# Patient Record
Sex: FEMALE | Race: WHITE | NOT HISPANIC OR LATINO | Employment: OTHER | URBAN - METROPOLITAN AREA
[De-identification: names, ages, dates, MRNs, and addresses within clinical notes are randomized per-mention and may not be internally consistent; named-entity substitution may affect disease eponyms.]

---

## 2018-05-17 ENCOUNTER — LAB REQUISITION (OUTPATIENT)
Dept: LAB | Facility: HOSPITAL | Age: 73
End: 2018-05-17

## 2018-05-17 DIAGNOSIS — Z12.11 ENCOUNTER FOR SCREENING FOR MALIGNANT NEOPLASM OF COLON: ICD-10-CM

## 2018-05-17 PROCEDURE — 88305 TISSUE EXAM BY PATHOLOGIST: CPT | Performed by: INTERNAL MEDICINE

## 2018-05-18 LAB
CYTO UR: NORMAL
LAB AP CASE REPORT: NORMAL
LAB AP CLINICAL INFORMATION: NORMAL
Lab: NORMAL
PATH REPORT.FINAL DX SPEC: NORMAL
PATH REPORT.GROSS SPEC: NORMAL

## 2018-12-13 ENCOUNTER — OFFICE VISIT (OUTPATIENT)
Dept: GASTROENTEROLOGY | Facility: CLINIC | Age: 73
End: 2018-12-13

## 2018-12-13 VITALS
HEIGHT: 64 IN | WEIGHT: 194.6 LBS | BODY MASS INDEX: 33.22 KG/M2 | HEART RATE: 110 BPM | SYSTOLIC BLOOD PRESSURE: 134 MMHG | DIASTOLIC BLOOD PRESSURE: 67 MMHG

## 2018-12-13 DIAGNOSIS — K92.1 HEMATOCHEZIA: Primary | ICD-10-CM

## 2018-12-13 PROCEDURE — 99213 OFFICE O/P EST LOW 20 MIN: CPT | Performed by: INTERNAL MEDICINE

## 2018-12-13 RX ORDER — MONTELUKAST SODIUM 4 MG/1
TABLET, CHEWABLE ORAL
COMMUNITY
Start: 2018-11-13 | End: 2019-03-11 | Stop reason: SDUPTHER

## 2018-12-13 RX ORDER — EZETIMIBE 10 MG/1
TABLET ORAL
COMMUNITY
Start: 2017-04-03 | End: 2018-12-13 | Stop reason: ALTCHOICE

## 2018-12-13 RX ORDER — RANITIDINE 150 MG/1
TABLET ORAL
COMMUNITY
End: 2018-12-13 | Stop reason: ALTCHOICE

## 2018-12-13 RX ORDER — DULOXETIN HYDROCHLORIDE 30 MG/1
CAPSULE, DELAYED RELEASE ORAL
COMMUNITY
Start: 2017-04-03 | End: 2018-12-13 | Stop reason: ALTCHOICE

## 2018-12-13 RX ORDER — ALBUTEROL SULFATE 90 UG/1
AEROSOL, METERED RESPIRATORY (INHALATION)
COMMUNITY
End: 2019-08-30 | Stop reason: SDUPTHER

## 2018-12-13 RX ORDER — LEVOTHYROXINE SODIUM 0.05 MG/1
50 TABLET ORAL DAILY
COMMUNITY
Start: 2018-11-23 | End: 2022-02-03

## 2018-12-13 RX ORDER — DULOXETIN HYDROCHLORIDE 60 MG/1
CAPSULE, DELAYED RELEASE ORAL
COMMUNITY
Start: 2018-09-10 | End: 2018-12-13 | Stop reason: ALTCHOICE

## 2018-12-13 RX ORDER — CLONAZEPAM 0.5 MG/1
TABLET, ORALLY DISINTEGRATING ORAL
COMMUNITY
End: 2020-09-25

## 2018-12-13 RX ORDER — PHENAZOPYRIDINE HYDROCHLORIDE 200 MG/1
TABLET, FILM COATED ORAL
COMMUNITY
Start: 2018-10-23 | End: 2018-12-13

## 2018-12-13 RX ORDER — TRAMADOL HYDROCHLORIDE 50 MG/1
TABLET ORAL
COMMUNITY
End: 2018-12-13 | Stop reason: ALTCHOICE

## 2018-12-13 RX ORDER — TRAZODONE HYDROCHLORIDE 50 MG/1
TABLET ORAL
COMMUNITY
Start: 2018-11-03 | End: 2018-12-13 | Stop reason: ALTCHOICE

## 2018-12-13 RX ORDER — FLUTICASONE PROPIONATE 50 MCG
SPRAY, SUSPENSION (ML) NASAL
COMMUNITY
End: 2019-11-01

## 2018-12-13 RX ORDER — HYDROCODONE BITARTRATE AND ACETAMINOPHEN 10; 325 MG/15ML; MG/15ML
SOLUTION ORAL
COMMUNITY
Start: 2017-05-26 | End: 2018-12-13 | Stop reason: ALTCHOICE

## 2018-12-13 RX ORDER — DILTIAZEM HYDROCHLORIDE 120 MG/1
CAPSULE, COATED, EXTENDED RELEASE ORAL
COMMUNITY
End: 2018-12-13 | Stop reason: HOSPADM

## 2018-12-13 RX ORDER — DIPHENOXYLATE HYDROCHLORIDE AND ATROPINE SULFATE 2.5; .025 MG/1; MG/1
TABLET ORAL
COMMUNITY
End: 2020-01-27 | Stop reason: SDUPTHER

## 2018-12-13 RX ORDER — TIZANIDINE 4 MG/1
TABLET ORAL
COMMUNITY
Start: 2018-12-10 | End: 2019-11-01

## 2018-12-13 RX ORDER — ROPINIROLE 4 MG/1
4 TABLET, FILM COATED ORAL 2 TIMES DAILY
COMMUNITY
Start: 2014-04-09 | End: 2021-02-22 | Stop reason: DRUGHIGH

## 2018-12-13 RX ORDER — CETIRIZINE HYDROCHLORIDE 10 MG/1
TABLET ORAL
COMMUNITY
Start: 2014-04-09 | End: 2019-11-01

## 2018-12-13 RX ORDER — AMITRIPTYLINE HYDROCHLORIDE 10 MG/1
TABLET, FILM COATED ORAL
COMMUNITY
End: 2018-12-13 | Stop reason: ALTCHOICE

## 2018-12-13 RX ORDER — MONTELUKAST SODIUM 10 MG/1
TABLET ORAL
COMMUNITY
Start: 2018-10-14 | End: 2019-11-01

## 2018-12-13 RX ORDER — LISINOPRIL 10 MG/1
TABLET ORAL
COMMUNITY
End: 2018-12-13 | Stop reason: HOSPADM

## 2018-12-13 RX ORDER — METOPROLOL SUCCINATE 50 MG/1
TABLET, EXTENDED RELEASE ORAL
COMMUNITY
End: 2018-12-13 | Stop reason: ALTCHOICE

## 2018-12-13 RX ORDER — HYDROCORTISONE ACETATE 25 MG/1
25 SUPPOSITORY RECTAL DAILY
Qty: 15 SUPPOSITORY | Refills: 0 | Status: SHIPPED | OUTPATIENT
Start: 2018-12-13 | End: 2019-11-01

## 2018-12-13 NOTE — PROGRESS NOTES
Gastroenterology Office Note  Lorrie Pagan  [id number]  1945    CARE TEAM  Patient Care Team:  Provider, No Known as PCP - General    No ref. provider found     Chief Complaint   Patient presents with   • Rectal Pain   • Rectal Bleeding        History of present illness:    Patient is a 73-year-old white female known to me.  I saw her earlier this year.  She had chronic diarrhea and had undergone colonoscopy.  The colonoscopy was nondiagnostic outside of a hyperplastic polyp and left-sided diverticulitis but no recommendation for chronic diarrhea.  Biopsies are negative for microscopic colitis.    .  She is here today with occasional problem rectal bleeding.  She stopped taking Colestid which was helping with her diarrhea and the diarrhea has not been problematic she is having very rare occasional problems with painless hematochezia in the absence of any other localizing GI symptoms specifically denying dysphagia, odynophagia, early satiety, unexplained weight loss, melanotic stools, pulse constipation or diarrhea.    Past Medical History:   Diagnosis Date   • Bronchitis 01/2018   • Cancer (CMS/HCC)     History of lung cancer and skin cancer    • Cardiac murmur    • Chronic cough    • Chronic obstructive pulmonary disease (CMS/HCC)    • GERD (gastroesophageal reflux disease)    • History of colonic polyps    • Irritable bowel syndrome     Constipation/diarrhea   • Osteoarthritis    • Sleep apnea         Past Surgical History:   Procedure Laterality Date   • BACK SURGERY     • CATARACT EXTRACTION     • CHOLECYSTECTOMY     • COLONOSCOPY     • HAND SURGERY     • HYSTERECTOMY     • INCONTINENCE SURGERY     • LUNG SURGERY     • NISSEN FUNDOPLICATION          Medications:    Current Outpatient Medications:   •  albuterol (VENTOLIN HFA) 108 (90 Base) MCG/ACT inhaler, Ventolin HFA 90 mcg/actuation aerosol inhaler, Disp: , Rfl:   •  cetirizine (ZYRTEC ALLERGY) 10 MG tablet, Zyrtec 10 mg tablet, Disp: , Rfl:   •   clonazePAM (KlonoPIN) 0.5 MG disintegrating tablet, clonazepam 0.5 mg disintegrating tablet  Place 1 tablet as needed by translingual route., Disp: , Rfl:   •  colestipol (COLESTID) 1 g tablet, , Disp: , Rfl:   •  diphenoxylate-atropine (LOMOTIL) 2.5-0.025 MG per tablet, Lomotil  PRN, Disp: , Rfl:   •  fluticasone (FLONASE ALLERGY RELIEF) 50 MCG/ACT nasal spray, Flonase Allergy Relief 50 mcg/actuation nasal spray,suspension  Take as needed by nasal route., Disp: , Rfl:   •  fluticasone-salmeterol (ADVAIR DISKUS) 250-50 MCG/DOSE DISKUS, Advair Diskus 250 mcg-50 mcg/dose powder for inhalation, Disp: , Rfl:   •  fluticasone-salmeterol (ADVAIR DISKUS) 500-50 MCG/DOSE DISKUS, Every 12 (Twelve) Hours., Disp: , Rfl:   •  Furosemide (LASIX PO), furosemide  40 mg PRN, Disp: , Rfl:   •  ipratropium (ATROVENT HFA) 17 MCG/ACT inhaler, ipratropium bromide  daily, Disp: , Rfl:   •  levothyroxine (SYNTHROID, LEVOTHROID) 50 MCG tablet, , Disp: , Rfl:   •  montelukast (SINGULAIR) 10 MG tablet, , Disp: , Rfl:   •  rOPINIRole (REQUIP) 3 MG tablet, Requip 3 mg tablet, Disp: , Rfl:   •  sodium chloride 0.9 % nebulizer solution 0.88 mL with albuterol (5 MG/ML) 0.5% nebulizer solution 0.6 mg, albuterol sulfate  PRN, Disp: , Rfl:   •  Tiotropium Bromide Monohydrate (SPIRIVA RESPIMAT) 1.25 MCG/ACT aerosol solution inhaler, Spiriva Respimat 1.25 mcg/actuation solution for inhalation, Disp: , Rfl:   •  tiZANidine (ZANAFLEX) 4 MG tablet, , Disp: , Rfl:   •  Zafirlukast (ACCOLATE PO), Accolate  daily, Disp: , Rfl:     Allergies:  Allergies   Allergen Reactions   • Penicillins Other (See Comments)   • Statins Other (See Comments)       Family History   Problem Relation Age of Onset   • Colon polyps Mother    • Colon polyps Father        Social History     Socioeconomic History   • Marital status: Unknown     Spouse name: Not on file   • Number of children: Not on file   • Years of education: Not on file   • Highest education level: Not on file  "  Tobacco Use   • Smoking status: Former Smoker     Types: Cigarettes     Last attempt to quit: 1992     Years since quittin.9   • Smokeless tobacco: Never Used   Substance and Sexual Activity   • Alcohol use: Yes     Alcohol/week: 1.2 oz     Types: 2 Glasses of wine per week   • Drug use: No   • Sexual activity: No     Socioeconomic History: 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  2 sons.  5 grandchildren.  Drinks 2 alcoholic beverages per week.  Quit smoking in .         Review of Systems:  Review of Systems   Constitutional: Negative for unexpected weight loss.   HENT: Negative for trouble swallowing.    Eyes: Negative.    Respiratory: Negative.    Gastrointestinal: Positive for abdominal distention, abdominal pain, anal bleeding, constipation, diarrhea, nausea, rectal pain and indigestion. Negative for blood in stool, vomiting and GERD.   Endocrine: Negative.    Genitourinary: Negative.    Musculoskeletal: Negative.    Skin: Negative.    Allergic/Immunologic: Negative.    Neurological: Negative.    Hematological: Negative.    Psychiatric/Behavioral: Negative.        PHYSICAL EXAM   /67 (BP Location: Right arm, Patient Position: Sitting, Cuff Size: Adult)   Pulse 110   Ht 162.6 cm (64\")   Wt 88.3 kg (194 lb 9.6 oz)   BMI 33.40 kg/m²   General: Alert and oriented x 3. In no apparent or acute distress.  and No stigmata of chronic liver disease Wearing Nasal cannua O2    HEENT: Anicteric slcera. Normal oropharynx  Neck: Supple. Without lymphadenopathy  CV: Regular rate and rhythm, S1, S2  Lungs: Clear to ausculation. Without rales, robchi and wheezing  Abdomen:  Soft,non-distended without palpable masses or hepatosplenomeagaly, areas of rebound tenderness or guarding.   Extremeties: without clubbing or cyanosis +1 edema  Neurologic:  Alert and oriented x 3 without focal motor or sensory deficits  Rectal exam: deferred     Results for orders placed or performed in visit " on 05/17/18   Tissue Pathology Exam   Result Value Ref Range    Case Report       Surgical Pathology Report                         Case: [id number]                                  Authorizing Provider:  Cortes Rodriguez            Collected:           05/17/2018 09:10 AM                                 MD Yana                                                              Pathologist:           Vladislav Canales MD          Received:            05/17/2018 10:17 AM          Specimen:    Large Intestine, Left / Descending Colon                                                   Clinical Information       The working history is colon cancer screening.       Final Diagnosis        DESCENDING COLON POLYP:  Hyperplastic polyp.    JFJ/dlb         Gross Description       Received in formalin labeled as descending colon polyp is a 0.3 x 0.3 x 0.3 cm tan polyp. Submitted in toto in one cassette.  HBM/klb       Microscopic Description       Sections show colonic crypts with surface luminal dilatation and stellate shapes.  There is no adenomatous change or significant atypia.          Embedded Images          Results Review:  I reviewed the patient's new clinical results.  Old records were reviewed      ASSESSMENT  1.-Recurrent hematochezia likely due to anal fissure/internal hemorrhoids.  Conservative management is recommended  2.-History of cholerrheic diarrhea.     PLAN  1.-Conservative management.  Anusol HCC suppositories when necessary for refractory hematochezia  2.-High fiber diet  3.-Further workup and evaluation only for refractory symptoms.  Patient will will recheck to us and let us know if this is the case.      I discussed the patients findings and my recommendations with patient    Cortes Millan MD  12/13/2018   4:00 PM

## 2018-12-17 PROBLEM — K92.1 HEMATOCHEZIA: Status: ACTIVE | Noted: 2018-12-17

## 2019-03-11 RX ORDER — MONTELUKAST SODIUM 4 MG/1
TABLET, CHEWABLE ORAL
Qty: 60 TABLET | Refills: 4 | Status: SHIPPED | OUTPATIENT
Start: 2019-03-11 | End: 2019-07-25 | Stop reason: SDUPTHER

## 2019-07-25 RX ORDER — MONTELUKAST SODIUM 4 MG/1
TABLET, CHEWABLE ORAL
Qty: 60 TABLET | Refills: 4 | Status: SHIPPED | OUTPATIENT
Start: 2019-07-25 | End: 2019-10-17 | Stop reason: SDUPTHER

## 2019-08-28 ENCOUNTER — OFFICE VISIT (OUTPATIENT)
Dept: PULMONOLOGY | Facility: CLINIC | Age: 74
End: 2019-08-28

## 2019-08-28 VITALS
TEMPERATURE: 98.4 F | HEART RATE: 97 BPM | BODY MASS INDEX: 35.08 KG/M2 | OXYGEN SATURATION: 90 % | DIASTOLIC BLOOD PRESSURE: 60 MMHG | WEIGHT: 198 LBS | SYSTOLIC BLOOD PRESSURE: 126 MMHG | HEIGHT: 63 IN

## 2019-08-28 DIAGNOSIS — J44.9 CHRONIC OBSTRUCTIVE PULMONARY DISEASE, UNSPECIFIED COPD TYPE (HCC): Primary | ICD-10-CM

## 2019-08-28 DIAGNOSIS — Z99.89 OSA ON CPAP: ICD-10-CM

## 2019-08-28 DIAGNOSIS — J96.11 CHRONIC RESPIRATORY FAILURE WITH HYPOXIA (HCC): ICD-10-CM

## 2019-08-28 DIAGNOSIS — J47.9 IDIOPATHIC BRONCHIECTASIS (HCC): ICD-10-CM

## 2019-08-28 DIAGNOSIS — K44.9 HIATAL HERNIA: ICD-10-CM

## 2019-08-28 DIAGNOSIS — Z85.118 H/O: LUNG CANCER: ICD-10-CM

## 2019-08-28 DIAGNOSIS — G47.33 OSA ON CPAP: ICD-10-CM

## 2019-08-28 DIAGNOSIS — E66.9 OBESITY, CLASS II, BMI 35-39.9: ICD-10-CM

## 2019-08-28 DIAGNOSIS — Z87.891 FORMER SMOKER: ICD-10-CM

## 2019-08-28 PROCEDURE — 94060 EVALUATION OF WHEEZING: CPT | Performed by: INTERNAL MEDICINE

## 2019-08-28 PROCEDURE — 94726 PLETHYSMOGRAPHY LUNG VOLUMES: CPT | Performed by: INTERNAL MEDICINE

## 2019-08-28 PROCEDURE — 94729 DIFFUSING CAPACITY: CPT | Performed by: INTERNAL MEDICINE

## 2019-08-28 PROCEDURE — 99204 OFFICE O/P NEW MOD 45 MIN: CPT | Performed by: INTERNAL MEDICINE

## 2019-08-28 RX ORDER — IPRATROPIUM BROMIDE 42 UG/1
SPRAY, METERED NASAL
COMMUNITY
End: 2020-09-25

## 2019-08-28 RX ORDER — ALBUTEROL SULFATE 90 UG/1
4 AEROSOL, METERED RESPIRATORY (INHALATION) ONCE
Status: COMPLETED | OUTPATIENT
Start: 2019-08-28 | End: 2019-08-28

## 2019-08-28 RX ORDER — LISINOPRIL 5 MG/1
TABLET ORAL
Refills: 3 | COMMUNITY
Start: 2019-07-09 | End: 2019-11-01

## 2019-08-28 RX ORDER — AMITRIPTYLINE HYDROCHLORIDE 25 MG/1
TABLET, FILM COATED ORAL
COMMUNITY
End: 2020-09-25

## 2019-08-28 RX ADMIN — ALBUTEROL SULFATE 4 PUFF: 90 AEROSOL, METERED RESPIRATORY (INHALATION) at 09:17

## 2019-08-29 PROBLEM — J44.9 COPD MIXED TYPE: Status: ACTIVE | Noted: 2019-08-29

## 2019-08-29 PROBLEM — E66.9 OBESITY, CLASS II, BMI 35-39.9: Status: ACTIVE | Noted: 2019-08-29

## 2019-08-29 PROBLEM — E66.812 OBESITY, CLASS II, BMI 35-39.9: Status: ACTIVE | Noted: 2019-08-29

## 2019-08-29 PROBLEM — J96.11 CHRONIC RESPIRATORY FAILURE WITH HYPOXIA (HCC): Status: ACTIVE | Noted: 2019-08-29

## 2019-08-29 PROBLEM — Z99.89 OSA ON CPAP: Status: ACTIVE | Noted: 2019-08-29

## 2019-08-29 PROBLEM — K44.9 HIATAL HERNIA: Status: ACTIVE | Noted: 2019-08-29

## 2019-08-29 PROBLEM — Z85.118 H/O: LUNG CANCER: Status: ACTIVE | Noted: 2019-08-29

## 2019-08-29 PROBLEM — J47.9 IDIOPATHIC BRONCHIECTASIS (HCC): Status: ACTIVE | Noted: 2019-08-29

## 2019-08-29 PROBLEM — Z87.891 FORMER SMOKER: Status: ACTIVE | Noted: 2019-08-29

## 2019-08-29 PROBLEM — G47.33 OSA ON CPAP: Status: ACTIVE | Noted: 2019-08-29

## 2019-08-29 NOTE — PROGRESS NOTES
PULMONARY  NOTE    Chief Complaint     COPD, former smoker, history of lung cancer, history of bronchiectasis    History of Present Illness     73-year-old white female has been referred for evaluation of chronic lung disease.    She previously lived in [place] where she has had most of her medical care.  She recently moved to [place] and had been seen at [facility].  However due to difficulty getting into the clinic at [facility] she would like to switch her care to our office.    She has a past history of tobacco abuse that consisted of 1 or more packs of cigarettes per day.  She stopped smoking in 1992.    She has a history of moderate chronic obstructive pulmonary disease and has been on a medical regimen of Spiriva and Advair.  Insurance is apparently paying for these medications.    She has a history of obstructive sleep apnea and has CPAP which she uses with supplemental oxygen at night.    She has chronic respiratory failure and uses supplemental oxygen 24 hours a day.    She has regular reflux symptoms.  She did have a Nissen fundoplication in the past however that was over 10 years ago.  She continues to use a PPI and still has reflux symptoms.    She has fairly regular sinus symptoms with sinus congestion, drainage, and postnasal drip.  She is been on multiple OTC medications including Atrovent nasal spray.    She has a history of apparently limited stage lung cancer previously underwent a left upper lobe lobectomy.  She is had no evidence of recurrence to date.    Patient Active Problem List   Diagnosis   • Hematochezia   • Moderate (Stage II) COPD   • Bronchiectasis (CMS/HCC)   • H/O: lung cancer (Prior resection 2016)   • Former smoker (None since 1992)   • Chronic respiratory failure   • RENETTA on CPAP   • Obesity, Class II, BMI 35-39.9   • Hiatal hernia (Prior Nissen 2008)     Allergies   Allergen Reactions   • Penicillins Other (See Comments)     rash   • Statins Other (See Comments)     myalgia        Current Outpatient Medications:   •  albuterol (VENTOLIN HFA) 108 (90 Base) MCG/ACT inhaler, Ventolin HFA 90 mcg/actuation aerosol inhaler, Disp: , Rfl:   •  amitriptyline (ELAVIL) 10 MG tablet, amitriptyline 10 mg tablet, Disp: , Rfl:   •  cetirizine (ZYRTEC ALLERGY) 10 MG tablet, Zyrtec 10 mg tablet, Disp: , Rfl:   •  clonazePAM (KlonoPIN) 0.5 MG disintegrating tablet, clonazepam 0.5 mg disintegrating tablet  Place 1 tablet as needed by translingual route., Disp: , Rfl:   •  colestipol (COLESTID) 1 g tablet, Take 2 tablets (2 grams) by mouth daily . Keep two hours apart form other medications., Disp: 60 tablet, Rfl: 4  •  fluticasone (FLONASE ALLERGY RELIEF) 50 MCG/ACT nasal spray, Flonase Allergy Relief 50 mcg/actuation nasal spray,suspension  Take as needed by nasal route., Disp: , Rfl:   •  fluticasone-salmeterol (ADVAIR DISKUS) 250-50 MCG/DOSE DISKUS, Advair Diskus 250 mcg-50 mcg/dose powder for inhalation, Disp: , Rfl:   •  Furosemide (LASIX PO), furosemide  40 mg PRN, Disp: , Rfl:   •  HYDROcod Polst-CPM Polst ER (TUSSIONEX PENNKINETIC) 10-8 MG/5ML ER suspension, TK 10 ML PO BID PRN, Disp: , Rfl: 0  •  hydrocortisone (ANUSOL-HC) 25 MG suppository, Insert 1 suppository into the rectum Daily., Disp: 15 suppository, Rfl: 0  •  ipratropium (ATROVENT) 0.06 % nasal spray, ipratropium bromide 42 mcg (0.06 %) nasal spray, Disp: , Rfl:   •  levothyroxine (SYNTHROID, LEVOTHROID) 50 MCG tablet, , Disp: , Rfl:   •  lisinopril (PRINIVIL,ZESTRIL) 5 MG tablet, TK 1 T PO D UTD, Disp: , Rfl: 3  •  metFORMIN (GLUCOPHAGE) 500 MG tablet, TK 1 T PO Q 12 H WF, Disp: , Rfl: 0  •  montelukast (SINGULAIR) 10 MG tablet, , Disp: , Rfl:   •  rOPINIRole (REQUIP) 3 MG tablet, Requip 3 mg tablet, Disp: , Rfl:   •  sodium chloride 0.9 % nebulizer solution 0.88 mL with albuterol (5 MG/ML) 0.5% nebulizer solution 0.6 mg, albuterol sulfate  PRN, Disp: , Rfl:   •  Tiotropium Bromide Monohydrate (SPIRIVA RESPIMAT) 1.25 MCG/ACT aerosol  "solution inhaler, Spiriva Respimat 1.25 mcg/actuation solution for inhalation, Disp: , Rfl:   •  tiZANidine (ZANAFLEX) 4 MG tablet, , Disp: , Rfl:   •  Zafirlukast (ACCOLATE PO), Accolate  daily, Disp: , Rfl:   •  diphenoxylate-atropine (LOMOTIL) 2.5-0.025 MG per tablet, Lomotil  PRN, Disp: , Rfl:   No current facility-administered medications for this visit.   MEDICATION LIST AND ALLERGIES REVIEWED.    Family History   Problem Relation Age of Onset   • Colon polyps Mother    • Emphysema Mother    • Colon polyps Father      Social History     Tobacco Use   • Smoking status: Former Smoker     Packs/day: 1.50     Years: 25.00     Pack years: 37.50     Types: Cigarettes     Last attempt to quit: 1992     Years since quittin.6   • Smokeless tobacco: Never Used   Substance Use Topics   • Alcohol use: Yes     Alcohol/week: 1.2 oz     Types: 2 Glasses of wine per week   • Drug use: No     Social History     Social History Narrative    Previously lived in [facility], recently moved here        Retired    Previously smoked approximately 1 pack cigarettes per day for 25 years and stop smoking in     Drinks 3-4 alcoholic beverages on a weekly basis     FAMILY AND SOCIAL HISTORY REVIEWED.    Review of Systems  ALSO REFER TO SCANNED ROS SHEET FROM SAME DATE.    /60   Pulse 97   Temp 98.4 °F (36.9 °C)   Ht 158.8 cm (62.5\")   Wt 89.8 kg (198 lb)   LMP  (LMP Unknown)   SpO2 90% Comment: resting, 5 liters pulse dose  Breastfeeding? No   BMI 35.64 kg/m²   Physical Exam   Constitutional: She is oriented to person, place, and time. She appears well-developed. No distress.   HENT:   Head: Normocephalic and atraumatic.   Neck: No thyromegaly present.   Cardiovascular: Normal rate, regular rhythm and normal heart sounds.   No murmur heard.  Pulmonary/Chest: Effort normal. No stridor.   Decreased breath sounds with few rhonchi bilaterally that clear with cough, no wheezes   Abdominal: Soft. Bowel sounds are " normal.   Musculoskeletal: Normal range of motion.   Trace ankle edema   Lymphadenopathy:     She has no cervical adenopathy.        Right: No supraclavicular and no epitrochlear adenopathy present.        Left: No supraclavicular and no epitrochlear adenopathy present.   Neurological: She is alert and oriented to person, place, and time.   Skin: Skin is warm and dry. She is not diaphoretic.   Psychiatric: She has a normal mood and affect. Her behavior is normal.   Nursing note and vitals reviewed.      Results     Chest x-ray reveals chronic appearing interstitial changes without consolidation or effusions    PFTs reveal moderate airway obstruction, mild improvement in FEV1 after bronchodilator.  No restriction in a normal corrected diffusion capacity    Problem List       ICD-10-CM ICD-9-CM   1. Moderate (Stage II) COPD J44.9 496   2. Bronchiectasis (CMS/[facility]) J47.9 494.0   3. Chronic respiratory failure J96.11 518.83     799.02   4. Former smoker (None since 1992) Z87.891 V15.82   5. H/O: lung cancer (Prior resection 2016) Z85.118 V10.11   6. Hiatal hernia (Prior Nissen 2008) K44.9 553.3   7. Obesity, Class II, BMI 35-39.9 E66.9 278.00   8. RENETTA on CPAP G47.33 327.23    Z99.89 V46.8       Discussion     We reviewed her test results.  She has moderate obstructive airways disease.  I suspect she has a combination of emphysema, chronic bronchitis, and bronchiectasis.  Her lung disease is most likely due to her history of tobacco abuse.  However, will get an alpha-1 antitrypsin screen.  A suspect one is been done in the past but I do not have any record of it.    Regarding her bronchiectasis I recommended regular use of nebulized albuterol and a flutter valve.  I would suggest this at least twice a day.  I gave her a prescription for an Aerobika flutter valve.    She will remain on Spiriva and Advair for her moderate COPD    Although she has not been on frequent antibiotics she has had frequent exacerbations of cough  and sputum production and we will trial suppressive azithromycin on a Monday, Wednesday, and Friday schedule.  I gave her written instructions, a prescription, and we discussed potential side effects.    She has evidence of fluid retention probably due to chronic cor pulmonale.  I recommended salt and water avoidance.    Her chronic cough is most likely due to her COPD and bronchiectasis.  However, lisinopril (and reflux) could be complicating factors.  I suggested a trial off of lisinopril to see if her cough improves.    Reflux is probably also contributing to her multiple symptoms.  We discussed reflux precautions and I gave her reflux information sheet.    I think she would benefit from pulmonary rehabilitation.  We will make that referral.    I have encouraged continued compliance with her CPAP    We will obtain a CT scan of the chest to further evaluate for underlying bronchiectasis, interstitial lung disease, and as for surveillance for her history of lung cancer    Vladislav Arboleda MD  Note electronically signed    CC: Evangelina Luna MD

## 2019-08-30 DIAGNOSIS — J44.9 CHRONIC OBSTRUCTIVE PULMONARY DISEASE, UNSPECIFIED COPD TYPE (HCC): Primary | ICD-10-CM

## 2019-08-30 RX ORDER — ALBUTEROL SULFATE 90 UG/1
2 AEROSOL, METERED RESPIRATORY (INHALATION) EVERY 6 HOURS PRN
Qty: 8 G | Refills: 2 | Status: SHIPPED | OUTPATIENT
Start: 2019-08-30 | End: 2020-04-27

## 2019-09-03 DIAGNOSIS — J44.9 CHRONIC OBSTRUCTIVE PULMONARY DISEASE, UNSPECIFIED COPD TYPE (HCC): ICD-10-CM

## 2019-09-03 DIAGNOSIS — Z85.118 H/O: LUNG CANCER: ICD-10-CM

## 2019-09-03 DIAGNOSIS — J47.9 IDIOPATHIC BRONCHIECTASIS (HCC): Primary | ICD-10-CM

## 2019-09-10 ENCOUNTER — HOSPITAL ENCOUNTER (OUTPATIENT)
Dept: CT IMAGING | Facility: HOSPITAL | Age: 74
Discharge: HOME | End: 2019-09-10
Admitting: INTERNAL MEDICINE

## 2019-09-10 DIAGNOSIS — J47.9 IDIOPATHIC BRONCHIECTASIS (HCC): ICD-10-CM

## 2019-09-10 DIAGNOSIS — Z85.118 H/O: LUNG CANCER: ICD-10-CM

## 2019-09-10 PROCEDURE — 71250 CT THORAX DX C-: CPT

## 2019-09-13 ENCOUNTER — TELEPHONE (OUTPATIENT)
Dept: PULMONOLOGY | Facility: CLINIC | Age: 74
End: 2019-09-13

## 2019-09-16 ENCOUNTER — TELEPHONE (OUTPATIENT)
Dept: PULMONOLOGY | Facility: CLINIC | Age: 74
End: 2019-09-16

## 2019-09-16 NOTE — TELEPHONE ENCOUNTER
Called CT results to patient.  She does follow with a GI as well, and is due her endoscopy for evaluation.

## 2019-09-17 ENCOUNTER — PREP FOR SURGERY (OUTPATIENT)
Dept: OTHER | Facility: HOSPITAL | Age: 74
End: 2019-09-17

## 2019-09-17 DIAGNOSIS — K22.89 ESOPHAGEAL DILATATION: ICD-10-CM

## 2019-09-17 DIAGNOSIS — K21.9 GASTRIC REFLUX: Primary | ICD-10-CM

## 2019-09-20 PROBLEM — K22.89 ESOPHAGEAL DILATATION: Status: ACTIVE | Noted: 2019-09-20

## 2019-09-20 PROBLEM — K21.9 GASTRIC REFLUX: Status: ACTIVE | Noted: 2019-09-20

## 2019-09-30 ENCOUNTER — TELEPHONE (OUTPATIENT)
Dept: PULMONOLOGY | Facility: CLINIC | Age: 74
End: 2019-09-30

## 2019-09-30 NOTE — TELEPHONE ENCOUNTER
Pt called today c/o Sever SOB/Swelling/ and Fatigue for 3-4 days. Pt denies Chest Pain/Cough/Nause. Pt admits she is on Rx Azithromycin MWF and is currently taking Bactrim for UTI along with O2 24 hours daily. Pt can be reached @ [phone]. Please advise.

## 2019-09-30 NOTE — TELEPHONE ENCOUNTER
She will need to be seen and be evaluated, to get a better idea of what is going on.  She can see me or Jessica for a sick visit.  No testing, we can decide when she gets here.

## 2019-09-30 NOTE — TELEPHONE ENCOUNTER
CALLED AND SPOKE TO PT TO SCHEDULE APPT WITH [facility] FOR TOMORROW AT 11:00. PT STATED UNDERSTANDING

## 2019-09-30 NOTE — TELEPHONE ENCOUNTER
Please schedule pt with Jessica or Saray this week for sick f/u. No testing needed. Pt can be reached @ [phone].

## 2019-10-01 ENCOUNTER — OFFICE VISIT (OUTPATIENT)
Dept: PULMONOLOGY | Facility: CLINIC | Age: 74
End: 2019-10-01

## 2019-10-01 VITALS
HEIGHT: 63 IN | OXYGEN SATURATION: 90 % | SYSTOLIC BLOOD PRESSURE: 160 MMHG | TEMPERATURE: 97.6 F | DIASTOLIC BLOOD PRESSURE: 70 MMHG | BODY MASS INDEX: 36.11 KG/M2 | HEART RATE: 95 BPM | WEIGHT: 203.8 LBS

## 2019-10-01 DIAGNOSIS — E66.9 OBESITY, CLASS II, BMI 35-39.9: ICD-10-CM

## 2019-10-01 DIAGNOSIS — G47.33 OSA ON CPAP: ICD-10-CM

## 2019-10-01 DIAGNOSIS — R06.02 SHORTNESS OF BREATH: Primary | ICD-10-CM

## 2019-10-01 DIAGNOSIS — J96.11 CHRONIC RESPIRATORY FAILURE WITH HYPOXIA (HCC): ICD-10-CM

## 2019-10-01 DIAGNOSIS — J47.9 IDIOPATHIC BRONCHIECTASIS (HCC): ICD-10-CM

## 2019-10-01 DIAGNOSIS — Z99.89 OSA ON CPAP: ICD-10-CM

## 2019-10-01 DIAGNOSIS — J44.9 CHRONIC OBSTRUCTIVE PULMONARY DISEASE, UNSPECIFIED COPD TYPE (HCC): ICD-10-CM

## 2019-10-01 PROCEDURE — 87116 MYCOBACTERIA CULTURE: CPT | Performed by: NURSE PRACTITIONER

## 2019-10-01 PROCEDURE — 87205 SMEAR GRAM STAIN: CPT | Performed by: NURSE PRACTITIONER

## 2019-10-01 PROCEDURE — 99214 OFFICE O/P EST MOD 30 MIN: CPT | Performed by: NURSE PRACTITIONER

## 2019-10-01 PROCEDURE — 87206 SMEAR FLUORESCENT/ACID STAI: CPT | Performed by: NURSE PRACTITIONER

## 2019-10-01 PROCEDURE — 87070 CULTURE OTHR SPECIMN AEROBIC: CPT | Performed by: NURSE PRACTITIONER

## 2019-10-01 RX ORDER — SULFAMETHOXAZOLE AND TRIMETHOPRIM 200; 40 MG/5ML; MG/5ML
SUSPENSION ORAL 2 TIMES DAILY
COMMUNITY
End: 2019-10-25

## 2019-10-01 RX ORDER — ALBUTEROL SULFATE 2.5 MG/3ML
2.5 SOLUTION RESPIRATORY (INHALATION) 4 TIMES DAILY PRN
Qty: 125 VIAL | Refills: 3 | Status: SHIPPED | OUTPATIENT
Start: 2019-10-01 | End: 2020-09-25

## 2019-10-01 RX ORDER — PREDNISONE 10 MG/1
TABLET ORAL
Qty: 31 TABLET | Refills: 0 | Status: SHIPPED | OUTPATIENT
Start: 2019-10-01 | End: 2019-10-25

## 2019-10-01 NOTE — PROGRESS NOTES
[facility] Pulmonary Follow Up Note    Chief Complaint:  Chief Complaint   Patient presents with   • Shortness of Breath       History of Present Illness:  Lorrie Pagan is a 74 y.o.female here today for a sick visit with Shortness of Breath. She was last seen in the clinic on 8/28/19 by Dr Zaire Arboleda for an initial referral to our office for evaluation of chronic lung disease.    She presents today with increased shortness of breath, wheezing, with productive cough for the last 3-4 days. Her sputum is thick, but remains clear to white. She denies fever, chills, hemoptysis, chest pain, palpitations. She does have lower extremity edema, however she admits that it is at baseline and is chronic for her. She is also being treated for an acute UTI with Bactrim, which was started 2 days ago by her PCP.     Dr Arboleda has also started her on a trial of suppressive azithromycin on a Monday, Wednesday, and Friday because of frequent exacerbations and she has been taking it as prescribed. She also admits to some sinus symptoms with sinus congestion and postnasal drip. She is been on multiple OTC medications including Atrovent nasal spray and Mucinex DM. She was previously on Singulair, but has not been taking it lately.      She previously lived in [place] where she has had most of her medical care. She recently moved to [place] and had been seen at [facility], however due to difficulty getting into the clinic, she wanted to switch her care to our office. She is actually planning to switch most of her care to [facility].    She has a past history of tobacco abuse that consisted of 1 or more packs of cigarettes per day. She stopped smoking in 1992. She has a history of moderate chronic obstructive pulmonary disease that was confirmed by PFT at her last office appointment. She is currently on a maintenance regimen of Spiriva Resinat 1.25 and Advair Diskus 250/50.  Dr Arboleda also recommended adding albuterol  nebulizer treatments with a flutter valve for her bronchiectasis.     She has a history of obstructive sleep apnea and has CPAP which she uses with supplemental oxygen at night.     She has chronic respiratory failure and uses supplemental oxygen 24 hours a day. She is on 3 L at rest and increases to 4 L with activity.    I have reviewed the patients past medical, past surgical and family history as noted in Epic.     Subjective     Social History     Socioeconomic History   • Marital status: Unknown     Spouse name: Not on file   • Number of children: Not on file   • Years of education: Not on file   • Highest education level: Not on file   Tobacco Use   • Smoking status: Former Smoker     Packs/day: 1.50     Years: 25.00     Pack years: 37.50     Types: Cigarettes     Last attempt to quit: 1992     Years since quittin.7   • Smokeless tobacco: Never Used   Substance and Sexual Activity   • Alcohol use: Yes     Alcohol/week: 1.2 oz     Types: 2 Glasses of wine per week   • Drug use: No   • Sexual activity: No   Social History Narrative    Previously lived in [facility], recently moved here        Retired    Previously smoked approximately 1 pack cigarettes per day for 25 years and stop smoking in     Drinks 3-4 alcoholic beverages on a weekly basis         Current Outpatient Medications:   •  sulfamethoxazole-trimethoprim (BACTRIM,SEPTRA) 200-40 MG/5ML suspension, Take  by mouth 2 (Two) Times a Day., Disp: , Rfl:   •  albuterol (PROVENTIL) (2.5 MG/3ML) 0.083% nebulizer solution, Take 2.5 mg by nebulization 4 (Four) Times a Day As Needed for Wheezing., Disp: 125 vial, Rfl: 3  •  albuterol sulfate HFA (VENTOLIN HFA) 108 (90 Base) MCG/ACT inhaler, Inhale 2 puffs Every 6 (Six) Hours As Needed for Wheezing., Disp: 8 g, Rfl: 2  •  amitriptyline (ELAVIL) 10 MG tablet, amitriptyline 10 mg tablet, Disp: , Rfl:   •  cetirizine (ZYRTEC ALLERGY) 10 MG tablet, Zyrtec 10 mg tablet, Disp: , Rfl:   •  clonazePAM  (KlonoPIN) 0.5 MG disintegrating tablet, clonazepam 0.5 mg disintegrating tablet  Place 1 tablet as needed by translingual route., Disp: , Rfl:   •  colestipol (COLESTID) 1 g tablet, Take 2 tablets (2 grams) by mouth daily . Keep two hours apart form other medications., Disp: 60 tablet, Rfl: 4  •  diphenoxylate-atropine (LOMOTIL) 2.5-0.025 MG per tablet, Lomotil  PRN, Disp: , Rfl:   •  fluticasone (FLONASE ALLERGY RELIEF) 50 MCG/ACT nasal spray, Flonase Allergy Relief 50 mcg/actuation nasal spray,suspension  Take as needed by nasal route., Disp: , Rfl:   •  fluticasone-salmeterol (ADVAIR DISKUS) 250-50 MCG/DOSE DISKUS, Advair Diskus 250 mcg-50 mcg/dose powder for inhalation, Disp: , Rfl:   •  Furosemide (LASIX PO), furosemide  40 mg PRN, Disp: , Rfl:   •  HYDROcod Polst-CPM Polst ER (TUSSIONEX PENNKINETIC) 10-8 MG/5ML ER suspension, TK 10 ML PO BID PRN, Disp: , Rfl: 0  •  hydrocortisone (ANUSOL-HC) 25 MG suppository, Insert 1 suppository into the rectum Daily., Disp: 15 suppository, Rfl: 0  •  ipratropium (ATROVENT) 0.06 % nasal spray, ipratropium bromide 42 mcg (0.06 %) nasal spray, Disp: , Rfl:   •  levothyroxine (SYNTHROID, LEVOTHROID) 50 MCG tablet, , Disp: , Rfl:   •  lisinopril (PRINIVIL,ZESTRIL) 5 MG tablet, TK 1 T PO D UTD, Disp: , Rfl: 3  •  metFORMIN (GLUCOPHAGE) 500 MG tablet, TK 1 T PO Q 12 H WF, Disp: , Rfl: 0  •  montelukast (SINGULAIR) 10 MG tablet, , Disp: , Rfl:   •  predniSONE (DELTASONE) 10 MG tablet, Take 4 tabs daily x 3 days, then take 3 tabs daily x 3 days, then take 2 tabs daily x 3 days, then take 1 tab daily x 3 days, Disp: 31 tablet, Rfl: 0  •  rOPINIRole (REQUIP) 3 MG tablet, Requip 3 mg tablet, Disp: , Rfl:   •  Tiotropium Bromide Monohydrate (SPIRIVA RESPIMAT) 1.25 MCG/ACT aerosol solution inhaler, Spiriva Respimat 1.25 mcg/actuation solution for inhalation, Disp: , Rfl:   •  tiZANidine (ZANAFLEX) 4 MG tablet, , Disp: , Rfl:   •  Zafirlukast (ACCOLATE PO), Accolate  daily, Disp: , Rfl:  "    Allergies   Allergen Reactions   • Penicillins Other (See Comments)     rash   • Statins Other (See Comments)     myalgia       Immunization History   Administered Date(s) Administered   • Flu Vaccine High Dose PF 65YR+ 10/28/2018   • Pneumococcal Conjugate 13-Valent (PCV13) 10/28/2018       Review of Systems:    Review of Systems   Constitutional: Positive for fatigue. Negative for chills and fever.   HENT: Positive for congestion, rhinorrhea and sneezing. Negative for sinus pressure, trouble swallowing and voice change.    Eyes: Negative for blurred vision and visual disturbance.   Respiratory: Positive for cough, shortness of breath and wheezing. Negative for apnea and chest tightness.    Cardiovascular: Positive for leg swelling. Negative for chest pain and palpitations.   Gastrointestinal: Positive for constipation and diarrhea. Negative for abdominal pain, nausea and vomiting.   Endocrine: Negative for cold intolerance and heat intolerance.   Genitourinary: Positive for dysuria (Current UTI per PCP). Negative for hematuria.   Musculoskeletal: Negative for arthralgias and joint swelling.   Skin: Negative for rash and bruise.   Allergic/Immunologic: Positive for environmental allergies. Negative for food allergies and immunocompromised state.   Neurological: Negative for dizziness, speech difficulty, weakness and headache.   Hematological: Negative for adenopathy. Bruises/bleeds easily.   Psychiatric/Behavioral: Negative for dysphoric mood. The patient is not nervous/anxious.        Objective     Vital Signs:  Vitals:    10/01/19 1012   BP: 160/70   BP Location: Right arm   Patient Position: Sitting   Pulse: 95   Temp: 97.6 °F (36.4 °C)   SpO2: 90%  Comment: 5 liters   Weight: 92.4 kg (203 lb 12.8 oz)   Height: 158.8 cm (62.5\")       Physical Exam:    Physical Exam   Constitutional: She is oriented to person, place, and time. She appears well-developed and well-nourished. She is obese.  HENT:   Head: " Normocephalic and atraumatic.   Mouth/Throat: Oropharynx is clear and moist.   Eyes: EOM are normal. Pupils are equal, round, and reactive to light.   Neck: Normal range of motion. Neck supple.   Cardiovascular: Normal rate and regular rhythm.   Pulmonary/Chest: Effort normal. No respiratory distress. She has decreased breath sounds. She has wheezes (expiratory).   Abdominal: Soft. Bowel sounds are normal.   Musculoskeletal: Normal range of motion. She exhibits edema (BLE).   Lymphadenopathy:        Head (right side): No submental, no submandibular and no tonsillar adenopathy present.        Head (left side): No submental, no submandibular and no tonsillar adenopathy present.     She has no cervical adenopathy.   Neurological: She is alert and oriented to person, place, and time.   Skin: Skin is warm and dry.   Psychiatric: She has a normal mood and affect. Judgment normal.   Nursing note and vitals reviewed.      Results Review:    I reviewed the patient's new clinical results.    Ct Chest Without Contrast    Result Date: 9/11/2019  1.  Severe centrilobular emphysema with advanced destructive characteristics predominantly involving the upper lobes with mild paraseptal emphysema component also noted. There is diffuse bronchial wall thickening suggesting likely ongoing bronchial inflammation. Multifocal atelectasis and scarring are identified without evidence for dominant pulmonary mass or lesion.  2.  Remote sternal fracture identified.  3.  Nonspecific mild esophageal wall thickening suggesting possible gastroesophageal reflux disease, clinical correlation required.  D:  09/11/2019 E:  09/11/2019  This report was finalized on 9/11/2019 3:51 PM by Dr. Gage Noble MD.      Assessment/Plan   Assessment / Plan:    Problem List Items Addressed This Visit        Respiratory    Bronchiectasis (CMS/HCC)    Relevant Medications    predniSONE (DELTASONE) 10 MG tablet    Chronic respiratory failure    RENETTA on CPAP        "Other    Obesity, Class II, BMI 35-39.9      Other Visit Diagnoses     Shortness of breath    -  Primary    Relevant Medications    predniSONE (DELTASONE) 10 MG tablet    Other Relevant Orders    Respiratory Culture - Sputum, Cough    AFB Culture - Sputum, Cough    Moderate (Stage II) COPD        Relevant Medications    predniSONE (DELTASONE) 10 MG tablet          Discussion:    Patient presents today for a sick visit c/o increased shortness of breath, wheezing, and productive cough. Her sputum is thick, but non-colored, with no complaints of fever, chills. She does have wheezing on exam so I will send in a short steroid taper. Potential side effects were reviewed. I will also order a respiratory and AFB culture today. She should have her flu vaccination on return.    She will complete her coarse of Bactrim for her UTI as prescribed by her PCP. She will also continue her suppressive azithromycin on a Monday, Wednesday, and Friday schedule.    She will remain on Advair 250/50 for her moderate COPD. She has been on Spiriva Respimat 1.25 for \"asthma\". I will give her enough samples today for Spiriva 2.5 til she returns to the office to see if she does better with it. If so, we can change her prescription at her return visit.  She will continue regular use of nebulized albuterol and a flutter valve, at least twice daily. She may use up to 4 times daily as needed. I gave her samples of her albuterol neb and renewed her prescription today.    She will continue her oxygent therapy with 3 L at rest, 4 L with activity. She agrees to continue CPAP nightly with supplemental oxygen.     She did have a  CT scan of the chest to further evaluate for underlying bronchiectasis, interstitial lung disease, and as for surveillance for her history of lung cancer. Dr Arboleda can review it with her at her next visit.    She will return on 10/25/19 for her already scheduled follow up appointment with Dr Zaire Arboleda.  She will call in " the meantime if she develops worsening symptoms, fever, chills, or colored sputum.    Plan of care was reviewed with the patient at the conclusion of today's visit. Education was provided regarding diagnosis, management, and any prescribed or recommended over the counter medications. Patient verbalizes understanding of and agreement with management plan.     I spent 25 minutes with the patient. I spent > 50% percent of this time counseling and discussing diagnosis, prognosis, diagnostic testing, evaluation, treatment options and management.    DNOALD Petty  Electronically signed     Please note that portions of this note were completed with a voice recognition program. Efforts were made to edit the dictations, but occasionally words are mistranscribed.

## 2019-10-03 LAB
BACTERIA SPEC RESP CULT: NORMAL
GRAM STN SPEC: NORMAL

## 2019-10-17 ENCOUNTER — APPOINTMENT (OUTPATIENT)
Dept: GENERAL RADIOLOGY | Facility: HOSPITAL | Age: 74
End: 2019-10-17

## 2019-10-17 ENCOUNTER — APPOINTMENT (OUTPATIENT)
Dept: CT IMAGING | Facility: HOSPITAL | Age: 74
End: 2019-10-17

## 2019-10-17 ENCOUNTER — HOSPITAL ENCOUNTER (EMERGENCY)
Facility: HOSPITAL | Age: 74
Discharge: HOME | End: 2019-10-17
Attending: EMERGENCY MEDICINE | Admitting: EMERGENCY MEDICINE

## 2019-10-17 ENCOUNTER — TELEPHONE (OUTPATIENT)
Dept: PULMONOLOGY | Facility: CLINIC | Age: 74
End: 2019-10-17

## 2019-10-17 VITALS
HEART RATE: 98 BPM | DIASTOLIC BLOOD PRESSURE: 64 MMHG | WEIGHT: 190 LBS | HEIGHT: 64 IN | SYSTOLIC BLOOD PRESSURE: 140 MMHG | TEMPERATURE: 98.3 F | RESPIRATION RATE: 18 BRPM | OXYGEN SATURATION: 98 % | BODY MASS INDEX: 32.44 KG/M2

## 2019-10-17 DIAGNOSIS — J44.1 COPD EXACERBATION (HCC): Primary | ICD-10-CM

## 2019-10-17 DIAGNOSIS — R09.02 HYPOXIA: ICD-10-CM

## 2019-10-17 LAB
ALBUMIN SERPL-MCNC: 4.2 G/DL (ref 3.5–5.2)
ALBUMIN/GLOB SERPL: 1.7 G/DL
ALP SERPL-CCNC: 84 U/L (ref 39–117)
ALT SERPL W P-5'-P-CCNC: 12 U/L (ref 1–33)
ANION GAP SERPL CALCULATED.3IONS-SCNC: 6 MMOL/L (ref 5–15)
AST SERPL-CCNC: 13 U/L (ref 1–32)
BASOPHILS # BLD AUTO: 0.03 10*3/MM3 (ref 0–0.2)
BASOPHILS NFR BLD AUTO: 0.2 % (ref 0–1.5)
BILIRUB SERPL-MCNC: 0.3 MG/DL (ref 0.2–1.2)
BUN BLD-MCNC: 11 MG/DL (ref 8–23)
BUN/CREAT SERPL: 19 (ref 7–25)
CALCIUM SPEC-SCNC: 8.9 MG/DL (ref 8.6–10.5)
CHLORIDE SERPL-SCNC: 100 MMOL/L (ref 98–107)
CO2 SERPL-SCNC: 36 MMOL/L (ref 22–29)
CREAT BLD-MCNC: 0.58 MG/DL (ref 0.57–1)
D DIMER PPP FEU-MCNC: 1.04 MCGFEU/ML (ref 0–0.56)
DEPRECATED RDW RBC AUTO: 49 FL (ref 37–54)
EOSINOPHIL # BLD AUTO: 0.18 10*3/MM3 (ref 0–0.4)
EOSINOPHIL NFR BLD AUTO: 1.3 % (ref 0.3–6.2)
ERYTHROCYTE [DISTWIDTH] IN BLOOD BY AUTOMATED COUNT: 14.2 % (ref 12.3–15.4)
GFR SERPL CREATININE-BSD FRML MDRD: 101 ML/MIN/1.73
GLOBULIN UR ELPH-MCNC: 2.5 GM/DL
GLUCOSE BLD-MCNC: 157 MG/DL (ref 65–99)
HCT VFR BLD AUTO: 40.7 % (ref 34–46.6)
HGB BLD-MCNC: 11.9 G/DL (ref 12–15.9)
HOLD SPECIMEN: NORMAL
HOLD SPECIMEN: NORMAL
IMM GRANULOCYTES # BLD AUTO: 0.12 10*3/MM3 (ref 0–0.05)
IMM GRANULOCYTES NFR BLD AUTO: 0.9 % (ref 0–0.5)
LYMPHOCYTES # BLD AUTO: 1.69 10*3/MM3 (ref 0.7–3.1)
LYMPHOCYTES NFR BLD AUTO: 12.3 % (ref 19.6–45.3)
MCH RBC QN AUTO: 27.4 PG (ref 26.6–33)
MCHC RBC AUTO-ENTMCNC: 29.2 G/DL (ref 31.5–35.7)
MCV RBC AUTO: 93.6 FL (ref 79–97)
MONOCYTES # BLD AUTO: 1.02 10*3/MM3 (ref 0.1–0.9)
MONOCYTES NFR BLD AUTO: 7.4 % (ref 5–12)
NEUTROPHILS # BLD AUTO: 10.69 10*3/MM3 (ref 1.7–7)
NEUTROPHILS NFR BLD AUTO: 77.9 % (ref 42.7–76)
NRBC BLD AUTO-RTO: 0 /100 WBC (ref 0–0.2)
NT-PROBNP SERPL-MCNC: 65.7 PG/ML (ref 5–1800)
PLATELET # BLD AUTO: 312 10*3/MM3 (ref 140–450)
PMV BLD AUTO: 9.5 FL (ref 6–12)
POTASSIUM BLD-SCNC: 3.5 MMOL/L (ref 3.5–5.2)
PROT SERPL-MCNC: 6.7 G/DL (ref 6–8.5)
RBC # BLD AUTO: 4.35 10*6/MM3 (ref 3.77–5.28)
SODIUM BLD-SCNC: 142 MMOL/L (ref 136–145)
TROPONIN T SERPL-MCNC: <0.01 NG/ML (ref 0–0.03)
TROPONIN T SERPL-MCNC: <0.01 NG/ML (ref 0–0.03)
WBC NRBC COR # BLD: 13.73 10*3/MM3 (ref 3.4–10.8)
WHOLE BLOOD HOLD SPECIMEN: NORMAL
WHOLE BLOOD HOLD SPECIMEN: NORMAL

## 2019-10-17 PROCEDURE — 94799 UNLISTED PULMONARY SVC/PX: CPT

## 2019-10-17 PROCEDURE — 85025 COMPLETE CBC W/AUTO DIFF WBC: CPT | Performed by: EMERGENCY MEDICINE

## 2019-10-17 PROCEDURE — 96374 THER/PROPH/DIAG INJ IV PUSH: CPT

## 2019-10-17 PROCEDURE — 71045 X-RAY EXAM CHEST 1 VIEW: CPT

## 2019-10-17 PROCEDURE — 83880 ASSAY OF NATRIURETIC PEPTIDE: CPT | Performed by: EMERGENCY MEDICINE

## 2019-10-17 PROCEDURE — 99284 EMERGENCY DEPT VISIT MOD MDM: CPT

## 2019-10-17 PROCEDURE — 0 IOPAMIDOL PER 1 ML: Performed by: EMERGENCY MEDICINE

## 2019-10-17 PROCEDURE — 93005 ELECTROCARDIOGRAM TRACING: CPT | Performed by: EMERGENCY MEDICINE

## 2019-10-17 PROCEDURE — 71275 CT ANGIOGRAPHY CHEST: CPT

## 2019-10-17 PROCEDURE — 84484 ASSAY OF TROPONIN QUANT: CPT | Performed by: EMERGENCY MEDICINE

## 2019-10-17 PROCEDURE — 80053 COMPREHEN METABOLIC PANEL: CPT | Performed by: EMERGENCY MEDICINE

## 2019-10-17 PROCEDURE — 85379 FIBRIN DEGRADATION QUANT: CPT | Performed by: EMERGENCY MEDICINE

## 2019-10-17 PROCEDURE — 94640 AIRWAY INHALATION TREATMENT: CPT

## 2019-10-17 PROCEDURE — 25010000002 METHYLPREDNISOLONE PER 125 MG: Performed by: EMERGENCY MEDICINE

## 2019-10-17 RX ORDER — MONTELUKAST SODIUM 4 MG/1
TABLET, CHEWABLE ORAL
Qty: 60 TABLET | Refills: 0 | Status: SHIPPED | OUTPATIENT
Start: 2019-10-17 | End: 2020-02-17

## 2019-10-17 RX ORDER — IPRATROPIUM BROMIDE AND ALBUTEROL SULFATE 2.5; .5 MG/3ML; MG/3ML
3 SOLUTION RESPIRATORY (INHALATION) ONCE
Status: COMPLETED | OUTPATIENT
Start: 2019-10-17 | End: 2019-10-17

## 2019-10-17 RX ORDER — METHYLPREDNISOLONE SODIUM SUCCINATE 125 MG/2ML
125 INJECTION, POWDER, LYOPHILIZED, FOR SOLUTION INTRAMUSCULAR; INTRAVENOUS ONCE
Status: COMPLETED | OUTPATIENT
Start: 2019-10-17 | End: 2019-10-17

## 2019-10-17 RX ORDER — SODIUM CHLORIDE 0.9 % (FLUSH) 0.9 %
10 SYRINGE (ML) INJECTION AS NEEDED
Status: DISCONTINUED | OUTPATIENT
Start: 2019-10-17 | End: 2019-10-17 | Stop reason: HOSPADM

## 2019-10-17 RX ORDER — ASPIRIN 81 MG/1
TABLET, CHEWABLE ORAL
Status: COMPLETED
Start: 2019-10-17 | End: 2019-10-17

## 2019-10-17 RX ORDER — ASPIRIN 81 MG/1
324 TABLET, CHEWABLE ORAL ONCE
Status: COMPLETED | OUTPATIENT
Start: 2019-10-17 | End: 2019-10-17

## 2019-10-17 RX ORDER — PREDNISONE 20 MG/1
20 TABLET ORAL
Qty: 12 TABLET | Refills: 0 | Status: SHIPPED | OUTPATIENT
Start: 2019-10-17 | End: 2019-11-01

## 2019-10-17 RX ADMIN — ASPIRIN 81 MG CHEWABLE TABLET 324 MG: 81 TABLET CHEWABLE at 17:56

## 2019-10-17 RX ADMIN — IOPAMIDOL 75 ML: 755 INJECTION, SOLUTION INTRAVENOUS at 19:04

## 2019-10-17 RX ADMIN — METHYLPREDNISOLONE SODIUM SUCCINATE 125 MG: 125 INJECTION, POWDER, FOR SOLUTION INTRAMUSCULAR; INTRAVENOUS at 18:22

## 2019-10-17 RX ADMIN — ASPIRIN 324 MG: 81 TABLET, CHEWABLE ORAL at 17:56

## 2019-10-17 RX ADMIN — IPRATROPIUM BROMIDE AND ALBUTEROL SULFATE 3 ML: 2.5; .5 SOLUTION RESPIRATORY (INHALATION) at 18:29

## 2019-10-17 NOTE — ED PROVIDER NOTES
Subjective   74-year-old female presents for evaluation of shortness of breath.  Of note, the patient has a history of COPD and is oxygen dependent.  She wears 4 to 5 L of oxygen at all times.  Over the past 3 days she has been experiencing gradually worsening shortness of breath compared to baseline.  No sick contacts.  No fevers.  She endorses a nagging, nonproductive cough.  She has been using her home breathing treatments with only mild relief.  No recent foreign travel.  No recent surgeries.  She endorses mild lower extremity swelling.  No chest pain.        History provided by:  Patient  Shortness of Breath   Severity:  Moderate  Onset quality:  Sudden  Duration:  3 days  Timing:  Constant  Progression:  Worsening  Worsened by:  Exertion  Ineffective treatments:  Oxygen  Associated symptoms: cough    Associated symptoms: no fever    Cough:     Cough characteristics:  Non-productive    Severity:  Mild    Onset quality:  Sudden    Timing:  Constant    Progression:  Unchanged    Chronicity:  Recurrent  Risk factors: no recent surgery        Review of Systems   Constitutional: Negative for chills and fever.   HENT: Positive for congestion.    Respiratory: Positive for cough and shortness of breath.    Cardiovascular: Positive for leg swelling.   All other systems reviewed and are negative.      Past Medical History:   Diagnosis Date   • Bronchiectasis (CMS/HCC)    • Bronchitis 01/2018   • Cancer (CMS/HCC)     History of lung cancer and skin cancer    • Cardiac murmur    • Chronic cough    • Chronic obstructive pulmonary disease (CMS/HCC)    • GERD (gastroesophageal reflux disease)    • History of colonic polyps    • Irritable bowel syndrome     Constipation/diarrhea   • Lung cancer (CMS/HCC)    • Osteoarthritis    • Sleep apnea        Allergies   Allergen Reactions   • Penicillins Other (See Comments)     rash   • Statins Other (See Comments)     myalgia       Past Surgical History:   Procedure Laterality Date   •  BACK SURGERY     • CATARACT EXTRACTION     • CHOLECYSTECTOMY     • COLONOSCOPY     • HAND SURGERY     • HYSTERECTOMY     • INCONTINENCE SURGERY     • LUNG SURGERY     • NISSEN FUNDOPLICATION         Family History   Problem Relation Age of Onset   • Colon polyps Mother    • Emphysema Mother    • Colon polyps Father        Social History     Socioeconomic History   • Marital status: Unknown     Spouse name: Not on file   • Number of children: Not on file   • Years of education: Not on file   • Highest education level: Not on file   Tobacco Use   • Smoking status: Former Smoker     Packs/day: 1.50     Years: 25.00     Pack years: 37.50     Types: Cigarettes     Last attempt to quit: 1992     Years since quittin.8   • Smokeless tobacco: Never Used   Substance and Sexual Activity   • Alcohol use: Yes     Alcohol/week: 1.2 oz     Types: 2 Glasses of wine per week   • Drug use: No   • Sexual activity: No   Social History Narrative    Previously lived in [facility], recently moved here        Retired    Previously smoked approximately 1 pack cigarettes per day for 25 years and stop smoking in     Drinks 3-4 alcoholic beverages on a weekly basis         Objective   Physical Exam   Constitutional: She is oriented to person, place, and time. She appears well-developed and well-nourished. No distress.   Well-appearing female in no acute distress   HENT:   Head: Normocephalic and atraumatic.   Mouth/Throat: Oropharynx is clear and moist.   Eyes: EOM are normal. Pupils are equal, round, and reactive to light.   Neck: Normal range of motion. Neck supple.   Cardiovascular: Normal rate, regular rhythm and normal heart sounds. Exam reveals no gallop and no friction rub.   No murmur heard.  Pulmonary/Chest: Effort normal. No respiratory distress. She has wheezes. She has no rales.   Breathing is nonlabored, mild wheezing noted bilaterally in posterior lung fields, speaking in full sentences, no accessory muscle use  or retractions noted   Abdominal: Soft. Bowel sounds are normal. She exhibits no distension and no mass. There is no tenderness. There is no rebound and no guarding.   Musculoskeletal: Normal range of motion.        Right lower leg: She exhibits edema.        Left lower leg: She exhibits edema.   Mild, symmetrical lower extremity edema noted to bilateral lower extremities   Neurological: She is alert and oriented to person, place, and time.   Skin: Skin is warm and dry. No rash noted. She is not diaphoretic. No erythema.   Psychiatric: She has a normal mood and affect. Judgment and thought content normal.   Nursing note and vitals reviewed.      Procedures         ED Course  ED Course as of Oct 17 2129   Thu Oct 17, 2019   9331 Dr. Connors paged Dr. Miller, cardiology.  [PK]   4659 Dr. Connors discussed the case with Dr. Miller, cardiology, who is unable to review the EKG currently due to being in the cath lab. Dr. Connors has paged Dr. Morejon.  [PK]   2053 74-year-old female with a history of COPD presents for evaluation of shortness of breath that has gradually worsened over the past few days.  On arrival to the ED, patient nontoxic-appearing but mildly hypoxic and tachycardic.  Exam remarkable for only mild wheezing.  The patient's initial EKG revealed sinus tachycardia with a heart rate of 107 and mild ST elevation in inferior leads with no reciprocal depression.  Given the patient's overall clinical presentation, I felt that ACS/STEMI was unlikely, and she did not have ST elevation in 2 contiguous leads on her EKG.  I discussed the patient's case with Dr. Miller who was on for STEMI call who recommended that I talk with Dr. Morejon since he was already in the Cath Lab with another patient.  I discussed the patient's case with Dr. Morejon and he personally reviewed the patient's EKG and agreed that it was not consistent with a STEMI.  Labs unrevealing.  Low risk Well's but d-dimer elevated.  Imaging negative for  "pulmonary embolism or pneumonia.  Upon reevaluation following nebs and steroids, patient markedly improved.  Work of breathing is normal.  Oxygen saturations on her baseline O2 are in the upper 90s.  We discussed inpatient versus outpatient management, and the patient preferred the latter.  I feel that this is completely reasonable.  Repeat troponin/EKG negative/unchanged.  Doubt ACS, PE, dissection, or emergent cardiothoracic process at this time.  Prescription for steroid burst.  The patient will follow up with her primary care physician within the next week.  Agreeable with plan and given appropriate strict return precautions.  [DD]   2120 Upon reevaluation, patient significantly improved.Repeat troponin/EKG negative/unchanged.  Doubt ACS, PE, dissection, or emergent cardiothoracic process at this time based on exam, history, clinical presentation, gestalt, and objective findings in the ED.  The patient will follow up with her primary care physician within the next week.  Agreeable with plan and given appropriate strict return precautions.  Prescription for steroid burst.    [DD]      ED Course User Index  [DD] Red Connors MD  [PK] Behzad Martin     No results found for this or any previous visit (from the past 24 hour(s)).  Note: In addition to lab results from this visit, the labs listed above may include labs taken at another facility or during a different encounter within the last 24 hours. Please correlate lab times with ED admission and discharge times for further clarification of the services performed during this visit.    XR Chest 1 View    (Results Pending)     Vitals:    10/17/19 1733   BP: 151/65   BP Location: Left arm   Patient Position: Sitting   Pulse: 111   Resp: 18   Temp: 98.3 °F (36.8 °C)   TempSrc: Oral   SpO2: (!) 88%   Weight: 86.2 kg (190 lb)   Height: 162.6 cm (64\")     Medications   sodium chloride 0.9 % flush 10 mL (not administered)     ECG/EMG Results (last 24 hours)     " ** No results found for the last 24 hours. **        ECG 12 Lead    (Results Pending)                 Recent Results (from the past 24 hour(s))   Comprehensive Metabolic Panel    Collection Time: 10/17/19  6:05 PM   Result Value Ref Range    Glucose 157 (H) 65 - 99 mg/dL    BUN 11 8 - 23 mg/dL    Creatinine 0.58 0.57 - 1.00 mg/dL    Sodium 142 136 - 145 mmol/L    Potassium 3.5 3.5 - 5.2 mmol/L    Chloride 100 98 - 107 mmol/L    CO2 36.0 (H) 22.0 - 29.0 mmol/L    Calcium 8.9 8.6 - 10.5 mg/dL    Total Protein 6.7 6.0 - 8.5 g/dL    Albumin 4.20 3.50 - 5.20 g/dL    ALT (SGPT) 12 1 - 33 U/L    AST (SGOT) 13 1 - 32 U/L    Alkaline Phosphatase 84 39 - 117 U/L    Total Bilirubin 0.3 0.2 - 1.2 mg/dL    eGFR Non African Amer 101 >60 mL/min/1.73    Globulin 2.5 gm/dL    A/G Ratio 1.7 g/dL    BUN/Creatinine Ratio 19.0 7.0 - 25.0    Anion Gap 6.0 5.0 - 15.0 mmol/L   BNP    Collection Time: 10/17/19  6:05 PM   Result Value Ref Range    proBNP 65.7 5.0-1,800.0 pg/mL   Troponin    Collection Time: 10/17/19  6:05 PM   Result Value Ref Range    Troponin T <0.010 0.000 - 0.030 ng/mL   Light Blue Top    Collection Time: 10/17/19  6:05 PM   Result Value Ref Range    Extra Tube hold for add-on    Green Top (Gel)    Collection Time: 10/17/19  6:05 PM   Result Value Ref Range    Extra Tube Hold for add-ons.    Lavender Top    Collection Time: 10/17/19  6:05 PM   Result Value Ref Range    Extra Tube hold for add-on    Gold Top - SST    Collection Time: 10/17/19  6:05 PM   Result Value Ref Range    Extra Tube Hold for add-ons.    CBC Auto Differential    Collection Time: 10/17/19  6:05 PM   Result Value Ref Range    WBC 13.73 (H) 3.40 - 10.80 10*3/mm3    RBC 4.35 3.77 - 5.28 10*6/mm3    Hemoglobin 11.9 (L) 12.0 - 15.9 g/dL    Hematocrit 40.7 34.0 - 46.6 %    MCV 93.6 79.0 - 97.0 fL    MCH 27.4 26.6 - 33.0 pg    MCHC 29.2 (L) 31.5 - 35.7 g/dL    RDW 14.2 12.3 - 15.4 %    RDW-SD 49.0 37.0 - 54.0 fl    MPV 9.5 6.0 - 12.0 fL    Platelets 312 140  - 450 10*3/mm3    Neutrophil % 77.9 (H) 42.7 - 76.0 %    Lymphocyte % 12.3 (L) 19.6 - 45.3 %    Monocyte % 7.4 5.0 - 12.0 %    Eosinophil % 1.3 0.3 - 6.2 %    Basophil % 0.2 0.0 - 1.5 %    Immature Grans % 0.9 (H) 0.0 - 0.5 %    Neutrophils, Absolute 10.69 (H) 1.70 - 7.00 10*3/mm3    Lymphocytes, Absolute 1.69 0.70 - 3.10 10*3/mm3    Monocytes, Absolute 1.02 (H) 0.10 - 0.90 10*3/mm3    Eosinophils, Absolute 0.18 0.00 - 0.40 10*3/mm3    Basophils, Absolute 0.03 0.00 - 0.20 10*3/mm3    Immature Grans, Absolute 0.12 (H) 0.00 - 0.05 10*3/mm3    nRBC 0.0 0.0 - 0.2 /100 WBC   D-dimer, Quantitative    Collection Time: 10/17/19  6:05 PM   Result Value Ref Range    D-Dimer, Quantitative 1.04 (H) 0.00 - 0.56 MCGFEU/mL   Troponin    Collection Time: 10/17/19  8:37 PM   Result Value Ref Range    Troponin T <0.010 0.000 - 0.030 ng/mL     Note: In addition to lab results from this visit, the labs listed above may include labs taken at another facility or during a different encounter within the last 24 hours. Please correlate lab times with ED admission and discharge times for further clarification of the services performed during this visit.    XR Chest 1 View   Preliminary Result   Changes identified left lung base. There is prominence of   the pulmonary vascularity with no acute parenchymal disease. Underlying   chronic and emphysematous changes seen throughout the lung fields   bilaterally.       DICTATED:   10/17/2019   EDITED/ls :   10/17/2019           CT Angiogram Chest   Final Result   1. Negative for pulmonary embolus.      2. No acute findings in the chest. Emphysema. Postoperative changes of prior left upper lobectomy.      Signer Name: Carmelo Morgan MD    Signed: 10/17/2019 7:19 PM    Workstation Name: [id number]     [facility]        Vitals:    10/17/19 1807 10/17/19 1829 10/17/19 1830 10/17/19 1910   BP: 147/68  143/67    BP Location:       Patient Position:       Pulse: 108  102 101   Resp:   18     Temp:       TempSrc:       SpO2: 100%  100% 98%   Weight:       Height:         Medications   sodium chloride 0.9 % flush 10 mL (not administered)   ipratropium-albuterol (DUO-NEB) nebulizer solution 3 mL (3 mL Nebulization Given 10/17/19 1829)   methylPREDNISolone sodium succinate (SOLU-Medrol) injection 125 mg (125 mg Intravenous Given 10/17/19 1822)   aspirin chewable tablet 324 mg (324 mg Oral Given 10/17/19 1756)   iopamidol (ISOVUE-370) 76 % injection 100 mL (75 mL Intravenous Given 10/17/19 1904)     ECG/EMG Results (last 24 hours)     Procedure Component Value Units Date/Time    ECG 12 Lead [[id number]] Collected:  10/17/19 1750     Updated:  10/17/19 1758    ECG 12 Lead [[id number]] Collected:  10/17/19 1756     Updated:  10/17/19 1758    ECG 12 Lead [[id number]] Collected:  10/17/19 2035     Updated:  10/17/19 2035        ECG 12 Lead         ECG 12 Lead         ECG 12 Lead                 MDM    Final diagnoses:   COPD exacerbation (CMS/[facility])   Hypoxia       Documentation assistance provided by anni Martin.  Information recorded by the scribe was done at my direction and has been verified and validated by me.     Behzad Martin  10/17/19 1748       Red Connors MD  10/17/19 2130

## 2019-10-21 ENCOUNTER — PREP FOR SURGERY (OUTPATIENT)
Dept: OTHER | Facility: HOSPITAL | Age: 74
End: 2019-10-21

## 2019-10-21 DIAGNOSIS — R13.10 DYSPHAGIA, UNSPECIFIED TYPE: Primary | ICD-10-CM

## 2019-10-24 PROBLEM — R13.10 DYSPHAGIA: Status: ACTIVE | Noted: 2019-10-24

## 2019-10-25 ENCOUNTER — OFFICE VISIT (OUTPATIENT)
Dept: PULMONOLOGY | Facility: CLINIC | Age: 74
End: 2019-10-25

## 2019-10-25 ENCOUNTER — TELEPHONE (OUTPATIENT)
Dept: GASTROENTEROLOGY | Facility: CLINIC | Age: 74
End: 2019-10-25

## 2019-10-25 VITALS
HEIGHT: 64 IN | OXYGEN SATURATION: 90 % | SYSTOLIC BLOOD PRESSURE: 132 MMHG | WEIGHT: 193 LBS | DIASTOLIC BLOOD PRESSURE: 72 MMHG | HEART RATE: 98 BPM | BODY MASS INDEX: 32.95 KG/M2 | TEMPERATURE: 98.4 F

## 2019-10-25 DIAGNOSIS — Z99.89 OSA ON CPAP: ICD-10-CM

## 2019-10-25 DIAGNOSIS — G47.33 OSA ON CPAP: ICD-10-CM

## 2019-10-25 DIAGNOSIS — Z87.891 FORMER SMOKER: ICD-10-CM

## 2019-10-25 DIAGNOSIS — K44.9 HIATAL HERNIA: ICD-10-CM

## 2019-10-25 DIAGNOSIS — K21.9 GASTRIC REFLUX: ICD-10-CM

## 2019-10-25 DIAGNOSIS — J47.9 IDIOPATHIC BRONCHIECTASIS (HCC): Primary | ICD-10-CM

## 2019-10-25 DIAGNOSIS — E66.9 OBESITY, CLASS II, BMI 35-39.9: ICD-10-CM

## 2019-10-25 DIAGNOSIS — J96.11 CHRONIC RESPIRATORY FAILURE WITH HYPOXIA (HCC): ICD-10-CM

## 2019-10-25 DIAGNOSIS — Z85.118 H/O: LUNG CANCER: ICD-10-CM

## 2019-10-25 PROCEDURE — 99214 OFFICE O/P EST MOD 30 MIN: CPT | Performed by: INTERNAL MEDICINE

## 2019-10-25 RX ORDER — AZITHROMYCIN 250 MG/1
250 TABLET, FILM COATED ORAL 3 TIMES WEEKLY
Refills: 11 | COMMUNITY
Start: 2019-10-20 | End: 2020-09-25

## 2019-10-28 DIAGNOSIS — E66.9 OBESITY, CLASS II, BMI 35-39.9: Primary | ICD-10-CM

## 2019-10-28 NOTE — PROGRESS NOTES
PULMONARY  NOTE    Chief Complaint     COPD stage II, former smoker, history of lung cancer with a prior resection, history of bronchiectasis, RENETTA, class II obesity, chronic hypoxic respiratory failure    History of Present Illness     74-year-old white female returns today for follow-up.  I last saw her on 8/28/2019.    She previously lived in [place] where she has received most of her medical care then moved to [place] and has been seen at [facility] and most recently has moved her care to our office from a pulmonary perspective.    She has a past history of tobacco abuse that resolved in 1992.    She has a history of lung cancer and underwent a left upper lobe lobectomy in 2016 with no evidence of recurrence to date.    She has class II morbid obesity.    She has obstructive sleep apnea and uses CPAP on a nightly basis.    She has chronic hypoxic respiratory failure and uses supplemental oxygen during the day and with her CPAP at night.    She has regular reflux symptoms and uses a PPI.  Despite discussions on our last visit, she is not regularly following reflux precautions.  She had a Nissen fundoplication remotely    We talked about rehab on her last visit but apparently she has not been contacted about scheduling appointment.    She remains on Spiriva, Advair, and albuterol on an as-needed basis    Patient Active Problem List   Diagnosis   • Hematochezia   • Moderate (Stage II) COPD   • Bronchiectasis (CMS/HCC)   • H/O: lung cancer (Prior resection 2016)   • Former smoker (None since 1992)   • Chronic respiratory failure   • RENETTA on CPAP   • Obesity, Class II, BMI 35-39.9   • Hiatal hernia (Prior Nissen 2008)   • Gastric reflux   • Esophageal dilatation   • Dysphagia     Allergies   Allergen Reactions   • Penicillins Other (See Comments)     rash   • Statins Other (See Comments)     myalgia       Current Outpatient Medications:   •  albuterol (PROVENTIL) (2.5 MG/3ML) 0.083% nebulizer solution, Take  2.5 mg by nebulization 4 (Four) Times a Day As Needed for Wheezing., Disp: 125 vial, Rfl: 3  •  albuterol sulfate HFA (VENTOLIN HFA) 108 (90 Base) MCG/ACT inhaler, Inhale 2 puffs Every 6 (Six) Hours As Needed for Wheezing., Disp: 8 g, Rfl: 2  •  amitriptyline (ELAVIL) 10 MG tablet, amitriptyline 10 mg tablet, Disp: , Rfl:   •  cetirizine (ZYRTEC ALLERGY) 10 MG tablet, Zyrtec 10 mg tablet, Disp: , Rfl:   •  clonazePAM (KlonoPIN) 0.5 MG disintegrating tablet, clonazepam 0.5 mg disintegrating tablet  Place 1 tablet as needed by translingual route., Disp: , Rfl:   •  colestipol (COLESTID) 1 g tablet, TAKE 2 TABLETS BY MOUTH DAILY. KEEP 2 HOURS APART FROM OTHER MEDICATIONS, Disp: 60 tablet, Rfl: 0  •  diphenoxylate-atropine (LOMOTIL) 2.5-0.025 MG per tablet, Lomotil  PRN, Disp: , Rfl:   •  fluticasone (FLONASE ALLERGY RELIEF) 50 MCG/ACT nasal spray, Flonase Allergy Relief 50 mcg/actuation nasal spray,suspension  Take as needed by nasal route., Disp: , Rfl:   •  fluticasone-salmeterol (ADVAIR DISKUS) 250-50 MCG/DOSE DISKUS, Advair Diskus 250 mcg-50 mcg/dose powder for inhalation, Disp: , Rfl:   •  Furosemide (LASIX PO), furosemide  40 mg PRN, Disp: , Rfl:   •  HYDROcod Polst-CPM Polst ER (TUSSIONEX PENNKINETIC) 10-8 MG/5ML ER suspension, TK 10 ML PO BID PRN, Disp: , Rfl: 0  •  hydrocortisone (ANUSOL-HC) 25 MG suppository, Insert 1 suppository into the rectum Daily., Disp: 15 suppository, Rfl: 0  •  ipratropium (ATROVENT) 0.06 % nasal spray, ipratropium bromide 42 mcg (0.06 %) nasal spray, Disp: , Rfl:   •  levothyroxine (SYNTHROID, LEVOTHROID) 50 MCG tablet, , Disp: , Rfl:   •  metFORMIN (GLUCOPHAGE) 500 MG tablet, TK 1 T PO Q 12 H WF, Disp: , Rfl: 0  •  montelukast (SINGULAIR) 10 MG tablet, , Disp: , Rfl:   •  predniSONE (DELTASONE) 20 MG tablet, Take 1 tablet by mouth 3 (Three) Times a Day With Meals., Disp: 12 tablet, Rfl: 0  •  rOPINIRole (REQUIP) 3 MG tablet, Requip 3 mg tablet, Disp: , Rfl:   •  Tiotropium Bromide  "Monohydrate (SPIRIVA RESPIMAT) 1.25 MCG/ACT aerosol solution inhaler, Spiriva Respimat 1.25 mcg/actuation solution for inhalation, Disp: , Rfl:   •  tiZANidine (ZANAFLEX) 4 MG tablet, , Disp: , Rfl:   •  Zafirlukast (ACCOLATE PO), Accolate  daily, Disp: , Rfl:   •  azithromycin (ZITHROMAX) 250 MG tablet, , Disp: , Rfl: 11  •  lisinopril (PRINIVIL,ZESTRIL) 5 MG tablet, TK 1 T PO D UTD, Disp: , Rfl: 3  MEDICATION LIST AND ALLERGIES REVIEWED.    Family History   Problem Relation Age of Onset   • Colon polyps Mother    • Emphysema Mother    • Colon polyps Father      Social History     Tobacco Use   • Smoking status: Former Smoker     Packs/day: 1.50     Years: 25.00     Pack years: 37.50     Types: Cigarettes     Last attempt to quit: 1992     Years since quittin.8   • Smokeless tobacco: Never Used   Substance Use Topics   • Alcohol use: Yes     Alcohol/week: 1.2 oz     Types: 2 Glasses of wine per week   • Drug use: No     Social History     Social History Narrative    Previously lived in [facility], recently moved here        Retired    Previously smoked approximately 1 pack cigarettes per day for 25 years and stop smoking in     Drinks 3-4 alcoholic beverages on a weekly basis     FAMILY AND SOCIAL HISTORY REVIEWED.    Review of Systems  ALSO REFER TO SCANNED ROS SHEET FROM SAME DATE.    /72   Pulse 98   Temp 98.4 °F (36.9 °C)   Ht 162.6 cm (64\")   Wt 87.5 kg (193 lb)   LMP  (LMP Unknown)   SpO2 90% Comment: resting, 4 liters pulse dose  Breastfeeding? No   BMI 33.13 kg/m²   Physical Exam   Constitutional: She is oriented to person, place, and time. She appears well-developed. No distress.   HENT:   Head: Normocephalic and atraumatic.   Neck: No thyromegaly present.   Cardiovascular: Normal rate, regular rhythm and normal heart sounds.   No murmur heard.  Pulmonary/Chest: Effort normal. No stridor.   Decreased breath sounds bilaterally without wheezes   Abdominal: Soft. Bowel sounds " are normal.   Musculoskeletal: Normal range of motion.   Trace pitting lower extremity edema   Lymphadenopathy:     She has no cervical adenopathy.        Right: No supraclavicular and no epitrochlear adenopathy present.        Left: No supraclavicular and no epitrochlear adenopathy present.   Neurological: She is alert and oriented to person, place, and time.   Skin: Skin is warm and dry. She is not diaphoretic.   Psychiatric: She has a normal mood and affect. Her behavior is normal.   Nursing note and vitals reviewed.      Results     CT Christi Castillo of the chest on 10/17/2019 revealed no suspicious lesions or pulmonary emboli.    Alpha-1 antitrypsin screen was millimeters    Problem List       ICD-10-CM ICD-9-CM   1. Bronchiectasis (CMS/HCC) J47.9 494.0   2. Chronic respiratory failure J96.11 518.83     799.02   3. H/O: lung cancer (Prior resection 2016) Z85.118 V10.11   4. Former smoker (None since 1992) Z87.891 V15.82   5. Obesity, Class II, BMI 35-39.9 E66.9 278.00   6. RENETTA on CPAP G47.33 327.23    Z99.89 V46.8   7. Hiatal hernia (Prior Nissen 2008) K44.9 553.3   8. Gastric reflux K21.9 530.81       Discussion     We again reviewed her diagnoses and multiple medical issues.  I again reinforced weight loss and a regular exercise program.  We will again try to facilitate a referral to rehabilitation.    At times, she may need more than 5 L/min via nasal cannula, particularly with exertion.  We will try to arrange for her to get a high efficiency concentrator for home.    I reinforced reflux precautions, again.  She is not regularly following reflux precautions.    5 encourage continued use of CPAP with supplemental oxygen at night.    I reassured her that there is no evidence of recurrent malignancy on her current CT scan.  I would recommend an annual CT scan of the chest for the time being    Vladislav Arboleda MD  Note electronically signed    CC: Evangelina Luna MD

## 2019-11-01 ENCOUNTER — APPOINTMENT (OUTPATIENT)
Dept: PREADMISSION TESTING | Facility: HOSPITAL | Age: 74
End: 2019-11-01

## 2019-11-01 VITALS — WEIGHT: 199.8 LBS | HEIGHT: 64 IN | BODY MASS INDEX: 34.11 KG/M2

## 2019-11-01 LAB
DEPRECATED RDW RBC AUTO: 47.9 FL (ref 37–54)
ERYTHROCYTE [DISTWIDTH] IN BLOOD BY AUTOMATED COUNT: 14.2 % (ref 12.3–15.4)
HCT VFR BLD AUTO: 39.7 % (ref 34–46.6)
HGB BLD-MCNC: 11.9 G/DL (ref 12–15.9)
MCH RBC QN AUTO: 27.5 PG (ref 26.6–33)
MCHC RBC AUTO-ENTMCNC: 30 G/DL (ref 31.5–35.7)
MCV RBC AUTO: 91.7 FL (ref 79–97)
PLATELET # BLD AUTO: 321 10*3/MM3 (ref 140–450)
PMV BLD AUTO: 9.4 FL (ref 6–12)
RBC # BLD AUTO: 4.33 10*6/MM3 (ref 3.77–5.28)
WBC NRBC COR # BLD: 8.86 10*3/MM3 (ref 3.4–10.8)

## 2019-11-01 PROCEDURE — 85027 COMPLETE CBC AUTOMATED: CPT | Performed by: ANESTHESIOLOGY

## 2019-11-01 PROCEDURE — 36415 COLL VENOUS BLD VENIPUNCTURE: CPT

## 2019-11-01 RX ORDER — OMEPRAZOLE 20 MG/1
20 CAPSULE, DELAYED RELEASE ORAL DAILY
COMMUNITY
End: 2020-01-22

## 2019-11-01 RX ORDER — SULFAMETHOXAZOLE AND TRIMETHOPRIM 400; 80 MG/1; MG/1
1 TABLET ORAL 2 TIMES DAILY
COMMUNITY
End: 2020-09-25

## 2019-11-01 NOTE — PAT
Abnormal EKG. Cardiac clearance requested per Dr. Tang.     Appointment made Monday Nov. 4, 2019 at 9:45 with Dr. Chaidez.     Left message with Keely about clearance appointment and patient currently taking abx for UTI.

## 2019-11-05 NOTE — PAT
Called to get clearance patient had appt on 11/4 with Dr. Chaidez, per Radha in dr. Ward office he has not signed off on the clearance.  Radha to ask Dr. Chaidez to either fax clearance or call Dr. Millan so he know if patient is cleared for surgery.  Patient did not require any additional testing.

## 2019-11-06 ENCOUNTER — HOSPITAL ENCOUNTER (OUTPATIENT)
Facility: HOSPITAL | Age: 74
Setting detail: HOSPITAL OUTPATIENT SURGERY
Discharge: HOME | End: 2019-11-06
Attending: INTERNAL MEDICINE | Admitting: INTERNAL MEDICINE

## 2019-11-06 ENCOUNTER — ANESTHESIA EVENT (OUTPATIENT)
Dept: GASTROENTEROLOGY | Facility: HOSPITAL | Age: 74
End: 2019-11-06

## 2019-11-06 ENCOUNTER — ANESTHESIA (OUTPATIENT)
Dept: GASTROENTEROLOGY | Facility: HOSPITAL | Age: 74
End: 2019-11-06

## 2019-11-06 VITALS
TEMPERATURE: 97.4 F | SYSTOLIC BLOOD PRESSURE: 155 MMHG | WEIGHT: 199.8 LBS | RESPIRATION RATE: 20 BRPM | HEART RATE: 95 BPM | OXYGEN SATURATION: 98 % | DIASTOLIC BLOOD PRESSURE: 69 MMHG | BODY MASS INDEX: 34.11 KG/M2 | HEIGHT: 64 IN

## 2019-11-06 DIAGNOSIS — R13.10 DYSPHAGIA, UNSPECIFIED TYPE: ICD-10-CM

## 2019-11-06 LAB
GLUCOSE BLDC GLUCOMTR-MCNC: 140 MG/DL (ref 70–130)
POTASSIUM BLD-SCNC: 4.2 MMOL/L (ref 3.5–5.2)

## 2019-11-06 PROCEDURE — 82962 GLUCOSE BLOOD TEST: CPT

## 2019-11-06 PROCEDURE — 88305 TISSUE EXAM BY PATHOLOGIST: CPT | Performed by: INTERNAL MEDICINE

## 2019-11-06 PROCEDURE — 84132 ASSAY OF SERUM POTASSIUM: CPT | Performed by: ANESTHESIOLOGY

## 2019-11-06 PROCEDURE — 25010000002 PROPOFOL 10 MG/ML EMULSION: Performed by: NURSE ANESTHETIST, CERTIFIED REGISTERED

## 2019-11-06 PROCEDURE — C1726 CATH, BAL DIL, NON-VASCULAR: HCPCS | Performed by: INTERNAL MEDICINE

## 2019-11-06 RX ORDER — FAMOTIDINE 10 MG/ML
20 INJECTION, SOLUTION INTRAVENOUS ONCE
Status: COMPLETED | OUTPATIENT
Start: 2019-11-06 | End: 2019-11-06

## 2019-11-06 RX ORDER — SODIUM CHLORIDE, SODIUM LACTATE, POTASSIUM CHLORIDE, CALCIUM CHLORIDE 600; 310; 30; 20 MG/100ML; MG/100ML; MG/100ML; MG/100ML
9 INJECTION, SOLUTION INTRAVENOUS CONTINUOUS
Status: DISCONTINUED | OUTPATIENT
Start: 2019-11-06 | End: 2019-11-06 | Stop reason: HOSPADM

## 2019-11-06 RX ORDER — SODIUM CHLORIDE 0.9 % (FLUSH) 0.9 %
3 SYRINGE (ML) INJECTION EVERY 12 HOURS SCHEDULED
Status: DISCONTINUED | OUTPATIENT
Start: 2019-11-06 | End: 2019-11-06 | Stop reason: HOSPADM

## 2019-11-06 RX ORDER — FENTANYL CITRATE 50 UG/ML
50 INJECTION, SOLUTION INTRAMUSCULAR; INTRAVENOUS
Status: DISCONTINUED | OUTPATIENT
Start: 2019-11-06 | End: 2019-11-06 | Stop reason: HOSPADM

## 2019-11-06 RX ORDER — PROPOFOL 10 MG/ML
VIAL (ML) INTRAVENOUS AS NEEDED
Status: DISCONTINUED | OUTPATIENT
Start: 2019-11-06 | End: 2019-11-06 | Stop reason: SURG

## 2019-11-06 RX ORDER — LIDOCAINE HYDROCHLORIDE 10 MG/ML
INJECTION, SOLUTION EPIDURAL; INFILTRATION; INTRACAUDAL; PERINEURAL AS NEEDED
Status: DISCONTINUED | OUTPATIENT
Start: 2019-11-06 | End: 2019-11-06 | Stop reason: SURG

## 2019-11-06 RX ORDER — LIDOCAINE HYDROCHLORIDE 10 MG/ML
0.5 INJECTION, SOLUTION EPIDURAL; INFILTRATION; INTRACAUDAL; PERINEURAL ONCE AS NEEDED
Status: DISCONTINUED | OUTPATIENT
Start: 2019-11-06 | End: 2019-11-06 | Stop reason: HOSPADM

## 2019-11-06 RX ORDER — FAMOTIDINE 20 MG/1
20 TABLET, FILM COATED ORAL ONCE
Status: DISCONTINUED | OUTPATIENT
Start: 2019-11-06 | End: 2019-11-06 | Stop reason: HOSPADM

## 2019-11-06 RX ORDER — SODIUM CHLORIDE 0.9 % (FLUSH) 0.9 %
3-10 SYRINGE (ML) INJECTION AS NEEDED
Status: DISCONTINUED | OUTPATIENT
Start: 2019-11-06 | End: 2019-11-06 | Stop reason: HOSPADM

## 2019-11-06 RX ADMIN — SODIUM CHLORIDE, POTASSIUM CHLORIDE, SODIUM LACTATE AND CALCIUM CHLORIDE 9 ML/HR: 600; 310; 30; 20 INJECTION, SOLUTION INTRAVENOUS at 11:21

## 2019-11-06 RX ADMIN — FAMOTIDINE 20 MG: 10 INJECTION, SOLUTION INTRAVENOUS at 11:22

## 2019-11-06 RX ADMIN — LIDOCAINE HYDROCHLORIDE 50 MG: 10 INJECTION, SOLUTION EPIDURAL; INFILTRATION; INTRACAUDAL; PERINEURAL at 12:04

## 2019-11-06 RX ADMIN — PROPOFOL 20 MG: 10 INJECTION, EMULSION INTRAVENOUS at 12:10

## 2019-11-06 RX ADMIN — PROPOFOL 20 MG: 10 INJECTION, EMULSION INTRAVENOUS at 12:07

## 2019-11-06 RX ADMIN — PROPOFOL 50 MG: 10 INJECTION, EMULSION INTRAVENOUS at 12:04

## 2019-11-06 NOTE — ANESTHESIA POSTPROCEDURE EVALUATION
Patient: Lorrie Pagan    Procedure Summary     Date:  11/06/19 Room / Location:   [facility]    Anesthesia Start:  1158 Anesthesia Stop:      Procedure:  ESOPHAGOGASTRODUODENOSCOPY (N/A ) Diagnosis:       Dysphagia, unspecified type      (Dysphagia, unspecified type [R13.10])    Surgeon:  Cortes Millan MD Provider:  Red Villalobos MD    Anesthesia Type:  general ASA Status:  3          Anesthesia Type: general  Last vitals  /74  155/73 (11/06/19 1119)   Temp 97  97.5 °F (36.4 °C) (11/06/19 1119)   Pulse 92  92 (11/06/19 1119)   Resp 12  9 (11/06/19 1119)     SpO2 97  100 % (11/06/19 1119)     Post Anesthesia Care and Evaluation    Patient location during evaluation: PACU  Patient participation: complete - patient participated  Level of consciousness: awake and alert  Pain score: 0  Pain management: adequate  Airway patency: patent  Anesthetic complications: No anesthetic complications  PONV Status: none  Cardiovascular status: hemodynamically stable and acceptable  Respiratory status: nonlabored ventilation, acceptable and nasal cannula  Hydration status: acceptable

## 2019-11-06 NOTE — H&P
GASTROENTEROLOGY OFFICE NOTE  Lorrie Pagan  [id number]  1945    CARE TEAM  Patient Care Team:  Evangelina Luna MD as PCP - General (Family Medicine)  Elias Chaidez MD as Consulting Physician (Cardiology)    Cortes Sotomayor*     No chief complaint on file.       HISTORY OF PRESENT ILLNESS:  Patient presents for EGD to evaluate abnormal CAT scan revealing thickening of the esophagus in the setting of intermittent dysphasia to solids which is been present for many years.  She has chronic reflux and is status post Nissen fundoplication which initially was very effective in controlling her reflux but has been more problematic of late.  She denies odynophagia, early satiety, unexplained weight loss, melanotic stools, constipation or diarrhea.  She status post colonoscopy in 2018 by me which was unremarkable.    PAST MEDICAL HISTORY  Past Medical History:   Diagnosis Date   • Bronchiectasis (CMS/HCC)    • Bronchitis 01/2018   • Cancer (CMS/HCC)     History of lung cancer and skin cancer    • Cardiac murmur    • Chronic cough    • Chronic obstructive pulmonary disease (CMS/HCC)    • Depression    • Diabetes mellitus (CMS/HCC)    • Disease of thyroid gland    • GERD (gastroesophageal reflux disease)    • Glaucoma    • History of colonic polyps    • History of transfusion 1988   • Irritable bowel syndrome     Constipation/diarrhea   • Legally blind    • Lung cancer (CMS/HCC)    • Macular degeneration    • Neuropathy    • Osteoarthritis    • Oxygen dependent     5L   • Pneumonia    • Sleep apnea    • UTI (urinary tract infection)    • Wears glasses         PAST SURGICAL HISTORY  Past Surgical History:   Procedure Laterality Date   • BACK SURGERY     • CATARACT EXTRACTION     • CHOLECYSTECTOMY     • COLONOSCOPY     • ENDOSCOPY     • HAND SURGERY     • HYSTERECTOMY     • INCONTINENCE SURGERY     • LUNG SURGERY     • NISSEN FUNDOPLICATION          MEDICATIONS:    Current Facility-Administered  Medications:   •  famotidine (PEPCID) tablet 20 mg, 20 mg, Oral, Once, Red Villalobos MD  •  lactated ringers infusion, 9 mL/hr, Intravenous, Continuous, Red Villalobos MD, Last Rate: 9 mL/hr at 19 1121, 9 mL/hr at 19 1121  •  lidocaine PF 1% (XYLOCAINE) injection 0.5 mL, 0.5 mL, Injection, Once PRN, Red Villalobos MD  •  sodium chloride 0.9 % flush 3 mL, 3 mL, Intravenous, Q12H, Red Villalobos MD  •  sodium chloride 0.9 % flush 3-10 mL, 3-10 mL, Intravenous, PRN, Red Villalobos MD    ALLERGIES  Allergies   Allergen Reactions   • Penicillins Other (See Comments)     rash   • Statins Other (See Comments)     myalgia       FAMILY HISTORY:  Family History   Problem Relation Age of Onset   • Colon polyps Mother    • Emphysema Mother    • Colon polyps Father        SOCIAL HISTORY  Social History     Socioeconomic History   • Marital status: Single     Spouse name: Not on file   • Number of children: Not on file   • Years of education: Not on file   • Highest education level: Not on file   Tobacco Use   • Smoking status: Former Smoker     Packs/day: 1.50     Years: 25.00     Pack years: 37.50     Types: Cigarettes     Last attempt to quit: 1992     Years since quittin.8   • Smokeless tobacco: Never Used   Substance and Sexual Activity   • Alcohol use: Yes     Alcohol/week: 1.2 oz     Types: 2 Glasses of wine per week   • Drug use: No   • Sexual activity: No   Social History Narrative    Previously lived in [facility], recently moved here        Retired    Previously smoked approximately 1 pack cigarettes per day for 25 years and stop smoking in     Drinks 3-4 alcoholic beverages on a weekly basis     Socioeconomic History:  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       REVIEW OF SYSTEMS  Review of Systems  12 point review of systems pertinent as reviewed above    PHYSICAL EXAM   /73 (BP Location: Right arm,  "Patient Position: Lying)   Pulse 92   Temp 97.5 °F (36.4 °C) (Tympanic)   Resp 9   Ht 162.6 cm (64\")   Wt 90.6 kg (199 lb 12.8 oz)   LMP  (LMP Unknown)   SpO2 100%   BMI 34.30 kg/m²   General: Alert and oriented x 3. In no apparent or acute distress.  and No stigmata of chronic liver disease  HEENT: Anicteric slcera. Normal oropharynx  Neck: Supple. Without lymphadenopathy  CV: Regular rate and rhythm, S1, S2  Lungs: Clear to ausculation. Without rales, robchi and wheezing  Abdomen:  Soft,non-distended without palpable masses or hepatosplenomeagaly, areas of rebound tenderness or guarding.   Extremeties: Chronic stasis changes with marked erythema bilateral below the knees with 2+ pitting edema is noted.  Neurologic:  Alert and oriented x 3 without focal motor or sensory deficits  Rectal exam: deferred   \     Results Review:  I reviewed the patient's new clinical results.      ASSESSMENT  1.-  Abnormal CAT scan.  Abnormal esophagus.  2.-  Intermittent dysphagia to solids  3.-  Chronic GERD    PLAN  1.-  EGD for evaluation and likely esophageal dilation.  Risk of perforation bleeding cardiorespiratory compromise and missed lesions are reviewed in detail and ample opportunity for questions was provided to her son.  Further recommendations are deferred pending findings of today's upper endoscopy      I discussed the patients findings and my recommendations with patient    Cortes Millan MD  11/6/2019   11:53 AM    Much of this note is an electronic transcription of spoken language to printed text. Electronic transcription of spoken language may permit erroneous, nonsensical word phrases to be inadvertently transcribed.  Although I have reviewed the note for these errors, some may still be present.                  "

## 2019-11-07 LAB
CYTO UR: NORMAL
LAB AP CASE REPORT: NORMAL
LAB AP CLINICAL INFORMATION: NORMAL
PATH REPORT.FINAL DX SPEC: NORMAL
PATH REPORT.GROSS SPEC: NORMAL

## 2019-11-11 ENCOUNTER — CLINICAL SUPPORT (OUTPATIENT)
Dept: PULMONOLOGY | Facility: CLINIC | Age: 74
End: 2019-11-11

## 2019-11-11 DIAGNOSIS — J44.9 CHRONIC OBSTRUCTIVE PULMONARY DISEASE, UNSPECIFIED COPD TYPE (HCC): Primary | ICD-10-CM

## 2019-11-11 PROCEDURE — G0424 PULMONARY REHAB W EXER: HCPCS | Performed by: INTERNAL MEDICINE

## 2019-11-12 VITALS — SYSTOLIC BLOOD PRESSURE: 148 MMHG | OXYGEN SATURATION: 98 % | HEART RATE: 100 BPM | DIASTOLIC BLOOD PRESSURE: 82 MMHG

## 2019-11-12 NOTE — PROGRESS NOTES
[facility]  Pulmonary Rehabilitation  Daily Treatment Log    Name: Lorrie Pagan : 1945 Date: 2019     Session: 1/ approved: 18          Time In/Out: 10:30/12:00     COPD Charges:  x1    Physician : Zaire Arboleda MD      Dx: J44.9      GOLD: 1 []  2 [x]  3 []  4 []     Primary Insurance:            Secondary Insurance:[place] [employer]    /82   Pulse 100   LMP  (LMP Unknown)   SpO2 98% Comment: 5 lpd     Auscultation:  []Clear   [x]Clear and Decreased   []Insp. Wheeze   []Exp. Wheeze     []Rhonchi   []Rales    Target Heart Rate Zone: 120-130  Max HR: 140    Exercise Sp02% Blood Pressure Heart Rate ARNOL Scale/Fatigue   Treadmill: Speed:       Min:  % Grade:          Distance: x      Bands:#:        Reps:        Min:  Color Band: x      6MWT:Min: 15       Distance: 600 ft     MET: 1.9 88 160/80  128/80 123 5/5   Step-ups:#:      Reps:     Min: x      Cybex/Vision/Schwinn Bike:  Level:      Speed:    RPM:       Min:  Distance:                 Seat: x      Assessments:     Min: 20  Sit/stand # 10  Arm Curls #16  Step Test # 18     86  95  88    116  103  115   3/3  1  5/5   NuStep: Level:      METS:     Min:       SPM:         Total Steps:  Seat:      Arms: x      Arm Ergometer: Level:     Min:  RPM:            Fwd:      Back:  Total Turns: x      PLB / DB Spinner: Min:  P-Flex: Level:         Min:  IS: Volume:             Min:  Warm-up Stretches: Min: x         Education and Training: []ARNOL Scale  []PLB  []DB  []IMT  []IS  []Vital Signs   []Target HR Zone  []Exercise Vital Signs [] Safe Vital Signs  []Pulmonary Anatomy  []Lung Function  []Lung Disease Process  []Oxygen Use  []Oxygen Safety  []Pulmonary Meds  []Med Instruction  []Cough Technique  []Pulmonary Hygeine  []Infection Prevention  []Signs of Infection  []When to Call MD  []Nutrition  []Weight Management  []Advance Directives  []Posture  []Body Mechanics []Energy Conservation  []Pacing  ADL's  []Stress/Anxiety Management  []Stretching  []Home Exercise  []Smoking Cessation   []CO Progress  []Maintenance Exercise Program    []Pulmonary Knowledge Test/Education  []Review PFT  []Review Specific Disease  []Review Home Follow-through Compliance  []Safe Exercise []Exercise Progression Strategies    Progress Report:  Initial evaluation for CO today. Assessments and questionnaires completed today. Please see clinical eval for details.    Treating Clinicians: [x]Kasia Ramirez, RRT  [] Radha Means RRT    MD in Office: []INDY Tom  []STACIA Pagan  []HAILEE Villalta  []JR Arboleda    []ADRIANA Carlos []HOLLY Noel  []STACIA Quesada  []STACIA Alvarado  [x]DIANNE Arboleda  []MARY Estes   []ADRIANA Webber  []INDY Lala

## 2019-11-13 ENCOUNTER — APPOINTMENT (OUTPATIENT)
Dept: NUTRITION | Facility: HOSPITAL | Age: 74
End: 2019-11-13

## 2019-11-15 ENCOUNTER — TELEPHONE (OUTPATIENT)
Dept: PULMONOLOGY | Facility: CLINIC | Age: 74
End: 2019-11-15

## 2019-11-15 DIAGNOSIS — J47.1 BRONCHIECTASIS WITH ACUTE EXACERBATION (HCC): Primary | ICD-10-CM

## 2019-11-15 DIAGNOSIS — J96.11 CHRONIC RESPIRATORY FAILURE WITH HYPOXIA (HCC): ICD-10-CM

## 2019-11-15 DIAGNOSIS — J47.9 IDIOPATHIC BRONCHIECTASIS (HCC): ICD-10-CM

## 2019-11-15 RX ORDER — DOXYCYCLINE HYCLATE 100 MG
100 TABLET ORAL 2 TIMES DAILY
Qty: 20 TABLET | Refills: 0 | Status: SHIPPED | OUTPATIENT
Start: 2019-11-15 | End: 2020-09-25

## 2019-11-15 RX ORDER — PREDNISONE 10 MG/1
TABLET ORAL
Qty: 31 TABLET | Refills: 0 | Status: SHIPPED | OUTPATIENT
Start: 2019-11-15 | End: 2020-09-25

## 2019-11-15 NOTE — TELEPHONE ENCOUNTER
CALLED AND SPOKE TO PT TO SCHEDULE SICK VISIT WITH WILLIAMS 11/21 @10:30 FOR 6MWT. PT STATED UNDERSTANDING.

## 2019-11-15 NOTE — TELEPHONE ENCOUNTER
Pt states that O2 drops only when moving around from room to room. Abx/steroid will be called into [facility]. Pt notified and informed if symptoms continue to get worse she will need to go to ED for further evaluation. Pt verbalized understanding. Please schedule pt with DONALD w/ 6MWT. Pt can be reached @ [phone].

## 2019-11-15 NOTE — TELEPHONE ENCOUNTER
Pt called requesting a call back @ [phone] regarding her oxygen dropping in the 80's . Pt states that she has been experiencing Chest Tightness/Congestion for over 1 week.  Pt denies any fever/chest pain. Please advise.

## 2019-11-21 ENCOUNTER — OFFICE VISIT (OUTPATIENT)
Dept: PULMONOLOGY | Facility: CLINIC | Age: 74
End: 2019-11-21

## 2019-11-21 ENCOUNTER — CLINICAL SUPPORT (OUTPATIENT)
Dept: PULMONOLOGY | Facility: CLINIC | Age: 74
End: 2019-11-21

## 2019-11-21 VITALS
TEMPERATURE: 98.1 F | BODY MASS INDEX: 34.04 KG/M2 | HEART RATE: 102 BPM | OXYGEN SATURATION: 91 % | WEIGHT: 199.4 LBS | DIASTOLIC BLOOD PRESSURE: 80 MMHG | HEIGHT: 64 IN | SYSTOLIC BLOOD PRESSURE: 162 MMHG

## 2019-11-21 VITALS — DIASTOLIC BLOOD PRESSURE: 80 MMHG | HEART RATE: 98 BPM | SYSTOLIC BLOOD PRESSURE: 120 MMHG | OXYGEN SATURATION: 98 %

## 2019-11-21 DIAGNOSIS — R06.02 SHORTNESS OF BREATH: Primary | ICD-10-CM

## 2019-11-21 DIAGNOSIS — K21.9 GASTRIC REFLUX: ICD-10-CM

## 2019-11-21 DIAGNOSIS — J47.1 BRONCHIECTASIS WITH ACUTE EXACERBATION (HCC): ICD-10-CM

## 2019-11-21 DIAGNOSIS — J44.9 CHRONIC OBSTRUCTIVE PULMONARY DISEASE, UNSPECIFIED COPD TYPE (HCC): Primary | ICD-10-CM

## 2019-11-21 DIAGNOSIS — Z85.118 H/O: LUNG CANCER: ICD-10-CM

## 2019-11-21 DIAGNOSIS — G47.33 OBSTRUCTIVE SLEEP APNEA: ICD-10-CM

## 2019-11-21 DIAGNOSIS — E66.9 OBESITY, CLASS II, BMI 35-39.9: ICD-10-CM

## 2019-11-21 DIAGNOSIS — J96.11 CHRONIC RESPIRATORY FAILURE WITH HYPOXIA (HCC): ICD-10-CM

## 2019-11-21 DIAGNOSIS — Z87.891 FORMER SMOKER: ICD-10-CM

## 2019-11-21 PROCEDURE — G0424 PULMONARY REHAB W EXER: HCPCS | Performed by: INTERNAL MEDICINE

## 2019-11-21 PROCEDURE — 99214 OFFICE O/P EST MOD 30 MIN: CPT | Performed by: NURSE PRACTITIONER

## 2019-11-21 RX ORDER — PREDNISONE 10 MG/1
20 TABLET ORAL DAILY
Qty: 21 TABLET | Refills: 0 | Status: SHIPPED | OUTPATIENT
Start: 2019-11-21 | End: 2019-11-28

## 2019-11-21 NOTE — PROGRESS NOTES
[facility]  Pulmonary Rehabilitation  Daily Treatment Log    Name: Lorrie Pagan : 1945 Date: 2019     Session:2/ approved: 18          Time In/Out: 9:00/10:30am      COPD Charges:  x1    Physician : Zaire Arboleda MD      Dx: J44.9      GOLD: 1 []  2 [x]  3 []  4 []     Primary Insurance:        Secondary Insurance:[place] [facility]    /80   Pulse 98   LMP  (LMP Unknown)   SpO2 98% Comment: 3 lpm     Auscultation:  []Clear   []Clear and Decreased   []Insp. Wheeze   []Exp. Wheeze     [x]Rhonchi, decreased    []Rales    Target Heart Rate Zone: 120-130  Max HR: 140    Exercise Sp02% Blood Pressure Heart Rate ARNOL Scale/Fatigue   Treadmill: Speed:       Min:  % Grade:          Distance:  x      Bands:#:        Reps:        Min:  Color Band:  x      6MWT:Min:        Distance:     MET:  x       Step-ups:#:      Reps:     Min:  x      Cybex/Vision/Schwinn Bike:  Level:      Speed:    RPM:       Min:  Distance:                 Seat:  x      Hand Weights: Pounds:     Min:  Curls#         Sets:   Presses#    Sets:  Fly#            Sets:  Shoulder Shrugs#     Sets:  Wings#       Sets:  Wrist Curls#   Sets:                      x      NuStep: Level:1      METS: 1.9      Min:11       SPM:65           Total Steps:736  Seat:8      Arms:8 98 130/80 94 1/0.5   Arm Ergometer: Level:     Min:  RPM:            Fwd:      Back:  Total Turns:  x      PLB / DB Spinner: Min:10  P-Flex: Level:  1/1       Min:10  IS: Volume: 1500             Min:10  Warm-up Stretches: Min:x 98  90 0/0      Education and Trainin min  [x]ARNOL Scale  [x]PLB  [x]DB  [x]IMT  [x]IS  []Vital Signs   []Target HR Zone  []Exercise Vital Signs [] Safe Vital Signs  [x]Pulmonary Anatomy  [x]Lung Function  []Lung Disease Process  [x]Oxygen Use  []Oxygen Safety  []Pulmonary Meds  []Med Instruction  []Cough Technique  []Pulmonary Hygeine  []Infection Prevention  []Signs of Infection  []When to Call MD   []Nutrition  []Weight Management  []Advance Directives  [x]Posture  []Body Mechanics []Energy Conservation  []Pacing ADL's  []Stress/Anxiety Management  []Stretching  [x]Home Exercise  []Smoking Cessation   []NH Progress  [x]Maintenance Exercise Program    []Pulmonary Knowledge Test/Education  []Review PFT  []Review Specific Disease  []Review Home Follow-through Compliance  []Safe Exercise []Exercise Progression Strategies    Progress Report:  Orientation to NH. Pt given PFlex and IS. Instructed pt to use devices 2 times daily for 10 min each time. Initial training on PLB/DB and using better posture for better breathing.  Pt able to exercise on NuStep today. Max cues on using PLB with exercise.      Treating Clinicians: [x]Kasia Ramirez, RRT  [] Radha Means RRT    MD in Office: []INDY Tom  []STACIA Pagan  [x]HAILEE Villalta  [x]JR Arboleda    []ADRIANA Carlos [x]HOLLY Noel  []STACIA Quesada  []STACIA Alvarado  []DIANNE Arboleda  []MARY Estes   []ADRIANA Webber  [x]INDY Lala

## 2019-11-21 NOTE — PROGRESS NOTES
[facility] Pulmonary Follow Up Note    Chief Complaint:  Chief Complaint   Patient presents with   • Shortness of Breath       History of Present Illness:  Lorrie Pagan is a 74 y.o.female here today for a sick visit with Shortness of Breath. She was last seen in the office by Dr Zaire Arboleda on 10/25/19 for a routine follow up visit.    She previously lived in [place] where she has received most of her medical care then moved to [place] and has been seen at [facility] and most recently has moved her care to our office from a pulmonary perspective.     She has a past history of tobacco abuse and quit in 1992. She has a history of lung cancer and underwent a left upper lobe lobectomy in 2016 without evidence of reoccurrence. Most recent imaging was a CTA done in ED on 10/17/19 which was negative for pulmonary embolus. No acute findings, with chronic changes with emphysema and postoperative changes.     She has a history of chronic hypoxic respiratory failure and bronchiectasis. She is on supplemental home oxygen at 5 L NC 24/7. She is on Spiriva Respimat and Advair daily. She also has albuterol inhaler and nebs as needed.  She was just evaluated by Shameka with our pulmonary rehab today, prior to her visit.     She carries a diagnosis of obstructive sleep apnea with recommendation for CPAP. She was told that it was mild so she tells me today that she did not ever get a CPAP. She had a sleep study done at [facility] under Zacarias Pantoja within the previous year.    She has regular reflux symptoms and uses a PPI. She had a Nissen fundoplication remotely. She just had a repeat EGD with esophageal dilation by Dr Kenny on 11/6/19.    Today she presents with increased shortness of breath that began almost 2 weeks ago. She has had a productive cough with thick tenacious secretions, chest tightness, and congestions. She called the office last week and I started her on doxycyline with a prednisone taper. She  is here to follow up her acute illness. She has improved slightly, however she continues to be short of breath. She was able to participate in a very short rehab session today, however her oxygen saturation was in the 80's by the time she was roomed, but recovered with rest and increasing her oxygen to 6 L. She denies current fever, chills, hemoptysis, chest pain, palpations, or lower extremity edema.      I have reviewed the patients past medical, past surgical and family history as noted in Epic.     Subjective     Social History     Socioeconomic History   • Marital status: Single     Spouse name: Not on file   • Number of children: Not on file   • Years of education: Not on file   • Highest education level: Not on file   Tobacco Use   • Smoking status: Former Smoker     Packs/day: 1.50     Years: 25.00     Pack years: 37.50     Types: Cigarettes     Last attempt to quit: 1992     Years since quittin.9   • Smokeless tobacco: Never Used   Substance and Sexual Activity   • Alcohol use: Yes     Alcohol/week: 1.2 oz     Types: 2 Glasses of wine per week   • Drug use: No   • Sexual activity: No   Social History Narrative    Previously lived in [facility], recently moved here        Retired    Previously smoked approximately 1 pack cigarettes per day for 25 years and stop smoking in     Drinks 3-4 alcoholic beverages on a weekly basis         Current Outpatient Medications:   •  albuterol (PROVENTIL) (2.5 MG/3ML) 0.083% nebulizer solution, Take 2.5 mg by nebulization 4 (Four) Times a Day As Needed for Wheezing., Disp: 125 vial, Rfl: 3  •  albuterol sulfate HFA (VENTOLIN HFA) 108 (90 Base) MCG/ACT inhaler, Inhale 2 puffs Every 6 (Six) Hours As Needed for Wheezing., Disp: 8 g, Rfl: 2  •  amitriptyline (ELAVIL) 25 MG tablet, daily, Disp: , Rfl:   •  azithromycin (ZITHROMAX) 250 MG tablet, Take 250 mg by mouth 3 (Three) Times a Week., Disp: , Rfl: 11  •  clonazePAM (KlonoPIN) 0.5 MG disintegrating tablet,  clonazepam 0.5 mg disintegrating tablet  Place 1 tablet as needed by translingual route., Disp: , Rfl:   •  colestipol (COLESTID) 1 g tablet, TAKE 2 TABLETS BY MOUTH DAILY. KEEP 2 HOURS APART FROM OTHER MEDICATIONS, Disp: 60 tablet, Rfl: 0  •  diphenoxylate-atropine (LOMOTIL) 2.5-0.025 MG per tablet, Lomotil  PRN, Disp: , Rfl:   •  doxycycline (VIBRAMYICN) 100 MG tablet, Take 1 tablet by mouth 2 (Two) Times a Day., Disp: 20 tablet, Rfl: 0  •  fluticasone-salmeterol (ADVAIR DISKUS) 250-50 MCG/DOSE DISKUS, Advair Diskus 250 mcg-50 mcg/dose powder for inhalation, Disp: , Rfl:   •  Furosemide (LASIX PO), furosemide  40 mg, Disp: , Rfl:   •  HYDROcod Polst-CPM Polst ER (TUSSIONEX PENNKINETIC) 10-8 MG/5ML ER suspension, TK 10 ML PO BID PRN, Disp: , Rfl: 0  •  ipratropium (ATROVENT) 0.06 % nasal spray, ipratropium bromide 42 mcg (0.06 %) nasal spray, Disp: , Rfl:   •  levothyroxine (SYNTHROID, LEVOTHROID) 50 MCG tablet, Take 50 mcg by mouth Daily., Disp: , Rfl:   •  metFORMIN (GLUCOPHAGE) 500 MG tablet, TK 1 T PO Q 12 H WF, Disp: , Rfl: 0  •  omeprazole (priLOSEC) 20 MG capsule, Take 20 mg by mouth Daily., Disp: , Rfl:   •  predniSONE (DELTASONE) 10 MG tablet, Take 4 tabs daily x 3 days, then take 3 tabs daily x 3 days, then take 2 tabs daily x 3 days, then take 1 tab daily x 3 days, Disp: 31 tablet, Rfl: 0  •  rOPINIRole HCl (REQUIP PO), 8 mg nightly, Disp: , Rfl:   •  sulfamethoxazole-trimethoprim (BACTRIM,SEPTRA) 400-80 MG tablet, Take 1 tablet by mouth 2 (Two) Times a Day., Disp: , Rfl:   •  Tiotropium Bromide Monohydrate (SPIRIVA RESPIMAT) 1.25 MCG/ACT aerosol solution inhaler, Spiriva Respimat 1.25 mcg/actuation solution for inhalation, Disp: , Rfl:   •  predniSONE (DELTASONE) 10 MG tablet, Take 2 tablets by mouth Daily for 7 days. Then take 1 tablet by mouth Daily x 7 days., Disp: 21 tablet, Rfl: 0    Allergies   Allergen Reactions   • Penicillins Other (See Comments)     rash   • Statins Other (See Comments)      "myalgia       Immunization History   Administered Date(s) Administered   • Fluzone High Dose =>65 Years ([employer] ONLY) 10/28/2018, 10/21/2019   • Pneumococcal Conjugate 13-Valent (PCV13) 10/28/2018       Review of Systems:    Review of Systems   Constitutional: Positive for fatigue. Negative for chills and fever.   HENT: Positive for congestion and postnasal drip. Negative for rhinorrhea, sinus pressure and voice change.    Eyes: Negative for blurred vision and visual disturbance.   Respiratory: Positive for cough, chest tightness, shortness of breath and wheezing. Negative for apnea.    Cardiovascular: Negative for chest pain, palpitations and leg swelling.   Gastrointestinal: Positive for GERD. Negative for abdominal pain, nausea and vomiting.   Endocrine: Negative for cold intolerance and heat intolerance.   Genitourinary: Negative for dysuria and hematuria.   Musculoskeletal: Negative for arthralgias and joint swelling.   Skin: Negative for rash and bruise.   Allergic/Immunologic: Negative for environmental allergies, food allergies and immunocompromised state.   Neurological: Negative for dizziness, speech difficulty, weakness and headache.   Hematological: Negative for adenopathy. Does not bruise/bleed easily.   Psychiatric/Behavioral: Negative for dysphoric mood. The patient is not nervous/anxious.        Objective     Vital Signs:  Vitals:    11/21/19 1021   BP: 162/80   BP Location: Right arm   Patient Position: Sitting   Pulse: 102   Temp: 98.1 °F (36.7 °C)   SpO2: 91%  Comment: 6 liters   Weight: 90.4 kg (199 lb 6.4 oz)   Height: 162.6 cm (64\")       Physical Exam:    Physical Exam   Constitutional: She is oriented to person, place, and time. She appears well-developed and well-nourished.   HENT:   Head: Normocephalic and atraumatic.   Mouth/Throat: Oropharynx is clear and moist.   Eyes: EOM are normal. Pupils are equal, round, and reactive to light.   Neck: Normal range of motion. Neck supple. "   Cardiovascular: Normal rate and regular rhythm.   Pulmonary/Chest: She is in respiratory distress. She has decreased breath sounds. She has no wheezes.   Abdominal: Soft. Bowel sounds are normal.   Musculoskeletal: Normal range of motion. She exhibits edema (mild BLE).   Lymphadenopathy:     She has no cervical adenopathy.   Neurological: She is alert and oriented to person, place, and time.   Skin: Skin is warm and dry.   Psychiatric: She has a normal mood and affect. Judgment normal.   Nursing note and vitals reviewed.      Results Review:    I reviewed the patient's new clinical results.    No radiology results for the last 30 days.    CTA Chest 10/17/2019  COMPARISON: 9/10/2019  FINDINGS:   There is no CT evidence for pulmonary embolus. The heart is normal in size. There is no significant thoracic lymphadenopathy. There are no pleural effusions. There is diffuse centrilobular emphysema. Postoperative changes of prior left upper lobectomy.  Atelectasis at the lung bases.     IMPRESSION:  1. Negative for pulmonary embolus.     2. No acute findings in the chest. Emphysema. Postoperative changes of prior left upper lobectomy.       Assessment/Plan   Assessment / Plan:    Problem List Items Addressed This Visit        Respiratory    Chronic respiratory failure with oxygen depend ance    Obstructive Sleep Apnea       Digestive    Gastric reflux       Other    H/O: lung cancer (Prior resection 2016)    Former smoker (None since 1992)    Obesity, Class II, BMI 35-39.9      Other Visit Diagnoses     Shortness of breath    -  Primary    Relevant Medications    prednisone (DELTASONE) 10 MG tablet    Bronchiectasis with acute exacerbation (CMS/[facility])        Relevant Medications    prednisone (DELTASONE) 10 MG tablet          Discussion:    Patient presents today for a sick visit. Her symptoms began almost 2 weeks. She is C/O increased shortness of breath with a productive cough with thick tenacious secretions, wheezing,  chest tightness, and congestion. She called the office last week and I started her on doxycyline with a prednisone taper. She has improved slightly, however she continues to be short of breath. She was able to participate in a very short rehab session earlier today. After walking from the waiting room, her oxygen saturation was 86% on 5 L. She required 6 L with rest to recover to over 90% O2 saturation.  She will complete her course of doxycyline. She was to start her 20 mg daily prednisone today with her taper. I will extend her taper to include 20 mg daily for 7 days, then decrease to 10 mg daily for 7 days.     With her bronchiectasis, she will continue to use her nebulizer treatment twice a day with her flutter valve and as needed. We reviewed the proper use and need for secretion clearance. She will also resume her suppressive therapy with azithromycin M/W/F upon completion of doxycyline. She will continue Advair and Spiriva Respimat.     She will continue her home oxygen therapy for her chronic hypoxic respiratory failure. She uses 4-5 L at rest currently She will require more that 5 L at times, especially with exertion, as seen in the office today.  We need to get her a high efficiency concentrator through her [employer] company, [employer] with [employer].     She also carries the diagnosis of RENETTA. She had a sleep study done at [facility] per Dr Guerra with Pulmonary Department. It was done within this year so we will try to get the report and initiate her CPAP. We had a long discussion regarding the use. She does not sleep well. She has unrestorative sleep with daytime hypersomnolence. According to the patient, CPAP was recommended, but she admits that she did not think she needed it and never obtained it. We will try to get this set up for her through her DME noted above.    She has GERD with reflux and will continue on her daily PPI. She just had an EGD with esophageal dilation per Dr Cullen on 11/6/19.      She was just evaluated by our pulmonary rehab program today. She qualifies and will be starting next week. She would certainly benefit from the program and physical activity. She is obese with deconditioning. benefits of physical activity was discussed.     She was recently treated with Bactrim for an acute UTI and I urged her to follow up with her PCP to ensure resolution of UTI with possible repeat culture.     She will follow up in the clinic in approximately 3-4 months with Dr Zaire Arboleda. She will need a post 30 day CPAP follow up as well.     Plan of care was reviewed with the patient at the conclusion of today's visit. Education was provided regarding diagnosis, management, and any prescribed or recommended over the counter medications. Patient verbalizes understanding of and agreement with management plan.     I spent 25 minutes with the patient. I spent > 50% percent of this time counseling and discussing diagnosis, prognosis, diagnostic testing, evaluation, current status, treatment options, management and clinical trials.    DONALD Petty  Electronically signed     Please note that portions of this note were completed with a voice recognition program. Efforts were made to edit the dictations, but occasionally words are mistranscribed.

## 2019-11-25 ENCOUNTER — CLINICAL SUPPORT (OUTPATIENT)
Dept: PULMONOLOGY | Facility: CLINIC | Age: 74
End: 2019-11-25

## 2019-11-25 VITALS
HEART RATE: 102 BPM | OXYGEN SATURATION: 99 % | DIASTOLIC BLOOD PRESSURE: 70 MMHG | RESPIRATION RATE: 14 BRPM | SYSTOLIC BLOOD PRESSURE: 140 MMHG

## 2019-11-25 DIAGNOSIS — J44.9 COPD MIXED TYPE (HCC): ICD-10-CM

## 2019-11-25 PROCEDURE — G0424 PULMONARY REHAB W EXER: HCPCS | Performed by: INTERNAL MEDICINE

## 2019-11-25 NOTE — PROGRESS NOTES
[facility]  Pulmonary Rehabilitation  Daily Treatment Log    Name: Lorrie Pagan : 1945 Date: 2019     Session: 3/ approved: 18          Time In/Out: 09:00/10:30am     COPD Charges:  x1    Physician : Zaire Arboleda MD      Dx: J44.9      GOLD: 1 []  2 [x]  3 []  4 []     Primary Insurance:   Secondary Insurance:[employer]  /70   Pulse 102   Resp 14   LMP  (LMP Unknown)   SpO2 99% Comment: 3 LPM     Auscultation:  [x]Clear   []Clear and Decreased   []Insp. Wheeze   []Exp. Wheeze     []Rhonchi   []Rales    Target Heart Rate Zone: 120-130 Max HR: 140    Exercise Sp02% Blood Pressure Heart Rate ARNOL Scale/Fatigue   Treadmill: Speed:       Min:  % Grade:          Distance: x      Bands:#:        Reps:        Min:  Color Band: x      6MWT:Min:        Distance:     MET: x      Step-ups:#:      Reps:     Min: x      Cybex/Vision/Schwinn Bike:  Level:      Speed:    RPM:       Min:  Distance:                 Seat: x      Hand Weights: Pounds:     Min:  Curls#         Sets:   Presses#    Sets:  Fly#            Sets:  Shoulder Shrugs#     Sets:  Wings#       Sets:  Wrist Curls#   Sets:                     x      NuStep: Level:1      METS:2.0     Min:20  5 x 3 plus rest SPM: 62 Total Steps:970  Seat:8    Arms:8 99  98  100 130/80 101  103  94 2/2   Arm Ergometer: Level:10w/8w Min:10  RPM:  60          Fwd:5      Back:5  Total Turns:414 99  99 140/80 91  93 3/2   PLB / DB Spinner: Min:10  P-Flex: Level: 1/1        Min:10  IS: Volume: 1000            Min:10  Warm-up Stretches: Min:10 93  105 3/3      Education and Trainin min  [x]ARNOL Scale  [x]PLB  [x]DB  [x]IMT  [x]IS  [x]Vital Signs   []Target HR Zone  []Exercise Vital Signs [] Safe Vital Signs  []Pulmonary Anatomy  []Lung Function  []Lung Disease Process  []Oxygen Use  []Oxygen Safety  []Pulmonary Meds  []Med Instruction  []Cough Technique  []Pulmonary Hygeine  []Infection Prevention  []Signs of  Infection  []When to Call MD  []Nutrition  []Weight Management  []Advance Directives  [x]Posture  []Body Mechanics []Energy Conservation  []Pacing ADL's  []Stress/Anxiety Management  [x]Stretching  [x]Home Exercise  []Smoking Cessation   []RI Progress  []Maintenance Exercise Program    []Pulmonary Knowledge Test/Education  []Review PFT  []Review Specific Disease  []Review Home Follow-through Compliance  []Safe Exercise []Exercise Progression Strategies    Progress Report:  Good motivation and participation.  Max cues to use pursed lip breathing during exercise.  Pt states good follow-through doing breathing exercises at home.  Reminded pt to use good posture during stretching and exercise.     Treating Clinicians: []Kasia Ramirez, RRT  [x] Radha Means, RRT    MD in Office: [x]INDY Tom  []STACIA Pagan  []HAILEE Villalta  []JR Arboleda    []ADRIANA Carlos []HOLLY Noel  []STACIA Quesada  []STACIA Alvarado  [x]DIANNE Arboleda  [x]MARY Estes   []ADRIANA Webber  []INDY Lala

## 2019-11-29 LAB
MYCOBACTERIUM SPEC CULT: ABNORMAL
NIGHT BLUE STAIN TISS: ABNORMAL

## 2019-12-02 ENCOUNTER — HOSPITAL ENCOUNTER (OUTPATIENT)
Dept: NUTRITION | Facility: HOSPITAL | Age: 74
Setting detail: RECURRING SERIES
Discharge: HOME | End: 2019-12-02

## 2019-12-02 VITALS — HEIGHT: 64 IN | BODY MASS INDEX: 34.23 KG/M2

## 2019-12-02 PROCEDURE — 97802 MEDICAL NUTRITION INDIV IN: CPT

## 2019-12-03 ENCOUNTER — DOCUMENTATION (OUTPATIENT)
Dept: PULMONOLOGY | Facility: CLINIC | Age: 74
End: 2019-12-03

## 2019-12-03 ENCOUNTER — CLINICAL SUPPORT (OUTPATIENT)
Dept: PULMONOLOGY | Facility: CLINIC | Age: 74
End: 2019-12-03

## 2019-12-03 VITALS
BODY MASS INDEX: 34.33 KG/M2 | DIASTOLIC BLOOD PRESSURE: 72 MMHG | HEART RATE: 102 BPM | OXYGEN SATURATION: 96 % | RESPIRATION RATE: 12 BRPM | SYSTOLIC BLOOD PRESSURE: 138 MMHG | WEIGHT: 200 LBS

## 2019-12-03 DIAGNOSIS — J44.9 CHRONIC OBSTRUCTIVE PULMONARY DISEASE, UNSPECIFIED COPD TYPE (HCC): Primary | ICD-10-CM

## 2019-12-03 PROBLEM — Z22.39 MYCOBACTERIUM AVIUM COMPLEX COLONIZATION: Status: ACTIVE | Noted: 2019-12-03

## 2019-12-03 PROCEDURE — G0424 PULMONARY REHAB W EXER: HCPCS | Performed by: INTERNAL MEDICINE

## 2019-12-03 NOTE — PROGRESS NOTES
Patient in pulmonary rehab today. Spoke with her. She has received her high efficiency concentrator at home. She is feeling much improved since her OV.     Scant growth 1+ MAC ID by state lab from sputum culture.   CTA Chest after sputum collection revealed no evidence for PE. No significant lymphadenopathy, infiltrates, or other acute findings.  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

## 2019-12-03 NOTE — PROGRESS NOTES
[facility]  Pulmonary Rehabilitation  Daily Treatment Log    Name: Lorrie Pagan : 1945 Date: 12/3/2019     Session: 4/ approved: 18          Time In/Out: 9:00/10:30am     COPD Charges:  x1    Physician : Zaire Arboleda MD      Dx: J44.9      GOLD: 1 []  2 [x]  3 []  4 []     Primary Insurance:               Secondary Insurance:[employer] [facility]    /72   Pulse 102   Resp 12   Wt 90.7 kg (200 lb)   LMP  (LMP Unknown)   SpO2 96% Comment: 3 lpm  BMI 34.33 kg/m²      Auscultation:  []Clear   []Clear and Decreased   []Insp. Wheeze   [x]Exp. Wheeze, cleared with rescue MDI x 2 puffs   []Rhonchi   []Rales    Target Heart Rate Zone: 120-130  Max HR: 140    Exercise Sp02% Blood Pressure Heart Rate ARNOL Scale/Fatigue   Treadmill: Speed:       Min:  % Grade:          Distance:  x      Bands:#:        Reps:        Min:  Color Band:  x      6MWT:Min:        Distance:     MET:  x      Step-ups:#:      Reps:     Min:  x      Cybex/Vision/Schwinn Bike:  Level:      Speed:    RPM:       Min:  Distance:                 Seat:  x      Hand Weights: Pounds:     Min:  Curls#         Sets:   Presses#    Sets:  Fly#            Sets:  Shoulder Shrugs#     Sets:  Wings#       Sets:  Wrist Curls#   Sets:                      x      NuStep: Level: 2     METS:     Min: 20  Rested at 10 min      SPM:62         Total Steps:1238  Seat: 8    Arms:8 98  96 140/70  140/80 107  108 2/2  2/2   Arm Ergometer: Level: 10/8w    Min:13  RPM:50-53            Fwd:8      Back:5  Total Turns:690 96 142/80 107 2/2   PLB / DB Spinner: Min:5  P-Flex: Level:2/2         Min:5  IS: Volume: 900            Min:10  Warm-up Stretches: Min:10   95    105   2/2      Education and Trainin min   xBORG Scale  [x]PLB  [x]DB  [x]IMT  [x]IS  []Vital Signs   []Target HR Zone  []Exercise Vital Signs [] Safe Vital Signs  []Pulmonary Anatomy  []Lung Function  []Lung Disease Process  [x]Oxygen Use  []Oxygen Safety   [x]Pulmonary Meds  [x]Med Instruction  []Cough Technique  []Pulmonary Hygeine  []Infection Prevention  []Signs of Infection  []When to Call MD  []Nutrition  []Weight Management  []Advance Directives  []Posture  []Body Mechanics [x]Energy Conservation  []Pacing ADL's  []Stress/Anxiety Management  [x]Stretching  [x]Home Exercise  []Smoking Cessation   [x]PA Progress  []Maintenance Exercise Program    [x]Pulmonary Knowledge Test/Education  []Review PFT  []Review Specific Disease  [x]Review Home Follow-through Compliance  [x]Safe Exercise []Exercise Progression Strategies    Progress Report:  Good participation and motivation. Pt states she is using IS/Pflex at home on regular basis. She states she is doing stretches daily and demo safe effective stretching and use of IS/Pflex. Mod cues to use PLB with exercise. Completed pre- pulmonary knowledge test. Pt missed 1 question. Education on nutrition, weight-loss and strategies for weight-loss/goal weight. Pt seeing dietitian for guidance. Mod cues for pacing with exercise to be able to continue exercise when fatigue, instead of stopping exercise. She decreased pace to recover, then increases pace to normal pace. Pt able to make progress with exercise, increasing resistance/pace and time.    Treating Clinicians: [x]Kasia Ramirez, RRT  [] Radha Means, RRT    MD in Office: []INDY Tom  []STACIA Pagan  []HAILEE Villalta  [x]JR Arboleda    []ADRIANA Carlos []HOLLY Noel  [x]STACIA Quesada  [x]STACIA Alvarado  []DIANNE Arboleda  []MARY Estes   []ADRIANA Webber  []INDY Lala

## 2019-12-05 ENCOUNTER — CLINICAL SUPPORT (OUTPATIENT)
Dept: PULMONOLOGY | Facility: CLINIC | Age: 74
End: 2019-12-05

## 2019-12-05 VITALS
HEART RATE: 83 BPM | RESPIRATION RATE: 14 BRPM | DIASTOLIC BLOOD PRESSURE: 60 MMHG | OXYGEN SATURATION: 99 % | SYSTOLIC BLOOD PRESSURE: 120 MMHG

## 2019-12-05 DIAGNOSIS — J44.9 CHRONIC OBSTRUCTIVE PULMONARY DISEASE, UNSPECIFIED COPD TYPE (HCC): Primary | ICD-10-CM

## 2019-12-05 PROCEDURE — G0424 PULMONARY REHAB W EXER: HCPCS | Performed by: INTERNAL MEDICINE

## 2019-12-05 NOTE — PROGRESS NOTES
[facility]  Pulmonary Rehabilitation  Daily Treatment Log    Name: Lorrie Pagan : 1945 Date: 2019     Session: 5/ approved: 18          Time In/Out: 9:00/10:30am     COPD Charges:  x1    Physician : Zaire Arboleda MD      Dx: J44.9      GOLD: 1 []  2 [x]  3 []  4 []     Primary Insurance:               Secondary Insurance:[employer]    /60   Pulse 83   Resp 14   LMP  (LMP Unknown)   SpO2 99% Comment: 3 lpm     Auscultation:  []Clear   [x]Clear and Decreased   []Insp. Wheeze   []Exp. Wheeze     []Rhonchi   []Rales    Target Heart Rate Zone: 120-130  Max HR: 140    Exercise Sp02% Blood Pressure Heart Rate ARNOL Scale/Fatigue   Treadmill: Speed:       Min:  % Grade:          Distance:  x      Bands:#:        Reps:        Min:  Color Band:  x      6MWT:Min:        Distance:     MET:  x      Step-ups:#:      Reps:     Min:  x      Cybex/Vision/Schwinn Bike:  Level:      Speed:    RPM:       Min:  Distance:                 Seat:  x      Hand Weights: Pounds:     Min:  Curls#         Sets:   Presses#    Sets:  Fly#            Sets:  Shoulder Shrugs#     Sets:  Wings#       Sets:  Wrist Curls#   Sets:                      x      NuStep: Level:2/3      METS: 2.6     Min:18       SPM: 71    Total Steps:1260  Seat: 8     Arms:8 98 130/80 86 1/2   Arm Ergometer: Level:     Min:  RPM:            Fwd:      Back:  Total Turns:  x      PLB / DB Spinner: Min:8  P-Flex: Level:2/2         Min:10  IS: Volume: 1500             Min: 15  Warm-up Stretches: Min: 15 99  85 1/0      Education and Trainin min  [x]ARNOL Scale  [x]PLB  [x]DB  [x]IMT  [x]IS  []Vital Signs   []Target HR Zone  []Exercise Vital Signs [] Safe Vital Signs  []Pulmonary Anatomy  [x]Lung Function  [x]Lung Disease Process  []Oxygen Use  []Oxygen Safety  []Pulmonary Meds  []Med Instruction  []Cough Technique  []Pulmonary Hygeine  []Infection Prevention  []Signs of Infection  []When to Call MD   []Nutrition  []Weight Management  []Advance Directives  [x]Posture  [x]Body Mechanics [x]Energy Conservation  []Pacing ADL's  []Stress/Anxiety Management  [x]Stretching  []Home Exercise  []Smoking Cessation   []AL Progress  []Maintenance Exercise Program    []Pulmonary Knowledge Test/Education  []Review PFT  []Review Specific Disease  [x]Review Home Follow-through Compliance  []Safe Exercise []Exercise Progression Strategies    Progress Report: Good participation and motivation. Pt demonstrates better use of IS/Pflex and states she is using devices at home daily. Pt verbalizes better understanding of lung function and lung disease process. Demonstrates effective stretching exercises, mod cues to use PLB with exercise. Pt feeling very good today. She was able to exercise at a higher intensity and longer time without resting. Making good progress. She will be switching rehab time from morning session starting next week to 1-2:30 pm, T/Th due to transportation.      Treating Clinicians: [x]Kasia Ramirez, RRT  [] Radha Means RRT MD in Office: []INDY Tom  []STACIA Pagan  []HAILEE Villalta  [x]JR Arboleda    []ADRIANA Carlos []HOLLY Noel  []STACIA Quesada  [x]STACIA Alvarado  []DIANNE Arboleda  []MARY Estes   []ADRIANA Webber  []INDY Lala

## 2019-12-10 ENCOUNTER — CLINICAL SUPPORT (OUTPATIENT)
Dept: PULMONOLOGY | Facility: CLINIC | Age: 74
End: 2019-12-10

## 2019-12-10 DIAGNOSIS — J44.9 CHRONIC OBSTRUCTIVE PULMONARY DISEASE, UNSPECIFIED COPD TYPE (HCC): Primary | ICD-10-CM

## 2019-12-10 PROCEDURE — G0424 PULMONARY REHAB W EXER: HCPCS | Performed by: INTERNAL MEDICINE

## 2019-12-12 VITALS
DIASTOLIC BLOOD PRESSURE: 70 MMHG | HEART RATE: 99 BPM | OXYGEN SATURATION: 98 % | RESPIRATION RATE: 14 BRPM | SYSTOLIC BLOOD PRESSURE: 138 MMHG

## 2019-12-12 NOTE — PROGRESS NOTES
[facility]  Pulmonary Rehabilitation  Daily Treatment Log    Name: Lorrie Pagan : 1945 Date: 12/10/2019     Session: 6/ approved: 18          Time In/Out: 1:00/2:30pm     COPD Charges:  x1    Physician : Zaire Arboleda MD      Dx: J44.9      GOLD: 1 []  2 [x]  3 []  4 []     Primary Insurance:                Secondary Insurance:[employer]    /70   Pulse 99   Resp 14   LMP  (LMP Unknown)   SpO2 98% Comment: R/A     Auscultation:  [x]Clear   []Clear and Decreased   []Insp. Wheeze   []Exp. Wheeze     []Rhonchi   []Rales    Target Heart Rate Zone: 120-130  Max HR: 140    Exercise Sp02% Blood Pressure Heart Rate ARNOL Scale/Fatigue   Treadmill: Speed:       Min:  % Grade:          Distance:  x      Bands:#:        Reps:        Min:  Color Band:  x      6MWT:Min:        Distance:     MET:  x      Step-ups:#:      Reps:     Min:  x      Cybex/Vision/Schwinn Bike:  Level:      Speed:    RPM:       Min:  Distance:                 Seat:  x      Hand Weights: Pounds:     Min:  Curls#         Sets:   Presses#    Sets:  Fly#            Sets:  Shoulder Shrugs#     Sets:  Wings#       Sets:  Wrist Curls#   Sets:                      x      NuStep: Level: 3   METS:2.6   Min: 25  10min/rest/10min    SPM:71           Total Steps:1420  Seat: 8     Arms: 8   96  96   140/80   99  107     0/0  0/2   Arm Ergometer: Level:     Min:  RPM:            Fwd:      Back:  Total Turns:  x      PLB / DB Spinner: Min:10  P-Flex: Level: 3/3        Min:10  IS: Volume: 1500            Min:10  Warm-up Stretches: Min:15   99    95   0/2  Legs fatigue      Education and Trainin min  [x]ARNOL Scale  [x]PLB  [x]DB  [x]IMT  [x]IS  [x]Vital Signs   []Target HR Zone  []Exercise Vital Signs [] Safe Vital Signs  []Pulmonary Anatomy  []Lung Function  []Lung Disease Process  []Oxygen Use  []Oxygen Safety  []Pulmonary Meds  []Med Instruction  []Cough Technique  []Pulmonary Hygeine   []Infection Prevention  []Signs of Infection  []When to Call MD  []Nutrition  []Weight Management  []Advance Directives  [x]Posture  [x]Body Mechanics [x]Energy Conservation  [x]Pacing ADL's  []Stress/Anxiety Management  [x]Stretching  [x]Home Exercise  []Smoking Cessation   []DE Progress  []Maintenance Exercise Program    []Pulmonary Knowledge Test/Education  []Review PFT  []Review Specific Disease  []Review Home Follow-through Compliance  [x]Safe Exercise []Exercise Progression Strategies    Progress Report: Good participation and motivation. Pt states she is doing stretches daily and using IS/Pflex at home twice daily. Demonstrates appropriate use of IS/Plfex. Discussed pacing with exercise and ADL's to be able to do activities for longer period of time. Pt verbalized understanding of safe exercise progression and safe vital signs. Mod cues to use PLB with exercise/stretching.       Treating Clinicians: [x]Kasia Ramirez, RRT  [] Radha Means RRT    MD in Office: [x]INDY Tom  []STACIA Pagan  []HAILEE Villalta  []JR Arboleda    []ADRIANA Carlos []HOLLY Noel  []STACIA Quesada  []STACIA Alvarado  []DIANNE Arboleda  [x]MARY Estes   []ADRIANA Webber  []INDY Lala

## 2019-12-17 ENCOUNTER — CLINICAL SUPPORT (OUTPATIENT)
Dept: PULMONOLOGY | Facility: CLINIC | Age: 74
End: 2019-12-17

## 2019-12-17 VITALS
SYSTOLIC BLOOD PRESSURE: 138 MMHG | HEART RATE: 93 BPM | OXYGEN SATURATION: 99 % | DIASTOLIC BLOOD PRESSURE: 82 MMHG | RESPIRATION RATE: 14 BRPM

## 2019-12-17 DIAGNOSIS — J44.9 COPD MIXED TYPE (HCC): ICD-10-CM

## 2019-12-17 PROCEDURE — G0424 PULMONARY REHAB W EXER: HCPCS | Performed by: INTERNAL MEDICINE

## 2019-12-17 NOTE — PROGRESS NOTES
[facility]  Pulmonary Rehabilitation  Daily Treatment Log    Name: Lorrie Pagan : 1945 Date: 2019     Session: 7/ approved: 18          Time In/Out: 01:00/02:30PM     COPD Charges:  x1    Physician : Zaire Arboleda MD      Dx: J44.9      GOLD: 1 []  2 [x]  3 []  4 []     Primary Insurance:  Secondary Insurance:[employer]  /82   Pulse 93   Resp 14   LMP  (LMP Unknown)   SpO2 99% Comment: 3 LPM     Auscultation:  [x]Clear   []Clear and Decreased   []Insp. Wheeze   []Exp. Wheeze     []Rhonchi   []Rales    Target Heart Rate Zone: 120-130 Max HR:140    Exercise Sp02% Blood Pressure Heart Rate ARNOL Scale/Fatigue   Treadmill: Speed:       Min:  % Grade:          Distance: x      Bands:#:        Reps:        Min:  Color Band: x      6MWT:Min:        Distance:     MET: x      Step-ups:#:      Reps:     Min: x      Cybex/Vision/Schwinn Bike:  Level:      Speed:    RPM:       Min:  Distance:                 Seat: x      Hand Weights: Pounds:     Min:  Curls#         Sets:   Presses#    Sets:  Fly#            Sets:  Shoulder Shrugs#     Sets:  Wings#       Sets:  Wrist Curls#   Sets:                     x      NuStep: Level:2   METS:2.3   Min: 10      SPM: 65        Total Steps:714  Seat: 8     Arms:8 99 130/70 87 1/2   Arm Ergometer: Level:10w/8w Min:14  RPM: 50           Fwd:7      Back:7  Total Turns:680 96 140/80 98 2/2   PLB / DB Spinner: Min:5  P-Flex: Level:4/4         Min:5  IS: Volume: 1000            Min:5  Warm-up Stretches: Min:10 95  92 0/0.5      Education and Trainin min [x]ARNOL Scale  [x]PLB  [x]DB  [x]IMT  [x]IS  [x]Vital Signs   []Target HR Zone  []Exercise Vital Signs [] Safe Vital Signs  []Pulmonary Anatomy  []Lung Function  []Lung Disease Process  []Oxygen Use  []Oxygen Safety  []Pulmonary Meds  []Med Instruction  []Cough Technique  []Pulmonary Hygeine  []Infection Prevention  []Signs of Infection  []When to Call MD   []Nutrition  []Weight Management  []Advance Directives  [x]Posture  []Body Mechanics []Energy Conservation  []Pacing ADL's  []Stress/Anxiety Management  [x]Stretching  [x]Home Exercise  []Smoking Cessation   [x]SC Progress  []Maintenance Exercise Program    []Pulmonary Knowledge Test/Education  []Review PFT  []Review Specific Disease  [x]Review Home Follow-through Compliance  []Safe Exercise []Exercise Progression Strategies    Progress Report: Good motivation and participation.  Min cues for pursed lip breathing.  Pt arrived with a new battery operated scooter for mobility.  Pt is learning to use it properly.  She developed sciatic pain while using Nu-Step.  She completed 10 minutes but then needed to walk around and rest.  Patient was no longer complaining of pain when she left; pain resolved.    Treating Clinicians: [x]Kasia Ramirez, RRT  [x] Radha Means, RRT    MD in Office: []INDY Tom  []STACIA Pagan  []HAILEE Villalta  [x]JR Arboleda    []ADRIANA Carlos []HOLLY Noel  [x]STACIA Quesada  []STACIA Alvarado  []DIANNE Arboleda  []MARY Estes   []ADRIANA Webber  []INDY Lala

## 2020-01-02 ENCOUNTER — CLINICAL SUPPORT (OUTPATIENT)
Dept: PULMONOLOGY | Facility: CLINIC | Age: 75
End: 2020-01-02

## 2020-01-02 VITALS
OXYGEN SATURATION: 100 % | WEIGHT: 193 LBS | DIASTOLIC BLOOD PRESSURE: 72 MMHG | RESPIRATION RATE: 12 BRPM | BODY MASS INDEX: 33.13 KG/M2 | SYSTOLIC BLOOD PRESSURE: 130 MMHG | HEART RATE: 86 BPM

## 2020-01-02 DIAGNOSIS — J44.9 COPD MIXED TYPE (HCC): Primary | ICD-10-CM

## 2020-01-02 PROCEDURE — G0424 PULMONARY REHAB W EXER: HCPCS | Performed by: INTERNAL MEDICINE

## 2020-01-02 NOTE — PROGRESS NOTES
[facility]  Pulmonary Rehabilitation  Daily Treatment Log    Name: Lorrie Pagan : 1945 Date: 2020     Session:  approved: 18          Time In/Out: 1:00/*2:30pm     COPD Charges:  x1    Physician : Zaire Arboleda MD      Dx: J44.9      GOLD: 1 []  2 [x]  3 []  4 []     Primary Insurance:                   Secondary Insurance: [employer] [facility]    /72   Pulse 86   Resp 12   Wt 87.5 kg (193 lb)   LMP  (LMP Unknown)   SpO2 100% Comment: 3 lpm  BMI 33.13 kg/m²      Auscultation:  []Clear   [x]Clear and Decreased   []Insp. Wheeze   []Exp. Wheeze     []Rhonchi   []Rales    Target Heart Rate Zone: 120-130  Max HR: 140    Exercise Sp02% Blood Pressure Heart Rate ARNOL Scale/Fatigue   Treadmill: Speed:       Min:  % Grade:          Distance:  x      Bands:#:        Reps:        Min:  Color Band:  x      6MWT:Min:        Distance:     MET:  x      Step-ups:#:      Reps:     Min:  x      Cybex/Vision/Schwinn Bike:  Level:      Speed:    RPM:       Min:  Distance:                 Seat:  x      Hand Weights: Pounds: 2     Min: 20  Curls#10         Sets: 2  Presses#10    Sets:2  Fly# 10           Sets:2  Shoulder Shrugs# 5    Sets:2  Wings# 10      Sets:2  Wrist Curls# 5  Sets:2                       97   142/80   85   0/1   NuStep: Level:4/3      METS:2.2     Min:25       SPM:58    Rested at 15 min         Total Steps:1288  Seat:8      Arms:8   95  97   140/80   92  92   2/2  0/1   Arm Ergometer: Level:     Min:  RPM:            Fwd:      Back:  Total Turns:  x      PLB / DB Spinner: Min:5  P-Flex: Level: 4/4         Min:10  IS: Volume: 1500             Min:10  Warm-up Stretches: Min:15   97    83   0/0.5  Rt hip pain, slight  Stretches done sitting      Education and Trainin min  [x]ARNOL Scale  [x]PLB  [x]DB  [x]IMT  [x]IS  []Vital Signs   []Target HR Zone  []Exercise Vital Signs [] Safe Vital Signs  []Pulmonary Anatomy  []Lung Function  []Lung Disease  Process  []Oxygen Use  []Oxygen Safety  []Pulmonary Meds  []Med Instruction  []Cough Technique  []Pulmonary Hygeine  []Infection Prevention  []Signs of Infection  []When to Call MD  []Nutrition  []Weight Management  []Advance Directives  [x]Posture  [x]Body Mechanics []Energy Conservation  []Pacing ADL's  []Stress/Anxiety Management  [x]Stretching  [x]Home Exercise  []Smoking Cessation   []NE Progress  []Maintenance Exercise Program    []Pulmonary Knowledge Test/Education  []Review PFT  []Review Specific Disease  [x]Review Home Follow-through Compliance  []Safe Exercise []Exercise Progression Strategies    Progress Report: Good participation and motivation. Pt states she is walking/using IMT/IS at home daily. Demonstrates effective use. Pt has lost 6 pounds since beginning NE. She states she is eating less, having smaller portions of food. Mod cues to use PLB/DB with exercise, but doing much better.     Treating Clinicians: [x]Kasia Ramirez, RRT  [] Radha Means RRT    MD in Office: []INDY Tom  []STACIA Pagan  []HAILEE Villalta  [x]JR Arboleda    []ADRIANA Carlos []HOLLY Noel  []STACIA Quesada  []STACIA Alvarado  []DIANNE Arboleda  []MARY Estes   []ADRIANA Webber  []INDY Lala

## 2020-01-06 ENCOUNTER — HOSPITAL ENCOUNTER (OUTPATIENT)
Dept: NUTRITION | Facility: HOSPITAL | Age: 75
Setting detail: RECURRING SERIES
Discharge: HOME | End: 2020-01-06

## 2020-01-06 VITALS — HEIGHT: 64 IN | WEIGHT: 196.5 LBS | BODY MASS INDEX: 33.55 KG/M2

## 2020-01-07 ENCOUNTER — CLINICAL SUPPORT (OUTPATIENT)
Dept: PULMONOLOGY | Facility: CLINIC | Age: 75
End: 2020-01-07

## 2020-01-07 VITALS
SYSTOLIC BLOOD PRESSURE: 140 MMHG | OXYGEN SATURATION: 99 % | DIASTOLIC BLOOD PRESSURE: 88 MMHG | HEART RATE: 80 BPM | RESPIRATION RATE: 14 BRPM

## 2020-01-07 DIAGNOSIS — J44.9 COPD MIXED TYPE (HCC): Primary | ICD-10-CM

## 2020-01-07 PROCEDURE — MNTEXCRVST: Performed by: INTERNAL MEDICINE

## 2020-01-09 ENCOUNTER — CLINICAL SUPPORT (OUTPATIENT)
Dept: PULMONOLOGY | Facility: CLINIC | Age: 75
End: 2020-01-09

## 2020-01-09 VITALS
RESPIRATION RATE: 14 BRPM | OXYGEN SATURATION: 98 % | BODY MASS INDEX: 33.64 KG/M2 | HEART RATE: 92 BPM | SYSTOLIC BLOOD PRESSURE: 140 MMHG | WEIGHT: 196 LBS | DIASTOLIC BLOOD PRESSURE: 80 MMHG

## 2020-01-09 DIAGNOSIS — J44.9 COPD MIXED TYPE (HCC): Primary | ICD-10-CM

## 2020-01-09 PROCEDURE — G0424 PULMONARY REHAB W EXER: HCPCS | Performed by: INTERNAL MEDICINE

## 2020-01-09 NOTE — PROGRESS NOTES
[facility]  Pulmonary Rehabilitation  Daily Treatment Log    Name: Lorrie Pagan : 1945 Date: 2020     Session: 10/ approved: 18          Time In/Out: 1:00/2:30pm     COPD Charges:  x1    Physician : Zaire Arboleda MD      Dx: J44.9      GOLD: 1 []  2 [x]  3 []  4 []     Primary Insurance:                Secondary Insurance:[place] [employer]    /80   Pulse 92   Resp 14   Wt 88.9 kg (196 lb)   LMP  (LMP Unknown)   SpO2 98% Comment: 2 lpm  BMI 33.64 kg/m²      Auscultation:  []Clear   [x]Clear and Decreased   []Insp. Wheeze   []Exp. Wheeze     []Rhonchi   []Rales    Target Heart Rate Zone: 120-130  Max HR: 140    Exercise Sp02% Blood Pressure Heart Rate ARNOL Scale/Fatigue   Treadmill: Speed:       Min:  % Grade:          Distance:  x      Bands:#:        Reps:        Min:  Color Band:  x      6MWT:Min:        Distance:     MET:  x      Step-ups:#:      Reps:     Min:  x      Cybex/Vision/Schwinn Bike:  Level:      Speed:    RPM:       Min:  Distance:                 Seat:  x      Hand Weights: Pounds:     Min:  Curls#         Sets:   Presses#    Sets:  Fly#            Sets:  Shoulder Shrugs#     Sets:  Wings#       Sets:  Wrist Curls#   Sets:                      x      NuStep: Level: 3      METS: 3.0    Min:35       SPM:82         Total Steps:2920  Seat:8      Arms:8   99   148/88   92   2/3   Arm Ergometer: Level: 12w    Min:7  RPM:64            Fwd:7      Back:x  Total Turns:407   98   152/88   100   0/2   PLB / DB Spinner: Min:5  P-Flex: Level: 5/4        Min:5  IS: Volume: 1500            Min:5  Warm-up Stretches: Min:15   97    90   0/1      Education and Training:15 min [x]ARNOL Scale  [x]PLB  [x]DB  [x]IMT  [x]IS  []Vital Signs   []Target HR Zone  []Exercise Vital Signs [] Safe Vital Signs  [x]Pulmonary Anatomy  []Lung Function  []Lung Disease Process  []Oxygen Use  []Oxygen Safety  []Pulmonary Meds  []Med Instruction  []Cough Technique   []Pulmonary Hygeine  []Infection Prevention  []Signs of Infection  []When to Call MD  []Nutrition  []Weight Management  []Advance Directives  [x]Posture  [x]Body Mechanics [x]Energy Conservation  []Pacing ADL's  []Stress/Anxiety Management  [x]Stretching  [x]Home Exercise  []Smoking Cessation   []SD Progress  []Maintenance Exercise Program    []Pulmonary Knowledge Test/Education  []Review PFT  []Review Specific Disease  [x]Review Home Follow-through Compliance  []Safe Exercise [x]Exercise Progression Strategies    Progress Report: Good participation and motivation. Pt states understanding different exercise strategies for cardiopulmonary benefit. She was able to increase intensity of pace with nustep exercise today. Min cues to use PLB with exercise.      Treating Clinicians: [x]Kasia Ramirez, RRT  [] Radha Means, RRT    MD in Office: [x]INDY Tom  []STACIA Pagan  []HAILEE Villalta  []JR Arboleda    []ADRIANA Carlos []HOLLY Noel  []STACIA Quesada  []STACIA Alvarado  [x]DIANNE Arboleda  []MARY Estes   []ADRIANA Webber  []INDY Lala

## 2020-01-14 ENCOUNTER — CLINICAL SUPPORT (OUTPATIENT)
Dept: PULMONOLOGY | Facility: CLINIC | Age: 75
End: 2020-01-14

## 2020-01-14 DIAGNOSIS — J44.9 COPD MIXED TYPE (HCC): Primary | ICD-10-CM

## 2020-01-14 PROCEDURE — G0424 PULMONARY REHAB W EXER: HCPCS | Performed by: INTERNAL MEDICINE

## 2020-01-21 VITALS
RESPIRATION RATE: 12 BRPM | DIASTOLIC BLOOD PRESSURE: 70 MMHG | HEART RATE: 99 BPM | OXYGEN SATURATION: 98 % | SYSTOLIC BLOOD PRESSURE: 112 MMHG

## 2020-01-21 NOTE — PROGRESS NOTES
[facility]  Pulmonary Rehabilitation  Daily Treatment Log    Name: Lorrie Pagan : 1945 Date: 2020     Session: 11/ approved: 18          Time In/Out: 1:00/2:30pm     COPD Charges:  x1    Physician : Zaire Arboleda MD      Dx: J44.9      GOLD: 1 []  2 [x]  3 []  4 []     Primary Insurance:                   Secondary Insurance:[employer]    /70   Pulse 99   Resp 12   LMP  (LMP Unknown)   SpO2 98% Comment: 2 lpm     Auscultation:  []Clear   [x]Clear and Decreased   []Insp. Wheeze   []Exp. Wheeze     []Rhonchi   []Rales    Target Heart Rate Zone: 120-130  Max HR: 140    Exercise Sp02% Blood Pressure Heart Rate ARNOL Scale/Fatigue   Treadmill: Speed:       Min:  % Grade:          Distance:  x      Bands:#:        Reps:        Min:  Color Band:  x       6MWT:Min:        Distance:     MET:  x      Step-ups:#:      Reps:     Min:  x      Cybex/Vision/Schwinn Bike:  Level:      Speed:    RPM:       Min:  Distance:                 Seat:  x      Hand Weights: Pounds:     Min:  Curls#         Sets:   Presses#    Sets:  Fly#            Sets:  Shoulder Shrugs#     Sets:  Wings#       Sets:  Wrist Curls#   Sets:                      x      NuStep: Level:4      METS:2.5       Min:40       SPM:64           Total Steps:2582  Seat:8      Arms:8   99   140/70   111 2/3  2/1  Arms tired   Arm Ergometer: Level:     Min:  RPM:            Fwd:      Back:  Total Turns:  x      PLB / DB Spinner: Min:10  P-Flex: Level: 4/5        Min:10  IS: Volume:  2000           Min:10  Warm-up Stretches: Min:10   98    103   2/2      Education and Training: 10 min [x]ARNOL Scale  [x]PLB  [x]DB  [x]IMT  [x]IS  []Vital Signs   []Target HR Zone  []Exercise Vital Signs [] Safe Vital Signs  []Pulmonary Anatomy  []Lung Function  []Lung Disease Process  []Oxygen Use  []Oxygen Safety  []Pulmonary Meds  []Med Instruction  []Cough Technique  []Pulmonary Hygeine  []Infection Prevention  []Signs of  Infection  []When to Call MD  []Nutrition  []Weight Management  []Advance Directives  [x]Posture  [x]Body Mechanics []Energy Conservation  []Pacing ADL's  []Stress/Anxiety Management  [x]Stretching  []Home Exercise  []Smoking Cessation   []CA Progress  []Maintenance Exercise Program    []Pulmonary Knowledge Test/Education  []Review PFT  []Review Specific Disease  [x]Review Home Follow-through Compliance  []Safe Exercise []Exercise Progression Strategies    Progress Report: Good participation and motivation. Pt states she is using IS/Pflex at home and is trying to walk more for exercise. Pt understands the importance of exercise at home and long term for better breathing.     Treating Clinicians: [x]Kasia Ramirez, RRT  [] Radha Means, RRT    MD in Office: []INDY Tom  []STACIA Pagan  []HAILEE Villalta  [x]JR Arboleda    []ADRIANA Carlos []HOLLY Noel  []STACIA Quesada  []STACIA Alvarado  []DIANNE Arboleda  []MARY Estes   []ADRIANA Webber  [x]INDY Lala

## 2020-01-22 ENCOUNTER — OFFICE VISIT (OUTPATIENT)
Dept: GASTROENTEROLOGY | Facility: CLINIC | Age: 75
End: 2020-01-22

## 2020-01-22 VITALS
SYSTOLIC BLOOD PRESSURE: 162 MMHG | WEIGHT: 199.6 LBS | BODY MASS INDEX: 34.08 KG/M2 | HEART RATE: 100 BPM | DIASTOLIC BLOOD PRESSURE: 75 MMHG | HEIGHT: 64 IN

## 2020-01-22 DIAGNOSIS — K21.00 GASTROESOPHAGEAL REFLUX DISEASE WITH ESOPHAGITIS: ICD-10-CM

## 2020-01-22 DIAGNOSIS — R13.19 ESOPHAGEAL DYSPHAGIA: ICD-10-CM

## 2020-01-22 DIAGNOSIS — R09.89 GLOBUS SENSATION: Primary | ICD-10-CM

## 2020-01-22 DIAGNOSIS — K58.0 IRRITABLE BOWEL SYNDROME WITH DIARRHEA: ICD-10-CM

## 2020-01-22 DIAGNOSIS — R60.9 PERIPHERAL EDEMA: ICD-10-CM

## 2020-01-22 PROCEDURE — 99214 OFFICE O/P EST MOD 30 MIN: CPT | Performed by: INTERNAL MEDICINE

## 2020-01-22 RX ORDER — RANITIDINE 150 MG/1
TABLET ORAL
COMMUNITY
End: 2020-01-22

## 2020-01-22 RX ORDER — EZETIMIBE 10 MG/1
10 TABLET ORAL DAILY
COMMUNITY
Start: 2017-04-03 | End: 2020-09-25

## 2020-01-22 RX ORDER — CLINDAMYCIN PHOSPHATE 10 MG/G
GEL TOPICAL AS NEEDED
COMMUNITY
End: 2020-09-25

## 2020-01-22 RX ORDER — ESOMEPRAZOLE MAGNESIUM 40 MG/1
40 CAPSULE, DELAYED RELEASE ORAL 2 TIMES DAILY
Qty: 180 CAPSULE | Refills: 3 | Status: SHIPPED | OUTPATIENT
Start: 2020-01-22 | End: 2021-03-09

## 2020-01-22 RX ORDER — LIDOCAINE HCL 4 %
LIQUID ROLL-ON (ML) TOPICAL
Refills: 3 | COMMUNITY
Start: 2019-12-09 | End: 2020-09-25

## 2020-01-22 RX ORDER — OMEPRAZOLE AND SODIUM BICARBONATE 40; 1100 MG/1; MG/1
CAPSULE ORAL
COMMUNITY
Start: 2008-03-02 | End: 2020-01-22

## 2020-01-22 NOTE — PROGRESS NOTES
GASTROENTEROLOGY OFFICE NOTE  Lorrie Pagan  [id number]  1945    CARE TEAM  Patient Care Team:  Evangelina Luna MD as PCP - General (Family Medicine)  Elias Chaidez MD as Consulting Physician (Cardiology)    No ref. provider found     Chief Complaint   Patient presents with   • Follow-up     Post EGD    • Difficulty Swallowing   • Abnormal Imaging        HISTORY OF PRESENT ILLNESS:  Patient presents for follow-up post EGD for dysphagia to solids.  She underwent EGD on November 6, 2019 And was dilated 20 mm and biopsies were negative for eosinophilia or Lopez's esophagus..  There is no gross endoscopic features of eosinophilia nor did we clearly identify an esophageal stricture.  She had also been noted to have esophageal thickening on CAT scan which prompted the upper endoscopy.  She has a history of Nissen fundoplication which was initially effective but has since failed to resolve her reflux.    She has had no dysphagia since her esophageal  dilation.  She does however describe a globus sensation with a lump-like sensation of the sternal notch and associated with dysphagia to solids, odynophagia early satiety coughing, wheezing, voice hoarseness, nausea, vomiting melena or bright red blood per rectum.    She has quite a bit of peripheral edema and is a little frustrated with this.  It is chronically erythematous both her lower extremities and she is wondering what else can be done for this.    PAST MEDICAL HISTORY  Past Medical History:   Diagnosis Date   • Bronchiectasis (CMS/HCC)    • Bronchitis 01/2018   • Cancer (CMS/HCC)     History of lung cancer and skin cancer    • Cardiac murmur    • Chronic cough    • Chronic obstructive pulmonary disease (CMS/HCC)    • Depression    • Diabetes mellitus (CMS/HCC)    • Disease of thyroid gland    • GERD (gastroesophageal reflux disease)    • Glaucoma    • History of colonic polyps    • History of transfusion 1988   • Irritable bowel syndrome      Constipation/diarrhea   • Legally blind    • Lung cancer (CMS/HCC)    • Macular degeneration    • Neuropathy    • Osteoarthritis    • Oxygen dependent     5L   • Pneumonia    • Sleep apnea    • UTI (urinary tract infection)    • Wears glasses         PAST SURGICAL HISTORY  Past Surgical History:   Procedure Laterality Date   • BACK SURGERY     • CATARACT EXTRACTION     • CHOLECYSTECTOMY     • COLONOSCOPY     • ENDOSCOPY     • ENDOSCOPY N/A 11/6/2019    Procedure: ESOPHAGOGASTRODUODENOSCOPY;  Surgeon: Cortes Millan MD;  Location: [facility] ENDOSCOPY;  Service: Gastroenterology   • HAND SURGERY     • HYSTERECTOMY     • INCONTINENCE SURGERY     • LUNG SURGERY     • NISSEN FUNDOPLICATION          MEDICATIONS:    Current Outpatient Medications:   •  albuterol (PROVENTIL) (2.5 MG/3ML) 0.083% nebulizer solution, Take 2.5 mg by nebulization 4 (Four) Times a Day As Needed for Wheezing., Disp: 125 vial, Rfl: 3  •  albuterol sulfate HFA (VENTOLIN HFA) 108 (90 Base) MCG/ACT inhaler, Inhale 2 puffs Every 6 (Six) Hours As Needed for Wheezing., Disp: 8 g, Rfl: 2  •  amitriptyline (ELAVIL) 25 MG tablet, daily, Disp: , Rfl:   •  azithromycin (ZITHROMAX) 250 MG tablet, Take 250 mg by mouth 3 (Three) Times a Week., Disp: , Rfl: 11  •  clonazePAM (KlonoPIN) 0.5 MG disintegrating tablet, clonazepam 0.5 mg disintegrating tablet  Place 1 tablet as needed by translingual route., Disp: , Rfl:   •  colestipol (COLESTID) 1 g tablet, TAKE 2 TABLETS BY MOUTH DAILY. KEEP 2 HOURS APART FROM OTHER MEDICATIONS, Disp: 60 tablet, Rfl: 0  •  diphenoxylate-atropine (LOMOTIL) 2.5-0.025 MG per tablet, Lomotil  PRN, Disp: , Rfl:   •  doxycycline (VIBRAMYICN) 100 MG tablet, Take 1 tablet by mouth 2 (Two) Times a Day., Disp: 20 tablet, Rfl: 0  •  ezetimibe (ZETIA) 10 MG tablet, Zetia 10 mg tablet, Disp: , Rfl:   •  fluticasone-salmeterol (ADVAIR DISKUS) 250-50 MCG/DOSE DISKUS, Advair Diskus 250 mcg-50 mcg/dose powder for inhalation, Disp: ,  Rfl:   •  Furosemide (LASIX PO), furosemide  40 mg, Disp: , Rfl:   •  levothyroxine (SYNTHROID, LEVOTHROID) 50 MCG tablet, Take 50 mcg by mouth Daily., Disp: , Rfl:   •  metFORMIN (GLUCOPHAGE) 500 MG tablet, TK 1 T PO Q 12 H WF, Disp: , Rfl: 0  •  omeprazole (priLOSEC) 20 MG capsule, Take 20 mg by mouth Daily., Disp: , Rfl:   •  omeprazole-sodium bicarbonate (ZEGERID)  MG per capsule, Zegerid 40 mg-1.1 gram capsule  Two times a day, Disp: , Rfl:   •  rOPINIRole HCl (REQUIP PO), 8 mg nightly, Disp: , Rfl:   •  Tiotropium Bromide Monohydrate (SPIRIVA RESPIMAT) 1.25 MCG/ACT aerosol solution inhaler, Spiriva Respimat 1.25 mcg/actuation solution for inhalation, Disp: , Rfl:   •  ASPERCREME W/LIDOCAINE 4 % cream, USE TOPICALLY BID UTD, Disp: , Rfl: 3  •  clindamycin (CLINDAGEL) 1 % gel, clindamycin 1 % topical gel, Disp: , Rfl:   •  diclofenac (VOLTAREN) 1 % gel gel, diclofenac 1 % topical gel, Disp: , Rfl:   •  HYDROcod Polst-CPM Polst ER (TUSSIONEX PENNKINETIC) 10-8 MG/5ML ER suspension, TK 10 ML PO BID PRN, Disp: , Rfl: 0  •  influenza vac split high-dose (FLUZONE HIGH DOSE) 0.5 ML suspension prefilled syringe injection, Fluzone High-Dose 5161-4149 (PF) 180 mcg/0.5 mL intramuscular syringe, Disp: , Rfl:   •  ipratropium (ATROVENT) 0.06 % nasal spray, ipratropium bromide 42 mcg (0.06 %) nasal spray, Disp: , Rfl:   •  predniSONE (DELTASONE) 10 MG tablet, Take 4 tabs daily x 3 days, then take 3 tabs daily x 3 days, then take 2 tabs daily x 3 days, then take 1 tab daily x 3 days, Disp: 31 tablet, Rfl: 0  •  raNITIdine (ZANTAC) 150 MG tablet, ranitidine 150 mg tablet, Disp: , Rfl:   •  sulfamethoxazole-trimethoprim (BACTRIM,SEPTRA) 400-80 MG tablet, Take 1 tablet by mouth 2 (Two) Times a Day., Disp: , Rfl:     ALLERGIES  Allergies   Allergen Reactions   • Penicillins Other (See Comments)     rash   • Statins Other (See Comments)     myalgia       FAMILY HISTORY:  Family History   Problem Relation Age of Onset   •  Colon polyps Mother    • Emphysema Mother    • Colon polyps Father        SOCIAL HISTORY  Social History     Socioeconomic History   • Marital status: Single     Spouse name: Not on file   • Number of children: Not on file   • Years of education: Not on file   • Highest education level: Not on file   Tobacco Use   • Smoking status: Former Smoker     Packs/day: 1.50     Years: 25.00     Pack years: 37.50     Types: Cigarettes     Last attempt to quit: 1992     Years since quittin.0   • Smokeless tobacco: Never Used   Substance and Sexual Activity   • Alcohol use: Yes     Alcohol/week: 2.0 standard drinks     Types: 2 Glasses of wine per week   • Drug use: No   • Sexual activity: Never   Social History Narrative    Previously lived in [place] [facility], recently moved here        Retired    Previously smoked approximately 1 pack cigarettes per day for 25 years and stop smoking in     Drinks 3-4 alcoholic beverages on a weekly basis     Socioeconomic History:  She is single.  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 Negative for appetite change, chills, fatigue, fever, unexpected weight gain and unexpected weight loss.   HENT: Positive for dental problem, drooling, facial swelling, postnasal drip, rhinorrhea, sneezing and voice change. Negative for congestion, ear discharge, ear pain, hearing loss, mouth sores, nosebleeds, sinus pressure, sore throat, swollen glands, tinnitus and trouble swallowing.    Respiratory: Positive for chest tightness, shortness of breath and wheezing. Negative for apnea, cough, choking and stridor.    Cardiovascular: Positive for chest pain, palpitations and leg swelling.   Gastrointestinal: Positive for abdominal distention, constipation, diarrhea and GERD. Negative for abdominal pain, anal bleeding, blood in stool,  "nausea, rectal pain, vomiting and indigestion.   Endocrine: Positive for heat intolerance. Negative for cold intolerance, polydipsia, polyphagia and polyuria.   Musculoskeletal: Positive for back pain, gait problem, neck pain and neck stiffness. Negative for arthralgias, joint swelling, myalgias and bursitis.   Allergic/Immunologic: Positive for environmental allergies. Negative for food allergies and immunocompromised state.   Neurological: Positive for numbness. Negative for dizziness, tremors, seizures, syncope, facial asymmetry, speech difficulty, weakness, light-headedness, headache, memory problem and confusion.   Hematological: Negative for adenopathy. Bruises/bleeds easily.   Psychiatric/Behavioral: Positive for sleep disturbance. Negative for agitation, behavioral problems, decreased concentration, dysphoric mood, hallucinations, self-injury, suicidal ideas, negative for hyperactivity, depressed mood and stress. The patient is not nervous/anxious.      I reviewed the above-noted review of systems and the active GI issues are those noted in my HPI    PHYSICAL EXAM   /75 (BP Location: Right arm, Patient Position: Sitting, Cuff Size: Adult)   Pulse 100   Ht 162.6 cm (64\")   Wt 90.5 kg (199 lb 9.6 oz)   LMP  (LMP Unknown)   BMI 34.26 kg/m²   General: Alert and oriented x 3. In no apparent or acute distress.  and No stigmata of chronic liver disease  HEENT: Anicteric slcera. Normal oropharynx  Neck: Supple. Without lymphadenopathy  CV: Regular rate and rhythm, S1, S2  Lungs: Clear to ausculation. Without rales, robchi and wheezing  Abdomen:  Soft,non-distended without palpable masses or hepatosplenomeagaly, areas of rebound tenderness or guarding.   Extremeties: Bilateral 1-2+ pitting edema with chronic stasis changes and induration and skin thickening noted.  No clubbing or cyanosis.  Neurologic:  Alert and oriented x 3 without focal motor or sensory deficits  Rectal exam: deferred        Results " Review:  I reviewed the patient's new clinical results.      ASSESSMENT  1.-  Intermittent dysphagia to solids resolved with esophageal dilation  2.-  Patient up-to-date with her colon cancer screening.  Screening colonoscopy in 2018  3.-  Peripheral edema.  Patient will reapproach her primary care provider in this regard  4.-  Globus syndrome likely due to incomplete control of reflux.  High-dose proton pump inhibitor therapy for 8 weeks is recommended  5.-  Chronic diarrhea well controlled with Colestid.  Multifactorial    PLAN  1.-  Nexium 40 mg p.o. twice daily  2.-  Continue Colestid as needed for diarrhea  3.-  Return appointment in 8 weeks  4.-  Patient will discuss her peripheral edema with her primary care physician      I discussed the patients findings and my recommendations with patient    Cortes Michael Millan MD  1/22/2020   2:45 PM    Much of this note is an electronic transcription of spoken language to printed text. Electronic transcription of spoken language may permit erroneous, nonsensical word phrases to be inadvertently transcribed.  Although I have reviewed the note for these errors, some may still be present.

## 2020-01-23 ENCOUNTER — CLINICAL SUPPORT (OUTPATIENT)
Dept: PULMONOLOGY | Facility: CLINIC | Age: 75
End: 2020-01-23

## 2020-01-23 DIAGNOSIS — J44.9 COPD MIXED TYPE (HCC): Primary | ICD-10-CM

## 2020-01-23 PROCEDURE — G0424 PULMONARY REHAB W EXER: HCPCS | Performed by: INTERNAL MEDICINE

## 2020-01-24 VITALS
RESPIRATION RATE: 12 BRPM | SYSTOLIC BLOOD PRESSURE: 130 MMHG | HEART RATE: 91 BPM | DIASTOLIC BLOOD PRESSURE: 70 MMHG | OXYGEN SATURATION: 98 %

## 2020-01-24 NOTE — PROGRESS NOTES
[facility]  Pulmonary Rehabilitation  Daily Treatment Log    Name: Lorrie Pagan : 1945 Date: 2020     Session: 12/ approved: 18          Time In/Out: 1:00/2:30pm    COPD Charges:  x1    Physician : Zaire Arboleda MD      Dx: J44.9      GOLD: 1 []  2 [x]  3 []  4 []     Primary Insurance:                 Secondary Insurance:[employer]    /70   Pulse 91   Resp 12   LMP  (LMP Unknown)   SpO2 98% Comment: 2 lpm     Auscultation:  [x]Clear   []Clear and Decreased   []Insp. Wheeze   []Exp. Wheeze     []Rhonchi   []Rales    Target Heart Rate Zone: 120-130  Max HR: 140    Exercise Sp02% Blood Pressure Heart Rate ARNOL Scale/Fatigue   Treadmill: Speed:       Min:  % Grade:          Distance:  x      Bands:#:        Reps:        Min:  Color Band:  x      6MWT:Min:        Distance:     MET:  x      Step-ups:#:      Reps:     Min:  x      Cybex/Vision/Schwinn Bike:  Level:      Speed:    RPM:       Min:  Distance:                 Seat:  x      Hand Weights: Pounds:     Min:  Curls#         Sets:   Presses#    Sets:  Fly#            Sets:  Shoulder Shrugs#     Sets:  Wings#       Sets:  Wrist Curls#   Sets:                      x      NuStep: Level:      METS:     Min:       SPM:         Total Steps:  Seat:      Arms:  x      Arm Ergometer: Level:10w     Min:18  RPM:58            Fwd:9      Back:9  Total Turns:1034 97 138/80 92 2/2   PLB / DB Spinner: Min:10  P-Flex: Level:5/4         Min:10  IS: Volume: 1500            Min:10  Warm-up Stretches: Min:15 99  90 2/2      Education and Trainin min  [x]ARNOL Scale  [x]PLB  [x]DB  [x]IMT  [x]IS  []Vital Signs   []Target HR Zone  []Exercise Vital Signs [] Safe Vital Signs  [x]Pulmonary Anatomy  []Lung Function  []Lung Disease Process  []Oxygen Use  []Oxygen Safety  []Pulmonary Meds  []Med Instruction  []Cough Technique  []Pulmonary Hygeine  []Infection Prevention  []Signs of Infection  []When to Call MD   []Nutrition  []Weight Management  []Advance Directives  [x]Posture  [x]Body Mechanics []Energy Conservation  []Pacing ADL's  [x]Stress/Anxiety Management  [x]Stretching  [x]Home Exercise  []Smoking Cessation   []IN Progress  [x]Maintenance Exercise Program    []Pulmonary Knowledge Test/Education  []Review PFT  []Review Specific Disease  [x]Review Home Follow-through Compliance  []Safe Exercise []Exercise Progression Strategies    Progress Report: Good participation and motivation. Progressing with IN. Min cues to use PLB with exercise. Mod cues to use better posture with exercise and with breathing exercises. Discussed maintenance exercise program. She states understanding of the importance of continued exercise long term for continued lung/health benefit.        Treating Clinicians: [x]Kasia Ramirez, RRT  [] Radha Means RRT    MD in Office: []INDY Tom  []STACIA Pagan  []HAILEE Villalta  [x]JR Arboleda    []ADRIANA Carlos []HOLLY Noel  []STACIA Quesada  []STACIA Alvarado  [x]DIANNE Arboleda  []MARY Estes   []ADRIANA Webber  []INDY Lala

## 2020-01-27 DIAGNOSIS — K58.0 IRRITABLE BOWEL SYNDROME WITH DIARRHEA: Primary | ICD-10-CM

## 2020-01-27 PROBLEM — K21.00 GASTROESOPHAGEAL REFLUX DISEASE WITH ESOPHAGITIS: Status: ACTIVE | Noted: 2020-01-27

## 2020-01-27 PROBLEM — R60.9 PERIPHERAL EDEMA: Status: ACTIVE | Noted: 2020-01-27

## 2020-01-27 PROBLEM — R09.A2 GLOBUS SENSATION: Status: ACTIVE | Noted: 2020-01-27

## 2020-01-27 PROBLEM — R09.89 GLOBUS SENSATION: Status: ACTIVE | Noted: 2020-01-27

## 2020-01-27 PROBLEM — R60.0 PERIPHERAL EDEMA: Status: ACTIVE | Noted: 2020-01-27

## 2020-01-27 PROBLEM — R13.19 ESOPHAGEAL DYSPHAGIA: Status: ACTIVE | Noted: 2019-10-24

## 2020-01-27 NOTE — TELEPHONE ENCOUNTER
----- Message from Cortes Millan MD sent at 1/27/2020  1:53 PM EST -----  Okay to refill confirm that she is using it sparingly just call and 30 with no refills  ----- Message -----  From: Maria Eugenia Ace MA  Sent: 1/27/2020  10:47 AM EST  To: Cortes Millan MD    Patient is requesting refill on lomotil?    We last sent this in on 05/30/2018 and she only uses it as needed.

## 2020-01-28 RX ORDER — DIPHENOXYLATE HYDROCHLORIDE AND ATROPINE SULFATE 2.5; .025 MG/1; MG/1
TABLET ORAL
Qty: 30 TABLET | Refills: 0 | Status: SHIPPED | OUTPATIENT
Start: 2020-01-28 | End: 2020-07-15

## 2020-02-10 ENCOUNTER — APPOINTMENT (OUTPATIENT)
Dept: NUTRITION | Facility: HOSPITAL | Age: 75
End: 2020-02-10

## 2020-02-17 RX ORDER — MONTELUKAST SODIUM 4 MG/1
TABLET, CHEWABLE ORAL
Qty: 60 TABLET | Refills: 2 | Status: SHIPPED | OUTPATIENT
Start: 2020-02-17 | End: 2020-09-25

## 2020-02-26 ENCOUNTER — OFFICE VISIT (OUTPATIENT)
Dept: PULMONOLOGY | Facility: CLINIC | Age: 75
End: 2020-02-26

## 2020-02-26 VITALS
BODY MASS INDEX: 31.45 KG/M2 | HEIGHT: 64 IN | DIASTOLIC BLOOD PRESSURE: 78 MMHG | TEMPERATURE: 97.4 F | SYSTOLIC BLOOD PRESSURE: 128 MMHG | HEART RATE: 92 BPM | OXYGEN SATURATION: 98 % | WEIGHT: 184.2 LBS

## 2020-02-26 DIAGNOSIS — Z87.891 FORMER SMOKER: ICD-10-CM

## 2020-02-26 DIAGNOSIS — J47.9 IDIOPATHIC BRONCHIECTASIS (HCC): ICD-10-CM

## 2020-02-26 DIAGNOSIS — J96.11 CHRONIC RESPIRATORY FAILURE WITH HYPOXIA (HCC): ICD-10-CM

## 2020-02-26 DIAGNOSIS — Z99.89 OSA ON CPAP: ICD-10-CM

## 2020-02-26 DIAGNOSIS — E66.9 OBESITY, CLASS I, BMI 30.0-34.9 (SEE ACTUAL BMI): ICD-10-CM

## 2020-02-26 DIAGNOSIS — G47.33 OSA ON CPAP: ICD-10-CM

## 2020-02-26 DIAGNOSIS — K44.9 HIATAL HERNIA: ICD-10-CM

## 2020-02-26 DIAGNOSIS — R13.19 ESOPHAGEAL DYSPHAGIA: ICD-10-CM

## 2020-02-26 DIAGNOSIS — Z85.118 H/O: LUNG CANCER: ICD-10-CM

## 2020-02-26 DIAGNOSIS — J44.9 COPD MIXED TYPE (HCC): Primary | ICD-10-CM

## 2020-02-26 DIAGNOSIS — Z22.39 MYCOBACTERIUM AVIUM COMPLEX COLONIZATION: ICD-10-CM

## 2020-02-26 PROBLEM — E66.811 OBESITY, CLASS I, BMI 30.0-34.9 (SEE ACTUAL BMI): Status: ACTIVE | Noted: 2020-02-26

## 2020-02-26 PROCEDURE — 99214 OFFICE O/P EST MOD 30 MIN: CPT | Performed by: INTERNAL MEDICINE

## 2020-02-26 RX ORDER — LOSARTAN POTASSIUM 25 MG/1
TABLET ORAL DAILY
COMMUNITY
Start: 2020-02-07 | End: 2020-09-25

## 2020-02-26 RX ORDER — DULOXETIN HYDROCHLORIDE 60 MG/1
60 CAPSULE, DELAYED RELEASE ORAL DAILY
COMMUNITY
Start: 2020-01-22 | End: 2020-12-20

## 2020-02-26 NOTE — PROGRESS NOTES
PULMONARY  NOTE    Chief Complaint     Stage II COPD, former smoker, history of lung cancer with prior resection, history of bronchiectasis, RENETTA, class I obesity, chronic respiratory failure    History of Present Illness     74-year-old white female returns today for follow-up.  I last saw her on 10/25/2019.    She has a history of tobacco abuse that resolved in 1992.    She has stage II, moderate, chronic obstructive pulmonary disease.  She remains on Spiriva, Advair, and albuterol.  Insurance is paying for most of the cost of these medications.    She has had no exacerbation of bronchiectasis or COPD since I last saw her.  Since I last saw her she went off of lisinopril and she indicates that her cough is significantly better.    She has been on azithromycin on a Monday, Wednesday, and Friday schedule for prevention of acute bronchitis.  She is wondering about going off of that.    She has obstructive sleep apnea as well as chronic hypoxic respiratory failure.  She uses supplemental oxygen during the day and CPAP at night.    She has reflux symptoms despite PPI use and a prior Nissen fundoplication.  She intermittently follows reflux precautions.    Her most recent chest imaging was a CT angiogram done 10/17/2019 which revealed no thoracic abnormalities and no evidence of recurrent disease.    Patient Active Problem List   Diagnosis   • Hematochezia   • Moderate (Stage II) COPD   • Bronchiectasis (CMS/HCC)   • H/O: lung cancer (Prior resection 2016)   • Former smoker (None since 1992)   • Chronic respiratory failure   • RENETTA on CPAP   • Hiatal hernia (Prior Nissen 2008)   • Gastric reflux   • Esophageal dilatation   • Esophageal dysphagia   • Mycobacterium avium complex colonization   • Globus sensation   • Gastroesophageal reflux disease with esophagitis   • Irritable bowel syndrome with diarrhea   • Peripheral edema   • Obesity, Class I, BMI 30.0-34.9 (see actual BMI)     Allergies   Allergen Reactions   •  Penicillins Other (See Comments)     rash   • Statins Other (See Comments)     myalgia       Current Outpatient Medications:   •  albuterol (PROVENTIL) (2.5 MG/3ML) 0.083% nebulizer solution, Take 2.5 mg by nebulization 4 (Four) Times a Day As Needed for Wheezing., Disp: 125 vial, Rfl: 3  •  albuterol sulfate HFA (VENTOLIN HFA) 108 (90 Base) MCG/ACT inhaler, Inhale 2 puffs Every 6 (Six) Hours As Needed for Wheezing., Disp: 8 g, Rfl: 2  •  amitriptyline (ELAVIL) 25 MG tablet, daily, Disp: , Rfl:   •  ASPERCREME W/LIDOCAINE 4 % cream, USE TOPICALLY BID UTD, Disp: , Rfl: 3  •  azithromycin (ZITHROMAX) 250 MG tablet, Take 250 mg by mouth 3 (Three) Times a Week., Disp: , Rfl: 11  •  colestipol (COLESTID) 1 g tablet, TAKE 2 TABLETS BY MOUTH DAILY. KEEP 2 HOURS APART FROM OTHER MEDICATIONS, Disp: 60 tablet, Rfl: 2  •  diclofenac (VOLTAREN) 1 % gel gel, diclofenac 1 % topical gel, Disp: , Rfl:   •  diphenoxylate-atropine (LOMOTIL) 2.5-0.025 MG per tablet, Take 1 tablet (2.5 mg) by mouth Q 6 hours 2 times per day as needed, Disp: 30 tablet, Rfl: 0  •  DULoxetine (CYMBALTA) 30 MG capsule, TK 1 C PO D, Disp: , Rfl:   •  esomeprazole (nexIUM) 40 MG capsule, Take 1 capsule by mouth 2 (Two) Times a Day., Disp: 180 capsule, Rfl: 3  •  ezetimibe (ZETIA) 10 MG tablet, Zetia 10 mg tablet, Disp: , Rfl:   •  fluticasone-salmeterol (ADVAIR DISKUS) 250-50 MCG/DOSE DISKUS, Advair Diskus 250 mcg-50 mcg/dose powder for inhalation, Disp: , Rfl:   •  Furosemide (LASIX PO), furosemide  40 mg, Disp: , Rfl:   •  influenza vac split high-dose (FLUZONE HIGH DOSE) 0.5 ML suspension prefilled syringe injection, Fluzone High-Dose 9437-3646 (PF) 180 mcg/0.5 mL intramuscular syringe, Disp: , Rfl:   •  ipratropium (ATROVENT) 0.06 % nasal spray, ipratropium bromide 42 mcg (0.06 %) nasal spray, Disp: , Rfl:   •  levothyroxine (SYNTHROID, LEVOTHROID) 50 MCG tablet, Take 50 mcg by mouth Daily., Disp: , Rfl:   •  losartan (COZAAR) 25 MG tablet, Take  by  mouth Daily., Disp: , Rfl:   •  metFORMIN (GLUCOPHAGE) 500 MG tablet, TK 1 T PO Q 12 H WF, Disp: , Rfl: 0  •  rOPINIRole HCl (REQUIP PO), 8 mg nightly, Disp: , Rfl:   •  Tiotropium Bromide Monohydrate (SPIRIVA RESPIMAT) 1.25 MCG/ACT aerosol solution inhaler, Spiriva Respimat 1.25 mcg/actuation solution for inhalation, Disp: , Rfl:   •  clindamycin (CLINDAGEL) 1 % gel, clindamycin 1 % topical gel, Disp: , Rfl:   •  clonazePAM (KlonoPIN) 0.5 MG disintegrating tablet, clonazepam 0.5 mg disintegrating tablet  Place 1 tablet as needed by translingual route., Disp: , Rfl:   •  doxycycline (VIBRAMYICN) 100 MG tablet, Take 1 tablet by mouth 2 (Two) Times a Day., Disp: 20 tablet, Rfl: 0  •  HYDROcod Polst-CPM Polst ER (TUSSIONEX PENNKINETIC) 10-8 MG/5ML ER suspension, TK 10 ML PO BID PRN, Disp: , Rfl: 0  •  predniSONE (DELTASONE) 10 MG tablet, Take 4 tabs daily x 3 days, then take 3 tabs daily x 3 days, then take 2 tabs daily x 3 days, then take 1 tab daily x 3 days, Disp: 31 tablet, Rfl: 0  •  sulfamethoxazole-trimethoprim (BACTRIM,SEPTRA) 400-80 MG tablet, Take 1 tablet by mouth 2 (Two) Times a Day., Disp: , Rfl:   MEDICATION LIST AND ALLERGIES REVIEWED.    Family History   Problem Relation Age of Onset   • Colon polyps Mother    • Emphysema Mother    • Colon polyps Father      Social History     Tobacco Use   • Smoking status: Former Smoker     Packs/day: 1.50     Years: 25.00     Pack years: 37.50     Types: Cigarettes     Last attempt to quit: 1992     Years since quittin.1   • Smokeless tobacco: Never Used   Substance Use Topics   • Alcohol use: Yes     Alcohol/week: 2.0 standard drinks     Types: 2 Glasses of wine per week   • Drug use: No     Social History     Social History Narrative    Previously lived in [facility], recently moved here        Retired    Previously smoked approximately 1 pack cigarettes per day for 25 years and stop smoking in     Drinks 3-4 alcoholic beverages on a weekly basis  "    FAMILY AND SOCIAL HISTORY REVIEWED.    Review of Systems  ALSO REFER TO SCANNED ROS SHEET FROM SAME DATE.    /78   Pulse 92   Temp 97.4 °F (36.3 °C)   Ht 162.6 cm (64\")   Wt 83.6 kg (184 lb 3.2 oz)   LMP  (LMP Unknown)   SpO2 98% Comment: 5 liters  BMI 31.62 kg/m²   Physical Exam   Constitutional: She is oriented to person, place, and time. She appears well-developed. No distress.   HENT:   Head: Normocephalic and atraumatic.   Neck: No thyromegaly present.   Cardiovascular: Normal rate, regular rhythm and normal heart sounds.   No murmur heard.  Pulmonary/Chest: Effort normal. No stridor.   Decreased breath sounds without wheezes   Abdominal: Soft. Bowel sounds are normal.   Musculoskeletal: Normal range of motion. She exhibits no edema.   Lymphadenopathy:     She has no cervical adenopathy.        Right: No supraclavicular and no epitrochlear adenopathy present.        Left: No supraclavicular and no epitrochlear adenopathy present.   Neurological: She is alert and oriented to person, place, and time.   Skin: Skin is warm and dry. She is not diaphoretic.   Psychiatric: She has a normal mood and affect. Her behavior is normal.   Nursing note and vitals reviewed.      Results     CT angiogram of the chest from 10/17/2019 again reviewed on PACS.  No suspicious parenchymal lesions and no pulmonary embolism.  No evidence of recurrent malignancy    Problem List       ICD-10-CM ICD-9-CM   1. Moderate (Stage II) COPD J44.9 496   2. H/O: lung cancer (Prior resection 2016) Z85.118 V10.11   3. Former smoker (None since 1992) Z87.891 V15.82   4. Chronic respiratory failure J96.11 518.83     799.02   5. Bronchiectasis (CMS/HCC) J47.9 494.0   6. Esophageal dysphagia R13.10 787.20   7. Mycobacterium avium complex colonization Z22.39 V02.59   8. RENETTA on CPAP G47.33 327.23    Z99.89 V46.8   9. Hiatal hernia (Prior Nissen 2008) K44.9 553.3   10. Obesity, Class I, BMI 30.0-34.9 (see actual BMI) E66.9 278.00 "       Discussion     She is stable from a pulmonary standpoint.  Her cough is significantly better since she has been off of lisinopril.    She has had no recent exacerbation of bronchiectasis or acute bronchitis.  Taylor try her off of the scheduled azithromycin.    I have recommended continued compliance with reflux precautions.    She is going to remain on Spiriva, Advair, and albuterol.    Organic continue with periodic CT scans for surveillance for lung cancer.    I will plan to see her back in 6 months or earlier if there are any problems in the meantime    Vladislav Arboleda MD  Note electronically signed    CC: Evangelina Luna MD

## 2020-02-27 DIAGNOSIS — Z85.118 H/O: LUNG CANCER: Primary | ICD-10-CM

## 2020-03-30 ENCOUNTER — TELEPHONE (OUTPATIENT)
Dept: GASTROENTEROLOGY | Facility: CLINIC | Age: 75
End: 2020-03-30

## 2020-03-30 NOTE — TELEPHONE ENCOUNTER
Called to offer the video visit as an alternative to waiting for an office appointment. The patient denied and said she would wait for her appointment in-person.

## 2020-04-07 ENCOUNTER — TELEPHONE (OUTPATIENT)
Dept: PULMONOLOGY | Facility: CLINIC | Age: 75
End: 2020-04-07

## 2020-04-07 ENCOUNTER — TELEPHONE (OUTPATIENT)
Dept: GASTROENTEROLOGY | Facility: CLINIC | Age: 75
End: 2020-04-07

## 2020-04-07 NOTE — TELEPHONE ENCOUNTER
Patient called stating she has been getting regular CT scans d/t hx of Cancer and is scheduled for another one tomorrow. She has been self-isolating d/t concerns of coronavirus and is wondering if she needs to postpone CT scan for another month or so. Please advise?

## 2020-04-07 NOTE — TELEPHONE ENCOUNTER
Since she is 4 years post diagnosis and no signs of reoccurrence 6 months ago it would be ok to postpone until may or June if she feels safer doing that.

## 2020-04-08 ENCOUNTER — APPOINTMENT (OUTPATIENT)
Dept: CT IMAGING | Facility: HOSPITAL | Age: 75
End: 2020-04-08

## 2020-04-25 DIAGNOSIS — J44.9 CHRONIC OBSTRUCTIVE PULMONARY DISEASE, UNSPECIFIED COPD TYPE (HCC): ICD-10-CM

## 2020-04-27 ENCOUNTER — OFFICE VISIT (OUTPATIENT)
Dept: GASTROENTEROLOGY | Facility: CLINIC | Age: 75
End: 2020-04-27

## 2020-04-27 DIAGNOSIS — J44.9 CHRONIC OBSTRUCTIVE PULMONARY DISEASE, UNSPECIFIED COPD TYPE (HCC): ICD-10-CM

## 2020-04-27 DIAGNOSIS — R09.89 GLOBUS SENSATION: ICD-10-CM

## 2020-04-27 DIAGNOSIS — R13.19 ESOPHAGEAL DYSPHAGIA: ICD-10-CM

## 2020-04-27 DIAGNOSIS — K59.1 FUNCTIONAL DIARRHEA: ICD-10-CM

## 2020-04-27 DIAGNOSIS — K21.9 GASTROESOPHAGEAL REFLUX DISEASE WITHOUT ESOPHAGITIS: Primary | ICD-10-CM

## 2020-04-27 PROBLEM — IMO0002: Status: ACTIVE | Noted: 2017-10-13

## 2020-04-27 PROBLEM — R60.9 EDEMA: Status: ACTIVE | Noted: 2020-04-27

## 2020-04-27 PROBLEM — R00.0 TACHYCARDIA: Status: ACTIVE | Noted: 2020-04-27

## 2020-04-27 PROBLEM — I87.2 PERIPHERAL VENOUS INSUFFICIENCY: Status: ACTIVE | Noted: 2020-04-27

## 2020-04-27 PROBLEM — R06.00 DYSPNEA: Status: ACTIVE | Noted: 2020-04-27

## 2020-04-27 PROBLEM — I35.1 AORTIC VALVE REGURGITATION: Status: ACTIVE | Noted: 2020-04-27

## 2020-04-27 PROBLEM — I10 BENIGN HYPERTENSION: Status: ACTIVE | Noted: 2020-04-27

## 2020-04-27 PROBLEM — R03.0 ELEVATED BLOOD-PRESSURE READING WITHOUT DIAGNOSIS OF HYPERTENSION: Status: ACTIVE | Noted: 2020-04-27

## 2020-04-27 PROBLEM — R26.89 IMPAIRMENT OF BALANCE: Status: ACTIVE | Noted: 2018-11-09

## 2020-04-27 PROCEDURE — 99214 OFFICE O/P EST MOD 30 MIN: CPT | Performed by: INTERNAL MEDICINE

## 2020-04-27 RX ORDER — LATANOPROST 50 UG/ML
1 SOLUTION/ DROPS OPHTHALMIC NIGHTLY
COMMUNITY
Start: 2020-04-23

## 2020-04-27 RX ORDER — IBUPROFEN 600 MG/1
TABLET ORAL DAILY
COMMUNITY
Start: 2020-04-03 | End: 2020-09-25

## 2020-04-27 RX ORDER — GLIMEPIRIDE 2 MG/1
1 TABLET ORAL EVERY MORNING
COMMUNITY
Start: 2020-04-09

## 2020-04-27 RX ORDER — ALBUTEROL SULFATE 90 UG/1
AEROSOL, METERED RESPIRATORY (INHALATION)
Qty: 25.5 G | Refills: 2 | Status: SHIPPED | OUTPATIENT
Start: 2020-04-27 | End: 2020-09-25 | Stop reason: SDUPTHER

## 2020-04-27 RX ORDER — ALBUTEROL SULFATE 90 UG/1
AEROSOL, METERED RESPIRATORY (INHALATION)
Qty: 8.5 G | Refills: 1 | Status: SHIPPED | OUTPATIENT
Start: 2020-04-27 | End: 2020-04-27

## 2020-04-27 RX ORDER — ALOSETRON HYDROCHLORIDE 1 MG/1
1 TABLET, FILM COATED ORAL 2 TIMES DAILY
Qty: 60 TABLET | Refills: 11 | Status: SHIPPED | OUTPATIENT
Start: 2020-04-27 | End: 2020-09-25

## 2020-04-27 NOTE — PROGRESS NOTES
GASTROENTEROLOGY TELEMEDICINE OFFICE NOTE  Lorrie Pagan  [id number]  1945    CARE TEAM  Patient Care Team:  Evangelina Luna MD as PCP - General (Family Medicine)  Elias Chaidez MD as Consulting Physician (Cardiology)     You have chosen to receive care through a telehealth visit.  Do you consent to use a video/audio connection for your medical care today? Yes      No ref. provider found     Chief Complaint   Patient presents with   • Follow-up     Globus sensation   • Difficulty Swallowing   • Heartburn   • Diarrhea        HISTORY OF PRESENT ILLNESS:  Patient presents for follow-up of multiple issues    In terms of her acid reflux despite taking Nexium 40 mg p.o. twice daily for the past 3 weeks she still has occasional breakthrough symptoms on the order of once or twice a week during the day not associated with any specific foods that cause her fairly significant retrosternal burning sensation and discomfort.  She takes Tums and this gives her some transient relief.    In terms of her dysphagia to solids which, when I last saw her in January, was entirely resolved following her November dilation has recurred to the point where she is having some problems occasionally with pills but is nowhere near as bad as it was prior to the esophageal dilation.  She did not feel that repeat dilation is necessary just yet at this time    Her chronic diarrhea which is been a problem since she was in college continues to be problematic and she has difficulties scheduling Colestid to keep it away from other medications she takes Lotronex in addition to the Colestid is still has 4-6 urgent bowel movements per day with occasional episodes of urge incontinence.    Her peripheral edema was somewhat problematic last time I saw her she was due to see a vascular surgeon but that appointment was rescheduled due to the coronavirus pandemic and she has yet to reschedule it.    She denies odynophagia, early satiety,  unexplained weight loss melena or bright red blood per rectum.    PAST MEDICAL HISTORY  Past Medical History:   Diagnosis Date   • Bronchiectasis (CMS/HCC)    • Bronchitis 01/2018   • Cancer (CMS/HCC)     History of lung cancer and skin cancer    • Cardiac murmur    • Chronic cough    • Chronic obstructive pulmonary disease (CMS/HCC)    • Colon polyp    • Depression    • Diabetes mellitus (CMS/HCC)    • Disease of thyroid gland    • GERD (gastroesophageal reflux disease)    • Glaucoma    • History of colonic polyps    • History of transfusion 1988   • Irritable bowel syndrome     Constipation/diarrhea   • Legally blind    • Lung cancer (CMS/HCC)    • Macular degeneration    • Neuropathy    • Osteoarthritis    • Oxygen dependent     5L   • Pneumonia    • Sleep apnea    • UTI (urinary tract infection)    • Wears glasses         PAST SURGICAL HISTORY  Past Surgical History:   Procedure Laterality Date   • BACK SURGERY     • CATARACT EXTRACTION     • CHOLECYSTECTOMY     • COLONOSCOPY     • ENDOSCOPY     • ENDOSCOPY N/A 11/6/2019    Procedure: ESOPHAGOGASTRODUODENOSCOPY;  Surgeon: Cortes Millan MD;  Location: [facility] ENDOSCOPY;  Service: Gastroenterology   • HAND SURGERY     • HYSTERECTOMY     • INCONTINENCE SURGERY     • LUNG SURGERY     • NISSEN FUNDOPLICATION          MEDICATIONS:    Current Outpatient Medications:   •  albuterol (PROVENTIL) (2.5 MG/3ML) 0.083% nebulizer solution, Take 2.5 mg by nebulization 4 (Four) Times a Day As Needed for Wheezing., Disp: 125 vial, Rfl: 3  •  ALBUTEROL SULFATE  (90 Base) MCG/ACT inhaler, INHALE 2 PUFFS BY MOUTH EVERY 6 HOURS AS NEEDED FOR WHEEZING, Disp: 25.5 g, Rfl: 2  •  alosetron (Lotronex) 1 MG tablet, Take 1 tablet by mouth 2 (Two) Times a Day. Hold for constipation, blood in stool, abdominal pain., Disp: 60 tablet, Rfl: 11  •  amitriptyline (ELAVIL) 25 MG tablet, daily, Disp: , Rfl:   •  ASPERCREME W/LIDOCAINE 4 % cream, USE TOPICALLY BID UTD, Disp: ,  Rfl: 3  •  clindamycin (CLINDAGEL) 1 % gel, Apply  topically to the appropriate area as directed As Needed., Disp: , Rfl:   •  colestipol (COLESTID) 1 g tablet, TAKE 2 TABLETS BY MOUTH DAILY. KEEP 2 HOURS APART FROM OTHER MEDICATIONS, Disp: 60 tablet, Rfl: 2  •  diclofenac (VOLTAREN) 1 % gel gel, diclofenac 1 % topical gel, Disp: , Rfl:   •  diphenoxylate-atropine (LOMOTIL) 2.5-0.025 MG per tablet, Take 1 tablet (2.5 mg) by mouth Q 6 hours 2 times per day as needed, Disp: 30 tablet, Rfl: 0  •  DULoxetine (CYMBALTA) 30 MG capsule, TK 1 C PO D, Disp: , Rfl:   •  esomeprazole (nexIUM) 40 MG capsule, Take 1 capsule by mouth 2 (Two) Times a Day., Disp: 180 capsule, Rfl: 3  •  Furosemide (LASIX PO), furosemide  40 mg, Disp: , Rfl:   •  ibuprofen (ADVIL,MOTRIN) 600 MG tablet, Take  by mouth Daily., Disp: , Rfl:   •  influenza vac split high-dose (FLUZONE HIGH DOSE) 0.5 ML suspension prefilled syringe injection, Fluzone High-Dose 1139-9549 (PF) 180 mcg/0.5 mL intramuscular syringe, Disp: , Rfl:   •  ipratropium (ATROVENT) 0.06 % nasal spray, ipratropium bromide 42 mcg (0.06 %) nasal spray, Disp: , Rfl:   •  latanoprost (XALATAN) 0.005 % ophthalmic solution, USE 1 DROP IN BOTH EYES NIGHTLY, Disp: , Rfl:   •  levothyroxine (SYNTHROID, LEVOTHROID) 50 MCG tablet, Take 50 mcg by mouth Daily., Disp: , Rfl:   •  losartan (COZAAR) 25 MG tablet, Take  by mouth Daily., Disp: , Rfl:   •  metFORMIN (GLUCOPHAGE) 500 MG tablet, TK 1 T PO Q 12 H WF, Disp: , Rfl: 0  •  rOPINIRole HCl (REQUIP PO), Take 8 mg by mouth 2 (Two) Times a Day., Disp: , Rfl:   •  timolol (TIMOPTIC) 0.25 % ophthalmic solution, INT 1 GTT IN OD IN THE MORNING, Disp: , Rfl:   •  Tiotropium Bromide Monohydrate (SPIRIVA RESPIMAT) 1.25 MCG/ACT aerosol solution inhaler, Spiriva Respimat 1.25 mcg/actuation solution for inhalation, Disp: , Rfl:   •  azithromycin (ZITHROMAX) 250 MG tablet, Take 250 mg by mouth 3 (Three) Times a Week., Disp: , Rfl: 11  •  clonazePAM (KlonoPIN)  0.5 MG disintegrating tablet, clonazepam 0.5 mg disintegrating tablet  Place 1 tablet as needed by translingual route., Disp: , Rfl:   •  doxycycline (VIBRAMYICN) 100 MG tablet, Take 1 tablet by mouth 2 (Two) Times a Day., Disp: 20 tablet, Rfl: 0  •  ezetimibe (ZETIA) 10 MG tablet, Zetia 10 mg tablet, Disp: , Rfl:   •  fluticasone-salmeterol (ADVAIR DISKUS) 250-50 MCG/DOSE DISKUS, Advair Diskus 250 mcg-50 mcg/dose powder for inhalation, Disp: , Rfl:   •  HYDROcod Polst-CPM Polst ER (TUSSIONEX PENNKINETIC) 10-8 MG/5ML ER suspension, TK 10 ML PO BID PRN, Disp: , Rfl: 0  •  predniSONE (DELTASONE) 10 MG tablet, Take 4 tabs daily x 3 days, then take 3 tabs daily x 3 days, then take 2 tabs daily x 3 days, then take 1 tab daily x 3 days, Disp: 31 tablet, Rfl: 0  •  sulfamethoxazole-trimethoprim (BACTRIM,SEPTRA) 400-80 MG tablet, Take 1 tablet by mouth 2 (Two) Times a Day., Disp: , Rfl:     ALLERGIES  Allergies   Allergen Reactions   • Penicillins Other (See Comments)     rash   • Statins Other (See Comments)     myalgia   • Tramadol Nausea And Vomiting and GI Intolerance       FAMILY HISTORY:  Family History   Problem Relation Age of Onset   • Colon polyps Mother    • Emphysema Mother    • Colon polyps Father    • Colon cancer Neg Hx        SOCIAL HISTORY  Social History     Socioeconomic History   • Marital status: Single     Spouse name: Not on file   • Number of children: Not on file   • Years of education: Not on file   • Highest education level: Not on file   Tobacco Use   • Smoking status: Former Smoker     Packs/day: 1.50     Years: 25.00     Pack years: 37.50     Types: Cigarettes     Last attempt to quit: 1992     Years since quittin.3   • Smokeless tobacco: Never Used   Substance and Sexual Activity   • Alcohol use: Yes     Alcohol/week: 2.0 standard drinks     Types: 2 Glasses of wine per week   • Drug use: No   • Sexual activity: Never   Social History Narrative    Previously lived in [facility], [facility]  moved here        Retired    Previously smoked approximately 1 pack cigarettes per day for 25 years and stop smoking in 1992    Drinks 3-4 alcoholic beverages on a weekly basis     Socioeconomic History:  She is retired.       REVIEW OF SYSTEMS  Review of Systems   Constitutional: Positive for diaphoresis and fatigue. Negative for activity change, appetite change, chills, fever, unexpected weight gain and unexpected weight loss.   HENT: Positive for trouble swallowing. Negative for voice change.    Gastrointestinal: Positive for abdominal distention, abdominal pain, anal bleeding, constipation, diarrhea, nausea, GERD and indigestion. Negative for blood in stool, rectal pain and vomiting.     I reviewed the above-noted review of systems    PHYSICAL EXAM   General: She is alert and in no apparent or acute distress.  In good spirits  HEENT: Wearing nasal cannula oxygen  Neck: Neck rigidity not observed.  Normal neck motion apparent  Lungs: Speaking in full sentences without any respiratory distress noted.  Neurologic: Normal cognition.  Normal affect.  Normal speech       Results Review:  None      ASSESSMENT  1.-  GERD.  Incompletely controlled with Nexium 40 mg p.o. twice daily with occasional breakthrough symptoms.  Pepcid Complete for breakthrough symptoms as recommended  2.-  Globus syndrome.  Likewise incompletely resolved with Nexium 40 mg p.o. twice daily  3.-  Dysphagia to solids.  Recent recurrence despite relatively recent dilation in November.  Observe for now repeat dilation would not appear to be necessary  4.-  Multifactorial diarrhea.  Unresponsive to Colestid and Imodium (as she stated that if she took Colestid 2 g p.o. twice daily she feels she do would do well but given her multiple medications is difficult for her to take it consistently)  5.-  Peripheral edema due to see vascular surgeon for this    PLAN  1.-  Discontinue Colestid and Imodium  2.-  Start Lotronex 1 mg p.o. twice daily  titrated to response and tolerance.  Clear instructions given to discontinue at the earliest onset of abdominal pain, constipation, hematochezia and to not restart unless clearly instructed by us to do so.  Risk of ischemic colitis reviewed  3.-  Pepcid Complete as needed for breakthrough GERD  4.-  Follow-up appointment in 4 weeks      I discussed the patient's findings and my recommendations with patient    Cortesmanny Millan MD  4/27/2020   09:54    Visit completed via telemedicine video/audio with [employer].  15-minute duration.    Much of this note is an electronic transcription of spoken language to printed text. Electronic transcription of spoken language may permit erroneous, nonsensical word phrases to be inadvertently transcribed.  Although I have reviewed the note for these errors, some may still be present.

## 2020-04-28 ENCOUNTER — TELEPHONE (OUTPATIENT)
Dept: GASTROENTEROLOGY | Facility: CLINIC | Age: 75
End: 2020-04-28

## 2020-04-28 NOTE — TELEPHONE ENCOUNTER
----- Message from Cortes Millan MD sent at 4/27/2020  9:51 AM EDT -----  appt in 4 weeks for lotronex follow up

## 2020-04-29 ENCOUNTER — TELEPHONE (OUTPATIENT)
Dept: GASTROENTEROLOGY | Facility: CLINIC | Age: 75
End: 2020-04-29

## 2020-04-30 ENCOUNTER — TELEPHONE (OUTPATIENT)
Dept: GASTROENTEROLOGY | Facility: CLINIC | Age: 75
End: 2020-04-30

## 2020-04-30 NOTE — TELEPHONE ENCOUNTER
Called patient back because she was confused on what medication  recommended. Notified patient per  she should take Pepcid Complete as needed for breakthrough GERD. Patient confirmed she understood and had no additional questions

## 2020-05-19 ENCOUNTER — OFFICE VISIT (OUTPATIENT)
Dept: GASTROENTEROLOGY | Facility: CLINIC | Age: 75
End: 2020-05-19

## 2020-05-19 DIAGNOSIS — K59.1 FUNCTIONAL DIARRHEA: ICD-10-CM

## 2020-05-19 DIAGNOSIS — R09.89 GLOBUS SENSATION: ICD-10-CM

## 2020-05-19 DIAGNOSIS — K21.9 GASTROESOPHAGEAL REFLUX DISEASE WITHOUT ESOPHAGITIS: Primary | ICD-10-CM

## 2020-05-19 DIAGNOSIS — R13.19 ESOPHAGEAL DYSPHAGIA: ICD-10-CM

## 2020-05-19 DIAGNOSIS — K58.0 IRRITABLE BOWEL SYNDROME WITH DIARRHEA: ICD-10-CM

## 2020-05-19 PROCEDURE — 99441 PR PHYS/QHP TELEPHONE EVALUATION 5-10 MIN: CPT | Performed by: INTERNAL MEDICINE

## 2020-05-19 RX ORDER — METFORMIN HYDROCHLORIDE 750 MG/1
750 TABLET, EXTENDED RELEASE ORAL 2 TIMES DAILY
COMMUNITY
Start: 2020-04-28 | End: 2020-09-25

## 2020-05-19 NOTE — PROGRESS NOTES
GASTROENTEROLOGY TELEPHONE BASED OFFICE NOTE  Lorrie Pagan  [id number]  1945    CARE TEAM  Patient Care Team:  Evangelina Luna MD as PCP - General (Family Medicine)  Elias Chaidez MD as Consulting Physician (Cardiology)     You have chosen to receive care through a telephone visit. Do you consent to use a telephone visit for your medical care today? Yes      No ref. provider found     Chief Complaint   Patient presents with   • Follow-up   • Heartburn   • Diarrhea        HISTORY OF PRESENT ILLNESS:  Patient presents for follow-up of chronic diarrhea.    She has been doing well on Colestid 2 g p.o. twice daily but this was somewhat complicated given her multiple medications and finding a window where she could take the Colestid and keep it separate from her other meds by 2 hours.  A trial of Lotronex 1 mg p.o. twice daily did not control her diarrhea as well as Colestid did having now 4-6 urgent bowel movements per day.  Again she was doing well without the urgent bowel movements and occasional urge incontinence that has returned since discontinuing Colestid.  She remains on metformin.    She is otherwise without any specific localizing GI complaints.  She is status post esophageal dilation to 20 mm in November 2019 and states that her dysphasia remains improved following this dilation.  Occasionally she has some problems with dysphagia to pills but nothing more than this.    She denies odynophagia, early satiety, unexplained weight loss, melena or bright red blood per rectum.  Again, her diarrhea has been a problem since she was in college.    PAST MEDICAL HISTORY  Past Medical History:   Diagnosis Date   • Bronchiectasis (CMS/HCC)    • Bronchitis 01/2018   • Cancer (CMS/HCC)     History of lung cancer and skin cancer    • Cardiac murmur    • Chronic cough    • Chronic obstructive pulmonary disease (CMS/HCC)    • Colon polyp    • Depression    • Diabetes mellitus (CMS/HCC)    • Disease of  thyroid gland    • GERD (gastroesophageal reflux disease)    • Glaucoma    • History of colonic polyps    • History of transfusion 1988   • Irritable bowel syndrome     Constipation/diarrhea   • Legally blind    • Lung cancer (CMS/HCC)    • Macular degeneration    • Neuropathy    • Osteoarthritis    • Oxygen dependent     5L   • Pneumonia    • Sleep apnea    • UTI (urinary tract infection)    • Wears glasses         PAST SURGICAL HISTORY  Past Surgical History:   Procedure Laterality Date   • BACK SURGERY     • CATARACT EXTRACTION     • CHOLECYSTECTOMY     • COLONOSCOPY     • ENDOSCOPY     • ENDOSCOPY N/A 11/6/2019    Procedure: ESOPHAGOGASTRODUODENOSCOPY;  Surgeon: Cortes Millan MD;  Location: [facility] ENDOSCOPY;  Service: Gastroenterology   • HAND SURGERY     • HYSTERECTOMY     • INCONTINENCE SURGERY     • LUNG SURGERY     • NISSEN FUNDOPLICATION          MEDICATIONS:    Current Outpatient Medications:   •  albuterol (PROVENTIL) (2.5 MG/3ML) 0.083% nebulizer solution, Take 2.5 mg by nebulization 4 (Four) Times a Day As Needed for Wheezing., Disp: 125 vial, Rfl: 3  •  ALBUTEROL SULFATE  (90 Base) MCG/ACT inhaler, INHALE 2 PUFFS BY MOUTH EVERY 6 HOURS AS NEEDED FOR WHEEZING, Disp: 25.5 g, Rfl: 2  •  alosetron (Lotronex) 1 MG tablet, Take 1 tablet by mouth 2 (Two) Times a Day. Hold for constipation, blood in stool, abdominal pain., Disp: 60 tablet, Rfl: 11  •  amitriptyline (ELAVIL) 25 MG tablet, daily, Disp: , Rfl:   •  ASPERCREME W/LIDOCAINE 4 % cream, USE TOPICALLY BID UTD, Disp: , Rfl: 3  •  azithromycin (ZITHROMAX) 250 MG tablet, Take 250 mg by mouth 3 (Three) Times a Week., Disp: , Rfl: 11  •  clindamycin (CLINDAGEL) 1 % gel, Apply  topically to the appropriate area as directed As Needed., Disp: , Rfl:   •  clonazePAM (KlonoPIN) 0.5 MG disintegrating tablet, clonazepam 0.5 mg disintegrating tablet  Place 1 tablet as needed by translingual route., Disp: , Rfl:   •  colestipol (COLESTID) 1 g  tablet, TAKE 2 TABLETS BY MOUTH DAILY. KEEP 2 HOURS APART FROM OTHER MEDICATIONS, Disp: 60 tablet, Rfl: 2  •  diclofenac (VOLTAREN) 1 % gel gel, diclofenac 1 % topical gel, Disp: , Rfl:   •  diphenoxylate-atropine (LOMOTIL) 2.5-0.025 MG per tablet, Take 1 tablet (2.5 mg) by mouth Q 6 hours 2 times per day as needed, Disp: 30 tablet, Rfl: 0  •  doxycycline (VIBRAMYICN) 100 MG tablet, Take 1 tablet by mouth 2 (Two) Times a Day., Disp: 20 tablet, Rfl: 0  •  DULoxetine (CYMBALTA) 30 MG capsule, TK 1 C PO D, Disp: , Rfl:   •  esomeprazole (nexIUM) 40 MG capsule, Take 1 capsule by mouth 2 (Two) Times a Day., Disp: 180 capsule, Rfl: 3  •  ezetimibe (ZETIA) 10 MG tablet, Zetia 10 mg tablet, Disp: , Rfl:   •  fluticasone-salmeterol (ADVAIR DISKUS) 250-50 MCG/DOSE DISKUS, Advair Diskus 250 mcg-50 mcg/dose powder for inhalation, Disp: , Rfl:   •  Furosemide (LASIX PO), furosemide  40 mg, Disp: , Rfl:   •  HYDROcod Polst-CPM Polst ER (TUSSIONEX PENNKINETIC) 10-8 MG/5ML ER suspension, TK 10 ML PO BID PRN, Disp: , Rfl: 0  •  ibuprofen (ADVIL,MOTRIN) 600 MG tablet, Take  by mouth Daily., Disp: , Rfl:   •  influenza vac split high-dose (FLUZONE HIGH DOSE) 0.5 ML suspension prefilled syringe injection, Fluzone High-Dose 1010-1818 (PF) 180 mcg/0.5 mL intramuscular syringe, Disp: , Rfl:   •  ipratropium (ATROVENT) 0.06 % nasal spray, ipratropium bromide 42 mcg (0.06 %) nasal spray, Disp: , Rfl:   •  latanoprost (XALATAN) 0.005 % ophthalmic solution, USE 1 DROP IN BOTH EYES NIGHTLY, Disp: , Rfl:   •  levothyroxine (SYNTHROID, LEVOTHROID) 50 MCG tablet, Take 50 mcg by mouth Daily., Disp: , Rfl:   •  losartan (COZAAR) 25 MG tablet, Take  by mouth Daily., Disp: , Rfl:   •  metFORMIN (GLUCOPHAGE) 500 MG tablet, TK 1 T PO Q 12 H WF, Disp: , Rfl: 0  •  metFORMIN ER (GLUCOPHAGE-XR) 750 MG 24 hr tablet, Take 750 mg by mouth 2 (Two) Times a Day., Disp: , Rfl:   •  predniSONE (DELTASONE) 10 MG tablet, Take 4 tabs daily x 3 days, then take 3 tabs  daily x 3 days, then take 2 tabs daily x 3 days, then take 1 tab daily x 3 days, Disp: 31 tablet, Rfl: 0  •  rOPINIRole HCl (REQUIP PO), Take 8 mg by mouth 2 (Two) Times a Day., Disp: , Rfl:   •  sulfamethoxazole-trimethoprim (BACTRIM,SEPTRA) 400-80 MG tablet, Take 1 tablet by mouth 2 (Two) Times a Day., Disp: , Rfl:   •  timolol (TIMOPTIC) 0.25 % ophthalmic solution, INT 1 GTT IN OD IN THE MORNING, Disp: , Rfl:   •  Tiotropium Bromide Monohydrate (SPIRIVA RESPIMAT) 1.25 MCG/ACT aerosol solution inhaler, Spiriva Respimat 1.25 mcg/actuation solution for inhalation, Disp: , Rfl:     ALLERGIES  Allergies   Allergen Reactions   • Penicillins Other (See Comments)     rash   • Statins Other (See Comments)     myalgia   • Tramadol Nausea And Vomiting and GI Intolerance       FAMILY HISTORY:  Family History   Problem Relation Age of Onset   • Colon polyps Mother    • Emphysema Mother    • Colon polyps Father    • Colon cancer Neg Hx        SOCIAL HISTORY  Social History     Socioeconomic History   • Marital status: Single     Spouse name: Not on file   • Number of children: Not on file   • Years of education: Not on file   • Highest education level: Not on file   Tobacco Use   • Smoking status: Former Smoker     Packs/day: 1.50     Years: 25.00     Pack years: 37.50     Types: Cigarettes     Last attempt to quit: 1992     Years since quittin.4   • Smokeless tobacco: Never Used   Substance and Sexual Activity   • Alcohol use: Yes     Alcohol/week: 2.0 standard drinks     Types: 2 Glasses of wine per week   • Drug use: No   • Sexual activity: Never   Social History Narrative    Previously lived in [facility], recently moved here        Retired    Previously smoked approximately 1 pack cigarettes per day for 25 years and stop smoking in     Drinks 3-4 alcoholic beverages on a weekly basis     Socioeconomic History:  Retired.  Single./.  Quit smoking .  Has about 2 glasses of wine per week.        REVIEW OF SYSTEMS  Review of Systems   Constitutional: Positive for diaphoresis. Negative for activity change, appetite change, chills, fatigue, fever, unexpected weight gain and unexpected weight loss.   HENT: Positive for trouble swallowing and voice change.    Gastrointestinal: Positive for abdominal distention, anal bleeding and GERD. Negative for abdominal pain, blood in stool, constipation, diarrhea, nausea, rectal pain, vomiting and indigestion.     I reviewed the above-noted review of systems and the pertinent/active issues today are those noted in today's HPI.    PHYSICAL EXAM   LMP  (LMP Unknown)   General: Pleasant female no apparent or acute distress  HEENT: Normal speech  Lungs: Grossly normal respirations without labored breathing or wheezing noted.  Speaking in full sentences  Neurologic: Normal cognition.  Normal affect.  Alert oriented x3.  Normal speech  Rectal exam: deferred        Results Review:  I reviewed the patient's new clinical results.      ASSESSMENT  1.-  Multifactorial diarrhea.  Patient states that she is doing well on Colestid and will resume Colestid 2 g p.o. twice daily.  I have also asked her to approach her primary care physician and see if metformin can be either substituted for another medication, diminished in dosage or temporarily discontinued.  She will get back to me once this has been addressed  2.-  Gastroesophageal reflux disease.  Doing well on Nexium 40 mg p.o. twice daily with as needed use of Pepcid Complete  3.-  Dysphagia to solids.  Status post dilation November 2019 with residual dysphagia only occasionally to pills    PLAN  1.-  Colestid 2 g p.o. twice daily  2.-  Discontinue Lotronex  3.-  Follow-up appointment in 2 weeks  4.-  Patient to discuss management of metformin with her primary care physician.  Best case scenario would be for her to be able to discontinue it and using another alternative agent for management of her diabetes.      This visit has  been rescheduled as a phone visit to comply with patient safety concerns in accordance with CDC recommendations. Total time of discussion was 10 minutes.        I discussed the patient's findings and my recommendations with patient    Cortes Millan MD  5/19/2020   15:34    Much of this note is an electronic transcription of spoken language to printed text. Electronic transcription of spoken language may permit erroneous, nonsensical word phrases to be inadvertently transcribed.  Although I have reviewed the note for these errors, some may still be present.

## 2020-05-27 ENCOUNTER — HOSPITAL ENCOUNTER (OUTPATIENT)
Dept: CT IMAGING | Facility: HOSPITAL | Age: 75
Discharge: HOME | End: 2020-05-27
Admitting: INTERNAL MEDICINE

## 2020-05-27 DIAGNOSIS — Z85.118 H/O: LUNG CANCER: ICD-10-CM

## 2020-05-27 PROCEDURE — 71250 CT THORAX DX C-: CPT

## 2020-05-28 ENCOUNTER — TELEPHONE (OUTPATIENT)
Dept: PULMONOLOGY | Facility: CLINIC | Age: 75
End: 2020-05-28

## 2020-05-28 NOTE — TELEPHONE ENCOUNTER
Patient called states has had more shortness of breath in the last week  coughing and spitting up brown no fever. Please advise

## 2020-06-01 ENCOUNTER — OFFICE VISIT (OUTPATIENT)
Dept: PULMONOLOGY | Facility: CLINIC | Age: 75
End: 2020-06-01

## 2020-06-01 VITALS
HEART RATE: 96 BPM | HEIGHT: 64 IN | BODY MASS INDEX: 33.63 KG/M2 | WEIGHT: 197 LBS | TEMPERATURE: 98 F | DIASTOLIC BLOOD PRESSURE: 56 MMHG | OXYGEN SATURATION: 92 % | SYSTOLIC BLOOD PRESSURE: 124 MMHG

## 2020-06-01 DIAGNOSIS — K44.9 HIATAL HERNIA: ICD-10-CM

## 2020-06-01 DIAGNOSIS — I35.1 NONRHEUMATIC AORTIC VALVE INSUFFICIENCY: ICD-10-CM

## 2020-06-01 DIAGNOSIS — Z85.118 H/O: LUNG CANCER: ICD-10-CM

## 2020-06-01 DIAGNOSIS — E66.9 OBESITY, CLASS I, BMI 30.0-34.9 (SEE ACTUAL BMI): ICD-10-CM

## 2020-06-01 DIAGNOSIS — G47.33 OSA ON CPAP: ICD-10-CM

## 2020-06-01 DIAGNOSIS — Z87.891 FORMER SMOKER: ICD-10-CM

## 2020-06-01 DIAGNOSIS — Z99.89 OSA ON CPAP: ICD-10-CM

## 2020-06-01 DIAGNOSIS — J47.9 IDIOPATHIC BRONCHIECTASIS (HCC): ICD-10-CM

## 2020-06-01 DIAGNOSIS — I50.32 CHRONIC DIASTOLIC HEART FAILURE (HCC): ICD-10-CM

## 2020-06-01 DIAGNOSIS — J44.9 COPD MIXED TYPE (HCC): ICD-10-CM

## 2020-06-01 DIAGNOSIS — J96.11 CHRONIC RESPIRATORY FAILURE WITH HYPOXIA (HCC): Primary | ICD-10-CM

## 2020-06-01 PROCEDURE — 99214 OFFICE O/P EST MOD 30 MIN: CPT | Performed by: INTERNAL MEDICINE

## 2020-06-01 NOTE — PROGRESS NOTES
PULMONARY  NOTE    Chief Complaint     Stage II COPD, former smoker, history of lung cancer with a prior resection, history of bronchiectasis, RENETTA, class I obesity, chronic respiratory failure, valvular heart disease, diastolic dysfunction    History of Present Illness     74-year-old white female returns today for follow-up.  I last saw her on 2/26/2020    When I last saw her she was doing much better.  She felt that she was doing the best that she had been doing for a long period of time.  Her weight at that time was about 184 pounds.  She indicates that her best weight has been in the low 180s.    Since I saw her she has noted more dyspnea on exertion.  She denies chest pain or palpitations.  She has also noted more lower extremity edema.    Her most recent weight is up around 196 pounds.    She is on Lasix 40 mg a day.  There is been no change in her diuretic dosing.  She is indiscrete with salt and does not restrict fluid intake.    She has chronic respiratory failure and uses supplemental oxygen 24 hours a day.    She also has obstructive sleep apnea and uses CPAP with her supplemental oxygen at night.    She has a history of lung cancer resected in 2016.  Most recent CT scan of the chest on 5/27/2020 reveals no evidence of active disease.    Patient Active Problem List   Diagnosis   • Hematochezia   • Moderate (Stage II) COPD   • Bronchiectasis (CMS/HCC)   • H/O: lung cancer (Prior resection 2016)   • Former smoker (None since 1992)   • Chronic respiratory failure   • RENETTA on CPAP   • Hiatal hernia (Prior Nissen 2008)   • Esophageal dilatation   • Esophageal dysphagia   • Mycobacterium avium complex colonization   • Globus sensation   • Irritable bowel syndrome with diarrhea   • Peripheral edema   • Obesity, Class I, BMI 30.0-34.9 (see actual BMI)   • Aortic valve regurgitation   • Benign hypertension   • Dyspnea   • Edema   • Elevated blood-pressure reading without diagnosis of hypertension   • Hip pain   •  Impairment of balance   • Low back pain   • Nerve root disorder   • Neuritis or radiculitis due to displacement of intervertebral disc   • Peripheral venous insufficiency   • Tachycardia   • Functional diarrhea   • Chronic diastolic heart failure (CMS/HCC)     Allergies   Allergen Reactions   • Penicillins Other (See Comments)     rash   • Statins Other (See Comments)     myalgia   • Tramadol Nausea And Vomiting and GI Intolerance       Current Outpatient Medications:   •  albuterol (PROVENTIL) (2.5 MG/3ML) 0.083% nebulizer solution, Take 2.5 mg by nebulization 4 (Four) Times a Day As Needed for Wheezing., Disp: 125 vial, Rfl: 3  •  ALBUTEROL SULFATE  (90 Base) MCG/ACT inhaler, INHALE 2 PUFFS BY MOUTH EVERY 6 HOURS AS NEEDED FOR WHEEZING, Disp: 25.5 g, Rfl: 2  •  alosetron (Lotronex) 1 MG tablet, Take 1 tablet by mouth 2 (Two) Times a Day. Hold for constipation, blood in stool, abdominal pain., Disp: 60 tablet, Rfl: 11  •  amitriptyline (ELAVIL) 25 MG tablet, daily, Disp: , Rfl:   •  ASPERCREME W/LIDOCAINE 4 % cream, USE TOPICALLY BID UTD, Disp: , Rfl: 3  •  azithromycin (ZITHROMAX) 250 MG tablet, Take 250 mg by mouth 3 (Three) Times a Week., Disp: , Rfl: 11  •  clindamycin (CLINDAGEL) 1 % gel, Apply  topically to the appropriate area as directed As Needed., Disp: , Rfl:   •  clonazePAM (KlonoPIN) 0.5 MG disintegrating tablet, clonazepam 0.5 mg disintegrating tablet  Place 1 tablet as needed by translingual route., Disp: , Rfl:   •  colestipol (COLESTID) 1 g tablet, TAKE 2 TABLETS BY MOUTH DAILY. KEEP 2 HOURS APART FROM OTHER MEDICATIONS, Disp: 60 tablet, Rfl: 2  •  diclofenac (VOLTAREN) 1 % gel gel, diclofenac 1 % topical gel, Disp: , Rfl:   •  diphenoxylate-atropine (LOMOTIL) 2.5-0.025 MG per tablet, Take 1 tablet (2.5 mg) by mouth Q 6 hours 2 times per day as needed, Disp: 30 tablet, Rfl: 0  •  doxycycline (VIBRAMYICN) 100 MG tablet, Take 1 tablet by mouth 2 (Two) Times a Day., Disp: 20 tablet, Rfl: 0  •   DULoxetine (CYMBALTA) 30 MG capsule, TK 1 C PO D, Disp: , Rfl:   •  esomeprazole (nexIUM) 40 MG capsule, Take 1 capsule by mouth 2 (Two) Times a Day., Disp: 180 capsule, Rfl: 3  •  ezetimibe (ZETIA) 10 MG tablet, Zetia 10 mg tablet, Disp: , Rfl:   •  fluticasone-salmeterol (ADVAIR DISKUS) 250-50 MCG/DOSE DISKUS, Advair Diskus 250 mcg-50 mcg/dose powder for inhalation, Disp: , Rfl:   •  Furosemide (LASIX PO), furosemide  40 mg, Disp: , Rfl:   •  HYDROcod Polst-CPM Polst ER (TUSSIONEX PENNKINETIC) 10-8 MG/5ML ER suspension, TK 10 ML PO BID PRN, Disp: , Rfl: 0  •  ibuprofen (ADVIL,MOTRIN) 600 MG tablet, Take  by mouth Daily., Disp: , Rfl:   •  influenza vac split high-dose (FLUZONE HIGH DOSE) 0.5 ML suspension prefilled syringe injection, Fluzone High-Dose [id number] (PF) 180 mcg/0.5 mL intramuscular syringe, Disp: , Rfl:   •  ipratropium (ATROVENT) 0.06 % nasal spray, ipratropium bromide 42 mcg (0.06 %) nasal spray, Disp: , Rfl:   •  latanoprost (XALATAN) 0.005 % ophthalmic solution, USE 1 DROP IN BOTH EYES NIGHTLY, Disp: , Rfl:   •  levothyroxine (SYNTHROID, LEVOTHROID) 50 MCG tablet, Take 50 mcg by mouth Daily., Disp: , Rfl:   •  losartan (COZAAR) 25 MG tablet, Take  by mouth Daily., Disp: , Rfl:   •  metFORMIN (GLUCOPHAGE) 500 MG tablet, TK 1 T PO Q 12 H WF, Disp: , Rfl: 0  •  metFORMIN ER (GLUCOPHAGE-XR) 750 MG 24 hr tablet, Take 750 mg by mouth 2 (Two) Times a Day., Disp: , Rfl:   •  predniSONE (DELTASONE) 10 MG tablet, Take 4 tabs daily x 3 days, then take 3 tabs daily x 3 days, then take 2 tabs daily x 3 days, then take 1 tab daily x 3 days, Disp: 31 tablet, Rfl: 0  •  rOPINIRole HCl (REQUIP PO), Take 8 mg by mouth 2 (Two) Times a Day., Disp: , Rfl:   •  sulfamethoxazole-trimethoprim (BACTRIM,SEPTRA) 400-80 MG tablet, Take 1 tablet by mouth 2 (Two) Times a Day., Disp: , Rfl:   •  timolol (TIMOPTIC) 0.25 % ophthalmic solution, INT 1 GTT IN OD IN THE MORNING, Disp: , Rfl:   •  Tiotropium Bromide Monohydrate  "(SPIRIVA RESPIMAT) 1.25 MCG/ACT aerosol solution inhaler, Spiriva Respimat 1.25 mcg/actuation solution for inhalation, Disp: , Rfl:   MEDICATION LIST AND ALLERGIES REVIEWED.    Family History   Problem Relation Age of Onset   • Colon polyps Mother    • Emphysema Mother    • Colon polyps Father    • Colon cancer Neg Hx      Social History     Tobacco Use   • Smoking status: Former Smoker     Packs/day: 1.50     Years: 25.00     Pack years: 37.50     Types: Cigarettes     Last attempt to quit: 1992     Years since quittin.4   • Smokeless tobacco: Never Used   Substance Use Topics   • Alcohol use: Yes     Alcohol/week: 2.0 standard drinks     Types: 2 Glasses of wine per week   • Drug use: No     Social History     Social History Narrative    Previously lived in [facility], recently moved here        Retired    Previously smoked approximately 1 pack cigarettes per day for 25 years and stop smoking in     Drinks 3-4 alcoholic beverages on a weekly basis     FAMILY AND SOCIAL HISTORY REVIEWED.    Review of Systems   Constitutional: Negative.  Negative for fatigue and unexpected weight change.   HENT: Positive for rhinorrhea. Negative for postnasal drip.    Respiratory: Positive for shortness of breath and wheezing. Negative for cough.    Cardiovascular: Positive for leg swelling. Negative for chest pain and palpitations.   Gastrointestinal: Positive for abdominal distention. Negative for nausea.   Musculoskeletal: Negative.  Negative for arthralgias and joint swelling.   Skin: Negative.  Negative for rash and wound.   Allergic/Immunologic: Negative.  Negative for immunocompromised state.   Hematological: Negative.  Negative for adenopathy.   Psychiatric/Behavioral: Positive for sleep disturbance.     ALSO REFER TO SCANNED ROS SHEET FROM SAME DATE.    /56   Pulse 96   Temp 98 °F (36.7 °C)   Ht 162.6 cm (64\")   Wt 89.4 kg (197 lb)   LMP  (LMP Unknown)   SpO2 92% Comment: 5 LITERS  BMI 33.81 " "kg/m²   Physical Exam   Constitutional: She is oriented to person, place, and time. She appears well-developed. No distress.   HENT:   Head: Normocephalic and atraumatic.   Neck: No thyromegaly present.   Cardiovascular: Normal rate and regular rhythm.   No murmur heard.  Grade 2/6 SM   Pulmonary/Chest: Effort normal. No stridor.   Decreased BS bilaterally without wheezes   Abdominal: Soft. Bowel sounds are normal.   Musculoskeletal: Normal range of motion. She exhibits edema.   Bilateral LE edema, pitting   Lymphadenopathy:     She has no cervical adenopathy.        Right: No supraclavicular and no epitrochlear adenopathy present.        Left: No supraclavicular and no epitrochlear adenopathy present.   Neurological: She is alert and oriented to person, place, and time.   Skin: Skin is warm and dry. She is not diaphoretic.   Psychiatric: She has a normal mood and affect. Her behavior is normal.   Nursing note and vitals reviewed.      Results     CT Chest chest done 5/27/2020 reveals no evidence of active disease    Problem List       ICD-10-CM ICD-9-CM   1. Chronic respiratory failure J96.11 518.83     799.02   2. Bronchiectasis (CMS/HCC) J47.9 494.0   3. Nonrheumatic aortic valve insufficiency I35.1 424.1   4. Former smoker (None since 1992) Z87.891 V15.82   5. RENETTA on CPAP G47.33 327.23    Z99.89 V46.8   6. Obesity, Class I, BMI 30.0-34.9 (see actual BMI) E66.9 278.00   7. Moderate (Stage II) COPD J44.9 496   8. Hiatal hernia (Prior Nissen 2008) K44.9 553.3   9. H/O: lung cancer (Prior resection 2016) Z85.118 V10.11   10. Chronic diastolic heart failure (CMS/HCC) I50.32 428.32       Discussion     We reviewed her CT scan.  No suspicious lesions.  At the very least, we will plan on getting a follow-up CT scan of the chest next year.    She is volume overloaded.  Her weight is up almost 15 pounds from her \"Dry weight\"  She does not follow any salt moderation.  We discussed the need for salt water moderation.  Also, " she needs to increase her diuretics for a period of time    She may be best served through the heart failure clinic.    If her dyspnea and lower extremity edema cannot be controlled with reasonable diuretic dose, she probably needs another echocardiogram.    In the meantime, I recommended supplemental oxygen 24 hours a day.  She will remain on the same bronchodilator regimen    Also, CPAP at night    Vladislav Arboleda MD  Note electronically signed    CC: Evangelina Luna MD

## 2020-06-02 DIAGNOSIS — I50.32 CHRONIC DIASTOLIC HEART FAILURE (HCC): Primary | ICD-10-CM

## 2020-06-08 ENCOUNTER — OFFICE VISIT (OUTPATIENT)
Dept: CARDIOLOGY | Facility: HOSPITAL | Age: 75
End: 2020-06-08

## 2020-06-08 VITALS
WEIGHT: 195 LBS | SYSTOLIC BLOOD PRESSURE: 152 MMHG | HEIGHT: 64 IN | RESPIRATION RATE: 18 BRPM | BODY MASS INDEX: 33.29 KG/M2 | TEMPERATURE: 98 F | OXYGEN SATURATION: 100 % | HEART RATE: 93 BPM | DIASTOLIC BLOOD PRESSURE: 67 MMHG

## 2020-06-08 DIAGNOSIS — J44.9 COPD MIXED TYPE (HCC): ICD-10-CM

## 2020-06-08 DIAGNOSIS — I10 BENIGN HYPERTENSION: ICD-10-CM

## 2020-06-08 DIAGNOSIS — I50.33 ACUTE ON CHRONIC DIASTOLIC CHF (CONGESTIVE HEART FAILURE) (HCC): Primary | ICD-10-CM

## 2020-06-08 LAB
ANION GAP SERPL CALCULATED.3IONS-SCNC: 9 MMOL/L (ref 5–15)
BUN BLD-MCNC: 10 MG/DL (ref 8–23)
BUN/CREAT SERPL: 16.1 (ref 7–25)
CALCIUM SPEC-SCNC: 9.3 MG/DL (ref 8.6–10.5)
CHLORIDE SERPL-SCNC: 96 MMOL/L (ref 98–107)
CO2 SERPL-SCNC: 37 MMOL/L (ref 22–29)
CREAT BLD-MCNC: 0.62 MG/DL (ref 0.57–1)
GFR SERPL CREATININE-BSD FRML MDRD: 94 ML/MIN/1.73
GLUCOSE BLD-MCNC: 95 MG/DL (ref 65–99)
POTASSIUM BLD-SCNC: 3.6 MMOL/L (ref 3.5–5.2)
SODIUM BLD-SCNC: 142 MMOL/L (ref 136–145)

## 2020-06-08 PROCEDURE — 99214 OFFICE O/P EST MOD 30 MIN: CPT | Performed by: NURSE PRACTITIONER

## 2020-06-08 PROCEDURE — 80048 BASIC METABOLIC PNL TOTAL CA: CPT | Performed by: NURSE PRACTITIONER

## 2020-06-08 RX ORDER — GLIMEPIRIDE 2 MG/1
2 TABLET ORAL 2 TIMES DAILY
COMMUNITY
End: 2020-12-20

## 2020-06-08 NOTE — PROGRESS NOTES
[facility]  [facility]      06/08/2020         Lorrie Pagan  [address] [facility] [address] [facility] [address]  [unfilled]    1945    Evangelina Luna MD    Lorrie Pagan is a 74 y.o. female.      Subjective:     Chief Complaint:  Leg Swelling and Establish Care         HPI 74-year-old female presents the office at the request of Dr. Arboleda pulmonary for ongoing evaluation of her acute on chronic diastolic heart failure.  She reports she is currently 15 pounds up from her dry weight.  Dry weight is usually around 180.  She notes worsening dyspnea as well as pedal edema over the last few months.  She reports she is followed by pulmonary and oxygen requirements have increased from 2 L to 5 L over the last 6 months.  She is followed by Dr. Chaidez.  Last appointment Dr. Chaidez was roughly 6 months ago and upcoming appointment is roughly 6 months from now.  She does deny chest pain, palpitations, dizziness, presyncope syncope or orthopnea.  She recently increased her Lasix at the request of Dr. Arboleda to 40 mg twice daily and notes mild improvement in her pedal edema.      Patient Active Problem List   Diagnosis   • Hematochezia   • Moderate (Stage II) COPD   • Bronchiectasis (CMS/HCC)   • H/O: lung cancer (Prior resection 2016)   • Former smoker (None since 1992)   • Chronic respiratory failure   • RENETTA on CPAP   • Hiatal hernia (Prior Nissen 2008)   • Esophageal dilatation   • Esophageal dysphagia   • Mycobacterium avium complex colonization   • Globus sensation   • Irritable bowel syndrome with diarrhea   • Peripheral edema   • Obesity, Class I, BMI 30.0-34.9 (see actual BMI)   • Aortic valve regurgitation   • Benign hypertension   • Dyspnea   • Edema   • Elevated blood-pressure reading without diagnosis of hypertension   • Hip pain   • Impairment of balance   • Low back pain   • Nerve root disorder   • Neuritis or radiculitis due to displacement of intervertebral  disc   • Peripheral venous insufficiency   • Tachycardia   • Functional diarrhea   • Chronic diastolic heart failure (CMS/HCC)       Past Medical History:   Diagnosis Date   • Bronchiectasis (CMS/HCC)    • Bronchitis 2018   • Cancer (CMS/HCC)     History of lung cancer and skin cancer    • Cardiac murmur    • Chronic cough    • Chronic obstructive pulmonary disease (CMS/HCC)    • Colon polyp    • Depression    • Diabetes mellitus (CMS/HCC)    • Disease of thyroid gland    • GERD (gastroesophageal reflux disease)    • Glaucoma    • History of colonic polyps    • History of transfusion    • Irritable bowel syndrome     Constipation/diarrhea   • Legally blind    • Lung cancer (CMS/HCC)    • Macular degeneration    • Neuropathy    • Osteoarthritis    • Oxygen dependent     5L   • Pneumonia    • Sleep apnea    • UTI (urinary tract infection)    • Wears glasses        Past Surgical History:   Procedure Laterality Date   • BACK SURGERY     • CATARACT EXTRACTION     • CHOLECYSTECTOMY     • COLONOSCOPY     • ENDOSCOPY     • ENDOSCOPY N/A 2019    Procedure: ESOPHAGOGASTRODUODENOSCOPY;  Surgeon: Cortes Millan MD;  Location: [facility] ENDOSCOPY;  Service: Gastroenterology   • HAND SURGERY     • HYSTERECTOMY     • INCONTINENCE SURGERY     • LUNG SURGERY     • NISSEN FUNDOPLICATION         Family History   Problem Relation Age of Onset   • Colon polyps Mother    • Emphysema Mother    • Colon polyps Father    • Colon cancer Neg Hx        Social History     Socioeconomic History   • Marital status: Single     Spouse name: Not on file   • Number of children: Not on file   • Years of education: Not on file   • Highest education level: Not on file   Tobacco Use   • Smoking status: Former Smoker     Packs/day: 1.50     Years: 25.00     Pack years: 37.50     Types: Cigarettes     Last attempt to quit: 1992     Years since quittin.4   • Smokeless tobacco: Never Used   Substance and Sexual Activity   •  Alcohol use: Yes     Alcohol/week: 2.0 standard drinks     Types: 2 Glasses of wine per week   • Drug use: No   • Sexual activity: Never   Social History Narrative    Previously lived in [facility], recently moved here        Retired    Previously smoked approximately 1 pack cigarettes per day for 25 years and stop smoking in 1992    Drinks 3-4 alcoholic beverages on a weekly basis    caffeine use: 1 serving of coffee or tea weekly       Allergies   Allergen Reactions   • Penicillins Other (See Comments)     rash   • Statins Other (See Comments)     myalgia   • Tramadol Nausea And Vomiting and GI Intolerance         Current Outpatient Medications:   •  albuterol (PROVENTIL) (2.5 MG/3ML) 0.083% nebulizer solution, Take 2.5 mg by nebulization 4 (Four) Times a Day As Needed for Wheezing., Disp: 125 vial, Rfl: 3  •  ALBUTEROL SULFATE  (90 Base) MCG/ACT inhaler, INHALE 2 PUFFS BY MOUTH EVERY 6 HOURS AS NEEDED FOR WHEEZING, Disp: 25.5 g, Rfl: 2  •  alosetron (Lotronex) 1 MG tablet, Take 1 tablet by mouth 2 (Two) Times a Day. Hold for constipation, blood in stool, abdominal pain., Disp: 60 tablet, Rfl: 11  •  amitriptyline (ELAVIL) 25 MG tablet, daily, Disp: , Rfl:   •  ASPERCREME W/LIDOCAINE 4 % cream, USE TOPICALLY BID UTD, Disp: , Rfl: 3  •  azithromycin (ZITHROMAX) 250 MG tablet, Take 250 mg by mouth 3 (Three) Times a Week., Disp: , Rfl: 11  •  clindamycin (CLINDAGEL) 1 % gel, Apply  topically to the appropriate area as directed As Needed., Disp: , Rfl:   •  colestipol (COLESTID) 1 g tablet, TAKE 2 TABLETS BY MOUTH DAILY. KEEP 2 HOURS APART FROM OTHER MEDICATIONS, Disp: 60 tablet, Rfl: 2  •  diclofenac (VOLTAREN) 1 % gel gel, diclofenac 1 % topical gel, Disp: , Rfl:   •  diphenoxylate-atropine (LOMOTIL) 2.5-0.025 MG per tablet, Take 1 tablet (2.5 mg) by mouth Q 6 hours 2 times per day as needed, Disp: 30 tablet, Rfl: 0  •  DULoxetine (CYMBALTA) 30 MG capsule, TK 1 C PO D, Disp: , Rfl:   •  esomeprazole  (nexIUM) 40 MG capsule, Take 1 capsule by mouth 2 (Two) Times a Day., Disp: 180 capsule, Rfl: 3  •  fluticasone-salmeterol (ADVAIR DISKUS) 250-50 MCG/DOSE DISKUS, Advair Diskus 250 mcg-50 mcg/dose powder for inhalation, Disp: , Rfl:   •  Furosemide (LASIX PO), furosemide  40 mg, Disp: , Rfl:   •  glimepiride (AMARYL) 2 MG tablet, Take 2 mg by mouth 2 (Two) Times a Day., Disp: , Rfl:   •  HYDROcod Polst-CPM Polst ER (TUSSIONEX PENNKINETIC) 10-8 MG/5ML ER suspension, TK 10 ML PO BID PRN, Disp: , Rfl: 0  •  ibuprofen (ADVIL,MOTRIN) 600 MG tablet, Take  by mouth Daily., Disp: , Rfl:   •  influenza vac split high-dose (FLUZONE HIGH DOSE) 0.5 ML suspension prefilled syringe injection, Fluzone High-Dose 2650-9613 (PF) 180 mcg/0.5 mL intramuscular syringe, Disp: , Rfl:   •  ipratropium (ATROVENT) 0.06 % nasal spray, ipratropium bromide 42 mcg (0.06 %) nasal spray, Disp: , Rfl:   •  latanoprost (XALATAN) 0.005 % ophthalmic solution, USE 1 DROP IN BOTH EYES NIGHTLY, Disp: , Rfl:   •  levothyroxine (SYNTHROID, LEVOTHROID) 50 MCG tablet, Take 50 mcg by mouth Daily., Disp: , Rfl:   •  losartan (COZAAR) 25 MG tablet, Take  by mouth Daily., Disp: , Rfl:   •  rOPINIRole HCl (REQUIP PO), Take 8 mg by mouth 2 (Two) Times a Day., Disp: , Rfl:   •  timolol (TIMOPTIC) 0.25 % ophthalmic solution, INT 1 GTT IN OD IN THE MORNING, Disp: , Rfl:   •  Tiotropium Bromide Monohydrate (SPIRIVA RESPIMAT) 1.25 MCG/ACT aerosol solution inhaler, Spiriva Respimat 1.25 mcg/actuation solution for inhalation, Disp: , Rfl:   •  clonazePAM (KlonoPIN) 0.5 MG disintegrating tablet, clonazepam 0.5 mg disintegrating tablet  Place 1 tablet as needed by translingual route., Disp: , Rfl:   •  doxycycline (VIBRAMYICN) 100 MG tablet, Take 1 tablet by mouth 2 (Two) Times a Day., Disp: 20 tablet, Rfl: 0  •  ezetimibe (ZETIA) 10 MG tablet, Zetia 10 mg tablet, Disp: , Rfl:   •  metFORMIN (GLUCOPHAGE) 500 MG tablet, TK 1 T PO Q 12 H WF, Disp: , Rfl: 0  •  metFORMIN ER  (GLUCOPHAGE-XR) 750 MG 24 hr tablet, Take 750 mg by mouth 2 (Two) Times a Day., Disp: , Rfl:   •  predniSONE (DELTASONE) 10 MG tablet, Take 4 tabs daily x 3 days, then take 3 tabs daily x 3 days, then take 2 tabs daily x 3 days, then take 1 tab daily x 3 days, Disp: 31 tablet, Rfl: 0  •  sulfamethoxazole-trimethoprim (BACTRIM,SEPTRA) 400-80 MG tablet, Take 1 tablet by mouth 2 (Two) Times a Day., Disp: , Rfl:     The following portions of the patient's history were reviewed today and updated as appropriate: allergies, current medications, past family history, past medical history, past social history, past surgical history and problem list     Review of Systems   Constitution: Negative for chills, decreased appetite, diaphoresis, fever, malaise/fatigue, night sweats, weight gain and weight loss.   HENT: Negative for congestion, hearing loss, hoarse voice and nosebleeds.    Eyes: Negative for blurred vision, visual disturbance and visual halos.   Cardiovascular: Positive for dyspnea on exertion and leg swelling. Negative for chest pain, claudication, cyanosis, irregular heartbeat, near-syncope, orthopnea, palpitations, paroxysmal nocturnal dyspnea and syncope.   Respiratory: Negative for cough, hemoptysis, shortness of breath, sleep disturbances due to breathing, snoring, sputum production and wheezing.    Hematologic/Lymphatic: Negative for bleeding problem. Does not bruise/bleed easily.   Skin: Negative for dry skin, itching and rash.   Musculoskeletal: Negative for arthritis, falls, joint pain, joint swelling and myalgias.   Gastrointestinal: Negative for bloating, abdominal pain, constipation, diarrhea, flatus, heartburn, hematemesis, hematochezia, melena, nausea and vomiting.   Genitourinary: Negative for dysuria, frequency, hematuria, nocturia and urgency.   Neurological: Negative for excessive daytime sleepiness, dizziness, headaches, light-headedness, loss of balance and weakness.   Psychiatric/Behavioral:  "Negative for depression. The patient does not have insomnia and is not nervous/anxious.        Objective:     Vitals:    06/08/20 1245 06/08/20 1247 06/08/20 1248   BP: 152/70 154/69 152/67   BP Location: Right arm Right arm Left arm   Patient Position: Sitting Sitting Sitting   Cuff Size: Adult Adult Adult   Pulse: 99 98 93   Resp:   18   Temp:   98 °F (36.7 °C)   TempSrc:   Temporal   SpO2: 99% 100% 100%   Weight:   88.5 kg (195 lb)   Height:   162.6 cm (64\")       Body mass index is 33.47 kg/m².    Physical Exam   Constitutional: She is oriented to person, place, and time. She appears well-developed and well-nourished. She is active and cooperative. No distress.   HENT:   Head: Normocephalic and atraumatic.   Mouth/Throat: Oropharynx is clear and moist.   Eyes: Pupils are equal, round, and reactive to light. Conjunctivae and EOM are normal.   Neck: Normal range of motion. Neck supple. No JVD present. No tracheal deviation present. No thyromegaly present.   Cardiovascular: Normal rate, regular rhythm, normal heart sounds and intact distal pulses.   Pulmonary/Chest: Effort normal. She has rales.   Abdominal: Soft. Bowel sounds are normal. She exhibits no distension. There is no tenderness.   Musculoskeletal: Normal range of motion. She exhibits edema (2+ pitting edema noted BLEs).   Neurological: She is alert and oriented to person, place, and time.   Skin: Skin is warm, dry and intact. There is erythema (BLES).   Psychiatric: She has a normal mood and affect. Her behavior is normal.   Nursing note and vitals reviewed.      Lab and Diagnostic Review:  Results for orders placed or performed during the hospital encounter of 11/06/19   Potassium   Result Value Ref Range    Potassium 4.2 3.5 - 5.2 mmol/L   POC Glucose Once   Result Value Ref Range    Glucose 140 (H) 70 - 130 mg/dL   Tissue Pathology Exam   Result Value Ref Range    Case Report       Surgical Pathology Report                         Case: [id number]     "                              Authorizing Provider:  Millan, Cortes     Collected:           11/06/2019 12:10 PM                                 MD Michael                                                                    Ordering Location:     [facility]   Received:            11/06/2019 01:06 PM                                 ENDO [facility]                                                                  Pathologist:           Jf Kiser MD                                                        Specimen:    Esophagus, Distal, distal esophagus bx                                                     Clinical Information       The working history is dysphagia, unspecified type.      Final Diagnosis        DISTAL ESOPHAGUS BIOPSY:  Squamous mucosa with basal layer hyperplasia, vascular ectasia and increased intraepithelial leukocytes without eosinophils.  Gastric cardia-type mucosa showing mild chronic gastritis without activity with reactive features.  Histologic changes are compatible with reflux esophagitis.  Negative for intestinal metaplasia and dysplasia.    PCC/dlb      Gross Description       Received in formalin labeled as distal esophagus biopsy.  The specimen consists of a tan/pink soft irregular tissue measuring 0.3 x 0.2 x 0.2 cm which is submitted in toto in cassette 1A.  [place]/dlb      Microscopic Description       The slides are reviewed and demonstrate histopathologic features supporting the above rendered diagnosis.           BMP pending from today     Assessment and Plan:   1. Acute on chronic diastolic CHF (congestive heart failure) (CMS/HCC)  IV diuresis today in office. Patient received 80 mg lasix (NDC 2705-9217-76)  today through a right AC  over slow IV push. During IV diuresis, vitals were monitored and stable. Please see IV diuresis record for those vitals. Patient voided 750 ml in the office prior to discharge from the office. Butterfly  was d/c'd and area was free  of erythema, ecchymosis, or drainage.  Patient was  instructed to record urinary output for the next 24 hours. Patient will receive a follow up call from the HF center in 24 hours to evaluate urinary output and reassess signs and symptoms. Heart failure education today including signs and symptoms, the role of the heart failure center, daily weights, low sodium diet (less than 1500 mg per day), and daily physical activity. Reviewed HF Zones with patient and family.  Patient to continue current medications as previously ordered.     - Basic Metabolic Panel    2. Moderate (Stage II) COPD  On oxygen 24 hours a day   Followed by pulmonary   3. Benign hypertension  Continue losartan, lasix        It has been a pleasure to participate in the care of this patient.  Patient was instructed to call the [facility] Center with any questions, concerns, or worsening symptoms.    *Please note that portions of this note were completed with a voice recognition program. Efforts were made to edit the dictations, but occasionally words are mistranscribed.      Answers for HPI/ROS submitted by the patient on 6/7/2020   What is the primary reason for your visit?: Other  Please describe your symptoms.: swelling of feet and lower legs. swelling began about 7-8 years ago.had vein surgery in legs  about 5 years ago. It did no help. serious swelling last 2  years.  The swelling is becoming Very uncomfortable. My lower legs are red  and skin is dry and peeling . is this related to long term edema?  Have you had these symptoms before?: Yes  How long have you been having these symptoms?: Greater than 2 weeks  Please list any medications you are currently taking for this condition.: lasix  Please describe any probable cause for these symptoms. : do not know.   compression  sox, reduction in salt

## 2020-06-09 ENCOUNTER — TELEPHONE (OUTPATIENT)
Dept: CARDIOLOGY | Facility: HOSPITAL | Age: 75
End: 2020-06-09

## 2020-06-09 RX ORDER — FUROSEMIDE 40 MG/1
40 TABLET ORAL 2 TIMES DAILY
Qty: 60 TABLET | Refills: 11 | Status: ON HOLD | OUTPATIENT
Start: 2020-06-09 | End: 2020-09-27 | Stop reason: SDUPTHER

## 2020-06-09 RX ORDER — POTASSIUM CHLORIDE 20 MEQ/1
20 TABLET, EXTENDED RELEASE ORAL DAILY
Qty: 30 TABLET | Refills: 11 | Status: SHIPPED | OUTPATIENT
Start: 2020-06-09 | End: 2020-06-25

## 2020-06-09 NOTE — TELEPHONE ENCOUNTER
Reviewed labs with patient.  Patient notes very little improvement in symptoms of abdominal fullness, dyspnea and pedal edema after IV diuresis.  Patient to continue Lasix 40 mg twice daily.  Will begin potassium chloride 20 mEq daily.  Did speak with Dr. Chaidez's office.  Patient has an upcoming appointment in November 2020.  Asked office staff to move appointment up for further evaluation.  Office staff noted that his next available was mid December. Asked office staff again to move up her appt with Dr Chaidez. Dr Chaidez's office to call patient with new appt.   Patient verbalized understanding.

## 2020-06-25 ENCOUNTER — TELEPHONE (OUTPATIENT)
Dept: CARDIOLOGY | Facility: HOSPITAL | Age: 75
End: 2020-06-25

## 2020-06-25 RX ORDER — POTASSIUM CHLORIDE 750 MG/1
20 TABLET, FILM COATED, EXTENDED RELEASE ORAL DAILY
Qty: 60 TABLET | Refills: 11 | Status: SHIPPED | OUTPATIENT
Start: 2020-06-25 | End: 2020-11-20

## 2020-06-25 NOTE — TELEPHONE ENCOUNTER
Patient left message on voicemail statting that she has trouble swallowing her potassium and wants to know what other options there are.

## 2020-07-15 ENCOUNTER — TELEMEDICINE (OUTPATIENT)
Dept: GASTROENTEROLOGY | Facility: CLINIC | Age: 75
End: 2020-07-15

## 2020-07-15 DIAGNOSIS — K92.1 HEMATOCHEZIA: ICD-10-CM

## 2020-07-15 DIAGNOSIS — K58.0 IRRITABLE BOWEL SYNDROME WITH DIARRHEA: Primary | ICD-10-CM

## 2020-07-15 DIAGNOSIS — R13.19 ESOPHAGEAL DYSPHAGIA: ICD-10-CM

## 2020-07-15 DIAGNOSIS — R13.10 DYSPHAGIA, UNSPECIFIED TYPE: ICD-10-CM

## 2020-07-15 DIAGNOSIS — K21.9 GASTROESOPHAGEAL REFLUX DISEASE WITHOUT ESOPHAGITIS: ICD-10-CM

## 2020-07-15 PROCEDURE — 99213 OFFICE O/P EST LOW 20 MIN: CPT | Performed by: INTERNAL MEDICINE

## 2020-07-15 RX ORDER — DIPHENOXYLATE HYDROCHLORIDE AND ATROPINE SULFATE 2.5; .025 MG/1; MG/1
1 TABLET ORAL 4 TIMES DAILY PRN
Qty: 30 TABLET | Refills: 1 | Status: SHIPPED | OUTPATIENT
Start: 2020-07-15 | End: 2021-11-03

## 2020-07-17 NOTE — PROGRESS NOTES
"GASTROENTEROLOGY TELEMEDICINE OFFICE NOTE  Lorrie Pagan  [id number]  1945    CARE TEAM  Patient Care Team:  Evagnelina Luna MD as PCP - General (Family Medicine)  Elias Chaidez MD as Consulting Physician (Cardiology)    No ref. provider found     Chief Complaint   Patient presents with   • Chronic diarrhea        HISTORY OF PRESENT ILLNESS:  Patient presents for follow-up recently seen on 5/21/2020 with complaints of chronic diarrhea which was doing well on Colestid 2 g p.o. twice daily.  Unfortunately, the requirement that the Colestid be kept separate from other meds by 2 hours was somewhat complicated and difficult to follow through on.  A trial of Lotronex did not control her diarrhea as well as Colestid and she was having 4-6 bowel movements per day.    Since I last saw her she has resumed Colestid 2 g 1 p.o. twice daily, discontinued Lotronex and is also been off metformin and with this she is back to having just a few nonurgent formed bowel movements per day and she feels that for the most part she is doing \"okay \"and does not feel further work-up or evaluation is necessary.    Specifically she denies dysphagia, odynophagia, early satiety, unexplained weight loss melena or bright red blood per rectum.    PAST MEDICAL HISTORY  Past Medical History:   Diagnosis Date   • Bronchiectasis (CMS/HCC)    • Bronchitis 01/2018   • Cancer (CMS/HCC)     History of lung cancer and skin cancer    • Cardiac murmur    • Chronic cough    • Chronic obstructive pulmonary disease (CMS/HCC)    • Colon polyp    • Depression    • Diabetes mellitus (CMS/HCC)    • Disease of thyroid gland    • GERD (gastroesophageal reflux disease)    • Glaucoma    • History of colonic polyps    • History of transfusion 1988   • Irritable bowel syndrome     Constipation/diarrhea   • Legally blind    • Lung cancer (CMS/HCC)    • Macular degeneration    • Neuropathy    • Osteoarthritis    • Oxygen dependent     5L   • Pneumonia "    • Sleep apnea    • UTI (urinary tract infection)    • Wears glasses         PAST SURGICAL HISTORY  Past Surgical History:   Procedure Laterality Date   • BACK SURGERY     • CATARACT EXTRACTION     • CHOLECYSTECTOMY     • COLONOSCOPY     • ENDOSCOPY     • ENDOSCOPY N/A 11/6/2019    Procedure: ESOPHAGOGASTRODUODENOSCOPY;  Surgeon: Cortes Millan MD;  Location: [facility] ENDOSCOPY;  Service: Gastroenterology   • HAND SURGERY     • HYSTERECTOMY     • INCONTINENCE SURGERY     • LUNG SURGERY     • NISSEN FUNDOPLICATION          MEDICATIONS:    Current Outpatient Medications:   •  albuterol (PROVENTIL) (2.5 MG/3ML) 0.083% nebulizer solution, Take 2.5 mg by nebulization 4 (Four) Times a Day As Needed for Wheezing., Disp: 125 vial, Rfl: 3  •  ALBUTEROL SULFATE  (90 Base) MCG/ACT inhaler, INHALE 2 PUFFS BY MOUTH EVERY 6 HOURS AS NEEDED FOR WHEEZING, Disp: 25.5 g, Rfl: 2  •  alosetron (Lotronex) 1 MG tablet, Take 1 tablet by mouth 2 (Two) Times a Day. Hold for constipation, blood in stool, abdominal pain., Disp: 60 tablet, Rfl: 11  •  amitriptyline (ELAVIL) 25 MG tablet, daily, Disp: , Rfl:   •  ASPERCREME W/LIDOCAINE 4 % cream, USE TOPICALLY BID UTD, Disp: , Rfl: 3  •  azithromycin (ZITHROMAX) 250 MG tablet, Take 250 mg by mouth 3 (Three) Times a Week., Disp: , Rfl: 11  •  clindamycin (CLINDAGEL) 1 % gel, Apply  topically to the appropriate area as directed As Needed., Disp: , Rfl:   •  clonazePAM (KlonoPIN) 0.5 MG disintegrating tablet, clonazepam 0.5 mg disintegrating tablet  Place 1 tablet as needed by translingual route., Disp: , Rfl:   •  colestipol (COLESTID) 1 g tablet, TAKE 2 TABLETS BY MOUTH DAILY. KEEP 2 HOURS APART FROM OTHER MEDICATIONS, Disp: 60 tablet, Rfl: 2  •  diclofenac (VOLTAREN) 1 % gel gel, diclofenac 1 % topical gel, Disp: , Rfl:   •  diphenoxylate-atropine (Lomotil) 2.5-0.025 MG per tablet, Take 1 tablet by mouth 4 (Four) Times a Day As Needed for Diarrhea., Disp: 30 tablet, Rfl:  1  •  doxycycline (VIBRAMYICN) 100 MG tablet, Take 1 tablet by mouth 2 (Two) Times a Day., Disp: 20 tablet, Rfl: 0  •  DULoxetine (CYMBALTA) 30 MG capsule, TK 1 C PO D, Disp: , Rfl:   •  esomeprazole (nexIUM) 40 MG capsule, Take 1 capsule by mouth 2 (Two) Times a Day., Disp: 180 capsule, Rfl: 3  •  ezetimibe (ZETIA) 10 MG tablet, Zetia 10 mg tablet, Disp: , Rfl:   •  fluticasone-salmeterol (ADVAIR DISKUS) 250-50 MCG/DOSE DISKUS, Advair Diskus 250 mcg-50 mcg/dose powder for inhalation, Disp: , Rfl:   •  furosemide (LASIX) 40 MG tablet, Take 1 tablet by mouth 2 (Two) Times a Day., Disp: 60 tablet, Rfl: 11  •  glimepiride (AMARYL) 2 MG tablet, Take 2 mg by mouth 2 (Two) Times a Day., Disp: , Rfl:   •  HYDROcod Polst-CPM Polst ER (TUSSIONEX PENNKINETIC) 10-8 MG/5ML ER suspension, TK 10 ML PO BID PRN, Disp: , Rfl: 0  •  ibuprofen (ADVIL,MOTRIN) 600 MG tablet, Take  by mouth Daily., Disp: , Rfl:   •  influenza vac split high-dose (FLUZONE HIGH DOSE) 0.5 ML suspension prefilled syringe injection, Fluzone High-Dose 0955-2210 (PF) 180 mcg/0.5 mL intramuscular syringe, Disp: , Rfl:   •  ipratropium (ATROVENT) 0.06 % nasal spray, ipratropium bromide 42 mcg (0.06 %) nasal spray, Disp: , Rfl:   •  latanoprost (XALATAN) 0.005 % ophthalmic solution, USE 1 DROP IN BOTH EYES NIGHTLY, Disp: , Rfl:   •  levothyroxine (SYNTHROID, LEVOTHROID) 50 MCG tablet, Take 50 mcg by mouth Daily., Disp: , Rfl:   •  losartan (COZAAR) 25 MG tablet, Take  by mouth Daily., Disp: , Rfl:   •  metFORMIN (GLUCOPHAGE) 500 MG tablet, TK 1 T PO Q 12 H WF, Disp: , Rfl: 0  •  metFORMIN ER (GLUCOPHAGE-XR) 750 MG 24 hr tablet, Take 750 mg by mouth 2 (Two) Times a Day., Disp: , Rfl:   •  potassium chloride (K-DUR) 10 MEQ CR tablet, Take 2 tablets by mouth Daily., Disp: 60 tablet, Rfl: 11  •  predniSONE (DELTASONE) 10 MG tablet, Take 4 tabs daily x 3 days, then take 3 tabs daily x 3 days, then take 2 tabs daily x 3 days, then take 1 tab daily x 3 days, Disp: 31  tablet, Rfl: 0  •  rOPINIRole HCl (REQUIP PO), Take 8 mg by mouth 2 (Two) Times a Day., Disp: , Rfl:   •  sulfamethoxazole-trimethoprim (BACTRIM,SEPTRA) 400-80 MG tablet, Take 1 tablet by mouth 2 (Two) Times a Day., Disp: , Rfl:   •  timolol (TIMOPTIC) 0.25 % ophthalmic solution, INT 1 GTT IN OD IN THE MORNING, Disp: , Rfl:   •  Tiotropium Bromide Monohydrate (SPIRIVA RESPIMAT) 1.25 MCG/ACT aerosol solution inhaler, Spiriva Respimat 1.25 mcg/actuation solution for inhalation, Disp: , Rfl:     ALLERGIES  Allergies   Allergen Reactions   • Penicillins Other (See Comments)     rash   • Statins Other (See Comments)     myalgia   • Tramadol Nausea And Vomiting and GI Intolerance       FAMILY HISTORY:  Family History   Problem Relation Age of Onset   • Colon polyps Mother    • Emphysema Mother    • Colon polyps Father    • Colon cancer Neg Hx        SOCIAL HISTORY  Social History     Socioeconomic History   • Marital status: Single     Spouse name: Not on file   • Number of children: Not on file   • Years of education: Not on file   • Highest education level: Not on file   Tobacco Use   • Smoking status: Former Smoker     Packs/day: 1.50     Years: 25.00     Pack years: 37.50     Types: Cigarettes     Last attempt to quit: 1992     Years since quittin.5   • Smokeless tobacco: Never Used   Substance and Sexual Activity   • Alcohol use: Yes     Alcohol/week: 2.0 standard drinks     Types: 2 Glasses of wine per week   • Drug use: No   • Sexual activity: Never   Social History Narrative    Previously lived in [facility], recently moved here        Retired    Previously smoked approximately 1 pack cigarettes per day for 25 years and stop smoking in     Drinks 3-4 alcoholic beverages on a weekly basis    caffeine use: 1 serving of coffee or tea weekly     Socioeconomic History:  Retired.  Single.  .  Ex-smoker since .  Drinks 3-4 alcoholic beverages per week mostly wine       REVIEW OF  SYSTEMS  Review of Systems  Unchanged from before as follows:  Constitutional: Positive for diaphoresis. Negative for activity change, appetite change, chills, fatigue, fever, unexpected weight gain and unexpected weight loss.   HENT: Positive for trouble swallowing and voice change.    Gastrointestinal: Positive for abdominal distention hematochezia,  and GERD. Negative for abdominal pain, blood in stool, constipation, diarrhea, nausea, rectal pain, vomiting and indigestion.     PHYSICAL EXAM   General: Well-developed pleasant white female wearing nasal cannula oxygen  HEENT: Nasal cannula oxygen.  Neck: Normal neck motion without rigidity observed  Lungs: Without obvious labored breathing or audible wheezing.  Neurologic: Alert and oriented x3 in good spirits.  Normal cognition and affect.      ASSESSMENT  1.-  Diarrhea predominant irritable bowel syndrome.  Multifactorial.  Improved with discontinuation of metformin and well-controlled with Colestid 1 g p.o. twice daily.  2.-  GERD.  Doing well on Nexium 40 mg p.o. twice daily with as needed Pepcid use for breakthrough symptoms rarely necessary  3.-  Dysphagia to solids status post esophageal dilation 20 mm November 2019 with minimal residual dysphagia which is nonprogressive and occurs only to pills    PLAN  1.-  Continue Colestid 1 g p.o. twice daily.  To make things simple for she can try to take 2 g in a single dose and see if this works well for her  2.-  Esophageal dilation as needed for progressive dysphagia to solids  3.-  Continue Nexium  4.-  GI follow-up as needed    Cortes Millan MD  7/17/2020   14:31    .VIDEOCONSENT  The use of a video visit(via [employer]. 10 minutes duration) has been reviewed with the patient and verbal informed consent have been obtained.

## 2020-09-25 ENCOUNTER — APPOINTMENT (OUTPATIENT)
Dept: GENERAL RADIOLOGY | Facility: HOSPITAL | Age: 75
End: 2020-09-25

## 2020-09-25 ENCOUNTER — APPOINTMENT (OUTPATIENT)
Dept: CT IMAGING | Facility: HOSPITAL | Age: 75
End: 2020-09-25

## 2020-09-25 ENCOUNTER — HOSPITAL ENCOUNTER (OUTPATIENT)
Facility: HOSPITAL | Age: 75
Setting detail: OBSERVATION
Discharge: HOME | End: 2020-09-27
Attending: EMERGENCY MEDICINE | Admitting: INTERNAL MEDICINE

## 2020-09-25 ENCOUNTER — APPOINTMENT (OUTPATIENT)
Dept: MRI IMAGING | Facility: HOSPITAL | Age: 75
End: 2020-09-25

## 2020-09-25 DIAGNOSIS — J44.9 CHRONIC OBSTRUCTIVE PULMONARY DISEASE, UNSPECIFIED COPD TYPE (HCC): ICD-10-CM

## 2020-09-25 DIAGNOSIS — G45.9 TIA (TRANSIENT ISCHEMIC ATTACK): Primary | ICD-10-CM

## 2020-09-25 PROBLEM — R55 SYNCOPE AND COLLAPSE: Status: ACTIVE | Noted: 2020-09-25

## 2020-09-25 LAB
ALT SERPL W P-5'-P-CCNC: 10 U/L (ref 1–33)
APTT PPP: 39.4 SECONDS (ref 24–37)
AST SERPL-CCNC: 18 U/L (ref 1–32)
BASE EXCESS BLDA CALC-SCNC: 19 MMOL/L (ref -5–5)
BASOPHILS # BLD AUTO: 0.05 10*3/MM3 (ref 0–0.2)
BASOPHILS NFR BLD AUTO: 0.5 % (ref 0–1.5)
CA-I BLDA-SCNC: 1.2 MMOL/L (ref 1.2–1.32)
CO2 BLDA-SCNC: 46 MMOL/L (ref 24–29)
CREAT BLDA-MCNC: 0.9 MG/DL (ref 0.6–1.3)
DEPRECATED RDW RBC AUTO: 49.3 FL (ref 37–54)
EOSINOPHIL # BLD AUTO: 0.18 10*3/MM3 (ref 0–0.4)
EOSINOPHIL NFR BLD AUTO: 1.6 % (ref 0.3–6.2)
ERYTHROCYTE [DISTWIDTH] IN BLOOD BY AUTOMATED COUNT: 14.9 % (ref 12.3–15.4)
GLUCOSE BLDC GLUCOMTR-MCNC: 115 MG/DL (ref 70–130)
GLUCOSE BLDC GLUCOMTR-MCNC: 83 MG/DL (ref 70–130)
HBA1C MFR BLD: 6.9 % (ref 4.8–5.6)
HCO3 BLDA-SCNC: 43.8 MMOL/L (ref 22–26)
HCT VFR BLD AUTO: 36.9 % (ref 34–46.6)
HCT VFR BLDA CALC: 36 % (ref 38–51)
HGB BLD-MCNC: 11.6 G/DL (ref 12–15.9)
HGB BLDA-MCNC: 12.2 G/DL (ref 12–17)
HOLD SPECIMEN: NORMAL
HOLD SPECIMEN: NORMAL
IMM GRANULOCYTES # BLD AUTO: 0.12 10*3/MM3 (ref 0–0.05)
IMM GRANULOCYTES NFR BLD AUTO: 1.1 % (ref 0–0.5)
INR PPP: 1.02 (ref 0.85–1.16)
LYMPHOCYTES # BLD AUTO: 1.97 10*3/MM3 (ref 0.7–3.1)
LYMPHOCYTES NFR BLD AUTO: 17.8 % (ref 19.6–45.3)
MCH RBC QN AUTO: 28.3 PG (ref 26.6–33)
MCHC RBC AUTO-ENTMCNC: 31.4 G/DL (ref 31.5–35.7)
MCV RBC AUTO: 90 FL (ref 79–97)
MONOCYTES # BLD AUTO: 0.84 10*3/MM3 (ref 0.1–0.9)
MONOCYTES NFR BLD AUTO: 7.6 % (ref 5–12)
NEUTROPHILS NFR BLD AUTO: 7.91 10*3/MM3 (ref 1.7–7)
NEUTROPHILS NFR BLD AUTO: 71.4 % (ref 42.7–76)
NRBC BLD AUTO-RTO: 0 /100 WBC (ref 0–0.2)
PCO2 BLDA: 66.3 MM HG (ref 35–45)
PH BLDA: 7.43 PH UNITS (ref 7.35–7.6)
PLATELET # BLD AUTO: 391 10*3/MM3 (ref 140–450)
PMV BLD AUTO: 9.2 FL (ref 6–12)
PO2 BLDA: 27 MMHG (ref 80–105)
POTASSIUM BLDA-SCNC: 2.5 MMOL/L (ref 3.5–4.9)
PROTHROMBIN TIME: 13.1 SECONDS (ref 11.5–14)
RBC # BLD AUTO: 4.1 10*6/MM3 (ref 3.77–5.28)
SAO2 % BLDA: 49 % (ref 95–98)
SODIUM BLD-SCNC: 139 MMOL/L (ref 138–146)
TROPONIN T SERPL-MCNC: 0.01 NG/ML (ref 0–0.03)
WBC # BLD AUTO: 11.07 10*3/MM3 (ref 3.4–10.8)
WHOLE BLOOD HOLD SPECIMEN: NORMAL
WHOLE BLOOD HOLD SPECIMEN: NORMAL

## 2020-09-25 PROCEDURE — 82947 ASSAY GLUCOSE BLOOD QUANT: CPT

## 2020-09-25 PROCEDURE — G0378 HOSPITAL OBSERVATION PER HR: HCPCS

## 2020-09-25 PROCEDURE — 0 IOPAMIDOL PER 1 ML: Performed by: EMERGENCY MEDICINE

## 2020-09-25 PROCEDURE — 82803 BLOOD GASES ANY COMBINATION: CPT

## 2020-09-25 PROCEDURE — 96372 THER/PROPH/DIAG INJ SC/IM: CPT

## 2020-09-25 PROCEDURE — 82330 ASSAY OF CALCIUM: CPT

## 2020-09-25 PROCEDURE — 70551 MRI BRAIN STEM W/O DYE: CPT

## 2020-09-25 PROCEDURE — 84484 ASSAY OF TROPONIN QUANT: CPT | Performed by: EMERGENCY MEDICINE

## 2020-09-25 PROCEDURE — 84295 ASSAY OF SERUM SODIUM: CPT

## 2020-09-25 PROCEDURE — 0042T HC CT CEREBRAL PERFUSION W/WO CONTRAST: CPT

## 2020-09-25 PROCEDURE — 99220 PR INITIAL OBSERVATION CARE/DAY 70 MINUTES: CPT | Performed by: INTERNAL MEDICINE

## 2020-09-25 PROCEDURE — 85610 PROTHROMBIN TIME: CPT | Performed by: EMERGENCY MEDICINE

## 2020-09-25 PROCEDURE — 70498 CT ANGIOGRAPHY NECK: CPT

## 2020-09-25 PROCEDURE — 94799 UNLISTED PULMONARY SVC/PX: CPT

## 2020-09-25 PROCEDURE — 70450 CT HEAD/BRAIN W/O DYE: CPT

## 2020-09-25 PROCEDURE — 93005 ELECTROCARDIOGRAM TRACING: CPT | Performed by: EMERGENCY MEDICINE

## 2020-09-25 PROCEDURE — 70496 CT ANGIOGRAPHY HEAD: CPT

## 2020-09-25 PROCEDURE — 94640 AIRWAY INHALATION TREATMENT: CPT

## 2020-09-25 PROCEDURE — 99214 OFFICE O/P EST MOD 30 MIN: CPT | Performed by: NURSE PRACTITIONER

## 2020-09-25 PROCEDURE — 82565 ASSAY OF CREATININE: CPT

## 2020-09-25 PROCEDURE — 84132 ASSAY OF SERUM POTASSIUM: CPT

## 2020-09-25 PROCEDURE — 99284 EMERGENCY DEPT VISIT MOD MDM: CPT

## 2020-09-25 PROCEDURE — 71045 X-RAY EXAM CHEST 1 VIEW: CPT

## 2020-09-25 PROCEDURE — 84450 TRANSFERASE (AST) (SGOT): CPT | Performed by: EMERGENCY MEDICINE

## 2020-09-25 PROCEDURE — 85014 HEMATOCRIT: CPT

## 2020-09-25 PROCEDURE — 85025 COMPLETE CBC W/AUTO DIFF WBC: CPT | Performed by: EMERGENCY MEDICINE

## 2020-09-25 PROCEDURE — 84460 ALANINE AMINO (ALT) (SGPT): CPT | Performed by: EMERGENCY MEDICINE

## 2020-09-25 PROCEDURE — 85730 THROMBOPLASTIN TIME PARTIAL: CPT | Performed by: EMERGENCY MEDICINE

## 2020-09-25 PROCEDURE — 83036 HEMOGLOBIN GLYCOSYLATED A1C: CPT | Performed by: INTERNAL MEDICINE

## 2020-09-25 PROCEDURE — 25010000002 ENOXAPARIN PER 10 MG: Performed by: INTERNAL MEDICINE

## 2020-09-25 RX ORDER — CHLORHEXIDINE GLUCONATE 0.12 MG/ML
15 RINSE ORAL 2 TIMES DAILY PRN
COMMUNITY

## 2020-09-25 RX ORDER — MAGNESIUM SULFATE HEPTAHYDRATE 40 MG/ML
2 INJECTION, SOLUTION INTRAVENOUS AS NEEDED
Status: DISCONTINUED | OUTPATIENT
Start: 2020-09-25 | End: 2020-09-27 | Stop reason: HOSPADM

## 2020-09-25 RX ORDER — DONEPEZIL HYDROCHLORIDE 5 MG/1
5 TABLET, FILM COATED ORAL NIGHTLY
COMMUNITY
End: 2021-05-10 | Stop reason: DRUGHIGH

## 2020-09-25 RX ORDER — ROPINIROLE 2 MG/1
8 TABLET, FILM COATED ORAL 2 TIMES DAILY
Status: DISCONTINUED | OUTPATIENT
Start: 2020-09-25 | End: 2020-09-27 | Stop reason: HOSPADM

## 2020-09-25 RX ORDER — METOPROLOL SUCCINATE 25 MG/1
25 TABLET, EXTENDED RELEASE ORAL DAILY
Status: DISCONTINUED | OUTPATIENT
Start: 2020-09-26 | End: 2020-09-27 | Stop reason: HOSPADM

## 2020-09-25 RX ORDER — METOLAZONE 2.5 MG/1
2.5 TABLET ORAL DAILY
COMMUNITY
End: 2020-09-25

## 2020-09-25 RX ORDER — NICOTINE POLACRILEX 4 MG
15 LOZENGE BUCCAL
Status: DISCONTINUED | OUTPATIENT
Start: 2020-09-25 | End: 2020-09-27 | Stop reason: HOSPADM

## 2020-09-25 RX ORDER — DONEPEZIL HYDROCHLORIDE 5 MG/1
5 TABLET, FILM COATED ORAL NIGHTLY
Status: DISCONTINUED | OUTPATIENT
Start: 2020-09-25 | End: 2020-09-27 | Stop reason: HOSPADM

## 2020-09-25 RX ORDER — METOLAZONE 2.5 MG/1
2.5 TABLET ORAL DAILY
Status: DISCONTINUED | OUTPATIENT
Start: 2020-09-26 | End: 2020-09-26

## 2020-09-25 RX ORDER — MAGNESIUM SULFATE HEPTAHYDRATE 40 MG/ML
4 INJECTION, SOLUTION INTRAVENOUS AS NEEDED
Status: DISCONTINUED | OUTPATIENT
Start: 2020-09-25 | End: 2020-09-27 | Stop reason: HOSPADM

## 2020-09-25 RX ORDER — ACETAMINOPHEN 325 MG/1
650 TABLET ORAL EVERY 4 HOURS PRN
Status: DISCONTINUED | OUTPATIENT
Start: 2020-09-25 | End: 2020-09-27 | Stop reason: HOSPADM

## 2020-09-25 RX ORDER — POTASSIUM CHLORIDE 1.5 G/1.77G
40 POWDER, FOR SOLUTION ORAL DAILY
Status: DISCONTINUED | OUTPATIENT
Start: 2020-09-26 | End: 2020-09-27 | Stop reason: HOSPADM

## 2020-09-25 RX ORDER — DIPHENOXYLATE HYDROCHLORIDE AND ATROPINE SULFATE 2.5; .025 MG/1; MG/1
1 TABLET ORAL 4 TIMES DAILY PRN
Status: DISCONTINUED | OUTPATIENT
Start: 2020-09-25 | End: 2020-09-27 | Stop reason: HOSPADM

## 2020-09-25 RX ORDER — POTASSIUM CHLORIDE 750 MG/1
40 CAPSULE, EXTENDED RELEASE ORAL AS NEEDED
Status: DISCONTINUED | OUTPATIENT
Start: 2020-09-25 | End: 2020-09-27 | Stop reason: HOSPADM

## 2020-09-25 RX ORDER — ALBUTEROL SULFATE 90 UG/1
2 AEROSOL, METERED RESPIRATORY (INHALATION) EVERY 6 HOURS PRN
Qty: 25.5 G | Refills: 2 | Status: SHIPPED | OUTPATIENT
Start: 2020-09-25 | End: 2021-07-20 | Stop reason: SDUPTHER

## 2020-09-25 RX ORDER — DEXTROSE MONOHYDRATE 25 G/50ML
25 INJECTION, SOLUTION INTRAVENOUS
Status: DISCONTINUED | OUTPATIENT
Start: 2020-09-25 | End: 2020-09-27 | Stop reason: HOSPADM

## 2020-09-25 RX ORDER — GLIMEPIRIDE 2 MG/1
1 TABLET ORAL DAILY
Status: DISCONTINUED | OUTPATIENT
Start: 2020-09-26 | End: 2020-09-27 | Stop reason: HOSPADM

## 2020-09-25 RX ORDER — ACETAMINOPHEN 160 MG/5ML
650 SOLUTION ORAL EVERY 4 HOURS PRN
Status: DISCONTINUED | OUTPATIENT
Start: 2020-09-25 | End: 2020-09-27 | Stop reason: HOSPADM

## 2020-09-25 RX ORDER — ASPIRIN 325 MG
325 TABLET ORAL ONCE
Status: COMPLETED | OUTPATIENT
Start: 2020-09-25 | End: 2020-09-25

## 2020-09-25 RX ORDER — DULOXETIN HYDROCHLORIDE 60 MG/1
60 CAPSULE, DELAYED RELEASE ORAL DAILY
Status: DISCONTINUED | OUTPATIENT
Start: 2020-09-26 | End: 2020-09-27 | Stop reason: HOSPADM

## 2020-09-25 RX ORDER — SODIUM CHLORIDE 0.9 % (FLUSH) 0.9 %
10 SYRINGE (ML) INJECTION AS NEEDED
Status: DISCONTINUED | OUTPATIENT
Start: 2020-09-25 | End: 2020-09-27 | Stop reason: HOSPADM

## 2020-09-25 RX ORDER — ASPIRIN 81 MG/1
81 TABLET ORAL DAILY
Status: DISCONTINUED | OUTPATIENT
Start: 2020-09-26 | End: 2020-09-27 | Stop reason: HOSPADM

## 2020-09-25 RX ORDER — METOPROLOL SUCCINATE 25 MG/1
25 TABLET, EXTENDED RELEASE ORAL DAILY
COMMUNITY
End: 2022-04-05

## 2020-09-25 RX ORDER — POTASSIUM CHLORIDE 7.45 MG/ML
10 INJECTION INTRAVENOUS
Status: DISCONTINUED | OUTPATIENT
Start: 2020-09-25 | End: 2020-09-27 | Stop reason: HOSPADM

## 2020-09-25 RX ORDER — METOLAZONE 2.5 MG/1
2.5 TABLET ORAL DAILY
Status: ON HOLD | COMMUNITY
End: 2020-09-27 | Stop reason: SDUPTHER

## 2020-09-25 RX ORDER — ACETAMINOPHEN 650 MG/1
650 SUPPOSITORY RECTAL EVERY 4 HOURS PRN
Status: DISCONTINUED | OUTPATIENT
Start: 2020-09-25 | End: 2020-09-27 | Stop reason: HOSPADM

## 2020-09-25 RX ORDER — FUROSEMIDE 40 MG/1
40 TABLET ORAL
Status: DISCONTINUED | OUTPATIENT
Start: 2020-09-26 | End: 2020-09-26

## 2020-09-25 RX ORDER — LEVOTHYROXINE SODIUM 0.05 MG/1
50 TABLET ORAL
Status: DISCONTINUED | OUTPATIENT
Start: 2020-09-26 | End: 2020-09-27 | Stop reason: HOSPADM

## 2020-09-25 RX ORDER — PANTOPRAZOLE SODIUM 40 MG/1
40 TABLET, DELAYED RELEASE ORAL EVERY MORNING
Status: DISCONTINUED | OUTPATIENT
Start: 2020-09-26 | End: 2020-09-27 | Stop reason: HOSPADM

## 2020-09-25 RX ORDER — ERGOCALCIFEROL 1.25 MG/1
50000 CAPSULE ORAL WEEKLY
COMMUNITY
End: 2021-07-20 | Stop reason: SDUPTHER

## 2020-09-25 RX ORDER — LATANOPROST 50 UG/ML
1 SOLUTION/ DROPS OPHTHALMIC NIGHTLY
Status: DISCONTINUED | OUTPATIENT
Start: 2020-09-25 | End: 2020-09-27 | Stop reason: HOSPADM

## 2020-09-25 RX ORDER — CHOLESTYRAMINE LIGHT 4 G/5.7G
1 POWDER, FOR SUSPENSION ORAL DAILY
Status: DISCONTINUED | OUTPATIENT
Start: 2020-09-26 | End: 2020-09-27 | Stop reason: HOSPADM

## 2020-09-25 RX ORDER — POTASSIUM CHLORIDE 1.5 G/1.77G
40 POWDER, FOR SOLUTION ORAL AS NEEDED
Status: DISCONTINUED | OUTPATIENT
Start: 2020-09-25 | End: 2020-09-27 | Stop reason: HOSPADM

## 2020-09-25 RX ORDER — SODIUM CHLORIDE 0.9 % (FLUSH) 0.9 %
10 SYRINGE (ML) INJECTION EVERY 12 HOURS SCHEDULED
Status: DISCONTINUED | OUTPATIENT
Start: 2020-09-25 | End: 2020-09-27 | Stop reason: HOSPADM

## 2020-09-25 RX ORDER — IPRATROPIUM BROMIDE AND ALBUTEROL SULFATE 2.5; .5 MG/3ML; MG/3ML
3 SOLUTION RESPIRATORY (INHALATION)
Status: DISCONTINUED | OUTPATIENT
Start: 2020-09-25 | End: 2020-09-27 | Stop reason: HOSPADM

## 2020-09-25 RX ORDER — AMOXICILLIN 250 MG
2 CAPSULE ORAL 2 TIMES DAILY PRN
Status: DISCONTINUED | OUTPATIENT
Start: 2020-09-25 | End: 2020-09-27 | Stop reason: HOSPADM

## 2020-09-25 RX ADMIN — LATANOPROST 1 DROP: 50 SOLUTION OPHTHALMIC at 22:30

## 2020-09-25 RX ADMIN — ENOXAPARIN SODIUM 40 MG: 40 INJECTION SUBCUTANEOUS at 20:49

## 2020-09-25 RX ADMIN — ASPIRIN 325 MG ORAL TABLET 325 MG: 325 PILL ORAL at 17:14

## 2020-09-25 RX ADMIN — SODIUM CHLORIDE, PRESERVATIVE FREE 10 ML: 5 INJECTION INTRAVENOUS at 20:52

## 2020-09-25 RX ADMIN — POTASSIUM CHLORIDE 40 MEQ: 10 CAPSULE, COATED, EXTENDED RELEASE ORAL at 23:51

## 2020-09-25 RX ADMIN — ROPINIROLE HYDROCHLORIDE 8 MG: 2 TABLET, FILM COATED ORAL at 20:49

## 2020-09-25 RX ADMIN — DONEPEZIL HYDROCHLORIDE 5 MG: 5 TABLET ORAL at 20:49

## 2020-09-25 RX ADMIN — IOPAMIDOL 115 ML: 755 INJECTION, SOLUTION INTRAVENOUS at 15:25

## 2020-09-25 RX ADMIN — IPRATROPIUM BROMIDE AND ALBUTEROL SULFATE 3 ML: 2.5; .5 SOLUTION RESPIRATORY (INHALATION) at 20:35

## 2020-09-25 NOTE — TELEPHONE ENCOUNTER
Fax refill request for Rx Albuterol HFA approved and faxed per chart to [facility].

## 2020-09-26 ENCOUNTER — APPOINTMENT (OUTPATIENT)
Dept: CARDIOLOGY | Facility: HOSPITAL | Age: 75
End: 2020-09-26

## 2020-09-26 LAB
ALBUMIN SERPL-MCNC: 3.4 G/DL (ref 3.5–5.2)
ALBUMIN/GLOB SERPL: 1.1 G/DL
ALP SERPL-CCNC: 81 U/L (ref 39–117)
ALT SERPL W P-5'-P-CCNC: 10 U/L (ref 1–33)
ANION GAP SERPL CALCULATED.3IONS-SCNC: 8 MMOL/L (ref 5–15)
AST SERPL-CCNC: 17 U/L (ref 1–32)
BASOPHILS # BLD AUTO: 0.05 10*3/MM3 (ref 0–0.2)
BASOPHILS NFR BLD AUTO: 0.6 % (ref 0–1.5)
BH CV ECHO MEAS - AI P1/2T: 351 MSEC
BH CV ECHO MEAS - AO MAX PG: 10 MMHG
BH CV ECHO MEAS - AO MEAN PG: 6 MMHG
BH CV ECHO MEAS - AO ROOT DIAM: 2.7 CM
BH CV ECHO MEAS - AO V2 MAX: 157 CM/SEC
BH CV ECHO MEAS - AVA(I,D): 2.5 CM2
BH CV ECHO MEAS - BSA(HAYCOCK): 1.9 M^2
BH CV ECHO MEAS - BSA: 1.9 M^2
BH CV ECHO MEAS - BZI_BMI: 30.9 KILOGRAMS/M^2
BH CV ECHO MEAS - BZI_METRIC_HEIGHT: 162.6 CM
BH CV ECHO MEAS - BZI_METRIC_WEIGHT: 81.6 KG
BH CV ECHO MEAS - IVSD: 1 CM
BH CV ECHO MEAS - LA DIMENSION: 4.1 CM
BH CV ECHO MEAS - LAT PEAK E' VEL: 7.9 CM/SEC
BH CV ECHO MEAS - LV MAX PG: 7 MMHG
BH CV ECHO MEAS - LV MEAN PG: 4 MMHG
BH CV ECHO MEAS - LV V1 MAX: 133 CM/SEC
BH CV ECHO MEAS - LVIDD: 4.2 CM
BH CV ECHO MEAS - LVIDS: 2.9 CM
BH CV ECHO MEAS - LVOT DIAM: 2 CM
BH CV ECHO MEAS - LVPWD: 0.9 CM
BH CV ECHO MEAS - MED PEAK E' VEL: 6.5 CM/SEC
BH CV ECHO MEAS - MV P1/2T: 72 MSEC
BH CV ECHO MEAS - RAP SYSTOLE: 8 MMHG
BH CV ECHO MEAS - RVSP: 33 MMHG
BH CV ECHO MEAS - TAPSE (>1.6): 2.5 CM
BH CV ECHO MEAS - TR MAX PG: 25 MMHG
BH CV ECHO MEAS - TR MAX VEL: 249 CM/SEC
BH CV VAS BP RIGHT ARM: NORMAL MMHG
BH CV XLRA - RV BASE: 3.1 CM
BH CV XLRA - RV LENGTH: 7.3 CM
BH CV XLRA - RV MID: 2.3 CM
BH CV XLRA - TDI S': 12.5 CM/SEC
BILIRUB SERPL-MCNC: 0.3 MG/DL (ref 0–1.2)
BUN SERPL-MCNC: 14 MG/DL (ref 8–23)
BUN/CREAT SERPL: 21.9 (ref 7–25)
CALCIUM SPEC-SCNC: 9.3 MG/DL (ref 8.6–10.5)
CHLORIDE SERPL-SCNC: 93 MMOL/L (ref 98–107)
CHOLEST SERPL-MCNC: 166 MG/DL (ref 0–200)
CO2 SERPL-SCNC: 38 MMOL/L (ref 22–29)
CREAT SERPL-MCNC: 0.64 MG/DL (ref 0.57–1)
DEPRECATED RDW RBC AUTO: 50.2 FL (ref 37–54)
EOSINOPHIL # BLD AUTO: 0.18 10*3/MM3 (ref 0–0.4)
EOSINOPHIL NFR BLD AUTO: 2.1 % (ref 0.3–6.2)
ERYTHROCYTE [DISTWIDTH] IN BLOOD BY AUTOMATED COUNT: 15 % (ref 12.3–15.4)
GFR SERPL CREATININE-BSD FRML MDRD: 90 ML/MIN/1.73
GLOBULIN UR ELPH-MCNC: 3 GM/DL
GLUCOSE BLDC GLUCOMTR-MCNC: 149 MG/DL (ref 70–130)
GLUCOSE BLDC GLUCOMTR-MCNC: 169 MG/DL (ref 70–130)
GLUCOSE BLDC GLUCOMTR-MCNC: 182 MG/DL (ref 70–130)
GLUCOSE SERPL-MCNC: 108 MG/DL (ref 65–99)
HCT VFR BLD AUTO: 36.7 % (ref 34–46.6)
HDLC SERPL-MCNC: 32 MG/DL (ref 40–60)
HGB BLD-MCNC: 11.2 G/DL (ref 12–15.9)
IMM GRANULOCYTES # BLD AUTO: 0.09 10*3/MM3 (ref 0–0.05)
IMM GRANULOCYTES NFR BLD AUTO: 1 % (ref 0–0.5)
LDLC SERPL CALC-MCNC: 93 MG/DL (ref 0–100)
LDLC/HDLC SERPL: 2.9 {RATIO}
LEFT ATRIUM VOLUME INDEX: 20.3 ML/M2
LV EF 2D ECHO EST: 60 %
LYMPHOCYTES # BLD AUTO: 1.68 10*3/MM3 (ref 0.7–3.1)
LYMPHOCYTES NFR BLD AUTO: 19.3 % (ref 19.6–45.3)
MAGNESIUM SERPL-MCNC: 2.1 MG/DL (ref 1.6–2.4)
MAXIMAL PREDICTED HEART RATE: 143 BPM
MCH RBC QN AUTO: 28.1 PG (ref 26.6–33)
MCHC RBC AUTO-ENTMCNC: 30.5 G/DL (ref 31.5–35.7)
MCV RBC AUTO: 92 FL (ref 79–97)
MONOCYTES # BLD AUTO: 0.64 10*3/MM3 (ref 0.1–0.9)
MONOCYTES NFR BLD AUTO: 7.4 % (ref 5–12)
NEUTROPHILS NFR BLD AUTO: 6.05 10*3/MM3 (ref 1.7–7)
NEUTROPHILS NFR BLD AUTO: 69.6 % (ref 42.7–76)
NRBC BLD AUTO-RTO: 0 /100 WBC (ref 0–0.2)
PLATELET # BLD AUTO: 363 10*3/MM3 (ref 140–450)
PMV BLD AUTO: 9.1 FL (ref 6–12)
POTASSIUM SERPL-SCNC: 2.7 MMOL/L (ref 3.5–5.2)
PROT SERPL-MCNC: 6.4 G/DL (ref 6–8.5)
RBC # BLD AUTO: 3.99 10*6/MM3 (ref 3.77–5.28)
SARS-COV-2 RNA NOSE QL NAA+PROBE: NOT DETECTED
SODIUM SERPL-SCNC: 139 MMOL/L (ref 136–145)
STRESS TARGET HR: 122 BPM
TRIGL SERPL-MCNC: 206 MG/DL (ref 0–150)
VLDLC SERPL-MCNC: 41.2 MG/DL
WBC # BLD AUTO: 8.69 10*3/MM3 (ref 3.4–10.8)

## 2020-09-26 PROCEDURE — U0004 COV-19 TEST NON-CDC HGH THRU: HCPCS | Performed by: INTERNAL MEDICINE

## 2020-09-26 PROCEDURE — G0378 HOSPITAL OBSERVATION PER HR: HCPCS

## 2020-09-26 PROCEDURE — 80061 LIPID PANEL: CPT | Performed by: INTERNAL MEDICINE

## 2020-09-26 PROCEDURE — 99214 OFFICE O/P EST MOD 30 MIN: CPT | Performed by: PSYCHIATRY & NEUROLOGY

## 2020-09-26 PROCEDURE — 94799 UNLISTED PULMONARY SVC/PX: CPT

## 2020-09-26 PROCEDURE — 99226 PR SBSQ OBSERVATION CARE/DAY 35 MINUTES: CPT | Performed by: INTERNAL MEDICINE

## 2020-09-26 PROCEDURE — 85025 COMPLETE CBC W/AUTO DIFF WBC: CPT | Performed by: INTERNAL MEDICINE

## 2020-09-26 PROCEDURE — 82962 GLUCOSE BLOOD TEST: CPT

## 2020-09-26 PROCEDURE — 80053 COMPREHEN METABOLIC PANEL: CPT | Performed by: INTERNAL MEDICINE

## 2020-09-26 PROCEDURE — 96372 THER/PROPH/DIAG INJ SC/IM: CPT

## 2020-09-26 PROCEDURE — 83735 ASSAY OF MAGNESIUM: CPT | Performed by: INTERNAL MEDICINE

## 2020-09-26 PROCEDURE — 93306 TTE W/DOPPLER COMPLETE: CPT

## 2020-09-26 PROCEDURE — 25010000002 ENOXAPARIN PER 10 MG: Performed by: INTERNAL MEDICINE

## 2020-09-26 RX ADMIN — LATANOPROST 1 DROP: 50 SOLUTION OPHTHALMIC at 20:44

## 2020-09-26 RX ADMIN — DONEPEZIL HYDROCHLORIDE 5 MG: 5 TABLET ORAL at 20:27

## 2020-09-26 RX ADMIN — DULOXETINE HYDROCHLORIDE 60 MG: 60 CAPSULE, DELAYED RELEASE ORAL at 09:53

## 2020-09-26 RX ADMIN — POTASSIUM CHLORIDE 40 MEQ: 10 CAPSULE, COATED, EXTENDED RELEASE ORAL at 09:53

## 2020-09-26 RX ADMIN — ENOXAPARIN SODIUM 40 MG: 40 INJECTION SUBCUTANEOUS at 20:27

## 2020-09-26 RX ADMIN — ROPINIROLE HYDROCHLORIDE 8 MG: 2 TABLET, FILM COATED ORAL at 20:26

## 2020-09-26 RX ADMIN — IPRATROPIUM BROMIDE AND ALBUTEROL SULFATE 3 ML: 2.5; .5 SOLUTION RESPIRATORY (INHALATION) at 15:56

## 2020-09-26 RX ADMIN — SODIUM CHLORIDE, PRESERVATIVE FREE 10 ML: 5 INJECTION INTRAVENOUS at 20:27

## 2020-09-26 RX ADMIN — ASPIRIN 81 MG: 81 TABLET, COATED ORAL at 09:52

## 2020-09-26 RX ADMIN — ROPINIROLE HYDROCHLORIDE 8 MG: 2 TABLET, FILM COATED ORAL at 09:52

## 2020-09-26 RX ADMIN — PANTOPRAZOLE SODIUM 40 MG: 40 TABLET, DELAYED RELEASE ORAL at 09:53

## 2020-09-26 RX ADMIN — METOPROLOL SUCCINATE 25 MG: 25 TABLET, EXTENDED RELEASE ORAL at 09:52

## 2020-09-26 RX ADMIN — POTASSIUM CHLORIDE 40 MEQ: 10 CAPSULE, COATED, EXTENDED RELEASE ORAL at 20:26

## 2020-09-26 RX ADMIN — IPRATROPIUM BROMIDE AND ALBUTEROL SULFATE 3 ML: 2.5; .5 SOLUTION RESPIRATORY (INHALATION) at 12:39

## 2020-09-26 RX ADMIN — IPRATROPIUM BROMIDE AND ALBUTEROL SULFATE 3 ML: 2.5; .5 SOLUTION RESPIRATORY (INHALATION) at 07:53

## 2020-09-26 RX ADMIN — POTASSIUM CHLORIDE 40 MEQ: 10 CAPSULE, COATED, EXTENDED RELEASE ORAL at 17:13

## 2020-09-26 RX ADMIN — LEVOTHYROXINE SODIUM 50 MCG: 50 TABLET ORAL at 04:15

## 2020-09-26 RX ADMIN — TIMOLOL MALEATE 1 DROP: 2.5 SOLUTION/ DROPS OPHTHALMIC at 09:51

## 2020-09-26 RX ADMIN — METOLAZONE 2.5 MG: 2.5 TABLET ORAL at 09:54

## 2020-09-26 RX ADMIN — IPRATROPIUM BROMIDE AND ALBUTEROL SULFATE 3 ML: 2.5; .5 SOLUTION RESPIRATORY (INHALATION) at 20:36

## 2020-09-27 VITALS
SYSTOLIC BLOOD PRESSURE: 129 MMHG | WEIGHT: 180 LBS | DIASTOLIC BLOOD PRESSURE: 55 MMHG | HEIGHT: 64 IN | OXYGEN SATURATION: 96 % | HEART RATE: 76 BPM | TEMPERATURE: 98.1 F | RESPIRATION RATE: 18 BRPM | BODY MASS INDEX: 30.73 KG/M2

## 2020-09-27 PROBLEM — E83.42 HYPOMAGNESEMIA: Status: ACTIVE | Noted: 2020-09-27

## 2020-09-27 PROBLEM — E87.6 HYPOKALEMIA: Status: ACTIVE | Noted: 2020-09-27

## 2020-09-27 LAB
ANION GAP SERPL CALCULATED.3IONS-SCNC: 8 MMOL/L (ref 5–15)
BUN SERPL-MCNC: 12 MG/DL (ref 8–23)
BUN/CREAT SERPL: 18.8 (ref 7–25)
CALCIUM SPEC-SCNC: 9.8 MG/DL (ref 8.6–10.5)
CHLORIDE SERPL-SCNC: 94 MMOL/L (ref 98–107)
CO2 SERPL-SCNC: 35 MMOL/L (ref 22–29)
CREAT SERPL-MCNC: 0.64 MG/DL (ref 0.57–1)
GFR SERPL CREATININE-BSD FRML MDRD: 90 ML/MIN/1.73
GLUCOSE BLDC GLUCOMTR-MCNC: 134 MG/DL (ref 70–130)
GLUCOSE BLDC GLUCOMTR-MCNC: 174 MG/DL (ref 70–130)
GLUCOSE SERPL-MCNC: 162 MG/DL (ref 65–99)
MAGNESIUM SERPL-MCNC: 2 MG/DL (ref 1.6–2.4)
POTASSIUM SERPL-SCNC: 3.6 MMOL/L (ref 3.5–5.2)
SODIUM SERPL-SCNC: 137 MMOL/L (ref 136–145)

## 2020-09-27 PROCEDURE — 97165 OT EVAL LOW COMPLEX 30 MIN: CPT

## 2020-09-27 PROCEDURE — 80048 BASIC METABOLIC PNL TOTAL CA: CPT | Performed by: INTERNAL MEDICINE

## 2020-09-27 PROCEDURE — 99213 OFFICE O/P EST LOW 20 MIN: CPT | Performed by: PSYCHIATRY & NEUROLOGY

## 2020-09-27 PROCEDURE — 63710000001 INSULIN LISPRO (HUMAN) PER 5 UNITS: Performed by: INTERNAL MEDICINE

## 2020-09-27 PROCEDURE — 94799 UNLISTED PULMONARY SVC/PX: CPT

## 2020-09-27 PROCEDURE — 99217 PR OBSERVATION CARE DISCHARGE MANAGEMENT: CPT | Performed by: INTERNAL MEDICINE

## 2020-09-27 PROCEDURE — 82962 GLUCOSE BLOOD TEST: CPT

## 2020-09-27 PROCEDURE — 83735 ASSAY OF MAGNESIUM: CPT | Performed by: INTERNAL MEDICINE

## 2020-09-27 PROCEDURE — G0378 HOSPITAL OBSERVATION PER HR: HCPCS

## 2020-09-27 PROCEDURE — 97161 PT EVAL LOW COMPLEX 20 MIN: CPT

## 2020-09-27 RX ORDER — METOLAZONE 2.5 MG/1
2.5 TABLET ORAL DAILY PRN
Start: 2020-09-27 | End: 2022-04-05

## 2020-09-27 RX ORDER — FUROSEMIDE 40 MG/1
40 TABLET ORAL DAILY
Qty: 60 TABLET | Refills: 11 | Status: SHIPPED | OUTPATIENT
Start: 2020-09-27 | End: 2020-11-20

## 2020-09-27 RX ADMIN — INSULIN LISPRO 2 UNITS: 100 INJECTION, SOLUTION INTRAVENOUS; SUBCUTANEOUS at 12:28

## 2020-09-27 RX ADMIN — IPRATROPIUM BROMIDE AND ALBUTEROL SULFATE 3 ML: 2.5; .5 SOLUTION RESPIRATORY (INHALATION) at 12:17

## 2020-09-27 RX ADMIN — PANTOPRAZOLE SODIUM 40 MG: 40 TABLET, DELAYED RELEASE ORAL at 05:21

## 2020-09-27 RX ADMIN — DULOXETINE HYDROCHLORIDE 60 MG: 60 CAPSULE, DELAYED RELEASE ORAL at 10:14

## 2020-09-27 RX ADMIN — IPRATROPIUM BROMIDE AND ALBUTEROL SULFATE 3 ML: 2.5; .5 SOLUTION RESPIRATORY (INHALATION) at 08:11

## 2020-09-27 RX ADMIN — METOPROLOL SUCCINATE 25 MG: 25 TABLET, EXTENDED RELEASE ORAL at 10:14

## 2020-09-27 RX ADMIN — POTASSIUM CHLORIDE 40 MEQ: 1.5 FOR SOLUTION ORAL at 10:14

## 2020-09-27 RX ADMIN — TIMOLOL MALEATE 1 DROP: 2.5 SOLUTION/ DROPS OPHTHALMIC at 10:15

## 2020-09-27 RX ADMIN — ASPIRIN 81 MG: 81 TABLET, COATED ORAL at 10:14

## 2020-09-27 RX ADMIN — LEVOTHYROXINE SODIUM 50 MCG: 50 TABLET ORAL at 05:21

## 2020-09-28 ENCOUNTER — READMISSION MANAGEMENT (OUTPATIENT)
Dept: CALL CENTER | Facility: HOSPITAL | Age: 75
End: 2020-09-28

## 2020-09-28 NOTE — OUTREACH NOTE
Prep Survey      Responses   [facility] facility patient discharged from?  [place]   Is LACE score < 7 ?  No   Eligibility  Readm Mgmt   Discharge diagnosis  **Syncope and collapse    COVID-19 Test Status  Negative   Does the patient have one of the following disease processes/diagnoses(primary or secondary)?  Other   Does the patient have Home health ordered?  No   Is there a DME ordered?  No   Prep survey completed?  Yes          Radha Adame RN

## 2020-09-29 ENCOUNTER — OFFICE VISIT (OUTPATIENT)
Dept: PULMONOLOGY | Facility: CLINIC | Age: 75
End: 2020-09-29

## 2020-09-29 VITALS
WEIGHT: 183.8 LBS | BODY MASS INDEX: 31.38 KG/M2 | DIASTOLIC BLOOD PRESSURE: 64 MMHG | OXYGEN SATURATION: 98 % | HEIGHT: 64 IN | SYSTOLIC BLOOD PRESSURE: 130 MMHG | HEART RATE: 87 BPM | TEMPERATURE: 97.1 F

## 2020-09-29 DIAGNOSIS — G47.33 OSA ON CPAP: ICD-10-CM

## 2020-09-29 DIAGNOSIS — Z85.118 H/O: LUNG CANCER: ICD-10-CM

## 2020-09-29 DIAGNOSIS — Z87.891 FORMER SMOKER: ICD-10-CM

## 2020-09-29 DIAGNOSIS — Z22.39 MYCOBACTERIUM AVIUM COMPLEX COLONIZATION: ICD-10-CM

## 2020-09-29 DIAGNOSIS — J44.9 COPD MIXED TYPE (HCC): ICD-10-CM

## 2020-09-29 DIAGNOSIS — Z99.89 OSA ON CPAP: ICD-10-CM

## 2020-09-29 DIAGNOSIS — J47.9 IDIOPATHIC BRONCHIECTASIS (HCC): Primary | ICD-10-CM

## 2020-09-29 PROBLEM — G47.34 NOCTURNAL HYPOXEMIA: Status: ACTIVE | Noted: 2020-09-29

## 2020-09-29 PROCEDURE — 99214 OFFICE O/P EST MOD 30 MIN: CPT | Performed by: INTERNAL MEDICINE

## 2020-09-29 NOTE — PROGRESS NOTES
"  PULMONARY  NOTE    Chief Complaint     History of lung cancer, stage II COPD, former smoker, history of bronchiectasis, RENETTA, nocturnal hypoxemia, valvular heart disease, diastolic dysfunction    History of Present Illness     75-year-old white female returns today for follow-up.  I last saw her on 6/1/2020    On that visit she was quite markedly volume overloaded.  Her weight was up to around 196 pounds with her \"dry weight\" around 180 pounds.  She indicates that she is doing better with changes in her diuretics.    She has a history of obstructive sleep apnea and previously was on CPAP which she could not tolerate  She is using supplemental oxygen at night    She has a history of lung cancer resected in 2016  Most recent CT scan of the chest on 5/27/2020 revealed no evidence of recurrent or active disease    She has a history of stage II, moderate, COPD  She primarily uses albuterol on an as-needed basis    Patient Active Problem List   Diagnosis   • Hematochezia   • Bronchiectasis (CMS/HCC)   • H/O: lung cancer (Prior resection 2016)   • Former smoker (None since 1992)   • Chronic respiratory failure   • RENETTA (Previous CPAP)   • Hiatal hernia (Prior Nissen 2008)   • Esophageal dilatation   • Esophageal dysphagia   • Mycobacterium avium complex colonization   • Globus sensation   • Irritable bowel syndrome with diarrhea   • Peripheral edema   • Obesity   • Aortic valve regurgitation   • Benign hypertension   • Dyspnea   • Edema   • Elevated blood-pressure reading without diagnosis of hypertension   • Hip pain   • Impairment of balance   • Low back pain   • Nerve root disorder   • Neuritis or radiculitis due to displacement of intervertebral disc   • Peripheral venous insufficiency   • Tachycardia   • Functional diarrhea   • Chronic diastolic heart failure (CMS/HCC)   • Syncope and collapse   • Hypokalemia   • Hypomagnesemia   • Nocturnal hypoxemia   • Stage II, moderate, COPD     Allergies   Allergen Reactions   • " Penicillins Other (See Comments)     rash   • Statins Other (See Comments)     myalgia   • Tramadol Nausea And Vomiting and GI Intolerance       Current Outpatient Medications:   •  albuterol sulfate  (90 Base) MCG/ACT inhaler, Inhale 2 puffs Every 6 (Six) Hours As Needed for Wheezing., Disp: 25.5 g, Rfl: 2  •  chlorhexidine (PERIDEX) 0.12 % solution, Apply 15 mL to the mouth or throat 2 (Two) Times a Day. For 14 days, w/1 refill, Disp: , Rfl:   •  diphenoxylate-atropine (Lomotil) 2.5-0.025 MG per tablet, Take 1 tablet by mouth 4 (Four) Times a Day As Needed for Diarrhea., Disp: 30 tablet, Rfl: 1  •  donepezil (ARICEPT) 5 MG tablet, Take 5 mg by mouth Every Night., Disp: , Rfl:   •  DULoxetine (CYMBALTA) 60 MG capsule, Take 60 mg by mouth Daily., Disp: , Rfl:   •  esomeprazole (nexIUM) 40 MG capsule, Take 1 capsule by mouth 2 (Two) Times a Day., Disp: 180 capsule, Rfl: 3  •  furosemide (LASIX) 40 MG tablet, Take 1 tablet by mouth Daily., Disp: 60 tablet, Rfl: 11  •  glimepiride (AMARYL) 2 MG tablet, Take 2 mg by mouth 2 (Two) Times a Day., Disp: , Rfl:   •  latanoprost (XALATAN) 0.005 % ophthalmic solution, USE 1 DROP IN BOTH EYES NIGHTLY, Disp: , Rfl:   •  levothyroxine (SYNTHROID, LEVOTHROID) 50 MCG tablet, Take 50 mcg by mouth Daily., Disp: , Rfl:   •  metOLazone (ZAROXOLYN) 2.5 MG tablet, Take 1 tablet by mouth Daily As Needed (for swelling or weight gain of 4 lbs). As directed, Disp: , Rfl:   •  metoprolol succinate XL (TOPROL-XL) 25 MG 24 hr tablet, Take 25 mg by mouth Daily., Disp: , Rfl:   •  potassium chloride (K-DUR) 10 MEQ CR tablet, Take 2 tablets by mouth Daily. (Patient taking differently: Take 20 mEq by mouth 2 (Two) Times a Day. Daughter states takes in the MORNING), Disp: 60 tablet, Rfl: 11  •  rOPINIRole (Requip) 4 MG tablet, Take 4 mg by mouth 2 (Two) Times a Day. + 2 extra at bedtime if needed, Disp: , Rfl:   •  timolol (TIMOPTIC) 0.25 % ophthalmic solution, Administer 1 drop to the right  "eye Every Morning., Disp: , Rfl:   •  Tiotropium Bromide Monohydrate (SPIRIVA RESPIMAT) 1.25 MCG/ACT aerosol solution inhaler, Inhale 2 puffs Daily., Disp: , Rfl:   •  vitamin D (ERGOCALCIFEROL) 1.25 MG (97782 UT) capsule capsule, Take 50,000 Units by mouth 1 (One) Time Per Week., Disp: , Rfl:   MEDICATION LIST AND ALLERGIES REVIEWED.    Family History   Problem Relation Age of Onset   • Colon polyps Mother    • Emphysema Mother    • Colon polyps Father    • Colon cancer Neg Hx      Social History     Tobacco Use   • Smoking status: Former Smoker     Packs/day: 1.50     Years: 25.00     Pack years: 37.50     Types: Cigarettes     Quit date: 1992     Years since quittin.7   • Smokeless tobacco: Never Used   Substance Use Topics   • Alcohol use: Yes     Alcohol/week: 2.0 standard drinks     Types: 2 Glasses of wine per week   • Drug use: No     Social History     Social History Narrative    Previously lived in [facility], recently moved here        Retired    Previously smoked approximately 1 pack cigarettes per day for 25 years and stop smoking in     Drinks 3-4 alcoholic beverages on a weekly basis    caffeine use: 1 serving of coffee or tea weekly     FAMILY AND SOCIAL HISTORY REVIEWED.    Review of Systems  ALSO REFER TO SCANNED ROS SHEET FROM SAME DATE.    /64 (BP Location: Left arm, Patient Position: Sitting, Cuff Size: Adult)   Pulse 87   Temp 97.1 °F (36.2 °C) (Temporal)   Ht 162.6 cm (64\")   Wt 83.4 kg (183 lb 12.8 oz)   LMP  (LMP Unknown)   SpO2 98% Comment: 5 liters at rest  BMI 31.55 kg/m²   Physical Exam  Vitals signs and nursing note reviewed.   Constitutional:       General: She is not in acute distress.     Appearance: She is well-developed. She is not diaphoretic.   HENT:      Head: Normocephalic and atraumatic.   Neck:      Thyroid: No thyromegaly.   Cardiovascular:      Rate and Rhythm: Normal rate and regular rhythm.      Heart sounds: Normal heart sounds. No murmur. "   Pulmonary:      Effort: Pulmonary effort is normal.      Breath sounds: Normal breath sounds. No stridor.   Abdominal:      General: Bowel sounds are normal.      Palpations: Abdomen is soft.   Musculoskeletal: Normal range of motion.   Lymphadenopathy:      Cervical: No cervical adenopathy.      Upper Body:      Right upper body: No supraclavicular or epitrochlear adenopathy.      Left upper body: No supraclavicular or epitrochlear adenopathy.   Skin:     General: Skin is warm and dry.   Neurological:      Mental Status: She is alert and oriented to person, place, and time.   Psychiatric:         Behavior: Behavior normal.         Results     CT scan of the chest from 5/27/2020 again reviewed on PACS.  No evidence of active disease    Problem List       ICD-10-CM ICD-9-CM   1. Bronchiectasis (CMS/HCC)  J47.9 494.0   2. Mycobacterium avium complex colonization  Z22.39 V02.59   3. RENETTA (Previous CPAP)  G47.33 327.23    Z99.89 V46.8   4. Former smoker (None since 1992)  Z87.891 V15.82   5. H/O: lung cancer (Prior resection 2016)  Z85.118 V10.11   6. Stage II, moderate, COPD  J44.9 496       Discussion     She appears stable from a pulmonary standpoint.  Overall improved with better control of fluid retention.  Her dry weight is now around 180 pounds.    She is no longer using CPAP for her obstructive sleep apnea.  Only supplemental oxygen at night.  She does not think she can tolerate the CPAP.    Our plan is to follow serial chest imaging for 5 years.  We will get 1 or 2 more CAT scans over the next year for recurrent cancer surveillance.    I have reinforced salt water moderation    I will plan to see her back in 6 months.  We will arrange for get a CT scan of the chest in December 2020    Vladislav Arboleda MD  Note electronically signed    CC: Evangelina Luna MD

## 2020-09-30 ENCOUNTER — READMISSION MANAGEMENT (OUTPATIENT)
Dept: CALL CENTER | Facility: HOSPITAL | Age: 75
End: 2020-09-30

## 2020-09-30 DIAGNOSIS — Z87.891 FORMER SMOKER: Primary | ICD-10-CM

## 2020-09-30 NOTE — OUTREACH NOTE
Medical Week 1 Survey      Responses   [facility] patient discharged from?  [place]   COVID-19 Test Status  Negative   Does the patient have one of the following disease processes/diagnoses(primary or secondary)?  Other   Is there a successful TCM telephone encounter documented?  No   Week 1 attempt successful?  Yes   Call start time  0920   Call end time  0923   Discharge diagnosis  **Syncope and collapse    Meds reviewed with patient/caregiver?  Yes   Is the patient having any side effects they believe may be caused by any medication additions or changes?  No   Does the patient have all medications ordered at discharge?  Yes   Is the patient taking all medications as directed (includes completed medication regime)?  Yes   Does the patient have a primary care provider?   Yes   Does the patient have an appointment with their PCP within 7 days of discharge?  No   Comments regarding PCP  Dr Luna- encouraged patient to make followup with PCO   What is preventing the patient from scheduling follow up appointments within 7 days of discharge?  Haven't had time   Nursing Interventions  Educated patient on importance of making appointment, Advised patient to make appointment   Psychosocial issues?  No   Comments  Patient reports she is doing well.    Did the patient receive a copy of their discharge instructions?  Yes   Nursing interventions  Reviewed instructions with patient   What is the patient's perception of their health status since discharge?  Improving   Is the patient/caregiver able to teach back signs and symptoms related to disease process for when to call PCP?  Yes   Is the patient/caregiver able to teach back signs and symptoms related to disease process for when to call 911?  Yes   Is the patient/caregiver able to teach back the hierarchy of who to call/visit for symptoms/problems? PCP, Specialist, Home health nurse, Urgent Care, ED, 911  Yes   Week 1 call completed?  Yes          Nakul Mckeon,  RN

## 2020-10-07 ENCOUNTER — READMISSION MANAGEMENT (OUTPATIENT)
Dept: CALL CENTER | Facility: HOSPITAL | Age: 75
End: 2020-10-07

## 2020-10-07 NOTE — OUTREACH NOTE
Medical Week 2 Survey      Responses   [facility] patient discharged from?  [place]   Does the patient have one of the following disease processes/diagnoses(primary or secondary)?  Other   Week 2 attempt successful?  Yes   Call start time  0918   Call end time  0925   Medication alerts for this patient  Increase lasix and potassium.   Meds reviewed with patient/caregiver?  Yes   Is the patient taking all medications as directed (includes completed medication regime)?  Yes   Comments regarding appointments  Pt saw pcp yesterday.   Has the patient kept scheduled appointments due by today?  Yes   What is the patient's perception of their health status since discharge?  Improving   Additional teach back comments  Pt reports she has had increased leg swelling but they have increased her lasix.   Week 2 Call Completed?  Yes          Svetlana Oseguera RN

## 2020-10-13 ENCOUNTER — READMISSION MANAGEMENT (OUTPATIENT)
Dept: CALL CENTER | Facility: HOSPITAL | Age: 75
End: 2020-10-13

## 2020-10-13 NOTE — OUTREACH NOTE
Medical Week 3 Survey      Responses   [facility] patient discharged from?  [place]   Does the patient have one of the following disease processes/diagnoses(primary or secondary)?  Other   Week 3 attempt successful?  No   Unsuccessful attempts  Attempt 1          Mary Jo Mcgee RN

## 2020-10-15 ENCOUNTER — APPOINTMENT (OUTPATIENT)
Dept: CT IMAGING | Facility: HOSPITAL | Age: 75
End: 2020-10-15

## 2020-10-19 ENCOUNTER — READMISSION MANAGEMENT (OUTPATIENT)
Dept: CALL CENTER | Facility: HOSPITAL | Age: 75
End: 2020-10-19

## 2020-10-19 NOTE — OUTREACH NOTE
Medical Week 3 Survey      Responses   [facility] patient discharged from?  [place]   Does the patient have one of the following disease processes/diagnoses(primary or secondary)?  Other   Week 3 attempt successful?  No   Unsuccessful attempts  Attempt 2          Lisa Campuzano RN

## 2020-11-20 ENCOUNTER — LAB (OUTPATIENT)
Dept: LAB | Facility: HOSPITAL | Age: 75
End: 2020-11-20

## 2020-11-20 ENCOUNTER — OFFICE VISIT (OUTPATIENT)
Dept: INTERNAL MEDICINE | Facility: CLINIC | Age: 75
End: 2020-11-20

## 2020-11-20 VITALS
BODY MASS INDEX: 31.45 KG/M2 | RESPIRATION RATE: 18 BRPM | WEIGHT: 184.2 LBS | OXYGEN SATURATION: 98 % | HEIGHT: 64 IN | HEART RATE: 105 BPM | TEMPERATURE: 97.3 F | SYSTOLIC BLOOD PRESSURE: 150 MMHG | DIASTOLIC BLOOD PRESSURE: 62 MMHG

## 2020-11-20 DIAGNOSIS — J44.9 COPD MIXED TYPE (HCC): ICD-10-CM

## 2020-11-20 DIAGNOSIS — Z85.118 H/O: LUNG CANCER: ICD-10-CM

## 2020-11-20 DIAGNOSIS — K58.0 IRRITABLE BOWEL SYNDROME WITH DIARRHEA: ICD-10-CM

## 2020-11-20 DIAGNOSIS — G62.89 OTHER POLYNEUROPATHY: ICD-10-CM

## 2020-11-20 DIAGNOSIS — J30.9 ALLERGIC RHINITIS, UNSPECIFIED SEASONALITY, UNSPECIFIED TRIGGER: ICD-10-CM

## 2020-11-20 DIAGNOSIS — J96.11 CHRONIC RESPIRATORY FAILURE WITH HYPOXIA (HCC): ICD-10-CM

## 2020-11-20 DIAGNOSIS — E87.6 HYPOKALEMIA: ICD-10-CM

## 2020-11-20 DIAGNOSIS — I35.1 NONRHEUMATIC AORTIC VALVE INSUFFICIENCY: ICD-10-CM

## 2020-11-20 DIAGNOSIS — I10 BENIGN HYPERTENSION: ICD-10-CM

## 2020-11-20 DIAGNOSIS — E03.9 ACQUIRED HYPOTHYROIDISM: ICD-10-CM

## 2020-11-20 DIAGNOSIS — R09.82 POST-NASAL DRAINAGE: ICD-10-CM

## 2020-11-20 DIAGNOSIS — E78.2 MIXED HYPERLIPIDEMIA: ICD-10-CM

## 2020-11-20 DIAGNOSIS — I50.32 CHRONIC DIASTOLIC HEART FAILURE (HCC): Primary | ICD-10-CM

## 2020-11-20 DIAGNOSIS — E11.9 CONTROLLED TYPE 2 DIABETES MELLITUS WITHOUT COMPLICATION, WITHOUT LONG-TERM CURRENT USE OF INSULIN (HCC): ICD-10-CM

## 2020-11-20 DIAGNOSIS — I50.32 CHRONIC DIASTOLIC HEART FAILURE (HCC): ICD-10-CM

## 2020-11-20 DIAGNOSIS — G47.33 OSA ON CPAP: ICD-10-CM

## 2020-11-20 DIAGNOSIS — G25.81 RESTLESS LEG SYNDROME: ICD-10-CM

## 2020-11-20 DIAGNOSIS — E55.9 VITAMIN D DEFICIENCY: ICD-10-CM

## 2020-11-20 DIAGNOSIS — R41.3 MEMORY LOSS: ICD-10-CM

## 2020-11-20 DIAGNOSIS — Z99.89 OSA ON CPAP: ICD-10-CM

## 2020-11-20 PROBLEM — Z87.891 FORMER SMOKER: Status: RESOLVED | Noted: 2019-08-29 | Resolved: 2020-11-20

## 2020-11-20 PROBLEM — R06.00 DYSPNEA: Status: RESOLVED | Noted: 2020-04-27 | Resolved: 2020-11-20

## 2020-11-20 PROBLEM — R13.19 ESOPHAGEAL DYSPHAGIA: Status: RESOLVED | Noted: 2019-10-24 | Resolved: 2020-11-20

## 2020-11-20 PROBLEM — R03.0 ELEVATED BLOOD-PRESSURE READING WITHOUT DIAGNOSIS OF HYPERTENSION: Status: RESOLVED | Noted: 2020-04-27 | Resolved: 2020-11-20

## 2020-11-20 PROBLEM — K59.1 FUNCTIONAL DIARRHEA: Status: RESOLVED | Noted: 2020-04-27 | Resolved: 2020-11-20

## 2020-11-20 PROBLEM — IMO0002: Status: RESOLVED | Noted: 2017-10-13 | Resolved: 2020-11-20

## 2020-11-20 PROBLEM — K22.89 ESOPHAGEAL DILATATION: Status: RESOLVED | Noted: 2019-09-20 | Resolved: 2020-11-20

## 2020-11-20 PROBLEM — R60.9 EDEMA: Status: RESOLVED | Noted: 2020-04-27 | Resolved: 2020-11-20

## 2020-11-20 PROBLEM — K92.1 HEMATOCHEZIA: Status: RESOLVED | Noted: 2018-12-17 | Resolved: 2020-11-20

## 2020-11-20 PROBLEM — R00.0 TACHYCARDIA: Status: RESOLVED | Noted: 2020-04-27 | Resolved: 2020-11-20

## 2020-11-20 PROBLEM — R09.89 GLOBUS SENSATION: Status: RESOLVED | Noted: 2020-01-27 | Resolved: 2020-11-20

## 2020-11-20 PROBLEM — R09.A2 GLOBUS SENSATION: Status: RESOLVED | Noted: 2020-01-27 | Resolved: 2020-11-20

## 2020-11-20 PROBLEM — R55 SYNCOPE AND COLLAPSE: Status: RESOLVED | Noted: 2020-09-25 | Resolved: 2020-11-20

## 2020-11-20 LAB
ALBUMIN SERPL-MCNC: 4.1 G/DL (ref 3.5–5.2)
ALBUMIN/GLOB SERPL: 1.2 G/DL
ALP SERPL-CCNC: 94 U/L (ref 39–117)
ALT SERPL W P-5'-P-CCNC: 19 U/L (ref 1–33)
ANION GAP SERPL CALCULATED.3IONS-SCNC: 10 MMOL/L (ref 5–15)
AST SERPL-CCNC: 20 U/L (ref 1–32)
BILIRUB SERPL-MCNC: 0.3 MG/DL (ref 0–1.2)
BUN SERPL-MCNC: 12 MG/DL (ref 8–23)
BUN/CREAT SERPL: 18.5 (ref 7–25)
CALCIUM SPEC-SCNC: 9.8 MG/DL (ref 8.6–10.5)
CHLORIDE SERPL-SCNC: 92 MMOL/L (ref 98–107)
CO2 SERPL-SCNC: 39 MMOL/L (ref 22–29)
CREAT SERPL-MCNC: 0.65 MG/DL (ref 0.57–1)
DEPRECATED RDW RBC AUTO: 41.7 FL (ref 37–54)
ERYTHROCYTE [DISTWIDTH] IN BLOOD BY AUTOMATED COUNT: 13.4 % (ref 12.3–15.4)
GFR SERPL CREATININE-BSD FRML MDRD: 88 ML/MIN/1.73
GLOBULIN UR ELPH-MCNC: 3.4 GM/DL
GLUCOSE SERPL-MCNC: 144 MG/DL (ref 65–99)
HCT VFR BLD AUTO: 37.1 % (ref 34–46.6)
HGB BLD-MCNC: 11.9 G/DL (ref 12–15.9)
MCH RBC QN AUTO: 27.5 PG (ref 26.6–33)
MCHC RBC AUTO-ENTMCNC: 32.1 G/DL (ref 31.5–35.7)
MCV RBC AUTO: 85.9 FL (ref 79–97)
PLATELET # BLD AUTO: 385 10*3/MM3 (ref 140–450)
PMV BLD AUTO: 10 FL (ref 6–12)
POTASSIUM SERPL-SCNC: 3.3 MMOL/L (ref 3.5–5.2)
PROT SERPL-MCNC: 7.5 G/DL (ref 6–8.5)
RBC # BLD AUTO: 4.32 10*6/MM3 (ref 3.77–5.28)
SODIUM SERPL-SCNC: 141 MMOL/L (ref 136–145)
WBC # BLD AUTO: 10.52 10*3/MM3 (ref 3.4–10.8)

## 2020-11-20 PROCEDURE — 99204 OFFICE O/P NEW MOD 45 MIN: CPT | Performed by: INTERNAL MEDICINE

## 2020-11-20 PROCEDURE — 85027 COMPLETE CBC AUTOMATED: CPT

## 2020-11-20 PROCEDURE — 80053 COMPREHEN METABOLIC PANEL: CPT

## 2020-11-20 RX ORDER — IMIPRAMINE HYDROCHLORIDE 25 MG/1
TABLET ORAL AS NEEDED
COMMUNITY
Start: 2020-10-16

## 2020-11-20 RX ORDER — FUROSEMIDE 40 MG/1
40 TABLET ORAL DAILY
COMMUNITY
End: 2022-06-08 | Stop reason: SDUPTHER

## 2020-11-20 RX ORDER — FLUTICASONE PROPIONATE 50 MCG
2 SPRAY, SUSPENSION (ML) NASAL DAILY
Qty: 18.2 ML | Refills: 11 | Status: SHIPPED | OUTPATIENT
Start: 2020-11-20 | End: 2022-06-01

## 2020-11-20 RX ORDER — EZETIMIBE 10 MG/1
10 TABLET ORAL DAILY
Status: ON HOLD | COMMUNITY
Start: 2020-11-19 | End: 2022-04-11

## 2020-11-20 RX ORDER — CETIRIZINE HYDROCHLORIDE 5 MG/1
5 TABLET ORAL DAILY
Qty: 90 TABLET | Refills: 3 | Status: SHIPPED | OUTPATIENT
Start: 2020-11-20 | End: 2021-04-23

## 2020-11-20 RX ORDER — BLOOD SUGAR DIAGNOSTIC
STRIP MISCELLANEOUS
Status: ON HOLD | COMMUNITY
Start: 2020-11-08 | End: 2022-04-13

## 2020-11-20 RX ORDER — POTASSIUM CHLORIDE 750 MG/1
3 CAPSULE, EXTENDED RELEASE ORAL 2 TIMES DAILY
COMMUNITY
End: 2022-08-31 | Stop reason: SDUPTHER

## 2020-11-20 NOTE — PROGRESS NOTES
Internal Medicine New Patient  Lorrie Pagan is a 75 y.o. female who presents today to establish care and with concerns as outlined below.    Chief Complaint  Chief Complaint   Patient presents with   • Establish Care     New PT        HPI  Ms. Pagan comes in today to establish care. She used to be a patient of Evangelina Luna at . She sees several specialists with [facility] and wants to have a PCP with [facility]. She sees Dr. Chaidez, Dr. Benson, Dr. Arboleda, Dr. Saldana, Dr. Millan, Dr. Monroy, and [facility].    She has a history of DEANN lung cancer diagnosed in 11/2016 and treated with resection as well as COPD on 5L oxygen. She notes that she was able to use 3L when she was resting in the hospital at the end of September. She is currently on fluticasone and spiriva as well as albuterol PRN. She is currently having coughing when she leans her head back which results in gagging. She has been sleeping sitting up to avoid this.    She has peripheral neuropathy. She has diabetes but reports that her neuropathy was diagnosed before diabetes was diagnosed. She follows with Dr. Monroy for pain management and will have spinal cord stimulator implanted in December. She reports that she had a good response to the week trial of this. She also takes cymbalta 60mg daily which was originally started for depression around the time of lung cancer diagnosis but has now been continued for her neuropathy.    Dr. Saldana has her on aricept for memory loss. Her father had dementia.    She has restless leg syndrome on requip.    She has chronic diastolic heart failure. She follows with Dr. Chaidez but notes that her PCP has been changing her dosage of furosemide, metolazone, potassium frequently over the last few weeks. She is now using furosemide 40mg daily, metolazone 2.5mg daily, and potassium 30meq BID. She is also on metoprolol succinate 25mg daily. She has home health monitoring her weight weekly.    She  has T2DM on amaryl 2mg BID. She reports poor control due to diet. Her last A1c was 2 months ago and 6.9.    She has HLD on zetia. She has myalgia to statin.    She is on weekly vitamin D x 6 weeks.    She has IBS-D on lomotil PRN.        Review of Systems  Review of Systems   Constitutional: Positive for fatigue. Negative for activity change, appetite change, chills, fever, unexpected weight gain and unexpected weight loss.   HENT: Positive for dental problem, drooling, mouth sores, postnasal drip and rhinorrhea.    Eyes: Positive for visual disturbance (vision loss due to macular degeneration and gluacoma).   Respiratory: Positive for cough (when leaning her head back) and shortness of breath (stable).    Cardiovascular: Positive for leg swelling. Negative for chest pain and palpitations.   Gastrointestinal: Positive for diarrhea. Negative for abdominal pain, anal bleeding, blood in stool, constipation, nausea and vomiting.   Genitourinary: Negative.    Musculoskeletal: Positive for arthralgias, gait problem and myalgias.   Skin: Negative.    Neurological: Positive for weakness and numbness (neuropathy in feet).   Hematological: Negative.    Psychiatric/Behavioral: Positive for dysphoric mood.        Past Medical History  Past Medical History:   Diagnosis Date   • Bronchiectasis (CMS/HCC)    • Bronchitis 01/2018   • Cancer (CMS/HCC)     History of lung cancer and skin cancer    • Cardiac murmur    • Chronic cough    • Chronic obstructive pulmonary disease (CMS/HCC)    • Colon polyp    • Depression    • Diabetes mellitus (CMS/HCC)    • Disease of thyroid gland    • GERD (gastroesophageal reflux disease)    • Glaucoma    • History of colonic polyps    • History of transfusion 1988   • Irritable bowel syndrome     Constipation/diarrhea   • Legally blind    • Lung cancer (CMS/HCC)    • Macular degeneration    • Neuropathy    • Osteoarthritis    • Oxygen dependent     5L   • Pneumonia    • Sleep apnea    • UTI (urinary  tract infection)    • Wears glasses         Surgical History  Past Surgical History:   Procedure Laterality Date   • BACK SURGERY     • CATARACT EXTRACTION     • CHOLECYSTECTOMY     • COLONOSCOPY     • ENDOSCOPY     • ENDOSCOPY N/A 2019    Procedure: ESOPHAGOGASTRODUODENOSCOPY;  Surgeon: Cortes Millan MD;  [facility]  Service: Gastroenterology   • HAND SURGERY     • HYSTERECTOMY     • INCONTINENCE SURGERY     • LUNG SURGERY     • NISSEN FUNDOPLICATION          Family History  Family History   Problem Relation Age of Onset   • Colon polyps Mother    • Emphysema Mother    • Colon polyps Father    • Colon cancer Neg Hx         Social History  Social History     Socioeconomic History   • Marital status: Single     Spouse name: Not on file   • Number of children: Not on file   • Years of education: Not on file   • Highest education level: Not on file   Tobacco Use   • Smoking status: Former Smoker     Packs/day: 1.50     Years: 25.00     Pack years: 37.50     Types: Cigarettes     Quit date: 1992     Years since quittin.9   • Smokeless tobacco: Never Used   Substance and Sexual Activity   • Alcohol use: Yes     Alcohol/week: 2.0 standard drinks     Types: 2 Glasses of wine per week   • Drug use: No   • Sexual activity: Never   Social History Narrative    Previously lived in [facility], recently moved here        Retired    Previously smoked approximately 1 pack cigarettes per day for 25 years and stop smoking in     Drinks 3-4 alcoholic beverages on a weekly basis    caffeine use: 1 serving of coffee or tea weekly        Current Medications  Current Outpatient Medications on File Prior to Visit   Medication Sig Dispense Refill   • Accu-Chek Guide test strip TEST 2 TIMES PER DAY     • albuterol sulfate  (90 Base) MCG/ACT inhaler Inhale 2 puffs Every 6 (Six) Hours As Needed for Wheezing. 25.5 g 2   • chlorhexidine (PERIDEX) 0.12 % solution Apply 15 mL to the  mouth or throat 2 (Two) Times a Day. For 14 days, w/1 refill     • diphenoxylate-atropine (Lomotil) 2.5-0.025 MG per tablet Take 1 tablet by mouth 4 (Four) Times a Day As Needed for Diarrhea. 30 tablet 1   • donepezil (ARICEPT) 5 MG tablet Take 5 mg by mouth Every Night.     • DULoxetine (CYMBALTA) 60 MG capsule Take 60 mg by mouth Daily.     • esomeprazole (nexIUM) 40 MG capsule Take 1 capsule by mouth 2 (Two) Times a Day. 180 capsule 3   • ezetimibe (ZETIA) 10 MG tablet Take 10 mg by mouth Daily.     • Fluticasone Propionate, Inhal, 113 MCG/ACT aerosol powder  Take 113 mcg by mouth As Needed.     • furosemide (LASIX) 40 MG tablet Take 1 tablet by mouth Daily. 60 tablet 11   • glimepiride (AMARYL) 2 MG tablet Take 2 mg by mouth 2 (Two) Times a Day.     • latanoprost (XALATAN) 0.005 % ophthalmic solution USE 1 DROP IN BOTH EYES NIGHTLY     • levothyroxine (SYNTHROID, LEVOTHROID) 50 MCG tablet Take 50 mcg by mouth Daily.     • metOLazone (ZAROXOLYN) 2.5 MG tablet Take 1 tablet by mouth Daily As Needed (for swelling or weight gain of 4 lbs). As directed     • metoprolol succinate XL (TOPROL-XL) 25 MG 24 hr tablet Take 25 mg by mouth Daily.     • potassium chloride (K-DUR) 10 MEQ CR tablet Take 2 tablets by mouth Daily. (Patient taking differently: Take 60 mEq by mouth 2 (Two) Times a Day. Daughter states takes in the MORNING) 60 tablet 11   • rOPINIRole (Requip) 4 MG tablet Take 4 mg by mouth 2 (Two) Times a Day. + 2 extra at bedtime if needed     • Spacer/Aero-Holding Chambers (AeroChamber Plus Morris-Vu) misc As Needed.     • timolol (TIMOPTIC) 0.25 % ophthalmic solution Administer 1 drop to the right eye Every Morning.     • Tiotropium Bromide Monohydrate (SPIRIVA RESPIMAT) 1.25 MCG/ACT aerosol solution inhaler Inhale 2 puffs Daily.     • vitamin D (ERGOCALCIFEROL) 1.25 MG (81609 UT) capsule capsule Take 50,000 Units by mouth 1 (One) Time Per Week.       No current facility-administered medications on file prior to  "visit.        Allergies  Allergies   Allergen Reactions   • Penicillins Other (See Comments)     rash   • Statins Other (See Comments)     myalgia   • Tramadol Nausea And Vomiting and GI Intolerance        Objective  Visit Vitals  /62   Pulse 105   Temp 97.3 °F (36.3 °C)   Resp 18   Ht 162.6 cm (64.02\")   Wt 83.6 kg (184 lb 3.2 oz)   LMP  (LMP Unknown)   SpO2 98%   BMI 31.60 kg/m²        Physical Exam  Physical Exam  Vitals signs and nursing note reviewed.   Constitutional:       General: She is not in acute distress.     Appearance: She is well-developed. She is obese. She is ill-appearing (chronically ill appearing elderly female). She is not diaphoretic.   HENT:      Head: Normocephalic and atraumatic.      Right Ear: External ear normal.      Left Ear: External ear normal.      Nose: Nose normal.      Mouth/Throat:      Comments: Wearing mask  Eyes:      General: No scleral icterus.     Conjunctiva/sclera: Conjunctivae normal.      Pupils: Pupils are equal, round, and reactive to light.   Neck:      Musculoskeletal: Neck supple.   Cardiovascular:      Rate and Rhythm: Normal rate and regular rhythm.      Heart sounds: Normal heart sounds. No murmur.   Pulmonary:      Effort: Pulmonary effort is normal.      Breath sounds: Normal breath sounds. Decreased air movement present.      Comments: On supplemental oxygen via nasal cannula.  Abdominal:      General: There is no distension.      Palpations: Abdomen is soft.      Tenderness: There is no abdominal tenderness.   Musculoskeletal:         General: No deformity.      Right lower leg: Edema (1+) present.      Left lower leg: Edema (1+) present.   Lymphadenopathy:      Cervical: No cervical adenopathy.   Skin:     General: Skin is warm and dry.      Findings: Erythema (bilateral LE) present. No rash.   Neurological:      General: No focal deficit present.      Mental Status: She is alert and oriented to person, place, and time.      Motor: Weakness " (generalized) present.      Gait: Gait abnormal (walks with rolling walker).   Psychiatric:         Behavior: Behavior normal.          Results  Results for orders placed or performed during the hospital encounter of 09/25/20   COVID-19,[facility], NP SWAB IN [facility] VIRAL TRANSPORT MEDIA 24-30 HR TAT - Swab, Nasopharynx    Specimen: Nasopharynx; Swab   Result Value Ref Range    SARS-CoV-2 KATELYNN Not Detected Not Detected   Troponin    Specimen: Blood   Result Value Ref Range    Troponin T 0.010 0.000 - 0.030 ng/mL   aPTT    Specimen: Blood   Result Value Ref Range    PTT 39.4 (H) 24.0 - 37.0 seconds   AST    Specimen: Blood   Result Value Ref Range    AST (SGOT) 18 1 - 32 U/L   ALT    Specimen: Blood   Result Value Ref Range    ALT (SGPT) 10 1 - 33 U/L   CBC Auto Differential    Specimen: Blood   Result Value Ref Range    WBC 11.07 (H) 3.40 - 10.80 10*3/mm3    RBC 4.10 3.77 - 5.28 10*6/mm3    Hemoglobin 11.6 (L) 12.0 - 15.9 g/dL    Hematocrit 36.9 34.0 - 46.6 %    MCV 90.0 79.0 - 97.0 fL    MCH 28.3 26.6 - 33.0 pg    MCHC 31.4 (L) 31.5 - 35.7 g/dL    RDW 14.9 12.3 - 15.4 %    RDW-SD 49.3 37.0 - 54.0 fl    MPV 9.2 6.0 - 12.0 fL    Platelets 391 140 - 450 10*3/mm3    Neutrophil % 71.4 42.7 - 76.0 %    Lymphocyte % 17.8 (L) 19.6 - 45.3 %    Monocyte % 7.6 5.0 - 12.0 %    Eosinophil % 1.6 0.3 - 6.2 %    Basophil % 0.5 0.0 - 1.5 %    Immature Grans % 1.1 (H) 0.0 - 0.5 %    Neutrophils, Absolute 7.91 (H) 1.70 - 7.00 10*3/mm3    Lymphocytes, Absolute 1.97 0.70 - 3.10 10*3/mm3    Monocytes, Absolute 0.84 0.10 - 0.90 10*3/mm3    Eosinophils, Absolute 0.18 0.00 - 0.40 10*3/mm3    Basophils, Absolute 0.05 0.00 - 0.20 10*3/mm3    Immature Grans, Absolute 0.12 (H) 0.00 - 0.05 10*3/mm3    nRBC 0.0 0.0 - 0.2 /100 WBC   Protime-INR    Specimen: Blood   Result Value Ref Range    Protime 13.1 11.5 - 14.0 Seconds    INR 1.02 0.85 - 1.16   Hemoglobin A1c    Specimen: Blood   Result Value Ref Range    Hemoglobin A1C 6.90 (H) 4.80 - 5.60 %    Comprehensive Metabolic Panel    Specimen: Blood   Result Value Ref Range    Glucose 108 (H) 65 - 99 mg/dL    BUN 14 8 - 23 mg/dL    Creatinine 0.64 0.57 - 1.00 mg/dL    Sodium 139 136 - 145 mmol/L    Potassium 2.7 (L) 3.5 - 5.2 mmol/L    Chloride 93 (L) 98 - 107 mmol/L    CO2 38.0 (H) 22.0 - 29.0 mmol/L    Calcium 9.3 8.6 - 10.5 mg/dL    Total Protein 6.4 6.0 - 8.5 g/dL    Albumin 3.40 (L) 3.50 - 5.20 g/dL    ALT (SGPT) 10 1 - 33 U/L    AST (SGOT) 17 1 - 32 U/L    Alkaline Phosphatase 81 39 - 117 U/L    Total Bilirubin 0.3 0.0 - 1.2 mg/dL    eGFR Non African Amer 90 >60 mL/min/1.73    Globulin 3.0 gm/dL    A/G Ratio 1.1 g/dL    BUN/Creatinine Ratio 21.9 7.0 - 25.0    Anion Gap 8.0 5.0 - 15.0 mmol/L   CBC Auto Differential    Specimen: Blood   Result Value Ref Range    WBC 8.69 3.40 - 10.80 10*3/mm3    RBC 3.99 3.77 - 5.28 10*6/mm3    Hemoglobin 11.2 (L) 12.0 - 15.9 g/dL    Hematocrit 36.7 34.0 - 46.6 %    MCV 92.0 79.0 - 97.0 fL    MCH 28.1 26.6 - 33.0 pg    MCHC 30.5 (L) 31.5 - 35.7 g/dL    RDW 15.0 12.3 - 15.4 %    RDW-SD 50.2 37.0 - 54.0 fl    MPV 9.1 6.0 - 12.0 fL    Platelets 363 140 - 450 10*3/mm3    Neutrophil % 69.6 42.7 - 76.0 %    Lymphocyte % 19.3 (L) 19.6 - 45.3 %    Monocyte % 7.4 5.0 - 12.0 %    Eosinophil % 2.1 0.3 - 6.2 %    Basophil % 0.6 0.0 - 1.5 %    Immature Grans % 1.0 (H) 0.0 - 0.5 %    Neutrophils, Absolute 6.05 1.70 - 7.00 10*3/mm3    Lymphocytes, Absolute 1.68 0.70 - 3.10 10*3/mm3    Monocytes, Absolute 0.64 0.10 - 0.90 10*3/mm3    Eosinophils, Absolute 0.18 0.00 - 0.40 10*3/mm3    Basophils, Absolute 0.05 0.00 - 0.20 10*3/mm3    Immature Grans, Absolute 0.09 (H) 0.00 - 0.05 10*3/mm3    nRBC 0.0 0.0 - 0.2 /100 WBC   Lipid Panel    Specimen: Blood   Result Value Ref Range    Total Cholesterol 166 0 - 200 mg/dL    Triglycerides 206 (H) 0 - 150 mg/dL    HDL Cholesterol 32 (L) 40 - 60 mg/dL    LDL Cholesterol  93 0 - 100 mg/dL    VLDL Cholesterol 41.2 mg/dL    LDL/HDL Ratio 2.90    Magnesium     Specimen: Blood   Result Value Ref Range    Magnesium 2.1 1.6 - 2.4 mg/dL   Basic Metabolic Panel    Specimen: Blood   Result Value Ref Range    Glucose 162 (H) 65 - 99 mg/dL    BUN 12 8 - 23 mg/dL    Creatinine 0.64 0.57 - 1.00 mg/dL    Sodium 137 136 - 145 mmol/L    Potassium 3.6 3.5 - 5.2 mmol/L    Chloride 94 (L) 98 - 107 mmol/L    CO2 35.0 (H) 22.0 - 29.0 mmol/L    Calcium 9.8 8.6 - 10.5 mg/dL    eGFR Non African Amer 90 >60 mL/min/1.73    BUN/Creatinine Ratio 18.8 7.0 - 25.0    Anion Gap 8.0 5.0 - 15.0 mmol/L   Magnesium    Specimen: Blood   Result Value Ref Range    Magnesium 2.0 1.6 - 2.4 mg/dL   POC Surgery Labs    Specimen: Blood   Result Value Ref Range    Ionized Calcium 1.20 1.20 - 1.32 mmol/L    POC Potassium 2.5 (L) 3.5 - 4.9 mmol/L    Sodium 139 138 - 146 mmol/L    Total CO2 46 (H) 24 - 29 mmol/L    Hemoglobin 12.2 12.0 - 17.0 g/dL    Hematocrit 36 (L) 38 - 51 %    pCO2, Arterial 66.3 (H) 35 - 45 mm Hg    pO2, Arterial 27 (L) 80 - 105 mmHg    Base Excess 19.0000 (H) -5 - 5 mmol/L    O2 Saturation, Arterial 49 (L) 95 - 98 %    pH, Arterial 7.43 7.35 - 7.6 pH units    HCO3, Arterial 43.8 (H) 22 - 26 mmol/L    Glucose 115 70 - 130 mg/dL   POC Creatinine    Specimen: Blood   Result Value Ref Range    Creatinine 0.90 0.60 - 1.30 mg/dL   POC Glucose Once    Specimen: Blood   Result Value Ref Range    Glucose 83 70 - 130 mg/dL   POC Glucose Once    Specimen: Blood   Result Value Ref Range    Glucose 149 (H) 70 - 130 mg/dL   POC Glucose Once    Specimen: Blood   Result Value Ref Range    Glucose 169 (H) 70 - 130 mg/dL   POC Glucose Once    Specimen: Blood   Result Value Ref Range    Glucose 182 (H) 70 - 130 mg/dL   POC Glucose Once    Specimen: Blood   Result Value Ref Range    Glucose 134 (H) 70 - 130 mg/dL   POC Glucose Once    Specimen: Blood   Result Value Ref Range    Glucose 174 (H) 70 - 130 mg/dL   Adult Transthoracic Echo Complete W/ Cont if Necessary Per Protocol   Result Value Ref Range    BSA  1.9 m^2     CV ECHO EVANGELISTA - BZI_BMI 30.9 kilograms/m^2     CV ECHO EVANGELISTA - BSA(HAYCOCK) 1.9 m^2     CV ECHO EVANGELISTA - BZI_METRIC_WEIGHT 81.6 kg     CV ECHO EVANGELISTA - BZI_METRIC_HEIGHT 162.6 cm    Target HR (85%) 122 bpm    Max. Pred. HR (100%) 143 bpm     CV VAS BP RIGHT /71 mmHg    RV S' 12.50 cm/sec    RV Base 3.10 cm    RV Length 7.30 cm    RV Mid 2.30 cm    LA Volume Index 20.3 mL/m2    Ao root diam 2.7 cm    Ao pk waldemar 157.0 cm/sec    GEORGE(I,D) 2.5 cm2    Lat Peak E' Waldemar 7.9 cm/sec    LV V1 max PG 7.0 mmHg    LV V1 mean PG 4.0 mmHg    LV V1 max 133.0 cm/sec    LVPWd 0.9 cm    Med Peak E' Waldemar 6.50 cm/sec    MV P1/2t 72 msec    RAP systole 8 mmHg    RVSP(TR) 33 mmHg    TR max PG 25 mmHg    AI P1/2t 351 msec    Ao max PG 10 mmHg    Ao mean PG 6 mmHg    IVSd 1.0 cm    LA dimension 4.1 cm    LVIDd 4.2 cm    LVIDs 2.9 cm    LVOT diam 2.0 cm    TR max waldemar 249 cm/sec    TAPSE (>1.6) 2.50 cm    Echo EF Estimated 60 %   Light Blue Top   Result Value Ref Range    Extra Tube hold for add-on    Green Top (Gel)   Result Value Ref Range    Extra Tube Hold for add-ons.    Lavender Top   Result Value Ref Range    Extra Tube hold for add-on    Gold Top - SST   Result Value Ref Range    Extra Tube Hold for add-ons.         Assessment and Plan  Diagnoses and all orders for this visit:    Chronic diastolic heart failure (CMS/HCC)  - Follows with Dr. Chaidez. Echo 9/2020 with EF 56-60%, borderline dilated LA cavity, mild TR and AR, normal RVSP  - on metoprolol succinate 25mg daily, furosemide 40mg daily and metolazone 2.5mg daily. Diuretic regimen with several frequent changes due to increasing edema and hypokalemia per patient.  - Weight fairly stable, dry weight felt to be 180lbs. Stable respiratory status.   - Has home health for weekly weight monitoring  - Last CMP 1 month ago normal, will repeat today.    Hypokalemia  - Due to diuretic use. On potassium 30meq BID. CMP today.    H/O: lung cancer (Prior resection 2016)  -  Found incidentally in 2016 and resected. Will have CT scan for imaging surveillance arranged by pulmonary.    Chronic respiratory failure due to Stage II, moderate, COPD  - Follows with Dr. Arboleda. On fluticasone and albuterol PRN  - Uses 5L continuous O2    RENETTA (Previous CPAP)  - Intolerant of CPAP, sleeps with supplemental oxygen    Acquired hypothyroidism  - On levothyroxine 50mcg daily  - Uncertain of last TSH, will check [facility] portal    Irritable bowel syndrome with diarrhea  - Follows with Dr. Millan. Had been on colestid per last consult note however notably now using lomotil for control of diarrhea.    Nonrheumatic aortic valve insufficiency  - Mild AR on recent echo 9/2020. Follows with Dr. Chaidez.    Benign hypertension  - BP above goal today on lasix, metolazone, and metoprolol however appears to have fluctuated within an overall acceptable range. Continue current therapy.    Other polyneuropathy  - Follows with Dr. Saldana. Reports chronic neuropathy preceding diagnosis of diabetes. Likely multifactorial with component of radiculopathy from chronic DDD and diabetic neuropathy.  - On cymbalta 60mg daily and follows with pain management, Dr. Monroy, who plans to implant spinal cord stimulator in the next month per patient.    Memory loss  - On donepezil prescribed by Dr. Saldana.  - Possible component of polypharmacy    Restless leg syndrome  - On requip    Controlled type 2 diabetes mellitus without complication, without long-term current use of insulin (CMS/[facility])  - A1c 9/2020 6.9, on glimepiride 2mg BID. Had been on metformin but stopped due to diarrhea.  - Uncertain of last urine microalbumin result, will check [facility] portal    Vitamin D deficiency  - On ergocalciferol 10174 U weekly x ~6 weeks. Will need to repeat vitamin D level at future appointment.    Mixed hyperlipidemia  - Statin intolerant due to myalgias. On zetia.  - Need to obtain most recent lipid panel results from [facility] portal.    Allergic  rhinitis, unspecified seasonality, unspecified trigger and Post-nasal drainage  - Complains of rhinorrhea and what clinically seems to be post nasal drainage with cough when head is tilted back.   - Advised to start zyrtec and flonase    Health Maintenance   Topic Date Due   • URINE MICROALBUMIN  1945   • TDAP/TD VACCINES (1 - Tdap) 09/19/1962   • ZOSTER VACCINE (1 of 2) 09/19/1993   • HEPATITIS C SCREENING  12/13/2018   • ANNUAL WELLNESS VISIT  12/13/2018   • DIABETIC EYE EXAM  12/13/2018   • Pneumococcal Vaccine 65+ (2 of 2 - PPSV23) 10/28/2019   • INFLUENZA VACCINE  08/01/2020   • HEMOGLOBIN A1C  03/25/2021     Health Maintenance  - Pap smear: no longer indicated  - Mammogram: Discuss next visit, would not recommend ongoing screening.  - Colonoscopy: 2018  - HCV: Need records of past testing  - Immunizations: Flu UTD, oneumovax complete  - Depression screening: screen next visit    Return in about 3 months (around 2/20/2021) for Follow up 30 minutes, Labs today.

## 2020-11-20 NOTE — PROGRESS NOTES
Internal Medicine Follow Up    Chief Complaint  Lorrie Pagan is a 75 y.o. female who presents today for follow up of chronic medical conditions outlined below.    No chief complaint on file.       HPI  HPI     Review of Systems  Review of Systems     Current Medications  Current Outpatient Medications on File Prior to Visit   Medication Sig Dispense Refill   • albuterol sulfate  (90 Base) MCG/ACT inhaler Inhale 2 puffs Every 6 (Six) Hours As Needed for Wheezing. 25.5 g 2   • chlorhexidine (PERIDEX) 0.12 % solution Apply 15 mL to the mouth or throat 2 (Two) Times a Day. For 14 days, w/1 refill     • diphenoxylate-atropine (Lomotil) 2.5-0.025 MG per tablet Take 1 tablet by mouth 4 (Four) Times a Day As Needed for Diarrhea. 30 tablet 1   • donepezil (ARICEPT) 5 MG tablet Take 5 mg by mouth Every Night.     • DULoxetine (CYMBALTA) 60 MG capsule Take 60 mg by mouth Daily.     • esomeprazole (nexIUM) 40 MG capsule Take 1 capsule by mouth 2 (Two) Times a Day. 180 capsule 3   • furosemide (LASIX) 40 MG tablet Take 1 tablet by mouth Daily. 60 tablet 11   • glimepiride (AMARYL) 2 MG tablet Take 2 mg by mouth 2 (Two) Times a Day.     • latanoprost (XALATAN) 0.005 % ophthalmic solution USE 1 DROP IN BOTH EYES NIGHTLY     • levothyroxine (SYNTHROID, LEVOTHROID) 50 MCG tablet Take 50 mcg by mouth Daily.     • metOLazone (ZAROXOLYN) 2.5 MG tablet Take 1 tablet by mouth Daily As Needed (for swelling or weight gain of 4 lbs). As directed     • metoprolol succinate XL (TOPROL-XL) 25 MG 24 hr tablet Take 25 mg by mouth Daily.     • potassium chloride (K-DUR) 10 MEQ CR tablet Take 2 tablets by mouth Daily. (Patient taking differently: Take 20 mEq by mouth 2 (Two) Times a Day. Daughter states takes in the MORNING) 60 tablet 11   • rOPINIRole (Requip) 4 MG tablet Take 4 mg by mouth 2 (Two) Times a Day. + 2 extra at bedtime if needed     • timolol (TIMOPTIC) 0.25 % ophthalmic solution Administer 1 drop to the right eye  Every Morning.     • Tiotropium Bromide Monohydrate (SPIRIVA RESPIMAT) 1.25 MCG/ACT aerosol solution inhaler Inhale 2 puffs Daily.     • vitamin D (ERGOCALCIFEROL) 1.25 MG (83224 UT) capsule capsule Take 50,000 Units by mouth 1 (One) Time Per Week.       No current facility-administered medications on file prior to visit.        Allergies  Allergies   Allergen Reactions   • Penicillins Other (See Comments)     rash   • Statins Other (See Comments)     myalgia   • Tramadol Nausea And Vomiting and GI Intolerance       Objective  Visit Vitals  LMP  (LMP Unknown)        Physical Exam  Physical Exam    Results  Results for orders placed or performed during the hospital encounter of 09/25/20   COVID-19,[facility], NP SWAB IN [facility] VIRAL TRANSPORT MEDIA 24-30 HR TAT - Swab, Nasopharynx    Specimen: Nasopharynx; Swab   Result Value Ref Range    SARS-CoV-2 KATELYNN Not Detected Not Detected   Troponin    Specimen: Blood   Result Value Ref Range    Troponin T 0.010 0.000 - 0.030 ng/mL   aPTT    Specimen: Blood   Result Value Ref Range    PTT 39.4 (H) 24.0 - 37.0 seconds   AST    Specimen: Blood   Result Value Ref Range    AST (SGOT) 18 1 - 32 U/L   ALT    Specimen: Blood   Result Value Ref Range    ALT (SGPT) 10 1 - 33 U/L   CBC Auto Differential    Specimen: Blood   Result Value Ref Range    WBC 11.07 (H) 3.40 - 10.80 10*3/mm3    RBC 4.10 3.77 - 5.28 10*6/mm3    Hemoglobin 11.6 (L) 12.0 - 15.9 g/dL    Hematocrit 36.9 34.0 - 46.6 %    MCV 90.0 79.0 - 97.0 fL    MCH 28.3 26.6 - 33.0 pg    MCHC 31.4 (L) 31.5 - 35.7 g/dL    RDW 14.9 12.3 - 15.4 %    RDW-SD 49.3 37.0 - 54.0 fl    MPV 9.2 6.0 - 12.0 fL    Platelets 391 140 - 450 10*3/mm3    Neutrophil % 71.4 42.7 - 76.0 %    Lymphocyte % 17.8 (L) 19.6 - 45.3 %    Monocyte % 7.6 5.0 - 12.0 %    Eosinophil % 1.6 0.3 - 6.2 %    Basophil % 0.5 0.0 - 1.5 %    Immature Grans % 1.1 (H) 0.0 - 0.5 %    Neutrophils, Absolute 7.91 (H) 1.70 - 7.00 10*3/mm3    Lymphocytes, Absolute 1.97 0.70 - 3.10  10*3/mm3    Monocytes, Absolute 0.84 0.10 - 0.90 10*3/mm3    Eosinophils, Absolute 0.18 0.00 - 0.40 10*3/mm3    Basophils, Absolute 0.05 0.00 - 0.20 10*3/mm3    Immature Grans, Absolute 0.12 (H) 0.00 - 0.05 10*3/mm3    nRBC 0.0 0.0 - 0.2 /100 WBC   Protime-INR    Specimen: Blood   Result Value Ref Range    Protime 13.1 11.5 - 14.0 Seconds    INR 1.02 0.85 - 1.16   Hemoglobin A1c    Specimen: Blood   Result Value Ref Range    Hemoglobin A1C 6.90 (H) 4.80 - 5.60 %   Comprehensive Metabolic Panel    Specimen: Blood   Result Value Ref Range    Glucose 108 (H) 65 - 99 mg/dL    BUN 14 8 - 23 mg/dL    Creatinine 0.64 0.57 - 1.00 mg/dL    Sodium 139 136 - 145 mmol/L    Potassium 2.7 (L) 3.5 - 5.2 mmol/L    Chloride 93 (L) 98 - 107 mmol/L    CO2 38.0 (H) 22.0 - 29.0 mmol/L    Calcium 9.3 8.6 - 10.5 mg/dL    Total Protein 6.4 6.0 - 8.5 g/dL    Albumin 3.40 (L) 3.50 - 5.20 g/dL    ALT (SGPT) 10 1 - 33 U/L    AST (SGOT) 17 1 - 32 U/L    Alkaline Phosphatase 81 39 - 117 U/L    Total Bilirubin 0.3 0.0 - 1.2 mg/dL    eGFR Non African Amer 90 >60 mL/min/1.73    Globulin 3.0 gm/dL    A/G Ratio 1.1 g/dL    BUN/Creatinine Ratio 21.9 7.0 - 25.0    Anion Gap 8.0 5.0 - 15.0 mmol/L   CBC Auto Differential    Specimen: Blood   Result Value Ref Range    WBC 8.69 3.40 - 10.80 10*3/mm3    RBC 3.99 3.77 - 5.28 10*6/mm3    Hemoglobin 11.2 (L) 12.0 - 15.9 g/dL    Hematocrit 36.7 34.0 - 46.6 %    MCV 92.0 79.0 - 97.0 fL    MCH 28.1 26.6 - 33.0 pg    MCHC 30.5 (L) 31.5 - 35.7 g/dL    RDW 15.0 12.3 - 15.4 %    RDW-SD 50.2 37.0 - 54.0 fl    MPV 9.1 6.0 - 12.0 fL    Platelets 363 140 - 450 10*3/mm3    Neutrophil % 69.6 42.7 - 76.0 %    Lymphocyte % 19.3 (L) 19.6 - 45.3 %    Monocyte % 7.4 5.0 - 12.0 %    Eosinophil % 2.1 0.3 - 6.2 %    Basophil % 0.6 0.0 - 1.5 %    Immature Grans % 1.0 (H) 0.0 - 0.5 %    Neutrophils, Absolute 6.05 1.70 - 7.00 10*3/mm3    Lymphocytes, Absolute 1.68 0.70 - 3.10 10*3/mm3    Monocytes, Absolute 0.64 0.10 - 0.90 10*3/mm3     Eosinophils, Absolute 0.18 0.00 - 0.40 10*3/mm3    Basophils, Absolute 0.05 0.00 - 0.20 10*3/mm3    Immature Grans, Absolute 0.09 (H) 0.00 - 0.05 10*3/mm3    nRBC 0.0 0.0 - 0.2 /100 WBC   Lipid Panel    Specimen: Blood   Result Value Ref Range    Total Cholesterol 166 0 - 200 mg/dL    Triglycerides 206 (H) 0 - 150 mg/dL    HDL Cholesterol 32 (L) 40 - 60 mg/dL    LDL Cholesterol  93 0 - 100 mg/dL    VLDL Cholesterol 41.2 mg/dL    LDL/HDL Ratio 2.90    Magnesium    Specimen: Blood   Result Value Ref Range    Magnesium 2.1 1.6 - 2.4 mg/dL   Basic Metabolic Panel    Specimen: Blood   Result Value Ref Range    Glucose 162 (H) 65 - 99 mg/dL    BUN 12 8 - 23 mg/dL    Creatinine 0.64 0.57 - 1.00 mg/dL    Sodium 137 136 - 145 mmol/L    Potassium 3.6 3.5 - 5.2 mmol/L    Chloride 94 (L) 98 - 107 mmol/L    CO2 35.0 (H) 22.0 - 29.0 mmol/L    Calcium 9.8 8.6 - 10.5 mg/dL    eGFR Non African Amer 90 >60 mL/min/1.73    BUN/Creatinine Ratio 18.8 7.0 - 25.0    Anion Gap 8.0 5.0 - 15.0 mmol/L   Magnesium    Specimen: Blood   Result Value Ref Range    Magnesium 2.0 1.6 - 2.4 mg/dL   POC Surgery Labs    Specimen: Blood   Result Value Ref Range    Ionized Calcium 1.20 1.20 - 1.32 mmol/L    POC Potassium 2.5 (L) 3.5 - 4.9 mmol/L    Sodium 139 138 - 146 mmol/L    Total CO2 46 (H) 24 - 29 mmol/L    Hemoglobin 12.2 12.0 - 17.0 g/dL    Hematocrit 36 (L) 38 - 51 %    pCO2, Arterial 66.3 (H) 35 - 45 mm Hg    pO2, Arterial 27 (L) 80 - 105 mmHg    Base Excess 19.0000 (H) -5 - 5 mmol/L    O2 Saturation, Arterial 49 (L) 95 - 98 %    pH, Arterial 7.43 7.35 - 7.6 pH units    HCO3, Arterial 43.8 (H) 22 - 26 mmol/L    Glucose 115 70 - 130 mg/dL   POC Creatinine    Specimen: Blood   Result Value Ref Range    Creatinine 0.90 0.60 - 1.30 mg/dL   POC Glucose Once    Specimen: Blood   Result Value Ref Range    Glucose 83 70 - 130 mg/dL   POC Glucose Once    Specimen: Blood   Result Value Ref Range    Glucose 149 (H) 70 - 130 mg/dL   POC Glucose Once     Specimen: Blood   Result Value Ref Range    Glucose 169 (H) 70 - 130 mg/dL   POC Glucose Once    Specimen: Blood   Result Value Ref Range    Glucose 182 (H) 70 - 130 mg/dL   POC Glucose Once    Specimen: Blood   Result Value Ref Range    Glucose 134 (H) 70 - 130 mg/dL   POC Glucose Once    Specimen: Blood   Result Value Ref Range    Glucose 174 (H) 70 - 130 mg/dL   Adult Transthoracic Echo Complete W/ Cont if Necessary Per Protocol   Result Value Ref Range    BSA 1.9 m^2     CV ECHO EVANGELISTA - BZI_BMI 30.9 kilograms/m^2     CV ECHO EVANGELISTA - BSA(HAYCOCK) 1.9 m^2     CV ECHO EVANGELISTA - BZI_METRIC_WEIGHT 81.6 kg     CV ECHO EVANGELISTA - BZI_METRIC_HEIGHT 162.6 cm    Target HR (85%) 122 bpm    Max. Pred. HR (100%) 143 bpm     CV VAS BP RIGHT /71 mmHg    RV S' 12.50 cm/sec    RV Base 3.10 cm    RV Length 7.30 cm    RV Mid 2.30 cm    LA Volume Index 20.3 mL/m2    Ao root diam 2.7 cm    Ao pk waldemar 157.0 cm/sec    GEORGE(I,D) 2.5 cm2    Lat Peak E' Waldemar 7.9 cm/sec    LV V1 max PG 7.0 mmHg    LV V1 mean PG 4.0 mmHg    LV V1 max 133.0 cm/sec    LVPWd 0.9 cm    Med Peak E' Waldemar 6.50 cm/sec    MV P1/2t 72 msec    RAP systole 8 mmHg    RVSP(TR) 33 mmHg    TR max PG 25 mmHg    AI P1/2t 351 msec    Ao max PG 10 mmHg    Ao mean PG 6 mmHg    IVSd 1.0 cm    LA dimension 4.1 cm    LVIDd 4.2 cm    LVIDs 2.9 cm    LVOT diam 2.0 cm    TR max waldemar 249 cm/sec    TAPSE (>1.6) 2.50 cm    Echo EF Estimated 60 %   Light Blue Top   Result Value Ref Range    Extra Tube hold for add-on    Green Top (Gel)   Result Value Ref Range    Extra Tube Hold for add-ons.    Lavender Top   Result Value Ref Range    Extra Tube hold for add-on    Gold Top - SST   Result Value Ref Range    Extra Tube Hold for add-ons.         Assessment and Plan  There are no diagnoses linked to this encounter.     No follow-ups on file.

## 2020-11-21 DIAGNOSIS — E87.6 HYPOKALEMIA: Primary | ICD-10-CM

## 2020-11-21 PROBLEM — D64.9 NORMOCYTIC ANEMIA: Status: ACTIVE | Noted: 2020-11-21

## 2020-11-24 ENCOUNTER — TELEPHONE (OUTPATIENT)
Dept: INTERNAL MEDICINE | Facility: CLINIC | Age: 75
End: 2020-11-24

## 2020-11-24 ENCOUNTER — APPOINTMENT (OUTPATIENT)
Dept: CT IMAGING | Facility: HOSPITAL | Age: 75
End: 2020-11-24

## 2020-11-24 NOTE — TELEPHONE ENCOUNTER
Fatmata from [facility] states that she is retaining fluid.  On 11/17, her weight was 178.  On 11/21, her weight was 181 and yesterday it was 186.  She can be reached at [phone]

## 2020-11-24 NOTE — TELEPHONE ENCOUNTER
Called and spoke to Pt as well as Fatmata from HH. PT voiced understanding, as well as Fatmata from HH

## 2020-11-24 NOTE — TELEPHONE ENCOUNTER
Please call the patient and have her take a second dose of lasix today and tomorrow and call Dr. Ward office for an appointment.

## 2020-11-30 ENCOUNTER — HOSPITAL ENCOUNTER (OUTPATIENT)
Dept: CT IMAGING | Facility: HOSPITAL | Age: 75
Discharge: HOME | End: 2020-11-30
Admitting: INTERNAL MEDICINE

## 2020-11-30 DIAGNOSIS — Z87.891 FORMER SMOKER: ICD-10-CM

## 2020-11-30 PROCEDURE — 71250 CT THORAX DX C-: CPT

## 2020-12-04 ENCOUNTER — TELEPHONE (OUTPATIENT)
Dept: INTERNAL MEDICINE | Facility: CLINIC | Age: 75
End: 2020-12-04

## 2020-12-04 NOTE — TELEPHONE ENCOUNTER
PATIENT STATES SHE IS GETTING CRAMPS IN HER LEGS.  SHE THINKS IT MAY BE DUE TO LOW POTASSIUM.     PATIENT PHONE [phone].

## 2020-12-07 ENCOUNTER — LAB (OUTPATIENT)
Dept: LAB | Facility: HOSPITAL | Age: 75
End: 2020-12-07

## 2020-12-07 DIAGNOSIS — E87.6 HYPOKALEMIA: ICD-10-CM

## 2020-12-07 PROCEDURE — 80048 BASIC METABOLIC PNL TOTAL CA: CPT

## 2020-12-08 LAB
ANION GAP SERPL CALCULATED.3IONS-SCNC: 6.4 MMOL/L (ref 5–15)
BUN SERPL-MCNC: 15 MG/DL (ref 8–23)
BUN/CREAT SERPL: 23.4 (ref 7–25)
CALCIUM SPEC-SCNC: 9.5 MG/DL (ref 8.6–10.5)
CHLORIDE SERPL-SCNC: 97 MMOL/L (ref 98–107)
CO2 SERPL-SCNC: 33.6 MMOL/L (ref 22–29)
CREAT SERPL-MCNC: 0.64 MG/DL (ref 0.57–1)
GFR SERPL CREATININE-BSD FRML MDRD: 90 ML/MIN/1.73
GLUCOSE SERPL-MCNC: 98 MG/DL (ref 65–99)
POTASSIUM SERPL-SCNC: 4.5 MMOL/L (ref 3.5–5.2)
SODIUM SERPL-SCNC: 137 MMOL/L (ref 136–145)

## 2020-12-18 DIAGNOSIS — G62.89 OTHER POLYNEUROPATHY: Primary | ICD-10-CM

## 2020-12-18 DIAGNOSIS — E11.9 CONTROLLED TYPE 2 DIABETES MELLITUS WITHOUT COMPLICATION, WITHOUT LONG-TERM CURRENT USE OF INSULIN (HCC): ICD-10-CM

## 2020-12-20 RX ORDER — DULOXETIN HYDROCHLORIDE 60 MG/1
CAPSULE, DELAYED RELEASE ORAL
Qty: 90 CAPSULE | Refills: 3 | Status: SHIPPED | OUTPATIENT
Start: 2020-12-20 | End: 2022-05-31

## 2020-12-20 RX ORDER — GLIMEPIRIDE 2 MG/1
TABLET ORAL
Qty: 180 TABLET | Refills: 3 | Status: SHIPPED | OUTPATIENT
Start: 2020-12-20 | End: 2022-02-08

## 2020-12-31 ENCOUNTER — TELEPHONE (OUTPATIENT)
Dept: INTERNAL MEDICINE | Facility: CLINIC | Age: 75
End: 2020-12-31

## 2020-12-31 NOTE — TELEPHONE ENCOUNTER
MIGUEL @ [facility] CALLING CONTACT NUMBER: [phone]  ADVISING PATIENT  HAS HAD SIGNIFICANT WEIGHT GAIN.   12/22 WEIGHT 178.8 LBS  12/31 WEIGHT 187.0 LBS

## 2020-12-31 NOTE — TELEPHONE ENCOUNTER
If evidence of swelling, she can take an extra dose of furosemide 40 mg for 2-3 days. If no improvement or feeling short of breathschu, needs to contact Cardiologist.

## 2021-01-06 ENCOUNTER — OFFICE VISIT (OUTPATIENT)
Dept: INTERNAL MEDICINE | Facility: CLINIC | Age: 76
End: 2021-01-06

## 2021-01-06 VITALS
BODY MASS INDEX: 30.01 KG/M2 | WEIGHT: 175.8 LBS | TEMPERATURE: 97.7 F | RESPIRATION RATE: 16 BRPM | SYSTOLIC BLOOD PRESSURE: 140 MMHG | HEIGHT: 64 IN | DIASTOLIC BLOOD PRESSURE: 60 MMHG | HEART RATE: 88 BPM | OXYGEN SATURATION: 93 %

## 2021-01-06 DIAGNOSIS — I50.32 CHRONIC DIASTOLIC HEART FAILURE (HCC): ICD-10-CM

## 2021-01-06 DIAGNOSIS — N64.4 BREAST PAIN, RIGHT: Primary | ICD-10-CM

## 2021-01-06 PROBLEM — E66.9 OBESITY: Status: RESOLVED | Noted: 2020-02-26 | Resolved: 2021-01-06

## 2021-01-06 PROCEDURE — 99213 OFFICE O/P EST LOW 20 MIN: CPT | Performed by: INTERNAL MEDICINE

## 2021-01-06 NOTE — PROGRESS NOTES
Internal Medicine Acute Visit    Chief Complaint   Patient presents with   • Mass     breast        HPI  Ms. Pagan comes in today for R breast pain. Pain has been present for several weeks and seems to be improving but still present. No palpable mass or nipple discharge. She does have a history of breast biopsies with what sounds like fibrocystic breasts, advised to cut back on caffeine. She also notes prior trauma where she struck her breast and broke her sternum on a table. She last had a mammogram 5y ago.    Notably she is doing much better in regards to her edema, SOA. She is taking lasix and metolazone as instructed and has cut back on salt intake. Weight today is 175lbs. She requests diagnosis of obesity removed from her chart because it makes her feel bad.       Review of Systems  Review of Systems   Constitutional: Negative.    Respiratory: Positive for shortness of breath (chronic, on oxygen).    Cardiovascular: Positive for leg swelling (improving).   Genitourinary: Positive for breast pain (right). Negative for breast discharge and breast lump.        Medications  Current Outpatient Medications on File Prior to Visit   Medication Sig Dispense Refill   • Accu-Chek Guide test strip TEST 2 TIMES PER DAY     • albuterol sulfate  (90 Base) MCG/ACT inhaler Inhale 2 puffs Every 6 (Six) Hours As Needed for Wheezing. 25.5 g 2   • cetirizine (zyrTEC) 5 MG tablet Take 1 tablet by mouth Daily. 90 tablet 3   • chlorhexidine (PERIDEX) 0.12 % solution Apply 15 mL to the mouth or throat 2 (Two) Times a Day. For 14 days, w/1 refill     • diphenoxylate-atropine (Lomotil) 2.5-0.025 MG per tablet Take 1 tablet by mouth 4 (Four) Times a Day As Needed for Diarrhea. 30 tablet 1   • donepezil (ARICEPT) 5 MG tablet Take 5 mg by mouth Every Night.     • DULoxetine (CYMBALTA) 60 MG capsule TAKE 1 CAPSULE BY MOUTH DAILY 90 capsule 3   • esomeprazole (nexIUM) 40 MG capsule Take 1 capsule by mouth 2 (Two) Times a Day. 180  capsule 3   • ezetimibe (ZETIA) 10 MG tablet Take 10 mg by mouth Daily.     • fluticasone (Flonase) 50 MCG/ACT nasal spray 2 sprays into the nostril(s) as directed by provider Daily. 18.2 mL 11   • Fluticasone Propionate, Inhal, 113 MCG/ACT aerosol powder  Take 113 mcg by mouth As Needed.     • furosemide (LASIX) 40 MG tablet Take 40 mg by mouth Daily.     • glimepiride (AMARYL) 2 MG tablet TAKE 1 TABLET BY MOUTH TWICE DAILY 180 tablet 3   • latanoprost (XALATAN) 0.005 % ophthalmic solution USE 1 DROP IN BOTH EYES NIGHTLY     • levothyroxine (SYNTHROID, LEVOTHROID) 50 MCG tablet Take 50 mcg by mouth Daily.     • metOLazone (ZAROXOLYN) 2.5 MG tablet Take 1 tablet by mouth Daily As Needed (for swelling or weight gain of 4 lbs). As directed     • metoprolol succinate XL (TOPROL-XL) 25 MG 24 hr tablet Take 25 mg by mouth Daily.     • potassium chloride (MICRO-K) 10 MEQ CR capsule Take 30 mEq by mouth 2 (Two) Times a Day.     • rOPINIRole (Requip) 4 MG tablet Take 4 mg by mouth 2 (Two) Times a Day. + 2 extra at bedtime if needed     • Spacer/Aero-Holding Chambers (AeroChamber Plus Morris-Vu) misc As Needed.     • timolol (TIMOPTIC) 0.25 % ophthalmic solution Administer 1 drop to the right eye Every Morning.     • Tiotropium Bromide Monohydrate (SPIRIVA RESPIMAT) 1.25 MCG/ACT aerosol solution inhaler Inhale 2 puffs Daily.     • vitamin D (ERGOCALCIFEROL) 1.25 MG (53812 UT) capsule capsule Take 50,000 Units by mouth 1 (One) Time Per Week.       No current facility-administered medications on file prior to visit.         Allergies  Allergies   Allergen Reactions   • Penicillins Other (See Comments)     rash   • Statins Other (See Comments)     myalgia   • Tramadol Nausea And Vomiting and GI Intolerance       PMH  Past Medical History:   Diagnosis Date   • Bronchiectasis (CMS/[facility])    • Bronchitis 01/2018   • Cancer (CMS/[facility])     History of lung cancer and skin cancer    • Cardiac murmur    • Chronic cough    • Chronic  "obstructive pulmonary disease (CMS/HCC)    • Colon polyp    • Depression    • Diabetes mellitus (CMS/[facility])    • Disease of thyroid gland    • Esophageal dilatation 9/20/2019    Added automatically from request for surgery [id number]   • Esophageal dysphagia 10/24/2019    Added automatically from request for surgery [id number]   • Former smoker (None since 1992) 8/29/2019   • GERD (gastroesophageal reflux disease)    • Glaucoma    • Globus sensation 1/27/2020   • History of colonic polyps    • History of transfusion 1988   • Irritable bowel syndrome     Constipation/diarrhea   • Legally blind    • Lung cancer (CMS/[facility])    • Macular degeneration    • Neuropathy    • Obesity 2/26/2020   • Osteoarthritis    • Oxygen dependent     5L   • Pneumonia    • Sleep apnea    • UTI (urinary tract infection)    • Wears glasses        Objective  Visit Vitals  /60   Pulse 88   Temp 97.7 °F (36.5 °C)   Resp 16   Ht 162.6 cm (64\")   Wt 79.7 kg (175 lb 12.8 oz)   LMP  (LMP Unknown)   SpO2 93%   BMI 30.18 kg/m²        Physical Exam  Physical Exam  Vitals signs and nursing note reviewed. Exam conducted with a chaperone present.   Constitutional:       General: She is not in acute distress.     Appearance: She is well-developed.   HENT:      Head: Normocephalic and atraumatic.   Eyes:      Conjunctiva/sclera: Conjunctivae normal.   Pulmonary:      Effort: Pulmonary effort is normal. No respiratory distress.      Comments: Breathing comfortably on supplemental oxygen, able to lay flat on exam table for breast exam  Chest:      Breasts:         Right: Tenderness (see image below) present. No swelling, bleeding, inverted nipple, mass, nipple discharge or skin change.         Left: Normal. No swelling, bleeding, inverted nipple, mass, nipple discharge, skin change or tenderness.       Lymphadenopathy:      Upper Body:      Right upper body: No supraclavicular, axillary or pectoral adenopathy.      Left upper body: No supraclavicular, axillary " or pectoral adenopathy.   Skin:     General: Skin is warm and dry.   Neurological:      Mental Status: She is alert and oriented to person, place, and time.         Results  Results for orders placed or performed in visit on 12/07/20   Basic Metabolic Panel    Specimen: Blood   Result Value Ref Range    Glucose 98 65 - 99 mg/dL    BUN 15 8 - 23 mg/dL    Creatinine 0.64 0.57 - 1.00 mg/dL    Sodium 137 136 - 145 mmol/L    Potassium 4.5 3.5 - 5.2 mmol/L    Chloride 97 (L) 98 - 107 mmol/L    CO2 33.6 (H) 22.0 - 29.0 mmol/L    Calcium 9.5 8.6 - 10.5 mg/dL    eGFR Non African Amer 90 >60 mL/min/1.73    BUN/Creatinine Ratio 23.4 7.0 - 25.0    Anion Gap 6.4 5.0 - 15.0 mmol/L        Assessment and Plan  Diagnoses and all orders for this visit:    Breast pain, right  - Improving R breast pain, no mass on exam. Will schedule diagnostic mammogram.    Chronic diastolic heart failure (CMS/HCC)  - Follows with Dr. Chaidez. Echo 9/2020 with EF 56-60%, borderline dilated LA cavity, mild TR and AR, normal RVSP  - on metoprolol succinate 25mg daily, furosemide 40mg daily and metolazone 2.5mg daily with instruction to take extra dose of lasix if weight or edema increasing. Has had hypokalemia due to diuretics but most recently normal.  - Has home health for weekly weight monitoring  - Weight currently improving, 175lbs. Clinically looks well with less edema, stable oxygen requirement.    Health Maintenance  - Pap smear: no longer indicated  - Mammogram: ordered.  - Colonoscopy: 2018  - HCV: Need records of past testing  - Immunizations: Flu UTD, pneumovax complete  - Depression screening: screen next visit    Return in about 5 weeks (around 2/10/2021) for as scheduled.

## 2021-02-12 ENCOUNTER — TELEPHONE (OUTPATIENT)
Dept: INTERNAL MEDICINE | Facility: CLINIC | Age: 76
End: 2021-02-12

## 2021-02-12 NOTE — TELEPHONE ENCOUNTER
PATIENT STATED SHE IS GETTING HER 1ST COVID VACCINE SHOT 02/13 @ 10:15 WITH [facility] AND PATIENT WOULD LIKE TO KNOW IF IT WILL AFFORD HER IMMUNITY UNTIL THE 2ND COVID SHOT?             PLEASE ADVISE:  [phone]

## 2021-02-12 NOTE — TELEPHONE ENCOUNTER
Called PT and informed her that she should still wait 14 days before going out. PT stated understanding.

## 2021-02-17 ENCOUNTER — APPOINTMENT (OUTPATIENT)
Dept: MAMMOGRAPHY | Facility: HOSPITAL | Age: 76
End: 2021-02-17

## 2021-02-22 ENCOUNTER — OFFICE VISIT (OUTPATIENT)
Dept: INTERNAL MEDICINE | Facility: CLINIC | Age: 76
End: 2021-02-22

## 2021-02-22 ENCOUNTER — LAB (OUTPATIENT)
Dept: LAB | Facility: HOSPITAL | Age: 76
End: 2021-02-22

## 2021-02-22 VITALS
RESPIRATION RATE: 16 BRPM | SYSTOLIC BLOOD PRESSURE: 140 MMHG | DIASTOLIC BLOOD PRESSURE: 62 MMHG | WEIGHT: 177.8 LBS | OXYGEN SATURATION: 98 % | BODY MASS INDEX: 30.35 KG/M2 | TEMPERATURE: 96.6 F | HEART RATE: 94 BPM | HEIGHT: 64 IN

## 2021-02-22 DIAGNOSIS — E11.9 CONTROLLED TYPE 2 DIABETES MELLITUS WITHOUT COMPLICATION, WITHOUT LONG-TERM CURRENT USE OF INSULIN (HCC): ICD-10-CM

## 2021-02-22 DIAGNOSIS — E78.2 MIXED HYPERLIPIDEMIA: ICD-10-CM

## 2021-02-22 DIAGNOSIS — E03.9 ACQUIRED HYPOTHYROIDISM: ICD-10-CM

## 2021-02-22 DIAGNOSIS — R41.3 MEMORY DISTURBANCE: Primary | ICD-10-CM

## 2021-02-22 DIAGNOSIS — I50.32 CHRONIC DIASTOLIC HEART FAILURE (HCC): ICD-10-CM

## 2021-02-22 DIAGNOSIS — R41.3 MEMORY DISTURBANCE: ICD-10-CM

## 2021-02-22 DIAGNOSIS — J96.11 CHRONIC RESPIRATORY FAILURE WITH HYPOXIA (HCC): ICD-10-CM

## 2021-02-22 DIAGNOSIS — K21.00 GASTROESOPHAGEAL REFLUX DISEASE WITH ESOPHAGITIS WITHOUT HEMORRHAGE: ICD-10-CM

## 2021-02-22 DIAGNOSIS — J44.9 COPD MIXED TYPE (HCC): ICD-10-CM

## 2021-02-22 DIAGNOSIS — I10 BENIGN HYPERTENSION: ICD-10-CM

## 2021-02-22 DIAGNOSIS — G25.81 RESTLESS LEG SYNDROME: ICD-10-CM

## 2021-02-22 LAB
ALBUMIN UR-MCNC: <1.2 MG/DL
CREAT UR-MCNC: 146.7 MG/DL
HBA1C MFR BLD: 6.7 %
MICROALBUMIN/CREAT UR: NORMAL MG/G{CREAT}

## 2021-02-22 PROCEDURE — 80053 COMPREHEN METABOLIC PANEL: CPT

## 2021-02-22 PROCEDURE — 82607 VITAMIN B-12: CPT

## 2021-02-22 PROCEDURE — 80061 LIPID PANEL: CPT

## 2021-02-22 PROCEDURE — 82043 UR ALBUMIN QUANTITATIVE: CPT

## 2021-02-22 PROCEDURE — 99214 OFFICE O/P EST MOD 30 MIN: CPT | Performed by: INTERNAL MEDICINE

## 2021-02-22 PROCEDURE — 83036 HEMOGLOBIN GLYCOSYLATED A1C: CPT | Performed by: INTERNAL MEDICINE

## 2021-02-22 PROCEDURE — 85027 COMPLETE CBC AUTOMATED: CPT

## 2021-02-22 PROCEDURE — 82570 ASSAY OF URINE CREATININE: CPT

## 2021-02-22 PROCEDURE — 84443 ASSAY THYROID STIM HORMONE: CPT

## 2021-02-22 RX ORDER — ROPINIROLE 8 MG/1
8 TABLET, FILM COATED, EXTENDED RELEASE ORAL 2 TIMES DAILY
COMMUNITY

## 2021-02-22 RX ORDER — LOSARTAN POTASSIUM 25 MG/1
25 TABLET ORAL DAILY
COMMUNITY
Start: 2021-01-11 | End: 2021-04-29 | Stop reason: HOSPADM

## 2021-02-22 NOTE — PROGRESS NOTES
Internal Medicine Follow Up    Chief Complaint  Lorrie Pagan is a 75 y.o. female who presents today for follow up of chronic medical conditions outlined below.    Chief Complaint   Patient presents with   • Follow-up     heart failure, diabetes   • Insomnia   • Heartburn        HPI  Ms. Pagan comes in today for follow up. Dr. Saldana changed her ropinorole to extended release due to uncontrolled RLS. She is now taking 8mg BID. She feels that since this change she has had confusion with difficult completing sentences. She has insomnia. She does not fall asleep until 2-3am and sleeps only 3 hours. She sleeps in a recliner, cannot sleep in her bed but is unsure why. She denies orthopnea. Has a sleep number bed and has tried several settings. Due to poor sleep at night she does nap during the day. She has uncontrolled GERD despite nexium BID. Had EGD 11/2019 with reflux esophagitis. She has been drinking vinegar and lemon juice. She has not been limiting food triggers. She remains on glimepiride for DM, denies issues such as hypoglycemia.       Review of Systems  Review of Systems   Constitutional: Negative for chills, fever, unexpected weight gain and unexpected weight loss.   Respiratory: Positive for shortness of breath (stable).    Cardiovascular: Positive for leg swelling (minimal, stable). Negative for chest pain and palpitations.   Gastrointestinal: Positive for GERD. Negative for anal bleeding, blood in stool, nausea and vomiting.   Genitourinary: Negative for decreased urine volume and difficulty urinating.   Musculoskeletal: Positive for gait problem.   Psychiatric/Behavioral: Positive for sleep disturbance.        Current Medications  Current Outpatient Medications on File Prior to Visit   Medication Sig Dispense Refill   • Accu-Chek Guide test strip TEST 2 TIMES PER DAY     • albuterol sulfate  (90 Base) MCG/ACT inhaler Inhale 2 puffs Every 6 (Six) Hours As Needed for Wheezing. 25.5 g 2   •  cetirizine (zyrTEC) 5 MG tablet Take 1 tablet by mouth Daily. 90 tablet 3   • chlorhexidine (PERIDEX) 0.12 % solution Apply 15 mL to the mouth or throat 2 (Two) Times a Day. For 14 days, w/1 refill     • diphenoxylate-atropine (Lomotil) 2.5-0.025 MG per tablet Take 1 tablet by mouth 4 (Four) Times a Day As Needed for Diarrhea. 30 tablet 1   • donepezil (ARICEPT) 5 MG tablet Take 5 mg by mouth Every Night.     • DULoxetine (CYMBALTA) 60 MG capsule TAKE 1 CAPSULE BY MOUTH DAILY 90 capsule 3   • esomeprazole (nexIUM) 40 MG capsule Take 1 capsule by mouth 2 (Two) Times a Day. 180 capsule 3   • ezetimibe (ZETIA) 10 MG tablet Take 10 mg by mouth Daily.     • furosemide (LASIX) 40 MG tablet Take 40 mg by mouth Daily.     • glimepiride (AMARYL) 2 MG tablet TAKE 1 TABLET BY MOUTH TWICE DAILY 180 tablet 3   • latanoprost (XALATAN) 0.005 % ophthalmic solution USE 1 DROP IN BOTH EYES NIGHTLY     • levothyroxine (SYNTHROID, LEVOTHROID) 50 MCG tablet Take 50 mcg by mouth Daily.     • losartan (COZAAR) 25 MG tablet Take 25 mg by mouth Daily.     • metOLazone (ZAROXOLYN) 2.5 MG tablet Take 1 tablet by mouth Daily As Needed (for swelling or weight gain of 4 lbs). As directed     • metoprolol succinate XL (TOPROL-XL) 25 MG 24 hr tablet Take 25 mg by mouth Daily.     • potassium chloride (MICRO-K) 10 MEQ CR capsule Take 30 mEq by mouth 2 (Two) Times a Day.     • rOPINIRole XL (REQUIP XL) 8 MG 24 hr tablet Take 8 mg by mouth 2 (two) times a day.     • Spacer/Aero-Holding Chambers (AeroChamber Plus Morris-Vu) misc As Needed.     • timolol (TIMOPTIC) 0.25 % ophthalmic solution Administer 1 drop to the right eye Every Morning.     • Tiotropium Bromide Monohydrate (SPIRIVA RESPIMAT) 1.25 MCG/ACT aerosol solution inhaler Inhale 2 puffs Daily.     • vitamin D (ERGOCALCIFEROL) 1.25 MG (96276 UT) capsule capsule Take 50,000 Units by mouth 1 (One) Time Per Week.     • [DISCONTINUED] rOPINIRole (Requip) 4 MG tablet Take 4 mg by mouth 2 (Two) Times  "a Day. + 2 extra at bedtime if needed     • fluticasone (Flonase) 50 MCG/ACT nasal spray 2 sprays into the nostril(s) as directed by provider Daily. 18.2 mL 11   • Fluticasone Propionate, Inhal, 113 MCG/ACT aerosol powder  Take 113 mcg by mouth As Needed.       No current facility-administered medications on file prior to visit.        Allergies  Allergies   Allergen Reactions   • Penicillins Other (See Comments)     rash   • Statins Other (See Comments)     myalgia   • Tramadol Nausea And Vomiting and GI Intolerance       Objective  Visit Vitals  /62   Pulse 94   Temp 96.6 °F (35.9 °C)   Resp 16   Ht 162.6 cm (64.02\")   Wt 80.6 kg (177 lb 12.8 oz)   LMP  (LMP Unknown)   SpO2 98%   BMI 30.50 kg/m²        Physical Exam  Physical Exam  Vitals signs and nursing note reviewed.   Constitutional:       General: She is not in acute distress.     Appearance: She is well-developed.      Comments: Seated in motorized scooter with nasal cannula in place. Chronically ill appearing but at baseline.   HENT:      Head: Normocephalic and atraumatic.   Eyes:      Conjunctiva/sclera: Conjunctivae normal.   Cardiovascular:      Rate and Rhythm: Normal rate and regular rhythm.      Heart sounds: Murmur present.   Pulmonary:      Effort: Pulmonary effort is normal. No respiratory distress.      Breath sounds: Normal breath sounds. Decreased air movement (throughout) present.   Musculoskeletal:      Right lower leg: Edema (trace with associated stasis dermatitis) present.      Left lower leg: Edema (trace with associated stasis dermatitis) present.   Skin:     General: Skin is warm and dry.      Coloration: Skin is not jaundiced.   Neurological:      Mental Status: She is alert and oriented to person, place, and time. Mental status is at baseline.   Psychiatric:         Mood and Affect: Mood normal.         Behavior: Behavior normal.         Results  Results for orders placed or performed in visit on 12/07/20   Basic Metabolic " Panel    Specimen: Blood   Result Value Ref Range    Glucose 98 65 - 99 mg/dL    BUN 15 8 - 23 mg/dL    Creatinine 0.64 0.57 - 1.00 mg/dL    Sodium 137 136 - 145 mmol/L    Potassium 4.5 3.5 - 5.2 mmol/L    Chloride 97 (L) 98 - 107 mmol/L    CO2 33.6 (H) 22.0 - 29.0 mmol/L    Calcium 9.5 8.6 - 10.5 mg/dL    eGFR Non African Amer 90 >60 mL/min/1.73    BUN/Creatinine Ratio 23.4 7.0 - 25.0    Anion Gap 6.4 5.0 - 15.0 mmol/L        Assessment and Plan  Diagnoses and all orders for this visit:    Memory disturbance  - Chronically on donepezil 10mg daily, managed by Dr. Saldana  - Described as confusion with word finding difficulty. Most likely polypharmacy and discussed that this is a possible side effect of ropinorole particularly given she takes high doses.  - Will check TSH and B12  - Recommend she discuss this with Dr. Saldana and may need alternative for her RLS    Mixed hyperlipidemia  - Statin intolerant due to myalgias. On zetia.  - Lipid panel ordered    Controlled type 2 diabetes mellitus without complication, without long-term current use of insulin (CMS/[facility])  - A1c today stable at 6.4 on glimepiride 2mg BID. No hypoglycemia.  - Urine microalbumin today.    Restless leg syndrome  - Managed by Dr. Saldana, recently switched to ropinirole XR 8mg BID  - Concern for confusion as noted above    Chronic respiratory failure due to Stage II, moderate, COPD  - Follows with Dr. Arboleda. On fluticasone and albuterol PRN however notes noncompliance with inhalers.  - Uses 5L continuous O2.   - Respiratory status stable today, exam without wheezes or rhonchi.    Chronic diastolic heart failure (CMS/HCC)  - Follows with Dr. Chaidez. Echo 9/2020 with EF 56-60%, borderline dilated LA cavity, mild TR and AR, normal RVSP  - on metoprolol succinate 25mg daily, losartan 25mg daily, furosemide 40mg daily and metolazone 2.5mg daily. Takes extra dose of furosemide for weight gain or edema about once every 2 weeks.  - Weight fairly stable,  dry weight felt to be 175-180lbs. Stable respiratory status.   - labs today    Benign hypertension  - BP acceptable today on lasix 40mg daily, metolazone 2.5mg daily, metoprolol succinate 25mg daily, losartan 25mg daily. Unsure when or who added this medication as she had not previously reported it however per fill history it is not new. Continue current therapy.    Gastroesophageal reflux disease with esophagitis without hemorrhage  - Has increased PPI to BID due to uncontrolled reflux. Noted to be consuming vinegar and lemon juice. Discussed dietary modifications however if reflux continues may need EGD.    Acquired hypothyroidism  - On levothyroxine 50mcg daily  - TSH ordered     Health Maintenance  - Pap smear: no longer indicated  - Mammogram: scheduled 3/23.  - Colonoscopy: 2018  - HCV: Need records of past testing  - Immunizations: Flu UTD, pneumovax complete  - Depression screening: negative 2/2021       Return in about 3 months (around 5/22/2021) for Follow up 30 minutes, Labs today.

## 2021-02-23 PROBLEM — E03.9 ACQUIRED HYPOTHYROIDISM: Status: ACTIVE | Noted: 2021-02-23

## 2021-02-23 LAB
ALBUMIN SERPL-MCNC: 4.2 G/DL (ref 3.5–5.2)
ALBUMIN/GLOB SERPL: 1.3 G/DL
ALP SERPL-CCNC: 98 U/L (ref 39–117)
ALT SERPL W P-5'-P-CCNC: 9 U/L (ref 1–33)
ANION GAP SERPL CALCULATED.3IONS-SCNC: 12.7 MMOL/L (ref 5–15)
AST SERPL-CCNC: 17 U/L (ref 1–32)
BILIRUB SERPL-MCNC: 0.3 MG/DL (ref 0–1.2)
BUN SERPL-MCNC: 18 MG/DL (ref 8–23)
BUN/CREAT SERPL: 32.7 (ref 7–25)
CALCIUM SPEC-SCNC: 10.2 MG/DL (ref 8.6–10.5)
CHLORIDE SERPL-SCNC: 86 MMOL/L (ref 98–107)
CHOLEST SERPL-MCNC: 207 MG/DL (ref 0–200)
CO2 SERPL-SCNC: 39.3 MMOL/L (ref 22–29)
CREAT SERPL-MCNC: 0.55 MG/DL (ref 0.57–1)
DEPRECATED RDW RBC AUTO: 45.5 FL (ref 37–54)
ERYTHROCYTE [DISTWIDTH] IN BLOOD BY AUTOMATED COUNT: 14.3 % (ref 12.3–15.4)
GFR SERPL CREATININE-BSD FRML MDRD: 107 ML/MIN/1.73
GLOBULIN UR ELPH-MCNC: 3.2 GM/DL
GLUCOSE SERPL-MCNC: 121 MG/DL (ref 65–99)
HCT VFR BLD AUTO: 39.6 % (ref 34–46.6)
HDLC SERPL-MCNC: 49 MG/DL (ref 40–60)
HGB BLD-MCNC: 12.5 G/DL (ref 12–15.9)
LDLC SERPL CALC-MCNC: 133 MG/DL (ref 0–100)
LDLC/HDLC SERPL: 2.66 {RATIO}
MCH RBC QN AUTO: 27.4 PG (ref 26.6–33)
MCHC RBC AUTO-ENTMCNC: 31.6 G/DL (ref 31.5–35.7)
MCV RBC AUTO: 86.7 FL (ref 79–97)
PLATELET # BLD AUTO: 400 10*3/MM3 (ref 140–450)
PMV BLD AUTO: 9.7 FL (ref 6–12)
POTASSIUM SERPL-SCNC: 3.6 MMOL/L (ref 3.5–5.2)
PROT SERPL-MCNC: 7.4 G/DL (ref 6–8.5)
RBC # BLD AUTO: 4.57 10*6/MM3 (ref 3.77–5.28)
SODIUM SERPL-SCNC: 138 MMOL/L (ref 136–145)
TRIGL SERPL-MCNC: 139 MG/DL (ref 0–150)
TSH SERPL DL<=0.05 MIU/L-ACNC: 1.47 UIU/ML (ref 0.27–4.2)
VIT B12 BLD-MCNC: 292 PG/ML (ref 211–946)
VLDLC SERPL-MCNC: 25 MG/DL (ref 5–40)
WBC # BLD AUTO: 10.94 10*3/MM3 (ref 3.4–10.8)

## 2021-02-26 ENCOUNTER — TELEPHONE (OUTPATIENT)
Dept: INTERNAL MEDICINE | Facility: CLINIC | Age: 76
End: 2021-02-26

## 2021-02-26 NOTE — TELEPHONE ENCOUNTER
Please return patient's call and let her know that she is correct that may be related. We will still repeat the lab to guarantee it is resolved at her next visit.

## 2021-02-26 NOTE — TELEPHONE ENCOUNTER
PATIENT CALLED STATING THAT HER TEST RESULTS SHOWED THAT HER WHITE CELL COUNT WAS ELEVATED.    PATIENT STATED THAT SHE RECEIVED THE COVID VACCINE 9 DAYS PRIOR TO GETTING HER LABS DONE, AND THAT MAY BE THE REASON IT WAS ELEVATED.    ANY QUESTIONS AND/OR CONCERNS PLEASE CALL PATIENT. [phone]

## 2021-03-08 ENCOUNTER — TELEPHONE (OUTPATIENT)
Dept: INTERNAL MEDICINE | Facility: CLINIC | Age: 76
End: 2021-03-08

## 2021-03-08 NOTE — TELEPHONE ENCOUNTER
Yes, she is correct that the vaccine has caused issues with false positive mammograms. She should call the breast center and request to push the appointment to 6 weeks after her second vaccine.

## 2021-03-08 NOTE — TELEPHONE ENCOUNTER
PATIENT STATES THAT SHE HAS A REFERRAL UPCOMING FOR A MAMMOGRAM ON 03/23/21 AT 1PM. SHE WAS INFORMED THAT TYPICALLY THE IMAGING WHILE GETTING THE COVID VACCINES MAY BE DISTORTED. SHE STATES IN SOME CASES THAT THE LYMPH NODES MAY SHOW WHITE. PATIENT IS WONDERING IF SHE SHOULD PUSH BACK HER APPOINTMENT DUE TO BEING IN BETWEEN THE VACCINE 1 AND 2. PLEASE CONTACT AND ADVISE.     CONTACT: [phone]

## 2021-03-09 ENCOUNTER — OFFICE VISIT (OUTPATIENT)
Dept: GASTROENTEROLOGY | Facility: CLINIC | Age: 76
End: 2021-03-09

## 2021-03-09 VITALS
HEIGHT: 64 IN | BODY MASS INDEX: 30.39 KG/M2 | TEMPERATURE: 98.9 F | HEART RATE: 91 BPM | WEIGHT: 178 LBS | SYSTOLIC BLOOD PRESSURE: 137 MMHG | DIASTOLIC BLOOD PRESSURE: 68 MMHG

## 2021-03-09 DIAGNOSIS — K21.9 GASTROESOPHAGEAL REFLUX DISEASE WITHOUT ESOPHAGITIS: Primary | ICD-10-CM

## 2021-03-09 PROCEDURE — 99213 OFFICE O/P EST LOW 20 MIN: CPT | Performed by: NURSE PRACTITIONER

## 2021-03-09 RX ORDER — PANTOPRAZOLE SODIUM 40 MG/1
40 TABLET, DELAYED RELEASE ORAL DAILY
Qty: 90 TABLET | Refills: 3 | Status: SHIPPED | OUTPATIENT
Start: 2021-03-09 | End: 2021-04-05 | Stop reason: SDUPTHER

## 2021-03-09 NOTE — PROGRESS NOTES
GASTROENTEROLOGY OFFICE NOTE  Lorrie Pagan  [id number]  1945    CARE TEAM  Patient Care Team:  Dacia Viera MD as PCP - General (Internal Medicine)  Elias Chaidez MD as Consulting Physician (Cardiology)    Referring Provider: Dacia Viera MD    Chief Complaint   Patient presents with   • Heartburn        HISTORY OF PRESENT ILLNESS:  Ms. Pagan is a 75-year-old female who presents today with complaints of acid reflux.  She reports that she has always had to sleep on a wedge at nighttime but now this is not helping nearly as well and she is sleeping almost completely sitting up.  She denies dysphagia and odynophagia but complains of what feels like spasms when she drinks liquids frequently.    PAST MEDICAL HISTORY  Past Medical History:   Diagnosis Date   • Bronchiectasis (CMS/[facility])    • Bronchitis 01/2018   • Cancer (CMS/[facility])     History of lung cancer and skin cancer    • Cardiac murmur    • Chronic cough    • Chronic obstructive pulmonary disease (CMS/[facility])    • Colon polyp    • Depression    • Diabetes mellitus (CMS/[facility])    • Disease of thyroid gland    • Esophageal dilatation 9/20/2019    Added automatically from request for surgery [id number]   • Esophageal dysphagia 10/24/2019    Added automatically from request for surgery [id number]   • Former smoker (None since 1992) 8/29/2019   • GERD (gastroesophageal reflux disease)    • Glaucoma    • Globus sensation 1/27/2020   • History of colonic polyps    • History of transfusion 1988   • Irritable bowel syndrome     Constipation/diarrhea   • Legally blind    • Lung cancer (CMS/[facility])    • Macular degeneration    • Neuropathy    • Obesity 2/26/2020   • Osteoarthritis    • Oxygen dependent     5L   • Pneumonia    • Sleep apnea    • UTI (urinary tract infection)    • Wears glasses         PAST SURGICAL HISTORY  Past Surgical History:   Procedure Laterality Date   • BACK SURGERY     • CATARACT EXTRACTION Bilateral    • CHOLECYSTECTOMY     •  COLONOSCOPY     • ENDOSCOPY N/A 11/6/2019    Procedure: ESOPHAGOGASTRODUODENOSCOPY;  Surgeon: Cortes Millan MD;  Location: [facility] ENDOSCOPY;  Service: Gastroenterology   • HAND SURGERY      laceration repair   • INCONTINENCE SURGERY     • LUNG REMOVAL, PARTIAL Left 2016   • NISSEN FUNDOPLICATION     • TOTAL ABDOMINAL HYSTERECTOMY  1988    benign cyst        MEDICATIONS:    Current Outpatient Medications:   •  Accu-Chek Guide test strip, TEST 2 TIMES PER DAY, Disp: , Rfl:   •  albuterol sulfate  (90 Base) MCG/ACT inhaler, Inhale 2 puffs Every 6 (Six) Hours As Needed for Wheezing., Disp: 25.5 g, Rfl: 2  •  cetirizine (zyrTEC) 5 MG tablet, Take 1 tablet by mouth Daily., Disp: 90 tablet, Rfl: 3  •  chlorhexidine (PERIDEX) 0.12 % solution, Apply 15 mL to the mouth or throat 2 (Two) Times a Day. For 14 days, w/1 refill, Disp: , Rfl:   •  diphenoxylate-atropine (Lomotil) 2.5-0.025 MG per tablet, Take 1 tablet by mouth 4 (Four) Times a Day As Needed for Diarrhea., Disp: 30 tablet, Rfl: 1  •  donepezil (ARICEPT) 5 MG tablet, Take 5 mg by mouth Every Night., Disp: , Rfl:   •  DULoxetine (CYMBALTA) 60 MG capsule, TAKE 1 CAPSULE BY MOUTH DAILY, Disp: 90 capsule, Rfl: 3  •  ezetimibe (ZETIA) 10 MG tablet, Take 10 mg by mouth Daily., Disp: , Rfl:   •  fluticasone (Flonase) 50 MCG/ACT nasal spray, 2 sprays into the nostril(s) as directed by provider Daily., Disp: 18.2 mL, Rfl: 11  •  Fluticasone Propionate, Inhal, 113 MCG/ACT aerosol powder , Take 113 mcg by mouth As Needed., Disp: , Rfl:   •  furosemide (LASIX) 40 MG tablet, Take 40 mg by mouth Daily., Disp: , Rfl:   •  glimepiride (AMARYL) 2 MG tablet, TAKE 1 TABLET BY MOUTH TWICE DAILY, Disp: 180 tablet, Rfl: 3  •  latanoprost (XALATAN) 0.005 % ophthalmic solution, USE 1 DROP IN BOTH EYES NIGHTLY, Disp: , Rfl:   •  levothyroxine (SYNTHROID, LEVOTHROID) 50 MCG tablet, Take 50 mcg by mouth Daily., Disp: , Rfl:   •  losartan (COZAAR) 25 MG tablet, Take 25 mg  by mouth Daily., Disp: , Rfl:   •  metOLazone (ZAROXOLYN) 2.5 MG tablet, Take 1 tablet by mouth Daily As Needed (for swelling or weight gain of 4 lbs). As directed, Disp: , Rfl:   •  metoprolol succinate XL (TOPROL-XL) 25 MG 24 hr tablet, Take 25 mg by mouth Daily., Disp: , Rfl:   •  pantoprazole (PROTONIX) 40 MG EC tablet, Take 1 tablet by mouth Daily., Disp: 90 tablet, Rfl: 3  •  potassium chloride (MICRO-K) 10 MEQ CR capsule, Take 30 mEq by mouth 2 (Two) Times a Day., Disp: , Rfl:   •  rOPINIRole XL (REQUIP XL) 8 MG 24 hr tablet, Take 8 mg by mouth 2 (two) times a day., Disp: , Rfl:   •  Spacer/Aero-Holding Chambers (AeroChamber Plus Morris-Vu) misc, As Needed., Disp: , Rfl:   •  timolol (TIMOPTIC) 0.25 % ophthalmic solution, Administer 1 drop to the right eye Every Morning., Disp: , Rfl:   •  Tiotropium Bromide Monohydrate (SPIRIVA RESPIMAT) 1.25 MCG/ACT aerosol solution inhaler, Inhale 2 puffs Daily., Disp: , Rfl:   •  vitamin D (ERGOCALCIFEROL) 1.25 MG (91548 UT) capsule capsule, Take 50,000 Units by mouth 1 (One) Time Per Week., Disp: , Rfl:     ALLERGIES  Allergies   Allergen Reactions   • Penicillins Other (See Comments)     rash   • Statins Other (See Comments)     myalgia   • Tramadol Nausea And Vomiting and GI Intolerance       FAMILY HISTORY:  Family History   Problem Relation Age of Onset   • Colon polyps Mother    • Emphysema Mother    • Hypertension Mother    • Colon polyps Father    • Dementia Father    • Heart disease Father    • Hyperlipidemia Father    • Macular degeneration Father    • Glaucoma Father    • Colon cancer Neg Hx        SOCIAL HISTORY  Social History     Socioeconomic History   • Marital status: Single     Spouse name: Not on file   • Number of children: Not on file   • Years of education: Not on file   • Highest education level: Not on file   Tobacco Use   • Smoking status: Former Smoker     Packs/day: 1.50     Years: 25.00     Pack years: 37.50     Types: Cigarettes     Quit date:  "1992     Years since quittin.2   • Smokeless tobacco: Never Used   Vaping Use   • Vaping Use: Never used   Substance and Sexual Activity   • Alcohol use: Yes     Comment: a glass of wine or bourbon a couple times a month socially   • Drug use: No   • Sexual activity: Never       REVIEW OF SYSTEMS  Review of Systems   Constitutional: Negative for activity change, appetite change, chills, diaphoresis, fatigue, fever, unexpected weight gain and unexpected weight loss.   HENT: Negative for trouble swallowing and voice change.    Gastrointestinal: Positive for GERD. Negative for abdominal distention, abdominal pain, anal bleeding, blood in stool, constipation, diarrhea, nausea, rectal pain, vomiting and indigestion.         PHYSICAL EXAM   /68 (BP Location: Right arm, Patient Position: Sitting, Cuff Size: Adult)   Pulse 91   Temp 98.9 °F (37.2 °C) (Temporal)   Ht 162.6 cm (64.02\")   Wt 80.7 kg (178 lb)   LMP  (LMP Unknown)   BMI 30.54 kg/m²   Physical Exam  Vitals and nursing note reviewed.   Constitutional:       Appearance: Normal appearance. She is well-developed.   HENT:      Head: Normocephalic and atraumatic.      Nose: Nose normal.      Mouth/Throat:      Mouth: Mucous membranes are moist.      Pharynx: Oropharynx is clear.   Eyes:      Pupils: Pupils are equal, round, and reactive to light.   Cardiovascular:      Rate and Rhythm: Normal rate and regular rhythm.   Pulmonary:      Effort: Pulmonary effort is normal.      Breath sounds: Normal breath sounds. No wheezing or rales.   Abdominal:      General: Bowel sounds are normal.      Palpations: Abdomen is soft. There is no mass.      Tenderness: There is abdominal tenderness in the epigastric area. There is no guarding or rebound.      Hernia: No hernia is present.   Musculoskeletal:         General: Normal range of motion.      Cervical back: Normal range of motion and neck supple.   Skin:     General: Skin is warm and dry.   Neurological: "      Mental Status: She is alert and oriented to person, place, and time.      Cranial Nerves: No cranial nerve deficit.   Psychiatric:         Behavior: Behavior normal.         Judgment: Judgment normal.       Results Review:  [facility] records reviewed and discussed with patient.     ASSESSMENT / PLAN  1.  GERD  -Begin pantoprazole 40 mg daily    Return in about 6 months (around 9/9/2021).    I discussed the patients findings and my recommendations with patient    DONALD Serrano

## 2021-03-11 ENCOUNTER — OFFICE VISIT (OUTPATIENT)
Dept: PULMONOLOGY | Facility: CLINIC | Age: 76
End: 2021-03-11

## 2021-03-11 VITALS
OXYGEN SATURATION: 97 % | HEIGHT: 64 IN | BODY MASS INDEX: 30.9 KG/M2 | DIASTOLIC BLOOD PRESSURE: 76 MMHG | HEART RATE: 87 BPM | TEMPERATURE: 96.4 F | SYSTOLIC BLOOD PRESSURE: 122 MMHG | WEIGHT: 181 LBS

## 2021-03-11 DIAGNOSIS — J96.11 CHRONIC RESPIRATORY FAILURE WITH HYPOXIA (HCC): ICD-10-CM

## 2021-03-11 DIAGNOSIS — J44.9 COPD MIXED TYPE (HCC): ICD-10-CM

## 2021-03-11 DIAGNOSIS — K44.9 HIATAL HERNIA: ICD-10-CM

## 2021-03-11 DIAGNOSIS — Z99.89 OSA ON CPAP: ICD-10-CM

## 2021-03-11 DIAGNOSIS — I50.32 CHRONIC DIASTOLIC HEART FAILURE (HCC): ICD-10-CM

## 2021-03-11 DIAGNOSIS — J47.9 IDIOPATHIC BRONCHIECTASIS (HCC): Primary | ICD-10-CM

## 2021-03-11 DIAGNOSIS — G47.33 OSA ON CPAP: ICD-10-CM

## 2021-03-11 DIAGNOSIS — G47.34 NOCTURNAL HYPOXEMIA: ICD-10-CM

## 2021-03-11 DIAGNOSIS — Z85.118 H/O: LUNG CANCER: ICD-10-CM

## 2021-03-11 DIAGNOSIS — Z22.39 MYCOBACTERIUM AVIUM COMPLEX COLONIZATION: ICD-10-CM

## 2021-03-11 PROCEDURE — 99214 OFFICE O/P EST MOD 30 MIN: CPT | Performed by: INTERNAL MEDICINE

## 2021-03-11 NOTE — PROGRESS NOTES
PULMONARY  NOTE    Chief Complaint     Previously resected lung cancer, stage II COPD, former smoker, history of bronchiectasis, RENETTA intolerant of CPAP, nocturnal hypoxemia, valvular heart disease, chronic diastolic heart failure    History of Present Illness     75-year-old female returns today for follow-up  I last saw her on 9/29/2020    She continues to have issues with fluid retention.  Target weight is 177  She indicates that she weighed 184 pounds at home before coming in and 181 pounds by our office scale  She has not taken her diuretics today because she has been traveling    She has obstructive sleep apnea previously on CPAP for which she was intolerant  She now uses supplemental oxygen at night.    She has a history of lung cancer resected in 2016.  Most recent CT scan of the chest done in November revealed no evidence of recurrent disease    She has moderate, stage II, COPD.  She primarily uses albuterol for her COPD symptoms    Patient Active Problem List   Diagnosis   • Bronchiectasis (CMS/HCC)   • H/O: lung cancer (Prior resection 2016)   • Chronic respiratory failure   • RENETTA (Previous CPAP)   • Hiatal hernia (Prior Nissen 2008)   • Mycobacterium avium complex colonization   • Irritable bowel syndrome with diarrhea   • Peripheral edema   • Aortic valve regurgitation   • Benign hypertension   • Impairment of balance   • Peripheral neuropathy   • Peripheral venous insufficiency   • Chronic diastolic heart failure (CMS/HCC)   • Hypokalemia   • Hypomagnesemia   • Nocturnal hypoxemia   • Stage II, moderate, COPD   • Memory loss   • Restless leg syndrome   • Controlled type 2 diabetes mellitus without complication, without long-term current use of insulin (CMS/HCC)   • Vitamin D deficiency   • Mixed hyperlipidemia   • Allergic rhinitis   • Normocytic anemia   • Gastroesophageal reflux disease with esophagitis without hemorrhage   • Acquired hypothyroidism     Allergies   Allergen Reactions   • Penicillins  Other (See Comments)     rash   • Statins Other (See Comments)     myalgia   • Tramadol Nausea And Vomiting and GI Intolerance       Current Outpatient Medications:   •  Accu-Chek Guide test strip, TEST 2 TIMES PER DAY, Disp: , Rfl:   •  albuterol sulfate  (90 Base) MCG/ACT inhaler, Inhale 2 puffs Every 6 (Six) Hours As Needed for Wheezing., Disp: 25.5 g, Rfl: 2  •  cetirizine (zyrTEC) 5 MG tablet, Take 1 tablet by mouth Daily., Disp: 90 tablet, Rfl: 3  •  chlorhexidine (PERIDEX) 0.12 % solution, Apply 15 mL to the mouth or throat 2 (Two) Times a Day. For 14 days, w/1 refill, Disp: , Rfl:   •  diphenoxylate-atropine (Lomotil) 2.5-0.025 MG per tablet, Take 1 tablet by mouth 4 (Four) Times a Day As Needed for Diarrhea., Disp: 30 tablet, Rfl: 1  •  donepezil (ARICEPT) 5 MG tablet, Take 5 mg by mouth Every Night., Disp: , Rfl:   •  DULoxetine (CYMBALTA) 60 MG capsule, TAKE 1 CAPSULE BY MOUTH DAILY, Disp: 90 capsule, Rfl: 3  •  ezetimibe (ZETIA) 10 MG tablet, Take 10 mg by mouth Daily., Disp: , Rfl:   •  fluticasone (Flonase) 50 MCG/ACT nasal spray, 2 sprays into the nostril(s) as directed by provider Daily., Disp: 18.2 mL, Rfl: 11  •  Fluticasone Propionate, Inhal, 113 MCG/ACT aerosol powder , Take 113 mcg by mouth As Needed., Disp: , Rfl:   •  furosemide (LASIX) 40 MG tablet, Take 40 mg by mouth Daily., Disp: , Rfl:   •  glimepiride (AMARYL) 2 MG tablet, TAKE 1 TABLET BY MOUTH TWICE DAILY, Disp: 180 tablet, Rfl: 3  •  latanoprost (XALATAN) 0.005 % ophthalmic solution, USE 1 DROP IN BOTH EYES NIGHTLY, Disp: , Rfl:   •  levothyroxine (SYNTHROID, LEVOTHROID) 50 MCG tablet, Take 50 mcg by mouth Daily., Disp: , Rfl:   •  losartan (COZAAR) 25 MG tablet, Take 25 mg by mouth Daily., Disp: , Rfl:   •  metOLazone (ZAROXOLYN) 2.5 MG tablet, Take 1 tablet by mouth Daily As Needed (for swelling or weight gain of 4 lbs). As directed, Disp: , Rfl:   •  metoprolol succinate XL (TOPROL-XL) 25 MG 24 hr tablet, Take 25 mg by mouth  Daily., Disp: , Rfl:   •  pantoprazole (PROTONIX) 40 MG EC tablet, Take 1 tablet by mouth Daily., Disp: 90 tablet, Rfl: 3  •  potassium chloride (MICRO-K) 10 MEQ CR capsule, Take 30 mEq by mouth 2 (Two) Times a Day., Disp: , Rfl:   •  rOPINIRole XL (REQUIP XL) 8 MG 24 hr tablet, Take 8 mg by mouth 2 (two) times a day., Disp: , Rfl:   •  Spacer/Aero-Holding Chambers (AeroChamber Plus Morris-Vu) misc, As Needed., Disp: , Rfl:   •  timolol (TIMOPTIC) 0.25 % ophthalmic solution, Administer 1 drop to the right eye Every Morning., Disp: , Rfl:   •  Tiotropium Bromide Monohydrate (SPIRIVA RESPIMAT) 1.25 MCG/ACT aerosol solution inhaler, Inhale 2 puffs Daily., Disp: , Rfl:   •  vitamin D (ERGOCALCIFEROL) 1.25 MG (78825 UT) capsule capsule, Take 50,000 Units by mouth 1 (One) Time Per Week., Disp: , Rfl:   MEDICATION LIST AND ALLERGIES REVIEWED.    Family History   Problem Relation Age of Onset   • Colon polyps Mother    • Emphysema Mother    • Hypertension Mother    • Colon polyps Father    • Dementia Father    • Heart disease Father    • Hyperlipidemia Father    • Macular degeneration Father    • Glaucoma Father    • Colon cancer Neg Hx      Social History     Tobacco Use   • Smoking status: Former Smoker     Packs/day: 1.50     Years: 25.00     Pack years: 37.50     Types: Cigarettes     Quit date: 1992     Years since quittin.2   • Smokeless tobacco: Never Used   Vaping Use   • Vaping Use: Never used   Substance Use Topics   • Alcohol use: Yes     Comment: a glass of wine or bourbon a couple times a month socially   • Drug use: No     Social History     Social History Narrative    Previously lived in [facility], recently moved here        Retired    Previously smoked approximately 1 pack cigarettes per day for 25 years and stop smoking in     Drinks 3-4 alcoholic beverages on a weekly basis    caffeine use: 1 serving of coffee or tea weekly     FAMILY AND SOCIAL HISTORY REVIEWED.    Review of Systems  ALSO  "REFER TO SCANNED ROS SHEET FROM SAME DATE.    /76   Pulse 87   Temp 96.4 °F (35.8 °C)   Ht 162.6 cm (64.02\")   Wt 82.1 kg (181 lb)   LMP  (LMP Unknown)   SpO2 97% Comment: 5 liters  BMI 31.05 kg/m²   Physical Exam  Vitals and nursing note reviewed.   Constitutional:       General: She is not in acute distress.     Appearance: She is well-developed. She is not diaphoretic.   HENT:      Head: Normocephalic and atraumatic.   Neck:      Thyroid: No thyromegaly.   Cardiovascular:      Rate and Rhythm: Normal rate and regular rhythm.      Heart sounds: Normal heart sounds. No murmur.   Pulmonary:      Effort: Pulmonary effort is normal.      Breath sounds: No stridor.      Comments: Decreased breath sounds without wheezes or rhonchi  Abdominal:      General: Bowel sounds are normal.      Palpations: Abdomen is soft.   Musculoskeletal:         General: Normal range of motion.      Comments: Pitting ankle edema   Lymphadenopathy:      Cervical: No cervical adenopathy.      Upper Body:      Right upper body: No supraclavicular or epitrochlear adenopathy.      Left upper body: No supraclavicular or epitrochlear adenopathy.   Skin:     General: Skin is warm and dry.   Neurological:      Mental Status: She is alert and oriented to person, place, and time.   Psychiatric:         Behavior: Behavior normal.         Results     CT scan of the chest from 11/30/2020 revealed no suspicious intrathoracic lesions    Immunization History   Administered Date(s) Administered   • COVID-19 (MODERNA) 02/13/2021   • COVID-19 (PFIZER) 02/13/2021   • Flu Vaccine Quad PF >36MO 10/31/2019, 10/25/2020   • Fluzone High Dose =>65 Years ([facility] ONLY) 10/28/2018, 10/21/2019, 10/16/2020   • Influenza TIV (IM) 11/01/2017   • Pneumococcal Conjugate 13-Valent (PCV13) 08/03/2018, 10/28/2018   • Pneumococcal Polysaccharide (PPSV23) 10/16/2020   • Pneumococcal, Unspecified 01/01/2016     Problem List       ICD-10-CM ICD-9-CM   1. Bronchiectasis " (CMS/[facility])  J47.9 494.0   2. Chronic diastolic heart failure (CMS/[facility])  I50.32 428.32   3. Chronic respiratory failure  J96.11 518.83     799.02   4. H/O: lung cancer (Prior resection 2016)  Z85.118 V10.11   5. Hiatal hernia (Prior Nissen 2008)  K44.9 553.3   6. Mycobacterium avium complex colonization  Z22.39 V02.59   7. Nocturnal hypoxemia  G47.34 327.24   8. RENETTA (Previous CPAP)  G47.33 327.23    Z99.89 V46.8   9. Stage II, moderate, COPD  J44.9 496       Discussion     Fluid retention continues to be an ongoing issue  Her target weight is 177 pounds.  She is 4 to 7 pounds above her target weight today  I have encouraged her to double her diuretics as previously recommended for the next couple days    Her reflux symptoms appear worse  I have reinforced reflux precautions.    I recommended a follow-up CT scan of the chest which we will obtain in May 2021  After that point I think we will be able to just follow with annual chest imaging    She is can remain on albuterol on an as-needed basis for her COPD.  We will renew her albuterol    Have encouraged continued use of supplemental oxygen at night and during the day    Moderate level of Medical Decision Making complexity based on 2 or more chronic stable illnesses and prescription drug management.    Vladislav Arboleda MD  Note electronically signed    CC: Dacia Viera MD

## 2021-03-12 ENCOUNTER — TELEPHONE (OUTPATIENT)
Dept: GASTROENTEROLOGY | Facility: CLINIC | Age: 76
End: 2021-03-12

## 2021-03-12 DIAGNOSIS — Z85.118 H/O: LUNG CANCER: Primary | ICD-10-CM

## 2021-03-12 DIAGNOSIS — Z87.891 FORMER SMOKER: ICD-10-CM

## 2021-03-23 ENCOUNTER — APPOINTMENT (OUTPATIENT)
Dept: MAMMOGRAPHY | Facility: HOSPITAL | Age: 76
End: 2021-03-23

## 2021-04-05 ENCOUNTER — OFFICE VISIT (OUTPATIENT)
Dept: INTERNAL MEDICINE | Facility: CLINIC | Age: 76
End: 2021-04-05

## 2021-04-05 VITALS
TEMPERATURE: 98 F | DIASTOLIC BLOOD PRESSURE: 76 MMHG | HEART RATE: 78 BPM | OXYGEN SATURATION: 94 % | SYSTOLIC BLOOD PRESSURE: 122 MMHG | HEIGHT: 64 IN | WEIGHT: 180 LBS | BODY MASS INDEX: 30.73 KG/M2

## 2021-04-05 DIAGNOSIS — R13.19 ESOPHAGEAL DYSPHAGIA: Primary | ICD-10-CM

## 2021-04-05 DIAGNOSIS — K21.00 GASTROESOPHAGEAL REFLUX DISEASE WITH ESOPHAGITIS WITHOUT HEMORRHAGE: ICD-10-CM

## 2021-04-05 PROCEDURE — 99214 OFFICE O/P EST MOD 30 MIN: CPT | Performed by: INTERNAL MEDICINE

## 2021-04-05 RX ORDER — PANTOPRAZOLE SODIUM 40 MG/1
40 TABLET, DELAYED RELEASE ORAL 2 TIMES DAILY
Qty: 180 TABLET | Refills: 3 | Status: SHIPPED | OUTPATIENT
Start: 2021-04-05 | End: 2022-06-01 | Stop reason: SDUPTHER

## 2021-04-05 NOTE — PROGRESS NOTES
Internal Medicine Acute Visit    Chief Complaint   Patient presents with   • Difficulty Swallowing     Ongoing for a year but has worsened        HPI  Ms. Pagan comes in today due to worsening dysphagia. She has had this issue for over a year. She had barium swallow 5-6y ago in [place] that was reportedly normal. She had EGD 2019 which did not show abnormality and she had empiric esophageal dilation. She notes that her dysphagia worsened since then. She vomits about daily while eating. She notes last night that after taking a few sips of cold iced tea she vomited. She has sensation of something stuck in her throat. She continues protonix daily and has cut out vinegar, moon, and sleeps sitting up in her recliner. She has uncontrolled GERD despite this.       Review of Systems  Review of Systems   Constitutional: Negative for unexpected weight loss.   Gastrointestinal: Positive for vomiting, GERD and indigestion. Negative for abdominal pain.        Medications  Current Outpatient Medications on File Prior to Visit   Medication Sig Dispense Refill   • Accu-Chek Guide test strip TEST 2 TIMES PER DAY     • albuterol sulfate  (90 Base) MCG/ACT inhaler Inhale 2 puffs Every 6 (Six) Hours As Needed for Wheezing. 25.5 g 2   • chlorhexidine (PERIDEX) 0.12 % solution Apply 15 mL to the mouth or throat 2 (Two) Times a Day. For 14 days, w/1 refill     • diphenoxylate-atropine (Lomotil) 2.5-0.025 MG per tablet Take 1 tablet by mouth 4 (Four) Times a Day As Needed for Diarrhea. 30 tablet 1   • donepezil (ARICEPT) 5 MG tablet Take 5 mg by mouth Every Night.     • DULoxetine (CYMBALTA) 60 MG capsule TAKE 1 CAPSULE BY MOUTH DAILY 90 capsule 3   • ezetimibe (ZETIA) 10 MG tablet Take 10 mg by mouth Daily.     • fluticasone (Flonase) 50 MCG/ACT nasal spray 2 sprays into the nostril(s) as directed by provider Daily. 18.2 mL 11   • furosemide (LASIX) 40 MG tablet Take 40 mg by mouth Daily.     • glimepiride (AMARYL) 2 MG tablet  TAKE 1 TABLET BY MOUTH TWICE DAILY 180 tablet 3   • latanoprost (XALATAN) 0.005 % ophthalmic solution USE 1 DROP IN BOTH EYES NIGHTLY     • levothyroxine (SYNTHROID, LEVOTHROID) 50 MCG tablet Take 50 mcg by mouth Daily.     • losartan (COZAAR) 25 MG tablet Take 25 mg by mouth Daily.     • metOLazone (ZAROXOLYN) 2.5 MG tablet Take 1 tablet by mouth Daily As Needed (for swelling or weight gain of 4 lbs). As directed     • metoprolol succinate XL (TOPROL-XL) 25 MG 24 hr tablet Take 25 mg by mouth Daily.     • pantoprazole (PROTONIX) 40 MG EC tablet Take 1 tablet by mouth Daily. 90 tablet 3   • potassium chloride (MICRO-K) 10 MEQ CR capsule Take 30 mEq by mouth 2 (Two) Times a Day.     • rOPINIRole XL (REQUIP XL) 8 MG 24 hr tablet Take 8 mg by mouth 2 (two) times a day.     • Spacer/Aero-Holding Chambers (AeroChamber Plus Morris-Vu) misc As Needed.     • timolol (TIMOPTIC) 0.25 % ophthalmic solution Administer 1 drop to the right eye Every Morning.     • Tiotropium Bromide Monohydrate (SPIRIVA RESPIMAT) 1.25 MCG/ACT aerosol solution inhaler Inhale 2 puffs Daily.     • vitamin D (ERGOCALCIFEROL) 1.25 MG (98102 UT) capsule capsule Take 50,000 Units by mouth 1 (One) Time Per Week.     • cetirizine (zyrTEC) 5 MG tablet Take 1 tablet by mouth Daily. 90 tablet 3   • [DISCONTINUED] Fluticasone Propionate, Inhal, 113 MCG/ACT aerosol powder  Take 113 mcg by mouth As Needed.       No current facility-administered medications on file prior to visit.        Allergies  Allergies   Allergen Reactions   • Penicillins Other (See Comments)     rash   • Statins Other (See Comments)     myalgia   • Tramadol Nausea And Vomiting and GI Intolerance       PMH  Past Medical History:   Diagnosis Date   • Bronchiectasis (CMS/HCC)    • Bronchitis 01/2018   • Cancer (CMS/HCC)     History of lung cancer and skin cancer    • Cardiac murmur    • Chronic cough    • Chronic obstructive pulmonary disease (CMS/HCC)    • Colon polyp    • Depression    •  "Diabetes mellitus (CMS/HCC)    • Disease of thyroid gland    • Esophageal dilatation 9/20/2019    Added automatically from request for surgery [id number]   • Esophageal dysphagia 10/24/2019    Added automatically from request for surgery [id number]   • Former smoker (None since 1992) 8/29/2019   • GERD (gastroesophageal reflux disease)    • Glaucoma    • Globus sensation 1/27/2020   • History of colonic polyps    • History of transfusion 1988   • Irritable bowel syndrome     Constipation/diarrhea   • Legally blind    • Lung cancer (CMS/[facility])    • Macular degeneration    • Neuropathy    • Obesity 2/26/2020   • Osteoarthritis    • Oxygen dependent     5L   • Pneumonia    • Sleep apnea    • UTI (urinary tract infection)    • Wears glasses        Objective  Visit Vitals  /76   Pulse 78   Temp 98 °F (36.7 °C)   Ht 162.6 cm (64\")   Wt 81.6 kg (180 lb)   LMP  (LMP Unknown)   SpO2 94%   BMI 30.90 kg/m²        Physical Exam  Physical Exam  Vitals and nursing note reviewed.   Constitutional:       General: She is not in acute distress.     Appearance: She is well-developed. She is obese. She is not ill-appearing.   HENT:      Head: Normocephalic and atraumatic.   Eyes:      Conjunctiva/sclera: Conjunctivae normal.   Pulmonary:      Effort: Pulmonary effort is normal. No respiratory distress.      Comments: Nasal cannula in place, on supplemental oxygen  Skin:     General: Skin is warm and dry.      Coloration: Skin is not jaundiced.   Neurological:      Mental Status: She is alert and oriented to person, place, and time. Mental status is at baseline.      Gait: Gait normal.   Psychiatric:         Mood and Affect: Mood normal.         Behavior: Behavior normal.         Results  Results for orders placed or performed in visit on 02/22/21   Vitamin B12    Specimen: Blood   Result Value Ref Range    Vitamin B-12 292 211 - 946 pg/mL   Comprehensive Metabolic Panel    Specimen: Blood   Result Value Ref Range    Glucose 121 (H) 65 - " 99 mg/dL    BUN 18 8 - 23 mg/dL    Creatinine 0.55 (L) 0.57 - 1.00 mg/dL    Sodium 138 136 - 145 mmol/L    Potassium 3.6 3.5 - 5.2 mmol/L    Chloride 86 (L) 98 - 107 mmol/L    CO2 39.3 (H) 22.0 - 29.0 mmol/L    Calcium 10.2 8.6 - 10.5 mg/dL    Total Protein 7.4 6.0 - 8.5 g/dL    Albumin 4.20 3.50 - 5.20 g/dL    ALT (SGPT) 9 1 - 33 U/L    AST (SGOT) 17 1 - 32 U/L    Alkaline Phosphatase 98 39 - 117 U/L    Total Bilirubin 0.3 0.0 - 1.2 mg/dL    eGFR Non African Amer 107 >60 mL/min/1.73    Globulin 3.2 gm/dL    A/G Ratio 1.3 g/dL    BUN/Creatinine Ratio 32.7 (H) 7.0 - 25.0    Anion Gap 12.7 5.0 - 15.0 mmol/L   TSH Rfx On Abnormal To Free T4    Specimen: Blood   Result Value Ref Range    TSH 1.470 0.270 - 4.200 uIU/mL   CBC (No Diff)    Specimen: Blood   Result Value Ref Range    WBC 10.94 (H) 3.40 - 10.80 10*3/mm3    RBC 4.57 3.77 - 5.28 10*6/mm3    Hemoglobin 12.5 12.0 - 15.9 g/dL    Hematocrit 39.6 34.0 - 46.6 %    MCV 86.7 79.0 - 97.0 fL    MCH 27.4 26.6 - 33.0 pg    MCHC 31.6 31.5 - 35.7 g/dL    RDW 14.3 12.3 - 15.4 %    RDW-SD 45.5 37.0 - 54.0 fl    MPV 9.7 6.0 - 12.0 fL    Platelets 400 140 - 450 10*3/mm3   Microalbumin / Creatinine Urine Ratio - Urine, Clean Catch    Specimen: Urine, Clean Catch   Result Value Ref Range    Microalbumin/Creatinine Ratio      Creatinine, Urine 146.7 mg/dL    Microalbumin, Urine <1.2 mg/dL   Lipid Panel    Specimen: Blood   Result Value Ref Range    Total Cholesterol 207 (H) 0 - 200 mg/dL    Triglycerides 139 0 - 150 mg/dL    HDL Cholesterol 49 40 - 60 mg/dL    LDL Cholesterol  133 (H) 0 - 100 mg/dL    VLDL Cholesterol 25 5 - 40 mg/dL    LDL/HDL Ratio 2.66         Assessment and Plan  Diagnoses and all orders for this visit:    Esophageal dysphagia  - Chronic dysphagia, regurgitation of food  - Differential includes THO, stricture, uncontrolled GERD  - Had EGD 2019 without clear etiology and reports normal barium esophagram when in [place] 5+ years ago.   - Will repeat barium  esophagram to evaluation for THO. Increasing PPI to BID and she will return to see GI.    Gastroesophageal reflux disease with esophagitis without hemorrhage  - Uncontrolled on pantoprazole 40mg daily, increase to BID. Continue dietary and lifestyle modifications.  - Follow up with GI    Health Maintenance  - Pap smear: no longer indicated  - Mammogram: scheduled 5/3.  - Colonoscopy: 2018  - HCV: Need records of past testing  - Immunizations: Flu UTD, pneumovax complete, COVID complete, shingrix #1  - Depression screening: negative 2/2021    Return if symptoms worsen or fail to improve.

## 2021-04-09 ENCOUNTER — TELEPHONE (OUTPATIENT)
Dept: INTERNAL MEDICINE | Facility: CLINIC | Age: 76
End: 2021-04-09

## 2021-04-09 DIAGNOSIS — M62.830 MUSCLE SPASM OF BACK: Primary | ICD-10-CM

## 2021-04-09 RX ORDER — TIZANIDINE 2 MG/1
2 TABLET ORAL NIGHTLY PRN
Qty: 30 TABLET | Refills: 0 | Status: SHIPPED | OUTPATIENT
Start: 2021-04-09 | End: 2021-09-15 | Stop reason: SDUPTHER

## 2021-04-09 NOTE — TELEPHONE ENCOUNTER
I have sent a low dose of tizanidine that she can take at night for her back pain. She needs to watch for drowsiness, dizziness, and be cautious of falls as this can increase the risk of falling. I suggest she follow up with Dr. Monroy for her pain as well.

## 2021-04-13 ENCOUNTER — LAB (OUTPATIENT)
Dept: PREADMISSION TESTING | Facility: HOSPITAL | Age: 76
End: 2021-04-13

## 2021-04-13 PROCEDURE — U0005 INFEC AGEN DETEC AMPLI PROBE: HCPCS

## 2021-04-13 PROCEDURE — C9803 HOPD COVID-19 SPEC COLLECT: HCPCS

## 2021-04-13 PROCEDURE — U0004 COV-19 TEST NON-CDC HGH THRU: HCPCS

## 2021-04-14 LAB — SARS-COV-2 RNA NOSE QL NAA+PROBE: NOT DETECTED

## 2021-04-16 ENCOUNTER — HOSPITAL ENCOUNTER (OUTPATIENT)
Dept: GENERAL RADIOLOGY | Facility: HOSPITAL | Age: 76
Discharge: HOME | End: 2021-04-16
Admitting: INTERNAL MEDICINE

## 2021-04-16 DIAGNOSIS — R13.19 ESOPHAGEAL DYSPHAGIA: ICD-10-CM

## 2021-04-16 PROCEDURE — 63710000001 BARIUM SULFATE 96 % RECONSTITUTED SUSPENSION: Performed by: INTERNAL MEDICINE

## 2021-04-16 PROCEDURE — 74220 X-RAY XM ESOPHAGUS 1CNTRST: CPT

## 2021-04-16 PROCEDURE — A9270 NON-COVERED ITEM OR SERVICE: HCPCS | Performed by: INTERNAL MEDICINE

## 2021-04-16 RX ADMIN — BARIUM SULFATE 183 ML: 960 POWDER, FOR SUSPENSION ORAL at 09:05

## 2021-04-17 PROBLEM — K22.4 ESOPHAGEAL DYSMOTILITY: Status: ACTIVE | Noted: 2021-04-17

## 2021-04-22 ENCOUNTER — OFFICE VISIT (OUTPATIENT)
Dept: GASTROENTEROLOGY | Facility: CLINIC | Age: 76
End: 2021-04-22

## 2021-04-22 ENCOUNTER — PREP FOR SURGERY (OUTPATIENT)
Dept: OTHER | Facility: HOSPITAL | Age: 76
End: 2021-04-22

## 2021-04-22 VITALS
TEMPERATURE: 97.1 F | BODY MASS INDEX: 30.66 KG/M2 | SYSTOLIC BLOOD PRESSURE: 160 MMHG | WEIGHT: 178.6 LBS | HEART RATE: 87 BPM | DIASTOLIC BLOOD PRESSURE: 61 MMHG

## 2021-04-22 DIAGNOSIS — R13.10 DYSPHAGIA, UNSPECIFIED TYPE: Primary | ICD-10-CM

## 2021-04-22 DIAGNOSIS — R13.19 ESOPHAGEAL DYSPHAGIA: ICD-10-CM

## 2021-04-22 DIAGNOSIS — R09.89 GLOBUS SENSATION: ICD-10-CM

## 2021-04-22 DIAGNOSIS — K21.9 GASTROESOPHAGEAL REFLUX DISEASE WITHOUT ESOPHAGITIS: Primary | ICD-10-CM

## 2021-04-22 PROCEDURE — 99214 OFFICE O/P EST MOD 30 MIN: CPT | Performed by: INTERNAL MEDICINE

## 2021-04-22 RX ORDER — KETOCONAZOLE 20 MG/G
1 CREAM TOPICAL DAILY PRN
COMMUNITY
Start: 2021-04-20 | End: 2022-10-26

## 2021-04-22 RX ORDER — KETOCONAZOLE 20 MG/ML
1 SHAMPOO TOPICAL AS NEEDED
COMMUNITY
Start: 2021-04-20 | End: 2022-10-19

## 2021-04-22 RX ORDER — METOCLOPRAMIDE 10 MG/1
TABLET ORAL
Qty: 90 TABLET | Refills: 2 | Status: SHIPPED | OUTPATIENT
Start: 2021-04-22 | End: 2021-04-29 | Stop reason: HOSPADM

## 2021-04-22 NOTE — PROGRESS NOTES
GASTROENTEROLOGY OFFICE NOTE  Lorrie Pagan      [id number]  1945    CARE TEAM  Patient Care Team:  Dacia Viera MD as PCP - General (Internal Medicine)  Elias Chaidez MD as Consulting Physician (Cardiology)    No ref. provider found     Chief Complaint   Patient presents with   • Follow-up   • Heartburn        HISTORY OF PRESENT ILLNESS:  Patient known to me with history of intermittent dysphagia to solids which is responded well to esophageal dilation in the past.  Last one was in November 2019.  She is again having problems with dysphagia to solids.  In addition to this she continues have a persistent lump-like sensation of the sternal notch.    She also has problems with constipation and postprandial abdominal distention, bloating and can regurgitate food sometimes ingested the night prior.  She has rare problems with minimal amounts of rectal bleeding.  Last colonoscopy was in May 2018 at which time she was having problems chronic diarrhea hyperplastic polyp was removed and left-sided diverticulosis was identified.    She denies odynophagia.  She has some early satiety and postprandial fullness and bloating.  She denies unexplained weight loss, melena or particular problems with constipation.    She has problems with chronic diarrhea which has required therapy with colestipol in the past but is currently not problematic.    PAST MEDICAL HISTORY  Past Medical History:   Diagnosis Date   • Bronchiectasis (CMS/HCC)    • Bronchitis 01/2018   • Cancer (CMS/HCC)     History of lung cancer and skin cancer    • Cardiac murmur    • Chronic cough    • Chronic obstructive pulmonary disease (CMS/HCC)    • Colon polyp    • Depression    • Diabetes mellitus (CMS/HCC)    • Disease of thyroid gland    • Esophageal dilatation 9/20/2019    Added automatically from request for surgery [id number]   • Esophageal dysphagia 10/24/2019    Added automatically from request for surgery [id number]   • Former smoker  (None since 1992) 8/29/2019   • GERD (gastroesophageal reflux disease)    • Glaucoma    • Globus sensation 1/27/2020   • History of colonic polyps    • History of transfusion 1988   • Irritable bowel syndrome     Constipation/diarrhea   • Legally blind    • Lung cancer (CMS/HCC)    • Macular degeneration    • Neuropathy    • Obesity 2/26/2020   • Osteoarthritis    • Oxygen dependent     5L   • Pneumonia    • Sleep apnea    • UTI (urinary tract infection)    • Wears glasses         PAST SURGICAL HISTORY  Past Surgical History:   Procedure Laterality Date   • BACK SURGERY     • CATARACT EXTRACTION Bilateral    • CHOLECYSTECTOMY     • COLONOSCOPY     • ENDOSCOPY N/A 11/6/2019    Procedure: ESOPHAGOGASTRODUODENOSCOPY;  Surgeon: Cortes Millan MD;  Location: [facility] ENDOSCOPY;  Service: Gastroenterology   • HAND SURGERY      laceration repair   • INCONTINENCE SURGERY     • LUNG REMOVAL, PARTIAL Left 2016   • NISSEN FUNDOPLICATION     • TOTAL ABDOMINAL HYSTERECTOMY  1988    benign cyst        MEDICATIONS:    Current Outpatient Medications:   •  Accu-Chek Guide test strip, TEST 2 TIMES PER DAY, Disp: , Rfl:   •  albuterol sulfate  (90 Base) MCG/ACT inhaler, Inhale 2 puffs Every 6 (Six) Hours As Needed for Wheezing., Disp: 25.5 g, Rfl: 2  •  cetirizine (zyrTEC) 5 MG tablet, Take 1 tablet by mouth Daily., Disp: 90 tablet, Rfl: 3  •  chlorhexidine (PERIDEX) 0.12 % solution, Apply 15 mL to the mouth or throat 2 (Two) Times a Day. For 14 days, w/1 refill, Disp: , Rfl:   •  diphenoxylate-atropine (Lomotil) 2.5-0.025 MG per tablet, Take 1 tablet by mouth 4 (Four) Times a Day As Needed for Diarrhea., Disp: 30 tablet, Rfl: 1  •  donepezil (ARICEPT) 5 MG tablet, Take 5 mg by mouth Every Night., Disp: , Rfl:   •  DULoxetine (CYMBALTA) 60 MG capsule, TAKE 1 CAPSULE BY MOUTH DAILY, Disp: 90 capsule, Rfl: 3  •  ezetimibe (ZETIA) 10 MG tablet, Take 10 mg by mouth Daily., Disp: , Rfl:   •  fluticasone (Flonase) 50  MCG/ACT nasal spray, 2 sprays into the nostril(s) as directed by provider Daily., Disp: 18.2 mL, Rfl: 11  •  furosemide (LASIX) 40 MG tablet, Take 40 mg by mouth Daily., Disp: , Rfl:   •  glimepiride (AMARYL) 2 MG tablet, TAKE 1 TABLET BY MOUTH TWICE DAILY, Disp: 180 tablet, Rfl: 3  •  ketoconazole (NIZORAL) 2 % cream, , Disp: , Rfl:   •  ketoconazole (NIZORAL) 2 % shampoo, , Disp: , Rfl:   •  latanoprost (XALATAN) 0.005 % ophthalmic solution, USE 1 DROP IN BOTH EYES NIGHTLY, Disp: , Rfl:   •  levothyroxine (SYNTHROID, LEVOTHROID) 50 MCG tablet, Take 50 mcg by mouth Daily., Disp: , Rfl:   •  losartan (COZAAR) 25 MG tablet, Take 25 mg by mouth Daily., Disp: , Rfl:   •  metOLazone (ZAROXOLYN) 2.5 MG tablet, Take 1 tablet by mouth Daily As Needed (for swelling or weight gain of 4 lbs). As directed, Disp: , Rfl:   •  metoprolol succinate XL (TOPROL-XL) 25 MG 24 hr tablet, Take 25 mg by mouth Daily., Disp: , Rfl:   •  pantoprazole (PROTONIX) 40 MG EC tablet, Take 1 tablet by mouth 2 (two) times a day., Disp: 180 tablet, Rfl: 3  •  potassium chloride (MICRO-K) 10 MEQ CR capsule, Take 30 mEq by mouth 2 (Two) Times a Day., Disp: , Rfl:   •  rOPINIRole XL (REQUIP XL) 8 MG 24 hr tablet, Take 8 mg by mouth 2 (two) times a day., Disp: , Rfl:   •  Spacer/Aero-Holding Chambers (AeroChamber Plus Morris-Vu) misc, As Needed., Disp: , Rfl:   •  timolol (TIMOPTIC) 0.25 % ophthalmic solution, Administer 1 drop to the right eye Every Morning., Disp: , Rfl:   •  Tiotropium Bromide Monohydrate (SPIRIVA RESPIMAT) 1.25 MCG/ACT aerosol solution inhaler, Inhale 2 puffs Daily., Disp: , Rfl:   •  tiZANidine (ZANAFLEX) 2 MG tablet, Take 1 tablet by mouth At Night As Needed for Muscle Spasms., Disp: 30 tablet, Rfl: 0  •  vitamin D (ERGOCALCIFEROL) 1.25 MG (20458 UT) capsule capsule, Take 50,000 Units by mouth 1 (One) Time Per Week., Disp: , Rfl:     ALLERGIES  Allergies   Allergen Reactions   • Penicillins Other (See Comments)     rash   • Statins  Other (See Comments)     myalgia   • Tramadol Nausea And Vomiting and GI Intolerance       FAMILY HISTORY:  Family History   Problem Relation Age of Onset   • Colon polyps Mother    • Emphysema Mother    • Hypertension Mother    • Colon polyps Father    • Dementia Father    • Heart disease Father    • Hyperlipidemia Father    • Macular degeneration Father    • Glaucoma Father    • Colon cancer Neg Hx        SOCIAL HISTORY  Social History     Socioeconomic History   • Marital status: Single     Spouse name: Not on file   • Number of children: Not on file   • Years of education: Not on file   • Highest education level: Not on file   Tobacco Use   • Smoking status: Former Smoker     Packs/day: 1.50     Years: 25.00     Pack years: 37.50     Types: Cigarettes     Quit date: 1992     Years since quittin.3   • Smokeless tobacco: Never Used   Vaping Use   • Vaping Use: Never used   Substance and Sexual Activity   • Alcohol use: Yes     Comment: a glass of wine or bourbon a couple times a month socially   • Drug use: No   • Sexual activity: Never     Socioeconomic History:  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.  2 sons and 5 grandchildren.  Rarely drinks alcoholic beverages and quit smoking in .       REVIEW OF SYSTEMS  Review of Systems   Constitutional: Positive for diaphoresis and fatigue. Negative for activity change, appetite change, chills, fever, unexpected weight gain and unexpected weight loss.   HENT: Positive for trouble swallowing. Negative for voice change.    Gastrointestinal: Positive for abdominal distention, abdominal pain, anal bleeding, blood in stool, constipation, diarrhea, nausea, vomiting, GERD and indigestion. Negative for rectal pain.     I reviewed the above-noted review of systems and pertinent/activity are those noted above.    PHYSICAL EXAM   /61 (BP Location: Left arm, Patient Position: Sitting, Cuff Size: Adult)   Pulse 87   Temp 97.1 °F (36.2 °C)  (Temporal)   Wt 81 kg (178 lb 9.6 oz)   LMP  (LMP Unknown)   BMI 30.66 kg/m²   General: Elderly white female wearing nasal cannula oxygen.  HEENT: Anicteric sclerae. Normal oropharynx wearing glasses  Neck: Supple. Without lymphadenopathy  CV: Regular rate and rhythm, S1, S2  Lungs: Decreased breath sounds throughout without rales rhonchi or wheezing  Abdomen:  Soft,non-distended without palpable masses or hepatosplenomeagaly, areas of rebound tenderness or guarding.   Extremeties: Without clubbing or cyanosis.  Trace edema noted  Neurologic:  Alert and oriented x 3 without focal motor or sensory deficits  Rectal exam: deferred       ASSESSMENT  1.-Intermittent dysphagia to solids.  Repeat esophageal dilation recommended  2.-Chronic diarrhea.  Multifactorial.  3.-Gastroparesis by history.  Trial of metoclopramide recommended.  Patient made aware of risk of permanent tardive dyskinesia with prolonged use of metoclopramide  4.-Debilitated status  5.-COPD with supplemental oxygen needs currently requiring 4 to 5 L nasal cannula.  6.-Globus syndrome secondary to GERD    PLAN  1.-Schedule EGD  2.-Trial of metoclopramide 10 mg p.o. 3 times daily  3.-Dietary modification gastroparesis again reviewed      Cortes Millan MD  4/22/2021   15:21 EDT

## 2021-04-27 PROBLEM — R13.10 DYSPHAGIA: Status: ACTIVE | Noted: 2021-04-27

## 2021-04-28 ENCOUNTER — PRE-ADMISSION TESTING (OUTPATIENT)
Dept: PREADMISSION TESTING | Facility: HOSPITAL | Age: 76
End: 2021-04-28

## 2021-04-28 VITALS — BODY MASS INDEX: 29.85 KG/M2 | WEIGHT: 174.82 LBS | HEIGHT: 64 IN

## 2021-04-28 LAB
DEPRECATED RDW RBC AUTO: 50 FL (ref 37–54)
ERYTHROCYTE [DISTWIDTH] IN BLOOD BY AUTOMATED COUNT: 14.9 % (ref 12.3–15.4)
FLUAV RNA RESP QL NAA+PROBE: NOT DETECTED
FLUBV RNA RESP QL NAA+PROBE: NOT DETECTED
HCT VFR BLD AUTO: 38.6 % (ref 34–46.6)
HGB BLD-MCNC: 11.6 G/DL (ref 12–15.9)
MCH RBC QN AUTO: 27.5 PG (ref 26.6–33)
MCHC RBC AUTO-ENTMCNC: 30.1 G/DL (ref 31.5–35.7)
MCV RBC AUTO: 91.5 FL (ref 79–97)
PLATELET # BLD AUTO: 397 10*3/MM3 (ref 140–450)
PMV BLD AUTO: 9.6 FL (ref 6–12)
POTASSIUM SERPL-SCNC: 3.9 MMOL/L (ref 3.5–5.2)
QT INTERVAL: 398 MS
QTC INTERVAL: 441 MS
RBC # BLD AUTO: 4.22 10*6/MM3 (ref 3.77–5.28)
SARS-COV-2 RNA RESP QL NAA+PROBE: NOT DETECTED
WBC # BLD AUTO: 9.85 10*3/MM3 (ref 3.4–10.8)

## 2021-04-28 PROCEDURE — 85027 COMPLETE CBC AUTOMATED: CPT

## 2021-04-28 PROCEDURE — 93010 ELECTROCARDIOGRAM REPORT: CPT | Performed by: INTERNAL MEDICINE

## 2021-04-28 PROCEDURE — 93005 ELECTROCARDIOGRAM TRACING: CPT

## 2021-04-28 PROCEDURE — 36415 COLL VENOUS BLD VENIPUNCTURE: CPT

## 2021-04-28 PROCEDURE — 84132 ASSAY OF SERUM POTASSIUM: CPT

## 2021-04-28 PROCEDURE — C9803 HOPD COVID-19 SPEC COLLECT: HCPCS

## 2021-04-28 PROCEDURE — 87636 SARSCOV2 & INF A&B AMP PRB: CPT

## 2021-04-29 ENCOUNTER — ANESTHESIA EVENT (OUTPATIENT)
Dept: GASTROENTEROLOGY | Facility: HOSPITAL | Age: 76
End: 2021-04-29

## 2021-04-29 ENCOUNTER — HOSPITAL ENCOUNTER (OUTPATIENT)
Facility: HOSPITAL | Age: 76
Setting detail: HOSPITAL OUTPATIENT SURGERY
Discharge: HOME | End: 2021-04-29
Attending: INTERNAL MEDICINE | Admitting: INTERNAL MEDICINE

## 2021-04-29 ENCOUNTER — ANESTHESIA (OUTPATIENT)
Dept: GASTROENTEROLOGY | Facility: HOSPITAL | Age: 76
End: 2021-04-29

## 2021-04-29 VITALS
OXYGEN SATURATION: 97 % | TEMPERATURE: 97.2 F | HEART RATE: 82 BPM | DIASTOLIC BLOOD PRESSURE: 63 MMHG | RESPIRATION RATE: 18 BRPM | SYSTOLIC BLOOD PRESSURE: 140 MMHG

## 2021-04-29 DIAGNOSIS — R13.10 DYSPHAGIA, UNSPECIFIED TYPE: ICD-10-CM

## 2021-04-29 LAB
GLUCOSE BLDC GLUCOMTR-MCNC: 118 MG/DL (ref 70–130)
GLUCOSE BLDC GLUCOMTR-MCNC: 92 MG/DL (ref 70–130)

## 2021-04-29 PROCEDURE — 25010000002 PROPOFOL 10 MG/ML EMULSION: Performed by: NURSE ANESTHETIST, CERTIFIED REGISTERED

## 2021-04-29 PROCEDURE — 88305 TISSUE EXAM BY PATHOLOGIST: CPT | Performed by: INTERNAL MEDICINE

## 2021-04-29 PROCEDURE — 43249 ESOPH EGD DILATION <30 MM: CPT | Performed by: INTERNAL MEDICINE

## 2021-04-29 PROCEDURE — 43239 EGD BIOPSY SINGLE/MULTIPLE: CPT | Performed by: INTERNAL MEDICINE

## 2021-04-29 PROCEDURE — C1726 CATH, BAL DIL, NON-VASCULAR: HCPCS | Performed by: INTERNAL MEDICINE

## 2021-04-29 PROCEDURE — 82962 GLUCOSE BLOOD TEST: CPT

## 2021-04-29 RX ORDER — HYDROMORPHONE HYDROCHLORIDE 1 MG/ML
0.5 INJECTION, SOLUTION INTRAMUSCULAR; INTRAVENOUS; SUBCUTANEOUS
Status: DISCONTINUED | OUTPATIENT
Start: 2021-04-29 | End: 2021-04-29 | Stop reason: HOSPADM

## 2021-04-29 RX ORDER — SODIUM CHLORIDE, SODIUM LACTATE, POTASSIUM CHLORIDE, AND CALCIUM CHLORIDE .6; .31; .03; .02 G/100ML; G/100ML; G/100ML; G/100ML
20 INJECTION, SOLUTION INTRAVENOUS ONCE
Status: COMPLETED | OUTPATIENT
Start: 2021-04-29 | End: 2021-04-29

## 2021-04-29 RX ORDER — FENTANYL CITRATE 50 UG/ML
50 INJECTION, SOLUTION INTRAMUSCULAR; INTRAVENOUS
Status: DISCONTINUED | OUTPATIENT
Start: 2021-04-29 | End: 2021-04-29 | Stop reason: HOSPADM

## 2021-04-29 RX ORDER — FLUTICASONE PROPIONATE AND SALMETEROL 55; 14 UG/1; UG/1
1 POWDER, METERED RESPIRATORY (INHALATION) ONCE AS NEEDED
COMMUNITY
End: 2021-07-19

## 2021-04-29 RX ORDER — FAMOTIDINE 10 MG/ML
20 INJECTION, SOLUTION INTRAVENOUS ONCE
Status: COMPLETED | OUTPATIENT
Start: 2021-04-29 | End: 2021-04-29

## 2021-04-29 RX ORDER — PROPOFOL 10 MG/ML
VIAL (ML) INTRAVENOUS AS NEEDED
Status: DISCONTINUED | OUTPATIENT
Start: 2021-04-29 | End: 2021-04-29 | Stop reason: SURG

## 2021-04-29 RX ADMIN — FAMOTIDINE 20 MG: 10 INJECTION INTRAVENOUS at 11:29

## 2021-04-29 RX ADMIN — PROPOFOL 100 MCG/KG/MIN: 10 INJECTION, EMULSION INTRAVENOUS at 12:45

## 2021-04-29 RX ADMIN — SODIUM CHLORIDE, POTASSIUM CHLORIDE, SODIUM LACTATE AND CALCIUM CHLORIDE 20 ML/HR: 600; 310; 30; 20 INJECTION, SOLUTION INTRAVENOUS at 11:30

## 2021-04-29 RX ADMIN — SODIUM CHLORIDE, POTASSIUM CHLORIDE, SODIUM LACTATE AND CALCIUM CHLORIDE 250 ML: 600; 310; 30; 20 INJECTION, SOLUTION INTRAVENOUS at 13:00

## 2021-04-29 NOTE — ANESTHESIA PREPROCEDURE EVALUATION
Anesthesia Evaluation     Patient summary reviewed and Nursing notes reviewed   NPO Solid Status: > 8 hours  NPO Liquid Status: > 8 hours           Airway   Mallampati: I  TM distance: >3 FB  Neck ROM: full  No difficulty expected  Dental      Pulmonary    (+) lung cancer (L lung 5 yrs ago ), COPD (PRN Breqathing RX) moderate, home oxygen, sleep apnea,   (-) pneumonia, shortness of breath, recent URI  Cardiovascular     ECG reviewed    (+) hypertension, valvular problems/murmurs murmur and AI, CHF Diastolic >=55%, hyperlipidemia,   (-) past MI, angina    ROS comment: ECG Sinus rhythm with 1st degree AV block  Otherwise normal ECG    ECHO EF >56% LA lborderline dilated.  Mild AI TVR RVSP (TR)<35 mmHg       Neuro/Psych  (+) numbness, psychiatric history,     (-) seizures, CVA  GI/Hepatic/Renal/Endo    (+) obesity,  hiatal hernia, GERD,  diabetes mellitus, thyroid problem hypothyroidism  (-) morbid obesity, no renal disease    Musculoskeletal     (+) back pain,   Abdominal    Substance History      OB/GYN          Other   arthritis,    history of cancer    ROS/Med Hx Other: EGD for  DYSPHAGIA                 Anesthesia Plan    ASA 3     general   (PFL )  intravenous induction     Anesthetic plan, all risks, benefits, and alternatives have been provided, discussed and informed consent has been obtained with: patient.    Plan discussed with CRNA.

## 2021-04-29 NOTE — ANESTHESIA POSTPROCEDURE EVALUATION
Patient: Lorrie Pagan    Procedure Summary     Date: 04/29/21 Room / [facility]:  [facility]    Anesthesia Start: 1241 Anesthesia Stop:     Procedure: ESOPHAGOGASTRODUODENOSCOPY (N/A ) Diagnosis:       Dysphagia, unspecified type      (Dysphagia, unspecified type [R13.10])    Surgeons: Cortes Millan MD Provider: Erick Berry MD    Anesthesia Type: general ASA Status: 3          Anesthesia Type: general    Vitals  No vitals data found for the desired time range.          Post Anesthesia Care and Evaluation    Patient location during evaluation: PACU  Patient participation: complete - patient participated  Level of consciousness: awake and responsive to verbal stimuli  Pain score: 2  Pain management: adequate  Airway patency: patent  Anesthetic complications: No anesthetic complications    Cardiovascular status: acceptable  Respiratory status: acceptable  Hydration status: acceptable    Comments: Pt awake and responsive. SV. VSS. Report to RN. Patient Vitals in the past 24 hrs:  04/29/21 1055, BP:125/69, Temp:97.5 °F (36.4 °C), Temp src:Temporal, Pulse:88, SpO2:98 %  133/78. p 72. r 16. t 98.1

## 2021-04-30 ENCOUNTER — TELEPHONE (OUTPATIENT)
Dept: INTERNAL MEDICINE | Facility: CLINIC | Age: 76
End: 2021-04-30

## 2021-04-30 NOTE — TELEPHONE ENCOUNTER
Spoke to Pt and she does not remember who wrote the script. I informed Pt that it was not Dr. Hassan and that we recommended that she call Dr. Chaidez's office to see if it was them. Pt voiced understanding.

## 2021-04-30 NOTE — TELEPHONE ENCOUNTER
Caller: Lorrie Pagan Sangita    Relationship: Self    Best call back number: [phone]    What medications are you currently taking:   Current Outpatient Medications on File Prior to Visit   Medication Sig Dispense Refill   • Accu-Chek Guide test strip TEST 2 TIMES PER DAY     • albuterol sulfate  (90 Base) MCG/ACT inhaler Inhale 2 puffs Every 6 (Six) Hours As Needed for Wheezing. 25.5 g 2   • chlorhexidine (PERIDEX) 0.12 % solution Apply 15 mL to the mouth or throat 2 (Two) Times a Day As Needed. For 14 days, w/1 refill      • diphenoxylate-atropine (Lomotil) 2.5-0.025 MG per tablet Take 1 tablet by mouth 4 (Four) Times a Day As Needed for Diarrhea. 30 tablet 1   • donepezil (ARICEPT) 5 MG tablet Take 5 mg by mouth Every Night.     • DULoxetine (CYMBALTA) 60 MG capsule TAKE 1 CAPSULE BY MOUTH DAILY (Patient taking differently: Take 60 mg by mouth Daily.) 90 capsule 3   • ezetimibe (ZETIA) 10 MG tablet Take 10 mg by mouth Daily.     • fluticasone (Flonase) 50 MCG/ACT nasal spray 2 sprays into the nostril(s) as directed by provider Daily. 18.2 mL 11   • Fluticasone-Salmeterol (AirDuo RespiClick 55/14) 55-14 MCG/ACT aerosol powder  Inhale 1 puff 1 (One) Time As Needed. Airduo     • furosemide (LASIX) 40 MG tablet Take 40 mg by mouth Daily.     • glimepiride (AMARYL) 2 MG tablet TAKE 1 TABLET BY MOUTH TWICE DAILY (Patient taking differently: Take 2 mg by mouth 2 (two) times a day.) 180 tablet 3   • ketoconazole (NIZORAL) 2 % cream Apply 1 application topically to the appropriate area as directed Daily As Needed for Itching. BACK AND LEGS AS NEEDED FOR ITCHING     • ketoconazole (NIZORAL) 2 % shampoo Apply 1 application topically to the appropriate area as directed As Needed for Irritation.     • latanoprost (XALATAN) 0.005 % ophthalmic solution Administer 1 drop to both eyes Daily.     • levothyroxine (SYNTHROID, LEVOTHROID) 50 MCG tablet Take 50 mcg by mouth Daily.     • metOLazone (ZAROXOLYN) 2.5 MG  tablet Take 1 tablet by mouth Daily As Needed (for swelling or weight gain of 4 lbs). As directed     • metoprolol succinate XL (TOPROL-XL) 25 MG 24 hr tablet Take 25 mg by mouth Daily.     • O2 (OXYGEN) Inhale Continuous. 4-5 L/MIN DEPENDING ON ACTIVITY     • pantoprazole (PROTONIX) 40 MG EC tablet Take 1 tablet by mouth 2 (two) times a day. 180 tablet 3   • POTASSIUM CHLORIDE ER PO Take 30 mEq by mouth 2 (Two) Times a Day.     • rOPINIRole XL (REQUIP XL) 8 MG 24 hr tablet Take 8 mg by mouth 2 (two) times a day.     • Spacer/Aero-Holding Chambers (AeroChamber Plus Morris-Vu) misc As Needed.     • timolol (TIMOPTIC) 0.25 % ophthalmic solution Administer 1 drop to the right eye Every Morning.     • Tiotropium Bromide Monohydrate (SPIRIVA RESPIMAT) 1.25 MCG/ACT aerosol solution inhaler Inhale 2 puffs Daily.     • tiZANidine (ZANAFLEX) 2 MG tablet Take 1 tablet by mouth At Night As Needed for Muscle Spasms. 30 tablet 0   • vitamin D (ERGOCALCIFEROL) 1.25 MG (89948 UT) capsule capsule Take 50,000 Units by mouth 1 (One) Time Per Week. Friday?       Current Facility-Administered Medications on File Prior to Visit   Medication Dose Route Frequency Provider Last Rate Last Admin   • [COMPLETED] famotidine (PEPCID) injection 20 mg  20 mg Intravenous Once Erick Berry MD   20 mg at 04/29/21 1129   • [COMPLETED] lactated ringers bolus 500 mL  500 mL Intravenous Once PRN Carmelo King CRNA   250 mL at 04/29/21 1300   • [COMPLETED] lactated ringers solution  20 mL/hr Intravenous Once Erick Berry MD   20 mL/hr at 04/29/21 1130   • [DISCONTINUED] fentaNYL citrate (PF) (SUBLIMAZE) injection 50 mcg  50 mcg Intravenous Q5 Min PRN Carmelo King CRNA       • [DISCONTINUED] HYDROmorphone (DILAUDID) injection 0.5 mg  0.5 mg Intravenous Q5 Min PRN Carmelo King CRNA       • [DISCONTINUED] Propofol (DIPRIVAN) injection   Intravenous PRN Carmelo King CRNA   Stopped at 04/29/21 1300        When did you start taking these  medications: YESTERDAY    Which medication are you concerned about: LOSARTAN     Who prescribed you this medication: N/A    What are your concerns: PATIENT WENT TO PHARMACY AND NOTICED THEY WERE GIVING HER LOSARTAN IS THIS WHAT SHE NEEDS TO BE TAKING    PATIENT STATED THAT SHE THOUGHT SHE WAS TOOK OFF THIS MEDICATION AND COULDN'T REMEMBER THE REASON WHY EITHER    PLEASE ADVISE    How long have you been taking these medications: N/A    How long have you had these concerns: YESTERDAY

## 2021-05-02 PROBLEM — K31.84 GASTROPARESIS: Status: ACTIVE | Noted: 2021-05-02

## 2021-05-03 ENCOUNTER — HOSPITAL ENCOUNTER (OUTPATIENT)
Dept: ULTRASOUND IMAGING | Facility: HOSPITAL | Age: 76
Discharge: HOME | End: 2021-05-03

## 2021-05-03 ENCOUNTER — HOSPITAL ENCOUNTER (OUTPATIENT)
Dept: MAMMOGRAPHY | Facility: HOSPITAL | Age: 76
Discharge: HOME | End: 2021-05-03

## 2021-05-03 DIAGNOSIS — N64.4 BREAST PAIN, RIGHT: ICD-10-CM

## 2021-05-03 PROCEDURE — G0279 TOMOSYNTHESIS, MAMMO: HCPCS

## 2021-05-03 PROCEDURE — 76642 ULTRASOUND BREAST LIMITED: CPT | Performed by: RADIOLOGY

## 2021-05-03 PROCEDURE — 76642 ULTRASOUND BREAST LIMITED: CPT

## 2021-05-03 PROCEDURE — 77066 DX MAMMO INCL CAD BI: CPT

## 2021-05-03 PROCEDURE — 77066 DX MAMMO INCL CAD BI: CPT | Performed by: RADIOLOGY

## 2021-05-03 PROCEDURE — G0279 TOMOSYNTHESIS, MAMMO: HCPCS | Performed by: RADIOLOGY

## 2021-05-10 ENCOUNTER — OFFICE VISIT (OUTPATIENT)
Dept: INTERNAL MEDICINE | Facility: CLINIC | Age: 76
End: 2021-05-10

## 2021-05-10 ENCOUNTER — LAB (OUTPATIENT)
Dept: LAB | Facility: HOSPITAL | Age: 76
End: 2021-05-10

## 2021-05-10 VITALS
SYSTOLIC BLOOD PRESSURE: 130 MMHG | DIASTOLIC BLOOD PRESSURE: 62 MMHG | TEMPERATURE: 96.9 F | RESPIRATION RATE: 18 BRPM | BODY MASS INDEX: 29.98 KG/M2 | HEART RATE: 90 BPM | WEIGHT: 175.6 LBS | OXYGEN SATURATION: 98 % | HEIGHT: 64 IN

## 2021-05-10 DIAGNOSIS — S16.1XXA STRAIN OF NECK MUSCLE, INITIAL ENCOUNTER: ICD-10-CM

## 2021-05-10 DIAGNOSIS — G25.81 RESTLESS LEG SYNDROME: ICD-10-CM

## 2021-05-10 DIAGNOSIS — D64.9 NORMOCYTIC ANEMIA: ICD-10-CM

## 2021-05-10 DIAGNOSIS — K31.84 GASTROPARESIS: ICD-10-CM

## 2021-05-10 DIAGNOSIS — R26.89 IMPAIRMENT OF BALANCE: Primary | ICD-10-CM

## 2021-05-10 DIAGNOSIS — D72.829 LEUKOCYTOSIS, UNSPECIFIED TYPE: ICD-10-CM

## 2021-05-10 DIAGNOSIS — R13.19 ESOPHAGEAL DYSPHAGIA: ICD-10-CM

## 2021-05-10 DIAGNOSIS — R92.8 ABNORMAL MAMMOGRAM: ICD-10-CM

## 2021-05-10 LAB
BASOPHILS # BLD AUTO: 0.06 10*3/MM3 (ref 0–0.2)
BASOPHILS NFR BLD AUTO: 0.5 % (ref 0–1.5)
DEPRECATED RDW RBC AUTO: 43 FL (ref 37–54)
EOSINOPHIL # BLD AUTO: 0.17 10*3/MM3 (ref 0–0.4)
EOSINOPHIL NFR BLD AUTO: 1.6 % (ref 0.3–6.2)
ERYTHROCYTE [DISTWIDTH] IN BLOOD BY AUTOMATED COUNT: 13.7 % (ref 12.3–15.4)
HCT VFR BLD AUTO: 37.9 % (ref 34–46.6)
HGB BLD-MCNC: 12.5 G/DL (ref 12–15.9)
IMM GRANULOCYTES # BLD AUTO: 0.11 10*3/MM3 (ref 0–0.05)
IMM GRANULOCYTES NFR BLD AUTO: 1 % (ref 0–0.5)
LYMPHOCYTES # BLD AUTO: 2.12 10*3/MM3 (ref 0.7–3.1)
LYMPHOCYTES NFR BLD AUTO: 19.4 % (ref 19.6–45.3)
MCH RBC QN AUTO: 28.5 PG (ref 26.6–33)
MCHC RBC AUTO-ENTMCNC: 33 G/DL (ref 31.5–35.7)
MCV RBC AUTO: 86.3 FL (ref 79–97)
MONOCYTES # BLD AUTO: 0.64 10*3/MM3 (ref 0.1–0.9)
MONOCYTES NFR BLD AUTO: 5.9 % (ref 5–12)
NEUTROPHILS NFR BLD AUTO: 7.84 10*3/MM3 (ref 1.7–7)
NEUTROPHILS NFR BLD AUTO: 71.6 % (ref 42.7–76)
NRBC BLD AUTO-RTO: 0 /100 WBC (ref 0–0.2)
PLATELET # BLD AUTO: 406 10*3/MM3 (ref 140–450)
PMV BLD AUTO: 9.5 FL (ref 6–12)
RBC # BLD AUTO: 4.39 10*6/MM3 (ref 3.77–5.28)
WBC # BLD AUTO: 10.94 10*3/MM3 (ref 3.4–10.8)

## 2021-05-10 PROCEDURE — 99214 OFFICE O/P EST MOD 30 MIN: CPT | Performed by: INTERNAL MEDICINE

## 2021-05-10 PROCEDURE — 85025 COMPLETE CBC W/AUTO DIFF WBC: CPT

## 2021-05-10 RX ORDER — DONEPEZIL HYDROCHLORIDE 10 MG/1
10 TABLET, FILM COATED ORAL DAILY
COMMUNITY

## 2021-05-10 NOTE — PROGRESS NOTES
Internal Medicine Acute Visit    Chief Complaint   Patient presents with   • Restless Legs Syndrome   • Neck Pain        HPI  Ms. Pagan comes in today for RLS, neck pain. She notes that 2 weeks ago she had severe restless leg symptoms. She called in to Dr. Saldana and was advised to stop reglan which had been started 3 days prior. She notes this improved her symptoms. She accidentally took a reglan on Friday in place of her pantoprazole and symptoms did return. She will follow up with GI in a couple weeks. She has been advised on gastroparesis diet. She is trying to follow this diet but has limited access to a grocery store. She has had some neck pain attributed to position while sleeping. She notes that muscles were tight. She has had relief with zanaflex PRN but it causes drowsiness. She has not tried heat. She notes unsteadiness on her feet. She has had falls. She has a motorized scooter but only uses it as needed. She does not use a rolling walker or cane. She would like to cut down on her medications.       Review of Systems  Review of Systems   Constitutional: Negative.    HENT: Positive for trouble swallowing.    Gastrointestinal: Positive for nausea, vomiting and indigestion.   Musculoskeletal: Positive for gait problem, myalgias and neck pain.   Neurological:        Restless legs, uncontrollable leg movement        Medications  Current Outpatient Medications on File Prior to Visit   Medication Sig Dispense Refill   • Accu-Chek Guide test strip TEST 2 TIMES PER DAY     • albuterol sulfate  (90 Base) MCG/ACT inhaler Inhale 2 puffs Every 6 (Six) Hours As Needed for Wheezing. 25.5 g 2   • chlorhexidine (PERIDEX) 0.12 % solution Apply 15 mL to the mouth or throat 2 (Two) Times a Day As Needed. For 14 days, w/1 refill      • diphenoxylate-atropine (Lomotil) 2.5-0.025 MG per tablet Take 1 tablet by mouth 4 (Four) Times a Day As Needed for Diarrhea. 30 tablet 1   • donepezil (ARICEPT) 5 MG tablet Take 5 mg  by mouth Every Night.     • DULoxetine (CYMBALTA) 60 MG capsule TAKE 1 CAPSULE BY MOUTH DAILY (Patient taking differently: Take 60 mg by mouth Daily.) 90 capsule 3   • ezetimibe (ZETIA) 10 MG tablet Take 10 mg by mouth Daily.     • fluticasone (Flonase) 50 MCG/ACT nasal spray 2 sprays into the nostril(s) as directed by provider Daily. 18.2 mL 11   • Fluticasone-Salmeterol (AirDuo RespiClick 55/14) 55-14 MCG/ACT aerosol powder  Inhale 1 puff 1 (One) Time As Needed. Airduo     • furosemide (LASIX) 40 MG tablet Take 40 mg by mouth Daily.     • glimepiride (AMARYL) 2 MG tablet TAKE 1 TABLET BY MOUTH TWICE DAILY (Patient taking differently: Take 2 mg by mouth 2 (two) times a day.) 180 tablet 3   • ketoconazole (NIZORAL) 2 % cream Apply 1 application topically to the appropriate area as directed Daily As Needed for Itching. BACK AND LEGS AS NEEDED FOR ITCHING     • ketoconazole (NIZORAL) 2 % shampoo Apply 1 application topically to the appropriate area as directed As Needed for Irritation.     • latanoprost (XALATAN) 0.005 % ophthalmic solution Administer 1 drop to both eyes Daily.     • levothyroxine (SYNTHROID, LEVOTHROID) 50 MCG tablet Take 50 mcg by mouth Daily.     • metOLazone (ZAROXOLYN) 2.5 MG tablet Take 1 tablet by mouth Daily As Needed (for swelling or weight gain of 4 lbs). As directed     • metoprolol succinate XL (TOPROL-XL) 25 MG 24 hr tablet Take 25 mg by mouth Daily.     • O2 (OXYGEN) Inhale Continuous. 4-5 L/MIN DEPENDING ON ACTIVITY     • pantoprazole (PROTONIX) 40 MG EC tablet Take 1 tablet by mouth 2 (two) times a day. 180 tablet 3   • POTASSIUM CHLORIDE ER PO Take 30 mEq by mouth 2 (Two) Times a Day.     • rOPINIRole XL (REQUIP XL) 8 MG 24 hr tablet Take 8 mg by mouth 2 (two) times a day.     • Spacer/Aero-Holding Chambers (AeroChamber Plus Morris-Vu) misc As Needed.     • timolol (TIMOPTIC) 0.25 % ophthalmic solution Administer 1 drop to the right eye Every Morning.     • Tiotropium Bromide Monohydrate  (SPIRIVA RESPIMAT) 1.25 MCG/ACT aerosol solution inhaler Inhale 2 puffs Daily.     • tiZANidine (ZANAFLEX) 2 MG tablet Take 1 tablet by mouth At Night As Needed for Muscle Spasms. 30 tablet 0   • vitamin D (ERGOCALCIFEROL) 1.25 MG (79291 UT) capsule capsule Take 50,000 Units by mouth 1 (One) Time Per Week. Friday?       No current facility-administered medications on file prior to visit.        Allergies  Allergies   Allergen Reactions   • Statins Myalgia     myalgia   • Metoclopramide Other (See Comments)     EXACERBATED RLS   • Penicillins Rash     rash   • Tramadol Nausea And Vomiting and GI Intolerance     VERY MILD PER PT       PMH  Past Medical History:   Diagnosis Date   • Back pain    • Bronchiectasis (CMS/HCC)    • Bronchitis 01/2018   • Cancer (CMS/[facility])     History of lung cancer and skin cancer    • Cardiac murmur    • Chronic cough    • Chronic obstructive pulmonary disease (CMS/HCC)    • Colon polyp    • DDD (degenerative disc disease), lumbar    • Depression    • Diabetes mellitus (CMS/[facility])     DX: 2019, CHECK BS QOD, LAST A1C 6.3??   • Disease of thyroid gland    • Esophageal dilatation 9/20/2019    Added automatically from request for surgery [id number]   • Esophageal dysphagia 10/24/2019    Added automatically from request for surgery [id number]   • Former smoker (None since 1992) 8/29/2019   • GERD (gastroesophageal reflux disease)    • Glaucoma    • Globus sensation 1/27/2020   • History of colonic polyps    • History of transfusion 1988    NO REACTION    • Hyperlipidemia    • Hypertension    • Irritable bowel syndrome     Constipation/diarrhea   • Legally blind    • Lung cancer (CMS/[facility])    • Macular degeneration    • Neuropathy    • Obesity 2/26/2020   • Osteoarthritis    • Oxygen dependent     5L   • Pneumonia    • Sleep apnea     NON COMPLIANT WITH CPAP USE   • UTI (urinary tract infection)    • Wears glasses        Objective  Visit Vitals  /62   Pulse 90   Temp 96.9 °F (36.1 °C)   Resp 18  "  Ht 162.6 cm (64.02\")   Wt 79.7 kg (175 lb 9.6 oz)   LMP  (LMP Unknown)   SpO2 98%   BMI 30.13 kg/m²        Physical Exam  Physical Exam  Vitals and nursing note reviewed.   Constitutional:       General: She is not in acute distress.     Appearance: She is well-developed. She is not ill-appearing.      Comments: On supplemental oxygen   HENT:      Head: Normocephalic and atraumatic.   Eyes:      Conjunctiva/sclera: Conjunctivae normal.   Pulmonary:      Effort: Pulmonary effort is normal. No respiratory distress.   Musculoskeletal:         General: Tenderness (mild tenderness and muscle tension in R neck) present. No swelling or deformity.   Skin:     General: Skin is warm and dry.   Neurological:      Mental Status: She is alert and oriented to person, place, and time. Mental status is at baseline.      Gait: Gait abnormal (slow, unsteady).         Results  Results for orders placed or performed during the hospital encounter of 04/29/21   POC Glucose Once    Specimen: Blood   Result Value Ref Range    Glucose 118 70 - 130 mg/dL   POC Glucose Once    Specimen: Blood   Result Value Ref Range    Glucose 92 70 - 130 mg/dL   Tissue Pathology Exam    Specimen: Esophagus; Tissue   Result Value Ref Range    Case Report       Surgical Pathology Report                         Case: [id number]                                  Authorizing Provider:  Cortes Millan     Collected:           04/29/2021 12:52 PM                                 MD Michael                                                                    Ordering Location:     [facility]   Received:            04/29/2021 03:13 PM                                 [facility]                                                                  Pathologist:           Jf Kiser MD                                                        Specimen:    Esophagus, distal esophageal bx                                                            " Clinical Information       Dysphagia      Final Diagnosis       DISTAL ESOPHAGUS BIOPSY:  Scant squamous mucosa with basal layer hyperplasia, vascular ectasia, and mild increase in intraepithelial leukocytes without eosinophils.  Gastric cardia type mucosa showing mild chronic gastritis without activity with reactive features.  Negative for intestinal metaplasia and dysplasia.  Histologic changes present are suggestive of reflux esophagitis.      Gross Description          Specimen is received in formalin labeled distal esophagus biopsy and consists of 2 tan soft tissue fragments aggregating 0.4 x 0.4 x 0.2 cm submitted entirely in a single cassette.  HM      Microscopic Description       The slides are reviewed and demonstrate histopathologic features supporting the above rendered diagnosis.            Assessment and Plan  Diagnoses and all orders for this visit:    Impairment of balance  - has chronic neuropathy and debility. Gait unsteady in office without assist device.  - Recommend use of rolling walker and script provided. Has motorized scooter for longer distances.  - PT referral    Gastroparesis  - Intolerant of reglan due to restlessness, worsened RLS  - Discussed gastroparesis diet and handout provided from [facility] with recommended foods, recipes, foods to avoid    Esophageal dysphagia  - Recent EGD with reflux esophagitis and empiric dilation to 20mm. On PPI.  - Follows with Millan    Restless leg syndrome  - Managed by Dr. Saldana, on ropinirole XR 8mg BID  - Controlled since stopping reglan    Strain of neck muscle, initial encounter  - Improving with minimal tenderness and tension in R neck muscles on exam  - Recommend use of zanaflex qhs PRN and heat    Normocytic anemia  - CBC at end of April with new anemia, hgb 11.6 with normal MCV. Has had intermittent anemia in the past.  - Will repeat CBC and obtain Psmear    Abnormal mammogram  - Had mammogram in 5/2021 with L axillary adenopathy  possibly related to COVID and shingrix vaccines.  - Repeat mammogram and/or US in [address] [place] [address]  - Pap smear: no longer indicated  - Mammogram: 5/2021 BIR[facility] 3, repeat .  - Colonoscopy: 2018  - HCV: Need records of past testing  - Immunizations: Flu UTD, pneumovax complete, COVID complete, shingrix #1  - Depression screening: negative 2/2021      Return in about 6 weeks (around 6/21/2021) for follow up, Labs today.

## 2021-05-11 LAB
LAB AP CASE REPORT: NORMAL
LAB AP CLINICAL INFORMATION: NORMAL
PATH REPORT.FINAL DX SPEC: NORMAL
PATH REPORT.GROSS SPEC: NORMAL
PATHOLOGY REVIEW: YES

## 2021-05-18 ENCOUNTER — TELEPHONE (OUTPATIENT)
Dept: INTERNAL MEDICINE | Facility: CLINIC | Age: 76
End: 2021-05-18

## 2021-05-18 NOTE — TELEPHONE ENCOUNTER
Caller: Ej Lorrie Sangita    Relationship: Self    Best call back number: [phone]    What medication are you requesting: ANTIBIOTIC    What are your current symptoms: MISSING CROWN THAT IS SWOLLEN AND SORES ON HER TONGUE    How long have you been experiencing symptoms: 3 DAYS    Have you had these symptoms before:    [x] Yes  [] No    Have you been treated for these symptoms before:   [x] Yes  [] No    If a prescription is needed, what is your preferred pharmacy and phone number: [facility] [place], [place] - [address] [place] [address] - [phone]  - [phone]     Additional notes: PATIENT CALLING IN TO SUGGEST THAT THE HIGH WHITE CELL COUNT IS COMING FROM AN INFECTING IN HER MOUTH. SHE HAS A MISSING CROWN AND THERE IS SWELLING AND SORES ON HER TONGUE. SHE IS REQUESTING AN ANTIBIOTIC CALLED IN TO THE PHARMACY. PLEASE CALL AND ADVICE -[phone]

## 2021-05-18 NOTE — TELEPHONE ENCOUNTER
I would suggest she follow up with her dentist regarding missing crown and possible dental infection.

## 2021-05-19 ENCOUNTER — APPOINTMENT (OUTPATIENT)
Dept: CT IMAGING | Facility: HOSPITAL | Age: 76
End: 2021-05-19

## 2021-05-24 ENCOUNTER — TREATMENT (OUTPATIENT)
Dept: PHYSICAL THERAPY | Facility: CLINIC | Age: 76
End: 2021-05-24

## 2021-05-24 DIAGNOSIS — R26.2 DIFFICULTY WALKING: Primary | ICD-10-CM

## 2021-05-24 DIAGNOSIS — R26.89 BALANCE DISORDER: ICD-10-CM

## 2021-05-24 DIAGNOSIS — Z91.81 RISK FOR FALLS: ICD-10-CM

## 2021-05-24 PROCEDURE — 97163 PT EVAL HIGH COMPLEX 45 MIN: CPT | Performed by: PHYSICAL THERAPIST

## 2021-05-24 NOTE — PROGRESS NOTES
Physical Therapy Initial Evaluation and Plan of Care        Patient: Lorrie Pagan   : 1945  Diagnosis/ICD-10 Code:  Difficulty walking [R26.2]  Referring practitioner: Dacia Viera MD  Date of Initial Visit: 2021  Today's Date: 2021  Patient seen for 1 sessions           Subjective Evaluation    History of Present Illness  Mechanism of injury: About 10-11 years ago started noticing balance issues.  Was manageable until about 3 years ago, at that time she was diagnosed with neuropathy of bilateral feet. She has poor vision, so she bends over a lot to see things and feels this keeps her balance too far forward. History of 2 lumbar surgery, possible discectomy and a spacer placed in  and .  History of lung cancer, and COPD.  On O2 constant.  History of Rest Leg Syndrome (RLS). A spinal cord stimulator (Nevro, by Dr. Sylvie Monroy) was place in lumbar spine 2020, has helped with leg and feet symptoms.  Started noticing severe gait and balance issues about 3 years ago.  3 falls in the last year, 2 have been falling asleep on toilet and fell off.      COMPLAINTS  Neck stiffness, has turn body to see things.  Balance issues, and difficultly walking.  Numbness and pain bottom of bilateral feet, (left seems slightly worse).  Symptoms are worse when standing/walking. Standing also fatigues pt quickly.      Patient Occupation: Retired Pain  Current pain ratin (Neck pain)             Objective          Static Posture     Head  Forward.    Shoulders  Rounded.    Scapulae  Left protracted and right protracted.    Thoracic Spine  Hyperkyphosis.    Lumbar Spine   Flattened.     Active Range of Motion   Cervical/Thoracic Spine   Cervical    Flexion: 65 degrees   Extension: 15 degrees   Left lateral flexion: 12 degrees   Right lateral flexion: 8 degrees   Left rotation: 50 degrees   Right rotation: 45 degrees   Left Shoulder   Normal active range of motion    Right Shoulder    Normal active range of motion    Lumbar   Flexion: 75 degrees   Extension: 0 degrees   Left lateral flexion: 15 degrees   Right lateral flexion: 15 degrees     Strength/Myotome Testing     Left Hip   Planes of Motion   Flexion: 4-  Extension: 4  Abduction: 4  Adduction: 4    Right Hip   Planes of Motion   Flexion: 4-  Extension: 4  Abduction: 4  Adduction: 4    Left Knee   Flexion: 5  Extension: 4+    Right Knee   Extension: 4+    Left Ankle/Foot   Dorsiflexion: 4+  Inversion: 4+  Eversion: 5  Great toe extension: 5    Right Ankle/Foot   Dorsiflexion: 4+  Inversion: 4+  Eversion: 5  Great toe extension: 5    Ambulation   Weight-Bearing Status   Weight-Bearing Status (Left): full weight bearing   Weight-Bearing Status (Right): full weight-bearing     Additional Weight-Bearing Status Details  Use O2 tank larry to ambulate    Observational Gait   Decreased walking speed and stride length.     Quality of Movement During Gait   Trunk  Forward lean.     Functional Assessment     Single Leg Stance   Left: 0 seconds  Right: 0 seconds          Assessment & Plan     Assessment  Impairments: abnormal coordination, abnormal gait, abnormal or restricted ROM, impaired balance and impaired physical strength  Other impairment: Tinetti: 15.  DGI: 12  Assessment details: 75 year old female presents with balance dysfunction, gait abnormalities, and difficultly walking.  She has several factors contributing to these issues: poor eye sight, bilateral LE neuropathy, weakness of trunk and LEs, possible cervical spine derangements, and lumbar pathologies.  At this point I do not suspect any disequilibrium issues related to ear or vestibular nerve.  She is a very high fall risk  Prognosis: fair  Functional Limitations: carrying objects, walking, standing, stooping and reaching overhead  Goals  Plan Goals: STG to be met in 4 weeks  1.  Pt has improved hip stabilization strength to 4+/5.  2.  Pt has reduced fall risk with an improved  Tinetti score to 19.  3.  Pt has improved functional gait with a  DGI score of 16.  4.  Pt is more consistent with use of a walker to reduce fall risk.  LTG to be met in 12 weeks  1.  Pt is consistent with following through of HEP.  2.  Pt has maximum strength available for age at 5/5 for LE throughout.  3.  Pt has reduced fall risk with an improved Tinetti score to 24.  4.  Pt has improved functional gait with a  DGI score of 20 or greater.  5.  Pt has improved endurance with ability to ambulate up to 500 ft with controlled breathing.    Plan  Therapy options: will be seen for skilled physical therapy services  Planned modality interventions: high voltage pulsed current (pain management) and ultrasound  Planned therapy interventions: transfer training, therapeutic activities, stretching, strengthening, spinal/joint mobilization, soft tissue mobilization, postural training, neuromuscular re-education, manual therapy, joint mobilization, home exercise program, gait training, functional ROM exercises and balance/weight-bearing training  Frequency: 2x week  Duration in weeks: 12  Treatment plan discussed with: patient          Total Treatment:     50   mins    PT SIGNATURE: Red Francis PT   DATE TREATMENT INITIATED: 5/24/2021    Medicare Initial Certification  Certification Period: 8/22/2021  I certify that the therapy services are furnished while this patient is under my care.  The services outlined above are required by this patient, and will be reviewed every 90 days.     PHYSICIAN: ______________________________________________________      Dacia Viera MD        DATE: ___________________________________________________________    Please sign and return via fax to [phone].. Thank you, [facility].

## 2021-05-26 ENCOUNTER — TELEPHONE (OUTPATIENT)
Dept: INTERNAL MEDICINE | Facility: CLINIC | Age: 76
End: 2021-05-26

## 2021-05-26 ENCOUNTER — TREATMENT (OUTPATIENT)
Dept: PHYSICAL THERAPY | Facility: CLINIC | Age: 76
End: 2021-05-26

## 2021-05-26 DIAGNOSIS — R26.2 DIFFICULTY WALKING: Primary | ICD-10-CM

## 2021-05-26 PROCEDURE — 97110 THERAPEUTIC EXERCISES: CPT | Performed by: PHYSICAL THERAPIST

## 2021-05-26 PROCEDURE — 97530 THERAPEUTIC ACTIVITIES: CPT | Performed by: PHYSICAL THERAPIST

## 2021-05-26 PROCEDURE — 97112 NEUROMUSCULAR REEDUCATION: CPT | Performed by: PHYSICAL THERAPIST

## 2021-05-26 NOTE — TELEPHONE ENCOUNTER
Caller: Lorrie Pagan    Relationship: Self    Best call back number: [phone]    Equipment requested: PRESCRIPTION FOR A WALKER    Additional information or concerns: PATIENT STATED DR. SETHI GAVE HER A WRITTEN PRESCRIPTION FOR A WALKER BUT SHE HAS LOST IT AND WOULD LIKE TO KNOW IF A NEW PRESCRIPTION COULD BE PROVIDED TO HER

## 2021-05-26 NOTE — PROGRESS NOTES
Physical Therapy Daily Progress Note        Patient: Lorrie Pagan   : 1945  Diagnosis/ICD-10 Code:  Difficulty walking [R26.2]  Referring practitioner: Dacia Viera MD  Date of Initial Visit: Type: THERAPY  Noted: 2021  Today's Date: 2021  Patient seen for 2 sessions             Subjective   Lorrie Pagan reports: gets dizzy when she closes her eyes. She is going back and forth on getting the Rolator for walking. Neck is bothering her today.     Objective   Pt tends to fall back when closing eyes but improved with conscious focus on senses.   See Exercise, Manual, and Modality Logs for complete treatment.       Assessment/Plan  Pt was able to decrease LOB and dizziness with focusing on poster, creating picture in mind when eyes are closed and finding the poster again when opening the eyes. Proprioception is skewed possibly due to neuropathy and vision loss. Pt will continue to benefit from strengthening for balance and re-training proprioception senses. Talked to patient about benefits of Rolator to decrease falls due to vision and balance impairments, as well as, oxygen tank.    Progress per Plan of Care and Progress strengthening /stabilization /functional activity           Timed:  Manual Therapy:         mins  12627;  Therapeutic Exercise:    13     mins  92412;     Neuromuscular Jonatan:    20    mins  65316;    Therapeutic Activity:     10     mins  68830;     Gait Training:           mins  21743;     Ultrasound:          mins  58299;    Electrical Stimulation:         mins  74890;  Iontophoresis          mins  44190    Untimed:  Electrical Stimulation:         mins  55052 (MC );  Mechanical Traction:         mins  30694;   Fluidotherapy          mins  63413    Timed Treatment:   43   mins   Total Treatment:     43   mins        Fatmata Cortés PTA  Physical Therapist Assistant

## 2021-06-01 ENCOUNTER — TREATMENT (OUTPATIENT)
Dept: PHYSICAL THERAPY | Facility: CLINIC | Age: 76
End: 2021-06-01

## 2021-06-01 DIAGNOSIS — R26.2 DIFFICULTY WALKING: Primary | ICD-10-CM

## 2021-06-01 PROCEDURE — 97530 THERAPEUTIC ACTIVITIES: CPT | Performed by: PHYSICAL THERAPIST

## 2021-06-01 PROCEDURE — 97110 THERAPEUTIC EXERCISES: CPT | Performed by: PHYSICAL THERAPIST

## 2021-06-01 PROCEDURE — 97112 NEUROMUSCULAR REEDUCATION: CPT | Performed by: PHYSICAL THERAPIST

## 2021-06-01 NOTE — PROGRESS NOTES
Physical Therapy Daily Progress Note        Patient: Lorrie Pagan   : 1945  Diagnosis/ICD-10 Code:  Difficulty walking [R26.2]  Referring practitioner: Dacia Viera MD  Date of Initial Visit: Type: THERAPY  Noted: 2021  Today's Date: 2021  Patient seen for 3 sessions             Subjective   Lorrie Pagan reports: back has been hurting more from packing and moving things. Also having some problems keeping her sugars under control which did make her feel more dizzy and not herself.     Objective          Static Posture     Shoulders  Rounded.    Thoracic Spine  Hyperkyphosis.    Hip   Hip (Left): Increased flexion.   Hip (Right): Increased flexion.     Knee   Knee (Left): Flexed.   Knee (Right): Flexed.         See Exercise, Manual, and Modality Logs for complete treatment.       Assessment/Plan  Went over good lumbar support in a seat due to patient has low back pain regularly from recliner or cushion chairs. Pt found a lot of pain relief with a lumbar cushion and will trial using at home to decrease pain because it is affecting her ability to stand tall increasing feeling of falling over. Progressed exercises to incorporate endurance. Minimal to no dizziness with eyes closed today and able to self correct postural stance after demonstration/explanation.  Progress per Plan of Care and Progress strengthening /stabilization /functional activity           Timed:  Manual Therapy:         mins  59447;  Therapeutic Exercise:    15     mins  34290;     Neuromuscular Jonatan:   12     mins  49146;    Therapeutic Activity:     26     mins  12752;     Gait Training:           mins  38551;     Ultrasound:          mins  81882;    Electrical Stimulation:         mins  78692;  Iontophoresis          mins  52633    Untimed:  Electrical Stimulation:         mins  11223 ( );  Mechanical Traction:         mins  96495;   Fluidotherapy          mins  67238    Timed Treatment:   53   mins   Total  Treatment:     53   mins        Fatmata Cortés, WILLY  Physical Therapist Assistant

## 2021-06-02 ENCOUNTER — HOSPITAL ENCOUNTER (OUTPATIENT)
Dept: CT IMAGING | Facility: HOSPITAL | Age: 76
Discharge: HOME | End: 2021-06-02
Admitting: NURSE PRACTITIONER

## 2021-06-02 DIAGNOSIS — Z87.891 FORMER SMOKER: ICD-10-CM

## 2021-06-02 DIAGNOSIS — Z85.118 H/O: LUNG CANCER: ICD-10-CM

## 2021-06-02 PROCEDURE — 71250 CT THORAX DX C-: CPT

## 2021-06-03 ENCOUNTER — TREATMENT (OUTPATIENT)
Dept: PHYSICAL THERAPY | Facility: CLINIC | Age: 76
End: 2021-06-03

## 2021-06-03 DIAGNOSIS — R26.2 DIFFICULTY WALKING: Primary | ICD-10-CM

## 2021-06-03 PROCEDURE — 97530 THERAPEUTIC ACTIVITIES: CPT | Performed by: PHYSICAL THERAPIST

## 2021-06-03 PROCEDURE — 97112 NEUROMUSCULAR REEDUCATION: CPT | Performed by: PHYSICAL THERAPIST

## 2021-06-03 PROCEDURE — 97110 THERAPEUTIC EXERCISES: CPT | Performed by: PHYSICAL THERAPIST

## 2021-06-04 NOTE — PROGRESS NOTES
Physical Therapy Daily Progress Note        Patient: Lorrie Pagan   : 1945  Diagnosis/ICD-10 Code:  Difficulty walking [R26.2]  Referring practitioner: Dacia Viera MD  Date of Initial Visit: Type: THERAPY  Noted: 2021  Today's Date: 2021  Patient seen for 4 sessions             Subjective   Lorrie Pagan reports: got dropped off at the wrong building and had to walk to this building on her own about 700 feet. She notes fatigue in the legs towards the end. The lumbar cushion works really well but only in certain chairs.     Objective   See Exercise, Manual, and Modality Logs for complete treatment.       Assessment/Plan  Pt was able to complete all exercises with longer rest breaks due to long walk to get here. She is progressing well and balance with EC is good.   Progress per Plan of Care and Progress strengthening /stabilization /functional activity           Timed:  Manual Therapy:         mins  88476;  Therapeutic Exercise:    15     mins  10425;     Neuromuscular Jonatan:    12    mins  82298;    Therapeutic Activity:     26     mins  08409;     Gait Training:           mins  10914;     Ultrasound:          mins  81176;    Electrical Stimulation:         mins  71423;  Iontophoresis          mins  44491    Untimed:  Electrical Stimulation:         mins  59884 ( );  Mechanical Traction:         mins  01484;   Fluidotherapy          mins  45527    Timed Treatment:   53   mins   Total Treatment:     53   mins        Fatmata Cortés PTA  Physical Therapist Assistant

## 2021-06-08 ENCOUNTER — TELEPHONE (OUTPATIENT)
Dept: PHYSICAL THERAPY | Facility: CLINIC | Age: 76
End: 2021-06-08

## 2021-06-10 ENCOUNTER — TREATMENT (OUTPATIENT)
Dept: PHYSICAL THERAPY | Facility: CLINIC | Age: 76
End: 2021-06-10

## 2021-06-10 DIAGNOSIS — R26.2 DIFFICULTY WALKING: Primary | ICD-10-CM

## 2021-06-10 PROCEDURE — 97110 THERAPEUTIC EXERCISES: CPT | Performed by: PHYSICAL THERAPIST

## 2021-06-10 PROCEDURE — 97530 THERAPEUTIC ACTIVITIES: CPT | Performed by: PHYSICAL THERAPIST

## 2021-06-10 NOTE — PROGRESS NOTES
Physical Therapy Daily Progress Note        Patient: Lorrie Pagan   : 1945  Diagnosis/ICD-10 Code:  Difficulty walking [R26.2]  Referring practitioner: Dacia Viera MD  Date of Initial Visit: Type: THERAPY  Noted: 2021  Today's Date: 6/10/2021  Patient seen for 5 sessions             Subjective   Lorrie Pagan reports: balance has gotten better and surprised she hasn't fallen in the home with all the packing she is doing.     Objective   30 sec STS test: 11 reps  See Exercise, Manual, and Modality Logs for complete treatment.       Assessment/Plan  Progressed patient on exercises to challenge balance and strength more.   Progress per Plan of Care and Progress strengthening /stabilization /functional activity           Timed:  Manual Therapy:         mins  42948;  Therapeutic Exercise:    30     mins  55213;     Neuromuscular Jonatan:        mins  49466;    Therapeutic Activity:     15     mins  38188;     Gait Training:           mins  22344;     Ultrasound:          mins  66814;    Electrical Stimulation:         mins  91804;  Iontophoresis          mins  15245    Untimed:  Electrical Stimulation:         mins  88142 ( );  Mechanical Traction:         mins  92098;   Fluidotherapy          mins  82357    Timed Treatment:   45   mins   Total Treatment:     62   mins        Fatmata Cortés PTA  Physical Therapist Assistant

## 2021-06-14 ENCOUNTER — OFFICE VISIT (OUTPATIENT)
Dept: INTERNAL MEDICINE | Facility: CLINIC | Age: 76
End: 2021-06-14

## 2021-06-14 VITALS
WEIGHT: 173.2 LBS | HEART RATE: 98 BPM | DIASTOLIC BLOOD PRESSURE: 60 MMHG | SYSTOLIC BLOOD PRESSURE: 134 MMHG | TEMPERATURE: 97.1 F | OXYGEN SATURATION: 96 % | RESPIRATION RATE: 18 BRPM | HEIGHT: 64 IN | BODY MASS INDEX: 29.57 KG/M2

## 2021-06-14 DIAGNOSIS — M50.30 DEGENERATIVE DISC DISEASE, CERVICAL: ICD-10-CM

## 2021-06-14 DIAGNOSIS — Z85.118 H/O: LUNG CANCER: ICD-10-CM

## 2021-06-14 DIAGNOSIS — M51.36 DEGENERATIVE DISC DISEASE, LUMBAR: Primary | ICD-10-CM

## 2021-06-14 DIAGNOSIS — E11.9 CONTROLLED TYPE 2 DIABETES MELLITUS WITHOUT COMPLICATION, WITHOUT LONG-TERM CURRENT USE OF INSULIN (HCC): ICD-10-CM

## 2021-06-14 DIAGNOSIS — R13.19 ESOPHAGEAL DYSPHAGIA: ICD-10-CM

## 2021-06-14 DIAGNOSIS — K31.84 GASTROPARESIS: ICD-10-CM

## 2021-06-14 DIAGNOSIS — R26.89 IMPAIRMENT OF BALANCE: ICD-10-CM

## 2021-06-14 PROBLEM — M51.369 DEGENERATIVE DISC DISEASE, LUMBAR: Status: ACTIVE | Noted: 2021-06-14

## 2021-06-14 PROCEDURE — 99214 OFFICE O/P EST MOD 30 MIN: CPT | Performed by: INTERNAL MEDICINE
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
## 2021-06-15 ENCOUNTER — TREATMENT (OUTPATIENT)
Dept: PHYSICAL THERAPY | Facility: CLINIC | Age: 76
End: 2021-06-15

## 2021-06-15 DIAGNOSIS — R26.2 DIFFICULTY WALKING: Primary | ICD-10-CM

## 2021-06-15 PROCEDURE — 97530 THERAPEUTIC ACTIVITIES: CPT | Performed by: PHYSICAL THERAPIST

## 2021-06-15 PROCEDURE — 97110 THERAPEUTIC EXERCISES: CPT | Performed by: PHYSICAL THERAPIST

## 2021-06-15 PROCEDURE — 97112 NEUROMUSCULAR REEDUCATION: CPT | Performed by: PHYSICAL THERAPIST

## 2021-06-15 NOTE — PROGRESS NOTES
Physical Therapy Daily Progress Note        Patient: Lorrie Pagan   : 1945  Diagnosis/ICD-10 Code:  Difficulty walking [R26.2]  Referring practitioner: Dacia Viera MD  Date of Initial Visit: Type: THERAPY  Noted: 2021  Today's Date: 6/15/2021  Patient seen for 6 sessions             Subjective   Lorrie Pagan reports: has not been having any of the dizziness and feeling more balanced. Still having back and neck pain and the doctor wanted therapy for this. The MD is pleased with how much her balance has improved    Objective   SLS eyes open  R: >30 sec  L: 26 sec  See Exercise, Manual, and Modality Logs for complete treatment.       Assessment/Plan  Significant improvements this week in balance and strength noted. Progressed to challenge proximal stabilization to erect the spine that could be contributing to pain.   Progress per Plan of Care and Progress strengthening /stabilization /functional activity           Timed:  Manual Therapy:         mins  41631;  Therapeutic Exercise:    35     mins  90546;     Neuromuscular Jonatan:   15     mins  03208;    Therapeutic Activity:     10     mins  66614;     Gait Training:           mins  75307;     Ultrasound:          mins  33041;    Electrical Stimulation:         mins  58316;  Iontophoresis          mins  41530    Untimed:  Electrical Stimulation:         mins  17181 ( );  Mechanical Traction:         mins  65845;   Fluidotherapy          mins  98214    Timed Treatment:   60   mins   Total Treatment:     60   mins        Fatmata Cortés PTA  Physical Therapist Assistant

## 2021-06-17 ENCOUNTER — TREATMENT (OUTPATIENT)
Dept: PHYSICAL THERAPY | Facility: CLINIC | Age: 76
End: 2021-06-17

## 2021-06-17 DIAGNOSIS — R26.2 DIFFICULTY WALKING: Primary | ICD-10-CM

## 2021-06-17 PROCEDURE — 97530 THERAPEUTIC ACTIVITIES: CPT | Performed by: PHYSICAL THERAPIST

## 2021-06-17 PROCEDURE — 97112 NEUROMUSCULAR REEDUCATION: CPT | Performed by: PHYSICAL THERAPIST

## 2021-06-17 PROCEDURE — 97110 THERAPEUTIC EXERCISES: CPT | Performed by: PHYSICAL THERAPIST

## 2021-06-17 NOTE — PROGRESS NOTES
Physical Therapy Daily Progress Note        Patient: Lorrie Pagan   : 1945  Diagnosis/ICD-10 Code:  Difficulty walking [R26.2]  Referring practitioner: Dacia Viera MD  Date of Initial Visit: Type: THERAPY  Noted: 2021  Today's Date: 2021  Patient seen for 7 sessions             Subjective   Lorrie Pagan reports: balance is a noticeable difference. Still dealing with stiffness in the back and neck that cause discomfort.     Objective   See Exercise, Manual, and Modality Logs for complete treatment.       Assessment/Plan  Pt was able to be challenged in balance and strengthening exercises with fatigue. LOB with marching out of all balance activities possibly due to dynamic aspect.   Progress per Plan of Care and Progress strengthening /stabilization /functional activity           Timed:  Manual Therapy:         mins  90341;  Therapeutic Exercise:    15     mins  25394;     Neuromuscular Jonatan:    15    mins  31355;    Therapeutic Activity:    15      mins  32243;     Gait Training:           mins  25558;     Ultrasound:          mins  37994;    Electrical Stimulation:         mins  36439;  Iontophoresis          mins  99515    Untimed:  Electrical Stimulation:         mins  14080 ( );  Mechanical Traction:         mins  54477;   Fluidotherapy          mins  68697    Timed Treatment:   45   mins   Total Treatment:     60   mins        Fatmata Cortés PTA  Physical Therapist Assistant

## 2021-06-22 ENCOUNTER — TREATMENT (OUTPATIENT)
Dept: PHYSICAL THERAPY | Facility: CLINIC | Age: 76
End: 2021-06-22

## 2021-06-22 DIAGNOSIS — K21.00 GASTROESOPHAGEAL REFLUX DISEASE WITH ESOPHAGITIS, UNSPECIFIED WHETHER HEMORRHAGE: ICD-10-CM

## 2021-06-22 DIAGNOSIS — K31.84 GASTROPARESIS: Primary | ICD-10-CM

## 2021-06-22 DIAGNOSIS — M54.2 CERVICAL PAIN: Primary | ICD-10-CM

## 2021-06-22 DIAGNOSIS — M54.50 LUMBAR PAIN: ICD-10-CM

## 2021-06-22 PROCEDURE — 97140 MANUAL THERAPY 1/> REGIONS: CPT | Performed by: PHYSICAL THERAPIST

## 2021-06-22 PROCEDURE — 97163 PT EVAL HIGH COMPLEX 45 MIN: CPT | Performed by: PHYSICAL THERAPIST

## 2021-06-22 NOTE — TELEPHONE ENCOUNTER
Patient called saying she had to stop her medication due to it interacting with another medication and Dr. Millan said he would prescribe another one for the stomach not emptying     Tried calling patient to find out the name of medication that she stopped and which medication had the interaction no answer    Last seen 4/29/21

## 2021-06-22 NOTE — PROGRESS NOTES
Physical Therapy Initial Evaluation and Plan of Care        Patient: Lorrie Pagan   : 1945  Diagnosis/ICD-10 Code:  Cervical pain [M54.2]  Referring practitioner: Dacia Viera MD  Date of Initial Visit: 2021  Today's Date: 2021  Patient seen for 1 sessions           Subjective Evaluation    History of Present Illness  Mechanism of injury: Cervical and Lumbar pain off and on since she was a teenager.  Eventually pain became consistent with radiating symptoms.    History of 2 lumbar surgery, possible discectomy and a spacer placed in  and .  History of lung cancer, and COPD.  On O2 constant.  History of Rest Leg Syndrome (RLS). A spinal cord stimulator (Nevro, by Dr. Sylvie Monroy) was place in lumbar spine 2020, has helped with leg and feet symptoms.    CURRENT COMPLAINTS  1. Cervical:  Posterior neck pain that is intermittent but present daily.  The neck is very tight and stiff at times.  Occasionally will radiate out to right lateral upper arm.  The tight feeling is constant.  Not sure what makes it worse.  She has vision issues and is on O2, so is constantly in a flexed position.  2. Lumbar:  Intermittent bilateral lumbar pain down paraspinal muscles.  Only very occasional right LE radiating symptoms.  Does have neuropathy bilateral feet.  Worse with lifting, walking, standing or prolong sitting.         Quality of life: fair    Pain  Current pain rating: 3  At best pain ratin  At worst pain ratin  Quality: tight, dull ache and squeezing  Relieving factors: rest, support and heat  Progression: no change    Hand dominance: right             Objective          Static Posture     Head  Forward.    Shoulders  Elevated and rounded.    Thoracic Spine  Hyperkyphosis.    Lumbar Spine   Decreased lordosis.     Postural Observations  Seated posture: poor  Standing posture: poor        Palpation   Left   Hypertonic in the cervical paraspinals, lumbar paraspinals,  quadratus lumborum and sternocleidomastoid.   Tenderness of the cervical paraspinals, lumbar paraspinals, quadratus lumborum, rhomboids, sternocleidomastoid and upper trapezius.     Right   Hypertonic in the cervical paraspinals and sternocleidomastoid. Tenderness of the cervical paraspinals, lumbar paraspinals, rhomboids, sternocleidomastoid and upper trapezius.     Tenderness   Cervical Spine   Tenderness in the facet joint (bilateral cervical spine and upper thoracic spine).     Lumbar Spine  Tenderness in the facet joint (Bilateral lumbar facets tender).     Additional Tenderness Details  Can feel stimulator generator over right iliac crest area, about 1-2 inches lateral to right SI joint    Neurological Testing     Reflexes   Left   Patellar (L4): normal (2+)  Achilles (S1): normal (2+)    Right   Patellar (L4): normal (2+)  Achilles (S1): normal (2+)    Active Range of Motion   Cervical/Thoracic Spine   Cervical    Flexion: 40 degrees   Extension: 35 degrees   Left lateral flexion: 9 degrees   Right lateral flexion: 15 degrees   Left rotation: 40 degrees   Right rotation: 43 degrees   Left Shoulder   Normal active range of motion    Right Shoulder   Normal active range of motion    Lumbar   Flexion: 95 (Pain and difficultly coming up right) degrees   Extension: 5 degrees with pain  Left lateral flexion: 15 degrees   Right lateral flexion: 15 degrees   Left rotation: 35 (Pain right lumbar/sacral area) degrees with pain  Right rotation: 45 degrees     Strength/Myotome Testing   Cervical Spine     Left   Normal strength    Right   Normal strength    Left Hip   Planes of Motion   Flexion: 4+  Extension: 4+  Abduction: 4+    Right Hip   Planes of Motion   Flexion: 4+  Extension: 4+  Abduction: 4+    Left Knee   Flexion: 4+  Extension: 4+    Right Knee   Flexion: 5  Extension: 4+    Left Ankle/Foot   Dorsiflexion: 4  Eversion: 4-  Great toe extension: 4    Right Ankle/Foot   Dorsiflexion: 5  Eversion: 5  Great toe  extension: 5    Additional Strength Details  Pt has no myotome weakness, but has generalized weakness everywhere.      Tests     Lumbar     Left   Positive passive SLR.     Right   Positive passive SLR.     Additional Tests Details  Cervical distraction: decreases neck pain    Ambulation   Weight-Bearing Status   Weight-Bearing Status (Left): full weight bearing   Weight-Bearing Status (Right): full weight-bearing    Ambulation assistive devices: Use her O2 transportation devices for gait and balance.    Ambulation: Level Surfaces   Ambulation with assistive device: independent    Quality of Movement During Gait   Trunk  Forward lean.           Assessment & Plan     Assessment  Impairments: abnormal or restricted ROM, activity intolerance and pain with function  Other impairment: NDI Score: 40%, Modified Oswestry: 48%  Assessment details: 75 year old female presenting with chronic intermittent but daily cervical spine and lumbar spine area pain.  Pt is in general poor health with a multitude of medical issues present.  Neurological exam as it relates to the spine appears to be normal today, but she does have neuropathy in bilateral LE.  The is significant guarding and pain around cervical spine, this could be worsened by her COPD, is using accessory muscles to help with breathing at times.  She has poor lumbar stability which is also affecting gait and standing abilities.  Barriers to therapy: COPD, very poor eye sight, neuropathy of LE  Prognosis: fair  Functional Limitations: lifting, sleeping, walking, pulling, pushing, uncomfortable because of pain, sitting, standing, stooping and reaching overhead  Goals  Plan Goals: STG to be met in 4 weeks  1.  Pt reports being able to sleep at least 4 hours nightly.  2.  Pt worst cervical pain improves to 5/10.  3.  Pt cervical function improves so that NDI score improves to 35.  4.  Pt lumbar function improves so that Modified Oswestry improves to 40.  LTG to be met in 12  weeks  1.  Pt is consistent with her HEP.  2.  Pt is able to sleep up to 7 hours a night with tolerable spinal pain.  3.  Pt cervical function improves so that NDI score improves to 25.  4.  Pt lumbar function improves so that Modified Oswestry improves to 28.    Plan  Therapy options: will be seen for skilled physical therapy services  Planned modality interventions: high voltage pulsed current (pain management), ultrasound, traction and TENS  Planned therapy interventions: abdominal trunk stabilization, balance/weight-bearing training, body mechanics training, flexibility, functional ROM exercises, home exercise program, joint mobilization, therapeutic activities, stretching, strengthening, spinal/joint mobilization, soft tissue mobilization, postural training, neuromuscular re-education and manual therapy  Frequency: 2x week  Duration in weeks: 12  Treatment plan discussed with: patient        Manual Therapy:    15     mins  88352;      Timed Treatment:   15   mins   Total Treatment:     60   mins    PT SIGNATURE: Red Francis PT   DATE TREATMENT INITIATED: 6/22/2021    Medicare Initial Certification  Certification Period: 9/20/2021  I certify that the therapy services are furnished while this patient is under my care.  The services outlined above are required by this patient, and will be reviewed every 90 days.     PHYSICIAN: _______________________________________________     Dacia Viera MD        DATE: ___________________________________________________    Please sign and return via fax to [phone].. Thank you, [facility].

## 2021-06-25 NOTE — TELEPHONE ENCOUNTER
Called patient back and notified her of the options between Motegrity, Cisapride, and Domperidone. Per the patient she is going to contact her insurance company and will call us back when she decides which medications she wants to try.

## 2021-07-06 ENCOUNTER — TREATMENT (OUTPATIENT)
Dept: PHYSICAL THERAPY | Facility: CLINIC | Age: 76
End: 2021-07-06

## 2021-07-06 DIAGNOSIS — M54.2 CERVICAL PAIN: Primary | ICD-10-CM

## 2021-07-06 PROCEDURE — 97112 NEUROMUSCULAR REEDUCATION: CPT | Performed by: PHYSICAL THERAPIST

## 2021-07-06 PROCEDURE — 97140 MANUAL THERAPY 1/> REGIONS: CPT | Performed by: PHYSICAL THERAPIST

## 2021-07-06 PROCEDURE — 97530 THERAPEUTIC ACTIVITIES: CPT | Performed by: PHYSICAL THERAPIST

## 2021-07-06 NOTE — PROGRESS NOTES
Physical Therapy Daily Progress Note        Patient: Lorrie Pagan   : 1945  Diagnosis/ICD-10 Code:  Cervical pain [M54.2]  Referring practitioner: Dacia Viera MD  Date of Initial Visit: Type: THERAPY  Noted: 2021  Today's Date: 2021  Patient seen for 2 sessions             Subjective   Lorrie Pagan reports: the neck felt really good after last visit. The neck has been bothering her more than the back lately because she has been sleeping    Objective          Active Range of Motion   Cervical/Thoracic Spine   Cervical    Left rotation: 41 degrees   Right rotation: 54 degrees     (post treatment)  See Exercise, Manual, and Modality Logs for complete treatment.       Assessment/Plan  Pt responded well to incorporating cervical AROM activities that improved neck pain. Will continue to incorporate LE strengthening with the neck activities. Add walking with head turns next visit.   Progress per Plan of Care and Progress strengthening /stabilization /functional activity           Timed:  Manual Therapy:    15     mins  61844;  Therapeutic Exercise:         mins  36769;     Neuromuscular Jonatan:    15    mins  95387;    Therapeutic Activity:    18      mins  62045;     Gait Training:           mins  69431;     Ultrasound:          mins  95288;    Electrical Stimulation:         mins  36072;  Iontophoresis          mins  55976    Untimed:  Electrical Stimulation:         mins  91629 ( );  Mechanical Traction:         mins  09235;   Fluidotherapy          mins  16008    Timed Treatment:   48   mins   Total Treatment:     58   mins        Fatmata Cortés PTA  Physical Therapist Assistant

## 2021-07-08 ENCOUNTER — TELEPHONE (OUTPATIENT)
Dept: PHYSICAL THERAPY | Facility: CLINIC | Age: 76
End: 2021-07-08

## 2021-07-08 NOTE — TELEPHONE ENCOUNTER
PATIENT IS NOT FEELING WELL- PATIENT DID WANT TO LET YOU KNOW THAT SHE HAS BEEN DOING HER EXERCISES AT HOME

## 2021-07-14 ENCOUNTER — TELEPHONE (OUTPATIENT)
Dept: PHYSICAL THERAPY | Facility: CLINIC | Age: 76
End: 2021-07-14

## 2021-07-16 ENCOUNTER — TREATMENT (OUTPATIENT)
Dept: PHYSICAL THERAPY | Facility: CLINIC | Age: 76
End: 2021-07-16

## 2021-07-16 ENCOUNTER — TELEPHONE (OUTPATIENT)
Dept: GASTROENTEROLOGY | Facility: CLINIC | Age: 76
End: 2021-07-16

## 2021-07-16 DIAGNOSIS — M54.50 LUMBAR PAIN: ICD-10-CM

## 2021-07-16 DIAGNOSIS — M54.2 CERVICAL PAIN: Primary | ICD-10-CM

## 2021-07-16 PROCEDURE — 97110 THERAPEUTIC EXERCISES: CPT | Performed by: PHYSICAL THERAPIST

## 2021-07-16 PROCEDURE — 97140 MANUAL THERAPY 1/> REGIONS: CPT | Performed by: PHYSICAL THERAPIST

## 2021-07-16 PROCEDURE — 97530 THERAPEUTIC ACTIVITIES: CPT | Performed by: PHYSICAL THERAPIST

## 2021-07-16 NOTE — PROGRESS NOTES
Physical Therapy Daily Progress Note        Patient: Lorrie Pagan   : 1945  Diagnosis/ICD-10 Code:  Cervical pain [M54.2]  Referring practitioner: Dacia Viera MD  Date of Initial Visit: Type: THERAPY  Noted: 2021  Today's Date: 2021  Patient seen for 3 sessions             Subjective   Lorrie Pagan reports: hasn't been here in a while because her stomach has been not doing well. She is waiting on approval to take something for it because she is too full. She finally got her recliner to sleep and it has been going well. She is getting about 5 hours of sleep, the low back feels good and supported but she is finding the neck is getting stiff because she can't rest her head to the side. The neck is really hurting and doesn't want to do leg exercises because of the stomach.    Objective   Limited more to the R than the L with cervical rotation.   See Exercise, Manual, and Modality Logs for complete treatment.       Assessment/Plan  Focused on the neck today to decrease pain and come up with solutions on head placement when sleeping. Encouraged patient to try using pillow next to her so she can rest her head slightly to the side instead of putting it behind her head as it is too bulky pushing her head too far forward in recliner.   Progress per Plan of Care and Progress strengthening /stabilization /functional activity           Timed:  Manual Therapy:    15     mins  50460;  Therapeutic Exercise:    15     mins  22283;     Neuromuscular Jonatan:        mins  23077;    Therapeutic Activity:    10      mins  79384;     Gait Training:           mins  58908;     Ultrasound:          mins  51218;    Electrical Stimulation:         mins  47441;  Iontophoresis          mins  49650    Untimed:  Electrical Stimulation:         mins  26790 ( );  Mechanical Traction:         mins  96266;   Fluidotherapy          mins  56149    Timed Treatment:   40   mins   Total Treatment:     55    mins        Fatmata Cortés, PTA  Physical Therapist Assistant

## 2021-07-19 ENCOUNTER — LAB (OUTPATIENT)
Dept: LAB | Facility: HOSPITAL | Age: 76
End: 2021-07-19

## 2021-07-19 ENCOUNTER — OFFICE VISIT (OUTPATIENT)
Dept: INTERNAL MEDICINE | Facility: CLINIC | Age: 76
End: 2021-07-19

## 2021-07-19 VITALS
DIASTOLIC BLOOD PRESSURE: 75 MMHG | HEART RATE: 103 BPM | WEIGHT: 171 LBS | HEIGHT: 64 IN | OXYGEN SATURATION: 91 % | TEMPERATURE: 98.3 F | BODY MASS INDEX: 29.19 KG/M2 | SYSTOLIC BLOOD PRESSURE: 174 MMHG

## 2021-07-19 DIAGNOSIS — E55.9 VITAMIN D DEFICIENCY: ICD-10-CM

## 2021-07-19 DIAGNOSIS — R06.02 SHORTNESS OF BREATH: Primary | ICD-10-CM

## 2021-07-19 DIAGNOSIS — R06.02 SHORTNESS OF BREATH: ICD-10-CM

## 2021-07-19 DIAGNOSIS — M79.89 LEG SWELLING: ICD-10-CM

## 2021-07-19 LAB
25(OH)D3 SERPL-MCNC: 35.4 NG/ML
ANION GAP SERPL CALCULATED.3IONS-SCNC: 11.5 MMOL/L (ref 5–15)
BUN SERPL-MCNC: 15 MG/DL (ref 8–23)
BUN/CREAT SERPL: 20.3 (ref 7–25)
CALCIUM SPEC-SCNC: 9.9 MG/DL (ref 8.6–10.5)
CHLORIDE SERPL-SCNC: 90 MMOL/L (ref 98–107)
CO2 SERPL-SCNC: 38.5 MMOL/L (ref 22–29)
CREAT SERPL-MCNC: 0.74 MG/DL (ref 0.57–1)
DEPRECATED RDW RBC AUTO: 43.2 FL (ref 37–54)
ERYTHROCYTE [DISTWIDTH] IN BLOOD BY AUTOMATED COUNT: 13.6 % (ref 12.3–15.4)
GFR SERPL CREATININE-BSD FRML MDRD: 76 ML/MIN/1.73
GLUCOSE SERPL-MCNC: 91 MG/DL (ref 65–99)
HCT VFR BLD AUTO: 40.8 % (ref 34–46.6)
HGB BLD-MCNC: 13.3 G/DL (ref 12–15.9)
MCH RBC QN AUTO: 28.2 PG (ref 26.6–33)
MCHC RBC AUTO-ENTMCNC: 32.6 G/DL (ref 31.5–35.7)
MCV RBC AUTO: 86.6 FL (ref 79–97)
NT-PROBNP SERPL-MCNC: 20 PG/ML (ref 0–1800)
PLATELET # BLD AUTO: 458 10*3/MM3 (ref 140–450)
PMV BLD AUTO: 9.6 FL (ref 6–12)
POTASSIUM SERPL-SCNC: 3.4 MMOL/L (ref 3.5–5.2)
RBC # BLD AUTO: 4.71 10*6/MM3 (ref 3.77–5.28)
SODIUM SERPL-SCNC: 140 MMOL/L (ref 136–145)
WBC # BLD AUTO: 12.85 10*3/MM3 (ref 3.4–10.8)

## 2021-07-19 PROCEDURE — 99214 OFFICE O/P EST MOD 30 MIN: CPT | Performed by: NURSE PRACTITIONER

## 2021-07-19 PROCEDURE — 85027 COMPLETE CBC AUTOMATED: CPT

## 2021-07-19 PROCEDURE — 80048 BASIC METABOLIC PNL TOTAL CA: CPT

## 2021-07-19 PROCEDURE — 83880 ASSAY OF NATRIURETIC PEPTIDE: CPT | Performed by: NURSE PRACTITIONER

## 2021-07-19 PROCEDURE — 82306 VITAMIN D 25 HYDROXY: CPT

## 2021-07-19 NOTE — PROGRESS NOTES
Chief Complaint   Patient presents with   • Leg Swelling     bilateral, lower legs, taking fluid medication with no help   • Shortness of Breath     on exersion, sees a pulmonologist       History of Present Illness    75 y.o.female presents for leg swelling and shortness of breath.  Chronic resp failure with bronchiectasis, stage II COPD, hx lung cancer sp resection 2016 former smoker followed by pulm, Dr. Arboleda. On supplemental oxygen at night and most of day aprox 4-5 liters.  Is supposed to be on inhalers spiriva and air duo, but has been out for about 6 months and doesn't think inhalers help.  She felt good up until about a week half ago when started having increased shortness of breath with walking. Cant get down the hallway or more than 25 ft without exertional shortness of breath. Has had to use her scooter to get around. Has a productive cough but normal for her. No fever. No chest pain. Pt reports increased weight 7lbs over last few days by scales at home. When her weight goes up is normally because of excess fluid.   She has had increase swelling in lower ext bilateral also about week and half. Takes lasix 40mg daily and zaroxolyn 2.5 mg daily. When her weight is up she is supposed to double her lasix which she did for last 2 days and has not helped leg swelling, weight fluid retention or shortness of breath.  Both legs are swollen with some redness. Has had to have legs wrapped in past. No fever.  Has also had some increase fatigue falling asleep nodding off and dizziness. This fatigue and dizziness has happened before when her swelling is bad. Has fallen with past episodes but has not fallen yet with this episode.  Followed by cardiology Dr. Chaidez and has appt next week with him.  Having some abd general discomfort and nausea with noted gastroparesis. PT has call into GI Dr. Lanza and pt waiting to hear from his office. Supposed to be taking protonix bid but does not always remember to take  that med.  No changes in meds other than lasix above. Has had change in diet. Recently moved into [facility] so new cooking and eating 3 meals a day which is more than she normally eats.   Reports due for vit D check to see about vit D refills.      Review of Systems   Constitutional: Positive for fatigue. Negative for chills and fever.   HENT: Negative for congestion and rhinorrhea.    Eyes: Negative for blurred vision and visual disturbance.   Respiratory: Positive for cough and shortness of breath. Negative for wheezing.    Cardiovascular: Positive for leg swelling. Negative for chest pain and palpitations.   Gastrointestinal: Positive for abdominal pain and nausea. Negative for constipation, diarrhea and vomiting.   Genitourinary: Negative for difficulty urinating.   Musculoskeletal: Positive for arthralgias.   Skin: Positive for color change.   Neurological: Positive for dizziness and light-headedness. Negative for weakness and headache.         [facility]  The following portions of the patient's history were reviewed and updated as appropriate: allergies, current medications, past family history, past medical history, past social history, past surgical history and problem list.     Past Medical History:   Diagnosis Date   • Back pain    • Bronchiectasis (CMS/HCC)    • Bronchitis 01/2018   • Cancer (CMS/HCC)     History of lung cancer and skin cancer    • Cardiac murmur    • Chronic cough    • Chronic obstructive pulmonary disease (CMS/HCC)    • Colon polyp    • DDD (degenerative disc disease), lumbar    • Depression    • Diabetes mellitus (CMS/HCC)     DX: 2019, CHECK BS QOD, LAST A1C 6.3??   • Disease of thyroid gland    • Esophageal dilatation 9/20/2019    Added automatically from request for surgery [id number]   • Esophageal dysphagia 10/24/2019    Added automatically from request for surgery [id number]   • Former smoker (None since 1992) 8/29/2019   • GERD (gastroesophageal reflux disease)    • Glaucoma    •  Globus sensation 2020   • History of colonic polyps    • History of transfusion     NO REACTION    • Hyperlipidemia    • Hypertension    • Irritable bowel syndrome     Constipation/diarrhea   • Legally blind    • Lung cancer (CMS/HCC)    • Macular degeneration    • Neuropathy    • Obesity 2020   • Osteoarthritis    • Oxygen dependent     5L   • Pneumonia    • Sleep apnea     NON COMPLIANT WITH CPAP USE   • UTI (urinary tract infection)    • Wears glasses       Allergies   Allergen Reactions   • Statins Myalgia     myalgia   • Metoclopramide Other (See Comments)     EXACERBATED RLS   • Penicillins Rash     rash   • Tramadol Nausea And Vomiting and GI Intolerance     VERY MILD PER PT      Social History     Tobacco Use   • Smoking status: Former Smoker     Packs/day: 1.50     Years: 25.00     Pack years: 37.50     Types: Cigarettes     Quit date: 1992     Years since quittin.5   • Smokeless tobacco: Never Used   Vaping Use   • Vaping Use: Never used   Substance Use Topics   • Alcohol use: Yes     Comment: a glass of wine or bourbon a couple times a WEEK   • Drug use: No     Past Surgical History:   Procedure Laterality Date   • BACK SURGERY      X2 (LUMBAR)   • BREAST BIOPSY      20+ years ago   • CATARACT EXTRACTION Bilateral    • CHOLECYSTECTOMY     • COLONOSCOPY     • ENDOSCOPY N/A 2019    Procedure: ESOPHAGOGASTRODUODENOSCOPY;  Surgeon: Cortes Millan MD;  Location:  [facility];  Service: Gastroenterology   • ENDOSCOPY N/A 2021    Procedure: ESOPHAGOGASTRODUODENOSCOPY;  Surgeon: Cortes Millan MD;  Location:  [facility];  Service: Gastroenterology;  Laterality: N/A;  balloon dilation    • HAND SURGERY Right     laceration repair   • INCONTINENCE SURGERY      BLADDER   • LUNG REMOVAL, PARTIAL Left 2016   • NISSEN FUNDOPLICATION     • TOTAL ABDOMINAL HYSTERECTOMY      benign cyst      Family History   Problem Relation Age of Onset   • Colon  polyps Mother    • Emphysema Mother    • Hypertension Mother    • Colon polyps Father    • Dementia Father    • Heart disease Father    • Hyperlipidemia Father    • Macular degeneration Father    • Glaucoma Father    • Colon cancer Neg Hx    • Breast cancer Neg Hx    • Ovarian cancer Neg Hx            Current Outpatient Medications:   •  Accu-Chek Guide test strip, TEST 2 TIMES PER DAY, Disp: , Rfl:   •  albuterol sulfate  (90 Base) MCG/ACT inhaler, Inhale 2 puffs Every 6 (Six) Hours As Needed for Wheezing., Disp: 25.5 g, Rfl: 2  •  chlorhexidine (PERIDEX) 0.12 % solution, Apply 15 mL to the mouth or throat 2 (Two) Times a Day As Needed. For 14 days, w/1 refill , Disp: , Rfl:   •  diphenoxylate-atropine (Lomotil) 2.5-0.025 MG per tablet, Take 1 tablet by mouth 4 (Four) Times a Day As Needed for Diarrhea., Disp: 30 tablet, Rfl: 1  •  donepezil (ARICEPT) 10 MG tablet, Take 10 mg by mouth Daily., Disp: , Rfl:   •  DULoxetine (CYMBALTA) 60 MG capsule, TAKE 1 CAPSULE BY MOUTH DAILY (Patient taking differently: Take 60 mg by mouth Daily.), Disp: 90 capsule, Rfl: 3  •  ezetimibe (ZETIA) 10 MG tablet, Take 10 mg by mouth Daily., Disp: , Rfl:   •  fluticasone (Flonase) 50 MCG/ACT nasal spray, 2 sprays into the nostril(s) as directed by provider Daily., Disp: 18.2 mL, Rfl: 11  •  furosemide (LASIX) 40 MG tablet, Take 40 mg by mouth Daily., Disp: , Rfl:   •  glimepiride (AMARYL) 2 MG tablet, TAKE 1 TABLET BY MOUTH TWICE DAILY (Patient taking differently: Take 2 mg by mouth 2 (two) times a day.), Disp: 180 tablet, Rfl: 3  •  ketoconazole (NIZORAL) 2 % cream, Apply 1 application topically to the appropriate area as directed Daily As Needed for Itching. BACK AND LEGS AS NEEDED FOR ITCHING, Disp: , Rfl:   •  ketoconazole (NIZORAL) 2 % shampoo, Apply 1 application topically to the appropriate area as directed As Needed for Irritation., Disp: , Rfl:   •  latanoprost (XALATAN) 0.005 % ophthalmic solution, Administer 1 drop to  "both eyes Daily., Disp: , Rfl:   •  levothyroxine (SYNTHROID, LEVOTHROID) 50 MCG tablet, Take 50 mcg by mouth Daily., Disp: , Rfl:   •  metOLazone (ZAROXOLYN) 2.5 MG tablet, Take 1 tablet by mouth Daily As Needed (for swelling or weight gain of 4 lbs). As directed, Disp: , Rfl:   •  metoprolol succinate XL (TOPROL-XL) 25 MG 24 hr tablet, Take 25 mg by mouth Daily., Disp: , Rfl:   •  O2 (OXYGEN), Inhale Continuous. 4-5 L/MIN DEPENDING ON ACTIVITY, Disp: , Rfl:   •  pantoprazole (PROTONIX) 40 MG EC tablet, Take 1 tablet by mouth 2 (two) times a day., Disp: 180 tablet, Rfl: 3  •  POTASSIUM CHLORIDE ER PO, Take 30 mEq by mouth 2 (Two) Times a Day., Disp: , Rfl:   •  rOPINIRole XL (REQUIP XL) 8 MG 24 hr tablet, Take 8 mg by mouth 2 (two) times a day., Disp: , Rfl:   •  Spacer/Aero-Holding Chambers (AeroChamber Plus Morris-Vu) misc, As Needed., Disp: , Rfl:   •  timolol (TIMOPTIC) 0.25 % ophthalmic solution, Administer 1 drop to the right eye Every Morning., Disp: , Rfl:   •  Tiotropium Bromide Monohydrate (SPIRIVA RESPIMAT) 1.25 MCG/ACT aerosol solution inhaler, Inhale 2 puffs Daily., Disp: , Rfl:   •  tiZANidine (ZANAFLEX) 2 MG tablet, Take 1 tablet by mouth At Night As Needed for Muscle Spasms., Disp: 30 tablet, Rfl: 0  •  vitamin D (ERGOCALCIFEROL) 1.25 MG (00949 UT) capsule capsule, Take 50,000 Units by mouth 1 (One) Time Per Week. Friday?, Disp: , Rfl:   •  glucose blood test strip, Accu-Chek Guide test strips, Disp: , Rfl:     VITALS:  /75 (BP Location: Right arm, Patient Position: Standing, Cuff Size: Adult)   Pulse 103   Temp 98.3 °F (36.8 °C)   Ht 162.6 cm (64\")   Wt 77.6 kg (171 lb)   LMP  (LMP Unknown)   SpO2 91%   BMI 29.35 kg/m²     Physical Exam  Vitals reviewed.   HENT:      Head: Normocephalic.   Cardiovascular:      Rate and Rhythm: Normal rate and regular rhythm.      Heart sounds: Normal heart sounds.      Comments: Pitting ankle edema 1+ bilateral; no calf pain or tenderness  Pulmonary:      " Effort: Pulmonary effort is normal. No respiratory distress.      Breath sounds: Normal breath sounds. No rales.      Comments: On oxygen 5L NC  Abdominal:      General: Bowel sounds are normal. There is distension.      Palpations: Abdomen is soft.      Tenderness: There is no abdominal tenderness. There is no guarding.   Skin:     General: Skin is warm and dry.          Neurological:      General: No focal deficit present.      Mental Status: She is alert and oriented to person, place, and time.   Psychiatric:         Mood and Affect: Mood normal.         Result Review :          CBC (No Diff)  Order: [id number]  Status:  Final result   Visible to patient:  Yes (MyCmollyt) Next appt:  08/09/2021 at 01:30 PM in Radiology ([facility]) Dx:  Leg swelling  Specimen Information: Blood        Component   Ref Range & Units 1 d ago 2 mo ago   WBC   3.40 - 10.80 10*3/mm3 12.85High   10.94High     RBC   3.77 - 5.28 10*6/mm3 4.71  4.39    Hemoglobin   12.0 - 15.9 g/dL 13.3  12.5    Hematocrit   34.0 - 46.6 % 40.8  37.9    MCV   79.0 - 97.0 fL 86.6  86.3    MCH   26.6 - 33.0 pg 28.2  28.5    MCHC   31.5 - 35.7 g/dL 32.6  33.0    RDW   12.3 - 15.4 % 13.6  13.7    RDW-SD   37.0 - 54.0 fl 43.2  43.0    MPV   6.0 - 12.0 fL 9.6  9.5    Platelets   140 - 450 10*3/mm3 458High   406    Resulting Agency  [facility]         Specimen Collected: 07/19/21 15:37 Last Resulted: 07/19/21 23:13         Vitamin D 25 Hydroxy  Order: [id number]  Status:  Final result   Visible to patient:  Yes (MyChart) Next appt:  08/09/2021 at 01:30 PM in Radiology ([facility]) Dx:  Vitamin D deficiency  Specimen Information: Blood        Component   Ref Range & Units 1 d ago   25 Hydroxy, Vitamin D   ng/ml 35.4          Basic metabolic panel  Order: [id number]  Status:  Final result   Visible to patient:  Yes (MyChart) Next appt:  08/09/2021 at 01:30 PM in Radiology ([facility]) Dx:  Shortness of breath; Leg swelling  Specimen  Information: Blood        Component   Ref Range & Units 1 d ago 2 mo ago   Glucose   65 - 99 mg/dL 91  92 R    BUN   8 - 23 mg/dL 15     Creatinine   0.57 - 1.00 mg/dL 0.74     Sodium   136 - 145 mmol/L 140     Potassium   3.5 - 5.2 mmol/L 3.4Low      Chloride   98 - 107 mmol/L 90Low      CO2   22.0 - 29.0 mmol/L 38.5High      Calcium   8.6 - 10.5 mg/dL 9.9     eGFR Non African Amer   >60 mL/min/1.73 76     BUN/Creatinine Ratio   7.0 - 25.0 20.3     Anion Gap   5.0 - 15.0 mmol/L 11.5           proBNP  Order: [id number]  Status:  Final result   Visible to patient:  Yes (MyChart) Next appt:  08/09/2021 at 01:30 PM in Radiology ([facility]) Dx:  Shortness of breath; Leg swelling  Specimen Information: Blood        Component   Ref Range & Units 1 d ago 1 yr ago   proBNP   0.0-1,800.0 pg/mL 20.0  65.7 R    Resulting Agency  [facility]  [facility]                 Assessment and Plan    Diagnoses and all orders for this visit:    1. Shortness of breath (Primary)  -     proBNP  -     Basic metabolic panel; Future  -     Tiotropium Bromide Monohydrate (Spiriva Respimat) 1.25 MCG/ACT aerosol solution inhaler; Inhale 2 puffs Daily.  Dispense: 4 g; Refill: 11  -     albuterol sulfate  (90 Base) MCG/ACT inhaler; Inhale 2 puffs Every 6 (Six) Hours As Needed for Wheezing.  Dispense: 18 g; Refill: 2  multifactoral with COPD, chronic hypoxic resp failure, bronchiectasis, and hx lung cancer.  She does not sound wet on lung exam no crackles and BNP normal.  Has NOT been compliant with inhalers; has not had for about 6 months. Reordered inhalers. pt encouraged her to use. Cont home oxygen. If no improvement will need earlier FU with pulmonary.  2. Leg swelling  -     proBNP  -     Basic metabolic panel; Future  -     CBC (No Diff); Future  -     azithromycin (ZITHROMAX) 250 MG tablet; Take 2 tablets the first day, then 1 tablet daily for 4 days.  Dispense: 6 tablet; Refill: 0  Her wt today was 171. Review of past notes when  having fluid retention back in March noted her target weight was 177. I do not think needs more diuretics at this point. Go back down to her lasix and zaroxyln once per day. Her anterior legs have a mild cellulitis appearance and she does have some mild leukocytosis, will tx for possible cellulitis.   3. Vitamin D deficiency  -     Vitamin D 25 Hydroxy; Future  -     vitamin D (ERGOCALCIFEROL) 1.25 MG (64571 UT) capsule capsule; Take 1 capsule by mouth 1 (One) Time Per Week. Friday?  Dispense: 5 capsule; Refill: 0      Keep FU appt next week with cardiology.   Keep a weight log; weigh first thing in the morning before eats.  If no improvement in symptoms and if no changes with cardiology, then schedule a FU appt next week with pcp.  I called pt on 7-20 reviewed labs and updates to tx plan.       I discussed the patients findings and my recommendations with patient.  Patient was encouraged to keep me informed of any acute changes, lack of improvement, or any new concerning symptoms.  Patient voiced understanding of all instructions and denied further questions.      Follow Up   Return if symptoms worsen or fail to improve.  FU as above.    Electronically signed by:    DONALD Poole  07/19/2021

## 2021-07-20 RX ORDER — ALBUTEROL SULFATE 90 UG/1
2 AEROSOL, METERED RESPIRATORY (INHALATION) EVERY 6 HOURS PRN
Qty: 18 G | Refills: 2 | Status: SHIPPED | OUTPATIENT
Start: 2021-07-20 | End: 2022-09-02

## 2021-07-20 RX ORDER — ERGOCALCIFEROL 1.25 MG/1
50000 CAPSULE ORAL WEEKLY
Qty: 5 CAPSULE | Refills: 0 | Status: SHIPPED | OUTPATIENT
Start: 2021-07-20 | End: 2022-05-31

## 2021-07-20 RX ORDER — TIOTROPIUM BROMIDE INHALATION SPRAY 1.56 UG/1
2 SPRAY, METERED RESPIRATORY (INHALATION) DAILY
Qty: 4 G | Refills: 11 | Status: SHIPPED | OUTPATIENT
Start: 2021-07-20 | End: 2021-12-09 | Stop reason: ALTCHOICE

## 2021-07-20 RX ORDER — AZITHROMYCIN 250 MG/1
TABLET, FILM COATED ORAL
Qty: 6 TABLET | Refills: 0 | Status: SHIPPED | OUTPATIENT
Start: 2021-07-20 | End: 2021-09-03

## 2021-07-27 ENCOUNTER — TELEPHONE (OUTPATIENT)
Dept: INTERNAL MEDICINE | Facility: CLINIC | Age: 76
End: 2021-07-27

## 2021-07-27 NOTE — TELEPHONE ENCOUNTER
Caller: Lorrie Pagan    Relationship: Self    Best call back number: [phone]    What is the best time to reach you: ANYTIME    Who are you requesting to speak with (clinical staff, provider,  specific staff member): CLINICAL STAFF    Do you know the name of the person who called: SELF    What was the call regarding: PATIENT STATES THAT SHE IS HAVING SOME SWELLING IN HER FEET AND ANKLES.    Do you require a callback: YES

## 2021-07-27 NOTE — TELEPHONE ENCOUNTER
Spoke w/ pt, seen Johnnie on 7/19/21, given med and redness has gone, but swelling is still there, hard and tender to touch. Please advise

## 2021-07-27 NOTE — TELEPHONE ENCOUNTER
She needs to continue her current diuretics, use compression stockings, elevate legs when able and keep her appointment with cardiology. If weight is stable an increase in diuretics will likely cause more harm than good.

## 2021-08-09 ENCOUNTER — HOSPITAL ENCOUNTER (OUTPATIENT)
Dept: ULTRASOUND IMAGING | Facility: HOSPITAL | Age: 76
Discharge: HOME | End: 2021-08-09

## 2021-08-09 ENCOUNTER — HOSPITAL ENCOUNTER (OUTPATIENT)
Dept: MAMMOGRAPHY | Facility: HOSPITAL | Age: 76
Discharge: HOME | End: 2021-08-09

## 2021-08-09 DIAGNOSIS — R92.8 ABNORMAL MAMMOGRAM: ICD-10-CM

## 2021-08-09 PROCEDURE — G0279 TOMOSYNTHESIS, MAMMO: HCPCS | Performed by: RADIOLOGY

## 2021-08-09 PROCEDURE — 77065 DX MAMMO INCL CAD UNI: CPT

## 2021-08-09 PROCEDURE — G0279 TOMOSYNTHESIS, MAMMO: HCPCS

## 2021-08-09 PROCEDURE — 76642 ULTRASOUND BREAST LIMITED: CPT | Performed by: RADIOLOGY

## 2021-08-09 PROCEDURE — 77066 DX MAMMO INCL CAD BI: CPT | Performed by: RADIOLOGY

## 2021-08-09 PROCEDURE — 77066 DX MAMMO INCL CAD BI: CPT

## 2021-08-09 PROCEDURE — 76642 ULTRASOUND BREAST LIMITED: CPT

## 2021-08-10 ENCOUNTER — TRANSCRIBE ORDERS (OUTPATIENT)
Dept: MAMMOGRAPHY | Facility: HOSPITAL | Age: 76
End: 2021-08-10

## 2021-08-10 DIAGNOSIS — R92.8 ABNORMAL MAMMOGRAM: Primary | ICD-10-CM

## 2021-08-12 ENCOUNTER — OFFICE VISIT (OUTPATIENT)
Dept: GASTROENTEROLOGY | Facility: CLINIC | Age: 76
End: 2021-08-12

## 2021-08-12 VITALS
HEART RATE: 99 BPM | SYSTOLIC BLOOD PRESSURE: 161 MMHG | BODY MASS INDEX: 29.28 KG/M2 | WEIGHT: 170.6 LBS | DIASTOLIC BLOOD PRESSURE: 70 MMHG | TEMPERATURE: 97.5 F

## 2021-08-12 DIAGNOSIS — R13.19 ESOPHAGEAL DYSPHAGIA: Primary | ICD-10-CM

## 2021-08-12 DIAGNOSIS — K58.0 IRRITABLE BOWEL SYNDROME WITH DIARRHEA: ICD-10-CM

## 2021-08-12 DIAGNOSIS — R09.89 GLOBUS SENSATION: ICD-10-CM

## 2021-08-12 DIAGNOSIS — K31.84 GASTROPARESIS: ICD-10-CM

## 2021-08-12 PROCEDURE — 99214 OFFICE O/P EST MOD 30 MIN: CPT | Performed by: INTERNAL MEDICINE

## 2021-08-12 RX ORDER — MONTELUKAST SODIUM 4 MG/1
2 TABLET, CHEWABLE ORAL 2 TIMES DAILY
Qty: 120 TABLET | Refills: 11 | Status: ON HOLD | OUTPATIENT
Start: 2021-08-12 | End: 2022-04-11

## 2021-08-12 NOTE — PROGRESS NOTES
GASTROENTEROLOGY OFFICE NOTE  Lorrie Pagan  [id number]  1945          Chief Complaint   Patient presents with   • Follow-up   • Heartburn        HISTORY OF PRESENT ILLNESS:  Patient presents today with primary complaint of diarrhea.  Having about 10 urgent nonbloody bowel as per day.  She status post recent EGD which was unremarkable except for small sliding hiatal hernia.  It was felt that she had gastroparesis, prescribed prucalopride to help with that and chronic idiopathic constipation.    She is currently not on prucalopride is having 10 urgent watery bowel as per day.  In the past this problem is responded well to Colestid which she is currently not taking.    .  She has had problems with dysphagia to solids and is status post recent esophageal dilation on April 29.  This has improved following esophageal dilation to 20 mm.    PAST MEDICAL HISTORY  Past Medical History:   Diagnosis Date   • Back pain    • Bronchiectasis (CMS/HCC)    • Bronchitis 01/2018   • Cancer (CMS/HCC)     History of lung cancer and skin cancer    • Cardiac murmur    • Chronic cough    • Chronic obstructive pulmonary disease (CMS/HCC)    • Colon polyp    • DDD (degenerative disc disease), lumbar    • Depression    • Diabetes mellitus (CMS/HCC)     DX: 2019, CHECK BS QOD, LAST A1C 6.3??   • Disease of thyroid gland    • Esophageal dilatation 9/20/2019    Added automatically from request for surgery [id number]   • Esophageal dysphagia 10/24/2019    Added automatically from request for surgery [id number]   • Former smoker (None since 1992) 8/29/2019   • GERD (gastroesophageal reflux disease)    • Glaucoma    • Globus sensation 1/27/2020   • History of colonic polyps    • History of transfusion 1988    NO REACTION    • Hyperlipidemia    • Hypertension    • Irritable bowel syndrome     Constipation/diarrhea   • Legally blind    • Lung cancer (CMS/HCC)    • Macular degeneration    • Neuropathy    • Obesity 2/26/2020   • Osteoarthritis     • Oxygen dependent     5L   • Pneumonia    • Sleep apnea     NON COMPLIANT WITH CPAP USE   • UTI (urinary tract infection)    • Wears glasses         PAST SURGICAL HISTORY  Past Surgical History:   Procedure Laterality Date   • BACK SURGERY      X2 (LUMBAR)   • BREAST BIOPSY      20+ years ago   • CATARACT EXTRACTION Bilateral    • CHOLECYSTECTOMY     • COLONOSCOPY     • ENDOSCOPY N/A 11/6/2019    Procedure: ESOPHAGOGASTRODUODENOSCOPY;  Surgeon: Cortes Millan MD;  Location:  [facility];  Service: Gastroenterology   • ENDOSCOPY N/A 4/29/2021    Procedure: ESOPHAGOGASTRODUODENOSCOPY;  Surgeon: Cortes Millan MD;  Location:  [facility];  Service: Gastroenterology;  Laterality: N/A;  balloon dilation    • HAND SURGERY Right     laceration repair   • INCONTINENCE SURGERY      BLADDER   • LUNG REMOVAL, PARTIAL Left 2016   • NISSEN FUNDOPLICATION     • TOTAL ABDOMINAL HYSTERECTOMY  1988    benign cyst        MEDICATIONS:    Current Outpatient Medications:   •  Accu-Chek Guide test strip, TEST 2 TIMES PER DAY, Disp: , Rfl:   •  albuterol sulfate  (90 Base) MCG/ACT inhaler, Inhale 2 puffs Every 6 (Six) Hours As Needed for Wheezing., Disp: 18 g, Rfl: 2  •  azithromycin (ZITHROMAX) 250 MG tablet, Take 2 tablets the first day, then 1 tablet daily for 4 days., Disp: 6 tablet, Rfl: 0  •  chlorhexidine (PERIDEX) 0.12 % solution, Apply 15 mL to the mouth or throat 2 (Two) Times a Day As Needed. For 14 days, w/1 refill , Disp: , Rfl:   •  diphenoxylate-atropine (Lomotil) 2.5-0.025 MG per tablet, Take 1 tablet by mouth 4 (Four) Times a Day As Needed for Diarrhea., Disp: 30 tablet, Rfl: 1  •  donepezil (ARICEPT) 10 MG tablet, Take 20 mg by mouth Daily., Disp: , Rfl:   •  DULoxetine (CYMBALTA) 60 MG capsule, TAKE 1 CAPSULE BY MOUTH DAILY (Patient taking differently: Take 60 mg by mouth Daily.), Disp: 90 capsule, Rfl: 3  •  ezetimibe (ZETIA) 10 MG tablet, Take 10 mg by mouth Daily.,  Disp: , Rfl:   •  fluticasone (Flonase) 50 MCG/ACT nasal spray, 2 sprays into the nostril(s) as directed by provider Daily., Disp: 18.2 mL, Rfl: 11  •  furosemide (LASIX) 40 MG tablet, Take 40 mg by mouth Daily., Disp: , Rfl:   •  glimepiride (AMARYL) 2 MG tablet, TAKE 1 TABLET BY MOUTH TWICE DAILY (Patient taking differently: Take 2 mg by mouth 2 (two) times a day.), Disp: 180 tablet, Rfl: 3  •  glucose blood test strip, Accu-Chek Guide test strips, Disp: , Rfl:   •  ketoconazole (NIZORAL) 2 % cream, Apply 1 application topically to the appropriate area as directed Daily As Needed for Itching. BACK AND LEGS AS NEEDED FOR ITCHING, Disp: , Rfl:   •  ketoconazole (NIZORAL) 2 % shampoo, Apply 1 application topically to the appropriate area as directed As Needed for Irritation., Disp: , Rfl:   •  latanoprost (XALATAN) 0.005 % ophthalmic solution, Administer 1 drop to both eyes Daily., Disp: , Rfl:   •  levothyroxine (SYNTHROID, LEVOTHROID) 50 MCG tablet, Take 50 mcg by mouth Daily., Disp: , Rfl:   •  metOLazone (ZAROXOLYN) 2.5 MG tablet, Take 1 tablet by mouth Daily As Needed (for swelling or weight gain of 4 lbs). As directed, Disp: , Rfl:   •  metoprolol succinate XL (TOPROL-XL) 25 MG 24 hr tablet, Take 25 mg by mouth Daily., Disp: , Rfl:   •  O2 (OXYGEN), Inhale Continuous. 4-5 L/MIN DEPENDING ON ACTIVITY, Disp: , Rfl:   •  pantoprazole (PROTONIX) 40 MG EC tablet, Take 1 tablet by mouth 2 (two) times a day., Disp: 180 tablet, Rfl: 3  •  POTASSIUM CHLORIDE ER PO, Take 30 mEq by mouth 2 (Two) Times a Day., Disp: , Rfl:   •  Prucalopride Succinate 2 MG tablet, Take 1 tablet by mouth Daily., Disp: 30 tablet, Rfl: 2  •  rOPINIRole XL (REQUIP XL) 8 MG 24 hr tablet, Take 8 mg by mouth 2 (two) times a day., Disp: , Rfl:   •  Spacer/Aero-Holding Chambers (AeroChamber Plus Morris-Vu) misc, As Needed., Disp: , Rfl:   •  timolol (TIMOPTIC) 0.25 % ophthalmic solution, Administer 1 drop to the right eye Every Morning., Disp: , Rfl:    •  Tiotropium Bromide Monohydrate (Spiriva Respimat) 1.25 MCG/ACT aerosol solution inhaler, Inhale 2 puffs Daily., Disp: 4 g, Rfl: 11  •  tiZANidine (ZANAFLEX) 2 MG tablet, Take 1 tablet by mouth At Night As Needed for Muscle Spasms., Disp: 30 tablet, Rfl: 0  •  vitamin D (ERGOCALCIFEROL) 1.25 MG (55205 UT) capsule capsule, Take 1 capsule by mouth 1 (One) Time Per Week. Friday?, Disp: 5 capsule, Rfl: 0    ALLERGIES  Allergies   Allergen Reactions   • Statins Myalgia     myalgia   • Metoclopramide Other (See Comments)     EXACERBATED RLS   • Penicillins Rash     rash   • Tramadol Nausea And Vomiting and GI Intolerance     VERY MILD PER PT       FAMILY HISTORY:  Family History   Problem Relation Age of Onset   • Colon polyps Mother    • Emphysema Mother    • Hypertension Mother    • Colon polyps Father    • Dementia Father    • Heart disease Father    • Hyperlipidemia Father    • Macular degeneration Father    • Glaucoma Father    • Colon cancer Neg Hx    • Breast cancer Neg Hx    • Ovarian cancer Neg Hx        SOCIAL HISTORY  Social History     Socioeconomic History   • Marital status: Single     Spouse name: Not on file   • Number of children: Not on file   • Years of education: Not on file   • Highest education level: Not on file   Tobacco Use   • Smoking status: Former Smoker     Packs/day: 1.50     Years: 25.00     Pack years: 37.50     Types: Cigarettes     Quit date: 1992     Years since quittin.6   • Smokeless tobacco: Never Used   Vaping Use   • Vaping Use: Never used   Substance and Sexual Activity   • Alcohol use: Yes     Comment: a glass of wine or bourbon a couple times a WEEK   • Drug use: No   • Sexual activity: Never     Socioeconomic History: 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  2 sons and 5 grandchildren.  Rarely drinks alcoholic beverages and quit smoking in .       REVIEW OF SYSTEMS  Review of Systems   Constitutional: Positive for fatigue. Negative for  activity change, appetite change, chills, diaphoresis, fever, unexpected weight gain and unexpected weight loss.   HENT: Positive for trouble swallowing. Negative for voice change.    Gastrointestinal: Positive for abdominal distention, abdominal pain, anal bleeding, blood in stool, constipation, diarrhea, nausea, GERD and indigestion. Negative for rectal pain and vomiting.     I reviewed the above-noted review of systems    PHYSICAL EXAM   /70 (BP Location: Left arm, Patient Position: Sitting, Cuff Size: Adult)   Pulse 99   Temp 97.5 °F (36.4 °C) (Temporal)   Wt 77.4 kg (170 lb 9.6 oz)   LMP  (LMP Unknown)   BMI 29.28 kg/m²   General: Well-developed pleasant white female no apparent acute distress.  She is using supplemental oxygen  HEENT: Anicteric sclera wearing facemask  Neck: Without rigidity observed  Lungs: Grossly normal respirations without labored breathing observed without audible wheezing.  Speaking in full sentences  Abdomen: Without visible distention  Neurologic: Normal cognition and affect.  Alert and oriented.  Without gross motor deficits          Results Review:  I reviewed the patient's new clinical results.  Recent CBC reviewed.  Within normal limits/unremarkable      ASSESSMENT  1.-Intermittent dysphagia solids status post esophageal dilation with improved dysphagia to solids  2.-Diarrhea predominant early bowel syndrome.  This has been problematic in the past has responded well to Colestid and therefore we will simply initiate Colestid 1-2 4 g p.o. daily titrated response to tolerance  3.-Gastroparesis  4.-History of globus syndrome  5.-COPD requiring supplemental oxygen    PLAN  1.-Colestid 1 to 4 g/day titrated to response and tolerance with patient encouraged to call us for further recommendations if she does not respond well  2.-Repeat esophageal dilation as needed  3.-GI follow-up as needed        Cortes Millan MD  8/12/2021   14:25 EDT

## 2021-08-13 PROBLEM — R09.A2 GLOBUS SENSATION: Status: ACTIVE | Noted: 2021-08-13

## 2021-08-13 PROBLEM — R09.89 GLOBUS SENSATION: Status: ACTIVE | Noted: 2021-08-13

## 2021-08-25 ENCOUNTER — HOSPITAL ENCOUNTER (OUTPATIENT)
Dept: NUTRITION | Facility: HOSPITAL | Age: 76
Setting detail: RECURRING SERIES
Discharge: HOME | End: 2021-08-25

## 2021-08-25 VITALS — HEIGHT: 64 IN | BODY MASS INDEX: 29.02 KG/M2 | WEIGHT: 170 LBS

## 2021-08-25 PROCEDURE — 97802 MEDICAL NUTRITION INDIV IN: CPT | Performed by: DIETITIAN, REGISTERED

## 2021-08-25 NOTE — CONSULTS
Adult Outpatient Nutrition  Assessment/PES    Patient Name:  Lorrie Pagan  YOB: 1945  MRN: [id number]    Assessment Date:  8/25/2021    Comments:  Telephone nutrition consult, 60 minutes. This medical referred consult was provided as a telephone call, as patient is unable to attend an in-office appointment due to the COVID-19 crisis. Consent for treatment was given verbally.    Patient describes problems with recently diagnosed gastroparesis. Patient is s/p hysterectomy (1988), fundop (2008) and CCX (2016). She states that she recovered well from all surgeries with the exception of CCX which produced diarrhea. She was placed on Colestid at that time which resolved diarrhea. She states that she discontinued Colestid about 18 months ago because she felt it was no longer necessary. About 6 months ago she began experiencing bloating, early satiety and nausea. GES revealed gastroparesis. She was placed on motegrity which alleviated abdominal symptoms but did cause diarrhea. She was placed on Colestid again which seems to have resolved. Her primary concern at this time is knowing what to eat for her gastroparesis which she still struggles to control. Patient does report that she lives in an assisted living facility where her meals are prepared for her. She does report some choice with meals, but she is still unclear as to what to choose.    During the session we discussed the etiology of gastroparesis. RD presented low residue diet as a means of providing GI rest and improving symptoms. Based on current level of patient symptoms, RD recommends following GI Soft/low residue diet x 2 weeks. Reviewed hallmarks of low residue diet including adding in white grains and simple starches, well-cooked, tender meats, cooked vegetables and canned fruits. Recommended avoiding raw fruits and vegetables, whole grains and rare meats for now. We will discuss maintenance plan at follow up.     Goals:  - low residue  "diet x 2 weeks  - maintain weight    Total of 60 minutes spent with patient on nutrition counseling. Education based on Academy of Dietetics and Nutrition guidelines. Patient was provided with RD's contact information. Follow up visit is scheduled for 9/16/21 at 8:30 am. Thank you for this referral.      General Info     Row Name 08/25/21 0854       Today's Session    Person(s) attending today's session  Patient     Services Used Today?  No       General Information    How Well Do You Speak English?  very well    Do You Speak a Language Other Than English at Home?  no    Are you able to read and write English?  Yes    Lives With  alone    Is patient pregnant?  n/a          Anthropometrics     Row Name 08/25/21 0855          Anthropometrics    Height  162.6 cm (64\")     Weight  77.1 kg (170 lb)        Ideal Body Weight (IBW)    Ideal Body Weight (IBW) (kg)  55     % Ideal Body Weight  140.19        Body Mass Index (BMI)    BMI (kg/m2)  29.24         Nutritional Info/Activity     Row Name 08/25/21 0861       Nutritional Information    Have you had weight changes?  No    What is your desired body weight?  77.1 kg (170 lb)    Have you tried to lose weight before?  No    What is your motivation to lose weight?  none maintain    History of eating disorder?  No    What cultural diet influences are important for you to follow?  none    Do you have difficulty chewing food?  Yes esophageal sphincter    Functional Status  able to prepare meals;able to purchase food;ambulatory    List any food cravings/trigger foods you have  moon, vinegar    List any food aversions  none    How often do you eat out and where?  rarely but lives in [facility] community and has meals prepared    Do you use Food Assistance programs (WIC, food stamps, food bank)?  no    Do you need information about Food Assistance programs?  no    How many meals do you eat each day?  3    How many snacks do you eat each day?  1    What is the biggest " "challenge you have with your diet?  Food causing negative symptoms    Enter everything you can remember eating in the last 24 hours (1 day)  Breakfast: 2 eggs, goff, toast Lunch: pimento cheese sandwich, fruit, Dinner: salmon, rice, green beans Snacks: cheesecake, ice cream       Eating Environment    Eating environment  Group       Physical Activity    Are you currently involved in an activity/exercise program?   Yes    Describe physical activity  walking, water aerobics    How many minutes do you spend on exercise each day?  30    How would you rank exercise as an important health lifestyle practice?  6          Estimated/Assessed Needs     Row Name 08/25/21 0855          Calculation Measurements    Weight Used For Calculations  77.1 kg (170 lb)     Height  162.6 cm (64\")        Estimated/Assessed Needs    Additional Documentation  Wright-St. Jeor Equation (Group)        Wright-St. Jeor Equation    RMR (Wright-St. Jeor Equation)  1241.11     Wright-St. Jeor Activity Factors  1.2     Activity Factors (Wright-St. Jeor)  1489.332         Evaluation of Prescribed Nutrient/Fluid Intake     Row Name 08/25/21 0855          Calculation Measurements    Weight Used For Calculations  77.1 kg (170 lb)         Labs/Tests/Procedures/Meds     Row Name 08/25/21 0914          Labs/Procedures/Meds    Lab Results Reviewed  reviewed        Diagnostic Tests/Procedures    Diagnostic Test/Procedure Reviewed  reviewed        Medications    Pertinent Medications Reviewed  reviewed             Problem/Interventions:  Problem 1     Row Name 08/25/21 0915          Nutrition Diagnoses Problem 1    Problem 1  Altered GI Function     Etiology (related to)  Medical Diagnosis     Gastrointestinal  Gastroparesis     Signs/Symptoms (evidenced by)  Report/Observation     Reported/Observed By  MD     Reported GI Symptoms  Nausea and vomiting;Constipation                 Intervention Goal     Row Name 08/25/21 0916          Intervention Goal    " General  Reduce/improve symptoms;Meet nutritional needs for age/condition     PO  Meet estimated needs     Weight  Maintain weight           Nutrition Prescription     Row Name 08/25/21 0918          Nutrition Prescription PO    PO Prescription  Begin/change diet     Begin/Change Diet to  Regular     Fluid Consistency  Thin     Common Modifiers  GI Soft/[place]     New PO Prescription Ordered?  No, recommended         Education/Evaluation     Row Name 08/25/21 0919          Education    Education  Provided education regarding;Education topics;Advised regarding habits/behavior     Provided education regarding  Avoidance/improvement of symptoms;Avoidance of associated complications;Diet rationale;Key food habit change;Medical diagnosis     Education Topics  GI disease     Advised Regarding Habits/Behavior  Appropriate portions;Food choices        Monitor/Evaluation    Monitor  Per protocol     Education Follow-up  Other (comment) 2 weeks           Electronically signed by:  Emily Armenta RD  08/25/21 09:24 EDT

## 2021-08-27 ENCOUNTER — HOSPITAL ENCOUNTER (OUTPATIENT)
Dept: ULTRASOUND IMAGING | Facility: HOSPITAL | Age: 76
Discharge: HOME | End: 2021-08-27
Admitting: INTERNAL MEDICINE

## 2021-08-27 DIAGNOSIS — R92.8 ABNORMAL MAMMOGRAM: ICD-10-CM

## 2021-08-27 PROCEDURE — 10005 FNA BX W/US GDN 1ST LES: CPT | Performed by: RADIOLOGY

## 2021-08-27 PROCEDURE — 88184 FLOWCYTOMETRY/ TC 1 MARKER: CPT

## 2021-08-27 PROCEDURE — 88173 CYTOPATH EVAL FNA REPORT: CPT | Performed by: INTERNAL MEDICINE

## 2021-08-27 PROCEDURE — 88185 FLOWCYTOMETRY/TC ADD-ON: CPT

## 2021-08-27 PROCEDURE — 25010000003 LIDOCAINE 1 % SOLUTION: Performed by: RADIOLOGY

## 2021-08-27 RX ORDER — LIDOCAINE HYDROCHLORIDE 10 MG/ML
5 INJECTION, SOLUTION INFILTRATION; PERINEURAL ONCE
Status: COMPLETED | OUTPATIENT
Start: 2021-08-27 | End: 2021-08-27

## 2021-08-27 RX ADMIN — LIDOCAINE HYDROCHLORIDE 5 ML: 10 INJECTION, SOLUTION INFILTRATION; PERINEURAL at 11:14

## 2021-08-30 LAB
LAB AP CASE REPORT: NORMAL
LAB AP FLOW CYTOMETRY SUMMARY: NORMAL
PATH REPORT.FINAL DX SPEC: NORMAL

## 2021-08-31 ENCOUNTER — TRANSCRIBE ORDERS (OUTPATIENT)
Dept: MAMMOGRAPHY | Facility: HOSPITAL | Age: 76
End: 2021-08-31

## 2021-08-31 ENCOUNTER — TELEPHONE (OUTPATIENT)
Dept: MAMMOGRAPHY | Facility: HOSPITAL | Age: 76
End: 2021-08-31

## 2021-08-31 DIAGNOSIS — R92.8 ABNORMAL MAMMOGRAM: Primary | ICD-10-CM

## 2021-08-31 NOTE — TELEPHONE ENCOUNTER
Pt notified of pathology results and recommendation. Verbalizes understanding. Denies discomfort.  Denies signs and symptoms of infection. Questions answered, verbalized understanding. Patient transferred to [facility] scheduling for an appointment.

## 2021-09-03 ENCOUNTER — APPOINTMENT (OUTPATIENT)
Dept: GENERAL RADIOLOGY | Facility: HOSPITAL | Age: 76
End: 2021-09-03

## 2021-09-03 ENCOUNTER — HOSPITAL ENCOUNTER (EMERGENCY)
Facility: HOSPITAL | Age: 76
Discharge: HOME | End: 2021-09-03
Attending: EMERGENCY MEDICINE | Admitting: EMERGENCY MEDICINE

## 2021-09-03 VITALS
HEIGHT: 64 IN | DIASTOLIC BLOOD PRESSURE: 64 MMHG | BODY MASS INDEX: 29.02 KG/M2 | WEIGHT: 170 LBS | SYSTOLIC BLOOD PRESSURE: 132 MMHG | OXYGEN SATURATION: 99 % | RESPIRATION RATE: 24 BRPM | TEMPERATURE: 97.9 F | HEART RATE: 78 BPM

## 2021-09-03 DIAGNOSIS — J44.1 COPD WITH ACUTE EXACERBATION (HCC): Primary | ICD-10-CM

## 2021-09-03 LAB
ALBUMIN SERPL-MCNC: 3.9 G/DL (ref 3.5–5.2)
ALBUMIN/GLOB SERPL: 1.3 G/DL
ALP SERPL-CCNC: 117 U/L (ref 39–117)
ALT SERPL W P-5'-P-CCNC: 11 U/L (ref 1–33)
ANION GAP SERPL CALCULATED.3IONS-SCNC: 8 MMOL/L (ref 5–15)
AST SERPL-CCNC: 18 U/L (ref 1–32)
B PARAPERT DNA SPEC QL NAA+PROBE: NOT DETECTED
B PERT DNA SPEC QL NAA+PROBE: NOT DETECTED
BASOPHILS # BLD AUTO: 0.04 10*3/MM3 (ref 0–0.2)
BASOPHILS NFR BLD AUTO: 0.5 % (ref 0–1.5)
BILIRUB SERPL-MCNC: 0.2 MG/DL (ref 0–1.2)
BUN SERPL-MCNC: 16 MG/DL (ref 8–23)
BUN/CREAT SERPL: 25.4 (ref 7–25)
C PNEUM DNA NPH QL NAA+NON-PROBE: NOT DETECTED
CALCIUM SPEC-SCNC: 9.9 MG/DL (ref 8.6–10.5)
CHLORIDE SERPL-SCNC: 97 MMOL/L (ref 98–107)
CO2 SERPL-SCNC: 35 MMOL/L (ref 22–29)
CREAT SERPL-MCNC: 0.63 MG/DL (ref 0.57–1)
DEPRECATED RDW RBC AUTO: 47.1 FL (ref 37–54)
EOSINOPHIL # BLD AUTO: 0.23 10*3/MM3 (ref 0–0.4)
EOSINOPHIL NFR BLD AUTO: 2.7 % (ref 0.3–6.2)
ERYTHROCYTE [DISTWIDTH] IN BLOOD BY AUTOMATED COUNT: 14.3 % (ref 12.3–15.4)
FLUAV SUBTYP SPEC NAA+PROBE: NOT DETECTED
FLUBV RNA ISLT QL NAA+PROBE: NOT DETECTED
GFR SERPL CREATININE-BSD FRML MDRD: 92 ML/MIN/1.73
GLOBULIN UR ELPH-MCNC: 2.9 GM/DL
GLUCOSE SERPL-MCNC: 110 MG/DL (ref 65–99)
HADV DNA SPEC NAA+PROBE: NOT DETECTED
HCOV 229E RNA SPEC QL NAA+PROBE: NOT DETECTED
HCOV HKU1 RNA SPEC QL NAA+PROBE: NOT DETECTED
HCOV NL63 RNA SPEC QL NAA+PROBE: NOT DETECTED
HCOV OC43 RNA SPEC QL NAA+PROBE: NOT DETECTED
HCT VFR BLD AUTO: 37.3 % (ref 34–46.6)
HGB BLD-MCNC: 11.3 G/DL (ref 12–15.9)
HMPV RNA NPH QL NAA+NON-PROBE: NOT DETECTED
HOLD SPECIMEN: NORMAL
HPIV1 RNA SPEC QL NAA+PROBE: NOT DETECTED
HPIV2 RNA SPEC QL NAA+PROBE: NOT DETECTED
HPIV3 RNA NPH QL NAA+PROBE: NOT DETECTED
HPIV4 P GENE NPH QL NAA+PROBE: NOT DETECTED
IMM GRANULOCYTES # BLD AUTO: 0.06 10*3/MM3 (ref 0–0.05)
IMM GRANULOCYTES NFR BLD AUTO: 0.7 % (ref 0–0.5)
LYMPHOCYTES # BLD AUTO: 1.7 10*3/MM3 (ref 0.7–3.1)
LYMPHOCYTES NFR BLD AUTO: 19.9 % (ref 19.6–45.3)
M PNEUMO IGG SER IA-ACNC: NOT DETECTED
MCH RBC QN AUTO: 27.2 PG (ref 26.6–33)
MCHC RBC AUTO-ENTMCNC: 30.3 G/DL (ref 31.5–35.7)
MCV RBC AUTO: 89.9 FL (ref 79–97)
MONOCYTES # BLD AUTO: 0.64 10*3/MM3 (ref 0.1–0.9)
MONOCYTES NFR BLD AUTO: 7.5 % (ref 5–12)
NEUTROPHILS NFR BLD AUTO: 5.86 10*3/MM3 (ref 1.7–7)
NEUTROPHILS NFR BLD AUTO: 68.7 % (ref 42.7–76)
NRBC BLD AUTO-RTO: 0 /100 WBC (ref 0–0.2)
NT-PROBNP SERPL-MCNC: 72.8 PG/ML (ref 0–1800)
PLATELET # BLD AUTO: 378 10*3/MM3 (ref 140–450)
PMV BLD AUTO: 9.1 FL (ref 6–12)
POTASSIUM SERPL-SCNC: 3.9 MMOL/L (ref 3.5–5.2)
PROCALCITONIN SERPL-MCNC: 0.07 NG/ML (ref 0–0.25)
PROT SERPL-MCNC: 6.8 G/DL (ref 6–8.5)
RBC # BLD AUTO: 4.15 10*6/MM3 (ref 3.77–5.28)
RHINOVIRUS RNA SPEC NAA+PROBE: NOT DETECTED
RSV RNA NPH QL NAA+NON-PROBE: NOT DETECTED
SARS-COV-2 RNA NPH QL NAA+NON-PROBE: NOT DETECTED
SODIUM SERPL-SCNC: 140 MMOL/L (ref 136–145)
TROPONIN T SERPL-MCNC: <0.01 NG/ML (ref 0–0.03)
WBC # BLD AUTO: 8.53 10*3/MM3 (ref 3.4–10.8)
WHOLE BLOOD HOLD SPECIMEN: NORMAL
WHOLE BLOOD HOLD SPECIMEN: NORMAL

## 2021-09-03 PROCEDURE — 85025 COMPLETE CBC W/AUTO DIFF WBC: CPT | Performed by: EMERGENCY MEDICINE

## 2021-09-03 PROCEDURE — 80053 COMPREHEN METABOLIC PANEL: CPT | Performed by: EMERGENCY MEDICINE

## 2021-09-03 PROCEDURE — 0202U NFCT DS 22 TRGT SARS-COV-2: CPT | Performed by: EMERGENCY MEDICINE

## 2021-09-03 PROCEDURE — 96374 THER/PROPH/DIAG INJ IV PUSH: CPT

## 2021-09-03 PROCEDURE — 71045 X-RAY EXAM CHEST 1 VIEW: CPT

## 2021-09-03 PROCEDURE — 84145 PROCALCITONIN (PCT): CPT | Performed by: EMERGENCY MEDICINE

## 2021-09-03 PROCEDURE — 83880 ASSAY OF NATRIURETIC PEPTIDE: CPT | Performed by: EMERGENCY MEDICINE

## 2021-09-03 PROCEDURE — 99284 EMERGENCY DEPT VISIT MOD MDM: CPT

## 2021-09-03 PROCEDURE — 25010000002 METHYLPREDNISOLONE PER 125 MG: Performed by: EMERGENCY MEDICINE

## 2021-09-03 PROCEDURE — 94640 AIRWAY INHALATION TREATMENT: CPT

## 2021-09-03 PROCEDURE — 93005 ELECTROCARDIOGRAM TRACING: CPT | Performed by: EMERGENCY MEDICINE

## 2021-09-03 PROCEDURE — 84484 ASSAY OF TROPONIN QUANT: CPT | Performed by: EMERGENCY MEDICINE

## 2021-09-03 RX ORDER — ALBUTEROL SULFATE 90 UG/1
2 AEROSOL, METERED RESPIRATORY (INHALATION) ONCE
Status: COMPLETED | OUTPATIENT
Start: 2021-09-03 | End: 2021-09-03

## 2021-09-03 RX ORDER — SODIUM CHLORIDE 0.9 % (FLUSH) 0.9 %
10 SYRINGE (ML) INJECTION AS NEEDED
Status: DISCONTINUED | OUTPATIENT
Start: 2021-09-03 | End: 2021-09-03 | Stop reason: HOSPADM

## 2021-09-03 RX ORDER — AZITHROMYCIN 250 MG/1
TABLET, FILM COATED ORAL
Qty: 6 TABLET | Refills: 0 | Status: SHIPPED | OUTPATIENT
Start: 2021-09-03 | End: 2021-09-15

## 2021-09-03 RX ORDER — PREDNISONE 50 MG/1
50 TABLET ORAL DAILY
Qty: 5 TABLET | Refills: 0 | Status: SHIPPED | OUTPATIENT
Start: 2021-09-03 | End: 2021-09-08

## 2021-09-03 RX ORDER — METHYLPREDNISOLONE SODIUM SUCCINATE 125 MG/2ML
125 INJECTION, POWDER, LYOPHILIZED, FOR SOLUTION INTRAMUSCULAR; INTRAVENOUS ONCE
Status: COMPLETED | OUTPATIENT
Start: 2021-09-03 | End: 2021-09-03

## 2021-09-03 RX ORDER — ALBUTEROL SULFATE 90 UG/1
2 AEROSOL, METERED RESPIRATORY (INHALATION) EVERY 6 HOURS PRN
Qty: 8 G | Refills: 0 | Status: SHIPPED | OUTPATIENT
Start: 2021-09-03 | End: 2021-09-15 | Stop reason: SDUPTHER

## 2021-09-03 RX ADMIN — ALBUTEROL SULFATE 2 PUFF: 90 AEROSOL, METERED RESPIRATORY (INHALATION) at 11:54

## 2021-09-03 RX ADMIN — METHYLPREDNISOLONE SODIUM SUCCINATE 125 MG: 125 INJECTION, POWDER, FOR SOLUTION INTRAMUSCULAR; INTRAVENOUS at 11:35

## 2021-09-03 NOTE — ED PROVIDER NOTES
Subjective   Patient is a pleasant 75-year-old female with history of COPD, followed by Dr. Akhil Arboleda, pulmonology, who presents today with shortness of breath.  She states that her shortness of breath has been getting progressively worse over the past 3 to 4 days.  She has had a productive cough with green sputum.  The cough and sputum has not changed from her baseline.  She states that other than the shortness of breath she has not had any other associated symptoms.  She denies fever, chills, chest pain, abdominal pain, vomiting, or other acute complaints.  She has chronic lower extremity edema and states that she has had somewhat similar symptoms with fluid buildup on her lungs in the past.      Shortness of Breath  Severity:  Moderate  Onset quality:  Gradual  Duration:  3 days  Progression:  Unchanged  Chronicity:  New  Context: activity    Relieved by:  Nothing  Worsened by:  Exertion  Ineffective treatments:  Inhaler, rest and oxygen  Associated symptoms: cough and wheezing    Associated symptoms: no abdominal pain, no chest pain, no hemoptysis, no sputum production and no vomiting        Review of Systems   Respiratory: Positive for cough, shortness of breath and wheezing. Negative for hemoptysis and sputum production.    Cardiovascular: Negative for chest pain.   Gastrointestinal: Negative for abdominal pain and vomiting.   All other systems reviewed and are negative.      Past Medical History:   Diagnosis Date   • Back pain    • Bronchiectasis (CMS/HCC)    • Bronchitis 01/2018   • Cancer (CMS/HCC)     History of lung cancer and skin cancer    • Cardiac murmur    • Chronic cough    • Chronic obstructive pulmonary disease (CMS/HCC)    • Colon polyp    • DDD (degenerative disc disease), lumbar    • Depression    • Diabetes mellitus (CMS/HCC)     DX: 2019, CHECK BS QOD, LAST A1C 6.3??   • Disease of thyroid gland    • Esophageal dilatation 9/20/2019    Added automatically from request for surgery [id number]    • Esophageal dysphagia 10/24/2019    Added automatically from request for surgery [id number]   • Former smoker (None since 1992) 8/29/2019   • GERD (gastroesophageal reflux disease)    • Glaucoma    • Globus sensation 1/27/2020   • History of colonic polyps    • History of transfusion 1988    NO REACTION    • Hyperlipidemia    • Hypertension    • Irritable bowel syndrome     Constipation/diarrhea   • Legally blind    • Lung cancer (CMS/HCC)    • Macular degeneration    • Neuropathy    • Obesity 2/26/2020   • Osteoarthritis    • Oxygen dependent     5L   • Pneumonia    • Sleep apnea     NON COMPLIANT WITH CPAP USE   • UTI (urinary tract infection)    • Wears glasses        Allergies   Allergen Reactions   • Statins Myalgia     myalgia   • Metoclopramide Other (See Comments)     EXACERBATED RLS   • Penicillins Rash     rash   • Tramadol Nausea And Vomiting and GI Intolerance     VERY MILD PER PT       Past Surgical History:   Procedure Laterality Date   • BACK SURGERY      X2 (LUMBAR)   • BREAST BIOPSY      20+ years ago   • CATARACT EXTRACTION Bilateral    • CHOLECYSTECTOMY     • COLONOSCOPY     • ENDOSCOPY N/A 11/6/2019    Procedure: ESOPHAGOGASTRODUODENOSCOPY;  Surgeon: Cortes Millan MD;  Location:  [facility];  Service: Gastroenterology   • ENDOSCOPY N/A 4/29/2021    Procedure: ESOPHAGOGASTRODUODENOSCOPY;  Surgeon: Cortes Millan MD;  Location:  [facility];  Service: Gastroenterology;  Laterality: N/A;  balloon dilation    • HAND SURGERY Right     laceration repair   • INCONTINENCE SURGERY      BLADDER   • LUNG REMOVAL, PARTIAL Left 2016   • NISSEN FUNDOPLICATION     • TOTAL ABDOMINAL HYSTERECTOMY  1988    benign cyst   • US GUIDED FINE NEEDLE ASPIRATION  8/27/2021       Family History   Problem Relation Age of Onset   • Colon polyps Mother    • Emphysema Mother    • Hypertension Mother    • Colon polyps Father    • Dementia Father    • Heart disease Father    •  Hyperlipidemia Father    • Macular degeneration Father    • Glaucoma Father    • Colon cancer Neg Hx    • Breast cancer Neg Hx    • Ovarian cancer Neg Hx        Social History     Socioeconomic History   • Marital status: Single     Spouse name: Not on file   • Number of children: Not on file   • Years of education: Not on file   • Highest education level: Not on file   Tobacco Use   • Smoking status: Former Smoker     Packs/day: 1.50     Years: 25.00     Pack years: 37.50     Types: Cigarettes     Quit date: 1992     Years since quittin.6   • Smokeless tobacco: Never Used   Vaping Use   • Vaping Use: Never used   Substance and Sexual Activity   • Alcohol use: Yes     Comment: a glass of wine or bourbon a couple times a WEEK   • Drug use: No   • Sexual activity: Never           Objective   Physical Exam  Vitals and nursing note reviewed.   Constitutional:       General: She is not in acute distress.  HENT:      Head: Normocephalic and atraumatic.   Eyes:      Pupils: Pupils are equal, round, and reactive to light.   Neck:      Thyroid: No thyromegaly.      Vascular: No JVD.   Cardiovascular:      Rate and Rhythm: Normal rate and regular rhythm.      Heart sounds: Normal heart sounds. No murmur heard.   No friction rub. No gallop.    Pulmonary:      Effort: Respiratory distress (Mild incrased work of brething) present.      Breath sounds: Decreased breath sounds and wheezing present. No rales.   Abdominal:      Palpations: Abdomen is soft.      Tenderness: There is no abdominal tenderness.   Musculoskeletal:         General: Normal range of motion.      Cervical back: Normal range of motion and neck supple.   Lymphadenopathy:      Cervical: No cervical adenopathy.   Skin:     General: Skin is warm and dry.      Capillary Refill: Capillary refill takes less than 2 seconds.   Neurological:      General: No focal deficit present.      Mental Status: She is alert and oriented to person, place, and time.  "  Psychiatric:         Mood and Affect: Mood normal. Mood is not anxious.         Behavior: Behavior normal.         Procedures           ED Course  ED Course as of Sep 03 1335   Fri Sep 03, 2021   1329 Platelets: 378 [CP]      ED Course User Index  [CP] Virgilio Lucas,       No results found for this or any previous visit (from the past 24 hour(s)).  Note: In addition to lab results from this visit, the labs listed above may include labs taken at another facility or during a different encounter within the last 24 hours. Please correlate lab times with ED admission and discharge times for further clarification of the services performed during this visit.    XR Chest 1 View   Final Result   Stable chronic changes of the lung fields without evidence   of acute cardiopulmonary abnormality.       This report was finalized on 9/3/2021 12:26 PM by Elias Sin.            Vitals:    09/03/21 1034 09/03/21 1100 09/03/21 1114   BP: 142/60 128/62    BP Location: Left arm     Patient Position: Sitting     Pulse: 77 77    Resp:   (!) 36   Temp:   97.9 °F (36.6 °C)   TempSrc:   Oral   SpO2: 100% 100%    Weight:   77.1 kg (170 lb)   Height:   162.6 cm (64\")     Medications   sodium chloride 0.9 % flush 10 mL (has no administration in time range)     ECG/EMG Results (last 24 hours)     Procedure Component Value Units Date/Time    ECG 12 Lead [[id number]] Collected: 09/03/21 1041     Updated: 09/03/21 1042        ECG 12 Lead           Results for MARLENE WILBURN (MRN [id number]) as of 9/4/2021 11:29   Ref. Range 9/3/2021 10:57   Troponin T Latest Ref Range: 0.000 - 0.030 ng/mL <0.010   proBNP Latest Ref Range: 0.0-1,800.0 pg/mL 72.8   Glucose Latest Ref Range: 65 - 99 mg/dL 110 (H)   Sodium Latest Ref Range: 136 - 145 mmol/L 140   Potassium Latest Ref Range: 3.5 - 5.2 mmol/L 3.9   CO2 Latest Ref Range: 22.0 - 29.0 mmol/L 35.0 (H)   Chloride Latest Ref Range: 98 - 107 mmol/L 97 (L)   Anion Gap Latest Ref Range: 5.0 - 15.0 " mmol/L 8.0   Creatinine Latest Ref Range: 0.57 - 1.00 mg/dL 0.63   BUN Latest Ref Range: 8 - 23 mg/dL 16   BUN/Creatinine Ratio Latest Ref Range: 7.0 - 25.0  25.4 (H)   Calcium Latest Ref Range: 8.6 - 10.5 mg/dL 9.9   eGFR Non African Am Latest Ref Range: >60 mL/min/1.73 92   Alkaline Phosphatase Latest Ref Range: 39 - 117 U/L 117   Total Protein Latest Ref Range: 6.0 - 8.5 g/dL 6.8   ALT (SGPT) Latest Ref Range: 1 - 33 U/L 11   AST (SGOT) Latest Ref Range: 1 - 32 U/L 18   Total Bilirubin Latest Ref Range: 0.0 - 1.2 mg/dL 0.2   Albumin Latest Ref Range: 3.50 - 5.20 g/dL 3.90   Globulin Latest Units: gm/dL 2.9   A/G Ratio Latest Units: g/dL 1.3   Procalcitonin Latest Ref Range: 0.00 - 0.25 ng/mL 0.07   WBC Latest Ref Range: 3.40 - 10.80 10*3/mm3 8.53   RBC Latest Ref Range: 3.77 - 5.28 10*6/mm3 4.15   Hemoglobin Latest Ref Range: 12.0 - 15.9 g/dL 11.3 (L)   Hematocrit Latest Ref Range: 34.0 - 46.6 % 37.3   RDW Latest Ref Range: 12.3 - 15.4 % 14.3   MCV Latest Ref Range: 79.0 - 97.0 fL 89.9   MCH Latest Ref Range: 26.6 - 33.0 pg 27.2   MCHC Latest Ref Range: 31.5 - 35.7 g/dL 30.3 (L)   MPV Latest Ref Range: 6.0 - 12.0 fL 9.1   Platelets Latest Ref Range: 140 - 450 10*3/mm3 378   RDW-SD Latest Ref Range: 37.0 - 54.0 fl 47.1   Neutrophil Rel % Latest Ref Range: 42.7 - 76.0 % 68.7   Lymphocyte Rel % Latest Ref Range: 19.6 - 45.3 % 19.9   Monocyte Rel % Latest Ref Range: 5.0 - 12.0 % 7.5   Eosinophil Rel % Latest Ref Range: 0.3 - 6.2 % 2.7   Basophil Rel % Latest Ref Range: 0.0 - 1.5 % 0.5   Immature Granulocyte Rel % Latest Ref Range: 0.0 - 0.5 % 0.7 (H)   Neutrophils Absolute Latest Ref Range: 1.70 - 7.00 10*3/mm3 5.86   Lymphocytes Absolute Latest Ref Range: 0.70 - 3.10 10*3/mm3 1.70   Monocytes Absolute Latest Ref Range: 0.10 - 0.90 10*3/mm3 0.64   Eosinophils Absolute Latest Ref Range: 0.00 - 0.40 10*3/mm3 0.23   Basophils Absolute Latest Ref Range: 0.00 - 0.20 10*3/mm3 0.04   Immature Grans, Absolute Latest Ref  Range: 0.00 - 0.05 10*3/mm3 0.06 (H)   nRBC Latest Ref Range: 0.0 - 0.2 /100 WBC 0.0   Patient responded well to treatment in the emergency department.  She is ambulating without difficulty.  O2 saturations are 97 or above.  She is comfortable with discharge at this time.  She has reliable follow-up and understands to have a low threshold to return to the emergency department if symptoms return or other concerns arise.        MDM    Final diagnoses:   COPD with acute exacerbation (CMS/[facility])       ED Disposition  ED Disposition     ED Disposition Condition Comment    Discharge Stable         DISCHARGE    Patient discharged in stable condition.    Reviewed implications of results, diagnosis, meds, responsibility to follow up, warning signs and symptoms of possible worsening, potential complications and reasons to return to ER.    Patient/Family voiced understanding of above instructions.    Discussed plan for discharge, as there is no emergent indication for admission.  Pt/family is agreeable and understands need for follow up and possible repeat testing.  Pt/family is aware that discharge does not mean that nothing is wrong but that it indicates no emergency is currently present that requires admission and they must continue care with follow-up as given below or with a physician of their choice.     FOLLOW-UP  Dacia Viera MD  [address] [place] [address]  [phone]    Schedule an appointment as soon as possible for a visit       [facility] Emergency Department  [address] [address] [phone]    If symptoms worsen         Medication List      New Prescriptions    azithromycin 250 MG tablet  Commonly known as: ZITHROMAX  Take 2 tablets the first day, then 1 tablet daily for 4 days.     predniSONE 50 MG tablet  Commonly known as: DELTASONE  Take 1 tablet by mouth Daily for 5 days.        Changed    * albuterol sulfate  (90 Base) MCG/ACT  inhaler  Commonly known as: PROVENTIL HFA;VENTOLIN HFA;PROAIR HFA  Inhale 2 puffs Every 6 (Six) Hours As Needed for Wheezing.  What changed: Another medication with the same name was added. Make sure you understand how and when to take each.     * albuterol sulfate  (90 Base) MCG/ACT inhaler  Commonly known as: PROVENTIL HFA;VENTOLIN HFA;PROAIR HFA  Inhale 2 puffs Every 6 (Six) Hours As Needed for Wheezing.  What changed: You were already taking a medication with the same name, and this prescription was added. Make sure you understand how and when to take each.     glimepiride 2 MG tablet  Commonly known as: AMARYL  TAKE 1 TABLET BY MOUTH TWICE DAILY  What changed: when to take this         * This list has 2 medication(s) that are the same as other medications prescribed for you. Read the directions carefully, and ask your doctor or other care provider to review them with you.               Where to Get Your Medications      These medications were sent to [facility] [place], [place] - [address] [place] [address] - [phone] [facility] [phone] [address] [address]    Phone: [phone]   · albuterol sulfate  (90 Base) MCG/ACT inhaler  · azithromycin 250 MG tablet  · predniSONE 50 MG tablet           No follow-up provider specified.       Medication List      No changes were made to your prescriptions during this visit.          Virgilio Lucas DO  09/04/21 [id number]

## 2021-09-06 ENCOUNTER — TELEPHONE (OUTPATIENT)
Dept: TELEMETRY | Facility: HOSPITAL | Age: 76
End: 2021-09-06

## 2021-09-06 LAB
QT INTERVAL: 370 MS
QTC INTERVAL: 410 MS

## 2021-09-06 NOTE — TELEPHONE ENCOUNTER
COPD Nurse Navigator called patient in reference to recent ED visit on 9/3/2021.  Patient reports that symptoms are not much improved since Friday.  Patient reports that they have the medications they were prescribed.  Patient verbalized understanding of need to follow up with provider and schedule an appointment.  Patient prefers that nurse navigator schedule appointment for her.  Since this call was made when [facility] was closed due to holiday, the appointment will be made tomorrow and patient will be updated at that time.  COPD action plan reviewed.  NN continues to follow.

## 2021-09-07 ENCOUNTER — PATIENT OUTREACH (OUTPATIENT)
Dept: CASE MANAGEMENT | Facility: OTHER | Age: 76
End: 2021-09-07

## 2021-09-07 ENCOUNTER — TELEPHONE (OUTPATIENT)
Dept: TELEMETRY | Facility: HOSPITAL | Age: 76
End: 2021-09-07

## 2021-09-07 NOTE — OUTREACH NOTE
"Ambulatory Case Management Note    Patient Outreach    Contacted pt regarding [facility] ED visit 9/3/21 with chief c/o listed as shortness of breath.  Pt states \"I'm a little better today and I go see my pulmonologist tomorrow.\"   Has talked with nurse navigator for COPD and v/u of COPD zones and when to call for medical assistance.  She states she wears O2 24/7.  She is up and about and ambulates without assistive device.  She states she lives at [facility].  They provide her food and have transportation if needed, however she also has family and Wheels for transportation.  She reports she monitors her bs and blood pressure at home and both have been doing good.  She monitors her pulse O2 and states it runs in high 90s.  Her pharmacy fixes her med trays, but she takes them herself and reports she is compliant.  Care gaps reviewed. States she is scheduled for AWV next week with her PCP, Dr. Viera. She does not have living will and states she checked into it and it was too expensive.  [facility] hotline explained and number provided.  Role of  explained and contact number given.  [facility] 24/7 Nurse line explained and contact number provided.  At time of call, OT was with pt.  She denied any needs and voiced her appreciation for the call.      SDOH updated and reviewed with the patient during this program:     Financial Resource Strain: Low Risk    • Difficulty of Paying Living Expenses: Not very hard       Food Insecurity: No Food Insecurity   • Worried About Running Out of Food in the Last Year: Never true   • Ran Out of Food in the Last Year: Never true       Transportation Needs: No Transportation Needs   • Lack of Transportation (Medical): No   • Lack of Transportation (Non-Medical): No       Housing Stability: Low Risk    • Unable to Pay for Housing in the Last Year: No   • Number of Places Lived in the Last Year: 1   • Unstable Housing in the Last Year: No     General & Health Literacy " Assessment    Questions/Answers      Most Recent Value   Assessment Completed With  Patient   Living Arrangement  Alone   Type of Residence  Apartment [[facility]]   Home Care Services  Yes   Equiptment Used at Home  Oxygen/Respiratory Treatment, Tub Seat [glucometer   bp monitor   pulse O2  grab bars  ]   Bed or Wheelchair Confined  No   Difficulty Keeping Appointments  No   Health Literacy  Good        Care Evaluation    Questions/Answers      Most Recent Value   Annual Wellness Visit:   -- [pt is scheduled for 9/15/21]   Care Gaps Addressed  Diabetic A1C, Diabetic Eye Exam, Flu Shot, Pneumonia Vaccine   HbA1c Status  -- [2/22/21    6.7]   Diabetic Eye Exam Status  -- [Dr. John    3/2/21]   Flu Shot Status  Up to Date   Pneumonia Vaccine Status  Up to Date   Other Patient Education/Resources   24/7 [facility] Nurse Call Line, Advanced Care Planning   24/7 Nurse Call Line Education Method  Verbal   ACP Education Method  Verbal   Advanced Directives:  -- [ACP number provided ]   Medication Adherence  Medications understood   Healthy Lifestyle (Self-Efficacy)  self-monitors vital signs appropriately, recognizes when to contact medical assistance, correctly recognizes signs/symptoms of pulmonary distress        Ally Muñiz RN  Ambulatory Case Management    9/7/2021, 09:37 EDT

## 2021-09-07 NOTE — TELEPHONE ENCOUNTER
Nurse Navigator called patient to inform her that she had been scheduled a follow up appointment with [facility] for 9/8/2021 @ 10:30 am with DONALD Ibanez.  Patient verbal;ized understanding.  COPD action plan reviewed.  Nurse navigator continues to follow.

## 2021-09-08 ENCOUNTER — OFFICE VISIT (OUTPATIENT)
Dept: PULMONOLOGY | Facility: CLINIC | Age: 76
End: 2021-09-08

## 2021-09-08 VITALS
OXYGEN SATURATION: 96 % | BODY MASS INDEX: 30.9 KG/M2 | SYSTOLIC BLOOD PRESSURE: 126 MMHG | DIASTOLIC BLOOD PRESSURE: 70 MMHG | WEIGHT: 181 LBS | TEMPERATURE: 97.5 F | HEIGHT: 64 IN | HEART RATE: 96 BPM

## 2021-09-08 DIAGNOSIS — G47.33 OSA ON CPAP: ICD-10-CM

## 2021-09-08 DIAGNOSIS — J47.9 IDIOPATHIC BRONCHIECTASIS (HCC): ICD-10-CM

## 2021-09-08 DIAGNOSIS — J96.11 CHRONIC RESPIRATORY FAILURE WITH HYPOXIA (HCC): ICD-10-CM

## 2021-09-08 DIAGNOSIS — J44.9 COPD MIXED TYPE (HCC): Primary | ICD-10-CM

## 2021-09-08 DIAGNOSIS — Z99.89 OSA ON CPAP: ICD-10-CM

## 2021-09-08 PROCEDURE — 99213 OFFICE O/P EST LOW 20 MIN: CPT | Performed by: NURSE PRACTITIONER

## 2021-09-08 RX ORDER — PREDNISONE 10 MG/1
10 TABLET ORAL DAILY
Qty: 10 TABLET | Refills: 0 | Status: SHIPPED | OUTPATIENT
Start: 2021-09-08 | End: 2021-09-15

## 2021-09-08 NOTE — PROGRESS NOTES
"[facility] Pulmonary Follow up      Chief Complaint  Hospital Follow Up Visit (f/u)    Subjective          Lorrie Pagan presents to [facility] for follow-up after her recent emergency department visit.  She has been having some worsening shortness of breath for about a week.  She has been monitoring her oxygen saturations and they have been above 95% on her oxygen.  However she is feels like she cannot catch her breath.  She has noticed an overall worsening shortness of breath with activity over the last month.  She has been using her scooter more frequently.    When she was seen in the emergency department she was given a dose of IV steroids and then a prednisone 50 mg for 5 days.  It did help a little bit but not back to baseline.  She is also completing course of azithromycin.          Objective     Vital Signs:   /70 (BP Location: Left arm, Patient Position: Sitting, Cuff Size: Adult)   Pulse 96   Temp 97.5 °F (36.4 °C) (Temporal)   Ht 162.6 cm (64\")   Wt 82.1 kg (181 lb)   SpO2 96% Comment: 5L O2 resting  BMI 31.07 kg/m²       Immunization History   Administered Date(s) Administered   • COVID-19 (MODERNA) 02/13/2021, 03/13/2021   • COVID-19 (PFIZER) 02/13/2021   • Flu Vaccine Quad PF >36MO 10/31/2019, 10/25/2020   • Flu Vaccine Split Quad 10/31/2019   • Fluzone High Dose =>65 Years ([employer] ONLY) 10/28/2018, 10/21/2019, 10/16/2020   • Influenza TIV (IM) 11/01/2017   • Pneumococcal Conjugate 13-Valent (PCV13) 08/03/2018, 10/28/2018   • Pneumococcal Polysaccharide (PPSV23) 10/16/2020   • Pneumococcal, Unspecified 01/01/2016   • Shingrix 03/31/2021       Physical Exam  Vitals reviewed.   Constitutional:       General: She is not in acute distress.     Appearance: She is well-developed. She is not ill-appearing.   HENT:      Head: Normocephalic and atraumatic.   Eyes:      Pupils: Pupils are equal, round, and reactive to light. "   Cardiovascular:      Rate and Rhythm: Normal rate and regular rhythm.      Heart sounds: No murmur heard.     Pulmonary:      Effort: Pulmonary effort is normal. No respiratory distress.      Breath sounds: No wheezing or rales.      Comments: Very decreased bilaterally but no wheezing or rales  Abdominal:      General: Bowel sounds are normal. There is no distension.      Palpations: Abdomen is soft.   Musculoskeletal:         General: Normal range of motion.      Cervical back: Normal range of motion and neck supple.   Skin:     General: Skin is warm and dry.      Findings: No erythema.   Neurological:      Mental Status: She is alert and oriented to person, place, and time.   Psychiatric:         Behavior: Behavior normal.          Result Review :       Data reviewed: Recent hospitalization notes From the emergency department, including her chest x-ray and labs                    Assessment and Plan {CC Problem List  Visit Diagnosis  ROS  Review (Popup)  Health Maintenance  Quality  BestPractice  Medications  SmartSets  SnapShot Encounters  Media :23}   Problem List Items Addressed This Visit        Pulmonary and Pneumonias    Bronchiectasis (CMS/HCC)    Chronic respiratory failure    Stage II, moderate, COPD - Primary    Relevant Medications    predniSONE (DELTASONE) 10 MG tablet       Sleep    RENETTA (Previous CPAP)        The time it does not appear she is acutely infected however she is continue to have an exacerbation of her COPD with worsening shortness of breath and a cough.    She will complete her 50 mg of prednisone tomorrow would like her to continue on 10 mg daily for 7 to 10 days.    I also like to start her on some nebulizers to help with her secretion clearance.  She does have a nebulizer machine at home but is out of supplies.  I did give her some tubing as well as albuterol to use for the next 7 to 10 days as well.    I also instructed her to use Mucinex over-the-counter to help with  secretion clearance.    Continue on her Spiriva Respimat daily.    Follow-up here in the office if she has not improved in the next several days.      Follow Up     No follow-ups on file.  Patient was given instructions and counseling regarding her condition or for health maintenance advice. Please see specific information pulled into the AVS if appropriate.     I spent 25 minutes caring for Lorrie on this date of service. This time includes time spent by me in the following activities:preparing for the visit, reviewing tests, obtaining and/or reviewing a separately obtained history, performing a medically appropriate examination and/or evaluation , counseling and educating the patient/family/caregiver, ordering medications, tests, or procedures, referring and communicating with other health care professionals  and documenting information in the medical record      DONALD Sylvester, ACNP  [facility]  Electronically signed

## 2021-09-15 ENCOUNTER — OFFICE VISIT (OUTPATIENT)
Dept: INTERNAL MEDICINE | Facility: CLINIC | Age: 76
End: 2021-09-15

## 2021-09-15 ENCOUNTER — LAB (OUTPATIENT)
Dept: LAB | Facility: HOSPITAL | Age: 76
End: 2021-09-15

## 2021-09-15 VITALS
DIASTOLIC BLOOD PRESSURE: 58 MMHG | HEIGHT: 64 IN | BODY MASS INDEX: 30.01 KG/M2 | OXYGEN SATURATION: 98 % | SYSTOLIC BLOOD PRESSURE: 138 MMHG | HEART RATE: 92 BPM | WEIGHT: 175.8 LBS | RESPIRATION RATE: 20 BRPM | TEMPERATURE: 97.1 F

## 2021-09-15 DIAGNOSIS — Z99.89 OSA ON CPAP: ICD-10-CM

## 2021-09-15 DIAGNOSIS — Z11.59 ENCOUNTER FOR HEPATITIS C SCREENING TEST FOR LOW RISK PATIENT: ICD-10-CM

## 2021-09-15 DIAGNOSIS — K11.7 SIALORRHEA: ICD-10-CM

## 2021-09-15 DIAGNOSIS — K58.0 IRRITABLE BOWEL SYNDROME WITH DIARRHEA: ICD-10-CM

## 2021-09-15 DIAGNOSIS — I10 BENIGN HYPERTENSION: ICD-10-CM

## 2021-09-15 DIAGNOSIS — I50.32 CHRONIC DIASTOLIC HEART FAILURE (HCC): ICD-10-CM

## 2021-09-15 DIAGNOSIS — G47.33 OSA ON CPAP: ICD-10-CM

## 2021-09-15 DIAGNOSIS — G60.9 IDIOPATHIC PERIPHERAL NEUROPATHY: ICD-10-CM

## 2021-09-15 DIAGNOSIS — K21.00 GASTROESOPHAGEAL REFLUX DISEASE WITH ESOPHAGITIS WITHOUT HEMORRHAGE: ICD-10-CM

## 2021-09-15 DIAGNOSIS — J30.9 ALLERGIC RHINITIS, UNSPECIFIED SEASONALITY, UNSPECIFIED TRIGGER: ICD-10-CM

## 2021-09-15 DIAGNOSIS — E78.2 MIXED HYPERLIPIDEMIA: ICD-10-CM

## 2021-09-15 DIAGNOSIS — Z85.118 H/O: LUNG CANCER: ICD-10-CM

## 2021-09-15 DIAGNOSIS — M51.36 DEGENERATIVE DISC DISEASE, LUMBAR: ICD-10-CM

## 2021-09-15 DIAGNOSIS — E11.9 CONTROLLED TYPE 2 DIABETES MELLITUS WITHOUT COMPLICATION, WITHOUT LONG-TERM CURRENT USE OF INSULIN (HCC): ICD-10-CM

## 2021-09-15 DIAGNOSIS — J96.11 CHRONIC RESPIRATORY FAILURE WITH HYPOXIA (HCC): ICD-10-CM

## 2021-09-15 DIAGNOSIS — Z00.00 MEDICARE ANNUAL WELLNESS VISIT, SUBSEQUENT: Primary | ICD-10-CM

## 2021-09-15 DIAGNOSIS — E55.9 VITAMIN D DEFICIENCY: ICD-10-CM

## 2021-09-15 DIAGNOSIS — K31.84 GASTROPARESIS: ICD-10-CM

## 2021-09-15 DIAGNOSIS — R41.3 MEMORY LOSS: ICD-10-CM

## 2021-09-15 DIAGNOSIS — J44.9 COPD MIXED TYPE (HCC): ICD-10-CM

## 2021-09-15 DIAGNOSIS — G47.34 NOCTURNAL HYPOXEMIA: ICD-10-CM

## 2021-09-15 DIAGNOSIS — E03.9 ACQUIRED HYPOTHYROIDISM: ICD-10-CM

## 2021-09-15 DIAGNOSIS — G25.81 RESTLESS LEG SYNDROME: ICD-10-CM

## 2021-09-15 DIAGNOSIS — M50.30 DEGENERATIVE DISC DISEASE, CERVICAL: ICD-10-CM

## 2021-09-15 DIAGNOSIS — E87.6 HYPOKALEMIA: ICD-10-CM

## 2021-09-15 LAB
HBA1C MFR BLD: 6.7 %
HCV AB SER DONR QL: NORMAL
TSH SERPL DL<=0.05 MIU/L-ACNC: 2.48 UIU/ML (ref 0.27–4.2)

## 2021-09-15 PROCEDURE — G0439 PPPS, SUBSEQ VISIT: HCPCS | Performed by: INTERNAL MEDICINE

## 2021-09-15 PROCEDURE — 86803 HEPATITIS C AB TEST: CPT

## 2021-09-15 PROCEDURE — 84443 ASSAY THYROID STIM HORMONE: CPT

## 2021-09-15 RX ORDER — TIZANIDINE 2 MG/1
2 TABLET ORAL NIGHTLY PRN
Qty: 30 TABLET | Refills: 0 | Status: SHIPPED | OUTPATIENT
Start: 2021-09-15 | End: 2021-12-13

## 2021-09-15 RX ORDER — AMITRIPTYLINE HYDROCHLORIDE 10 MG/1
10 TABLET, FILM COATED ORAL DAILY
COMMUNITY
Start: 2021-09-10 | End: 2021-11-03

## 2021-09-15 NOTE — PROGRESS NOTES
The ABCs of the Annual Wellness Visit  Subsequent Medicare Wellness Visit    Chief Complaint   Patient presents with   • Medicare Wellness-subsequent       Subjective   History of Present Illness:  Lorrie Pagan is a 75 y.o. female who presents for a Subsequent Medicare Wellness Visit.    HEALTH RISK ASSESSMENT    Recent Hospitalizations:  Recently treated at the following:  [facility] 2020    Current Medical Providers:  Patient Care Team:  Dacia Viera MD as PCP - General (Internal Medicine)  Elias Chaidez MD as Consulting Physician (Cardiology)  Ally Muñiz RN as Ambulatory  (Population Health)    Smoking Status:  Social History     Tobacco Use   Smoking Status Former Smoker   • Packs/day: 1.50   • Years: 25.00   • Pack years: 37.50   • Types: Cigarettes   • Quit date: 1992   • Years since quittin.7   Smokeless Tobacco Never Used       Alcohol Consumption:  Social History     Substance and Sexual Activity   Alcohol Use Yes    Comment: a glass of wine or bourbon a couple times a WEEK       Depression Screen:   PHQ-2/PHQ-9 Depression Screening 9/15/2021   Little interest or pleasure in doing things 1   Feeling down, depressed, or hopeless 0   Total Score 1       Fall Risk Screen:  STEADI Fall Risk Assessment was completed, and patient is at MODERATE risk for falls. Assessment completed on:9/15/2021    Health Habits and Functional and Cognitive Screening:  Functional & Cognitive Status 9/15/2021   Do you have difficulty preparing food and eating? No   Do you have difficulty bathing yourself, getting dressed or grooming yourself? No   Do you have difficulty using the toilet? No   Do you have difficulty moving around from place to place? No   Do you have trouble with steps or getting out of a bed or a chair? No   Current Diet Well Balanced Diet   Dental Exam Not up to date   Eye Exam Up to date   Exercise (times per week) 7 times per week   Current  Exercises Include Walking   Do you need help using the phone?  No   Are you deaf or do you have serious difficulty hearing?  No   Do you need help with transportation? Yes   Do you need help shopping? Yes   Do you need help preparing meals?  No   Do you need help with housework?  Yes   Do you need help with laundry? Yes   Do you need help taking your medications? No   Do you need help managing money? No   Do you ever drive or ride in a car without wearing a seat belt? No   Have you felt unusual stress, anger or loneliness in the last month? No   Who do you live with? Alone   If you need help, do you have trouble finding someone available to you? No   Have you been bothered in the last four weeks by sexual problems? No   Do you have difficulty concentrating, remembering or making decisions? Yes         Does the patient have evidence of cognitive impairment? No    Asprin use counseling:Does not need ASA (and currently is not on it)    Age-appropriate Screening Schedule:  Refer to the list below for future screening recommendations based on patient's age, sex and/or medical conditions. Orders for these recommended tests are listed in the plan section. The patient has been provided with a written plan.    Health Maintenance   Topic Date Due   • DXA SCAN  Never done   • TDAP/TD VACCINES (1 - Tdap) Never done   • ZOSTER VACCINE (2 of 2) 05/26/2021   • HEMOGLOBIN A1C  08/22/2021   • INFLUENZA VACCINE  10/01/2021   • LIPID PANEL  02/22/2022   • URINE MICROALBUMIN  02/22/2022   • DIABETIC EYE EXAM  03/02/2022          The following portions of the patient's history were reviewed and updated as appropriate: allergies, current medications, past family history, past medical history, past social history, past surgical history and problem list.    Outpatient Medications Prior to Visit   Medication Sig Dispense Refill   • Accu-Chek Guide test strip TEST 2 TIMES PER DAY     • albuterol sulfate  (90 Base) MCG/ACT inhaler  Inhale 2 puffs Every 6 (Six) Hours As Needed for Wheezing. 18 g 2   • albuterol sulfate  (90 Base) MCG/ACT inhaler Inhale 2 puffs Every 6 (Six) Hours As Needed for Wheezing. 8 g 0   • amitriptyline (ELAVIL) 10 MG tablet Take 10 mg by mouth Daily.     • chlorhexidine (PERIDEX) 0.12 % solution Apply 15 mL to the mouth or throat 2 (Two) Times a Day As Needed. For 14 days, w/1 refill      • colestipol (Colestid) 1 g tablet Take 2 tablets by mouth 2 (Two) Times a Day. Take separate 2 hours from other medications 120 tablet 11   • diphenoxylate-atropine (Lomotil) 2.5-0.025 MG per tablet Take 1 tablet by mouth 4 (Four) Times a Day As Needed for Diarrhea. 30 tablet 1   • donepezil (ARICEPT) 10 MG tablet Take 20 mg by mouth Daily.     • DULoxetine (CYMBALTA) 60 MG capsule TAKE 1 CAPSULE BY MOUTH DAILY (Patient taking differently: Take 60 mg by mouth Daily.) 90 capsule 3   • ezetimibe (ZETIA) 10 MG tablet Take 10 mg by mouth Daily.     • fluticasone (Flonase) 50 MCG/ACT nasal spray 2 sprays into the nostril(s) as directed by provider Daily. 18.2 mL 11   • furosemide (LASIX) 40 MG tablet Take 40 mg by mouth Daily.     • glimepiride (AMARYL) 2 MG tablet TAKE 1 TABLET BY MOUTH TWICE DAILY (Patient taking differently: Take 2 mg by mouth 2 (two) times a day.) 180 tablet 3   • glucose blood test strip Accu-Chek Guide test strips     • ketoconazole (NIZORAL) 2 % cream Apply 1 application topically to the appropriate area as directed Daily As Needed for Itching. BACK AND LEGS AS NEEDED FOR ITCHING     • ketoconazole (NIZORAL) 2 % shampoo Apply 1 application topically to the appropriate area as directed As Needed for Irritation.     • latanoprost (XALATAN) 0.005 % ophthalmic solution Administer 1 drop to both eyes Daily.     • levothyroxine (SYNTHROID, LEVOTHROID) 50 MCG tablet Take 50 mcg by mouth Daily.     • metOLazone (ZAROXOLYN) 2.5 MG tablet Take 1 tablet by mouth Daily As Needed (for swelling or weight gain of 4 lbs). As  directed     • metoprolol succinate XL (TOPROL-XL) 25 MG 24 hr tablet Take 25 mg by mouth Daily.     • O2 (OXYGEN) Inhale Continuous. 4-5 L/MIN DEPENDING ON ACTIVITY     • pantoprazole (PROTONIX) 40 MG EC tablet Take 1 tablet by mouth 2 (two) times a day. 180 tablet 3   • POTASSIUM CHLORIDE ER PO Take 30 mEq by mouth 2 (Two) Times a Day.     • predniSONE (DELTASONE) 10 MG tablet Take 1 tablet by mouth Daily. 10 tablet 0   • Prucalopride Succinate 2 MG tablet Take 1 tablet by mouth Daily. 30 tablet 2   • rOPINIRole XL (REQUIP XL) 8 MG 24 hr tablet Take 8 mg by mouth 2 (two) times a day.     • Spacer/Aero-Holding Chambers (AeroChamber Plus Morris-Vu) misc As Needed.     • timolol (TIMOPTIC) 0.25 % ophthalmic solution Administer 1 drop to the right eye Every Morning.     • Tiotropium Bromide Monohydrate (Spiriva Respimat) 1.25 MCG/ACT aerosol solution inhaler Inhale 2 puffs Daily. 4 g 11   • tiZANidine (ZANAFLEX) 2 MG tablet Take 1 tablet by mouth At Night As Needed for Muscle Spasms. 30 tablet 0   • vitamin D (ERGOCALCIFEROL) 1.25 MG (17757 UT) capsule capsule Take 1 capsule by mouth 1 (One) Time Per Week. Friday? 5 capsule 0   • azithromycin (ZITHROMAX) 250 MG tablet Take 2 tablets the first day, then 1 tablet daily for 4 days. 6 tablet 0     No facility-administered medications prior to visit.       Patient Active Problem List   Diagnosis   • Bronchiectasis (CMS/HCC)   • H/O: lung cancer (Prior resection 2016)   • Chronic respiratory failure   • RENETTA (Previous CPAP)   • Hiatal hernia (Prior Nissen 2008)   • Mycobacterium avium complex colonization   • Irritable bowel syndrome with diarrhea   • Peripheral edema   • Aortic valve regurgitation   • Benign hypertension   • Impairment of balance   • Peripheral neuropathy   • Peripheral venous insufficiency   • Chronic diastolic heart failure (CMS/HCC)   • Hypokalemia   • Hypomagnesemia   • Nocturnal hypoxemia   • Stage II, moderate, COPD   • Memory loss   • Restless leg  "syndrome   • Controlled type 2 diabetes mellitus without complication, without long-term current use of insulin (CMS/[facility])   • Vitamin D deficiency   • Mixed hyperlipidemia   • Allergic rhinitis   • Normocytic anemia   • Gastroesophageal reflux disease with esophagitis without hemorrhage   • Acquired hypothyroidism   • Esophageal dysmotility   • Dysphagia   • Gastroparesis   • Degenerative disc disease, cervical   • Degenerative disc disease, lumbar   • Globus sensation       Advanced Care Planning:  ACP discussion was held with the patient during this visit. Patient does not have an advance directive, declines further assistance.    Review of Systems   HENT: Positive for dental problem, drooling and trouble swallowing.    Eyes: Negative.    Respiratory: Positive for shortness of breath.    Cardiovascular: Positive for leg swelling. Negative for palpitations.   Gastrointestinal: Positive for diarrhea, vomiting, GERD and indigestion. Negative for abdominal pain, anal bleeding and blood in stool.   Genitourinary: Negative.    Musculoskeletal: Positive for gait problem.   Skin: Negative.    Neurological: Positive for memory problem.   Psychiatric/Behavioral: Negative.        Compared to one year ago, the patient feels her physical health is the same.   Compared to one year ago, the patient feels her mental health is worse. Feels memory is worsening.    Reviewed chart for potential of high risk medication in the elderly: yes  Reviewed chart for potential of harmful drug interactions in the elderly:yes    Objective         Vitals:    09/15/21 1130   BP: 138/58   Pulse: 92   Resp: 20   Temp: 97.1 °F (36.2 °C)   SpO2: 98%   Weight: 79.7 kg (175 lb 12.8 oz)   Height: 162.6 cm (64.02\")   PainSc: 0-No pain       Body mass index is 30.16 kg/m².  Discussed the patient's BMI with her. The BMI is above average; BMI management plan is completed. Stationary bike twice per week.    Physical Exam  Vitals and nursing note reviewed. "   Constitutional:       General: She is not in acute distress.     Appearance: She is well-developed. She is obese. She is not ill-appearing, toxic-appearing or diaphoretic.   HENT:      Head: Normocephalic and atraumatic.      Right Ear: External ear normal.      Left Ear: External ear normal.      Nose: Nose normal.   Eyes:      General: No scleral icterus.     Conjunctiva/sclera: Conjunctivae normal.   Cardiovascular:      Rate and Rhythm: Normal rate and regular rhythm.      Heart sounds: Normal heart sounds.   Pulmonary:      Effort: Pulmonary effort is normal. No respiratory distress.      Breath sounds: Decreased breath sounds present. No wheezing or rhonchi.   Abdominal:      General: There is no distension.      Palpations: Abdomen is soft.   Musculoskeletal:         General: No deformity.      Cervical back: Neck supple.      Right lower leg: Edema (trace) present.      Left lower leg: Edema (trace) present.   Skin:     General: Skin is warm and dry.      Findings: No rash.   Neurological:      Mental Status: She is alert and oriented to person, place, and time. Mental status is at baseline.      Gait: Gait abnormal (uses cane).   Psychiatric:         Mood and Affect: Mood normal.         Behavior: Behavior normal.               Assessment/Plan   Medicare Risks and Personalized Health Plan  CMS Preventative Services Quick Reference  Advance Directive Discussion  Dementia/Memory   Fall Risk  Immunizations Discussed/Encouraged (specific immunizations; Influenza and COVID19 )  Obesity/Overweight     The above risks/problems have been discussed with the patient.  Pertinent information has been shared with the patient in the After Visit Summary.  Follow up plans and orders are seen below in the Assessment/Plan Section.    Diagnoses and all orders for this visit:    Medicare annual wellness visit, subsequent    Chronic diastolic heart failure (CMS/[facility])  - Follows with Dr. Chaidez. Echo 9/2020 with EF 56-60%,  borderline dilated LA cavity, mild TR and AR, normal RVSP  - on metoprolol succinate 25mg daily, losartan 25mg daily, furosemide 40mg daily and metolazone 2.5mg daily PRN. Takes extra dose of furosemide for weight gain or edema about once every 2 weeks.  - Weight fairly stable, dry weight felt to be 175-180lbs.     Hypokalemia  - Due to diuretic use. On potassium 30meq BID.   - Recent CMP with normal K    H/O: lung cancer (Prior resection 2016)  - Found incidentally in 2016 and resected. Recent CT scan for surveillance stable.  - Continue to follow with pulmonary    Chronic respiratory failure due to Stage II, moderate, COPD  - Follows with Dr. Arboleda. On spiriva and albuterol PRN   - Uses 4-5L continuous O2.   - Reports increased SOA however O2 requirement stable, exam without wheezes or rhonchi.  - Has recently been seen in ED for the same and prescribed zpak and steroids. Completed abx but has a few doses of prednisone remaining. SOA improved but she still feels that she is not back to baseline.  - Recommend completing prescribed steroids and scheduling pulmonary follow up    RENETTA (Previous CPAP) and Nocturnal hypoxemia  - intolerant of CPAP, uses oxygen at night    Irritable bowel syndrome with diarrhea  - Follows with Dr. Millan. On colestid and lomotil PRN.    Acquired hypothyroidism  - On levothyroxine 50mcg daily  - TSH ordered    Benign hypertension  - BP acceptable today, noted to no longer be on losartan. Unsure when or who stopped this.  - Continue lasix 40mg daily, metoprolol succinate 25mg daily    Idiopathic peripheral neuropathy  - Follows with Dr. Saldana. Reports chronic neuropathy preceding diagnosis of diabetes. Likely multifactorial with component of radiculopathy from chronic DDD and diabetic neuropathy.  - On cymbalta 60mg daily and follows with pain management, Dr. Monroy, has SCS    Restless leg syndrome  - Managed by Dr. Saldana, on ropinirole XR 8mg BID    Memory loss  - Chronically on  donepezil 20mg daily, managed by Dr. Saldana  - Likely a component of polypharmacy    Controlled type 2 diabetes mellitus without complication, without long-term current use of insulin (CMS/[facility])  - Has been well controlled, A1c today 6.7.  - Continue glimepiride 2mg BID.    Vitamin D deficiency  - On weekly ergocalciferol, vitamin D normal 7/2021    Gastroesophageal reflux disease with esophagitis without hemorrhage  - Uncontrolled. Not certain she is actually taking her pantoprazole which is prescribed 40mg BID. She will confirm with pharmacy who prepares her pill box.    Mixed hyperlipidemia  - Intolerant of statins.  - Lipid panel 2/2021 with   - Continue zetia    Allergic rhinitis, unspecified seasonality, unspecified trigger  - On flonase    Gastroparesis  - uncontrolled, has regurgitation of food  - Intolerant of reglan due to restlessness, worsened RLS  - Discussed gastroparesis diet again today. She is eating uncooked vegetables and fruits. Advised to cook all vegetables and avoid fruits with skin, seeds.     Degenerative disc disease, cervical and lumbar  - s/p lumbar SCS  - Currently in PT  - Continue tizanidine 2mg qhs PRN    Sialorrhea  - Started on amitriptyline 10mg qhs by neurology to control drooling    Encounter for hepatitis C screening test for low risk patient  - HCV ab ordered     Health Maintenance  - Pap smear: no longer indicated  - Mammogram: 5/2021 [facility] 3 with axillary adenopathy, 8/2021 [facility] 4, had FNA of R axillary node which was negative. Next mammo 3/2021.  - Colonoscopy: 2018  - HCV: ordered  - Immunizations: Flu discussed, pneumovax complete, COVID complete, shingrix #1  - Depression screening: negative 9/2021    Follow Up:  Return in about 4 months (around 1/15/2022) for Follow up diabetes, 1 year for wellness, Labs today.     An After Visit Summary and PPPS were given to the patient.

## 2021-09-16 ENCOUNTER — HOSPITAL ENCOUNTER (OUTPATIENT)
Dept: NUTRITION | Facility: HOSPITAL | Age: 76
Setting detail: RECURRING SERIES
Discharge: HOME | End: 2021-09-16

## 2021-09-16 NOTE — PROGRESS NOTES
Adult Outpatient Nutrition  Assessment/PES    Patient Name:  Lorrie Pagan  YOB: 1945  MRN: [id number]    Assessment Date:  9/16/2021    Comments:  Telephone nutrition consult, 30 minutes. This medical referred consult was provided as a telephone call, as patient is unable to attend an in-office appointment due to the COVID-19 crisis. Consent for treatment was given verbally.    Patient presents for follow up for n/v, early satiety and bloating 2/2 gastroparesis. She has been following the low residue die to the best of her ability over the last 2 weeks. She does report that the n/v has improved significantly. She does continue to experience early satiety and bloating. She also reports intermittent constipation. She states that she is not taking motegrity d/t adverse effects. She does take colestid prn for diarrhea. RD recommended miralax and benefiber daily to help alleviate constipation. Recommended slowly advancing diet to include fresh fruit and whole grains. Recommended continuing to avoid raw vegetables, nuts and seeds and high-fat/greasy foods. Also recommended discussing mediation regimen with GI. Patient states that she will call to schedule. Patient states that he has no further questions or concerns at this time. RD provided contact information and instructed to call should further nutrition concerns arise.      Goals:  - low residue diet x 2 weeks, 100% met  - maintain weight, 100% met     Total of 30 minutes spent with patient on nutrition counseling. Education based on Academy of Dietetics and Nutrition guidelines. Patient was provided with RD's contact information. Follow up visit PRN. Thank you for this referral.    Electronically signed by:  Emily Armenta RD  09/16/21 08:29 EDT

## 2021-09-24 ENCOUNTER — TELEPHONE (OUTPATIENT)
Dept: PULMONOLOGY | Facility: CLINIC | Age: 76
End: 2021-09-24

## 2021-09-24 NOTE — TELEPHONE ENCOUNTER
Called today regarding continued shortness of breath after round of antibiotics and steroids.  No real difference with nebulizers.   She denies any worsening in her lower extremity edema.  No chest pain.    I would like for her to come in on Monday with a chest x-ray.  We did discuss that she had some anemia noted on her emergency department visit I like to follow-up on a CBC as well.

## 2021-09-27 ENCOUNTER — OFFICE VISIT (OUTPATIENT)
Dept: PULMONOLOGY | Facility: CLINIC | Age: 76
End: 2021-09-27

## 2021-09-27 VITALS
WEIGHT: 178 LBS | SYSTOLIC BLOOD PRESSURE: 122 MMHG | OXYGEN SATURATION: 97 % | BODY MASS INDEX: 30.39 KG/M2 | HEART RATE: 88 BPM | TEMPERATURE: 97.6 F | DIASTOLIC BLOOD PRESSURE: 62 MMHG | HEIGHT: 64 IN

## 2021-09-27 DIAGNOSIS — G47.34 NOCTURNAL HYPOXEMIA: ICD-10-CM

## 2021-09-27 DIAGNOSIS — R06.02 SHORTNESS OF BREATH: Primary | ICD-10-CM

## 2021-09-27 DIAGNOSIS — J44.9 COPD MIXED TYPE (HCC): ICD-10-CM

## 2021-09-27 DIAGNOSIS — J96.11 CHRONIC RESPIRATORY FAILURE WITH HYPOXIA (HCC): ICD-10-CM

## 2021-09-27 DIAGNOSIS — J47.9 IDIOPATHIC BRONCHIECTASIS (HCC): ICD-10-CM

## 2021-09-27 LAB
BASOPHILS # BLD AUTO: 0.04 10*3/MM3 (ref 0–0.2)
BASOPHILS NFR BLD AUTO: 0.5 % (ref 0–1.5)
DEPRECATED RDW RBC AUTO: 42.5 FL (ref 37–54)
EOSINOPHIL # BLD AUTO: 0.24 10*3/MM3 (ref 0–0.4)
EOSINOPHIL NFR BLD AUTO: 2.9 % (ref 0.3–6.2)
ERYTHROCYTE [DISTWIDTH] IN BLOOD BY AUTOMATED COUNT: 14 % (ref 12.3–15.4)
HCT VFR BLD AUTO: 36.4 % (ref 34–46.6)
HGB BLD-MCNC: 11.7 G/DL (ref 12–15.9)
IMM GRANULOCYTES # BLD AUTO: 0.09 10*3/MM3 (ref 0–0.05)
IMM GRANULOCYTES NFR BLD AUTO: 1.1 % (ref 0–0.5)
LYMPHOCYTES # BLD AUTO: 1.79 10*3/MM3 (ref 0.7–3.1)
LYMPHOCYTES NFR BLD AUTO: 21.9 % (ref 19.6–45.3)
MCH RBC QN AUTO: 26.8 PG (ref 26.6–33)
MCHC RBC AUTO-ENTMCNC: 32.1 G/DL (ref 31.5–35.7)
MCV RBC AUTO: 83.5 FL (ref 79–97)
MONOCYTES # BLD AUTO: 0.59 10*3/MM3 (ref 0.1–0.9)
MONOCYTES NFR BLD AUTO: 7.2 % (ref 5–12)
NEUTROPHILS NFR BLD AUTO: 5.44 10*3/MM3 (ref 1.7–7)
NEUTROPHILS NFR BLD AUTO: 66.4 % (ref 42.7–76)
NRBC BLD AUTO-RTO: 0 /100 WBC (ref 0–0.2)
PLATELET # BLD AUTO: 413 10*3/MM3 (ref 140–450)
PMV BLD AUTO: 10 FL (ref 6–12)
RBC # BLD AUTO: 4.36 10*6/MM3 (ref 3.77–5.28)
WBC # BLD AUTO: 8.19 10*3/MM3 (ref 3.4–10.8)

## 2021-09-27 PROCEDURE — 85025 COMPLETE CBC W/AUTO DIFF WBC: CPT | Performed by: NURSE PRACTITIONER

## 2021-09-27 PROCEDURE — 80048 BASIC METABOLIC PNL TOTAL CA: CPT | Performed by: NURSE PRACTITIONER

## 2021-09-27 PROCEDURE — 99214 OFFICE O/P EST MOD 30 MIN: CPT | Performed by: NURSE PRACTITIONER

## 2021-09-27 PROCEDURE — 83880 ASSAY OF NATRIURETIC PEPTIDE: CPT | Performed by: NURSE PRACTITIONER

## 2021-09-27 PROCEDURE — 36415 COLL VENOUS BLD VENIPUNCTURE: CPT | Performed by: NURSE PRACTITIONER

## 2021-09-27 RX ORDER — GLYCOPYRROLATE 1 MG/1
TABLET ORAL
COMMUNITY
Start: 2021-09-20 | End: 2021-11-03

## 2021-09-27 RX ORDER — PREDNISONE 10 MG/1
10 TABLET ORAL DAILY
Qty: 10 TABLET | Refills: 0 | Status: SHIPPED | OUTPATIENT
Start: 2021-09-27 | End: 2021-11-03

## 2021-09-27 NOTE — PROGRESS NOTES
"[facility] Pulmonary Follow up      Chief Complaint  Recurrent Pneumonia, Follow-up, and COPD    Subjective          Lorrie Pagan presents to [facility] for follow-up on her recent exacerbation and worsening shortness of breath.  Unfortunately she has had quite a bit of issues with worsening shortness of breath over the last several weeks.  She has completed a round of antibiotics and steroids.  She does feel like the steroids initially did make a difference but she has been off those for a couple weeks now.    She is so short of breath with activity that she is really not left her apartment in the last several days.    She is also been having worsening lower extremity edema.  She is on her Lasix daily as well as her metolazone.  She did confirm with her pharmacy today that she is taking her metolazone.    She denies any significant chest pain or pressure.  No palpitations.  She does not have a significant cough or sputum production.    Objective     Vital Signs:   /62   Pulse 88   Temp 97.6 °F (36.4 °C)   Ht 162.6 cm (64.02\")   Wt 80.7 kg (178 lb)   SpO2 97% Comment: resting, 5 liters pulse dose  BMI 30.53 kg/m²       Immunization History   Administered Date(s) Administered   • COVID-19 (MODERNA) 02/13/2021, 03/13/2021, 09/17/2021   • Flu Vaccine Quad PF >36MO 10/31/2019, 10/25/2020   • Flu Vaccine Split Quad 10/31/2019   • Fluzone High Dose =>65 Years ([employer] ONLY) 10/28/2018, 10/21/2019, 10/16/2020   • Influenza TIV (IM) 11/01/2017   • Pneumococcal Conjugate 13-Valent (PCV13) 08/03/2018, 10/28/2018   • Pneumococcal Polysaccharide (PPSV23) 10/16/2020   • Pneumococcal, Unspecified 01/01/2016   • Shingrix 03/31/2021       Physical Exam  Vitals reviewed.   Constitutional:       General: She is not in acute distress.     Appearance: She is well-developed. She is obese. She is not ill-appearing.   HENT:      Head: Normocephalic and atraumatic. "   Eyes:      Pupils: Pupils are equal, round, and reactive to light.   Cardiovascular:      Rate and Rhythm: Normal rate and regular rhythm.      Heart sounds: No murmur heard.     Pulmonary:      Effort: Pulmonary effort is normal. No respiratory distress.      Breath sounds: No wheezing or rales.      Comments: Decreased but no wheezing or rales  Abdominal:      General: Bowel sounds are normal. There is no distension.      Palpations: Abdomen is soft.   Musculoskeletal:         General: Normal range of motion.      Cervical back: Normal range of motion and neck supple.   Skin:     General: Skin is warm and dry.      Findings: No erythema.   Neurological:      Mental Status: She is alert and oriented to person, place, and time.   Psychiatric:         Behavior: Behavior normal.          Result Review :              PA and lateral chest x-ray done the office today reviewed by me  Awaiting final MD interpretation                 Assessment and Plan    Problem List Items Addressed This Visit        Pulmonary and Pneumonias    Bronchiectasis (CMS/HCC)    Chronic respiratory failure    Stage II, moderate, COPD    Relevant Medications    predniSONE (DELTASONE) 10 MG tablet       Sleep    Nocturnal hypoxemia      Other Visit Diagnoses     Shortness of breath    -  Primary    Relevant Orders    XR Chest PA & Lateral (Completed)    CBC & Differential    BNP    Basic Metabolic Panel        I followed up on her chest x-ray today with no new significant findings.    It appears she continues to have quite a bit of exacerbation of her COPD with worsening respiratory failure.  I would like for her to continue on her nebulizers at least twice daily.  I will start her back on prednisone 10 mg daily.  I will also follow-up on some labs to rule out any other underlying cause of her shortness of breath.        Follow Up     Return in about 2 weeks (around 10/11/2021).  Patient was given instructions and counseling regarding her  condition or for health maintenance advice. Please see specific information pulled into the AVS if appropriate.     I spent 25 minutes caring for Lorrie on this date of service. This time includes time spent by me in the following activities:preparing for the visit, reviewing tests, obtaining and/or reviewing a separately obtained history, performing a medically appropriate examination and/or evaluation , counseling and educating the patient/family/caregiver, ordering medications, tests, or procedures, referring and communicating with other health care professionals , documenting information in the medical record and independently interpreting results and communicating that information with the patient/family/caregiver      DONALD Sylvester, ACNP  [facility] Critical [facility]  Electronically signed

## 2021-09-28 LAB
ANION GAP SERPL CALCULATED.3IONS-SCNC: 7.6 MMOL/L (ref 5–15)
BUN SERPL-MCNC: 13 MG/DL (ref 8–23)
BUN/CREAT SERPL: 24.1 (ref 7–25)
CALCIUM SPEC-SCNC: 9.7 MG/DL (ref 8.6–10.5)
CHLORIDE SERPL-SCNC: 95 MMOL/L (ref 98–107)
CO2 SERPL-SCNC: 38.4 MMOL/L (ref 22–29)
CREAT SERPL-MCNC: 0.54 MG/DL (ref 0.57–1)
GFR SERPL CREATININE-BSD FRML MDRD: 109 ML/MIN/1.73
GLUCOSE SERPL-MCNC: 78 MG/DL (ref 65–99)
NT-PROBNP SERPL-MCNC: 28.4 PG/ML (ref 0–1800)
POTASSIUM SERPL-SCNC: 4.2 MMOL/L (ref 3.5–5.2)
SODIUM SERPL-SCNC: 141 MMOL/L (ref 136–145)

## 2021-09-29 ENCOUNTER — TELEPHONE (OUTPATIENT)
Dept: PULMONOLOGY | Facility: CLINIC | Age: 76
End: 2021-09-29

## 2021-09-29 RX ORDER — FUROSEMIDE 40 MG/1
TABLET ORAL
Qty: 3 TABLET | Refills: 0 | Status: SHIPPED | OUTPATIENT
Start: 2021-09-29 | End: 2021-12-09

## 2021-09-29 NOTE — TELEPHONE ENCOUNTER
"Patient called stating she was told to let you know how she was doing after her appt here on Monday. States her breathing \"might be a little better\" but her swelling is about the same if not slightly worsening. Please advise.  "

## 2021-09-29 NOTE — TELEPHONE ENCOUNTER
Returned her call.  Her breathing is a bit better but her swelling is worse.      Extra Lasix 40mg for next three days.  I will send to her pharmacy for pre-packaging for her.      With her chronic respiratory failure and recurrent exacerbations, as well as her elevated CO2 and severe obstruction on her PFTs she would benefit from NIV therapy.    She requires noninvasive ventilation for her respiratory failure, BiPAP/CPAP has been ruled out due to the need for adjustable pressure support provided by NIV.  Ordering Trilogy for nocturnal and daytime usage.     She is agreeable to setting it up.  She would like to try a nasal pillow due to some claustrophobia.

## 2021-10-01 ENCOUNTER — APPOINTMENT (OUTPATIENT)
Dept: CT IMAGING | Facility: HOSPITAL | Age: 76
End: 2021-10-01

## 2021-10-01 ENCOUNTER — APPOINTMENT (OUTPATIENT)
Dept: GENERAL RADIOLOGY | Facility: HOSPITAL | Age: 76
End: 2021-10-01

## 2021-10-01 ENCOUNTER — HOSPITAL ENCOUNTER (EMERGENCY)
Facility: HOSPITAL | Age: 76
Discharge: HOME | End: 2021-10-01
Attending: EMERGENCY MEDICINE | Admitting: EMERGENCY MEDICINE

## 2021-10-01 VITALS
HEIGHT: 63 IN | HEART RATE: 76 BPM | RESPIRATION RATE: 18 BRPM | TEMPERATURE: 97.9 F | BODY MASS INDEX: 31.01 KG/M2 | DIASTOLIC BLOOD PRESSURE: 56 MMHG | SYSTOLIC BLOOD PRESSURE: 149 MMHG | OXYGEN SATURATION: 92 % | WEIGHT: 175 LBS

## 2021-10-01 DIAGNOSIS — E87.6 HYPOKALEMIA: ICD-10-CM

## 2021-10-01 DIAGNOSIS — R42 DIZZINESS: Primary | ICD-10-CM

## 2021-10-01 DIAGNOSIS — R53.1 WEAKNESS: ICD-10-CM

## 2021-10-01 DIAGNOSIS — R73.09 ELEVATED GLUCOSE: ICD-10-CM

## 2021-10-01 LAB
ALBUMIN SERPL-MCNC: 4.2 G/DL (ref 3.5–5.2)
ALBUMIN/GLOB SERPL: 1.3 G/DL
ALP SERPL-CCNC: 109 U/L (ref 39–117)
ALT SERPL W P-5'-P-CCNC: 10 U/L (ref 1–33)
ANION GAP SERPL CALCULATED.3IONS-SCNC: 12 MMOL/L (ref 5–15)
AST SERPL-CCNC: 14 U/L (ref 1–32)
BASOPHILS # BLD AUTO: 0.05 10*3/MM3 (ref 0–0.2)
BASOPHILS NFR BLD AUTO: 0.5 % (ref 0–1.5)
BILIRUB SERPL-MCNC: 0.4 MG/DL (ref 0–1.2)
BILIRUB UR QL STRIP: NEGATIVE
BUN SERPL-MCNC: 15 MG/DL (ref 8–23)
BUN/CREAT SERPL: 21.4 (ref 7–25)
CALCIUM SPEC-SCNC: 9.3 MG/DL (ref 8.6–10.5)
CHLORIDE SERPL-SCNC: 92 MMOL/L (ref 98–107)
CLARITY UR: CLEAR
CO2 SERPL-SCNC: 35 MMOL/L (ref 22–29)
COLOR UR: YELLOW
CREAT SERPL-MCNC: 0.7 MG/DL (ref 0.57–1)
DEPRECATED RDW RBC AUTO: 46.5 FL (ref 37–54)
EOSINOPHIL # BLD AUTO: 0.08 10*3/MM3 (ref 0–0.4)
EOSINOPHIL NFR BLD AUTO: 0.8 % (ref 0.3–6.2)
ERYTHROCYTE [DISTWIDTH] IN BLOOD BY AUTOMATED COUNT: 14.8 % (ref 12.3–15.4)
GFR SERPL CREATININE-BSD FRML MDRD: 81 ML/MIN/1.73
GLOBULIN UR ELPH-MCNC: 3.2 GM/DL
GLUCOSE SERPL-MCNC: 174 MG/DL (ref 65–99)
GLUCOSE UR STRIP-MCNC: NEGATIVE MG/DL
HCT VFR BLD AUTO: 38 % (ref 34–46.6)
HGB BLD-MCNC: 11.4 G/DL (ref 12–15.9)
HGB UR QL STRIP.AUTO: NEGATIVE
HOLD SPECIMEN: NORMAL
IMM GRANULOCYTES # BLD AUTO: 0.13 10*3/MM3 (ref 0–0.05)
IMM GRANULOCYTES NFR BLD AUTO: 1.3 % (ref 0–0.5)
KETONES UR QL STRIP: NEGATIVE
LEUKOCYTE ESTERASE UR QL STRIP.AUTO: NEGATIVE
LYMPHOCYTES # BLD AUTO: 1.39 10*3/MM3 (ref 0.7–3.1)
LYMPHOCYTES NFR BLD AUTO: 13.6 % (ref 19.6–45.3)
MAGNESIUM SERPL-MCNC: 1.9 MG/DL (ref 1.6–2.4)
MCH RBC QN AUTO: 26.1 PG (ref 26.6–33)
MCHC RBC AUTO-ENTMCNC: 30 G/DL (ref 31.5–35.7)
MCV RBC AUTO: 87 FL (ref 79–97)
MONOCYTES # BLD AUTO: 0.63 10*3/MM3 (ref 0.1–0.9)
MONOCYTES NFR BLD AUTO: 6.2 % (ref 5–12)
NEUTROPHILS NFR BLD AUTO: 7.94 10*3/MM3 (ref 1.7–7)
NEUTROPHILS NFR BLD AUTO: 77.6 % (ref 42.7–76)
NITRITE UR QL STRIP: NEGATIVE
NRBC BLD AUTO-RTO: 0 /100 WBC (ref 0–0.2)
PH UR STRIP.AUTO: 7.5 [PH] (ref 5–8)
PLATELET # BLD AUTO: 375 10*3/MM3 (ref 140–450)
PMV BLD AUTO: 8.8 FL (ref 6–12)
POTASSIUM SERPL-SCNC: 2.7 MMOL/L (ref 3.5–5.2)
PROT SERPL-MCNC: 7.4 G/DL (ref 6–8.5)
PROT UR QL STRIP: NEGATIVE
QT INTERVAL: 428 MS
QTC INTERVAL: 490 MS
RBC # BLD AUTO: 4.37 10*6/MM3 (ref 3.77–5.28)
SODIUM SERPL-SCNC: 139 MMOL/L (ref 136–145)
SP GR UR STRIP: 1.01 (ref 1–1.03)
TROPONIN T SERPL-MCNC: <0.01 NG/ML (ref 0–0.03)
UROBILINOGEN UR QL STRIP: NORMAL
WBC # BLD AUTO: 10.22 10*3/MM3 (ref 3.4–10.8)
WHOLE BLOOD HOLD SPECIMEN: NORMAL
WHOLE BLOOD HOLD SPECIMEN: NORMAL

## 2021-10-01 PROCEDURE — 96365 THER/PROPH/DIAG IV INF INIT: CPT

## 2021-10-01 PROCEDURE — 99284 EMERGENCY DEPT VISIT MOD MDM: CPT

## 2021-10-01 PROCEDURE — 25010000002 DEXAMETHASONE PER 1 MG: Performed by: EMERGENCY MEDICINE

## 2021-10-01 PROCEDURE — 85025 COMPLETE CBC W/AUTO DIFF WBC: CPT

## 2021-10-01 PROCEDURE — 25010000002 DIMENHYDRINATE 50 MG/ML SOLUTION: Performed by: EMERGENCY MEDICINE

## 2021-10-01 PROCEDURE — 93005 ELECTROCARDIOGRAM TRACING: CPT | Performed by: EMERGENCY MEDICINE

## 2021-10-01 PROCEDURE — 84484 ASSAY OF TROPONIN QUANT: CPT

## 2021-10-01 PROCEDURE — 70450 CT HEAD/BRAIN W/O DYE: CPT

## 2021-10-01 PROCEDURE — 81003 URINALYSIS AUTO W/O SCOPE: CPT

## 2021-10-01 PROCEDURE — 25010000002 MAGNESIUM SULFATE IN D5W 1G/100ML (PREMIX) 1-5 GM/100ML-% SOLUTION: Performed by: EMERGENCY MEDICINE

## 2021-10-01 PROCEDURE — 80053 COMPREHEN METABOLIC PANEL: CPT

## 2021-10-01 PROCEDURE — 83735 ASSAY OF MAGNESIUM: CPT

## 2021-10-01 PROCEDURE — 71045 X-RAY EXAM CHEST 1 VIEW: CPT

## 2021-10-01 PROCEDURE — 96375 TX/PRO/DX INJ NEW DRUG ADDON: CPT

## 2021-10-01 PROCEDURE — 93005 ELECTROCARDIOGRAM TRACING: CPT

## 2021-10-01 RX ORDER — POTASSIUM CHLORIDE 1.5 G/1.77G
20 POWDER, FOR SOLUTION ORAL
Status: COMPLETED | OUTPATIENT
Start: 2021-10-01 | End: 2021-10-01

## 2021-10-01 RX ORDER — DIMENHYDRINATE 50 MG/ML
25 INJECTION, SOLUTION INTRAMUSCULAR; INTRAVENOUS ONCE
Status: COMPLETED | OUTPATIENT
Start: 2021-10-01 | End: 2021-10-01

## 2021-10-01 RX ORDER — DEXAMETHASONE SODIUM PHOSPHATE 4 MG/ML
4 INJECTION, SOLUTION INTRA-ARTICULAR; INTRALESIONAL; INTRAMUSCULAR; INTRAVENOUS; SOFT TISSUE ONCE
Status: COMPLETED | OUTPATIENT
Start: 2021-10-01 | End: 2021-10-01

## 2021-10-01 RX ORDER — SODIUM CHLORIDE 0.9 % (FLUSH) 0.9 %
10 SYRINGE (ML) INJECTION AS NEEDED
Status: DISCONTINUED | OUTPATIENT
Start: 2021-10-01 | End: 2021-10-01 | Stop reason: HOSPADM

## 2021-10-01 RX ORDER — MAGNESIUM SULFATE 1 G/100ML
1 INJECTION INTRAVENOUS ONCE
Status: COMPLETED | OUTPATIENT
Start: 2021-10-01 | End: 2021-10-01

## 2021-10-01 RX ORDER — POTASSIUM CHLORIDE 750 MG/1
10 TABLET, FILM COATED, EXTENDED RELEASE ORAL 2 TIMES DAILY
Qty: 8 TABLET | Refills: 0 | Status: SHIPPED | OUTPATIENT
Start: 2021-10-01 | End: 2021-10-05

## 2021-10-01 RX ADMIN — MAGNESIUM SULFATE HEPTAHYDRATE 1 G: 1 INJECTION, SOLUTION INTRAVENOUS at 13:44

## 2021-10-01 RX ADMIN — POTASSIUM CHLORIDE 20 MEQ: 1.5 POWDER, FOR SOLUTION ORAL at 14:43

## 2021-10-01 RX ADMIN — DEXAMETHASONE SODIUM PHOSPHATE 4 MG: 4 INJECTION, SOLUTION INTRAMUSCULAR; INTRAVENOUS at 13:42

## 2021-10-01 RX ADMIN — POTASSIUM CHLORIDE 20 MEQ: 1.5 POWDER, FOR SOLUTION ORAL at 14:11

## 2021-10-01 RX ADMIN — POTASSIUM CHLORIDE 20 MEQ: 1.5 POWDER, FOR SOLUTION ORAL at 13:41

## 2021-10-01 RX ADMIN — DIMENHYDRINATE 25 MG: 50 INJECTION, SOLUTION INTRAMUSCULAR; INTRAVENOUS at 13:57

## 2021-10-01 NOTE — ED PROVIDER NOTES
Subjective   This is a delightful 76-year-old female who lives independently and gets around on a walker.  She has severe emphysema and is on 4 L oxygen at her baseline.  She has been vaccinated against Covid.  Uses a scooter if she has to go any distance.    She presents today with dizziness fairly abrupt onset this afternoon when she turned her head.  She felt lightheaded and was nauseated and vomited with it and just felt out of sorts.    She is never had episodes like quite like this in the past though she does recall several years ago she had severe dizziness and end up seeing ENT and she cannot remember any definitive diagnosis given.  She has pretty bad gastroparesis.  When she recently saw pulmonary in office her Lasix dose was increased for a few days as I thought she is volume overloaded.        All other systems reviewed and are negative except as noted above.    She was recently seen by pulmonology with increasing dyspnea they thought she was a candidate for trilogy ventilator has been ordered but she has not used it yet and she is continuing to use her oxygen.  Currently she has no focal weakness just feels weak all over.  Her cough is better than her baseline.  Bowel movements and urine have been her baseline and she is not passed blood per any orifice.          Review of Systems   All other systems reviewed and are negative.      Past Medical History:   Diagnosis Date   • Back pain    • Bronchiectasis (HCC)    • Bronchitis 01/2018   • Cancer (HCC)     History of lung cancer and skin cancer    • Cardiac murmur    • Chronic cough    • Chronic obstructive pulmonary disease (HCC)    • Colon polyp    • DDD (degenerative disc disease), lumbar    • Depression    • Diabetes mellitus (HCC)     DX: 2019, CHECK BS QOD, LAST A1C 6.3??   • Disease of thyroid gland    • Esophageal dilatation 9/20/2019    Added automatically from request for surgery [id number]   • Esophageal dysphagia 10/24/2019    Added automatically  from request for surgery [id number]   • Former smoker (None since 1992) 8/29/2019   • GERD (gastroesophageal reflux disease)    • Glaucoma    • Globus sensation 1/27/2020   • History of colonic polyps    • History of transfusion 1988    NO REACTION    • Hyperlipidemia    • Hypertension    • Irritable bowel syndrome     Constipation/diarrhea   • Legally blind    • Lung cancer (HCC)    • Macular degeneration    • Neuropathy    • Obesity 2/26/2020   • Osteoarthritis    • Oxygen dependent     5L   • Pneumonia    • Sleep apnea     NON COMPLIANT WITH CPAP USE   • UTI (urinary tract infection)    • Wears glasses      History of Present Illness    75 y.o.female presents for leg swelling and shortness of breath.  Chronic resp failure with bronchiectasis, stage II COPD, hx lung cancer sp resection 2016 former smoker followed by pulm, Dr. Arboleda. On supplemental oxygen at night and most of day aprox 4-5 liters.  Is supposed to be on inhalers spiriva and air duo, but has been out for about 6 months and doesn't think inhalers help.  She felt good up until about a week half ago when started having increased shortness of breath with walking. Cant get down the hallway or more than 25 ft without exertional shortness of breath. Has had to use her scooter to get around. Has a productive cough but normal for her. No fever. No chest pain. Pt reports increased weight 7lbs over last few days by scales at home. When her weight goes up is normally because of excess fluid.   She has had increase swelling in lower ext bilateral also about week and half. Takes lasix 40mg daily and zaroxolyn 2.5 mg daily. When her weight is up she is supposed to double her lasix which she did for last 2 days and has not helped leg swelling, weight fluid retention or shortness of breath.  Both legs are swollen with some redness. Has had to have legs wrapped in past. No fever.  Has also had some increase fatigue falling asleep nodding off and dizziness. This  fatigue and dizziness has happened before when her swelling is bad. Has fallen with past episodes but has not fallen yet with this episode.  Followed by cardiology Dr. Chaidez and has appt next week with him.  Having some abd general discomfort and nausea with noted gastroparesis. PT has call into GI Dr. Lanza and pt waiting to hear from his office. Supposed to be taking protonix bid but does not always remember to take that med.     franky note July 2021  Allergies   Allergen Reactions   • Statins Myalgia     myalgia   • Metoclopramide Other (See Comments)     EXACERBATED RLS   • Penicillins Rash     rash   • Tramadol Nausea And Vomiting and GI Intolerance     VERY MILD PER PT       Past Surgical History:   Procedure Laterality Date   • BACK SURGERY      X2 (LUMBAR)   • BREAST BIOPSY      20+ years ago   • CATARACT EXTRACTION Bilateral    • CHOLECYSTECTOMY     • COLONOSCOPY     • ENDOSCOPY N/A 11/6/2019    Procedure: ESOPHAGOGASTRODUODENOSCOPY;  Surgeon: Cortes Millan MD;  Location:  [facility];  Service: Gastroenterology   • ENDOSCOPY N/A 4/29/2021    Procedure: ESOPHAGOGASTRODUODENOSCOPY;  Surgeon: Cortes Millan MD;  Location:  [facility];  Service: Gastroenterology;  Laterality: N/A;  balloon dilation    • HAND SURGERY Right     laceration repair   • INCONTINENCE SURGERY      BLADDER   • LUNG REMOVAL, PARTIAL Left 2016   • NISSEN FUNDOPLICATION     • TOTAL ABDOMINAL HYSTERECTOMY  1988    benign cyst   • US GUIDED FINE NEEDLE ASPIRATION  8/27/2021       Family History   Problem Relation Age of Onset   • Colon polyps Mother    • Emphysema Mother    • Hypertension Mother    • Colon polyps Father    • Dementia Father    • Heart disease Father    • Hyperlipidemia Father    • Macular degeneration Father    • Glaucoma Father    • Colon cancer Neg Hx    • Breast cancer Neg Hx    • Ovarian cancer Neg Hx        Social History     Socioeconomic History   • Marital status:  Single     Spouse name: Not on file   • Number of children: Not on file   • Years of education: Not on file   • Highest education level: Not on file   Tobacco Use   • Smoking status: Former Smoker     Packs/day: 1.50     Years: 25.00     Pack years: 37.50     Types: Cigarettes     Quit date: 1992     Years since quittin.7   • Smokeless tobacco: Never Used   Vaping Use   • Vaping Use: Never used   Substance and Sexual Activity   • Alcohol use: Yes     Alcohol/week: 2.0 standard drinks     Types: 2 Standard drinks or equivalent per week     Comment: a glass of wine or bourbon a couple times a WEEK   • Drug use: No   • Sexual activity: Never           Objective   Physical Exam  Vitals and nursing note reviewed.   Constitutional:       Comments: Pleasant 76-year-old looks like she does not feel well but is alert and oriented.  GCS 15.   HENT:      Head: Normocephalic and atraumatic.      Right Ear: Tympanic membrane, ear canal and external ear normal.      Left Ear: Tympanic membrane, ear canal and external ear normal.      Nose: Nose normal.      Mouth/Throat:      Mouth: Mucous membranes are moist.      Pharynx: Oropharynx is clear.   Eyes:      Extraocular Movements: Extraocular movements intact.      Conjunctiva/sclera: Conjunctivae normal.      Pupils: Pupils are equal, round, and reactive to light.   Neck:      Vascular: No carotid bruit.   Cardiovascular:      Rate and Rhythm: Normal rate and regular rhythm.      Pulses: Normal pulses.      Heart sounds: Normal heart sounds.   Pulmonary:      Effort: Pulmonary effort is normal.      Breath sounds: Normal breath sounds.   Abdominal:      Comments: BMI 31 soft nontender without organomegaly, masses, or guarding.   Musculoskeletal:      Cervical back: Normal range of motion and neck supple. No rigidity or tenderness.      Comments: Good pulses in her feet she moves everything pretty well she has venous stasis changes with redness of both her legs but not  overtly infected from her ankles to her retirement up her legs.   Lymphadenopathy:      Cervical: No cervical adenopathy.   Skin:     General: Skin is warm and dry.      Capillary Refill: Capillary refill takes less than 2 seconds.   Neurological:      Comments: Face symmetric voice soft tongue midline.  Vision, hearing, and speech are preserved.  She has modest generalized weakness.         Procedures           ED Course                 Recent Results (from the past 24 hour(s))   ECG 12 Lead    Collection Time: 10/01/21 11:51 AM   Result Value Ref Range    QT Interval 428 ms    QTC Interval 490 ms   Comprehensive Metabolic Panel    Collection Time: 10/01/21 11:55 AM    Specimen: Blood   Result Value Ref Range    Glucose 174 (H) 65 - 99 mg/dL    BUN 15 8 - 23 mg/dL    Creatinine 0.70 0.57 - 1.00 mg/dL    Sodium 139 136 - 145 mmol/L    Potassium 2.7 (L) 3.5 - 5.2 mmol/L    Chloride 92 (L) 98 - 107 mmol/L    CO2 35.0 (H) 22.0 - 29.0 mmol/L    Calcium 9.3 8.6 - 10.5 mg/dL    Total Protein 7.4 6.0 - 8.5 g/dL    Albumin 4.20 3.50 - 5.20 g/dL    ALT (SGPT) 10 1 - 33 U/L    AST (SGOT) 14 1 - 32 U/L    Alkaline Phosphatase 109 39 - 117 U/L    Total Bilirubin 0.4 0.0 - 1.2 mg/dL    eGFR Non African Amer 81 >60 mL/min/1.73    Globulin 3.2 gm/dL    A/G Ratio 1.3 g/dL    BUN/Creatinine Ratio 21.4 7.0 - 25.0    Anion Gap 12.0 5.0 - 15.0 mmol/L   Troponin    Collection Time: 10/01/21 11:55 AM    Specimen: Blood   Result Value Ref Range    Troponin T <0.010 0.000 - 0.030 ng/mL   Magnesium    Collection Time: 10/01/21 11:55 AM    Specimen: Blood   Result Value Ref Range    Magnesium 1.9 1.6 - 2.4 mg/dL   Green Top (Gel)    Collection Time: 10/01/21 11:55 AM   Result Value Ref Range    Extra Tube Hold for add-ons.    Lavender Top    Collection Time: 10/01/21 11:55 AM   Result Value Ref Range    Extra Tube     Gold Top - SST    Collection Time: 10/01/21 11:55 AM   Result Value Ref Range    Extra Tube Hold for add-ons.    Gray Top     Collection Time: 10/01/21 11:55 AM   Result Value Ref Range    Extra Tube Hold for add-ons.    Light Blue Top    Collection Time: 10/01/21 11:55 AM   Result Value Ref Range    Extra Tube hold for add-on    CBC Auto Differential    Collection Time: 10/01/21 11:55 AM    Specimen: Blood   Result Value Ref Range    WBC 10.22 3.40 - 10.80 10*3/mm3    RBC 4.37 3.77 - 5.28 10*6/mm3    Hemoglobin 11.4 (L) 12.0 - 15.9 g/dL    Hematocrit 38.0 34.0 - 46.6 %    MCV 87.0 79.0 - 97.0 fL    MCH 26.1 (L) 26.6 - 33.0 pg    MCHC 30.0 (L) 31.5 - 35.7 g/dL    RDW 14.8 12.3 - 15.4 %    RDW-SD 46.5 37.0 - 54.0 fl    MPV 8.8 6.0 - 12.0 fL    Platelets 375 140 - 450 10*3/mm3    Neutrophil % 77.6 (H) 42.7 - 76.0 %    Lymphocyte % 13.6 (L) 19.6 - 45.3 %    Monocyte % 6.2 5.0 - 12.0 %    Eosinophil % 0.8 0.3 - 6.2 %    Basophil % 0.5 0.0 - 1.5 %    Immature Grans % 1.3 (H) 0.0 - 0.5 %    Neutrophils, Absolute 7.94 (H) 1.70 - 7.00 10*3/mm3    Lymphocytes, Absolute 1.39 0.70 - 3.10 10*3/mm3    Monocytes, Absolute 0.63 0.10 - 0.90 10*3/mm3    Eosinophils, Absolute 0.08 0.00 - 0.40 10*3/mm3    Basophils, Absolute 0.05 0.00 - 0.20 10*3/mm3    Immature Grans, Absolute 0.13 (H) 0.00 - 0.05 10*3/mm3    nRBC 0.0 0.0 - 0.2 /100 WBC   Urinalysis With Microscopic If Indicated (No Culture) - Urine, Clean Catch    Collection Time: 10/01/21 12:04 PM    Specimen: Urine, Clean Catch   Result Value Ref Range    Color, UA Yellow Yellow, Straw    Appearance, UA Clear Clear    pH, UA 7.5 5.0 - 8.0    Specific Gravity, UA 1.008 1.001 - 1.030    Glucose, UA Negative Negative    Ketones, UA Negative Negative    Bilirubin, UA Negative Negative    Blood, UA Negative Negative    Protein, UA Negative Negative    Leuk Esterase, UA Negative Negative    Nitrite, UA Negative Negative    Urobilinogen, UA 0.2 E.U./dL 0.2 - 1.0 E.U./dL     Note: In addition to lab results from this visit, the labs listed above may include labs taken at another facility or during a different  "encounter within the last 24 hours. Please correlate lab times with ED admission and discharge times for further clarification of the services performed during this visit.    CT Head Without Contrast   Preliminary Result   No acute intracranial abnormality.       D: 10/01/2021   E:  10/01/2021              XR Chest 1 View   Preliminary Result   No evidence of acute cardiopulmonary disease.       D:  10/01/2021   E:  10/01/2021                    Vitals:    10/01/21 1149   BP: 149/56   Pulse: 76   Resp: 18   Temp: 97.9 °F (36.6 °C)   TempSrc: Oral   SpO2: 92%   Weight: 79.4 kg (175 lb)   Height: 160 cm (63\")     Medications   sodium chloride 0.9 % flush 10 mL (has no administration in time range)   potassium chloride (KLOR-CON) packet 20 mEq (20 mEq Oral Given 10/1/21 1443)   magnesium sulfate in D5W 1g/100mL (PREMIX) (0 g Intravenous Stopped 10/1/21 1450)   dexamethasone (DECADRON) injection 4 mg (4 mg Intravenous Given 10/1/21 1342)   dimenhyDRINATE injection 25 mg (25 mg Intravenous Given 10/1/21 1357)     ECG/EMG Results (last 24 hours)     Procedure Component Value Units Date/Time    ECG 12 Lead [[id number]] Collected: 10/01/21 1151     Updated: 10/01/21 1521     QT Interval 428 ms      QTC Interval 490 ms     Narrative:      Test Reason : Weak/Dizzy/AMS protocol  Blood Pressure :   */*   mmHG  Vent. Rate :  79 BPM     Atrial Rate :  79 BPM     P-R Int : 216 ms          QRS Dur :  92 ms      QT Int : 428 ms       P-R-T Axes : -16  20  36 degrees     QTc Int : 490 ms    Sinus rhythm with 1st degree AV block  Inferior infarct , age undetermined  Anterior infarct (cited on or before 03-SEP-2021)  Abnormal ECG  When compared with ECG of 03-SEP-2021 10:41,  qtc 423 msec by my calculations    Confirmed by JENNIFER VICENTE MD ([id number]) on 10/1/2021 3:21:45 PM    Referred By: ED MD           Confirmed By: JENNIFER VICENTE MD        ECG 12 Lead   Final Result   Test Reason : Weak/Dizzy/AMS protocol   Blood Pressure :   */*   mmHG "   Vent. Rate :  79 BPM     Atrial Rate :  79 BPM      P-R Int : 216 ms          QRS Dur :  92 ms       QT Int : 428 ms       P-R-T Axes : -16  20  36 degrees      QTc Int : 490 ms      Sinus rhythm with 1st degree AV block   Inferior infarct , age undetermined   Anterior infarct (cited on or before 03-SEP-2021)   Abnormal ECG   When compared with ECG of 03-SEP-2021 10:41,   qtc 423 msec by my calculations      Confirmed by JENNIFER VICENTE MD ([id number]) on 10/1/2021 3:21:45 PM      Referred By: ED MD           Confirmed By: JENNIFER VICENTE MD                                     MDM  Number of Diagnoses or Management Options  Dizziness  Elevated glucose  Hypokalemia  Weakness  Diagnosis management comments:       I reviewed all available studies at the bedside with the patient now her daughter is at bedside.  Thankfully her head CT is unremarkable.  I do not think patient had a stroke but likely something like positional vertigo as the cause of her dizziness.    She has hypokalemia that I think is likely the result of her increased diuretic use.  She is on chronic potassium therapy to begin with but she received oral potassium and IV magnesium here and I think she needs to be continued on a few more days of increased potassium as her last dose of Lasix was today.  Of asked her to follow-up with her PCP for recheck of her electrolytes next week.  Her glucose is also slightly elevated.    She received IV Dramamine and some steroids here and felt much improved she was able to get up and get around fairly well.  Her daughter is with her and I have asked him to increase her care as I think she is in need help until the dizziness subsides.  I referred her to ENT if she has persistent issues she may need further testing and treatment.  She will return to the ED if worse in any way.    Are agreeable with the plan       Amount and/or Complexity of Data Reviewed  Clinical lab tests: reviewed  Tests in the radiology section of CPT®:  reviewed  Tests in the medicine section of CPT®: reviewed  Decide to obtain previous medical records or to obtain history from someone other than the patient: yes        Final diagnoses:   Dizziness   Weakness   Hypokalemia   Elevated glucose       ED Disposition  ED Disposition     ED Disposition Condition Comment    Discharge Stable           Dacia Viera MD  [address]  [facility] [id number] [phone]    In 3 days  for recheck of potassium    Cricket Garcia MD  [address] [facility] [address]  [place] [id number] [phone]      If symptoms worsen         Medication List      Changed    glimepiride 2 MG tablet  Commonly known as: AMARYL  TAKE 1 TABLET BY MOUTH TWICE DAILY  What changed: when to take this     * POTASSIUM CHLORIDE ER PO  What changed: Another medication with the same name was added. Make sure you understand how and when to take each.     * potassium chloride 10 MEQ CR tablet  Take 1 tablet by mouth 2 (Two) Times a Day for 4 days.  What changed: You were already taking a medication with the same name, and this prescription was added. Make sure you understand how and when to take each.         * This list has 2 medication(s) that are the same as other medications prescribed for you. Read the directions carefully, and ask your doctor or other care provider to review them with you.               Where to Get Your Medications      These medications were sent to [place], [place] - [address] [facility] - [phone] [facility] [phone]   [address] [facility] [address]    Phone: [phone]   · potassium chloride 10 MEQ CR tablet          Dmitriy Fischer MD  10/01/21 1749

## 2021-10-01 NOTE — DISCHARGE INSTRUCTIONS
I wrote a prescription for extra potassium for you to take but you will need your potassium rechecked again next week by her primary doctor.  With sudden movements and get some extra help especially while you are dizzy.

## 2021-10-04 ENCOUNTER — PATIENT OUTREACH (OUTPATIENT)
Dept: CASE MANAGEMENT | Facility: OTHER | Age: 76
End: 2021-10-04

## 2021-10-04 NOTE — OUTREACH NOTE
"Ambulatory Case Management Note    Patient Outreach  See previous outreach note 9/7/21       Contacted pt regarding [facility] ED visit 10/1/21 with chief c/o listed as weakness, generalized.  States \"I am about the same with the nausea and dizziness.\"  She reports that her breathing \"is not too bad.\"  Also, states her leg edema is \"significantly better with the lasix and keeping legs elevated.\"  She is able to keep liquids down today.  States has had broth, tea and jello and all has stayed down.  She did obtain the potassium prescribed by ED and is taking as directed.  Discussed orthostatic precautions and v/u. She has f/u appt with her PCP, Dr. Viera for 10/6/21 and has call into her GI doctor.  She does not drive stating that she is legally blind.  Either the [employer] where she lives or her family provides transportation.  She is up and about through her apt without assistive device, however does have walker for use outside of apt.  She monitors her bp, bs and pulse O2 at home. States her blood pressures have been fine, but bs up a little bit running around 150-160.  Her last A1C was 6.7 on 9/15/21.   She wears her O2 24/7. Her pharmacist manages her medicines using pill organizers, but then she takes them on her own.  She is very pleased with the [employer] and feels like she has made friends and has good neighbors  She denies any needs at present.  She states she has a living will, but has not been notorized and not sure what else needs to be done.  [facility] hotline explained and number provided.  Role of  explained and contact number given.  [facility] 24/7 Nurse line explained and contact number provided.      [facility] updated and reviewed with the patient during this program:     Financial Resource Strain: Low Risk    • Difficulty of Paying Living Expenses: Not very hard       Food Insecurity: No Food Insecurity   • Worried About Running Out of Food in the Last Year: Never true   • Ran Out " of Food in the Last Year: Never true       Transportation Needs: No Transportation Needs   • Lack of Transportation (Medical): No   • Lack of Transportation (Non-Medical): No       Housing Stability: Low Risk    • Unable to Pay for Housing in the Last Year: No   • Number of Places Lived in the Last Year: 1   • Unstable Housing in the Last Year: No     General & Health Literacy Assessment    Questions/Answers      Most Recent Value   Assessment Completed With  Patient   Living Arrangement  Alone   Type of Residence  Apartment [[facility]]   Equiptment Used at Home  Oxygen/Respiratory Treatment, Tub Seat, Walker [glucometer  bp monitor   pulse O2    grab bars  ]   Other Issues  Visual Impairment   Bed or Wheelchair Confined  No   Difficulty Keeping Appointments  No   Health Literacy  Good        Care Evaluation    Questions/Answers      Most Recent Value   Annual Wellness Visit:   Patient Has Completed   Other Patient Education/Resources   24/7 [facility] Nurse Call Line, Advanced Care Planning   24/7 Nurse Call Line Education Method  Verbal   ACP Education Method  Verbal   Healthy Lifestyle (Self-Efficacy)  self-monitors vital signs appropriately, correctly recognizes signs/symptoms of pulmonary distress, recognizes when to contact medical assistance        Ally Muñiz RN  Ambulatory Case Management    10/4/2021, 15:07 EDT

## 2021-10-06 ENCOUNTER — OFFICE VISIT (OUTPATIENT)
Dept: INTERNAL MEDICINE | Facility: CLINIC | Age: 76
End: 2021-10-06

## 2021-10-06 ENCOUNTER — LAB (OUTPATIENT)
Dept: LAB | Facility: HOSPITAL | Age: 76
End: 2021-10-06

## 2021-10-06 ENCOUNTER — TELEPHONE (OUTPATIENT)
Dept: INTERNAL MEDICINE | Facility: CLINIC | Age: 76
End: 2021-10-06

## 2021-10-06 VITALS
TEMPERATURE: 98.4 F | SYSTOLIC BLOOD PRESSURE: 134 MMHG | OXYGEN SATURATION: 97 % | HEART RATE: 101 BPM | DIASTOLIC BLOOD PRESSURE: 56 MMHG | WEIGHT: 168.8 LBS | BODY MASS INDEX: 29.9 KG/M2

## 2021-10-06 DIAGNOSIS — R11.0 NAUSEA: ICD-10-CM

## 2021-10-06 DIAGNOSIS — E87.6 HYPOKALEMIA: ICD-10-CM

## 2021-10-06 DIAGNOSIS — K59.00 CONSTIPATION, UNSPECIFIED CONSTIPATION TYPE: ICD-10-CM

## 2021-10-06 DIAGNOSIS — R13.10 DYSPHAGIA, UNSPECIFIED TYPE: ICD-10-CM

## 2021-10-06 DIAGNOSIS — R14.2 BURPING: ICD-10-CM

## 2021-10-06 DIAGNOSIS — K31.84 GASTROPARESIS: ICD-10-CM

## 2021-10-06 DIAGNOSIS — E87.6 HYPOKALEMIA: Primary | ICD-10-CM

## 2021-10-06 PROCEDURE — 99214 OFFICE O/P EST MOD 30 MIN: CPT | Performed by: NURSE PRACTITIONER

## 2021-10-06 PROCEDURE — 80048 BASIC METABOLIC PNL TOTAL CA: CPT

## 2021-10-06 RX ORDER — DOCUSATE SODIUM 100 MG/1
100 CAPSULE, LIQUID FILLED ORAL 2 TIMES DAILY PRN
Qty: 60 CAPSULE | Refills: 2 | Status: ON HOLD | OUTPATIENT
Start: 2021-10-06 | End: 2022-04-11

## 2021-10-06 RX ORDER — ONDANSETRON 4 MG/1
4 TABLET, ORALLY DISINTEGRATING ORAL EVERY 8 HOURS PRN
Qty: 30 TABLET | Refills: 0 | Status: ON HOLD | OUTPATIENT
Start: 2021-10-06 | End: 2022-04-11

## 2021-10-06 NOTE — TELEPHONE ENCOUNTER
THERE WAS A MENTION OF A REFERRAL TO A GASTROENTEROLOGIST DURING THE APPOINTMENT BUT WAS THEN FORGOTTEN.  THEY ARE CALLING TO SEE ABOUT THIS REFERRAL AND WHERE TO CALL?    PLEASE CALL [phone]

## 2021-10-06 NOTE — PROGRESS NOTES
Chief Complaint   Patient presents with   • Transitional Care Management       History of Present Illness    76 y.o.female presents for TCM recent ER visit [facility] 10-1 for dizziness.   Has baseline severe emphysema on oxygen 4LNC; also had recent increase in peripheral edema and lasix was increased; also on zaroxolyn. Not eating drinking much due to nausea with chronic gastroparesis. Her K was 2.7.  Her regular K dose was increased. Ct head negative. tx with dramamine and and IV steroids; dizziness improved.  Since then, pt continues to have difficulty with nausea and decreased oral intake. Burping a lot. Has gastroparesis and known dysphagia. Positive constipation. Wants just a stool softener. Does want strong med.  Has tried to get earlier appt with GI but will be a few weeks; pt and family would like to see a different GI specialist that has more availability. Requesting new referral.   Has completed the additional K replacement. (regularly takes 3 - 10meq kcl two times daily. She increased this to 4 - 10meq KCL two times daily for 4 days, then back down to normal dose.)  Swelling edema improved.  Dizziness improved.  Pt is accompanied by her daughter to today's appt.    Review of Systems   Constitutional: Negative for chills and fever.   Cardiovascular: Negative for leg swelling.   Gastrointestinal: Positive for constipation and nausea. Negative for abdominal pain, diarrhea and vomiting.   Neurological: Positive for weakness. Negative for dizziness, numbness and headache.         PMSFH  The following portions of the patient's history were reviewed and updated as appropriate: allergies, current medications, past family history, past medical history, past social history, past surgical history and problem list.     Past Medical History:   Diagnosis Date   • Back pain    • Bronchiectasis (HCC)    • Bronchitis 01/2018   • Cancer (HCC)     History of lung cancer and skin cancer    • Cardiac murmur    • Chronic cough     • Chronic obstructive pulmonary disease (HCC)    • Colon polyp    • DDD (degenerative disc disease), lumbar    • Depression    • Diabetes mellitus (HCC)     DX: 2019, CHECK BS QOD, LAST A1C 6.3??   • Disease of thyroid gland    • Esophageal dilatation 2019    Added automatically from request for surgery [id number]   • Esophageal dysphagia 10/24/2019    Added automatically from request for surgery [id number]   • Former smoker (None since ) 2019   • GERD (gastroesophageal reflux disease)    • Glaucoma    • Globus sensation 2020   • History of colonic polyps    • History of transfusion 1988    NO REACTION    • Hyperlipidemia    • Hypertension    • Irritable bowel syndrome     Constipation/diarrhea   • Legally blind    • Lung cancer (HCC)    • Macular degeneration    • Neuropathy    • Obesity 2020   • Osteoarthritis    • Oxygen dependent     5L   • Pneumonia    • Sleep apnea     NON COMPLIANT WITH CPAP USE   • UTI (urinary tract infection)    • Wears glasses       Allergies   Allergen Reactions   • Statins Myalgia     myalgia   • Metoclopramide Other (See Comments)     EXACERBATED RLS   • Penicillins Rash     rash   • Tramadol Nausea And Vomiting and GI Intolerance     VERY MILD PER PT      Social History     Tobacco Use   • Smoking status: Former Smoker     Packs/day: 1.50     Years: 25.00     Pack years: 37.50     Types: Cigarettes     Quit date: 1992     Years since quittin.7   • Smokeless tobacco: Never Used   Vaping Use   • Vaping Use: Never used   Substance Use Topics   • Alcohol use: Yes     Alcohol/week: 2.0 standard drinks     Types: 2 Standard drinks or equivalent per week     Comment: a glass of wine or bourbon a couple times a WEEK   • Drug use: No     Past Surgical History:   Procedure Laterality Date   • BACK SURGERY      X2 (LUMBAR)   • BREAST BIOPSY      20+ years ago   • CATARACT EXTRACTION Bilateral    • CHOLECYSTECTOMY     • COLONOSCOPY     • ENDOSCOPY N/A 2019     Procedure: ESOPHAGOGASTRODUODENOSCOPY;  Surgeon: Cortes Millan MD;  Location:  [facility];  Service: Gastroenterology   • ENDOSCOPY N/A 4/29/2021    Procedure: ESOPHAGOGASTRODUODENOSCOPY;  Surgeon: Cortes Millan MD;  Location:  [facility];  Service: Gastroenterology;  Laterality: N/A;  balloon dilation    • HAND SURGERY Right     laceration repair   • INCONTINENCE SURGERY      BLADDER   • LUNG REMOVAL, PARTIAL Left 2016   • NISSEN FUNDOPLICATION     • TOTAL ABDOMINAL HYSTERECTOMY  1988    benign cyst   • US GUIDED FINE NEEDLE ASPIRATION  8/27/2021      Family History   Problem Relation Age of Onset   • Colon polyps Mother    • Emphysema Mother    • Hypertension Mother    • Colon polyps Father    • Dementia Father    • Heart disease Father    • Hyperlipidemia Father    • Macular degeneration Father    • Glaucoma Father    • Colon cancer Neg Hx    • Breast cancer Neg Hx    • Ovarian cancer Neg Hx            Current Outpatient Medications:   •  Accu-Chek Guide test strip, TEST 2 TIMES PER DAY, Disp: , Rfl:   •  albuterol sulfate  (90 Base) MCG/ACT inhaler, Inhale 2 puffs Every 6 (Six) Hours As Needed for Wheezing., Disp: 18 g, Rfl: 2  •  amitriptyline (ELAVIL) 10 MG tablet, Take 10 mg by mouth Daily., Disp: , Rfl:   •  chlorhexidine (PERIDEX) 0.12 % solution, Apply 15 mL to the mouth or throat 2 (Two) Times a Day As Needed. For 14 days, w/1 refill , Disp: , Rfl:   •  colestipol (Colestid) 1 g tablet, Take 2 tablets by mouth 2 (Two) Times a Day. Take separate 2 hours from other medications, Disp: 120 tablet, Rfl: 11  •  diphenoxylate-atropine (Lomotil) 2.5-0.025 MG per tablet, Take 1 tablet by mouth 4 (Four) Times a Day As Needed for Diarrhea., Disp: 30 tablet, Rfl: 1  •  donepezil (ARICEPT) 10 MG tablet, Take 20 mg by mouth Daily., Disp: , Rfl:   •  DULoxetine (CYMBALTA) 60 MG capsule, TAKE 1 CAPSULE BY MOUTH DAILY (Patient taking differently: Take 60 mg by mouth  Daily.), Disp: 90 capsule, Rfl: 3  •  ezetimibe (ZETIA) 10 MG tablet, Take 10 mg by mouth Daily., Disp: , Rfl:   •  fluticasone (Flonase) 50 MCG/ACT nasal spray, 2 sprays into the nostril(s) as directed by provider Daily., Disp: 18.2 mL, Rfl: 11  •  furosemide (LASIX) 40 MG tablet, Take 40 mg by mouth Daily., Disp: , Rfl:   •  furosemide (Lasix) 40 MG tablet, Extra 40mg Daily for three days, Disp: 3 tablet, Rfl: 0  •  glimepiride (AMARYL) 2 MG tablet, TAKE 1 TABLET BY MOUTH TWICE DAILY (Patient taking differently: Take 2 mg by mouth 2 (two) times a day.), Disp: 180 tablet, Rfl: 3  •  glucose blood test strip, Accu-Chek Guide test strips, Disp: , Rfl:   •  glycopyrrolate (ROBINUL) 1 MG tablet, , Disp: , Rfl:   •  ketoconazole (NIZORAL) 2 % cream, Apply 1 application topically to the appropriate area as directed Daily As Needed for Itching. BACK AND LEGS AS NEEDED FOR ITCHING, Disp: , Rfl:   •  ketoconazole (NIZORAL) 2 % shampoo, Apply 1 application topically to the appropriate area as directed As Needed for Irritation., Disp: , Rfl:   •  latanoprost (XALATAN) 0.005 % ophthalmic solution, Administer 1 drop to both eyes Daily., Disp: , Rfl:   •  levothyroxine (SYNTHROID, LEVOTHROID) 50 MCG tablet, Take 50 mcg by mouth Daily., Disp: , Rfl:   •  metOLazone (ZAROXOLYN) 2.5 MG tablet, Take 1 tablet by mouth Daily As Needed (for swelling or weight gain of 4 lbs). As directed, Disp: , Rfl:   •  metoprolol succinate XL (TOPROL-XL) 25 MG 24 hr tablet, Take 25 mg by mouth Daily., Disp: , Rfl:   •  O2 (OXYGEN), Inhale Continuous. 4-5 L/MIN DEPENDING ON ACTIVITY, Disp: , Rfl:   •  pantoprazole (PROTONIX) 40 MG EC tablet, Take 1 tablet by mouth 2 (two) times a day., Disp: 180 tablet, Rfl: 3  •  POTASSIUM CHLORIDE ER PO, Take 30 mEq by mouth 2 (Two) Times a Day., Disp: , Rfl:   •  predniSONE (DELTASONE) 10 MG tablet, Take 1 tablet by mouth Daily., Disp: 10 tablet, Rfl: 0  •  Prucalopride Succinate 2 MG tablet, Take 1 tablet by  mouth Daily., Disp: 30 tablet, Rfl: 2  •  rOPINIRole XL (REQUIP XL) 8 MG 24 hr tablet, Take 8 mg by mouth 2 (two) times a day., Disp: , Rfl:   •  Spacer/Aero-Holding Chambers (AeroChamber Plus Morris-Vu) misc, As Needed., Disp: , Rfl:   •  timolol (TIMOPTIC) 0.25 % ophthalmic solution, Administer 1 drop to the right eye Every Morning., Disp: , Rfl:   •  Tiotropium Bromide Monohydrate (Spiriva Respimat) 1.25 MCG/ACT aerosol solution inhaler, Inhale 2 puffs Daily., Disp: 4 g, Rfl: 11  •  tiZANidine (ZANAFLEX) 2 MG tablet, Take 1 tablet by mouth At Night As Needed for Muscle Spasms., Disp: 30 tablet, Rfl: 0  •  vitamin D (ERGOCALCIFEROL) 1.25 MG (28238 UT) capsule capsule, Take 1 capsule by mouth 1 (One) Time Per Week. Friday?, Disp: 5 capsule, Rfl: 0    VITALS:  /56   Pulse 101   Temp 98.4 °F (36.9 °C)   Wt 76.6 kg (168 lb 12.8 oz)   LMP  (LMP Unknown)   SpO2 97%   BMI 29.90 kg/m²     Physical Exam  Vitals reviewed.   HENT:      Head: Normocephalic.   Eyes:      Pupils: Pupils are equal, round, and reactive to light.   Cardiovascular:      Rate and Rhythm: Normal rate and regular rhythm.      Heart sounds: Normal heart sounds.   Pulmonary:      Effort: Pulmonary effort is normal. No respiratory distress.      Breath sounds: Normal breath sounds.   Abdominal:      General: Bowel sounds are normal.      Palpations: Abdomen is soft.      Tenderness: There is no abdominal tenderness. There is no guarding or rebound.      Comments: burping   Skin:     General: Skin is warm and dry.   Neurological:      General: No focal deficit present.      Mental Status: She is alert and oriented to person, place, and time.      Cranial Nerves: Cranial nerves are intact.      Coordination: Coordination is intact.      Comments: Uses walker for assistance   Psychiatric:         Mood and Affect: Mood normal.         Result Review :            Assessment and Plan    Diagnoses and all orders for this visit:    1. Hypokalemia  (Primary)  -     Basic metabolic panel; Future  -     Basic metabolic panel; Future  K still low. Did an additional 4 days of an additional 10meq two times daily; in addition to her 30meq BID.    Return in 2 weeks for repeat K check.     2. Nausea  -     ondansetron ODT (ZOFRAN-ODT) 4 MG disintegrating tablet; Place 1 tablet on the tongue Every 8 (Eight) Hours As Needed for Nausea or Vomiting.  Dispense: 30 tablet; Refill: 0  -     Ambulatory Referral to Gastroenterology    3. Burping  -     Ambulatory Referral to Gastroenterology    4. Dysphagia, unspecified type  -     Ambulatory Referral to Gastroenterology    5. Gastroparesis  -     Ambulatory Referral to Gastroenterology    6. Constipation, unspecified constipation type  -     docusate sodium (COLACE) 100 MG capsule; Take 1 capsule by mouth 2 (Two) Times a Day As Needed for Constipation.  Dispense: 60 capsule; Refill: 2        I discussed the patients findings and my recommendations with patient.  Patient was encouraged to keep me informed of any acute changes, lack of improvement, or any new concerning symptoms.  Patient voiced understanding of all instructions and denied further questions.      Follow Up   Return if symptoms worsen or fail to improve.  Fu lab 2 weeks.    Electronically signed by:    DONALD Poole  10/06/2021

## 2021-10-07 ENCOUNTER — TELEPHONE (OUTPATIENT)
Dept: INTERNAL MEDICINE | Facility: CLINIC | Age: 76
End: 2021-10-07

## 2021-10-07 LAB
ANION GAP SERPL CALCULATED.3IONS-SCNC: 10.2 MMOL/L (ref 5–15)
BUN SERPL-MCNC: 19 MG/DL (ref 8–23)
BUN/CREAT SERPL: 20 (ref 7–25)
CALCIUM SPEC-SCNC: 10 MG/DL (ref 8.6–10.5)
CHLORIDE SERPL-SCNC: 87 MMOL/L (ref 98–107)
CO2 SERPL-SCNC: 40.8 MMOL/L (ref 22–29)
CREAT SERPL-MCNC: 0.95 MG/DL (ref 0.57–1)
GFR SERPL CREATININE-BSD FRML MDRD: 57 ML/MIN/1.73
GLUCOSE SERPL-MCNC: 133 MG/DL (ref 65–99)
POTASSIUM SERPL-SCNC: 2.9 MMOL/L (ref 3.5–5.2)
SODIUM SERPL-SCNC: 138 MMOL/L (ref 136–145)

## 2021-10-07 NOTE — TELEPHONE ENCOUNTER
She saw Danielle and note not yet complete. Please route this message to her to see what her plan was for GI referral. Thanks.

## 2021-10-07 NOTE — TELEPHONE ENCOUNTER
Called and left message for patient regarding new GI referral.  Advised to call back with any questions.

## 2021-10-07 NOTE — TELEPHONE ENCOUNTER
Return call. I have entered a new GI referral and asked for one that can see pt hopefully sooner than other gi could see.

## 2021-10-07 NOTE — PROGRESS NOTES
Call pt.  Let her know her potassium level is still too low; now 2.9 was 2.7 in ER.  She needs to do additional potassium.  She normally takes 3 10meq potassium tabs in the morning and 3 tabs in the evening.  I want her again to take 4 10meq potassium tabs in the morning and 4 tabs in the evening for 4 days; meaning thurs fri sat sun.  Then Mon she would go back to her normal dose of 3 tabs in the morning and 3 tabs in the evening.  She needs to have a follow up lab check in 1 week, so next Friday stop by lab. Order is in computer.

## 2021-10-07 NOTE — TELEPHONE ENCOUNTER
----- Message from DONALD Antonio sent at 10/7/2021  8:52 AM EDT -----  Call pt.  Let her know her potassium level is still too low; now 2.9 was 2.7 in ER.  She needs to do additional potassium.  She normally takes 3 10meq potassium tabs in the morning and 3 tabs in the evening.  I want her again to take 4 10meq potassium tabs in the morning and 4 tabs in the evening for 4 days; meaning thurs fri sat sun.  Then Mon she would go back to her normal dose of 3 tabs in the morning and 3 tabs in the evening.  She needs to have a follow up lab check in 1 week, so next Friday stop by lab. Order is in computer.

## 2021-10-07 NOTE — TELEPHONE ENCOUNTER
Left message for patient to return call to office regarding lab  results.  Office Phone number given

## 2021-10-15 ENCOUNTER — LAB (OUTPATIENT)
Dept: LAB | Facility: HOSPITAL | Age: 76
End: 2021-10-15

## 2021-10-15 DIAGNOSIS — E87.6 HYPOKALEMIA: ICD-10-CM

## 2021-10-15 LAB
ANION GAP SERPL CALCULATED.3IONS-SCNC: 9 MMOL/L (ref 5–15)
BUN SERPL-MCNC: 15 MG/DL (ref 8–23)
BUN/CREAT SERPL: 23.4 (ref 7–25)
CALCIUM SPEC-SCNC: 9.5 MG/DL (ref 8.6–10.5)
CHLORIDE SERPL-SCNC: 96 MMOL/L (ref 98–107)
CO2 SERPL-SCNC: 35 MMOL/L (ref 22–29)
CREAT SERPL-MCNC: 0.64 MG/DL (ref 0.57–1)
GFR SERPL CREATININE-BSD FRML MDRD: 90 ML/MIN/1.73
GLUCOSE SERPL-MCNC: 129 MG/DL (ref 65–99)
POTASSIUM SERPL-SCNC: 3.4 MMOL/L (ref 3.5–5.2)
SODIUM SERPL-SCNC: 140 MMOL/L (ref 136–145)

## 2021-10-15 PROCEDURE — 80048 BASIC METABOLIC PNL TOTAL CA: CPT

## 2021-10-16 NOTE — PROGRESS NOTES
Making progress on your potassium 3.4. maybe a couple more days of taking 4 of your potassium pills twice daily then should be fine.

## 2021-11-03 ENCOUNTER — OFFICE VISIT (OUTPATIENT)
Dept: GASTROENTEROLOGY | Facility: CLINIC | Age: 76
End: 2021-11-03

## 2021-11-03 VITALS
HEART RATE: 96 BPM | BODY MASS INDEX: 30.89 KG/M2 | WEIGHT: 174.4 LBS | SYSTOLIC BLOOD PRESSURE: 156 MMHG | DIASTOLIC BLOOD PRESSURE: 68 MMHG

## 2021-11-03 DIAGNOSIS — R09.89 GLOBUS SENSATION: ICD-10-CM

## 2021-11-03 DIAGNOSIS — K58.0 IRRITABLE BOWEL SYNDROME WITH DIARRHEA: ICD-10-CM

## 2021-11-03 DIAGNOSIS — R13.19 ESOPHAGEAL DYSPHAGIA: Primary | ICD-10-CM

## 2021-11-03 DIAGNOSIS — K31.84 GASTROPARESIS: ICD-10-CM

## 2021-11-03 DIAGNOSIS — K11.7 DROOLING: ICD-10-CM

## 2021-11-03 PROCEDURE — 99214 OFFICE O/P EST MOD 30 MIN: CPT | Performed by: INTERNAL MEDICINE

## 2021-11-03 RX ORDER — PANTOPRAZOLE SODIUM 40 MG/1
40 TABLET, DELAYED RELEASE ORAL 2 TIMES DAILY
Qty: 60 TABLET | Refills: 5 | Status: SHIPPED | OUTPATIENT
Start: 2021-11-03 | End: 2022-02-02 | Stop reason: SDUPTHER

## 2021-11-03 NOTE — PROGRESS NOTES
GASTROENTEROLOGY OFFICE NOTE  Lorrie Pagan  [id number]  1945      Chief Complaint   Patient presents with   • Follow-up   • Diarrhea   • Difficulty Swallowing   • Gastroparesis        HISTORY OF PRESENT ILLNESS:  Patient presents for follow-up.  She continues have problems with dysphagia to solids.  It is predominantly to solids to the point where she has to chew carefully, eat slowly, drink a lot of water with her meals but despite this sometimes liquids alone will present a difficulty during the act of ingestion to the point where she feels that it will come right back up.  No coughing or wheezing has been reported.    Recent problems with diarrhea have resolved.  Previously, she was having 10 urgent watery bowel moods per day.  Currently she is doing well with Colestid    Her dysphagia is fairly problematic for her and is significantly impairing her quality of life.  It is occurring on a daily basis and with virtually every meal.    PAST MEDICAL HISTORY  Past Medical History:   Diagnosis Date   • Back pain    • Bronchiectasis (HCC)    • Bronchitis 01/2018   • Cancer (HCC)     History of lung cancer and skin cancer    • Cardiac murmur    • Chronic cough    • Chronic obstructive pulmonary disease (HCC)    • Colon polyp    • DDD (degenerative disc disease), lumbar    • Depression    • Diabetes mellitus (HCC)     DX: 2019, CHECK BS QOD, LAST A1C 6.3??   • Disease of thyroid gland    • Esophageal dilatation 9/20/2019    Added automatically from request for surgery [id number]   • Esophageal dysphagia 10/24/2019    Added automatically from request for surgery [id number]   • Former smoker (None since 1992) 8/29/2019   • GERD (gastroesophageal reflux disease)    • Glaucoma    • Globus sensation 1/27/2020   • History of colonic polyps    • History of transfusion 1988    NO REACTION    • Hyperlipidemia    • Hypertension    • Irritable bowel syndrome     Constipation/diarrhea   • Legally blind    • Lung cancer (HCC)     • Macular degeneration    • Neuropathy    • Obesity 2/26/2020   • Osteoarthritis    • Oxygen dependent     5L   • Pneumonia    • Sleep apnea     NON COMPLIANT WITH CPAP USE   • UTI (urinary tract infection)    • Wears glasses         PAST SURGICAL HISTORY  Past Surgical History:   Procedure Laterality Date   • BACK SURGERY      X2 (LUMBAR)   • BREAST BIOPSY      20+ years ago   • CATARACT EXTRACTION Bilateral    • CHOLECYSTECTOMY     • COLONOSCOPY     • ENDOSCOPY N/A 11/6/2019    Procedure: ESOPHAGOGASTRODUODENOSCOPY;  Surgeon: Cortes Millan MD;  Location:  [facility];  Service: Gastroenterology   • ENDOSCOPY N/A 4/29/2021    Procedure: ESOPHAGOGASTRODUODENOSCOPY;  Surgeon: Cortes Millan MD;  Location:  [facility];  Service: Gastroenterology;  Laterality: N/A;  balloon dilation    • HAND SURGERY Right     laceration repair   • INCONTINENCE SURGERY      BLADDER   • LUNG REMOVAL, PARTIAL Left 2016   • NISSEN FUNDOPLICATION     • TOTAL ABDOMINAL HYSTERECTOMY  1988    benign cyst   • US GUIDED FINE NEEDLE ASPIRATION  8/27/2021        MEDICATIONS:    Current Outpatient Medications:   •  Accu-Chek Guide test strip, TEST 2 TIMES PER DAY, Disp: , Rfl:   •  albuterol sulfate  (90 Base) MCG/ACT inhaler, Inhale 2 puffs Every 6 (Six) Hours As Needed for Wheezing., Disp: 18 g, Rfl: 2  •  amitriptyline (ELAVIL) 10 MG tablet, Take 10 mg by mouth Daily., Disp: , Rfl:   •  chlorhexidine (PERIDEX) 0.12 % solution, Apply 15 mL to the mouth or throat 2 (Two) Times a Day As Needed. For 14 days, w/1 refill , Disp: , Rfl:   •  colestipol (Colestid) 1 g tablet, Take 2 tablets by mouth 2 (Two) Times a Day. Take separate 2 hours from other medications, Disp: 120 tablet, Rfl: 11  •  diphenoxylate-atropine (Lomotil) 2.5-0.025 MG per tablet, Take 1 tablet by mouth 4 (Four) Times a Day As Needed for Diarrhea., Disp: 30 tablet, Rfl: 1  •  docusate sodium (COLACE) 100 MG capsule, Take 1 capsule by  mouth 2 (Two) Times a Day As Needed for Constipation., Disp: 60 capsule, Rfl: 2  •  donepezil (ARICEPT) 10 MG tablet, Take 20 mg by mouth Daily., Disp: , Rfl:   •  DULoxetine (CYMBALTA) 60 MG capsule, TAKE 1 CAPSULE BY MOUTH DAILY (Patient taking differently: Take 60 mg by mouth Daily.), Disp: 90 capsule, Rfl: 3  •  ezetimibe (ZETIA) 10 MG tablet, Take 10 mg by mouth Daily., Disp: , Rfl:   •  fluticasone (Flonase) 50 MCG/ACT nasal spray, 2 sprays into the nostril(s) as directed by provider Daily., Disp: 18.2 mL, Rfl: 11  •  furosemide (LASIX) 40 MG tablet, Take 40 mg by mouth Daily., Disp: , Rfl:   •  furosemide (Lasix) 40 MG tablet, Extra 40mg Daily for three days, Disp: 3 tablet, Rfl: 0  •  glimepiride (AMARYL) 2 MG tablet, TAKE 1 TABLET BY MOUTH TWICE DAILY (Patient taking differently: Take 2 mg by mouth 2 (two) times a day.), Disp: 180 tablet, Rfl: 3  •  glucose blood test strip, Accu-Chek Guide test strips, Disp: , Rfl:   •  glycopyrrolate (ROBINUL) 1 MG tablet, , Disp: , Rfl:   •  ketoconazole (NIZORAL) 2 % cream, Apply 1 application topically to the appropriate area as directed Daily As Needed for Itching. BACK AND LEGS AS NEEDED FOR ITCHING, Disp: , Rfl:   •  ketoconazole (NIZORAL) 2 % shampoo, Apply 1 application topically to the appropriate area as directed As Needed for Irritation., Disp: , Rfl:   •  latanoprost (XALATAN) 0.005 % ophthalmic solution, Administer 1 drop to both eyes Daily., Disp: , Rfl:   •  levothyroxine (SYNTHROID, LEVOTHROID) 50 MCG tablet, Take 50 mcg by mouth Daily., Disp: , Rfl:   •  metOLazone (ZAROXOLYN) 2.5 MG tablet, Take 1 tablet by mouth Daily As Needed (for swelling or weight gain of 4 lbs). As directed, Disp: , Rfl:   •  metoprolol succinate XL (TOPROL-XL) 25 MG 24 hr tablet, Take 25 mg by mouth Daily., Disp: , Rfl:   •  O2 (OXYGEN), Inhale Continuous. 4-5 L/MIN DEPENDING ON ACTIVITY, Disp: , Rfl:   •  ondansetron ODT (ZOFRAN-ODT) 4 MG disintegrating tablet, Place 1 tablet on  the tongue Every 8 (Eight) Hours As Needed for Nausea or Vomiting., Disp: 30 tablet, Rfl: 0  •  pantoprazole (PROTONIX) 40 MG EC tablet, Take 1 tablet by mouth 2 (two) times a day., Disp: 180 tablet, Rfl: 3  •  POTASSIUM CHLORIDE ER PO, Take 30 mEq by mouth 2 (Two) Times a Day., Disp: , Rfl:   •  predniSONE (DELTASONE) 10 MG tablet, Take 1 tablet by mouth Daily., Disp: 10 tablet, Rfl: 0  •  Prucalopride Succinate 2 MG tablet, Take 1 tablet by mouth Daily., Disp: 30 tablet, Rfl: 2  •  rOPINIRole XL (REQUIP XL) 8 MG 24 hr tablet, Take 8 mg by mouth 2 (two) times a day., Disp: , Rfl:   •  Spacer/Aero-Holding Chambers (AeroChamber Plus Morris-Vu) misc, As Needed., Disp: , Rfl:   •  timolol (TIMOPTIC) 0.25 % ophthalmic solution, Administer 1 drop to the right eye Every Morning., Disp: , Rfl:   •  Tiotropium Bromide Monohydrate (Spiriva Respimat) 1.25 MCG/ACT aerosol solution inhaler, Inhale 2 puffs Daily., Disp: 4 g, Rfl: 11  •  tiZANidine (ZANAFLEX) 2 MG tablet, Take 1 tablet by mouth At Night As Needed for Muscle Spasms., Disp: 30 tablet, Rfl: 0  •  vitamin D (ERGOCALCIFEROL) 1.25 MG (04713 UT) capsule capsule, Take 1 capsule by mouth 1 (One) Time Per Week. Friday?, Disp: 5 capsule, Rfl: 0    ALLERGIES  Allergies   Allergen Reactions   • Statins Myalgia     myalgia   • Metoclopramide Other (See Comments)     EXACERBATED RLS   • Penicillins Rash     rash   • Tramadol Nausea And Vomiting and GI Intolerance     VERY MILD PER PT       FAMILY HISTORY:  Family History   Problem Relation Age of Onset   • Colon polyps Mother    • Emphysema Mother    • Hypertension Mother    • Colon polyps Father    • Dementia Father    • Heart disease Father    • Hyperlipidemia Father    • Macular degeneration Father    • Glaucoma Father    • Colon cancer Neg Hx    • Breast cancer Neg Hx    • Ovarian cancer Neg Hx        SOCIAL HISTORY  Social History     Socioeconomic History   • Marital status: Single   Tobacco Use   • Smoking status: Former  Smoker     Packs/day: 1.50     Years: 25.00     Pack years: 37.50     Types: Cigarettes     Quit date: 1992     Years since quittin.8   • Smokeless tobacco: Never Used   Vaping Use   • Vaping Use: Never used   Substance and Sexual Activity   • Alcohol use: Yes     Alcohol/week: 2.0 standard drinks     Types: 2 Standard drinks or equivalent per week     Comment: a glass of wine or bourbon a couple times a WEEK   • Drug use: No   • Sexual activity: Never     Socioeconomic History:  She is a retired microbiologist.  She was also an  at the [employer].  She has 2 sons and 5 grandchildren.  Rarely drinks alcoholic beverages.  Ex-smoker since        REVIEW OF SYSTEMS  Review of Systems   Constitutional: Negative for activity change, appetite change, chills, diaphoresis, fatigue, fever, unexpected weight gain and unexpected weight loss.   HENT: Positive for trouble swallowing. Negative for voice change.    Gastrointestinal: Positive for abdominal distention, anal bleeding, blood in stool, constipation, nausea, vomiting, GERD and indigestion. Negative for abdominal pain, diarrhea and rectal pain.     I reviewed the above-noted review of systems and the most pertinent/active issues are those noted in today's HPI    PHYSICAL EXAM   /68 (BP Location: Left arm, Patient Position: Sitting, Cuff Size: Adult)   Pulse 96   Wt 79.1 kg (174 lb 6.4 oz)   LMP  (LMP Unknown)   BMI 30.89 kg/m²   General: Pleasant female no acute distress wearing nasal cannula oxygen  HEENT: Anicteric sclera.  Wearing face shield  Neck: Without observed rigidity  CV: Regular rate and rhythm, S1, S2  Lungs: Decreased breath sounds  Abdomen:  Soft,non-distended without palpable masses or hepatosplenomeagaly, areas of rebound tenderness or guarding.   Extremeties: Trace edema without clubbing or cyanosis  Neurologic:  Alert and oriented x 3 without focal motor or sensory deficits  Rectal exam: deferred        ASSESSMENT  1.-Dysphagia.  It mostly seems to be to solids and perhaps repeating a upper endoscopy with a Savary dilation would be reasonable however some elements are also suggestive of a oropharyngeal problem and therefore we will start first with a video swallow with a speech pathologist for assessment of the oropharyngeal phase of swallowing and see if they can make some recommendations if there are findings on the study  2.-Resolved chronic diarrhea doing well on Colestid  3.-Gastroparesis  4.-Globus syndrome  5.-COPD on supplemental oxygen  6.-Excessive drooling.  This has failed to respond to multiple anticholinergics (Robinul etc.).  Perhaps a manifestation of incomplete control of reflux.  We will increase Protonix to 40 mg p.o. twice daily    PLAN  1.-Video swallow with speech pathologist  2.-Consider EGD with savory dilation pending findings of video swallow  3.-Continue pantoprazole.  Increase to 40 mg p.o. twice daily  4.-Patient will call us after the video swallow to review his results and determine whether we need to pursue an EGD.    Cortes Millan MD  11/3/2021   14:44 EDT

## 2021-11-05 PROBLEM — K11.7 DROOLING: Status: ACTIVE | Noted: 2021-11-05

## 2021-11-09 DIAGNOSIS — Z01.812 ENCOUNTER FOR PREPROCEDURE SCREENING LABORATORY TESTING FOR COVID-19: Primary | ICD-10-CM

## 2021-11-09 DIAGNOSIS — Z20.822 ENCOUNTER FOR PREPROCEDURE SCREENING LABORATORY TESTING FOR COVID-19: Primary | ICD-10-CM

## 2021-11-17 ENCOUNTER — OFFICE VISIT (OUTPATIENT)
Dept: PULMONOLOGY | Facility: CLINIC | Age: 76
End: 2021-11-17

## 2021-11-17 VITALS
BODY MASS INDEX: 30.69 KG/M2 | HEART RATE: 101 BPM | DIASTOLIC BLOOD PRESSURE: 70 MMHG | SYSTOLIC BLOOD PRESSURE: 144 MMHG | HEIGHT: 63 IN | WEIGHT: 173.2 LBS | TEMPERATURE: 98.2 F | OXYGEN SATURATION: 90 %

## 2021-11-17 DIAGNOSIS — J96.11 CHRONIC RESPIRATORY FAILURE WITH HYPOXIA (HCC): ICD-10-CM

## 2021-11-17 DIAGNOSIS — Z23 IMMUNIZATION DUE: ICD-10-CM

## 2021-11-17 DIAGNOSIS — Z22.39 MYCOBACTERIUM AVIUM COMPLEX COLONIZATION: ICD-10-CM

## 2021-11-17 DIAGNOSIS — J44.9 COPD MIXED TYPE (HCC): Primary | ICD-10-CM

## 2021-11-17 DIAGNOSIS — J47.9 IDIOPATHIC BRONCHIECTASIS (HCC): ICD-10-CM

## 2021-11-17 PROCEDURE — 99214 OFFICE O/P EST MOD 30 MIN: CPT | Performed by: NURSE PRACTITIONER

## 2021-11-17 PROCEDURE — 90662 IIV NO PRSV INCREASED AG IM: CPT | Performed by: NURSE PRACTITIONER

## 2021-11-17 PROCEDURE — G0008 ADMIN INFLUENZA VIRUS VAC: HCPCS | Performed by: NURSE PRACTITIONER

## 2021-11-17 RX ORDER — AZELASTINE 1 MG/ML
2 SPRAY, METERED NASAL 2 TIMES DAILY
Qty: 30 ML | Refills: 5 | Status: SHIPPED | OUTPATIENT
Start: 2021-11-17

## 2021-11-17 NOTE — PROGRESS NOTES
"[facility] Pulmonary Follow up      Chief Complaint  Shortness of Breath    Subjective          Lorrie Pagan presents to [facility] for worsening shortness of breath last 4 to 6 weeks.  She is even been using her scooter to get around especially to go down for meals secondary to dyspnea with activity.  She is on her 5 L of oxygen continuous at home.  She has not noticed any desaturations just worsening shortness of breath with activity.    She has some cough but no significant sputum production.  She usually does have some issues in the fall due to seasonal allergies.    She also has some heart failure issues and has had some worsening edema recently.  She does continue to take her Lasix, and has as needed Lasix for any worsening fluid.    During her last visit we did try NIV at night for chronic respiratory failure.  Unfortunately she was unable to tolerate that due to significant amounts of drooling.  She has had issues with significant drooling and follows up with speech therapy.    She had been on Spiriva in the past but was only taking it as needed and has not been on it for some time now.  She does have her albuterol rescue inhaler she uses occasionally.      Objective     Vital Signs:   /70   Pulse 101   Temp 98.2 °F (36.8 °C)   Ht 160 cm (63\")   Wt 78.6 kg (173 lb 3.2 oz)   SpO2 90% Comment: resting with 5 L of oxygen  BMI 30.68 kg/m²       Immunization History   Administered Date(s) Administered   • COVID-19 (MODERNA) 1st, 2nd, 3rd Dose Only 02/13/2021, 03/13/2021, 09/17/2021   • Flu Vaccine Quad PF >36MO 10/31/2019, 10/25/2020   • Flu Vaccine Split Quad 10/31/2019   • Fluzone High Dose =>65 Years (Vaxcare ONLY) 10/28/2018, 10/21/2019, 10/16/2020   • Fluzone High-Dose 65+yrs 11/17/2021   • Influenza TIV (IM) 11/01/2017   • Pneumococcal Conjugate 13-Valent (PCV13) 08/03/2018, 10/28/2018   • Pneumococcal Polysaccharide (PPSV23) 10/16/2020 "   • Pneumococcal, Unspecified 01/01/2016   • Shingrix 03/31/2021       Physical Exam  Vitals reviewed.   Constitutional:       General: She is not in acute distress.     Appearance: She is well-developed. She is not ill-appearing.   HENT:      Head: Normocephalic and atraumatic.   Eyes:      Pupils: Pupils are equal, round, and reactive to light.   Cardiovascular:      Rate and Rhythm: Normal rate and regular rhythm.      Heart sounds: No murmur heard.      Pulmonary:      Effort: Pulmonary effort is normal. No respiratory distress.      Breath sounds: Wheezing present. No rales.   Abdominal:      General: Bowel sounds are normal. There is no distension.      Palpations: Abdomen is soft.   Musculoskeletal:         General: Normal range of motion.      Cervical back: Normal range of motion and neck supple.   Skin:     General: Skin is warm and dry.      Findings: No erythema.   Neurological:      Mental Status: She is alert and oriented to person, place, and time.   Psychiatric:         Behavior: Behavior normal.          Result Review :       Data reviewed: Radiologic studies X-ray from October 1 reviewed                    Assessment and Plan    Problem List Items Addressed This Visit        Pulmonary and Pneumonias    Bronchiectasis (CMS/HCC)    Relevant Medications    azelastine (ASTELIN) 0.1 % nasal spray    Fluticasone-Umeclidin-Vilant (TRELEGY) 200-62.5-25 MCG/INH inhaler    Other Relevant Orders    Fluzone High-Dose 65+yrs (5663-0066) (Completed)    Chronic respiratory failure    Relevant Orders    Fluzone High-Dose 65+yrs (8800-3508) (Completed)    Stage II, moderate, COPD - Primary    Relevant Medications    azelastine (ASTELIN) 0.1 % nasal spray    Fluticasone-Umeclidin-Vilant (TRELEGY) 200-62.5-25 MCG/INH inhaler    Other Relevant Orders    Fluzone High-Dose 65+yrs (7090-0468) (Completed)       Other    Mycobacterium avium complex colonization    Relevant Orders    Fluzone High-Dose 65+yrs (0512-2237)  (Completed)      Other Visit Diagnoses     Immunization due        Relevant Orders    Fluzone High-Dose 65+yrs (9740-7646) (Completed)        With her worsening shortness of breath with activity and being off all of her inhaled medications with her COPD I would like to go ahead and restart her on Trelegy to see if we can get her out of this several weeks of worsening shortness of breath.    She is having a bit of a cough but mostly related to some postnasal drainage.  She is currently on Flonase some can add on Astelin nasal spray for an antihistamine.    I did encourage her to continue to get exercise daily to help with her deconditioning.    Continue on her oxygen at night and with activity.      Follow Up     Return if symptoms worsen or fail to improve.  Patient was given instructions and counseling regarding her condition or for health maintenance advice. Please see specific information pulled into the AVS if appropriate.     I spent 35 minutes caring for Lorrie on this date of service. This time includes time spent by me in the following activities:preparing for the visit, reviewing tests, obtaining and/or reviewing a separately obtained history, performing a medically appropriate examination and/or evaluation , counseling and educating the patient/family/caregiver, ordering medications, tests, or procedures, referring and communicating with other health care professionals  and documenting information in the medical record    Moderate level of Medical Decision Making complexity based on 2 or more chronic stable illnesses and prescription drug management.    DONALD Sylvester, ACNP  [facility]  Electronically signed

## 2021-11-19 ENCOUNTER — APPOINTMENT (OUTPATIENT)
Dept: PREADMISSION TESTING | Facility: HOSPITAL | Age: 76
End: 2021-11-19

## 2021-11-19 DIAGNOSIS — Z20.822 ENCOUNTER FOR PREPROCEDURE SCREENING LABORATORY TESTING FOR COVID-19: ICD-10-CM

## 2021-11-19 DIAGNOSIS — Z01.812 ENCOUNTER FOR PREPROCEDURE SCREENING LABORATORY TESTING FOR COVID-19: ICD-10-CM

## 2021-11-19 LAB — SARS-COV-2 RNA PNL SPEC NAA+PROBE: NOT DETECTED

## 2021-11-19 PROCEDURE — U0005 INFEC AGEN DETEC AMPLI PROBE: HCPCS | Performed by: INTERNAL MEDICINE

## 2021-11-19 PROCEDURE — U0004 COV-19 TEST NON-CDC HGH THRU: HCPCS | Performed by: INTERNAL MEDICINE

## 2021-11-22 ENCOUNTER — HOSPITAL ENCOUNTER (OUTPATIENT)
Dept: GENERAL RADIOLOGY | Facility: HOSPITAL | Age: 76
Discharge: HOME | End: 2021-11-22
Admitting: INTERNAL MEDICINE

## 2021-11-22 DIAGNOSIS — R13.13 PHARYNGEAL DYSPHAGIA: Primary | ICD-10-CM

## 2021-11-22 DIAGNOSIS — R13.19 ESOPHAGEAL DYSPHAGIA: ICD-10-CM

## 2021-11-22 PROCEDURE — 63710000001 BARIUM SULFATE 40 % PASTE: Performed by: INTERNAL MEDICINE

## 2021-11-22 PROCEDURE — 92611 MOTION FLUOROSCOPY/SWALLOW: CPT

## 2021-11-22 PROCEDURE — 63710000001 BARIUM SULFATE 40 % RECONSTITUTED SUSPENSION: Performed by: INTERNAL MEDICINE

## 2021-11-22 PROCEDURE — A9270 NON-COVERED ITEM OR SERVICE: HCPCS | Performed by: INTERNAL MEDICINE

## 2021-11-22 PROCEDURE — 74230 X-RAY XM SWLNG FUNCJ C+: CPT

## 2021-11-22 RX ADMIN — BARIUM SULFATE 100 ML: 0.81 POWDER, FOR SUSPENSION ORAL at 11:21

## 2021-11-22 RX ADMIN — BARIUM SULFATE 20 ML: 400 PASTE ORAL at 11:21

## 2021-11-22 NOTE — MBS/VFSS/FEES
Outpatient Speech Language Pathology   Adult Swallow Initial Evaluation   [facility]   Modified Barium Swallow Study (MBS)     Patient Name: Lorrie Pagan  : 1945  MRN: [id number]  Today's Date: 2021         Visit Date: 2021   Patient Active Problem List   Diagnosis   • Bronchiectasis (CMS/HCC)   • H/O: lung cancer (Prior resection )   • Chronic respiratory failure   • RENETTA (Previous CPAP)   • Hiatal hernia (Prior Nissen )   • Mycobacterium avium complex colonization   • Irritable bowel syndrome with diarrhea   • Peripheral edema   • Aortic valve regurgitation   • Benign hypertension   • Impairment of balance   • Peripheral neuropathy   • Peripheral venous insufficiency   • Chronic diastolic heart failure (HCC)   • Hypokalemia   • Hypomagnesemia   • Nocturnal hypoxemia   • Stage II, moderate, COPD   • Memory loss   • Restless leg syndrome   • Controlled type 2 diabetes mellitus without complication, without long-term current use of insulin (HCC)   • Vitamin D deficiency   • Mixed hyperlipidemia   • Allergic rhinitis   • Normocytic anemia   • Gastroesophageal reflux disease with esophagitis without hemorrhage   • Acquired hypothyroidism   • Esophageal dysmotility   • Dysphagia   • Gastroparesis   • Degenerative disc disease, cervical   • Degenerative disc disease, lumbar   • Globus sensation   • Sialorrhea   • Drooling        Past Medical History:   Diagnosis Date   • Back pain    • Bronchiectasis (HCC)    • Bronchitis 2018   • Cancer (HCC)     History of lung cancer and skin cancer    • Cardiac murmur    • Chronic cough    • Chronic obstructive pulmonary disease (HCC)    • Colon polyp    • DDD (degenerative disc disease), lumbar    • Depression    • Diabetes mellitus (HCC)     DX: 2019, CHECK BS QOD, LAST A1C 6.3??   • Disease of thyroid gland    • Esophageal dilatation 2019    Added automatically from request for surgery [id number]   • Esophageal dysphagia 10/24/2019     Added automatically from request for surgery [id number]   • Former smoker (None since 1992) 8/29/2019   • GERD (gastroesophageal reflux disease)    • Glaucoma    • Globus sensation 1/27/2020   • History of colonic polyps    • History of transfusion 1988    NO REACTION    • Hyperlipidemia    • Hypertension    • Irritable bowel syndrome     Constipation/diarrhea   • Legally blind    • Lung cancer (HCC)    • Macular degeneration    • Neuropathy    • Obesity 2/26/2020   • Osteoarthritis    • Oxygen dependent     5L   • Pneumonia    • Sleep apnea     NON COMPLIANT WITH CPAP USE   • UTI (urinary tract infection)    • Wears glasses         Past Surgical History:   Procedure Laterality Date   • BACK SURGERY      X2 (LUMBAR)   • BREAST BIOPSY      20+ years ago   • CATARACT EXTRACTION Bilateral    • CHOLECYSTECTOMY     • COLONOSCOPY     • ENDOSCOPY N/A 11/6/2019    Procedure: ESOPHAGOGASTRODUODENOSCOPY;  Surgeon: Cortes Millan MD;  Location:  [facility];  Service: Gastroenterology   • ENDOSCOPY N/A 4/29/2021    Procedure: ESOPHAGOGASTRODUODENOSCOPY;  Surgeon: Cortes Millan MD;  Location:  [facility];  Service: Gastroenterology;  Laterality: N/A;  balloon dilation    • HAND SURGERY Right     laceration repair   • INCONTINENCE SURGERY      BLADDER   • LUNG REMOVAL, PARTIAL Left 2016   • NISSEN FUNDOPLICATION     • TOTAL ABDOMINAL HYSTERECTOMY  1988    benign cyst   • US GUIDED FINE NEEDLE ASPIRATION  8/27/2021         Visit Dx:     ICD-10-CM ICD-9-CM   1. Pharyngeal dysphagia  R13.13 787.23   2. Esophageal dysphagia  R13.19 787.29                SLP Adult Swallow Evaluation     Row Name 11/22/21 1100       Rehab Evaluation    Document Type evaluation  -RD    Subjective Information no complaints  -RD    Patient Observations alert; cooperative; agree to therapy  -RD    Patient/Family/Caregiver Comments/Observations no family present  -RD    Care Plan Review evaluation/treatment results  reviewed; care plan/treatment goals reviewed; risks/benefits reviewed; current/potential barriers reviewed; patient/other agree to care plan  -RD    Patient Effort excellent  -RD       General Information    Patient Profile Reviewed yes  -RD    Pertinent History Of Current Problem PMH significant for bronchiectasis, respiratory failure, COPD stage 2 on home O2- 4-5L per pt, Worse SOB 4-6 weeks, drooling. Pt c/o difficulty swallowing solids & liquids. GI following- Millan w/ possibly dilatation pending MBS results.  -RD    Current Method of Nutrition regular textures; thin liquids  -RD    Precautions/Limitations, Vision WFL with corrective lenses; for purposes of eval  -RD    Precautions/Limitations, Hearing WFL; for purposes of eval  -RD    Prior Level of Function-Communication WFL  -RD    Prior Level of Function-Swallowing no diet consistency restrictions; esophageal concerns  -RD    Plans/Goals Discussed with patient; agreed upon  -RD    Barriers to Rehab none identified  -RD    Patient's Goals for Discharge eat/drink without coughing/choking  -RD       Pain    Additional Documentation Pain Scale: Numbers Pre/Post-Treatment (Group)  -RD       Pain Scale: Numbers Pre/Post-Treatment    Pretreatment Pain Rating 0/10 - no pain  -RD    Posttreatment Pain Rating 0/10 - no pain  -RD       MBS/VFSS    Utensils Used spoon; cup; straw  -RD    Consistencies Trialed thin liquids; pudding thick; regular textures  -RD       MBS/VFSS Interpretation    Oral Phase, Comment Functional oral phase of the swallow. Adequate mastication & oral transit of regular solids. No significant oral residue or weakness identified.  -RD       Initiation of Pharyngeal Swallow    Initiation of Pharyngeal Swallow bolus in pyriform sinuses  -RD    Rosenbek's Scale thin:; 7--->level 7; pudding/puree:; regular textures:; 1--->level 1  -RD    Attempted Compensatory Maneuvers bolus size; bolus presentation style; additional subsequent swallow;  multiple swallows  -RD    Response to Attempted Compensatory Maneuvers prevented aspiration; reduced residue; other (see comments)  single sips of thins, multiple swallows per bolus  -RD    Pharyngeal Phase, Comment Mild pharyngeal dysphagia. Penetration before/during the swallow w/ thin liquids 2' delayed initiation & reduced vestibular closure. Pt appeared to clear upon completion of the swallow. Aspiration after the swallow w/ consecutive drinks of thin liquids 2' residue in the pyriform sinuses. Pt sensed aspiration w/ coughing, but this did not clear subglottic material. No aspiration w/ small single tsp/cup/straw sips or w/ pudding or solids. Mild base of tongue (BOT)/vallecular & pyriform sinuses 2' reduced BOT retraction and reduced hyolaryngeal excursion. Pt able to reduce residue & decrease aspiration risk w/ multiple swallows.  -RD       Esophageal Phase    Esophageal Phase other (see comments)  -RD    Esophageal Phase, Comment Prominent cricopharyngeus noted, although this did not appear to impact swallowing function at this time. Pt has a hx of esophageal dysphagia & follows w/ GI. Pt would benefit from reflux precautions.  -RD       Clinical Impression    SLP Swallowing Diagnosis mild; pharyngeal dysphagia  -RD    Functional Impact risk of aspiration/pneumonia  -RD    Rehab Potential/Prognosis, Swallowing good, to achieve stated therapy goals  -RD    Swallow Criteria for Skilled Therapeutic Interventions Met demonstrates skilled criteria  -RD       Recommendations    Therapy Frequency (Swallow) evaluation only  -RD    SLP Diet Recommendation regular textures; thin liquids  -RD    Recommended Precautions and Strategies upright posture during/after eating; small bites of food and sips of liquid; multiple swallows per bite of food; multiple swallows per sip of liquid; general aspiration precautions; reflux precautions; other (see comments)  small single sips of liquids  -RD    Oral Care Recommendations  Oral Care BID/PRN  -RD    SLP Rec. for Method of Medication Administration meds whole; meds crushed; with pudding or applesauce; as tolerated  -RD    Monitor for Signs of Aspiration yes; notify SLP if any concerns  -RD    Anticipated Discharge Disposition (SLP) home with OP services; anticipate therapy at next level of care; other (see comments)  pt would benefit from OP SLP dysphagia treatment (physician to order if in agreement)  -RD          User Key  (r) = Recorded By, (t) = Taken By, (c) = Cosigned By    Initials Name Provider Type    Fatmata Amaro MS CCC-SLP Speech and Language Pathologist                               OP SLP Education     Row Name 11/22/21 1322       Education    Barriers to Learning No barriers identified  -RD    Assessed Learning needs; Learning motivation; Learning preferences; Learning readiness  -SHALINI    Learning Motivation Strong  -RD    Learning Method Explanation; Teach back; Written materials  -RD    Teaching Response Verbalized understanding  -RD          User Key  (r) = Recorded By, (t) = Taken By, (c) = Cosigned By    Initials Name Effective Dates    Fatmata Amaro MS CCC-SLP 06/16/21 -                          Time Calculation:   SLP Start Time: 1100  Untimed Charges  SLP Eval/Re-eval : ST Motion Fluoro Eval Swallow - 08342  80239-LT Motion Fluoro Eval Swallow Minutes: 54  Total Minutes  Untimed Charges Total Minutes: 54   Total Minutes: 54    Therapy Charges for Today     Code Description Service Date Service Provider Modifiers Qty    [id number]  ST MOTION FLUORO EVAL SWALLOW 4 11/22/2021 Fatmata Huddleston MS CCC-SLP GN 1             Patient was not wearing a face mask and did exhibit coughing during this therapy encounter.  Procedure performed was aerosolizing, involved close contact (within 6 feet for at least 15 minutes or longer), and did not involve contact with infectious secretions or specimens.  Therapist used appropriate personal protective equipment  including gloves, standard procedure mask and eye protection.  Appropriate PPE was worn during the entire therapy session.  Hand hygiene was completed before and after therapy session.       Fatmata Huddleston MS CCC-SLP  11/22/2021

## 2021-11-29 ENCOUNTER — TELEPHONE (OUTPATIENT)
Dept: INTERNAL MEDICINE | Facility: CLINIC | Age: 76
End: 2021-11-29

## 2021-11-29 NOTE — TELEPHONE ENCOUNTER
Caller: Lorrie Pagan    Relationship: Self    Best call back number: [phone]    What orders are you requesting (i.e. lab or imaging): LABS: CHILD CHECK    In what timeframe would the patient need to come in: ASAP    Where will you receive your lab/imaging services: IN OFFICE     Additional notes: CLOSE FRIEND WAS DIAGNOSED WITH CHILD AND PATIENT NEEDS TO CHECK TO SEE IF SHE HAS IT AS WELL. PLEASE CALL ONCE SHE HAS AN ACTIVE LAB ORDER

## 2021-11-30 NOTE — TELEPHONE ENCOUNTER
PATIENT CALL BACK.    THIS ORDER IS NO LONGER NEEDED, SHE STATED SHE WAS NOT EXPOSED.    ANY QUESTIONS OR CONCERNS PLEASE CALL PATIENT [phone]

## 2021-12-03 ENCOUNTER — TELEPHONE (OUTPATIENT)
Dept: GASTROENTEROLOGY | Facility: CLINIC | Age: 76
End: 2021-12-03

## 2021-12-03 DIAGNOSIS — Z20.822 ENCOUNTER FOR PREPROCEDURE SCREENING LABORATORY TESTING FOR COVID-19: Primary | ICD-10-CM

## 2021-12-03 DIAGNOSIS — Z01.812 ENCOUNTER FOR PREPROCEDURE SCREENING LABORATORY TESTING FOR COVID-19: Primary | ICD-10-CM

## 2021-12-03 NOTE — TELEPHONE ENCOUNTER
CALLED AND LVM, RETURNING PATIENT'S VCM.     SHE WANTED TO KNOW IF SHE COULD GET HER OWN COVID TEST- I INFORMED HER THAT WE ALREADY HAVE HER SCHEDULED IN [place], [address], AND IF THAT DOES NOT WORK FOR HER- SHE MAY GET HER OWN COVID TEST BETWEEN 48- 72 HOURS PRIOR.   BEFORE 12:00 PM ON DEC 14TH, OR AFTER 12:00 PM ON DEC 13TH.

## 2021-12-09 ENCOUNTER — OFFICE VISIT (OUTPATIENT)
Dept: PULMONOLOGY | Facility: CLINIC | Age: 76
End: 2021-12-09

## 2021-12-09 VITALS
HEIGHT: 63 IN | OXYGEN SATURATION: 99 % | WEIGHT: 173 LBS | DIASTOLIC BLOOD PRESSURE: 60 MMHG | TEMPERATURE: 98 F | SYSTOLIC BLOOD PRESSURE: 126 MMHG | BODY MASS INDEX: 30.65 KG/M2 | HEART RATE: 90 BPM

## 2021-12-09 DIAGNOSIS — J47.9 IDIOPATHIC BRONCHIECTASIS (HCC): ICD-10-CM

## 2021-12-09 DIAGNOSIS — J44.9 COPD MIXED TYPE (HCC): Primary | ICD-10-CM

## 2021-12-09 DIAGNOSIS — J96.11 CHRONIC RESPIRATORY FAILURE WITH HYPOXIA (HCC): ICD-10-CM

## 2021-12-09 PROCEDURE — 99214 OFFICE O/P EST MOD 30 MIN: CPT | Performed by: NURSE PRACTITIONER

## 2021-12-09 PROCEDURE — 94618 PULMONARY STRESS TESTING: CPT | Performed by: NURSE PRACTITIONER

## 2021-12-09 NOTE — PROGRESS NOTES
"[facility] Follow up      Chief Complaint  COPD and Follow-up    Subjective          Lorrie Pagan presents to [facility] for oxygen renewal.  She does have chronic respiratory failure with her stage II COPD and bronchiectasis.  She is on around 4 L continuous at home and around 5 L pulse dose with activity.    Her current DME is having issues with her supplies as well as customer service.  Since she is up for renewal she would like to change companies.    She is on her Trelegy for her COPD.  She has been using that for the about a month now and is doing much better with her cough and shortness of breath.  She still has some postnasal drainage and cough but overall feels like she is improving.      Objective     Vital Signs:   /60   Pulse 90   Temp 98 °F (36.7 °C)   Ht 160 cm (63\")   Wt 78.5 kg (173 lb)   SpO2 99% Comment: resting, 3 liters continuous  BMI 30.65 kg/m²       Immunization History   Administered Date(s) Administered   • COVID-19 (MODERNA) 1st, 2nd, 3rd Dose Only 02/13/2021, 03/13/2021, 09/17/2021   • Flu Vaccine Quad PF >36MO 10/31/2019, 10/25/2020   • Flu Vaccine Split Quad 10/31/2019   • Fluzone High Dose =>65 Years (Vaxcare ONLY) 10/28/2018, 10/21/2019, 10/16/2020   • Fluzone High-Dose 65+yrs 11/17/2021   • Influenza TIV (IM) 11/01/2017   • Pneumococcal Conjugate 13-Valent (PCV13) 08/03/2018, 10/28/2018   • Pneumococcal Polysaccharide (PPSV23) 10/16/2020   • Pneumococcal, Unspecified 01/01/2016   • Shingrix 03/31/2021       Physical Exam  Vitals reviewed.   Constitutional:       Appearance: She is well-developed.   HENT:      Head: Normocephalic and atraumatic.   Eyes:      Pupils: Pupils are equal, round, and reactive to light.   Cardiovascular:      Rate and Rhythm: Normal rate and regular rhythm.      Heart sounds: No murmur heard.      Pulmonary:      Effort: Pulmonary effort is normal. No respiratory distress.      " Breath sounds: Normal breath sounds. No wheezing or rales.   Abdominal:      General: Bowel sounds are normal. There is no distension.      Palpations: Abdomen is soft.   Musculoskeletal:         General: Normal range of motion.      Cervical back: Normal range of motion and neck supple.   Skin:     General: Skin is warm and dry.      Findings: No erythema.   Neurological:      Mental Status: She is alert and oriented to person, place, and time.   Psychiatric:         Behavior: Behavior normal.          Result Review :            6-minute walk test done in the office today:               Assessment and Plan    Problem List Items Addressed This Visit        Pulmonary and Pneumonias    Bronchiectasis (CMS/HCC)    Chronic respiratory failure    Stage II, moderate, COPD - Primary    Relevant Orders    Walking Oximetry (Completed)          We will go ahead and get her set up with Matt.  She does travel quite a bit to see family so that may be a good option for her.    She will need a 4 L continuous home concentrator as well as a 5 L pulse dose with activity.  Currently she does have tanks that do pulse dose conserving device as well as continuous flow as needed.    She will have her routine follow-up here in the office    Follow Up     No follow-ups on file.  Patient was given instructions and counseling regarding her condition or for health maintenance advice. Please see specific information pulled into the AVS if appropriate.     I spent 35 minutes caring for Lorrie on this date of service. This time includes time spent by me in the following activities:preparing for the visit, reviewing tests, obtaining and/or reviewing a separately obtained history, performing a medically appropriate examination and/or evaluation , counseling and educating the patient/family/caregiver, ordering medications, tests, or procedures, referring and communicating with other health care professionals , documenting information in the  medical record and independently interpreting results and communicating that information with the patient/family/caregiver    Moderate level of Medical Decision Making complexity based on 2 or more chronic stable illnesses and prescription drug management.    Saray Retana APRN, ACNP  [facility]  Electronically signed

## 2021-12-13 DIAGNOSIS — M50.30 DEGENERATIVE DISC DISEASE, CERVICAL: ICD-10-CM

## 2021-12-13 DIAGNOSIS — M51.36 DEGENERATIVE DISC DISEASE, LUMBAR: ICD-10-CM

## 2021-12-13 RX ORDER — TIZANIDINE 2 MG/1
2 TABLET ORAL NIGHTLY PRN
Qty: 30 TABLET | Refills: 0 | Status: SHIPPED | OUTPATIENT
Start: 2021-12-13 | End: 2022-07-20 | Stop reason: SINTOL

## 2021-12-14 ENCOUNTER — APPOINTMENT (OUTPATIENT)
Dept: PREADMISSION TESTING | Facility: HOSPITAL | Age: 76
End: 2021-12-14

## 2021-12-14 DIAGNOSIS — Z01.812 ENCOUNTER FOR PREPROCEDURE SCREENING LABORATORY TESTING FOR COVID-19: ICD-10-CM

## 2021-12-14 DIAGNOSIS — Z20.822 ENCOUNTER FOR PREPROCEDURE SCREENING LABORATORY TESTING FOR COVID-19: ICD-10-CM

## 2021-12-14 LAB — SARS-COV-2 RNA PNL SPEC NAA+PROBE: NOT DETECTED

## 2021-12-14 PROCEDURE — C9803 HOPD COVID-19 SPEC COLLECT: HCPCS

## 2021-12-14 PROCEDURE — U0004 COV-19 TEST NON-CDC HGH THRU: HCPCS

## 2021-12-14 PROCEDURE — U0005 INFEC AGEN DETEC AMPLI PROBE: HCPCS

## 2022-01-04 ENCOUNTER — TELEPHONE (OUTPATIENT)
Dept: INTERNAL MEDICINE | Facility: CLINIC | Age: 77
End: 2022-01-04

## 2022-01-04 NOTE — TELEPHONE ENCOUNTER
Caller: [facility]ERT DIC[facility] CONSULTING Vilma WALDRON    Relationship to patient:     Best call back number: [phone]    What is the call regarding:  CHIVO STATES THAT A LAB ORDER FORM WAS SENT FOR GENETIC TESTING AND WANTED TO KNOW IF IT WAS RECEIVED.  PLEASE FAX BACK [phone]

## 2022-01-18 ENCOUNTER — OFFICE VISIT (OUTPATIENT)
Dept: PULMONOLOGY | Facility: CLINIC | Age: 77
End: 2022-01-18

## 2022-01-18 VITALS
TEMPERATURE: 97 F | WEIGHT: 178.38 LBS | RESPIRATION RATE: 18 BRPM | DIASTOLIC BLOOD PRESSURE: 70 MMHG | HEIGHT: 63 IN | OXYGEN SATURATION: 94 % | HEART RATE: 97 BPM | BODY MASS INDEX: 31.61 KG/M2 | SYSTOLIC BLOOD PRESSURE: 142 MMHG

## 2022-01-18 DIAGNOSIS — Z85.118 H/O: LUNG CANCER: ICD-10-CM

## 2022-01-18 DIAGNOSIS — J47.9 IDIOPATHIC BRONCHIECTASIS: Primary | ICD-10-CM

## 2022-01-18 DIAGNOSIS — J96.11 CHRONIC RESPIRATORY FAILURE WITH HYPOXIA: ICD-10-CM

## 2022-01-18 DIAGNOSIS — J44.9 COPD MIXED TYPE: ICD-10-CM

## 2022-01-18 PROCEDURE — 99214 OFFICE O/P EST MOD 30 MIN: CPT | Performed by: NURSE PRACTITIONER

## 2022-01-18 RX ORDER — ALBUTEROL SULFATE 1.25 MG/3ML
1 SOLUTION RESPIRATORY (INHALATION) EVERY 6 HOURS PRN
Qty: 90 EACH | Refills: 5 | Status: SHIPPED | OUTPATIENT
Start: 2022-01-18

## 2022-01-18 NOTE — PROGRESS NOTES
"[facility], [place], operated by [facility] Follow up      Chief Complaint  Shortness of Breath (f/u)    Subjective          Lorrie Pagan presents to [facility] for worsening shortness of breath.  I have seen her here in the office and she is also been to the ER several times since September.    She is complaining of worsening shortness of breath specially with activity.  She is on 4 to 5 L continuous at home and has been monitor her oxygen saturations at 96%.  She denies any cough or congestion.    She has been off her Trelegy for 2 or 3 weeks now.  We did discuss that she has done this several times with worsening shortness of breath, stopping her chronic inhaled medications.    She still has significant reflux daily.  She cannot lie back.  She has been sleeping in a recliner.    She is also been having increased fluid, with worsening edema as well as abdominal distention.  She has been taking 2 Lasix the last couple of days.      Objective     Vital Signs:   /70 (BP Location: Left arm, Patient Position: Sitting, Cuff Size: Adult)   Pulse 97   Temp 97 °F (36.1 °C)   Resp 18   Ht 160 cm (63\")   Wt 80.9 kg (178 lb 6 oz)   SpO2 94%   PF (!) 5 L/min Comment: Pulse at resting  BMI 31.60 kg/m²       Immunization History   Administered Date(s) Administered   • COVID-19 (MODERNA) 1st, 2nd, 3rd Dose Only 02/13/2021, 03/13/2021, 09/17/2021   • Flu Vaccine Quad PF >36MO 10/31/2019, 10/25/2020   • Flu Vaccine Split Quad 10/31/2019   • Fluzone High Dose =>65 Years ([employer] ONLY) 10/28/2018, 10/21/2019, 10/16/2020   • Fluzone High-Dose 65+yrs 11/17/2021   • Influenza TIV (IM) 11/01/2017   • Pneumococcal Conjugate 13-Valent (PCV13) 08/03/2018, 10/28/2018   • Pneumococcal Polysaccharide (PPSV23) 10/16/2020   • Pneumococcal, Unspecified 01/01/2016   • Shingrix 03/31/2021       Physical Exam  Vitals reviewed.   Constitutional:       Appearance: She is well-developed.   HENT:      " Head: Normocephalic and atraumatic.   Eyes:      Pupils: Pupils are equal, round, and reactive to light.   Cardiovascular:      Rate and Rhythm: Normal rate and regular rhythm.      Heart sounds: No murmur heard.      Pulmonary:      Effort: Pulmonary effort is normal. No respiratory distress.      Breath sounds: Normal breath sounds. No wheezing or rales.      Comments: Decreased but clear    Abdominal:      General: Bowel sounds are normal. There is no distension.      Palpations: Abdomen is soft.   Musculoskeletal:         General: Normal range of motion.      Cervical back: Normal range of motion and neck supple.      Right lower leg: Edema present.      Left lower leg: Edema present.   Skin:     General: Skin is warm and dry.      Findings: No erythema.   Neurological:      Mental Status: She is alert and oriented to person, place, and time.   Psychiatric:         Behavior: Behavior normal.          Result Review :                         Assessment and Plan    Problem List Items Addressed This Visit        Hematology and Neoplasia    H/O: lung cancer (Prior resection 2016)    Relevant Orders    CT Chest Without Contrast       Pulmonary and Pneumonias    Bronchiectasis (CMS/HCC) - Primary    Relevant Medications    Fluticasone-Umeclidin-Vilant (TRELEGY) 200-62.5-25 MCG/INH inhaler    albuterol (ACCUNEB) 1.25 MG/3ML nebulizer solution    Chronic respiratory failure    Stage II, moderate, COPD    Relevant Medications    Fluticasone-Umeclidin-Vilant (TRELEGY) 200-62.5-25 MCG/INH inhaler    albuterol (ACCUNEB) 1.25 MG/3ML nebulizer solution          We discussed the importance of continuing on her chronic maintenance therapy today in the office.  I will renew her Trelegy.  Continue on her albuterol HFA or nebulizers as needed, especially with shortness of breath with activity.    We did discuss that her shortness of breath is likely multifactorial including deconditioning, her underlying lung disease, underlying  heart disease.  She did try a NIV in the past without success, she was unable to tolerate it.    Follow up with Soniya at [facility] [phone] for pulmonary rehab at her assisted living facility to help with her overall mobility and activity tolerance.     She will be do her follow-up CT scan for history of lung cancer in July.    Follow-up in 3 months with full PFTs, her last were in 2019.      Follow Up     No follow-ups on file.  Patient was given instructions and counseling regarding her condition or for health maintenance advice. Please see specific information pulled into the AVS if appropriate.     I spent 35 minutes caring for Lorrie on this date of service. This time includes time spent by me in the following activities:preparing for the visit, reviewing tests, obtaining and/or reviewing a separately obtained history, performing a medically appropriate examination and/or evaluation , counseling and educating the patient/family/caregiver, ordering medications, tests, or procedures, referring and communicating with other health care professionals , documenting information in the medical record and independently interpreting results and communicating that information with the patient/family/caregiver    Moderate level of Medical Decision Making complexity based on 2 or more chronic stable illnesses and prescription drug management.    Saray Retana APRN, ACNP  [facility]  Electronically signed

## 2022-02-02 ENCOUNTER — TELEPHONE (OUTPATIENT)
Dept: INTERNAL MEDICINE | Facility: CLINIC | Age: 77
End: 2022-02-02

## 2022-02-02 ENCOUNTER — LAB (OUTPATIENT)
Dept: LAB | Facility: HOSPITAL | Age: 77
End: 2022-02-02

## 2022-02-02 ENCOUNTER — OFFICE VISIT (OUTPATIENT)
Dept: INTERNAL MEDICINE | Facility: CLINIC | Age: 77
End: 2022-02-02

## 2022-02-02 VITALS
HEART RATE: 85 BPM | TEMPERATURE: 97.9 F | BODY MASS INDEX: 31.6 KG/M2 | DIASTOLIC BLOOD PRESSURE: 78 MMHG | SYSTOLIC BLOOD PRESSURE: 138 MMHG | OXYGEN SATURATION: 96 % | HEIGHT: 63 IN

## 2022-02-02 DIAGNOSIS — E78.2 MIXED HYPERLIPIDEMIA: ICD-10-CM

## 2022-02-02 DIAGNOSIS — E03.9 ACQUIRED HYPOTHYROIDISM: Primary | ICD-10-CM

## 2022-02-02 DIAGNOSIS — J96.11 CHRONIC RESPIRATORY FAILURE WITH HYPOXIA: ICD-10-CM

## 2022-02-02 DIAGNOSIS — E03.9 ACQUIRED HYPOTHYROIDISM: ICD-10-CM

## 2022-02-02 DIAGNOSIS — E11.9 CONTROLLED TYPE 2 DIABETES MELLITUS WITHOUT COMPLICATION, WITHOUT LONG-TERM CURRENT USE OF INSULIN: Primary | ICD-10-CM

## 2022-02-02 DIAGNOSIS — J44.9 COPD MIXED TYPE: ICD-10-CM

## 2022-02-02 DIAGNOSIS — Z85.118 H/O: LUNG CANCER: ICD-10-CM

## 2022-02-02 DIAGNOSIS — M51.36 DEGENERATIVE DISC DISEASE, LUMBAR: ICD-10-CM

## 2022-02-02 DIAGNOSIS — M50.30 DEGENERATIVE DISC DISEASE, CERVICAL: ICD-10-CM

## 2022-02-02 DIAGNOSIS — I10 BENIGN HYPERTENSION: ICD-10-CM

## 2022-02-02 LAB
EXPIRATION DATE: ABNORMAL
HBA1C MFR BLD: 6.7 %
Lab: ABNORMAL
TSH SERPL DL<=0.05 MIU/L-ACNC: 2.14 UIU/ML (ref 0.27–4.2)

## 2022-02-02 PROCEDURE — 99214 OFFICE O/P EST MOD 30 MIN: CPT | Performed by: INTERNAL MEDICINE

## 2022-02-02 PROCEDURE — 84443 ASSAY THYROID STIM HORMONE: CPT

## 2022-02-02 PROCEDURE — 83036 HEMOGLOBIN GLYCOSYLATED A1C: CPT | Performed by: INTERNAL MEDICINE

## 2022-02-02 PROCEDURE — 3044F HG A1C LEVEL LT 7.0%: CPT | Performed by: INTERNAL MEDICINE

## 2022-02-02 PROCEDURE — 80061 LIPID PANEL: CPT

## 2022-02-02 RX ORDER — SENNOSIDES 8.6 MG
500 CAPSULE ORAL
Status: ON HOLD | COMMUNITY
End: 2022-04-13

## 2022-02-02 RX ORDER — MEMANTINE HYDROCHLORIDE 5 MG/1
5 TABLET ORAL DAILY
COMMUNITY
Start: 2021-11-10

## 2022-02-02 RX ORDER — ESOMEPRAZOLE MAGNESIUM 40 MG/1
40 CAPSULE, DELAYED RELEASE ORAL
COMMUNITY
End: 2022-02-02 | Stop reason: SDUPTHER

## 2022-02-02 RX ORDER — LEVOTHYROXINE SODIUM 0.05 MG/1
50 TABLET ORAL DAILY
Qty: 90 TABLET | Refills: 1 | Status: CANCELLED | OUTPATIENT
Start: 2022-02-02

## 2022-02-02 RX ORDER — DAPSONE 50 MG/G
1 GEL TOPICAL DAILY PRN
COMMUNITY
End: 2022-10-26

## 2022-02-02 NOTE — TELEPHONE ENCOUNTER
Spoke to Pharmacy and they stated that they do not have an active script on file for Levothyroxine. Last refill was 10/23/2021

## 2022-02-02 NOTE — PROGRESS NOTES
Internal Medicine Follow Up    Chief Complaint  Lorrie Pagan is a 76 y.o. female who presents today for follow up of chronic medical conditions outlined below.    Chief Complaint   Patient presents with   • Follow-up   • Shortness of Breath   • Neck Pain   • Back Pain        HPI  Ms. Pagan comes in today for follow up. Has been to pulmonary and will have repeat PFTs as well as CT chest in July. Remains on 5L supplement O2. She reports dietary indiscretion and is surprised A1c has not increased much. She has stable neck pain, bilateral muscle tension. Using tizanidine and getting massage twice weekly by PT.       Review of Systems  Review of Systems   Constitutional: Negative.    Respiratory: Positive for shortness of breath (stable).    Cardiovascular: Positive for leg swelling (stable). Negative for chest pain.   Musculoskeletal: Positive for arthralgias, back pain and neck pain.        Current Medications  Current Outpatient Medications on File Prior to Visit   Medication Sig Dispense Refill   • Accu-Chek Guide test strip TEST 2 TIMES PER DAY     • acetaminophen (TYLENOL) 650 MG 8 hr tablet Indications: Pain. Take by mouth as directed as needed     • albuterol (ACCUNEB) 1.25 MG/3ML nebulizer solution Take 3 mL by nebulization Every 6 (Six) Hours As Needed for Shortness of Air. 90 each 5   • albuterol sulfate  (90 Base) MCG/ACT inhaler Inhale 2 puffs Every 6 (Six) Hours As Needed for Wheezing. 18 g 2   • azelastine (ASTELIN) 0.1 % nasal spray 2 sprays into the nostril(s) as directed by provider 2 (Two) Times a Day. 30 mL 5   • chlorhexidine (PERIDEX) 0.12 % solution Apply 15 mL to the mouth or throat 2 (Two) Times a Day As Needed. For 14 days, w/1 refill      • colestipol (Colestid) 1 g tablet Take 2 tablets by mouth 2 (Two) Times a Day. Take separate 2 hours from other medications 120 tablet 11   • Dapsone 5 % topical gel Indications: Skin condition. Apply to affected area as directed as needed      • docusate sodium (COLACE) 100 MG capsule Take 1 capsule by mouth 2 (Two) Times a Day As Needed for Constipation. 60 capsule 2   • donepezil (ARICEPT) 10 MG tablet Take 20 mg by mouth Daily.     • DULoxetine (CYMBALTA) 60 MG capsule TAKE 1 CAPSULE BY MOUTH DAILY (Patient taking differently: Take 60 mg by mouth Daily.) 90 capsule 3   • ezetimibe (ZETIA) 10 MG tablet Take 10 mg by mouth Daily.     • Fluticasone-Umeclidin-Vilant (TRELEGY) 200-62.5-25 MCG/INH inhaler Inhale 1 puff Daily. 1 each 5   • furosemide (LASIX) 40 MG tablet Take 40 mg by mouth Daily.     • glimepiride (AMARYL) 2 MG tablet TAKE 1 TABLET BY MOUTH TWICE DAILY (Patient taking differently: Take 2 mg by mouth 2 (two) times a day.) 180 tablet 3   • glucose blood test strip Accu-Chek Guide test strips     • ketoconazole (NIZORAL) 2 % cream Apply 1 application topically to the appropriate area as directed Daily As Needed for Itching. BACK AND LEGS AS NEEDED FOR ITCHING     • ketoconazole (NIZORAL) 2 % shampoo Apply 1 application topically to the appropriate area as directed As Needed for Irritation.     • latanoprost (XALATAN) 0.005 % ophthalmic solution Administer 1 drop to both eyes Daily.     • levothyroxine (SYNTHROID, LEVOTHROID) 50 MCG tablet Take 50 mcg by mouth Daily.     • memantine (NAMENDA) 5 MG tablet Take 5 mg by mouth Daily.     • metOLazone (ZAROXOLYN) 2.5 MG tablet Take 1 tablet by mouth Daily As Needed (for swelling or weight gain of 4 lbs). As directed     • metoprolol succinate XL (TOPROL-XL) 25 MG 24 hr tablet Take 25 mg by mouth Daily.     • O2 (OXYGEN) Inhale Continuous. 4-5 L/MIN DEPENDING ON ACTIVITY     • ondansetron ODT (ZOFRAN-ODT) 4 MG disintegrating tablet Place 1 tablet on the tongue Every 8 (Eight) Hours As Needed for Nausea or Vomiting. 30 tablet 0   • pantoprazole (PROTONIX) 40 MG EC tablet Take 1 tablet by mouth 2 (two) times a day. 180 tablet 3   • POTASSIUM CHLORIDE ER PO Take 30 mEq by mouth 2 (Two) Times a Day.    "  • rOPINIRole XL (REQUIP XL) 8 MG 24 hr tablet Take 8 mg by mouth 2 (two) times a day.     • Spacer/Aero-Holding Chambers (AeroChamber Plus Morris-Vu) misc As Needed.     • timolol (TIMOPTIC) 0.25 % ophthalmic solution Administer 1 drop to the right eye Every Morning.     • tiZANidine (ZANAFLEX) 2 MG tablet TAKE 1 TABLET BY MOUTH AT NIGHT AS NEEDED FOR MUSCLE SPASMS. 30 tablet 0   • vitamin D (ERGOCALCIFEROL) 1.25 MG (04443 UT) capsule capsule Take 1 capsule by mouth 1 (One) Time Per Week. Friday? 5 capsule 0   • fluticasone (Flonase) 50 MCG/ACT nasal spray 2 sprays into the nostril(s) as directed by provider Daily. 18.2 mL 11   • [DISCONTINUED] esomeprazole (nexIUM) 40 MG capsule Take 40 mg by mouth.     • [DISCONTINUED] pantoprazole (PROTONIX) 40 MG EC tablet Take 1 tablet by mouth 2 (Two) Times a Day. 60 tablet 5     No current facility-administered medications on file prior to visit.       Allergies  Allergies   Allergen Reactions   • Statins Myalgia     myalgia   • Hydrocodone Hallucinations   • Metoclopramide Other (See Comments)     EXACERBATED RLS   • Penicillins Rash     rash   • Tramadol Nausea And Vomiting and GI Intolerance     VERY MILD PER PT       Objective  Visit Vitals  /78   Pulse 85   Temp 97.9 °F (36.6 °C)   Ht 160 cm (63\")   LMP  (LMP Unknown)   SpO2 96%   Breastfeeding No   BMI 31.60 kg/m²        Physical Exam  Physical Exam  Vitals and nursing note reviewed.   Constitutional:       General: She is not in acute distress.     Appearance: She is well-developed. She is obese. She is not ill-appearing or toxic-appearing.   HENT:      Head: Normocephalic and atraumatic.   Eyes:      Conjunctiva/sclera: Conjunctivae normal.   Cardiovascular:      Rate and Rhythm: Normal rate and regular rhythm.      Heart sounds: Normal heart sounds.   Pulmonary:      Effort: Pulmonary effort is normal. No respiratory distress.      Breath sounds: Decreased breath sounds present.      Comments: On supplemental " oxygen  Musculoskeletal:      Right lower leg: Edema (trace to 1+) present.      Left lower leg: Edema (trace to 1+) present.   Skin:     General: Skin is warm and dry.   Neurological:      Mental Status: She is alert and oriented to person, place, and time. Mental status is at baseline.         Results  Results for orders placed or performed in visit on 02/02/22   POC Glycosylated Hemoglobin (Hb A1C)    Specimen: Blood   Result Value Ref Range    Hemoglobin A1C 6.7 %    Lot Number [id number]     Expiration Date 6/15/23         Assessment and Plan  Diagnoses and all orders for this visit:    Controlled type 2 diabetes mellitus without complication, without long-term current use of insulin ([facility])  - Has been well controlled, A1c today 6.7.  - Continue glimepiride 2mg BID.    Chronic respiratory failure due to Stage II, moderate, COPD  - Follows with Dr. Arboleda. On spiriva and albuterol PRN   - Uses 4-5L continuous O2. On trelegy and albuterol nebulizer.  - Will have PFTs and CT chest    H/O: lung cancer (Prior resection 2016)  - Found incidentally in 2016 and resected.  - Plan for CT chest this summer  - Continue to follow with pulmonary    Benign hypertension  - BP acceptable today.  - Continue lasix 40mg daily, metoprolol succinate 25mg daily    Acquired hypothyroidism  - Reports taking levothyroxine 50mcg daily however no recent fills and she gets medications prefilled by pharmacy. Will call pharmacy to confirm she is taking this.  - TSH ordered    Mixed hyperlipidemia  - Intolerant of statins.  - Lipid panel ordered  - Continue zetia    Degenerative disc disease, cervical and lumbar  - s/p lumbar SCS which she reports has become ineffective lately. Will follow up with surgeon.  - Currently in PT  - Continue tizanidine 2mg qhs PRN  - Declines pain management referral    Health Maintenance  - Pap smear: no longer indicated  - Mammogram: 5/2021 [facility] with axillary adenopathy, 8/2021 [facility], had FNA of R  axillary node which was negative. Next mammo 3/2022.  - Colonoscopy: 2018  - HCV: negative  - Immunizations: Flu UTD, pneumovax complete, COVID complete, shingrix #1  - Depression screening: negative 2/2022    Return in about 4 months (around 6/2/2022) for Follow up diabetes, HTN.

## 2022-02-03 LAB
CHOLEST SERPL-MCNC: 217 MG/DL (ref 0–200)
HDLC SERPL-MCNC: 55 MG/DL (ref 40–60)
LDLC SERPL CALC-MCNC: 143 MG/DL (ref 0–100)
LDLC/HDLC SERPL: 2.55 {RATIO}
TRIGL SERPL-MCNC: 108 MG/DL (ref 0–150)
VLDLC SERPL-MCNC: 19 MG/DL (ref 5–40)

## 2022-02-03 NOTE — TELEPHONE ENCOUNTER
Thank you. Her TSH is normal without the levothyroxine so not needed and will remove from med list.

## 2022-02-08 ENCOUNTER — TELEPHONE (OUTPATIENT)
Dept: INTERNAL MEDICINE | Facility: CLINIC | Age: 77
End: 2022-02-08

## 2022-02-08 DIAGNOSIS — E11.9 CONTROLLED TYPE 2 DIABETES MELLITUS WITHOUT COMPLICATION, WITHOUT LONG-TERM CURRENT USE OF INSULIN: ICD-10-CM

## 2022-02-08 RX ORDER — GLIMEPIRIDE 2 MG/1
TABLET ORAL
Qty: 180 TABLET | Refills: 1 | Status: ON HOLD | OUTPATIENT
Start: 2022-02-08 | End: 2022-07-14

## 2022-02-08 NOTE — TELEPHONE ENCOUNTER
Caller: Lorrie Pagan    Relationship: Self    Best call back number: [phone]    What medication are you requesting: LEVOTHYROXINE 50 MCG - TAKE 1 TABLET EACH MORNING     What are your current symptoms: THYROID ISSUES     How long have you been experiencing symptoms:     Have you had these symptoms before:    [] Yes  [] No    Have you been treated for these symptoms before:   [] Yes  [] No    If a prescription is needed, what is your preferred pharmacy and phone number:  [facility] [place], [place] - [address] [place] [address] - [phone]  - [phone] FX       Additional notes: PATIENT STATED SHE NEEDS A REFILL OF THIS MEDICATION. SHE THOUGHT NO LONGER TOOK IT BUT SHE NEEDS IT

## 2022-02-08 NOTE — TELEPHONE ENCOUNTER
Last Office Visit: 2/2/22  Next Office Visit: 6/1/22    Labs completed in past 6 months? yes  Labs completed in past year? yes    Not on active medication list.       Pharmacy: [facility] - [place] [address] [address]  - [phone]  - [phone] FX

## 2022-02-10 NOTE — TELEPHONE ENCOUNTER
Please clarify with patient, has she been taking levothyroxine? Her pharmacy reported that they have not been filling it for several months.

## 2022-02-13 NOTE — TELEPHONE ENCOUNTER
Her recent TSH was normal while off of the levothyroxine and if she has not been taking the levothyroxine she does not need to resume.

## 2022-02-17 ENCOUNTER — TELEPHONE (OUTPATIENT)
Dept: INTERNAL MEDICINE | Facility: CLINIC | Age: 77
End: 2022-02-17

## 2022-02-17 ENCOUNTER — PATIENT MESSAGE (OUTPATIENT)
Dept: INTERNAL MEDICINE | Facility: CLINIC | Age: 77
End: 2022-02-17

## 2022-02-21 ENCOUNTER — TELEPHONE (OUTPATIENT)
Dept: GASTROENTEROLOGY | Facility: CLINIC | Age: 77
End: 2022-02-21

## 2022-02-21 NOTE — TELEPHONE ENCOUNTER
Per  returned patients call  And notified her she will need to stop by the office to  a stool study kit so that way we can rule out infectious diarrhea. Patient confirmed she has not been taking the Colestid for a while now because she has so many other medication but if she has to start back on it she can. Notified the patient if the stool studies come back negative then  will make further recommendations in regards to Colestid vs Lomotil as needed. Patient is concerned about having to come to the office to get the kit as she wont be able to make it until at least the end of the week and per patient this is the same thing she has had for years. Asked patient if it would be easier to go to a [facility] lab instead of our office but she said no. Again notified patient the important of ruling out infectious diarrhea. Confirmed she had no additional questions and will notify  of the information above.

## 2022-02-21 NOTE — TELEPHONE ENCOUNTER
Patient called and LVM requesting a refill for Lomotil as she has been having diarrhea . The last time we refilled this prescription was 07/15/2020.

## 2022-02-23 NOTE — TELEPHONE ENCOUNTER
Notified patient of  new recommendations. Instructed patient to call our office back if the diarrhea does not resolve with the colestid.

## 2022-03-03 ENCOUNTER — APPOINTMENT (OUTPATIENT)
Dept: MAMMOGRAPHY | Facility: HOSPITAL | Age: 77
End: 2022-03-03

## 2022-03-18 ENCOUNTER — HOSPITAL ENCOUNTER (OUTPATIENT)
Dept: MAMMOGRAPHY | Facility: HOSPITAL | Age: 77
Discharge: HOME | End: 2022-03-18
Admitting: INTERNAL MEDICINE

## 2022-03-18 ENCOUNTER — TRANSCRIBE ORDERS (OUTPATIENT)
Dept: MAMMOGRAPHY | Facility: HOSPITAL | Age: 77
End: 2022-03-18

## 2022-03-18 DIAGNOSIS — R92.8 ABNORMAL MAMMOGRAM: Primary | ICD-10-CM

## 2022-03-18 DIAGNOSIS — R92.8 ABNORMAL MAMMOGRAM: ICD-10-CM

## 2022-03-18 PROCEDURE — 77066 DX MAMMO INCL CAD BI: CPT

## 2022-03-18 PROCEDURE — 77066 DX MAMMO INCL CAD BI: CPT | Performed by: RADIOLOGY

## 2022-03-29 ENCOUNTER — APPOINTMENT (OUTPATIENT)
Dept: GENERAL RADIOLOGY | Facility: HOSPITAL | Age: 77
End: 2022-03-29

## 2022-03-29 ENCOUNTER — HOSPITAL ENCOUNTER (EMERGENCY)
Facility: HOSPITAL | Age: 77
Discharge: HOME | End: 2022-03-30
Attending: EMERGENCY MEDICINE | Admitting: EMERGENCY MEDICINE

## 2022-03-29 ENCOUNTER — OFFICE VISIT (OUTPATIENT)
Dept: INTERNAL MEDICINE | Facility: CLINIC | Age: 77
End: 2022-03-29

## 2022-03-29 VITALS
HEART RATE: 100 BPM | OXYGEN SATURATION: 87 % | BODY MASS INDEX: 31.71 KG/M2 | RESPIRATION RATE: 18 BRPM | WEIGHT: 179 LBS | DIASTOLIC BLOOD PRESSURE: 64 MMHG | SYSTOLIC BLOOD PRESSURE: 150 MMHG

## 2022-03-29 VITALS
DIASTOLIC BLOOD PRESSURE: 54 MMHG | WEIGHT: 180 LBS | TEMPERATURE: 98 F | OXYGEN SATURATION: 100 % | SYSTOLIC BLOOD PRESSURE: 145 MMHG | BODY MASS INDEX: 31.89 KG/M2 | HEIGHT: 63 IN | RESPIRATION RATE: 18 BRPM | HEART RATE: 100 BPM

## 2022-03-29 DIAGNOSIS — I50.32 CHRONIC DIASTOLIC (CONGESTIVE) HEART FAILURE: ICD-10-CM

## 2022-03-29 DIAGNOSIS — R06.02 SHORTNESS OF BREATH: Primary | ICD-10-CM

## 2022-03-29 DIAGNOSIS — R09.02 HYPOXIA: ICD-10-CM

## 2022-03-29 DIAGNOSIS — R06.00 DYSPNEA, UNSPECIFIED TYPE: Primary | ICD-10-CM

## 2022-03-29 DIAGNOSIS — R60.9 PERIPHERAL EDEMA: ICD-10-CM

## 2022-03-29 DIAGNOSIS — J43.9 PULMONARY EMPHYSEMA, UNSPECIFIED EMPHYSEMA TYPE: ICD-10-CM

## 2022-03-29 LAB
ALBUMIN SERPL-MCNC: 4.1 G/DL (ref 3.5–5.2)
ALBUMIN/GLOB SERPL: 1.2 G/DL
ALP SERPL-CCNC: 125 U/L (ref 39–117)
ALT SERPL W P-5'-P-CCNC: 10 U/L (ref 1–33)
ANION GAP SERPL CALCULATED.3IONS-SCNC: 11 MMOL/L (ref 5–15)
ARTERIAL PATENCY WRIST A: ABNORMAL
AST SERPL-CCNC: 18 U/L (ref 1–32)
ATMOSPHERIC PRESS: ABNORMAL MM[HG]
BASE EXCESS BLDA CALC-SCNC: 15.7 MMOL/L (ref 0–2)
BASOPHILS # BLD AUTO: 0.05 10*3/MM3 (ref 0–0.2)
BASOPHILS NFR BLD AUTO: 0.4 % (ref 0–1.5)
BDY SITE: ABNORMAL
BILIRUB SERPL-MCNC: 0.3 MG/DL (ref 0–1.2)
BODY TEMPERATURE: 37 C
BUN SERPL-MCNC: 12 MG/DL (ref 8–23)
BUN/CREAT SERPL: 15.8 (ref 7–25)
CALCIUM SPEC-SCNC: 9.4 MG/DL (ref 8.6–10.5)
CHLORIDE SERPL-SCNC: 93 MMOL/L (ref 98–107)
CO2 BLDA-SCNC: 44.1 MMOL/L (ref 22–33)
CO2 SERPL-SCNC: 36 MMOL/L (ref 22–29)
COHGB MFR BLD: 0.8 % (ref 0–2)
CREAT SERPL-MCNC: 0.76 MG/DL (ref 0.57–1)
DEPRECATED RDW RBC AUTO: 46.8 FL (ref 37–54)
EGFRCR SERPLBLD CKD-EPI 2021: 80.3 ML/MIN/1.73
EOSINOPHIL # BLD AUTO: 0.23 10*3/MM3 (ref 0–0.4)
EOSINOPHIL NFR BLD AUTO: 2.1 % (ref 0.3–6.2)
EPAP: 0
ERYTHROCYTE [DISTWIDTH] IN BLOOD BY AUTOMATED COUNT: 15.6 % (ref 12.3–15.4)
FLUAV RNA RESP QL NAA+PROBE: NOT DETECTED
FLUBV RNA RESP QL NAA+PROBE: NOT DETECTED
GLOBULIN UR ELPH-MCNC: 3.4 GM/DL
GLUCOSE SERPL-MCNC: 115 MG/DL (ref 65–99)
HCO3 BLDA-SCNC: 42.2 MMOL/L (ref 20–26)
HCT VFR BLD AUTO: 39.7 % (ref 34–46.6)
HCT VFR BLD CALC: 36.1 % (ref 38–51)
HGB BLD-MCNC: 12.3 G/DL (ref 12–15.9)
HGB BLDA-MCNC: 11.8 G/DL (ref 14–18)
HOLD SPECIMEN: NORMAL
IMM GRANULOCYTES # BLD AUTO: 0.1 10*3/MM3 (ref 0–0.05)
IMM GRANULOCYTES NFR BLD AUTO: 0.9 % (ref 0–0.5)
INHALED O2 CONCENTRATION: 40 %
IPAP: 0
LYMPHOCYTES # BLD AUTO: 2.2 10*3/MM3 (ref 0.7–3.1)
LYMPHOCYTES NFR BLD AUTO: 19.7 % (ref 19.6–45.3)
MCH RBC QN AUTO: 25.7 PG (ref 26.6–33)
MCHC RBC AUTO-ENTMCNC: 31 G/DL (ref 31.5–35.7)
MCV RBC AUTO: 82.9 FL (ref 79–97)
METHGB BLD QL: 0.4 % (ref 0–1.5)
MODALITY: ABNORMAL
MONOCYTES # BLD AUTO: 0.72 10*3/MM3 (ref 0.1–0.9)
MONOCYTES NFR BLD AUTO: 6.4 % (ref 5–12)
NEUTROPHILS NFR BLD AUTO: 7.87 10*3/MM3 (ref 1.7–7)
NEUTROPHILS NFR BLD AUTO: 70.5 % (ref 42.7–76)
NOTE: ABNORMAL
NRBC BLD AUTO-RTO: 0 /100 WBC (ref 0–0.2)
NT-PROBNP SERPL-MCNC: 35.1 PG/ML (ref 0–1800)
OXYHGB MFR BLDV: 95.9 % (ref 94–99)
PAW @ PEAK INSP FLOW SETTING VENT: 0 CMH2O
PCO2 BLDA: 60.2 MM HG (ref 35–45)
PCO2 TEMP ADJ BLD: 60.2 MM HG (ref 35–45)
PH BLDA: 7.45 PH UNITS (ref 7.35–7.45)
PH, TEMP CORRECTED: 7.45 PH UNITS
PLATELET # BLD AUTO: 395 10*3/MM3 (ref 140–450)
PMV BLD AUTO: 9.3 FL (ref 6–12)
PO2 BLDA: 87.5 MM HG (ref 83–108)
PO2 TEMP ADJ BLD: 87.5 MM HG (ref 83–108)
POTASSIUM SERPL-SCNC: 3.1 MMOL/L (ref 3.5–5.2)
PROT SERPL-MCNC: 7.5 G/DL (ref 6–8.5)
RBC # BLD AUTO: 4.79 10*6/MM3 (ref 3.77–5.28)
SARS-COV-2 RNA RESP QL NAA+PROBE: NOT DETECTED
SODIUM SERPL-SCNC: 140 MMOL/L (ref 136–145)
TOTAL RATE: 0 BREATHS/MINUTE
TROPONIN T SERPL-MCNC: <0.01 NG/ML (ref 0–0.03)
WBC NRBC COR # BLD: 11.17 10*3/MM3 (ref 3.4–10.8)
WHOLE BLOOD HOLD SPECIMEN: NORMAL
WHOLE BLOOD HOLD SPECIMEN: NORMAL

## 2022-03-29 PROCEDURE — 93005 ELECTROCARDIOGRAM TRACING: CPT | Performed by: EMERGENCY MEDICINE

## 2022-03-29 PROCEDURE — 99214 OFFICE O/P EST MOD 30 MIN: CPT | Performed by: NURSE PRACTITIONER

## 2022-03-29 PROCEDURE — 36415 COLL VENOUS BLD VENIPUNCTURE: CPT

## 2022-03-29 PROCEDURE — 99283 EMERGENCY DEPT VISIT LOW MDM: CPT

## 2022-03-29 PROCEDURE — 85025 COMPLETE CBC W/AUTO DIFF WBC: CPT

## 2022-03-29 PROCEDURE — 83880 ASSAY OF NATRIURETIC PEPTIDE: CPT | Performed by: EMERGENCY MEDICINE

## 2022-03-29 PROCEDURE — 80053 COMPREHEN METABOLIC PANEL: CPT | Performed by: EMERGENCY MEDICINE

## 2022-03-29 PROCEDURE — 36600 WITHDRAWAL OF ARTERIAL BLOOD: CPT

## 2022-03-29 PROCEDURE — 82375 ASSAY CARBOXYHB QUANT: CPT

## 2022-03-29 PROCEDURE — 87636 SARSCOV2 & INF A&B AMP PRB: CPT | Performed by: EMERGENCY MEDICINE

## 2022-03-29 PROCEDURE — 71045 X-RAY EXAM CHEST 1 VIEW: CPT

## 2022-03-29 PROCEDURE — 82805 BLOOD GASES W/O2 SATURATION: CPT

## 2022-03-29 PROCEDURE — 83050 HGB METHEMOGLOBIN QUAN: CPT

## 2022-03-29 PROCEDURE — 84484 ASSAY OF TROPONIN QUANT: CPT | Performed by: EMERGENCY MEDICINE

## 2022-03-29 RX ORDER — SODIUM CHLORIDE 0.9 % (FLUSH) 0.9 %
10 SYRINGE (ML) INJECTION AS NEEDED
Status: DISCONTINUED | OUTPATIENT
Start: 2022-03-29 | End: 2022-03-30 | Stop reason: HOSPADM

## 2022-03-29 NOTE — PROGRESS NOTES
Chief Complaint   Patient presents with   • Shortness of Breath     Pt states having trouble breathing, getting O2 readings in 80's, having swelling as well, doubled the lasix for few days and now retaining again       History of Present Illness    76 y.o.female presents for shortness of breath.  Shortness of breath gradually getting worse over last 6 weeks. Has to take her  concentrator with her. normall oxygen 5Liter. Positive emphasyema hx lung cancer. Always has a prouctive cough not new. Now havng swellling in ankles. thurs fri sat doubled on lasix took more potassium. Swelling went down a little but starting back up again. Took another lasix at 1 today. Doesn't pee a lot.  No chest pain. No fever. No wheezing.  Review of Systems   Constitutional: Negative for chills, fatigue and fever.   Eyes: Negative for blurred vision and visual disturbance.   Respiratory: Positive for cough and shortness of breath.    Cardiovascular: Positive for leg swelling. Negative for chest pain and palpitations.   Genitourinary: Negative for difficulty urinating.   Neurological: Negative for dizziness, syncope, light-headedness and headache.       [facility]  The following portions of the patient's history were reviewed and updated as appropriate: allergies, current medications, past family history, past medical history, past social history, past surgical history and problem list.     Past Medical History:   Diagnosis Date   • Back pain    • Bronchiectasis (HCC)    • Bronchitis 01/2018   • Cancer (HCC)     History of lung cancer and skin cancer    • Cardiac murmur    • Chronic cough    • Chronic obstructive pulmonary disease (HCC)    • Colon polyp    • DDD (degenerative disc disease), lumbar    • Depression    • Diabetes mellitus (HCC)     DX: 2019, CHECK BS QOD, LAST A1C 6.3??   • Disease of thyroid gland    • Esophageal dilatation 9/20/2019    Added automatically from request for surgery [id number]   • Esophageal dysphagia 10/24/2019     Added automatically from request for surgery [id number]   • Former smoker (None since ) 2019   • GERD (gastroesophageal reflux disease)    • Glaucoma    • Globus sensation 2020   • History of colonic polyps    • History of transfusion 1988    NO REACTION    • Hyperlipidemia    • Hypertension    • Irritable bowel syndrome     Constipation/diarrhea   • Legally blind    • Lung cancer (HCC)    • Macular degeneration    • Neuropathy    • Obesity 2020   • Osteoarthritis    • Oxygen dependent     5L   • Pneumonia    • Sleep apnea     NON COMPLIANT WITH CPAP USE   • UTI (urinary tract infection)    • Wears glasses       Allergies   Allergen Reactions   • Hydrocodone Hallucinations   • Statins Myalgia     myalgia   • Metoclopramide Other (See Comments)     EXACERBATED RLS   • Penicillins Rash     rash   • Tramadol Nausea And Vomiting and GI Intolerance     VERY MILD PER PT      Social History     Tobacco Use   • Smoking status: Former Smoker     Packs/day: 1.50     Years: 25.00     Pack years: 37.50     Types: Cigarettes     Quit date: 1992     Years since quittin.2   • Smokeless tobacco: Never Used   Vaping Use   • Vaping Use: Never used   Substance Use Topics   • Alcohol use: Yes     Alcohol/week: 2.0 standard drinks     Types: 2 Standard drinks or equivalent per week     Comment: a glass of wine or bourbon a couple times a WEEK   • Drug use: No                Current Outpatient Medications:   •  Accu-Chek Guide test strip, TEST 2 TIMES PER DAY, Disp: , Rfl:   •  acetaminophen (TYLENOL) 650 MG 8 hr tablet, Indications: Pain. Take by mouth as directed as needed, Disp: , Rfl:   •  albuterol (ACCUNEB) 1.25 MG/3ML nebulizer solution, Take 3 mL by nebulization Every 6 (Six) Hours As Needed for Shortness of Air., Disp: 90 each, Rfl: 5  •  albuterol sulfate  (90 Base) MCG/ACT inhaler, Inhale 2 puffs Every 6 (Six) Hours As Needed for Wheezing., Disp: 18 g, Rfl: 2  •  azelastine (ASTELIN) 0.1 % nasal  spray, 2 sprays into the nostril(s) as directed by provider 2 (Two) Times a Day., Disp: 30 mL, Rfl: 5  •  chlorhexidine (PERIDEX) 0.12 % solution, Apply 15 mL to the mouth or throat 2 (Two) Times a Day As Needed. For 14 days, w/1 refill, Disp: , Rfl:   •  colestipol (Colestid) 1 g tablet, Take 2 tablets by mouth 2 (Two) Times a Day. Take separate 2 hours from other medications, Disp: 120 tablet, Rfl: 11  •  Dapsone 5 % topical gel, Indications: Skin condition. Apply to affected area as directed as needed, Disp: , Rfl:   •  docusate sodium (COLACE) 100 MG capsule, Take 1 capsule by mouth 2 (Two) Times a Day As Needed for Constipation., Disp: 60 capsule, Rfl: 2  •  donepezil (ARICEPT) 10 MG tablet, Take 20 mg by mouth Daily., Disp: , Rfl:   •  DULoxetine (CYMBALTA) 60 MG capsule, TAKE 1 CAPSULE BY MOUTH DAILY (Patient taking differently: Take 60 mg by mouth Daily.), Disp: 90 capsule, Rfl: 3  •  ezetimibe (ZETIA) 10 MG tablet, Take 10 mg by mouth Daily., Disp: , Rfl:   •  fluticasone (Flonase) 50 MCG/ACT nasal spray, 2 sprays into the nostril(s) as directed by provider Daily., Disp: 18.2 mL, Rfl: 11  •  Fluticasone-Umeclidin-Vilant (TRELEGY) 200-62.5-25 MCG/INH inhaler, Inhale 1 puff Daily., Disp: 1 each, Rfl: 5  •  furosemide (LASIX) 40 MG tablet, Take 40 mg by mouth Daily., Disp: , Rfl:   •  glimepiride (AMARYL) 2 MG tablet, TAKE 1 TABLET BY MOUTH TWICE DAILY, Disp: 180 tablet, Rfl: 1  •  glucose blood test strip, Accu-Chek Guide test strips, Disp: , Rfl:   •  ketoconazole (NIZORAL) 2 % cream, Apply 1 application topically to the appropriate area as directed Daily As Needed for Itching. BACK AND LEGS AS NEEDED FOR ITCHING, Disp: , Rfl:   •  ketoconazole (NIZORAL) 2 % shampoo, Apply 1 application topically to the appropriate area as directed As Needed for Irritation., Disp: , Rfl:   •  latanoprost (XALATAN) 0.005 % ophthalmic solution, Administer 1 drop to both eyes Daily., Disp: , Rfl:   •  memantine (NAMENDA) 5 MG  tablet, Take 5 mg by mouth Daily., Disp: , Rfl:   •  metOLazone (ZAROXOLYN) 2.5 MG tablet, Take 1 tablet by mouth Daily As Needed (for swelling or weight gain of 4 lbs). As directed, Disp: , Rfl:   •  metoprolol succinate XL (TOPROL-XL) 25 MG 24 hr tablet, Take 25 mg by mouth Daily., Disp: , Rfl:   •  O2 (OXYGEN), Inhale Continuous. 4-5 L/MIN DEPENDING ON ACTIVITY, Disp: , Rfl:   •  ondansetron ODT (ZOFRAN-ODT) 4 MG disintegrating tablet, Place 1 tablet on the tongue Every 8 (Eight) Hours As Needed for Nausea or Vomiting., Disp: 30 tablet, Rfl: 0  •  pantoprazole (PROTONIX) 40 MG EC tablet, Take 1 tablet by mouth 2 (two) times a day., Disp: 180 tablet, Rfl: 3  •  POTASSIUM CHLORIDE ER PO, Take 30 mEq by mouth 2 (Two) Times a Day., Disp: , Rfl:   •  rOPINIRole XL (REQUIP XL) 8 MG 24 hr tablet, Take 8 mg by mouth 2 (two) times a day., Disp: , Rfl:   •  Spacer/Aero-Holding Chambers (AeroChamber Plus Morris-Vu) misc, As Needed., Disp: , Rfl:   •  timolol (TIMOPTIC) 0.25 % ophthalmic solution, Administer 1 drop to the right eye Every Morning., Disp: , Rfl:   •  tiZANidine (ZANAFLEX) 2 MG tablet, TAKE 1 TABLET BY MOUTH AT NIGHT AS NEEDED FOR MUSCLE SPASMS., Disp: 30 tablet, Rfl: 0  •  vitamin D (ERGOCALCIFEROL) 1.25 MG (63481 UT) capsule capsule, Take 1 capsule by mouth 1 (One) Time Per Week. Friday?, Disp: 5 capsule, Rfl: 0    VITALS:  /64   Pulse 100   Resp 18   Wt 81.2 kg (179 lb)   LMP  (LMP Unknown)   SpO2 (!) 87%   Breastfeeding No   BMI 31.71 kg/m²     Physical Exam  HENT:      Head: Normocephalic.   Cardiovascular:      Rate and Rhythm: Normal rate and regular rhythm.      Heart sounds: Normal heart sounds.   Pulmonary:      Effort: Pulmonary effort is normal.      Breath sounds: Examination of the right-lower field reveals decreased breath sounds and rales. Examination of the left-lower field reveals rales. Decreased breath sounds and rales present. No wheezing.   Neurological:      General: No focal  deficit present.      Mental Status: She is alert and oriented to person, place, and time. Mental status is at baseline.   Psychiatric:         Mood and Affect: Mood normal.     on NC oxygen    Result Review :            Assessment and Plan    Diagnoses and all orders for this visit:    1. Shortness of breath (Primary)    2. Hypoxia    3. Pulmonary emphysema, unspecified emphysema type (HCC)    4. Chronic diastolic (congestive) heart failure (HCC)    Checked her oxygen saturation again running 86-89% sitting at rest. On exam with crackles and lower ext edema. Has already doubled up on her lasix for few days. Question if she has been taking her metoprolol; may have dropped off her home meds at some point.  Recommend emergency room evaluation with xray labs possible IV diuresis and further supplemental oxygen. She is accompanied by her daughter who will take her to [facility] ER by private vehicle. Report called to [facility] ER.    I discussed the patients findings and my recommendations with patient.  Patient was encouraged to keep me informed of any acute changes, lack of improvement, or any new concerning symptoms.  Patient voiced understanding of all instructions and denied further questions.      Follow Up   Return if symptoms worsen or fail to improve.      Electronically signed by:    Ally Garcia, APRN  03/29/2022

## 2022-04-01 LAB
QT INTERVAL: 364 MS
QTC INTERVAL: 452 MS

## 2022-04-04 ENCOUNTER — OFFICE VISIT (OUTPATIENT)
Dept: INTERNAL MEDICINE | Facility: CLINIC | Age: 77
End: 2022-04-04

## 2022-04-04 ENCOUNTER — PATIENT OUTREACH (OUTPATIENT)
Dept: CASE MANAGEMENT | Facility: OTHER | Age: 77
End: 2022-04-04

## 2022-04-04 ENCOUNTER — TELEPHONE (OUTPATIENT)
Dept: PULMONOLOGY | Facility: CLINIC | Age: 77
End: 2022-04-04

## 2022-04-04 VITALS
BODY MASS INDEX: 32.59 KG/M2 | DIASTOLIC BLOOD PRESSURE: 68 MMHG | HEART RATE: 88 BPM | WEIGHT: 184 LBS | SYSTOLIC BLOOD PRESSURE: 140 MMHG | OXYGEN SATURATION: 98 %

## 2022-04-04 DIAGNOSIS — R06.09 DYSPNEA ON EXERTION: Primary | ICD-10-CM

## 2022-04-04 PROCEDURE — 99214 OFFICE O/P EST MOD 30 MIN: CPT | Performed by: STUDENT IN AN ORGANIZED HEALTH CARE EDUCATION/TRAINING PROGRAM

## 2022-04-04 NOTE — PROGRESS NOTES
Internal Medicine Acute Visit     Patient Name: Lorrie Pagan  : 1945   MRN: [id number]     Chief Complaint:    Chief Complaint   Patient presents with   • Ankle Pain     Swelling both legs       History of Present Illness: Lorrie Pagan is a 76 y.o. female who presents for dyspnea. She has chronic hypoxic respiratory failure 2/2 Emphysema on 5L NC at baseline. She follows with pulmonology for this. She also has chronic HFpEF for which she takes Lasix 40 mg daily. She follows with cardiology for this (Dr. Chaidez).  She was seen as an acute visit last week for several weeks of worsening dyspnea and LE edema. She had tried doubling her Lasix dose but did not find that improved symptoms. Oxygen level was noted to be in the mid 80s at rest on typical 5L NC so she was referred to the ED for evaluation. Her O2 level was noted to be in the upper 90s at the ED. Lab evaluation including troponin, BNP, CBC, ABG, CMP were unrevealing for cause. BNP was low. Chest x-ray had bibasilar atelectasis but no edema or effusion. Hemoglobin was normal. Troponin was negative. She reports that the swelling in her legs has improved but that her dyspnea has not. Her cough is not changed from baseline. She has no fevers/chills. She is unable to speak for whether she has orthopnea or not because she does not lie flat due to severe reflux. She is on 5L NC now. Dyspnea is worse with exertion and improves with rest.     Subjective     I have reviewed and the following portions of the patient's history were updated as appropriate: past family history, past medical history, past social history, past surgical history and problem list.    Allergies:   Allergies   Allergen Reactions   • Hydrocodone Hallucinations   • Statins Myalgia     myalgia   • Metoclopramide Other (See Comments)     EXACERBATED RLS   • Penicillins Rash     rash   • Tramadol Nausea And Vomiting and GI Intolerance     VERY MILD PER PT        Objective     Physical Exam:  Vital Signs:   Vitals:    04/04/22 1117   BP: 140/68   Pulse: 88   SpO2: 98%   Weight: 83.5 kg (184 lb)   PainSc: 0-No pain     Body mass index is 32.59 kg/m².    Physical Exam  Vitals and nursing note reviewed.   Constitutional:       General: She is not in acute distress.     Appearance: Normal appearance.   Cardiovascular:      Rate and Rhythm: Normal rate and regular rhythm.   Pulmonary:      Effort: No respiratory distress.      Breath sounds: No wheezing, rhonchi or rales.      Comments: Some increased effort with ambulation  Musculoskeletal:      Right lower leg: Edema present.      Left lower leg: Edema present.   Neurological:      Mental Status: She is alert.         Assessment / Plan      Assessment/Plan:   Diagnoses and all orders for this visit:    1. Dyspnea on exertion (Primary)  Likely multifactorial due to chronic conditions including chronic hypoxic respiratory failure 2/2 emphysema and chronic HFpEF. Do not feel she would benefit from increased diuretic dosing given she has no crackles, her O2 level is 98%, her BNP was 32. Also do not feel she would benefit from treatment of acute COPD exacerbation at this time as she has no increased cough or sputum production. Her O2 level is normal on her usual 5L NC. There are no signs of pneumonia or other infection etiology. X-ray from ER was reviewed and showed bibasilar atelectasis. She has follow up with pulmonology in a few weeks and will follow up with her PCP, Dr. Viera in several day. Discussed findings with Dr. Viera prior to patient leaving. Continue albuterol prn, Trelegy inhaler, Lasix 40 mg daily.     Follow Up:   Return for Next scheduled follow up.    Time:   I spent approximately 20 minutes providing clinical care for this patient; including review of patient's chart and provider documentation, face to face time spent with patient in examination room (obtaining history, performing physical exam,  discussing diagnosis and management options), placing orders, and completing patient documentation.     Reviewed multiple labs from recent ER visit and medication management completed consistent with moderate MDM.     Radha Dasilva MD  [facility]

## 2022-04-04 NOTE — OUTREACH NOTE
AMBULATORY CASE MANAGEMENT NOTE    Name and Relationship of Patient/Support Person: Lorrie Pagan White - Self    Patient Outreach    Spoke with patient briefly during today's PCP visit. Discussed HRCM and CCM programs. Provided patient with my contact information. She is agreeable for a phone call later this week.     JI RIOS  Ambulatory Case Management    4/4/2022, 14:09 EDT

## 2022-04-04 NOTE — TELEPHONE ENCOUNTER
Pt called today stating that she was in the ED for SOB and f/u with PCP today but is still not doing any better. Pt c/o sob on exertion/cough/wheezing. Pt denies fever/chest pain/nausea. Pt is scheduled to f/u with the office on 4/18 and has been advised to start taking Neb Sol to help with her breathing. Pt can be reached @ [phone].

## 2022-04-05 ENCOUNTER — OFFICE VISIT (OUTPATIENT)
Dept: PULMONOLOGY | Facility: CLINIC | Age: 77
End: 2022-04-05

## 2022-04-05 VITALS
DIASTOLIC BLOOD PRESSURE: 90 MMHG | HEART RATE: 82 BPM | TEMPERATURE: 96.9 F | OXYGEN SATURATION: 93 % | SYSTOLIC BLOOD PRESSURE: 124 MMHG | HEIGHT: 63 IN | BODY MASS INDEX: 32.74 KG/M2 | WEIGHT: 184.8 LBS

## 2022-04-05 DIAGNOSIS — Z99.89 OSA ON CPAP: ICD-10-CM

## 2022-04-05 DIAGNOSIS — J44.9 COPD MIXED TYPE: Primary | ICD-10-CM

## 2022-04-05 DIAGNOSIS — J96.11 CHRONIC RESPIRATORY FAILURE WITH HYPOXIA: ICD-10-CM

## 2022-04-05 DIAGNOSIS — G47.33 OSA ON CPAP: ICD-10-CM

## 2022-04-05 DIAGNOSIS — J47.9 IDIOPATHIC BRONCHIECTASIS: ICD-10-CM

## 2022-04-05 PROCEDURE — 99214 OFFICE O/P EST MOD 30 MIN: CPT | Performed by: NURSE PRACTITIONER

## 2022-04-05 RX ORDER — PREDNISONE 10 MG/1
10 TABLET ORAL DAILY
Qty: 30 TABLET | Refills: 2 | Status: ON HOLD | OUTPATIENT
Start: 2022-04-18 | End: 2022-04-11

## 2022-04-05 RX ORDER — DOXYCYCLINE HYCLATE 100 MG/1
100 CAPSULE ORAL 2 TIMES DAILY
Qty: 20 CAPSULE | Refills: 0 | Status: ON HOLD | OUTPATIENT
Start: 2022-04-05 | End: 2022-04-11

## 2022-04-05 RX ORDER — PREDNISONE 10 MG/1
TABLET ORAL
Qty: 31 TABLET | Refills: 0 | Status: ON HOLD | OUTPATIENT
Start: 2022-04-05 | End: 2022-04-11

## 2022-04-05 NOTE — PROGRESS NOTES
"[facility] Pulmonary Follow up      Chief Complaint  Shortness of Breath (Follow up ) and COPD    Subjective          Lorrie Pagan presents to [facility] for worsening shortness of breath.  She has had worsening shortness of breath the last several weeks.  Especially with any activities of daily living.  Now she gets very winded just walking down the ri[place].  She is also been complaining of worsening edema.  She has taken her Lasix extra for 3 days with minimal results.  She has had some cough and congestion but with clear sputum.  She denies any fevers or chills.  She does complain of ANCA is generalized as well as sleepiness.    She did see her primary care provider last week and was sent to the emergency department due to low oxygen.  She did have an ABG with a normal PO2.  Her oxygen saturations in the ER were 98 to 100% on 4 L.  Her BNP and troponin were both normal.    She does continue on her Trelegy daily.  She has albuterol nebulizers at home but have not been using them lately.  She needs new tubing.         Objective     Vital Signs:   /90   Pulse 82   Temp 96.9 °F (36.1 °C)   Ht 160 cm (63\")   Wt 83.8 kg (184 lb 12.8 oz)   SpO2 93% Comment: 5 liters of oxygen at rest  BMI 32.74 kg/m²       Immunization History   Administered Date(s) Administered   • COVID-19 (MODERNA) 1st, 2nd, 3rd Dose Only 02/13/2021, 03/13/2021, 09/17/2021   • Flu Vaccine Quad PF >36MO 10/31/2019, 10/25/2020   • Flu Vaccine Split Quad 10/31/2019   • Fluzone High Dose =>65 Years ([employer] ONLY) 10/28/2018, 10/21/2019, 10/16/2020   • Fluzone High-Dose 65+yrs 11/17/2021   • Influenza TIV (IM) 11/01/2017   • Pneumococcal Conjugate 13-Valent (PCV13) 08/03/2018, 10/28/2018   • Pneumococcal Polysaccharide (PPSV23) 10/16/2020   • Pneumococcal, Unspecified 01/01/2016   • Shingrix 03/31/2021       Physical Exam  Vitals and nursing note reviewed.   Constitutional:       General: " She is not in acute distress.     Appearance: She is well-developed. She is obese. She is not ill-appearing.   HENT:      Head: Normocephalic and atraumatic.   Eyes:      Pupils: Pupils are equal, round, and reactive to light.   Cardiovascular:      Rate and Rhythm: Normal rate and regular rhythm.      Heart sounds: No murmur heard.  Pulmonary:      Effort: Pulmonary effort is normal. No respiratory distress.      Breath sounds: Normal breath sounds. No wheezing or rales.   Abdominal:      General: Bowel sounds are normal. There is no distension.      Palpations: Abdomen is soft.   Musculoskeletal:         General: Normal range of motion.      Cervical back: Normal range of motion and neck supple.      Right lower leg: Edema present.      Left lower leg: Edema present.   Skin:     General: Skin is warm and dry.      Findings: No erythema.   Neurological:      Mental Status: She is alert and oriented to person, place, and time.   Psychiatric:         Behavior: Behavior normal.          Result Review :       Data reviewed: Radiologic studies Chest x-ray done in the ED. and Recent hospitalization notes From her emergency department visit                Assessment and Plan    Problem List Items Addressed This Visit        Pulmonary and Pneumonias    Bronchiectasis (CMS/HCC)    Chronic respiratory failure    Stage II, moderate, COPD - Primary    Relevant Medications    predniSONE (DELTASONE) 10 MG tablet    predniSONE (DELTASONE) 10 MG tablet (Start on 4/18/2022)       Sleep    RENETTA (Previous CPAP)    Overview     Intolerant to PAP therapy                 We reviewed the results from her recent ER visit.  Does not appear she has an acute pneumonia but she certainly has an exacerbation of her COPD.  We will go ahead and treat her with a round of antibiotics and steroids.  I encouraged her to use her albuterol nebulizers 2-3 times a day for the next several days to help open up her airways.  Continue to monitor her oxygen  saturations at home.  Decrease her oxygen as tolerated to keep her sats greater than 88%.  Encouraged her to continue with her activity to help mobilize her secretions.    We discussed she may need to stay on a bit of chronic prednisone for 2 to 3 weeks after her taper, since she has had some issues with recurrent exacerbations lately.    Follow-up here in the office in 4 to 6 weeks.        Follow Up     No follow-ups on file.  Patient was given instructions and counseling regarding her condition or for health maintenance advice. Please see specific information pulled into the AVS if appropriate.     I spent 35 minutes caring for Lorrie on this date of service. This time includes time spent by me in the following activities:preparing for the visit, reviewing tests, obtaining and/or reviewing a separately obtained history, performing a medically appropriate examination and/or evaluation , counseling and educating the patient/family/caregiver, ordering medications, tests, or procedures, referring and communicating with other health care professionals , documenting information in the medical record and independently interpreting results and communicating that information with the patient/family/caregiver    Moderate level of Medical Decision Making complexity based on 2 or more chronic stable illnesses and prescription drug management.    Saray Retana APRN, ACNP  [facility]  Electronically signed

## 2022-04-08 ENCOUNTER — TELEPHONE (OUTPATIENT)
Dept: PULMONOLOGY | Facility: CLINIC | Age: 77
End: 2022-04-08

## 2022-04-08 NOTE — TELEPHONE ENCOUNTER
Pt called with requested update, breathing is better and allergy symptoms have improved. She wanted to know if she should keep her scheduled appointment for her PFT on 4/18 or move it out a couple of weeks?

## 2022-04-10 ENCOUNTER — HOSPITAL ENCOUNTER (EMERGENCY)
Facility: HOSPITAL | Age: 77
Discharge: HOME | End: 2022-04-10
Attending: EMERGENCY MEDICINE | Admitting: EMERGENCY MEDICINE

## 2022-04-10 ENCOUNTER — APPOINTMENT (OUTPATIENT)
Dept: GENERAL RADIOLOGY | Facility: HOSPITAL | Age: 77
End: 2022-04-10

## 2022-04-10 ENCOUNTER — APPOINTMENT (OUTPATIENT)
Dept: CT IMAGING | Facility: HOSPITAL | Age: 77
End: 2022-04-10

## 2022-04-10 VITALS
WEIGHT: 160 LBS | HEART RATE: 86 BPM | OXYGEN SATURATION: 94 % | TEMPERATURE: 98.2 F | RESPIRATION RATE: 20 BRPM | HEIGHT: 63 IN | BODY MASS INDEX: 28.35 KG/M2 | DIASTOLIC BLOOD PRESSURE: 62 MMHG | SYSTOLIC BLOOD PRESSURE: 146 MMHG

## 2022-04-10 DIAGNOSIS — J43.9 PULMONARY EMPHYSEMA, UNSPECIFIED EMPHYSEMA TYPE: ICD-10-CM

## 2022-04-10 DIAGNOSIS — J21.1 ACUTE BRONCHIOLITIS DUE TO HUMAN METAPNEUMOVIRUS: Primary | ICD-10-CM

## 2022-04-10 LAB
ALBUMIN SERPL-MCNC: 3.8 G/DL (ref 3.5–5.2)
ALBUMIN/GLOB SERPL: 1.3 G/DL
ALP SERPL-CCNC: 117 U/L (ref 39–117)
ALT SERPL W P-5'-P-CCNC: 9 U/L (ref 1–33)
ANION GAP SERPL CALCULATED.3IONS-SCNC: 9 MMOL/L (ref 5–15)
AST SERPL-CCNC: 17 U/L (ref 1–32)
B PARAPERT DNA SPEC QL NAA+PROBE: NOT DETECTED
B PERT DNA SPEC QL NAA+PROBE: NOT DETECTED
BASOPHILS # BLD AUTO: 0.03 10*3/MM3 (ref 0–0.2)
BASOPHILS NFR BLD AUTO: 0.4 % (ref 0–1.5)
BILIRUB SERPL-MCNC: 0.3 MG/DL (ref 0–1.2)
BUN SERPL-MCNC: 11 MG/DL (ref 8–23)
BUN/CREAT SERPL: 17.2 (ref 7–25)
C PNEUM DNA NPH QL NAA+NON-PROBE: NOT DETECTED
CALCIUM SPEC-SCNC: 8.8 MG/DL (ref 8.6–10.5)
CHLORIDE SERPL-SCNC: 98 MMOL/L (ref 98–107)
CO2 SERPL-SCNC: 32 MMOL/L (ref 22–29)
CREAT SERPL-MCNC: 0.64 MG/DL (ref 0.57–1)
DEPRECATED RDW RBC AUTO: 49.5 FL (ref 37–54)
EGFRCR SERPLBLD CKD-EPI 2021: 90.6 ML/MIN/1.73
EOSINOPHIL # BLD AUTO: 0.09 10*3/MM3 (ref 0–0.4)
EOSINOPHIL NFR BLD AUTO: 1.2 % (ref 0.3–6.2)
ERYTHROCYTE [DISTWIDTH] IN BLOOD BY AUTOMATED COUNT: 16 % (ref 12.3–15.4)
FLUAV SUBTYP SPEC NAA+PROBE: NOT DETECTED
FLUBV RNA ISLT QL NAA+PROBE: NOT DETECTED
GLOBULIN UR ELPH-MCNC: 3 GM/DL
GLUCOSE SERPL-MCNC: 125 MG/DL (ref 65–99)
HADV DNA SPEC NAA+PROBE: NOT DETECTED
HCOV 229E RNA SPEC QL NAA+PROBE: NOT DETECTED
HCOV HKU1 RNA SPEC QL NAA+PROBE: NOT DETECTED
HCOV NL63 RNA SPEC QL NAA+PROBE: NOT DETECTED
HCOV OC43 RNA SPEC QL NAA+PROBE: NOT DETECTED
HCT VFR BLD AUTO: 38 % (ref 34–46.6)
HGB BLD-MCNC: 11.5 G/DL (ref 12–15.9)
HMPV RNA NPH QL NAA+NON-PROBE: DETECTED
HOLD SPECIMEN: NORMAL
HPIV1 RNA ISLT QL NAA+PROBE: NOT DETECTED
HPIV2 RNA SPEC QL NAA+PROBE: NOT DETECTED
HPIV3 RNA NPH QL NAA+PROBE: NOT DETECTED
HPIV4 P GENE NPH QL NAA+PROBE: NOT DETECTED
IMM GRANULOCYTES # BLD AUTO: 0.08 10*3/MM3 (ref 0–0.05)
IMM GRANULOCYTES NFR BLD AUTO: 1.1 % (ref 0–0.5)
LYMPHOCYTES # BLD AUTO: 1.35 10*3/MM3 (ref 0.7–3.1)
LYMPHOCYTES NFR BLD AUTO: 18.5 % (ref 19.6–45.3)
M PNEUMO IGG SER IA-ACNC: NOT DETECTED
MCH RBC QN AUTO: 25.9 PG (ref 26.6–33)
MCHC RBC AUTO-ENTMCNC: 30.3 G/DL (ref 31.5–35.7)
MCV RBC AUTO: 85.6 FL (ref 79–97)
MONOCYTES # BLD AUTO: 0.63 10*3/MM3 (ref 0.1–0.9)
MONOCYTES NFR BLD AUTO: 8.6 % (ref 5–12)
NEUTROPHILS NFR BLD AUTO: 5.13 10*3/MM3 (ref 1.7–7)
NEUTROPHILS NFR BLD AUTO: 70.2 % (ref 42.7–76)
NRBC BLD AUTO-RTO: 0 /100 WBC (ref 0–0.2)
NT-PROBNP SERPL-MCNC: 19.5 PG/ML (ref 0–1800)
PLATELET # BLD AUTO: 351 10*3/MM3 (ref 140–450)
PMV BLD AUTO: 9.5 FL (ref 6–12)
POTASSIUM SERPL-SCNC: 3.5 MMOL/L (ref 3.5–5.2)
PROT SERPL-MCNC: 6.8 G/DL (ref 6–8.5)
QT INTERVAL: 362 MS
QTC INTERVAL: 452 MS
RBC # BLD AUTO: 4.44 10*6/MM3 (ref 3.77–5.28)
RHINOVIRUS RNA SPEC NAA+PROBE: NOT DETECTED
RSV RNA NPH QL NAA+NON-PROBE: NOT DETECTED
SARS-COV-2 RNA NPH QL NAA+NON-PROBE: NOT DETECTED
SODIUM SERPL-SCNC: 139 MMOL/L (ref 136–145)
TROPONIN T SERPL-MCNC: <0.01 NG/ML (ref 0–0.03)
WBC NRBC COR # BLD: 7.31 10*3/MM3 (ref 3.4–10.8)
WHOLE BLOOD HOLD SPECIMEN: NORMAL
WHOLE BLOOD HOLD SPECIMEN: NORMAL

## 2022-04-10 PROCEDURE — 25010000002 METHYLPREDNISOLONE PER 125 MG: Performed by: NURSE PRACTITIONER

## 2022-04-10 PROCEDURE — 0 IOPAMIDOL PER 1 ML: Performed by: EMERGENCY MEDICINE

## 2022-04-10 PROCEDURE — 85025 COMPLETE CBC W/AUTO DIFF WBC: CPT | Performed by: EMERGENCY MEDICINE

## 2022-04-10 PROCEDURE — 71275 CT ANGIOGRAPHY CHEST: CPT

## 2022-04-10 PROCEDURE — 83880 ASSAY OF NATRIURETIC PEPTIDE: CPT | Performed by: EMERGENCY MEDICINE

## 2022-04-10 PROCEDURE — 94799 UNLISTED PULMONARY SVC/PX: CPT

## 2022-04-10 PROCEDURE — 80053 COMPREHEN METABOLIC PANEL: CPT | Performed by: EMERGENCY MEDICINE

## 2022-04-10 PROCEDURE — 99284 EMERGENCY DEPT VISIT MOD MDM: CPT

## 2022-04-10 PROCEDURE — 93005 ELECTROCARDIOGRAM TRACING: CPT | Performed by: EMERGENCY MEDICINE

## 2022-04-10 PROCEDURE — 96374 THER/PROPH/DIAG INJ IV PUSH: CPT

## 2022-04-10 PROCEDURE — 84484 ASSAY OF TROPONIN QUANT: CPT | Performed by: EMERGENCY MEDICINE

## 2022-04-10 PROCEDURE — 94640 AIRWAY INHALATION TREATMENT: CPT

## 2022-04-10 PROCEDURE — 71045 X-RAY EXAM CHEST 1 VIEW: CPT

## 2022-04-10 PROCEDURE — 0202U NFCT DS 22 TRGT SARS-COV-2: CPT | Performed by: NURSE PRACTITIONER

## 2022-04-10 RX ORDER — IPRATROPIUM BROMIDE AND ALBUTEROL SULFATE 2.5; .5 MG/3ML; MG/3ML
3 SOLUTION RESPIRATORY (INHALATION) ONCE
Status: COMPLETED | OUTPATIENT
Start: 2022-04-10 | End: 2022-04-10

## 2022-04-10 RX ORDER — SODIUM CHLORIDE 0.9 % (FLUSH) 0.9 %
10 SYRINGE (ML) INJECTION AS NEEDED
Status: DISCONTINUED | OUTPATIENT
Start: 2022-04-10 | End: 2022-04-10 | Stop reason: HOSPADM

## 2022-04-10 RX ORDER — METHYLPREDNISOLONE SODIUM SUCCINATE 125 MG/2ML
125 INJECTION, POWDER, LYOPHILIZED, FOR SOLUTION INTRAMUSCULAR; INTRAVENOUS ONCE
Status: COMPLETED | OUTPATIENT
Start: 2022-04-10 | End: 2022-04-10

## 2022-04-10 RX ADMIN — IOPAMIDOL 85 ML: 755 INJECTION, SOLUTION INTRAVENOUS at 11:44

## 2022-04-10 RX ADMIN — IPRATROPIUM BROMIDE AND ALBUTEROL SULFATE 3 ML: .5; 3 SOLUTION RESPIRATORY (INHALATION) at 10:03

## 2022-04-10 RX ADMIN — METHYLPREDNISOLONE SODIUM SUCCINATE 125 MG: 125 INJECTION, POWDER, FOR SOLUTION INTRAMUSCULAR; INTRAVENOUS at 09:28

## 2022-04-11 ENCOUNTER — TELEPHONE (OUTPATIENT)
Dept: OTHER | Facility: HOSPITAL | Age: 77
End: 2022-04-11

## 2022-04-11 ENCOUNTER — DOCUMENTATION (OUTPATIENT)
Dept: PULMONOLOGY | Facility: CLINIC | Age: 77
End: 2022-04-11

## 2022-04-11 ENCOUNTER — HOSPITAL ENCOUNTER (INPATIENT)
Facility: HOSPITAL | Age: 77
LOS: 3 days | Discharge: HOME | End: 2022-04-15
Attending: EMERGENCY MEDICINE | Admitting: FAMILY MEDICINE

## 2022-04-11 ENCOUNTER — APPOINTMENT (OUTPATIENT)
Dept: GENERAL RADIOLOGY | Facility: HOSPITAL | Age: 77
End: 2022-04-11

## 2022-04-11 DIAGNOSIS — R91.8 BILATERAL PULMONARY INFILTRATES ON CXR: ICD-10-CM

## 2022-04-11 DIAGNOSIS — R06.03 ACUTE RESPIRATORY DISTRESS: Primary | ICD-10-CM

## 2022-04-11 DIAGNOSIS — J44.1 COPD WITH ACUTE EXACERBATION: ICD-10-CM

## 2022-04-11 DIAGNOSIS — R11.0 NAUSEA: ICD-10-CM

## 2022-04-11 PROBLEM — J96.21 ACUTE ON CHRONIC RESPIRATORY FAILURE WITH HYPOXIA (HCC): Status: ACTIVE | Noted: 2022-04-11

## 2022-04-11 LAB
ALBUMIN SERPL-MCNC: 4.2 G/DL (ref 3.5–5.2)
ALBUMIN/GLOB SERPL: 1.3 G/DL
ALP SERPL-CCNC: 118 U/L (ref 39–117)
ALT SERPL W P-5'-P-CCNC: 10 U/L (ref 1–33)
ANION GAP SERPL CALCULATED.3IONS-SCNC: 9 MMOL/L (ref 5–15)
AST SERPL-CCNC: 16 U/L (ref 1–32)
BASOPHILS # BLD AUTO: 0.03 10*3/MM3 (ref 0–0.2)
BASOPHILS NFR BLD AUTO: 0.3 % (ref 0–1.5)
BILIRUB SERPL-MCNC: 0.2 MG/DL (ref 0–1.2)
BUN SERPL-MCNC: 17 MG/DL (ref 8–23)
BUN/CREAT SERPL: 27.9 (ref 7–25)
CALCIUM SPEC-SCNC: 9.5 MG/DL (ref 8.6–10.5)
CHLORIDE SERPL-SCNC: 102 MMOL/L (ref 98–107)
CO2 SERPL-SCNC: 32 MMOL/L (ref 22–29)
CREAT SERPL-MCNC: 0.61 MG/DL (ref 0.57–1)
D-LACTATE SERPL-SCNC: 1.4 MMOL/L (ref 0.5–2)
DEPRECATED RDW RBC AUTO: 49.2 FL (ref 37–54)
EGFRCR SERPLBLD CKD-EPI 2021: 91.6 ML/MIN/1.73
EOSINOPHIL # BLD AUTO: 0 10*3/MM3 (ref 0–0.4)
EOSINOPHIL NFR BLD AUTO: 0 % (ref 0.3–6.2)
ERYTHROCYTE [DISTWIDTH] IN BLOOD BY AUTOMATED COUNT: 16.1 % (ref 12.3–15.4)
FLUAV RNA RESP QL NAA+PROBE: NOT DETECTED
FLUBV RNA RESP QL NAA+PROBE: NOT DETECTED
GLOBULIN UR ELPH-MCNC: 3.3 GM/DL
GLUCOSE BLDC GLUCOMTR-MCNC: 152 MG/DL (ref 70–130)
GLUCOSE SERPL-MCNC: 106 MG/DL (ref 65–99)
HBA1C MFR BLD: 7 % (ref 4.8–5.6)
HCT VFR BLD AUTO: 40.7 % (ref 34–46.6)
HGB BLD-MCNC: 12.3 G/DL (ref 12–15.9)
HOLD SPECIMEN: NORMAL
IMM GRANULOCYTES # BLD AUTO: 0.14 10*3/MM3 (ref 0–0.05)
IMM GRANULOCYTES NFR BLD AUTO: 1.2 % (ref 0–0.5)
LYMPHOCYTES # BLD AUTO: 1.8 10*3/MM3 (ref 0.7–3.1)
LYMPHOCYTES NFR BLD AUTO: 16 % (ref 19.6–45.3)
MCH RBC QN AUTO: 25.6 PG (ref 26.6–33)
MCHC RBC AUTO-ENTMCNC: 30.2 G/DL (ref 31.5–35.7)
MCV RBC AUTO: 84.6 FL (ref 79–97)
MONOCYTES # BLD AUTO: 1.03 10*3/MM3 (ref 0.1–0.9)
MONOCYTES NFR BLD AUTO: 9.1 % (ref 5–12)
NEUTROPHILS NFR BLD AUTO: 73.4 % (ref 42.7–76)
NEUTROPHILS NFR BLD AUTO: 8.27 10*3/MM3 (ref 1.7–7)
NRBC BLD AUTO-RTO: 0 /100 WBC (ref 0–0.2)
NT-PROBNP SERPL-MCNC: 169.2 PG/ML (ref 0–1800)
PLATELET # BLD AUTO: 363 10*3/MM3 (ref 140–450)
PMV BLD AUTO: 9.3 FL (ref 6–12)
POTASSIUM SERPL-SCNC: 3.8 MMOL/L (ref 3.5–5.2)
PROCALCITONIN SERPL-MCNC: 0.05 NG/ML (ref 0–0.25)
PROT SERPL-MCNC: 7.5 G/DL (ref 6–8.5)
QT INTERVAL: 348 MS
QTC INTERVAL: 401 MS
RBC # BLD AUTO: 4.81 10*6/MM3 (ref 3.77–5.28)
SARS-COV-2 RNA RESP QL NAA+PROBE: NOT DETECTED
SODIUM SERPL-SCNC: 143 MMOL/L (ref 136–145)
TROPONIN T SERPL-MCNC: <0.01 NG/ML (ref 0–0.03)
WBC NRBC COR # BLD: 11.27 10*3/MM3 (ref 3.4–10.8)
WHOLE BLOOD HOLD SPECIMEN: NORMAL
WHOLE BLOOD HOLD SPECIMEN: NORMAL

## 2022-04-11 PROCEDURE — 94799 UNLISTED PULMONARY SVC/PX: CPT

## 2022-04-11 PROCEDURE — 25010000002 HEPARIN (PORCINE) PER 1000 UNITS: Performed by: NURSE PRACTITIONER

## 2022-04-11 PROCEDURE — 94640 AIRWAY INHALATION TREATMENT: CPT

## 2022-04-11 PROCEDURE — 83605 ASSAY OF LACTIC ACID: CPT | Performed by: EMERGENCY MEDICINE

## 2022-04-11 PROCEDURE — 25010000002 METHYLPREDNISOLONE PER 125 MG: Performed by: EMERGENCY MEDICINE

## 2022-04-11 PROCEDURE — 87636 SARSCOV2 & INF A&B AMP PRB: CPT | Performed by: EMERGENCY MEDICINE

## 2022-04-11 PROCEDURE — 87040 BLOOD CULTURE FOR BACTERIA: CPT | Performed by: EMERGENCY MEDICINE

## 2022-04-11 PROCEDURE — 83036 HEMOGLOBIN GLYCOSYLATED A1C: CPT | Performed by: NURSE PRACTITIONER

## 2022-04-11 PROCEDURE — 85025 COMPLETE CBC W/AUTO DIFF WBC: CPT | Performed by: EMERGENCY MEDICINE

## 2022-04-11 PROCEDURE — 84145 PROCALCITONIN (PCT): CPT | Performed by: NURSE PRACTITIONER

## 2022-04-11 PROCEDURE — 99219 PR INITIAL OBSERVATION CARE/DAY 50 MINUTES: CPT | Performed by: NURSE PRACTITIONER

## 2022-04-11 PROCEDURE — 82962 GLUCOSE BLOOD TEST: CPT

## 2022-04-11 PROCEDURE — 71045 X-RAY EXAM CHEST 1 VIEW: CPT

## 2022-04-11 PROCEDURE — 93005 ELECTROCARDIOGRAM TRACING: CPT | Performed by: EMERGENCY MEDICINE

## 2022-04-11 PROCEDURE — 63710000001 INSULIN LISPRO (HUMAN) PER 5 UNITS: Performed by: NURSE PRACTITIONER

## 2022-04-11 PROCEDURE — G0378 HOSPITAL OBSERVATION PER HR: HCPCS

## 2022-04-11 PROCEDURE — 99284 EMERGENCY DEPT VISIT MOD MDM: CPT

## 2022-04-11 PROCEDURE — 25010000002 LEVOFLOXACIN PER 250 MG: Performed by: EMERGENCY MEDICINE

## 2022-04-11 PROCEDURE — 80053 COMPREHEN METABOLIC PANEL: CPT | Performed by: EMERGENCY MEDICINE

## 2022-04-11 PROCEDURE — 84484 ASSAY OF TROPONIN QUANT: CPT | Performed by: EMERGENCY MEDICINE

## 2022-04-11 PROCEDURE — 83880 ASSAY OF NATRIURETIC PEPTIDE: CPT | Performed by: EMERGENCY MEDICINE

## 2022-04-11 RX ORDER — ONDANSETRON 4 MG/1
4 TABLET, FILM COATED ORAL EVERY 6 HOURS PRN
Status: DISCONTINUED | OUTPATIENT
Start: 2022-04-11 | End: 2022-04-15 | Stop reason: HOSPADM

## 2022-04-11 RX ORDER — IPRATROPIUM BROMIDE AND ALBUTEROL SULFATE 2.5; .5 MG/3ML; MG/3ML
3 SOLUTION RESPIRATORY (INHALATION)
Status: DISCONTINUED | OUTPATIENT
Start: 2022-04-11 | End: 2022-04-15 | Stop reason: HOSPADM

## 2022-04-11 RX ORDER — TIZANIDINE 4 MG/1
2 TABLET ORAL NIGHTLY PRN
Status: DISCONTINUED | OUTPATIENT
Start: 2022-04-11 | End: 2022-04-15 | Stop reason: HOSPADM

## 2022-04-11 RX ORDER — GUAIFENESIN/DEXTROMETHORPHAN 100-10MG/5
10 SYRUP ORAL EVERY 6 HOURS PRN
Status: DISCONTINUED | OUTPATIENT
Start: 2022-04-11 | End: 2022-04-15 | Stop reason: HOSPADM

## 2022-04-11 RX ORDER — LEVOFLOXACIN 5 MG/ML
750 INJECTION, SOLUTION INTRAVENOUS EVERY 24 HOURS
Status: DISCONTINUED | OUTPATIENT
Start: 2022-04-12 | End: 2022-04-12

## 2022-04-11 RX ORDER — CHOLESTYRAMINE LIGHT 4 G/5.7G
1 POWDER, FOR SUSPENSION ORAL EVERY 12 HOURS SCHEDULED
Status: DISCONTINUED | OUTPATIENT
Start: 2022-04-11 | End: 2022-04-12

## 2022-04-11 RX ORDER — DONEPEZIL HYDROCHLORIDE 5 MG/1
20 TABLET, FILM COATED ORAL NIGHTLY
Status: DISCONTINUED | OUTPATIENT
Start: 2022-04-11 | End: 2022-04-15 | Stop reason: HOSPADM

## 2022-04-11 RX ORDER — ROPINIROLE 2 MG/1
8 TABLET, FILM COATED ORAL EVERY 8 HOURS SCHEDULED
Status: DISCONTINUED | OUTPATIENT
Start: 2022-04-11 | End: 2022-04-12

## 2022-04-11 RX ORDER — DEXTROSE MONOHYDRATE 25 G/50ML
25 INJECTION, SOLUTION INTRAVENOUS
Status: DISCONTINUED | OUTPATIENT
Start: 2022-04-11 | End: 2022-04-15 | Stop reason: HOSPADM

## 2022-04-11 RX ORDER — LEVOFLOXACIN 5 MG/ML
750 INJECTION, SOLUTION INTRAVENOUS ONCE
Status: COMPLETED | OUTPATIENT
Start: 2022-04-11 | End: 2022-04-11

## 2022-04-11 RX ORDER — GLIMEPIRIDE 2 MG/1
1 TABLET ORAL DAILY
Status: DISCONTINUED | OUTPATIENT
Start: 2022-04-12 | End: 2022-04-15 | Stop reason: HOSPADM

## 2022-04-11 RX ORDER — BUDESONIDE AND FORMOTEROL FUMARATE DIHYDRATE 160; 4.5 UG/1; UG/1
2 AEROSOL RESPIRATORY (INHALATION)
Status: DISCONTINUED | OUTPATIENT
Start: 2022-04-11 | End: 2022-04-15 | Stop reason: HOSPADM

## 2022-04-11 RX ORDER — LATANOPROST 50 UG/ML
1 SOLUTION/ DROPS OPHTHALMIC NIGHTLY
Status: DISCONTINUED | OUTPATIENT
Start: 2022-04-11 | End: 2022-04-15 | Stop reason: HOSPADM

## 2022-04-11 RX ORDER — SACCHAROMYCES BOULARDII 250 MG
250 CAPSULE ORAL 2 TIMES DAILY
Status: DISCONTINUED | OUTPATIENT
Start: 2022-04-11 | End: 2022-04-15 | Stop reason: HOSPADM

## 2022-04-11 RX ORDER — NICOTINE POLACRILEX 4 MG
15 LOZENGE BUCCAL
Status: DISCONTINUED | OUTPATIENT
Start: 2022-04-11 | End: 2022-04-15 | Stop reason: HOSPADM

## 2022-04-11 RX ORDER — FUROSEMIDE 40 MG/1
40 TABLET ORAL DAILY
Status: DISCONTINUED | OUTPATIENT
Start: 2022-04-11 | End: 2022-04-15 | Stop reason: HOSPADM

## 2022-04-11 RX ORDER — METOLAZONE 2.5 MG/1
2.5 TABLET ORAL DAILY
COMMUNITY
End: 2022-10-13 | Stop reason: SDUPTHER

## 2022-04-11 RX ORDER — METHYLPREDNISOLONE SODIUM SUCCINATE 125 MG/2ML
62.5 INJECTION, POWDER, LYOPHILIZED, FOR SOLUTION INTRAMUSCULAR; INTRAVENOUS DAILY
Status: DISCONTINUED | OUTPATIENT
Start: 2022-04-12 | End: 2022-04-15 | Stop reason: HOSPADM

## 2022-04-11 RX ORDER — METHYLPREDNISOLONE SODIUM SUCCINATE 125 MG/2ML
125 INJECTION, POWDER, LYOPHILIZED, FOR SOLUTION INTRAMUSCULAR; INTRAVENOUS ONCE
Status: COMPLETED | OUTPATIENT
Start: 2022-04-11 | End: 2022-04-11

## 2022-04-11 RX ORDER — POTASSIUM CHLORIDE 750 MG/1
30 CAPSULE, EXTENDED RELEASE ORAL 2 TIMES DAILY WITH MEALS
Status: DISCONTINUED | OUTPATIENT
Start: 2022-04-11 | End: 2022-04-15 | Stop reason: HOSPADM

## 2022-04-11 RX ORDER — IPRATROPIUM BROMIDE AND ALBUTEROL SULFATE 2.5; .5 MG/3ML; MG/3ML
3 SOLUTION RESPIRATORY (INHALATION) ONCE
Status: COMPLETED | OUTPATIENT
Start: 2022-04-11 | End: 2022-04-11

## 2022-04-11 RX ORDER — HEPARIN SODIUM 5000 [USP'U]/ML
5000 INJECTION, SOLUTION INTRAVENOUS; SUBCUTANEOUS EVERY 8 HOURS SCHEDULED
Status: DISCONTINUED | OUTPATIENT
Start: 2022-04-11 | End: 2022-04-15 | Stop reason: HOSPADM

## 2022-04-11 RX ORDER — FLUTICASONE PROPIONATE 50 MCG
2 SPRAY, SUSPENSION (ML) NASAL DAILY
Status: DISCONTINUED | OUTPATIENT
Start: 2022-04-11 | End: 2022-04-15 | Stop reason: HOSPADM

## 2022-04-11 RX ORDER — MEMANTINE HYDROCHLORIDE 5 MG/1
5 TABLET ORAL DAILY
Status: DISCONTINUED | OUTPATIENT
Start: 2022-04-11 | End: 2022-04-15 | Stop reason: HOSPADM

## 2022-04-11 RX ORDER — ALBUTEROL SULFATE 2.5 MG/3ML
2.5 SOLUTION RESPIRATORY (INHALATION) EVERY 4 HOURS PRN
Status: DISCONTINUED | OUTPATIENT
Start: 2022-04-11 | End: 2022-04-15 | Stop reason: HOSPADM

## 2022-04-11 RX ORDER — SODIUM CHLORIDE 0.9 % (FLUSH) 0.9 %
10 SYRINGE (ML) INJECTION AS NEEDED
Status: DISCONTINUED | OUTPATIENT
Start: 2022-04-11 | End: 2022-04-15 | Stop reason: HOSPADM

## 2022-04-11 RX ORDER — DULOXETIN HYDROCHLORIDE 60 MG/1
60 CAPSULE, DELAYED RELEASE ORAL DAILY
Status: DISCONTINUED | OUTPATIENT
Start: 2022-04-11 | End: 2022-04-15 | Stop reason: HOSPADM

## 2022-04-11 RX ORDER — AZELASTINE 1 MG/ML
2 SPRAY, METERED NASAL 2 TIMES DAILY
Status: DISCONTINUED | OUTPATIENT
Start: 2022-04-11 | End: 2022-04-15 | Stop reason: HOSPADM

## 2022-04-11 RX ORDER — FAMOTIDINE 20 MG/1
20 TABLET, FILM COATED ORAL
Status: DISCONTINUED | OUTPATIENT
Start: 2022-04-11 | End: 2022-04-15 | Stop reason: HOSPADM

## 2022-04-11 RX ADMIN — ROPINIROLE HYDROCHLORIDE 8 MG: 2 TABLET, FILM COATED ORAL at 22:24

## 2022-04-11 RX ADMIN — LATANOPROST 1 DROP: 50 SOLUTION OPHTHALMIC at 22:26

## 2022-04-11 RX ADMIN — INSULIN LISPRO 2 UNITS: 100 INJECTION, SOLUTION INTRAVENOUS; SUBCUTANEOUS at 19:21

## 2022-04-11 RX ADMIN — FLUTICASONE PROPIONATE 2 SPRAY: 50 SPRAY, METERED NASAL at 19:44

## 2022-04-11 RX ADMIN — HEPARIN SODIUM 5000 UNITS: 5000 INJECTION, SOLUTION INTRAVENOUS; SUBCUTANEOUS at 22:25

## 2022-04-11 RX ADMIN — FAMOTIDINE 20 MG: 20 TABLET, FILM COATED ORAL at 19:44

## 2022-04-11 RX ADMIN — BUDESONIDE AND FORMOTEROL FUMARATE DIHYDRATE 2 PUFF: 160; 4.5 AEROSOL RESPIRATORY (INHALATION) at 20:20

## 2022-04-11 RX ADMIN — LEVOFLOXACIN 750 MG: 750 INJECTION, SOLUTION INTRAVENOUS at 15:22

## 2022-04-11 RX ADMIN — DONEPEZIL HYDROCHLORIDE 20 MG: 5 TABLET ORAL at 22:24

## 2022-04-11 RX ADMIN — IPRATROPIUM BROMIDE AND ALBUTEROL SULFATE 3 ML: .5; 2.5 SOLUTION RESPIRATORY (INHALATION) at 20:20

## 2022-04-11 RX ADMIN — POTASSIUM CHLORIDE 30 MEQ: 750 CAPSULE, EXTENDED RELEASE ORAL at 19:44

## 2022-04-11 RX ADMIN — DULOXETINE HYDROCHLORIDE 60 MG: 60 CAPSULE, DELAYED RELEASE ORAL at 19:44

## 2022-04-11 RX ADMIN — METHYLPREDNISOLONE SODIUM SUCCINATE 125 MG: 125 INJECTION, POWDER, FOR SOLUTION INTRAMUSCULAR; INTRAVENOUS at 14:47

## 2022-04-11 RX ADMIN — IPRATROPIUM BROMIDE AND ALBUTEROL SULFATE 3 ML: .5; 3 SOLUTION RESPIRATORY (INHALATION) at 14:56

## 2022-04-11 RX ADMIN — Medication 250 MG: at 22:25

## 2022-04-11 RX ADMIN — MEMANTINE 5 MG: 5 TABLET ORAL at 19:44

## 2022-04-11 RX ADMIN — FUROSEMIDE 40 MG: 40 TABLET ORAL at 19:44

## 2022-04-11 RX ADMIN — AZELASTINE HYDROCHLORIDE 2 SPRAY: 137 SPRAY, METERED NASAL at 22:24

## 2022-04-11 NOTE — TELEPHONE ENCOUNTER
COPD Nurse Navigator called patient in regard to recent ED visit 4/10/2022.  The patient states that her symptoms are the same and she is actually about to be admitted to the hospital.  No other questions or concerns at this time.  NN continues to follow.

## 2022-04-11 NOTE — PROGRESS NOTES
Mrs. Pagan I called the office today.  She was in the emergency department yesterday with worsening shortness of breath.  I saw her on the fifth for bit of a bronchitis episode and gave her a round of antibiotics and steroids, she was unable to tolerate antibiotics and steroids but called back on the eighth and says she was feeling much better.  She presented to the ER yesterday with several days of worsening shortness of breath.  During her ER visit her CT scan did show scattered groundglass opacities and interstitial thickening likely infectious or inflammatory with possible atypical or viral pneumonia.  Her viral panel was positive for human Metapneumovirus only, her Covid was negative.    Currently she is complaining of shortness of breath.  She has been monitor her oxygen saturations in the mid 90s on her oxygen.  She does have a fairly productive cough.  She is using her nebulizers.  We did discuss continue to use her oxygen to keep her sats greater than or equal to 90%.  Encouraged her to continue with supportive measures at home.  She may need to try her prednisone again at a lower dose of 20 mg and take it on food.  At this time we did discuss with her intolerance to the antibiotics and steroids her next best option would be to evaluated in the hospital.  They did discuss staying in the hospital yesterday in the emergency department as well.

## 2022-04-12 PROBLEM — R06.03 ACUTE RESPIRATORY DISTRESS: Status: ACTIVE | Noted: 2022-04-12

## 2022-04-12 LAB
ANION GAP SERPL CALCULATED.3IONS-SCNC: 10 MMOL/L (ref 5–15)
BASOPHILS # BLD MANUAL: 0 10*3/MM3 (ref 0–0.2)
BASOPHILS NFR BLD MANUAL: 0 % (ref 0–1.5)
BUN SERPL-MCNC: 19 MG/DL (ref 8–23)
BUN/CREAT SERPL: 32.2 (ref 7–25)
CALCIUM SPEC-SCNC: 8.9 MG/DL (ref 8.6–10.5)
CHLORIDE SERPL-SCNC: 96 MMOL/L (ref 98–107)
CO2 SERPL-SCNC: 33 MMOL/L (ref 22–29)
CREAT SERPL-MCNC: 0.59 MG/DL (ref 0.57–1)
DEPRECATED RDW RBC AUTO: 48.4 FL (ref 37–54)
EGFRCR SERPLBLD CKD-EPI 2021: 92.4 ML/MIN/1.73
EOSINOPHIL # BLD MANUAL: 0 10*3/MM3 (ref 0–0.4)
EOSINOPHIL NFR BLD MANUAL: 0 % (ref 0.3–6.2)
ERYTHROCYTE [DISTWIDTH] IN BLOOD BY AUTOMATED COUNT: 15.9 % (ref 12.3–15.4)
GLUCOSE BLDC GLUCOMTR-MCNC: 155 MG/DL (ref 70–130)
GLUCOSE BLDC GLUCOMTR-MCNC: 176 MG/DL (ref 70–130)
GLUCOSE BLDC GLUCOMTR-MCNC: 199 MG/DL (ref 70–130)
GLUCOSE SERPL-MCNC: 169 MG/DL (ref 65–99)
HCT VFR BLD AUTO: 37.7 % (ref 34–46.6)
HGB BLD-MCNC: 11.4 G/DL (ref 12–15.9)
LYMPHOCYTES # BLD MANUAL: 1.41 10*3/MM3 (ref 0.7–3.1)
LYMPHOCYTES NFR BLD MANUAL: 2 % (ref 5–12)
MCH RBC QN AUTO: 25.5 PG (ref 26.6–33)
MCHC RBC AUTO-ENTMCNC: 30.2 G/DL (ref 31.5–35.7)
MCV RBC AUTO: 84.3 FL (ref 79–97)
MONOCYTES # BLD: 0.2 10*3/MM3 (ref 0.1–0.9)
NEUTROPHILS # BLD AUTO: 8.47 10*3/MM3 (ref 1.7–7)
NEUTROPHILS NFR BLD MANUAL: 75 % (ref 42.7–76)
NEUTS BAND NFR BLD MANUAL: 9 % (ref 0–5)
NRBC SPEC MANUAL: 0 /100 WBC (ref 0–0.2)
PLAT MORPH BLD: NORMAL
PLATELET # BLD AUTO: 366 10*3/MM3 (ref 140–450)
PMV BLD AUTO: 9.6 FL (ref 6–12)
POTASSIUM SERPL-SCNC: 4.1 MMOL/L (ref 3.5–5.2)
QT INTERVAL: 400 MS
QTC INTERVAL: 432 MS
RBC # BLD AUTO: 4.47 10*6/MM3 (ref 3.77–5.28)
RBC MORPH BLD: NORMAL
SODIUM SERPL-SCNC: 139 MMOL/L (ref 136–145)
VARIANT LYMPHS NFR BLD MANUAL: 1 % (ref 0–5)
VARIANT LYMPHS NFR BLD MANUAL: 13 % (ref 19.6–45.3)
WBC MORPH BLD: NORMAL
WBC NRBC COR # BLD: 10.08 10*3/MM3 (ref 3.4–10.8)

## 2022-04-12 PROCEDURE — 93005 ELECTROCARDIOGRAM TRACING: CPT | Performed by: NURSE PRACTITIONER

## 2022-04-12 PROCEDURE — 85007 BL SMEAR W/DIFF WBC COUNT: CPT | Performed by: NURSE PRACTITIONER

## 2022-04-12 PROCEDURE — 25010000002 METHYLPREDNISOLONE PER 125 MG: Performed by: NURSE PRACTITIONER

## 2022-04-12 PROCEDURE — 87205 SMEAR GRAM STAIN: CPT | Performed by: NURSE PRACTITIONER

## 2022-04-12 PROCEDURE — 63710000001 INSULIN LISPRO (HUMAN) PER 5 UNITS: Performed by: NURSE PRACTITIONER

## 2022-04-12 PROCEDURE — 93010 ELECTROCARDIOGRAM REPORT: CPT | Performed by: STUDENT IN AN ORGANIZED HEALTH CARE EDUCATION/TRAINING PROGRAM

## 2022-04-12 PROCEDURE — 85025 COMPLETE CBC W/AUTO DIFF WBC: CPT | Performed by: NURSE PRACTITIONER

## 2022-04-12 PROCEDURE — 25010000002 HEPARIN (PORCINE) PER 1000 UNITS: Performed by: NURSE PRACTITIONER

## 2022-04-12 PROCEDURE — 97161 PT EVAL LOW COMPLEX 20 MIN: CPT

## 2022-04-12 PROCEDURE — 94799 UNLISTED PULMONARY SVC/PX: CPT

## 2022-04-12 PROCEDURE — 87070 CULTURE OTHR SPECIMN AEROBIC: CPT | Performed by: NURSE PRACTITIONER

## 2022-04-12 PROCEDURE — 80048 BASIC METABOLIC PNL TOTAL CA: CPT | Performed by: NURSE PRACTITIONER

## 2022-04-12 PROCEDURE — 99233 SBSQ HOSP IP/OBS HIGH 50: CPT | Performed by: FAMILY MEDICINE

## 2022-04-12 PROCEDURE — 82962 GLUCOSE BLOOD TEST: CPT

## 2022-04-12 RX ORDER — ROPINIROLE 2 MG/1
8 TABLET, FILM COATED ORAL EVERY 12 HOURS SCHEDULED
Status: DISCONTINUED | OUTPATIENT
Start: 2022-04-13 | End: 2022-04-13

## 2022-04-12 RX ADMIN — FAMOTIDINE 20 MG: 20 TABLET, FILM COATED ORAL at 06:51

## 2022-04-12 RX ADMIN — HEPARIN SODIUM 5000 UNITS: 5000 INJECTION, SOLUTION INTRAVENOUS; SUBCUTANEOUS at 14:57

## 2022-04-12 RX ADMIN — DONEPEZIL HYDROCHLORIDE 20 MG: 5 TABLET ORAL at 22:44

## 2022-04-12 RX ADMIN — INSULIN LISPRO 2 UNITS: 100 INJECTION, SOLUTION INTRAVENOUS; SUBCUTANEOUS at 08:21

## 2022-04-12 RX ADMIN — FLUTICASONE PROPIONATE 2 SPRAY: 50 SPRAY, METERED NASAL at 08:22

## 2022-04-12 RX ADMIN — TIMOLOL MALEATE 1 DROP: 2.5 SOLUTION/ DROPS OPHTHALMIC at 08:22

## 2022-04-12 RX ADMIN — Medication 250 MG: at 08:21

## 2022-04-12 RX ADMIN — AZELASTINE HYDROCHLORIDE 2 SPRAY: 137 SPRAY, METERED NASAL at 22:45

## 2022-04-12 RX ADMIN — Medication 250 MG: at 22:45

## 2022-04-12 RX ADMIN — IPRATROPIUM BROMIDE AND ALBUTEROL SULFATE 3 ML: .5; 2.5 SOLUTION RESPIRATORY (INHALATION) at 19:31

## 2022-04-12 RX ADMIN — METHYLPREDNISOLONE SODIUM SUCCINATE 62.5 MG: 125 INJECTION, POWDER, FOR SOLUTION INTRAMUSCULAR; INTRAVENOUS at 08:22

## 2022-04-12 RX ADMIN — BUDESONIDE AND FORMOTEROL FUMARATE DIHYDRATE 2 PUFF: 160; 4.5 AEROSOL RESPIRATORY (INHALATION) at 19:31

## 2022-04-12 RX ADMIN — BUDESONIDE AND FORMOTEROL FUMARATE DIHYDRATE 2 PUFF: 160; 4.5 AEROSOL RESPIRATORY (INHALATION) at 07:22

## 2022-04-12 RX ADMIN — HEPARIN SODIUM 5000 UNITS: 5000 INJECTION, SOLUTION INTRAVENOUS; SUBCUTANEOUS at 22:45

## 2022-04-12 RX ADMIN — POTASSIUM CHLORIDE 30 MEQ: 750 CAPSULE, EXTENDED RELEASE ORAL at 08:21

## 2022-04-12 RX ADMIN — IPRATROPIUM BROMIDE AND ALBUTEROL SULFATE 3 ML: .5; 2.5 SOLUTION RESPIRATORY (INHALATION) at 12:29

## 2022-04-12 RX ADMIN — HEPARIN SODIUM 5000 UNITS: 5000 INJECTION, SOLUTION INTRAVENOUS; SUBCUTANEOUS at 06:52

## 2022-04-12 RX ADMIN — FUROSEMIDE 40 MG: 40 TABLET ORAL at 08:21

## 2022-04-12 RX ADMIN — Medication 10 ML: at 22:46

## 2022-04-12 RX ADMIN — IPRATROPIUM BROMIDE AND ALBUTEROL SULFATE 3 ML: .5; 2.5 SOLUTION RESPIRATORY (INHALATION) at 07:22

## 2022-04-12 RX ADMIN — INSULIN LISPRO 2 UNITS: 100 INJECTION, SOLUTION INTRAVENOUS; SUBCUTANEOUS at 16:39

## 2022-04-12 RX ADMIN — IPRATROPIUM BROMIDE AND ALBUTEROL SULFATE 3 ML: .5; 2.5 SOLUTION RESPIRATORY (INHALATION) at 01:22

## 2022-04-12 RX ADMIN — FAMOTIDINE 20 MG: 20 TABLET, FILM COATED ORAL at 16:40

## 2022-04-12 RX ADMIN — AZELASTINE HYDROCHLORIDE 2 SPRAY: 137 SPRAY, METERED NASAL at 08:23

## 2022-04-12 RX ADMIN — POTASSIUM CHLORIDE 30 MEQ: 750 CAPSULE, EXTENDED RELEASE ORAL at 16:40

## 2022-04-12 RX ADMIN — INSULIN LISPRO 2 UNITS: 100 INJECTION, SOLUTION INTRAVENOUS; SUBCUTANEOUS at 12:02

## 2022-04-12 RX ADMIN — ROPINIROLE HYDROCHLORIDE 8 MG: 2 TABLET, FILM COATED ORAL at 06:51

## 2022-04-12 RX ADMIN — MEMANTINE 5 MG: 5 TABLET ORAL at 08:21

## 2022-04-12 RX ADMIN — DULOXETINE HYDROCHLORIDE 60 MG: 60 CAPSULE, DELAYED RELEASE ORAL at 08:21

## 2022-04-12 RX ADMIN — LATANOPROST 1 DROP: 50 SOLUTION OPHTHALMIC at 22:45

## 2022-04-13 LAB
ANION GAP SERPL CALCULATED.3IONS-SCNC: 4 MMOL/L (ref 5–15)
BASOPHILS # BLD AUTO: 0.03 10*3/MM3 (ref 0–0.2)
BASOPHILS NFR BLD AUTO: 0.2 % (ref 0–1.5)
BUN SERPL-MCNC: 22 MG/DL (ref 8–23)
BUN/CREAT SERPL: 37.9 (ref 7–25)
CALCIUM SPEC-SCNC: 8.8 MG/DL (ref 8.6–10.5)
CHLORIDE SERPL-SCNC: 99 MMOL/L (ref 98–107)
CO2 SERPL-SCNC: 37 MMOL/L (ref 22–29)
CREAT SERPL-MCNC: 0.58 MG/DL (ref 0.57–1)
DEPRECATED RDW RBC AUTO: 49.6 FL (ref 37–54)
EGFRCR SERPLBLD CKD-EPI 2021: 92.8 ML/MIN/1.73
EOSINOPHIL # BLD AUTO: 0.01 10*3/MM3 (ref 0–0.4)
EOSINOPHIL NFR BLD AUTO: 0.1 % (ref 0.3–6.2)
ERYTHROCYTE [DISTWIDTH] IN BLOOD BY AUTOMATED COUNT: 15.8 % (ref 12.3–15.4)
GLUCOSE BLDC GLUCOMTR-MCNC: 126 MG/DL (ref 70–130)
GLUCOSE BLDC GLUCOMTR-MCNC: 129 MG/DL (ref 70–130)
GLUCOSE BLDC GLUCOMTR-MCNC: 191 MG/DL (ref 70–130)
GLUCOSE SERPL-MCNC: 121 MG/DL (ref 65–99)
HCT VFR BLD AUTO: 39.2 % (ref 34–46.6)
HGB BLD-MCNC: 11.5 G/DL (ref 12–15.9)
IMM GRANULOCYTES # BLD AUTO: 0.2 10*3/MM3 (ref 0–0.05)
IMM GRANULOCYTES NFR BLD AUTO: 1.6 % (ref 0–0.5)
LYMPHOCYTES # BLD AUTO: 2.09 10*3/MM3 (ref 0.7–3.1)
LYMPHOCYTES NFR BLD AUTO: 16.4 % (ref 19.6–45.3)
MCH RBC QN AUTO: 25.3 PG (ref 26.6–33)
MCHC RBC AUTO-ENTMCNC: 29.3 G/DL (ref 31.5–35.7)
MCV RBC AUTO: 86.2 FL (ref 79–97)
MONOCYTES # BLD AUTO: 1.08 10*3/MM3 (ref 0.1–0.9)
MONOCYTES NFR BLD AUTO: 8.5 % (ref 5–12)
NEUTROPHILS NFR BLD AUTO: 73.2 % (ref 42.7–76)
NEUTROPHILS NFR BLD AUTO: 9.37 10*3/MM3 (ref 1.7–7)
NRBC BLD AUTO-RTO: 0 /100 WBC (ref 0–0.2)
PLAT MORPH BLD: NORMAL
PLATELET # BLD AUTO: 382 10*3/MM3 (ref 140–450)
PMV BLD AUTO: 9.4 FL (ref 6–12)
POTASSIUM SERPL-SCNC: 4.3 MMOL/L (ref 3.5–5.2)
RBC # BLD AUTO: 4.55 10*6/MM3 (ref 3.77–5.28)
RBC MORPH BLD: NORMAL
SODIUM SERPL-SCNC: 140 MMOL/L (ref 136–145)
WBC MORPH BLD: NORMAL
WBC NRBC COR # BLD: 12.78 10*3/MM3 (ref 3.4–10.8)

## 2022-04-13 PROCEDURE — 25010000002 METHYLPREDNISOLONE PER 125 MG: Performed by: NURSE PRACTITIONER

## 2022-04-13 PROCEDURE — 63710000001 INSULIN LISPRO (HUMAN) PER 5 UNITS: Performed by: NURSE PRACTITIONER

## 2022-04-13 PROCEDURE — 85007 BL SMEAR W/DIFF WBC COUNT: CPT | Performed by: FAMILY MEDICINE

## 2022-04-13 PROCEDURE — 85025 COMPLETE CBC W/AUTO DIFF WBC: CPT | Performed by: FAMILY MEDICINE

## 2022-04-13 PROCEDURE — 99233 SBSQ HOSP IP/OBS HIGH 50: CPT | Performed by: FAMILY MEDICINE

## 2022-04-13 PROCEDURE — 80048 BASIC METABOLIC PNL TOTAL CA: CPT | Performed by: FAMILY MEDICINE

## 2022-04-13 PROCEDURE — 94664 DEMO&/EVAL PT USE INHALER: CPT

## 2022-04-13 PROCEDURE — 94799 UNLISTED PULMONARY SVC/PX: CPT

## 2022-04-13 PROCEDURE — 25010000002 HEPARIN (PORCINE) PER 1000 UNITS: Performed by: NURSE PRACTITIONER

## 2022-04-13 PROCEDURE — 82962 GLUCOSE BLOOD TEST: CPT

## 2022-04-13 RX ORDER — DOXYCYCLINE 100 MG/1
100 CAPSULE ORAL EVERY 12 HOURS SCHEDULED
Status: DISCONTINUED | OUTPATIENT
Start: 2022-04-13 | End: 2022-04-15 | Stop reason: HOSPADM

## 2022-04-13 RX ORDER — ACETAMINOPHEN 500 MG
1000 TABLET ORAL EVERY 6 HOURS PRN
COMMUNITY
End: 2022-10-26

## 2022-04-13 RX ORDER — ROPINIROLE 2 MG/1
4 TABLET, FILM COATED ORAL 2 TIMES DAILY
Status: DISCONTINUED | OUTPATIENT
Start: 2022-04-13 | End: 2022-04-15 | Stop reason: HOSPADM

## 2022-04-13 RX ADMIN — HEPARIN SODIUM 5000 UNITS: 5000 INJECTION, SOLUTION INTRAVENOUS; SUBCUTANEOUS at 21:16

## 2022-04-13 RX ADMIN — IPRATROPIUM BROMIDE AND ALBUTEROL SULFATE 3 ML: .5; 2.5 SOLUTION RESPIRATORY (INHALATION) at 21:25

## 2022-04-13 RX ADMIN — BUDESONIDE AND FORMOTEROL FUMARATE DIHYDRATE 2 PUFF: 160; 4.5 AEROSOL RESPIRATORY (INHALATION) at 07:09

## 2022-04-13 RX ADMIN — IPRATROPIUM BROMIDE AND ALBUTEROL SULFATE 3 ML: .5; 2.5 SOLUTION RESPIRATORY (INHALATION) at 13:03

## 2022-04-13 RX ADMIN — IPRATROPIUM BROMIDE AND ALBUTEROL SULFATE 3 ML: .5; 2.5 SOLUTION RESPIRATORY (INHALATION) at 00:49

## 2022-04-13 RX ADMIN — ROPINIROLE HYDROCHLORIDE 4 MG: 2 TABLET, FILM COATED ORAL at 21:16

## 2022-04-13 RX ADMIN — DULOXETINE HYDROCHLORIDE 60 MG: 60 CAPSULE, DELAYED RELEASE ORAL at 09:08

## 2022-04-13 RX ADMIN — ROPINIROLE HYDROCHLORIDE 8 MG: 2 TABLET, FILM COATED ORAL at 09:08

## 2022-04-13 RX ADMIN — Medication 10 ML: at 21:16

## 2022-04-13 RX ADMIN — DONEPEZIL HYDROCHLORIDE 20 MG: 5 TABLET ORAL at 21:16

## 2022-04-13 RX ADMIN — POTASSIUM CHLORIDE 30 MEQ: 750 CAPSULE, EXTENDED RELEASE ORAL at 09:08

## 2022-04-13 RX ADMIN — POTASSIUM CHLORIDE 30 MEQ: 750 CAPSULE, EXTENDED RELEASE ORAL at 18:22

## 2022-04-13 RX ADMIN — FAMOTIDINE 20 MG: 20 TABLET, FILM COATED ORAL at 06:13

## 2022-04-13 RX ADMIN — FAMOTIDINE 20 MG: 20 TABLET, FILM COATED ORAL at 18:23

## 2022-04-13 RX ADMIN — HEPARIN SODIUM 5000 UNITS: 5000 INJECTION, SOLUTION INTRAVENOUS; SUBCUTANEOUS at 14:19

## 2022-04-13 RX ADMIN — MEMANTINE 5 MG: 5 TABLET ORAL at 09:09

## 2022-04-13 RX ADMIN — FLUTICASONE PROPIONATE 2 SPRAY: 50 SPRAY, METERED NASAL at 11:25

## 2022-04-13 RX ADMIN — AZELASTINE HYDROCHLORIDE 2 SPRAY: 137 SPRAY, METERED NASAL at 21:15

## 2022-04-13 RX ADMIN — TIMOLOL MALEATE 1 DROP: 2.5 SOLUTION/ DROPS OPHTHALMIC at 11:26

## 2022-04-13 RX ADMIN — AZELASTINE HYDROCHLORIDE 2 SPRAY: 137 SPRAY, METERED NASAL at 11:25

## 2022-04-13 RX ADMIN — METHYLPREDNISOLONE SODIUM SUCCINATE 62.5 MG: 125 INJECTION, POWDER, FOR SOLUTION INTRAMUSCULAR; INTRAVENOUS at 09:08

## 2022-04-13 RX ADMIN — INSULIN LISPRO 2 UNITS: 100 INJECTION, SOLUTION INTRAVENOUS; SUBCUTANEOUS at 18:22

## 2022-04-13 RX ADMIN — Medication 250 MG: at 21:16

## 2022-04-13 RX ADMIN — DOXYCYCLINE 100 MG: 100 CAPSULE ORAL at 21:16

## 2022-04-13 RX ADMIN — FUROSEMIDE 40 MG: 40 TABLET ORAL at 09:08

## 2022-04-13 RX ADMIN — BUDESONIDE AND FORMOTEROL FUMARATE DIHYDRATE 2 PUFF: 160; 4.5 AEROSOL RESPIRATORY (INHALATION) at 21:25

## 2022-04-13 RX ADMIN — LATANOPROST 1 DROP: 50 SOLUTION OPHTHALMIC at 21:15

## 2022-04-13 RX ADMIN — Medication 250 MG: at 09:09

## 2022-04-13 RX ADMIN — IPRATROPIUM BROMIDE AND ALBUTEROL SULFATE 3 ML: .5; 2.5 SOLUTION RESPIRATORY (INHALATION) at 07:09

## 2022-04-13 RX ADMIN — HEPARIN SODIUM 5000 UNITS: 5000 INJECTION, SOLUTION INTRAVENOUS; SUBCUTANEOUS at 06:14

## 2022-04-14 LAB
ANION GAP SERPL CALCULATED.3IONS-SCNC: 7 MMOL/L (ref 5–15)
BACTERIA SPEC RESP CULT: NORMAL
BASOPHILS # BLD AUTO: 0.09 10*3/MM3 (ref 0–0.2)
BASOPHILS NFR BLD AUTO: 0.7 % (ref 0–1.5)
BUN SERPL-MCNC: 23 MG/DL (ref 8–23)
BUN/CREAT SERPL: 35.9 (ref 7–25)
CALCIUM SPEC-SCNC: 9 MG/DL (ref 8.6–10.5)
CHLORIDE SERPL-SCNC: 95 MMOL/L (ref 98–107)
CO2 SERPL-SCNC: 35 MMOL/L (ref 22–29)
CREAT SERPL-MCNC: 0.64 MG/DL (ref 0.57–1)
DEPRECATED RDW RBC AUTO: 48 FL (ref 37–54)
EGFRCR SERPLBLD CKD-EPI 2021: 90.6 ML/MIN/1.73
EOSINOPHIL # BLD AUTO: 0.01 10*3/MM3 (ref 0–0.4)
EOSINOPHIL NFR BLD AUTO: 0.1 % (ref 0.3–6.2)
ERYTHROCYTE [DISTWIDTH] IN BLOOD BY AUTOMATED COUNT: 15.6 % (ref 12.3–15.4)
GLUCOSE BLDC GLUCOMTR-MCNC: 104 MG/DL (ref 70–130)
GLUCOSE BLDC GLUCOMTR-MCNC: 150 MG/DL (ref 70–130)
GLUCOSE BLDC GLUCOMTR-MCNC: 199 MG/DL (ref 70–130)
GLUCOSE BLDC GLUCOMTR-MCNC: 205 MG/DL (ref 70–130)
GLUCOSE SERPL-MCNC: 181 MG/DL (ref 65–99)
GRAM STN SPEC: NORMAL
HCT VFR BLD AUTO: 43.4 % (ref 34–46.6)
HGB BLD-MCNC: 13.4 G/DL (ref 12–15.9)
IMM GRANULOCYTES # BLD AUTO: 0.26 10*3/MM3 (ref 0–0.05)
IMM GRANULOCYTES NFR BLD AUTO: 1.9 % (ref 0–0.5)
LYMPHOCYTES # BLD AUTO: 3.49 10*3/MM3 (ref 0.7–3.1)
LYMPHOCYTES NFR BLD AUTO: 25.7 % (ref 19.6–45.3)
MCH RBC QN AUTO: 26.3 PG (ref 26.6–33)
MCHC RBC AUTO-ENTMCNC: 30.9 G/DL (ref 31.5–35.7)
MCV RBC AUTO: 85.3 FL (ref 79–97)
MONOCYTES # BLD AUTO: 0.94 10*3/MM3 (ref 0.1–0.9)
MONOCYTES NFR BLD AUTO: 6.9 % (ref 5–12)
NEUTROPHILS NFR BLD AUTO: 64.7 % (ref 42.7–76)
NEUTROPHILS NFR BLD AUTO: 8.79 10*3/MM3 (ref 1.7–7)
NRBC BLD AUTO-RTO: 0.3 /100 WBC (ref 0–0.2)
PLATELET # BLD AUTO: 452 10*3/MM3 (ref 140–450)
PMV BLD AUTO: 9.8 FL (ref 6–12)
POTASSIUM SERPL-SCNC: 4.1 MMOL/L (ref 3.5–5.2)
RBC # BLD AUTO: 5.09 10*6/MM3 (ref 3.77–5.28)
SODIUM SERPL-SCNC: 137 MMOL/L (ref 136–145)
WBC NRBC COR # BLD: 13.58 10*3/MM3 (ref 3.4–10.8)

## 2022-04-14 PROCEDURE — 99231 SBSQ HOSP IP/OBS SF/LOW 25: CPT | Performed by: FAMILY MEDICINE

## 2022-04-14 PROCEDURE — 94799 UNLISTED PULMONARY SVC/PX: CPT

## 2022-04-14 PROCEDURE — 85025 COMPLETE CBC W/AUTO DIFF WBC: CPT | Performed by: FAMILY MEDICINE

## 2022-04-14 PROCEDURE — 82962 GLUCOSE BLOOD TEST: CPT

## 2022-04-14 PROCEDURE — 80048 BASIC METABOLIC PNL TOTAL CA: CPT | Performed by: FAMILY MEDICINE

## 2022-04-14 PROCEDURE — 25010000002 HEPARIN (PORCINE) PER 1000 UNITS: Performed by: NURSE PRACTITIONER

## 2022-04-14 PROCEDURE — 63710000001 INSULIN LISPRO (HUMAN) PER 5 UNITS: Performed by: NURSE PRACTITIONER

## 2022-04-14 PROCEDURE — 25010000002 METHYLPREDNISOLONE PER 125 MG: Performed by: NURSE PRACTITIONER

## 2022-04-14 RX ADMIN — Medication 250 MG: at 09:10

## 2022-04-14 RX ADMIN — HEPARIN SODIUM 5000 UNITS: 5000 INJECTION, SOLUTION INTRAVENOUS; SUBCUTANEOUS at 20:04

## 2022-04-14 RX ADMIN — HEPARIN SODIUM 5000 UNITS: 5000 INJECTION, SOLUTION INTRAVENOUS; SUBCUTANEOUS at 14:27

## 2022-04-14 RX ADMIN — HEPARIN SODIUM 5000 UNITS: 5000 INJECTION, SOLUTION INTRAVENOUS; SUBCUTANEOUS at 06:19

## 2022-04-14 RX ADMIN — TIMOLOL MALEATE 1 DROP: 2.5 SOLUTION/ DROPS OPHTHALMIC at 09:11

## 2022-04-14 RX ADMIN — GUAIFENESIN AND DEXTROMETHORPHAN 10 ML: 100; 10 SYRUP ORAL at 09:08

## 2022-04-14 RX ADMIN — BUDESONIDE AND FORMOTEROL FUMARATE DIHYDRATE 2 PUFF: 160; 4.5 AEROSOL RESPIRATORY (INHALATION) at 21:22

## 2022-04-14 RX ADMIN — FAMOTIDINE 20 MG: 20 TABLET, FILM COATED ORAL at 17:05

## 2022-04-14 RX ADMIN — AZELASTINE HYDROCHLORIDE 2 SPRAY: 137 SPRAY, METERED NASAL at 20:07

## 2022-04-14 RX ADMIN — INSULIN LISPRO 2 UNITS: 100 INJECTION, SOLUTION INTRAVENOUS; SUBCUTANEOUS at 12:06

## 2022-04-14 RX ADMIN — MEMANTINE 5 MG: 5 TABLET ORAL at 09:10

## 2022-04-14 RX ADMIN — POTASSIUM CHLORIDE 30 MEQ: 750 CAPSULE, EXTENDED RELEASE ORAL at 17:04

## 2022-04-14 RX ADMIN — FLUTICASONE PROPIONATE 2 SPRAY: 50 SPRAY, METERED NASAL at 10:25

## 2022-04-14 RX ADMIN — IPRATROPIUM BROMIDE AND ALBUTEROL SULFATE 3 ML: .5; 2.5 SOLUTION RESPIRATORY (INHALATION) at 00:32

## 2022-04-14 RX ADMIN — BUDESONIDE AND FORMOTEROL FUMARATE DIHYDRATE 2 PUFF: 160; 4.5 AEROSOL RESPIRATORY (INHALATION) at 07:28

## 2022-04-14 RX ADMIN — IPRATROPIUM BROMIDE AND ALBUTEROL SULFATE 3 ML: .5; 2.5 SOLUTION RESPIRATORY (INHALATION) at 21:22

## 2022-04-14 RX ADMIN — LATANOPROST 1 DROP: 50 SOLUTION OPHTHALMIC at 20:04

## 2022-04-14 RX ADMIN — DULOXETINE HYDROCHLORIDE 60 MG: 60 CAPSULE, DELAYED RELEASE ORAL at 09:10

## 2022-04-14 RX ADMIN — INSULIN LISPRO 2 UNITS: 100 INJECTION, SOLUTION INTRAVENOUS; SUBCUTANEOUS at 17:05

## 2022-04-14 RX ADMIN — DONEPEZIL HYDROCHLORIDE 20 MG: 5 TABLET ORAL at 20:04

## 2022-04-14 RX ADMIN — IPRATROPIUM BROMIDE AND ALBUTEROL SULFATE 3 ML: .5; 2.5 SOLUTION RESPIRATORY (INHALATION) at 13:14

## 2022-04-14 RX ADMIN — METHYLPREDNISOLONE SODIUM SUCCINATE 62.5 MG: 125 INJECTION, POWDER, FOR SOLUTION INTRAMUSCULAR; INTRAVENOUS at 09:07

## 2022-04-14 RX ADMIN — DOXYCYCLINE 100 MG: 100 CAPSULE ORAL at 09:10

## 2022-04-14 RX ADMIN — AZELASTINE HYDROCHLORIDE 2 SPRAY: 137 SPRAY, METERED NASAL at 10:24

## 2022-04-14 RX ADMIN — DOXYCYCLINE 100 MG: 100 CAPSULE ORAL at 20:04

## 2022-04-14 RX ADMIN — FUROSEMIDE 40 MG: 40 TABLET ORAL at 09:10

## 2022-04-14 RX ADMIN — FAMOTIDINE 20 MG: 20 TABLET, FILM COATED ORAL at 06:19

## 2022-04-14 RX ADMIN — Medication 250 MG: at 20:04

## 2022-04-14 RX ADMIN — ROPINIROLE HYDROCHLORIDE 4 MG: 2 TABLET, FILM COATED ORAL at 09:10

## 2022-04-14 RX ADMIN — ROPINIROLE HYDROCHLORIDE 4 MG: 2 TABLET, FILM COATED ORAL at 20:04

## 2022-04-14 RX ADMIN — POTASSIUM CHLORIDE 30 MEQ: 750 CAPSULE, EXTENDED RELEASE ORAL at 09:10

## 2022-04-14 RX ADMIN — IPRATROPIUM BROMIDE AND ALBUTEROL SULFATE 3 ML: .5; 2.5 SOLUTION RESPIRATORY (INHALATION) at 07:28

## 2022-04-15 ENCOUNTER — READMISSION MANAGEMENT (OUTPATIENT)
Dept: CALL CENTER | Facility: HOSPITAL | Age: 77
End: 2022-04-15

## 2022-04-15 VITALS
WEIGHT: 170 LBS | OXYGEN SATURATION: 91 % | HEART RATE: 94 BPM | DIASTOLIC BLOOD PRESSURE: 77 MMHG | TEMPERATURE: 98.2 F | SYSTOLIC BLOOD PRESSURE: 158 MMHG | BODY MASS INDEX: 30.12 KG/M2 | HEIGHT: 63 IN | RESPIRATION RATE: 18 BRPM

## 2022-04-15 PROBLEM — R06.03 ACUTE RESPIRATORY DISTRESS: Status: RESOLVED | Noted: 2022-04-12 | Resolved: 2022-04-15

## 2022-04-15 PROBLEM — J96.21 ACUTE ON CHRONIC RESPIRATORY FAILURE WITH HYPOXIA (HCC): Status: RESOLVED | Noted: 2022-04-11 | Resolved: 2022-04-15

## 2022-04-15 PROBLEM — J44.1 COPD EXACERBATION: Status: RESOLVED | Noted: 2022-04-11 | Resolved: 2022-04-15

## 2022-04-15 LAB
GLUCOSE BLDC GLUCOMTR-MCNC: 160 MG/DL (ref 70–130)
GLUCOSE BLDC GLUCOMTR-MCNC: 190 MG/DL (ref 70–130)

## 2022-04-15 PROCEDURE — 25010000002 METHYLPREDNISOLONE PER 125 MG: Performed by: NURSE PRACTITIONER

## 2022-04-15 PROCEDURE — 94799 UNLISTED PULMONARY SVC/PX: CPT

## 2022-04-15 PROCEDURE — 25010000002 HEPARIN (PORCINE) PER 1000 UNITS: Performed by: NURSE PRACTITIONER

## 2022-04-15 PROCEDURE — 99238 HOSP IP/OBS DSCHRG MGMT 30/<: CPT | Performed by: FAMILY MEDICINE

## 2022-04-15 PROCEDURE — 63710000001 INSULIN LISPRO (HUMAN) PER 5 UNITS: Performed by: NURSE PRACTITIONER

## 2022-04-15 PROCEDURE — 94664 DEMO&/EVAL PT USE INHALER: CPT

## 2022-04-15 PROCEDURE — 82962 GLUCOSE BLOOD TEST: CPT

## 2022-04-15 RX ORDER — DOXYCYCLINE 100 MG/1
100 CAPSULE ORAL EVERY 12 HOURS SCHEDULED
Qty: 6 CAPSULE | Refills: 0 | Status: SHIPPED | OUTPATIENT
Start: 2022-04-15 | End: 2022-04-18

## 2022-04-15 RX ORDER — PREDNISONE 20 MG/1
TABLET ORAL
Qty: 18 TABLET | Refills: 0 | Status: SHIPPED | OUTPATIENT
Start: 2022-04-15 | End: 2022-04-30

## 2022-04-15 RX ORDER — SACCHAROMYCES BOULARDII 250 MG
250 CAPSULE ORAL 2 TIMES DAILY
Qty: 60 CAPSULE | Refills: 0 | Status: SHIPPED | OUTPATIENT
Start: 2022-04-15 | End: 2022-08-31

## 2022-04-15 RX ORDER — ONDANSETRON 4 MG/1
4 TABLET, ORALLY DISINTEGRATING ORAL EVERY 8 HOURS PRN
Qty: 30 TABLET | Refills: 0 | Status: SHIPPED | OUTPATIENT
Start: 2022-04-15 | End: 2022-07-20

## 2022-04-15 RX ADMIN — IPRATROPIUM BROMIDE AND ALBUTEROL SULFATE 3 ML: .5; 2.5 SOLUTION RESPIRATORY (INHALATION) at 07:36

## 2022-04-15 RX ADMIN — HEPARIN SODIUM 5000 UNITS: 5000 INJECTION, SOLUTION INTRAVENOUS; SUBCUTANEOUS at 05:44

## 2022-04-15 RX ADMIN — DOXYCYCLINE 100 MG: 100 CAPSULE ORAL at 09:33

## 2022-04-15 RX ADMIN — INSULIN LISPRO 2 UNITS: 100 INJECTION, SOLUTION INTRAVENOUS; SUBCUTANEOUS at 12:47

## 2022-04-15 RX ADMIN — FLUTICASONE PROPIONATE 2 SPRAY: 50 SPRAY, METERED NASAL at 09:32

## 2022-04-15 RX ADMIN — AZELASTINE HYDROCHLORIDE 2 SPRAY: 137 SPRAY, METERED NASAL at 09:32

## 2022-04-15 RX ADMIN — MEMANTINE 5 MG: 5 TABLET ORAL at 09:33

## 2022-04-15 RX ADMIN — POTASSIUM CHLORIDE 30 MEQ: 750 CAPSULE, EXTENDED RELEASE ORAL at 09:33

## 2022-04-15 RX ADMIN — TIMOLOL MALEATE 1 DROP: 2.5 SOLUTION/ DROPS OPHTHALMIC at 09:32

## 2022-04-15 RX ADMIN — ROPINIROLE HYDROCHLORIDE 4 MG: 2 TABLET, FILM COATED ORAL at 09:33

## 2022-04-15 RX ADMIN — FAMOTIDINE 20 MG: 20 TABLET, FILM COATED ORAL at 05:44

## 2022-04-15 RX ADMIN — INSULIN LISPRO 2 UNITS: 100 INJECTION, SOLUTION INTRAVENOUS; SUBCUTANEOUS at 09:31

## 2022-04-15 RX ADMIN — DULOXETINE HYDROCHLORIDE 60 MG: 60 CAPSULE, DELAYED RELEASE ORAL at 09:33

## 2022-04-15 RX ADMIN — Medication 250 MG: at 09:33

## 2022-04-15 RX ADMIN — FUROSEMIDE 40 MG: 40 TABLET ORAL at 09:33

## 2022-04-15 RX ADMIN — IPRATROPIUM BROMIDE AND ALBUTEROL SULFATE 3 ML: .5; 2.5 SOLUTION RESPIRATORY (INHALATION) at 14:04

## 2022-04-15 RX ADMIN — METHYLPREDNISOLONE SODIUM SUCCINATE 62.5 MG: 125 INJECTION, POWDER, FOR SOLUTION INTRAMUSCULAR; INTRAVENOUS at 09:33

## 2022-04-15 RX ADMIN — BUDESONIDE AND FORMOTEROL FUMARATE DIHYDRATE 2 PUFF: 160; 4.5 AEROSOL RESPIRATORY (INHALATION) at 07:36

## 2022-04-15 NOTE — OUTREACH NOTE
Prep Survey    Flowsheet Row Responses   [facility] patient discharged from? [place]   Is LACE score < 7 ? No   Emergency Room discharge w/ pulse ox? No   Eligibility [facility]   Date of Admission 04/11/22   Date of Discharge 04/15/22   Discharge Disposition Home or Self Care   Discharge diagnosis Copd   Does the patient have one of the following disease processes/diagnoses(primary or secondary)? COPD/Pneumonia   Does the patient have Home health ordered? No   Is there a DME ordered? No   Prep survey completed? Yes          GABE Esparza Registered Nurse

## 2022-04-16 LAB
BACTERIA SPEC AEROBE CULT: NORMAL
BACTERIA SPEC AEROBE CULT: NORMAL

## 2022-04-18 ENCOUNTER — APPOINTMENT (OUTPATIENT)
Dept: GENERAL RADIOLOGY | Facility: HOSPITAL | Age: 77
End: 2022-04-18

## 2022-04-18 ENCOUNTER — TRANSITIONAL CARE MANAGEMENT TELEPHONE ENCOUNTER (OUTPATIENT)
Dept: CALL CENTER | Facility: HOSPITAL | Age: 77
End: 2022-04-18

## 2022-04-18 ENCOUNTER — HOSPITAL ENCOUNTER (EMERGENCY)
Facility: HOSPITAL | Age: 77
Discharge: HOME | End: 2022-04-18
Attending: EMERGENCY MEDICINE | Admitting: EMERGENCY MEDICINE

## 2022-04-18 VITALS
HEART RATE: 97 BPM | BODY MASS INDEX: 30.12 KG/M2 | RESPIRATION RATE: 24 BRPM | OXYGEN SATURATION: 100 % | WEIGHT: 170 LBS | TEMPERATURE: 97.9 F | DIASTOLIC BLOOD PRESSURE: 73 MMHG | SYSTOLIC BLOOD PRESSURE: 162 MMHG | HEIGHT: 63 IN

## 2022-04-18 DIAGNOSIS — J44.1 COPD WITH EXACERBATION: Primary | ICD-10-CM

## 2022-04-18 DIAGNOSIS — L03.115 CELLULITIS OF RIGHT LOWER EXTREMITY: ICD-10-CM

## 2022-04-18 DIAGNOSIS — R00.0 TACHYCARDIA: ICD-10-CM

## 2022-04-18 LAB
ALBUMIN SERPL-MCNC: 3.6 G/DL (ref 3.5–5.2)
ALBUMIN/GLOB SERPL: 1.4 G/DL
ALP SERPL-CCNC: 98 U/L (ref 39–117)
ALT SERPL W P-5'-P-CCNC: 10 U/L (ref 1–33)
ANION GAP SERPL CALCULATED.3IONS-SCNC: 11 MMOL/L (ref 5–15)
AST SERPL-CCNC: 12 U/L (ref 1–32)
BASOPHILS # BLD AUTO: 0.06 10*3/MM3 (ref 0–0.2)
BASOPHILS NFR BLD AUTO: 0.3 % (ref 0–1.5)
BILIRUB SERPL-MCNC: 0.3 MG/DL (ref 0–1.2)
BUN SERPL-MCNC: 23 MG/DL (ref 8–23)
BUN/CREAT SERPL: 28.4 (ref 7–25)
CALCIUM SPEC-SCNC: 8.6 MG/DL (ref 8.6–10.5)
CHLORIDE SERPL-SCNC: 95 MMOL/L (ref 98–107)
CO2 SERPL-SCNC: 32 MMOL/L (ref 22–29)
CREAT SERPL-MCNC: 0.81 MG/DL (ref 0.57–1)
DEPRECATED RDW RBC AUTO: 47 FL (ref 37–54)
EGFRCR SERPLBLD CKD-EPI 2021: 74.4 ML/MIN/1.73
EOSINOPHIL # BLD AUTO: 0.16 10*3/MM3 (ref 0–0.4)
EOSINOPHIL NFR BLD AUTO: 0.8 % (ref 0.3–6.2)
ERYTHROCYTE [DISTWIDTH] IN BLOOD BY AUTOMATED COUNT: 15.7 % (ref 12.3–15.4)
GLOBULIN UR ELPH-MCNC: 2.6 GM/DL
GLUCOSE SERPL-MCNC: 221 MG/DL (ref 65–99)
HCT VFR BLD AUTO: 43.4 % (ref 34–46.6)
HGB BLD-MCNC: 13.6 G/DL (ref 12–15.9)
HOLD SPECIMEN: NORMAL
IMM GRANULOCYTES # BLD AUTO: 0.55 10*3/MM3 (ref 0–0.05)
IMM GRANULOCYTES NFR BLD AUTO: 2.8 % (ref 0–0.5)
LYMPHOCYTES # BLD AUTO: 3.08 10*3/MM3 (ref 0.7–3.1)
LYMPHOCYTES NFR BLD AUTO: 15.6 % (ref 19.6–45.3)
MCH RBC QN AUTO: 26 PG (ref 26.6–33)
MCHC RBC AUTO-ENTMCNC: 31.3 G/DL (ref 31.5–35.7)
MCV RBC AUTO: 83 FL (ref 79–97)
MONOCYTES # BLD AUTO: 1.2 10*3/MM3 (ref 0.1–0.9)
MONOCYTES NFR BLD AUTO: 6.1 % (ref 5–12)
NEUTROPHILS NFR BLD AUTO: 14.69 10*3/MM3 (ref 1.7–7)
NEUTROPHILS NFR BLD AUTO: 74.4 % (ref 42.7–76)
NRBC BLD AUTO-RTO: 0 /100 WBC (ref 0–0.2)
NT-PROBNP SERPL-MCNC: 41 PG/ML (ref 0–1800)
PLATELET # BLD AUTO: 471 10*3/MM3 (ref 140–450)
PMV BLD AUTO: 9.9 FL (ref 6–12)
POTASSIUM SERPL-SCNC: 3.2 MMOL/L (ref 3.5–5.2)
PROT SERPL-MCNC: 6.2 G/DL (ref 6–8.5)
RBC # BLD AUTO: 5.23 10*6/MM3 (ref 3.77–5.28)
SODIUM SERPL-SCNC: 138 MMOL/L (ref 136–145)
TROPONIN T SERPL-MCNC: <0.01 NG/ML (ref 0–0.03)
WBC NRBC COR # BLD: 19.74 10*3/MM3 (ref 3.4–10.8)
WHOLE BLOOD HOLD SPECIMEN: NORMAL
WHOLE BLOOD HOLD SPECIMEN: NORMAL

## 2022-04-18 PROCEDURE — 99283 EMERGENCY DEPT VISIT LOW MDM: CPT

## 2022-04-18 PROCEDURE — 83880 ASSAY OF NATRIURETIC PEPTIDE: CPT | Performed by: EMERGENCY MEDICINE

## 2022-04-18 PROCEDURE — 99284 EMERGENCY DEPT VISIT MOD MDM: CPT

## 2022-04-18 PROCEDURE — 71045 X-RAY EXAM CHEST 1 VIEW: CPT

## 2022-04-18 PROCEDURE — 80053 COMPREHEN METABOLIC PANEL: CPT | Performed by: EMERGENCY MEDICINE

## 2022-04-18 PROCEDURE — 85025 COMPLETE CBC W/AUTO DIFF WBC: CPT | Performed by: EMERGENCY MEDICINE

## 2022-04-18 PROCEDURE — 93005 ELECTROCARDIOGRAM TRACING: CPT | Performed by: EMERGENCY MEDICINE

## 2022-04-18 PROCEDURE — 36415 COLL VENOUS BLD VENIPUNCTURE: CPT

## 2022-04-18 PROCEDURE — 94799 UNLISTED PULMONARY SVC/PX: CPT

## 2022-04-18 PROCEDURE — 84484 ASSAY OF TROPONIN QUANT: CPT | Performed by: EMERGENCY MEDICINE

## 2022-04-18 PROCEDURE — 94640 AIRWAY INHALATION TREATMENT: CPT

## 2022-04-18 RX ORDER — SULFAMETHOXAZOLE AND TRIMETHOPRIM 800; 160 MG/1; MG/1
2 TABLET ORAL 2 TIMES DAILY
Qty: 28 TABLET | Refills: 0 | Status: SHIPPED | OUTPATIENT
Start: 2022-04-18 | End: 2022-04-20

## 2022-04-18 RX ORDER — IPRATROPIUM BROMIDE AND ALBUTEROL SULFATE 2.5; .5 MG/3ML; MG/3ML
3 SOLUTION RESPIRATORY (INHALATION) ONCE
Status: COMPLETED | OUTPATIENT
Start: 2022-04-18 | End: 2022-04-18

## 2022-04-18 RX ORDER — SODIUM CHLORIDE 0.9 % (FLUSH) 0.9 %
10 SYRINGE (ML) INJECTION AS NEEDED
Status: DISCONTINUED | OUTPATIENT
Start: 2022-04-18 | End: 2022-04-19 | Stop reason: HOSPADM

## 2022-04-18 RX ADMIN — IPRATROPIUM BROMIDE AND ALBUTEROL SULFATE 3 ML: .5; 2.5 SOLUTION RESPIRATORY (INHALATION) at 20:30

## 2022-04-18 RX ADMIN — SODIUM CHLORIDE 1000 ML: 9 INJECTION, SOLUTION INTRAVENOUS at 20:41

## 2022-04-18 NOTE — ED PROVIDER NOTES
Subjective   76-year-old female who presents with complaint of shortness of breath.  The patient has a known history of COPD and wears 5 L nasal cannula oxygen at baseline.  She recent presented to our hospital and was admitted due to COPD exacerbation.  She states that she was observing her heart rate and oxygen saturations at home.  She noted that the heart rate was up to the oxygen saturation was within the appropriate typical range at 93% on her baseline 5 L nasal cannula oxygen.  She was discharged from the hospital with steroids, which per her report she has been taking.  She also has been using her neb treatments at home as prescribed.  No reported chest pain no reported fever, bodies, or chills.  No abdominal pain, no reported change in bowel or urinary function.  She exhibits normal mentation and answers all questions appropriately.  No other acute complaints.          Review of Systems   Constitutional: Positive for fatigue. Negative for chills and fever.   HENT: Negative for congestion, ear pain, postnasal drip, sinus pressure and sore throat.    Eyes: Negative for pain, redness and visual disturbance.   Respiratory: Positive for shortness of breath. Negative for cough and chest tightness.    Cardiovascular: Negative for chest pain, palpitations and leg swelling.   Gastrointestinal: Negative for abdominal pain, anal bleeding, blood in stool, diarrhea, nausea and vomiting.   Endocrine: Negative for polydipsia and polyuria.   Genitourinary: Negative for difficulty urinating, dysuria, frequency and urgency.   Musculoskeletal: Negative for arthralgias, back pain and neck pain.   Skin: Negative for pallor and rash.   Allergic/Immunologic: Negative for environmental allergies and immunocompromised state.   Neurological: Negative for dizziness, weakness and headaches.   Hematological: Negative for adenopathy.   Psychiatric/Behavioral: Negative for confusion, self-injury and suicidal ideas. The patient is not  "nervous/anxious.    All other systems reviewed and are negative.      Past Medical History:   Diagnosis Date   • Back pain    • Bronchiectasis (HCC)    • Bronchitis 01/2018   • Cancer (HCC)     History of lung cancer and skin cancer    • Cardiac murmur    • Chronic cough    • Chronic obstructive pulmonary disease (HCC)    • Colon polyp    • DDD (degenerative disc disease), lumbar    • Depression    • Diabetes mellitus (HCC)     DX: 2019, CHECK BS QOD, LAST A1C 6.3??   • Disease of thyroid gland    • Esophageal dilatation 9/20/2019    Added automatically from request for surgery [id number]   • Esophageal dysphagia 10/24/2019    Added automatically from request for surgery [id number]   • Former smoker (None since 1992) 8/29/2019   • GERD (gastroesophageal reflux disease)    • Glaucoma    • Globus sensation 1/27/2020   • History of colonic polyps    • History of transfusion 1988    NO REACTION    • Hyperlipidemia    • Hypertension    • Irritable bowel syndrome     Constipation/diarrhea   • Legally blind    • Lung cancer (HCC)    • Macular degeneration    • Neuropathy    • Obesity 2/26/2020   • Osteoarthritis    • Oxygen dependent     5L   • Pneumonia    • Sleep apnea     NON COMPLIANT WITH CPAP USE   • UTI (urinary tract infection)    • Wears glasses        Allergies   Allergen Reactions   • Hydrocodone Hallucinations     \"With high doses\"   • Statins Myalgia     myalgia   • Metoclopramide Other (See Comments)     EXACERBATED RLS   • Penicillins Rash     rash   • Tramadol Nausea And Vomiting and GI Intolerance     VERY MILD PER PT       Past Surgical History:   Procedure Laterality Date   • BACK SURGERY      X2 (LUMBAR)   • BREAST BIOPSY      20+ years ago   • BREAST BIOPSY Right 08/2021    fna ln   • CATARACT EXTRACTION Bilateral    • CHOLECYSTECTOMY     • COLONOSCOPY     • ENDOSCOPY N/A 11/06/2019    Procedure: ESOPHAGOGASTRODUODENOSCOPY;  Surgeon: Cortes Millan MD;  Location: [facility] ENDOSCOPY;  Service: " Gastroenterology   • ENDOSCOPY N/A 2021    Procedure: ESOPHAGOGASTRODUODENOSCOPY;  Surgeon: Cortes Millan MD;  Location: [facility] ENDOSCOPY;  Service: Gastroenterology;  Laterality: N/A;  balloon dilation    • HAND SURGERY Right     laceration repair   • INCONTINENCE SURGERY      BLADDER   • LUNG REMOVAL, PARTIAL Left 2016   • NISSEN FUNDOPLICATION     • TOTAL ABDOMINAL HYSTERECTOMY      benign cyst   • US GUIDED FINE NEEDLE ASPIRATION  2021       Family History   Problem Relation Age of Onset   • Colon polyps Mother    • Emphysema Mother    • Hypertension Mother    • Colon polyps Father    • Dementia Father    • Heart disease Father    • Hyperlipidemia Father    • Macular degeneration Father    • Glaucoma Father    • Colon cancer Neg Hx    • Breast cancer Neg Hx    • Ovarian cancer Neg Hx        Social History     Socioeconomic History   • Marital status: Single   Tobacco Use   • Smoking status: Former Smoker     Packs/day: 1.50     Years: 25.00     Pack years: 37.50     Types: Cigarettes     Quit date: 1992     Years since quittin.3   • Smokeless tobacco: Never Used   Vaping Use   • Vaping Use: Never used   Substance and Sexual Activity   • Alcohol use: Yes     Alcohol/week: 2.0 standard drinks     Types: 2 Standard drinks or equivalent per week     Comment: a glass of wine or bourbon a couple times a WEEK   • Drug use: No   • Sexual activity: Never           Objective   Physical Exam  Vitals and nursing note reviewed.   Constitutional:       General: She is not in acute distress.     Appearance: Normal appearance. She is well-developed. She is not toxic-appearing or diaphoretic.   HENT:      Head: Normocephalic and atraumatic.      Right Ear: External ear normal.      Left Ear: External ear normal.      Nose: Nose normal.   Eyes:      General: Lids are normal.      Pupils: Pupils are equal, round, and reactive to light.   Neck:      Trachea: No tracheal deviation.    Cardiovascular:      Rate and Rhythm: Normal rate and regular rhythm.      Pulses: No decreased pulses.      Heart sounds: Normal heart sounds. No murmur heard.    No friction rub. No gallop.   Pulmonary:      Effort: Pulmonary effort is normal. Tachypnea and prolonged expiration present. No respiratory distress.      Breath sounds: Examination of the right-middle field reveals wheezing. Examination of the left-middle field reveals wheezing. Examination of the right-lower field reveals wheezing. Examination of the left-lower field reveals wheezing. Wheezing present. No decreased breath sounds, rhonchi or rales.       Abdominal:      General: Bowel sounds are normal.      Palpations: Abdomen is soft.      Tenderness: There is no abdominal tenderness. There is no guarding or rebound.   Musculoskeletal:         General: No deformity. Normal range of motion.      Cervical back: Normal range of motion and neck supple.   Lymphadenopathy:      Cervical: No cervical adenopathy.   Skin:     General: Skin is warm and dry.      Findings: No rash.   Neurological:      Mental Status: She is alert and oriented to person, place, and time.      Cranial Nerves: No cranial nerve deficit.      Sensory: No sensory deficit.   Psychiatric:         Speech: Speech normal.         Behavior: Behavior normal.         Thought Content: Thought content normal.         Judgment: Judgment normal.         Procedures           ED Course                                                 MDM  Number of Diagnoses or Management Options  Cellulitis of right lower extremity: new and requires workup  COPD with exacerbation (HCC): new and requires workup  Tachycardia: new and requires workup  Diagnosis management comments: The patient is advised to take Bactrim in addition to already prescribed doxycycline for treatment of right lower extremity cellulitis.  She is advised to take doxycycline, use steroids, and breathing treatments as previously instructed  for treatment of COPD.    She is advised to follow-up primary care physician for recheck within the next 2 days and return to the ER if the redness on the right lower extremity becomes dramatically worse or if her breathing becomes dramatically worse or more concerning.       Amount and/or Complexity of Data Reviewed  Clinical lab tests: ordered and reviewed  Tests in the radiology section of CPT®: ordered and reviewed  Decide to obtain previous medical records or to obtain history from someone other than the patient: yes  Obtain history from someone other than the patient: yes  Independent visualization of images, tracings, or specimens: yes        Final diagnoses:   COPD with exacerbation (HCC)   Tachycardia   Cellulitis of right lower extremity       ED Disposition  ED Disposition     ED Disposition   Discharge    Condition   Stable    Comment   --             Dacia Viera MD  [address] [place] [address]  [phone]    In 2 days           Medication List      New Prescriptions    sulfamethoxazole-trimethoprim 800-160 MG per tablet  Commonly known as: BACTRIM DS,SEPTRA DS  Take 2 tablets by mouth 2 (Two) Times a Day for 7 days.        Changed    azelastine 0.1 % nasal spray  Commonly known as: ASTELIN  2 sprays into the nostril(s) as directed by provider 2 (Two) Times a Day.  What changed:   · when to take this  · reasons to take this     fluticasone 50 MCG/ACT nasal spray  Commonly known as: Flonase  2 sprays into the nostril(s) as directed by provider Daily.  What changed:   · when to take this  · reasons to take this     Fluticasone-Umeclidin-Vilant 200-62.5-25 MCG/INH inhaler  Commonly known as: TRELEGY  Inhale 1 puff Daily.  What changed: when to take this     pantoprazole 40 MG EC tablet  Commonly known as: PROTONIX  Take 1 tablet by mouth 2 (two) times a day.  What changed:   · when to take this  · reasons to take this           Where to Get Your Medications      These medications were  sent to [facility] - [address] - [phone]  - [phone]  [place] [address]    Phone: [phone]   · sulfamethoxazole-trimethoprim 800-160 MG per tablet          Riley Ingram MD  04/18/22 [id number]

## 2022-04-18 NOTE — OUTREACH NOTE
Call Center TCM Note    Flowsheet Row Responses   [facility] patient discharged from? [facility]   Does the patient have one of the following disease processes/diagnoses(primary or secondary)? COPD/Pneumonia   Was the primary reason for admission: COPD exacerbation   TCM attempt successful? Yes   Call start time 1414   Call end time 1423   Discharge diagnosis Copd   Meds reviewed with patient/caregiver? Yes   Is the patient having any side effects they believe may be caused by any medication additions or changes? No   Does the patient have all medications ordered at discharge? Yes   Is the patient taking all medications as directed (includes completed medication regime)? Yes   Does the patient have a primary care provider?  Yes   Does the patient have an appointment with their PCP or specialist within 7 days of discharge? Yes   Comments regarding PCP Has a followup on 4/20/2022 with PCP office for a hospital followup.    Has the patient kept scheduled appointments due by today? N/A   Pulse Ox monitoring Intermittent   Pulse Ox device source Patient   O2 Sat comments Sats are 90% on 02 , she reports she drops on concentrator below 90 but on portable tank she stays at 90 or above. She is calling the DME that supplies her 02 today. to see if they will come and check concentrator.    O2 Sat: education provided Sat levels, Monitoring frequency, When to seek care   Psychosocial issues? No   Did the patient receive a copy of their discharge instructions? Yes   Nursing interventions Reviewed instructions with patient   What is the patient's perception of their health status since discharge? Same   Nursing Interventions Nurse provided patient education   Is the patient/caregiver able to teach back the hierarchy of who to call/visit for symptoms/problems? PCP, Specialist, Home health nurse, Urgent Care, ED, 911 Yes   Is the patient able to teach back COPD zones? Yes   Nursing interventions Education provided on various  zones   Patient reports what zone on this call? Yellow Zone   Yellow Zone Increased shortness of air, Unable to complete daily activities, Increased or thicker phlegm/mucus, Using quick relief inhaler/nebulizer more often, Medication is not helping relieve symptoms, Poor sleep and symptoms work patient up   Yellow interventions Use quick relief inhaler as ordered, Use oxygen as ordered, Continue to use daily medications, Get plenty of rest, Use other meds such as steroids or antibiotics as ordered, Call provider immediatly if symptoms do not improve   TCM call completed? Yes   Wrap up additional comments Patient will call DME about 02. She is SOB and doesn't feel great she says. She has a followup on 4/20/2022          Nakul Mckeon RN    4/18/2022, 14:23 EDT

## 2022-04-19 ENCOUNTER — TELEPHONE (OUTPATIENT)
Dept: OTHER | Facility: HOSPITAL | Age: 77
End: 2022-04-19

## 2022-04-19 NOTE — DISCHARGE INSTRUCTIONS
Take antibiotics as prescribed.    Continue to use steroids and breathing treatments as prescribed and observe breathing.    Observe redness over the right lower extremity and return to the ER if it worsens or becomes more concerning.

## 2022-04-19 NOTE — TELEPHONE ENCOUNTER
COPD Nurse Navigator called patient in reference to recent ED visit 4/18/2022.  Patient reports that symptoms have improved.  Patient reports that they have the medications they were prescribed.  Patient verbalized understanding of need to follow up with provider and schedule an appointment, she has a follow up scheduled tomorrow.  COPD action plan reviewed.  NN continues to follow.

## 2022-04-20 ENCOUNTER — OFFICE VISIT (OUTPATIENT)
Dept: INTERNAL MEDICINE | Facility: CLINIC | Age: 77
End: 2022-04-20

## 2022-04-20 VITALS
DIASTOLIC BLOOD PRESSURE: 60 MMHG | SYSTOLIC BLOOD PRESSURE: 126 MMHG | BODY MASS INDEX: 31.01 KG/M2 | WEIGHT: 175 LBS | RESPIRATION RATE: 18 BRPM | HEIGHT: 63 IN | OXYGEN SATURATION: 96 % | HEART RATE: 102 BPM

## 2022-04-20 DIAGNOSIS — J96.11 CHRONIC RESPIRATORY FAILURE WITH HYPOXIA: ICD-10-CM

## 2022-04-20 DIAGNOSIS — J44.9 COPD MIXED TYPE: Primary | ICD-10-CM

## 2022-04-20 DIAGNOSIS — R60.9 PERIPHERAL EDEMA: ICD-10-CM

## 2022-04-20 PROCEDURE — 99495 TRANSJ CARE MGMT MOD F2F 14D: CPT | Performed by: INTERNAL MEDICINE

## 2022-04-20 PROCEDURE — 1111F DSCHRG MED/CURRENT MED MERGE: CPT | Performed by: INTERNAL MEDICINE

## 2022-04-20 NOTE — ACP (ADVANCE CARE PLANNING)
In light of recent hospitalization we discussed advanced directives and she endorsed that she would not want resuscitated should her heart stop. She would not want to live on life support or artificial fluids or nutrition. She would not want to be intubated as she feels that her lungs are in too poor condition to ever come off of the ventilator and she would not want to be on the ventilator longterm. She noted desire for cremation when she passes.

## 2022-04-20 NOTE — PROGRESS NOTES
Transitional Care Follow Up Visit  Subjective     Lorrie Pagan is a 76 y.o. female who presents for a transitional care management visit.    Within 48 business hours after discharge our office contacted her via telephone to coordinate her care and needs.      I reviewed and discussed the details of that call along with the discharge summary, hospital problems, inpatient lab results, inpatient diagnostic studies, and consultation reports with Lorrie.     Current outpatient and discharge medications have been reconciled for the patient.  Reviewed by: Dacia Viera MD      Date of TCM Phone Call 4/15/2022   [facility]   Date of Admission 4/11/2022   Date of Discharge 4/15/2022   Discharge Disposition Home or Self Care     Risk for Readmission (LACE) Score: 15 (4/15/2022  6:01 AM)      History of Present Illness   Course During Hospital Stay: Ms. Pagan comes in today after admission 4/11-4/15/2022 for COPD exacerbation. She notes that leading up to admission she had not been compliant with trelegy . She was treated with levaquin, solumedrol, duonebs, symbicort, and spiriva. Sputum culture initially showed GPC so she was transitioned to doxycycline. Eventually culture showed only mixed upper respiratory angelia, no staph or pseudomonas. She had CXR and CTA chest with findings of chronic lung disease, no focal findings. She was eventually discharged on doxycycline, prednisone taper, and her home inhalers. She presented to ED 4/18 due to tachycardia and was diagnosed as having RLE cellulitis. She was given bactrim but she has not started it yet. She notes that discoloration of her ankles is chronic x years and worse if on her feet for long periods of time. She is currently using 4L NC to conserve O2 until tanks arrive today. She is maintaining O2 sats.     We discussed recommendation for palliative care consult and she agreed. She also notes that she has been trying to prepare a  living will but had questions regarding DNR. She endorsed that she initially wanted to be DNR but then thought there may be a possibility that she could be resuscitated. We discussed that she may be resuscitated but her quality of life would not be guaranteed. She endorsed that she would not want to live on life support or artificial fluids or nutrition. She would not want to be intubated as she feels that her lungs are in too poor condition to ever come off of the ventilator and she would not want to be on the ventilator longterm.      The following portions of the patient's history were reviewed and updated as appropriate: allergies, current medications, past medical history and problem list.    Review of Systems   Constitutional: Negative.    Respiratory: Positive for shortness of breath (stable). Negative for cough (baseline).    Cardiovascular: Positive for leg swelling.   Skin: Positive for color change (chronic around ankles).       Objective   Physical Exam  Vitals and nursing note reviewed.   Constitutional:       General: She is not in acute distress.     Appearance: She is well-developed. She is not ill-appearing or toxic-appearing.   HENT:      Head: Normocephalic and atraumatic.   Eyes:      Conjunctiva/sclera: Conjunctivae normal.   Cardiovascular:      Rate and Rhythm: Normal rate and regular rhythm.      Heart sounds: Normal heart sounds.   Pulmonary:      Effort: Pulmonary effort is normal. No respiratory distress.      Breath sounds: Decreased breath sounds and wheezing (mild RUL) present.      Comments: On 4L NC, appears comfortable  Musculoskeletal:      Right lower leg: Edema (trace) present.      Left lower leg: Edema (trace) present.   Skin:     General: Skin is warm and dry.      Comments: Hyperpigmentation with purplish hue around both ankles. No warmth.   Neurological:      Mental Status: She is alert and oriented to person, place, and time. Mental status is at baseline.      Gait: Gait  abnormal (using motorized scooter).   Psychiatric:         Mood and Affect: Mood normal.         Behavior: Behavior normal.         Thought Content: Thought content normal.         Judgment: Judgment normal.         Assessment/Plan   Diagnoses and all orders for this visit:    Chronic respiratory failure due to Stage II, moderate, COPD  - Last PFTs with FEV1 51 but this was in 2019. She is now using 4-5L NC and has required hospitalization for COPD exacerbation so suspect she is at least stage 3. Needs new PFTs and has upcoming pulmonary appt to discuss.  - She is back to baseline today since treatment in hospital. On doxycycline and prednisone which she should finish.   - we discussed advanced care planning today as noted above. She is DNR/DNI. Will be referring her to palliative care.    Peripheral edema  - Improved today. Discussed that her ankle hyperpigmentation is due to venous stasis and not cellulitis. Do not start bactrim.    Health Maintenance  - Pap smear: no longer indicated  - Mammogram: 5/2021 [facility] with axillary adenopathy, 8/2021 [facility], had FNA of R axillary node which was negative. 3/2022 [facility], next in 6m.  - Colonoscopy: 2018  - HCV: negative  - Immunizations: Flu UTD, pneumovax complete, COVID booster due, shingrix #1  - Depression screening: negative 2/2022           Return in about 6 weeks (around 6/1/2022) for as scheduled.

## 2022-04-21 ENCOUNTER — TELEPHONE (OUTPATIENT)
Dept: INTERNAL MEDICINE | Facility: CLINIC | Age: 77
End: 2022-04-21

## 2022-04-21 NOTE — TELEPHONE ENCOUNTER
Caller: Lorrie Pagan    Relationship to patient: Self    Best call back number: [phone]    PATIENT IS CALLING IN TO GET INSTRUCTIONS ON HOW TO CLEAN OUT HER EARS. IT WAS NOT SHOWN AT THE APPOINTMENT.

## 2022-04-21 NOTE — TELEPHONE ENCOUNTER
Called pt and explained that she can wipe her ears with a washcloth after a shower but to avoid sticking anything in her ears. Suggested we could make an appt to come in and flush them out and see if there is need for a referral to ENT. Pt stated she is actually better now than she was a couple days ago and will call us if she needs help to get them cleaned out or if she starts having hearing issues.

## 2022-04-23 LAB
QT INTERVAL: 322 MS
QTC INTERVAL: 447 MS

## 2022-04-26 ENCOUNTER — READMISSION MANAGEMENT (OUTPATIENT)
Dept: CALL CENTER | Facility: HOSPITAL | Age: 77
End: 2022-04-26

## 2022-04-26 NOTE — OUTREACH NOTE
COPD/PN Week 2 Survey    Flowsheet Row Responses   [facility] patient discharged from? [facility]   Does the patient have one of the following disease processes/diagnoses(primary or secondary)? COPD/Pneumonia   Was the primary reason for admission: COPD exacerbation   Week 2 attempt successful? Yes   Call start time 1634   Call end time 1639   Meds reviewed with patient/caregiver? Yes   Is the patient having any side effects they believe may be caused by any medication additions or changes? No   Does the patient have all medications ordered at discharge? Yes   Is the patient taking all medications as directed (includes completed medication regime)? Yes   Comments regarding appointments She was seen in the ED at [facility] on 4/18/2022 for SOB   Does the patient have a primary care provider?  Yes   Does the patient have an appointment with their PCP or specialist within 7 days of discharge? Yes   Has the patient kept scheduled appointments due by today? Yes   What is the Home health agency?  Patient reports her PCP is ordering Palliative Care.    Psychosocial issues? No   Did the patient receive a copy of their discharge instructions? Yes   Nursing interventions Reviewed instructions with patient   What is the patient's perception of their health status since discharge? Improving   Nursing Interventions Nurse provided patient education   Is the patient/caregiver able to teach back the hierarchy of who to call/visit for symptoms/problems? PCP, Specialist, Home health nurse, Urgent Care, ED, 911 Yes   Is the patient able to teach back COPD zones? Yes   Nursing interventions Education provided on various zones   Patient reports what zone on this call? Yellow Zone   Yellow Zone Increased shortness of air, Unable to complete daily activities, Increased or thicker phlegm/mucus, Using quick relief inhaler/nebulizer more often, Medication is not helping relieve symptoms, Poor sleep and symptoms work patient up   Yellow  interventions Use quick relief inhaler as ordered, Use oxygen as ordered, Continue to use daily medications, Get plenty of rest, Use other meds such as steroids or antibiotics as ordered, Call provider immediatly if symptoms do not improve   Week 2 call completed? Yes   Wrap up additional comments Patient reports she is slightly improving., Still with SOB .           MU FIERRO - Registered Nurse

## 2022-04-28 DIAGNOSIS — Z01.812 BLOOD TESTS PRIOR TO TREATMENT OR PROCEDURE: Primary | ICD-10-CM

## 2022-04-29 ENCOUNTER — CLINICAL SUPPORT NO REQUIREMENTS (OUTPATIENT)
Dept: PULMONOLOGY | Facility: CLINIC | Age: 77
End: 2022-04-29

## 2022-04-29 DIAGNOSIS — Z01.812 BLOOD TESTS PRIOR TO TREATMENT OR PROCEDURE: ICD-10-CM

## 2022-04-29 PROCEDURE — 99211 OFF/OP EST MAY X REQ PHY/QHP: CPT | Performed by: NURSE PRACTITIONER

## 2022-04-29 PROCEDURE — U0005 INFEC AGEN DETEC AMPLI PROBE: HCPCS | Performed by: NURSE PRACTITIONER

## 2022-04-29 PROCEDURE — U0004 COV-19 TEST NON-CDC HGH THRU: HCPCS | Performed by: NURSE PRACTITIONER

## 2022-04-30 LAB — SARS-COV-2 RNA NOSE QL NAA+PROBE: NOT DETECTED

## 2022-05-02 ENCOUNTER — OFFICE VISIT (OUTPATIENT)
Dept: PULMONOLOGY | Facility: CLINIC | Age: 77
End: 2022-05-02

## 2022-05-02 VITALS
SYSTOLIC BLOOD PRESSURE: 132 MMHG | OXYGEN SATURATION: 94 % | BODY MASS INDEX: 32.78 KG/M2 | WEIGHT: 185 LBS | DIASTOLIC BLOOD PRESSURE: 60 MMHG | HEART RATE: 89 BPM | HEIGHT: 63 IN | TEMPERATURE: 97.3 F

## 2022-05-02 DIAGNOSIS — J96.11 CHRONIC RESPIRATORY FAILURE WITH HYPOXIA: ICD-10-CM

## 2022-05-02 DIAGNOSIS — J44.9 COPD MIXED TYPE: Primary | ICD-10-CM

## 2022-05-02 DIAGNOSIS — J47.9 IDIOPATHIC BRONCHIECTASIS: ICD-10-CM

## 2022-05-02 DIAGNOSIS — G47.33 OSA ON CPAP: ICD-10-CM

## 2022-05-02 DIAGNOSIS — Z99.89 OSA ON CPAP: ICD-10-CM

## 2022-05-02 PROBLEM — G47.34 NOCTURNAL HYPOXEMIA: Status: RESOLVED | Noted: 2020-09-29 | Resolved: 2022-05-02

## 2022-05-02 PROCEDURE — 94060 EVALUATION OF WHEEZING: CPT | Performed by: NURSE PRACTITIONER

## 2022-05-02 PROCEDURE — 99214 OFFICE O/P EST MOD 30 MIN: CPT | Performed by: NURSE PRACTITIONER

## 2022-05-02 PROCEDURE — 94726 PLETHYSMOGRAPHY LUNG VOLUMES: CPT | Performed by: NURSE PRACTITIONER

## 2022-05-02 PROCEDURE — 94729 DIFFUSING CAPACITY: CPT | Performed by: NURSE PRACTITIONER

## 2022-05-02 RX ORDER — ALBUTEROL SULFATE 90 UG/1
4 AEROSOL, METERED RESPIRATORY (INHALATION) ONCE
Status: COMPLETED | OUTPATIENT
Start: 2022-05-02 | End: 2022-05-02

## 2022-05-02 RX ADMIN — ALBUTEROL SULFATE 4 PUFF: 90 AEROSOL, METERED RESPIRATORY (INHALATION) at 10:34

## 2022-05-02 NOTE — PROGRESS NOTES
"[facility] Pulmonary Follow up      Chief Complaint  COPD (F/U)    Subjective          Lorrie Pagan presents to [facility] for follow-up after her recent hospital, she was admitted April 11 for 4 days for acute on chronic respiratory failure with bibasilar pneumonia and a PCR positive for human metapneumovirus.  She was placed on Levaquin for 5 days as well as Solu-Medrol and nebulizers.    Overall she is feeling a bit better but she is very short of breath with activity.  She is requiring 5 L continuous flow and will still desaturate with minimal activity.  She has been using her nebulizer some and she does have a productive cough still.  She has tried some Mucinex without much benefit.        Objective     Vital Signs:   /60 (BP Location: Left arm, Patient Position: Sitting, Cuff Size: Adult)   Pulse 89   Temp 97.3 °F (36.3 °C) (Infrared)   Ht 160 cm (63\")   Wt 83.9 kg (185 lb)   SpO2 94%   PF (!) 5 L/min Comment: resting pulse  BMI 32.77 kg/m²       Immunization History   Administered Date(s) Administered   • COVID-19 (MODERNA) 1st, 2nd, 3rd Dose Only 02/13/2021, 03/13/2021, 09/17/2021   • Flu Vaccine Quad PF >36MO 10/31/2019, 10/25/2020   • Flu Vaccine Split Quad 10/31/2019   • Fluzone High Dose =>65 Years (Vaxcare ONLY) 10/28/2018, 10/21/2019, 10/16/2020   • Fluzone High-Dose 65+yrs 11/17/2021   • Influenza TIV (IM) 11/01/2017   • Pneumococcal Conjugate 13-Valent (PCV13) 08/03/2018, 10/28/2018   • Pneumococcal Polysaccharide (PPSV23) 10/16/2020   • Pneumococcal, Unspecified 01/01/2016   • Shingrix 03/31/2021       Physical Exam  Vitals reviewed.   Constitutional:       Appearance: She is well-developed.   HENT:      Head: Normocephalic and atraumatic.   Eyes:      Pupils: Pupils are equal, round, and reactive to light.   Cardiovascular:      Rate and Rhythm: Normal rate and regular rhythm.      Heart sounds: No murmur heard.  Pulmonary:     "  Effort: Pulmonary effort is normal. No respiratory distress.      Breath sounds: No wheezing or rales.      Comments: Decreased but no wheezing or rales today  Abdominal:      General: Bowel sounds are normal. There is no distension.      Palpations: Abdomen is soft.   Musculoskeletal:         General: Normal range of motion.      Cervical back: Normal range of motion and neck supple.   Skin:     General: Skin is warm and dry.      Findings: No erythema.   Neurological:      Mental Status: She is alert and oriented to person, place, and time.   Psychiatric:         Behavior: Behavior normal.          Result Review :            PFTs done in the office today:  No significant changes at her moderate obstructive airway disease with an FEV1 of 1.15, 61% predicted.  In 2019 she was 1.0 L, 51% predicted.  Evidence of air trapping with an elevated residual volume.                 Assessment and Plan    Problem List Items Addressed This Visit        Pulmonary and Pneumonias    Bronchiectasis (CMS/HCC)    Chronic respiratory failure    Stage II, moderate, COPD - Primary    Relevant Medications    albuterol sulfate HFA (PROVENTIL HFA;VENTOLIN HFA;PROAIR HFA) inhaler 4 puff (Completed)    Other Relevant Orders    Pulmonary Function Test (Completed)       Sleep    RENETTA (Previous CPAP)    Overview     Intolerant to PAP therapy                 We did discuss after her recent pneumonia and may take several weeks for her to improve back to baseline.  At this time however she is requiring more than 5 L continuous flow at home due to continued hypoxemia.  We will get her a 10 L concentrator, and make sure she is getting enough portable tanks to help her ambulate throughout the day.    She does still try to go down stairs several days a week to do activities in her apartment building.    I encouraged her to continue with her activity to help with her overall recovery following her recent hospitalization and pneumonia.    Continue on  her nebulizers as needed to help with secretion clearance.    I would like for her to follow-up in 4 weeks to see if we can do a 6-minute walk test to get a better idea on her oxygen saturations after her recovery.    Follow Up     No follow-ups on file.  Patient was given instructions and counseling regarding her condition or for health maintenance advice. Please see specific information pulled into the AVS if appropriate.     I spent 35 minutes caring for Lorrie on this date of service. This time includes time spent by me in the following activities:preparing for the visit, reviewing tests, obtaining and/or reviewing a separately obtained history, performing a medically appropriate examination and/or evaluation , counseling and educating the patient/family/caregiver, ordering medications, tests, or procedures, referring and communicating with other health care professionals , documenting information in the medical record and independently interpreting results and communicating that information with the patient/family/caregiver      DONALD Sylvester, ACNP  [facility]monary Critical Care Medicine  Electronically signed

## 2022-05-03 ENCOUNTER — HOME HEALTH ADMISSION (OUTPATIENT)
Dept: HOME HEALTH SERVICES | Facility: HOME HEALTHCARE | Age: 77
End: 2022-05-03

## 2022-05-03 ENCOUNTER — TELEPHONE (OUTPATIENT)
Dept: INTERNAL MEDICINE | Facility: CLINIC | Age: 77
End: 2022-05-03

## 2022-05-03 ENCOUNTER — OFFICE VISIT (OUTPATIENT)
Dept: INTERNAL MEDICINE | Facility: CLINIC | Age: 77
End: 2022-05-03

## 2022-05-03 VITALS
OXYGEN SATURATION: 93 % | HEART RATE: 90 BPM | BODY MASS INDEX: 32.95 KG/M2 | SYSTOLIC BLOOD PRESSURE: 132 MMHG | TEMPERATURE: 97.6 F | DIASTOLIC BLOOD PRESSURE: 58 MMHG | WEIGHT: 186 LBS

## 2022-05-03 DIAGNOSIS — J44.9 COPD MIXED TYPE: ICD-10-CM

## 2022-05-03 DIAGNOSIS — J96.11 CHRONIC RESPIRATORY FAILURE WITH HYPOXIA: ICD-10-CM

## 2022-05-03 DIAGNOSIS — Z79.899 POLYPHARMACY: ICD-10-CM

## 2022-05-03 DIAGNOSIS — H69.83 DYSFUNCTION OF BOTH EUSTACHIAN TUBES: Primary | ICD-10-CM

## 2022-05-03 DIAGNOSIS — H93.13 TINNITUS OF BOTH EARS: ICD-10-CM

## 2022-05-03 PROBLEM — H69.93 DYSFUNCTION OF BOTH EUSTACHIAN TUBES: Status: ACTIVE | Noted: 2022-05-03

## 2022-05-03 PROCEDURE — 99213 OFFICE O/P EST LOW 20 MIN: CPT | Performed by: INTERNAL MEDICINE

## 2022-05-03 RX ORDER — PSEUDOEPHEDRINE HCL 120 MG/1
120 TABLET, FILM COATED, EXTENDED RELEASE ORAL EVERY 12 HOURS PRN
Qty: 30 TABLET | Refills: 0 | Status: SHIPPED | OUTPATIENT
Start: 2022-05-03 | End: 2022-06-21

## 2022-05-03 NOTE — PROGRESS NOTES
Internal Medicine Acute Visit    Chief Complaint   Patient presents with   • Tinnitus     Both ears been ongoing for about a month        HPI  Ms. Pagan comes in today with bilateral hearing loss and tinnitus. Symptoms for about a week. Feels like she is in a fish bowl. She also notes difficulty managing her medications and often not taking them as prescribed. Her pharmacy already prepackages her medications.       Review of Systems  Review of Systems   HENT: Positive for hearing loss and tinnitus. Negative for ear discharge and ear pain.    Allergic/Immunologic: Positive for environmental allergies.   Neurological: Positive for memory problem (forgets medications).        Medications  Current Outpatient Medications on File Prior to Visit   Medication Sig Dispense Refill   • acetaminophen (TYLENOL) 500 MG tablet Take 1,000 mg by mouth Every 6 (Six) Hours As Needed for Mild Pain .     • albuterol (ACCUNEB) 1.25 MG/3ML nebulizer solution Take 3 mL by nebulization Every 6 (Six) Hours As Needed for Shortness of Air. 90 each 5   • albuterol sulfate  (90 Base) MCG/ACT inhaler Inhale 2 puffs Every 6 (Six) Hours As Needed for Wheezing. 18 g 2   • azelastine (ASTELIN) 0.1 % nasal spray 2 sprays into the nostril(s) as directed by provider 2 (Two) Times a Day. (Patient taking differently: 2 sprays into the nostril(s) as directed by provider 2 (Two) Times a Day As Needed for Rhinitis or Allergies.) 30 mL 5   • chlorhexidine (PERIDEX) 0.12 % solution Apply 15 mL to the mouth or throat 2 (Two) Times a Day As Needed. For 14 days, w/1 refill     • Dapsone 5 % topical gel Apply  topically to the appropriate area as directed Daily As Needed (apply to legs).     • donepezil (ARICEPT) 10 MG tablet Take 10 mg by mouth Daily.     • DULoxetine (CYMBALTA) 60 MG capsule TAKE 1 CAPSULE BY MOUTH DAILY (Patient taking differently: Take 60 mg by mouth Daily.) 90 capsule 3   • fluticasone (Flonase) 50 MCG/ACT nasal spray 2 sprays into  the nostril(s) as directed by provider Daily. (Patient taking differently: 2 sprays into the nostril(s) as directed by provider Daily As Needed for Rhinitis or Allergies.) 18.2 mL 11   • Fluticasone-Umeclidin-Vilant (TRELEGY) 200-62.5-25 MCG/INH inhaler Inhale 1 puff Daily. (Patient taking differently: Inhale 1 puff Every Night.) 1 each 5   • furosemide (LASIX) 40 MG tablet Take 40 mg by mouth Daily.     • glimepiride (AMARYL) 2 MG tablet TAKE 1 TABLET BY MOUTH TWICE DAILY 180 tablet 1   • ketoconazole (NIZORAL) 2 % cream Apply 1 application topically to the appropriate area as directed Daily As Needed for Itching (apply to back and legs).     • ketoconazole (NIZORAL) 2 % shampoo Apply 1 application topically to the appropriate area as directed As Needed for Irritation (for head).     • latanoprost (XALATAN) 0.005 % ophthalmic solution Administer 1 drop to both eyes Every Night.     • memantine (NAMENDA) 5 MG tablet Take 5 mg by mouth Daily.     • metOLazone (ZAROXOLYN) 2.5 MG tablet Take 2.5 mg by mouth Daily.     • ondansetron ODT (ZOFRAN-ODT) 4 MG disintegrating tablet Place 1 tablet on the tongue Every 8 (Eight) Hours As Needed for Nausea or Vomiting. 30 tablet 0   • pantoprazole (PROTONIX) 40 MG EC tablet Take 1 tablet by mouth 2 (two) times a day. (Patient taking differently: Take 40 mg by mouth Daily As Needed (heartburn).) 180 tablet 3   • potassium chloride (MICRO-K) 10 MEQ CR capsule Take 3 capsules by mouth 2 (Two) Times a Day.     • rOPINIRole XL (REQUIP XL) 8 MG 24 hr tablet Take 8 mg by mouth 2 (two) times a day.     • saccharomyces boulardii (FLORASTOR) 250 MG capsule Take 1 capsule by mouth 2 (Two) Times a Day. 60 capsule 0   • Spacer/Aero-Holding Chambers (AeroChamber Plus Morris-Vu) misc As Needed.     • timolol (TIMOPTIC) 0.25 % ophthalmic solution Administer 1 drop to the right eye Every Morning.     • tiZANidine (ZANAFLEX) 2 MG tablet TAKE 1 TABLET BY MOUTH AT NIGHT AS NEEDED FOR MUSCLE SPASMS. 30  "tablet 0   • vitamin D (ERGOCALCIFEROL) 1.25 MG (00263 UT) capsule capsule Take 1 capsule by mouth 1 (One) Time Per Week. Friday? 5 capsule 0     Current Facility-Administered Medications on File Prior to Visit   Medication Dose Route Frequency Provider Last Rate Last Admin   • [COMPLETED] albuterol sulfate HFA (PROVENTIL HFA;VENTOLIN HFA;PROAIR HFA) inhaler 4 puff  4 puff Inhalation Once Saray Retana APRN   4 puff at 05/02/22 1034        Allergies  Allergies   Allergen Reactions   • Hydrocodone Hallucinations     \"With high doses\"   • Statins Myalgia     myalgia   • Metoclopramide Other (See Comments)     EXACERBATED RLS   • Penicillins Rash     rash   • Tramadol Nausea And Vomiting and GI Intolerance     VERY MILD PER PT       PMH  Past Medical History:   Diagnosis Date   • Back pain    • Bronchiectasis (HCC)    • Bronchitis 01/2018   • Cancer (HCC)     History of lung cancer and skin cancer    • Cardiac murmur    • Chronic cough    • Chronic obstructive pulmonary disease (HCC)    • Colon polyp    • DDD (degenerative disc disease), lumbar    • Depression    • Diabetes mellitus (HCC)     DX: 2019, CHECK BS QOD, LAST A1C 6.3??   • Disease of thyroid gland    • Esophageal dilatation 9/20/2019    Added automatically from request for surgery [id number]   • Esophageal dysphagia 10/24/2019    Added automatically from request for surgery [id number]   • Former smoker (None since 1992) 8/29/2019   • GERD (gastroesophageal reflux disease)    • Glaucoma    • Globus sensation 1/27/2020   • History of colonic polyps    • History of transfusion 1988    NO REACTION    • Hyperlipidemia    • Hypertension    • Irritable bowel syndrome     Constipation/diarrhea   • Legally blind    • Lung cancer (HCC)    • Macular degeneration    • Neuropathy    • Obesity 2/26/2020   • Osteoarthritis    • Oxygen dependent     5L   • Pneumonia    • Sleep apnea     NON COMPLIANT WITH CPAP USE   • UTI (urinary tract infection)    • Wears glasses  "       Objective  Visit Vitals  /58   Pulse 90   Temp 97.6 °F (36.4 °C)   Wt 84.4 kg (186 lb)   LMP  (LMP Unknown)   SpO2 93%   BMI 32.95 kg/m²        Physical Exam  Physical Exam  Vitals and nursing note reviewed.   Constitutional:       General: She is not in acute distress.     Appearance: She is well-developed.   HENT:      Head: Normocephalic and atraumatic.      Right Ear: Ear canal and external ear normal. A middle ear effusion is present. There is no impacted cerumen. Tympanic membrane is not injected, perforated or erythematous.      Left Ear: Ear canal and external ear normal. A middle ear effusion is present. There is no impacted cerumen. Tympanic membrane is not injected, perforated or erythematous.   Eyes:      Conjunctiva/sclera: Conjunctivae normal.   Pulmonary:      Effort: Pulmonary effort is normal. No respiratory distress.   Skin:     General: Skin is warm and dry.   Neurological:      Mental Status: She is alert and oriented to person, place, and time. Mental status is at baseline.         Results  Results for orders placed or performed in visit on 04/29/22   COVID-19 PCR, [facility], NP SWAB IN [employer] V[employer]/ORAL SWISH 24-30 HR TAT - Swab, Nasopharynx    Specimen: Nasopharynx; Swab   Result Value Ref Range    SARS-CoV-2 KATELYNN Not Detected Not Detected        Assessment and Plan  Diagnoses and all orders for this visit:    Dysfunction of both eustachian tubes  - Already on max dose flonase and azelastin  - Discussed oral steroids but she declines. Discussed cautious use of sudafed 12h which she will try. She understands that this may raise HR and BP.  - ENT referral if not improving    Tinnitus of both ears  - Due to above    Polypharmacy  - Has difficulty with medication compliance due to polypharmacy. Meds prepackaged by her pharmacy.  - Will refer to  for nursing to provide medication education    Chronic respiratory failure due to Stage II, moderate, COPD  - Last PFTs with  FEV1 61% 5/2022  - using 4-5L NC and has required hospitalization for COPD exacerbation  - Following with pulmonary  - Referred to home health and palliative care previously     Health Maintenance  - Pap smear: no longer indicated  - Mammogram: 5/2021 [facility] with axillary adenopathy, 8/2021 [facility], had FNA of R axillary node which was negative. 3/2022 [facility], next in 6m.  - Colonoscopy: 2018  - HCV: negative  - Immunizations: Flu UTD, pneumovax complete, COVID booster due, shingrix #1  - Depression screening: negative 2/2022    Return if symptoms worsen or fail to improve.

## 2022-05-03 NOTE — TELEPHONE ENCOUNTER
SHE STOPPED BY TO HAVE ME SEND DR. SETHI A MESSAGE.    SHE WANTED TO LET DR. SETHI KNOW THAT IF SHE SEND HER RX FOR SUDAFED TO HER PHARMACY, THEY CAN FILL IT W/O HER BEING THERE AND WILL DELIVER IT TO HER.    SHE WANTS IT SENT TO [facility] IN [place].    MARLENE WILBURN  [phone]

## 2022-05-04 ENCOUNTER — HOME CARE VISIT (OUTPATIENT)
Dept: HOME HEALTH SERVICES | Facility: HOME HEALTHCARE | Age: 77
End: 2022-05-04

## 2022-05-04 ENCOUNTER — READMISSION MANAGEMENT (OUTPATIENT)
Dept: CALL CENTER | Facility: HOSPITAL | Age: 77
End: 2022-05-04

## 2022-05-04 VITALS
DIASTOLIC BLOOD PRESSURE: 76 MMHG | HEART RATE: 100 BPM | TEMPERATURE: 98 F | SYSTOLIC BLOOD PRESSURE: 153 MMHG | OXYGEN SATURATION: 95 %

## 2022-05-04 PROCEDURE — G0299 HHS/HOSPICE OF RN EA 15 MIN: HCPCS

## 2022-05-04 NOTE — OUTREACH NOTE
COPD/PN Week 3 Survey    Flowsheet Row Responses   [facility] patient discharged from? [place]   Does the patient have one of the following disease processes/diagnoses(primary or secondary)? COPD/Pneumonia   Was the primary reason for admission: COPD exacerbation   Week 3 attempt successful? Yes   Call start time [phone]   Call end time [phone]   Discharge diagnosis Copd   Meds reviewed with patient/caregiver? Yes   Is the patient having any side effects they believe may be caused by any medication additions or changes? No   Is the patient taking all medications as directed (includes completed medication regime)? Yes   Does the patient have a primary care provider?  Yes   Has the patient kept scheduled appointments due by today? Yes   What is the Home health agency?  [facility] and    Has home health visited the patient within 72 hours of discharge? Yes  [Has not heard from , but it was just ordered on 5/3/22]   Pulse Ox monitoring Intermittent   Pulse Ox device source Patient   O2 Sat comments O2 sats 93-94% on 5LO2   O2 Sat: education provided Sat levels, Monitoring frequency   Psychosocial issues? No   What is the patient's perception of their health status since discharge? Improving   Nursing Interventions Nurse provided patient education   Is the patient/caregiver able to teach back the hierarchy of who to call/visit for symptoms/problems? PCP, Specialist, Home health nurse, Urgent Care, ED, 911 Yes   Additional teach back comments Pt c/o of lower extremity edema, almost 10lb weight gain since discharge   Is the patient able to teach back COPD zones? Yes   Nursing interventions Education provided on various zones   Patient reports what zone on this call? Yellow Zone   Yellow Zone Increased swelling of ankles, Using quick relief inhaler/nebulizer more often, Increased or thicker phlegm/mucus, Increased shortness of air, Unable to complete daily activities   Yellow interventions Continue to use  daily medications, Use quick relief inhaler as ordered, Use oxygen as ordered, Use other meds such as steroids or antibiotics as ordered, Call provider immediatly if symptoms do not improve   Week 3 call completed? Yes   Wrap up additional comments Pt states she is improving, has a productive cough, still some SOB, has some increased LE edema. Has f/u with PCP and Pulm Dr BENAVIDES S - Registered Nurse

## 2022-05-07 ENCOUNTER — HOME CARE VISIT (OUTPATIENT)
Dept: HOME HEALTH SERVICES | Facility: HOME HEALTHCARE | Age: 77
End: 2022-05-07

## 2022-05-07 PROCEDURE — G0300 HHS/HOSPICE OF LPN EA 15 MIN: HCPCS

## 2022-05-08 VITALS
TEMPERATURE: 97.5 F | SYSTOLIC BLOOD PRESSURE: 134 MMHG | DIASTOLIC BLOOD PRESSURE: 66 MMHG | RESPIRATION RATE: 20 BRPM | OXYGEN SATURATION: 95 % | HEART RATE: 87 BPM

## 2022-05-09 ENCOUNTER — TELEPHONE (OUTPATIENT)
Dept: INTERNAL MEDICINE | Facility: CLINIC | Age: 77
End: 2022-05-09

## 2022-05-09 ENCOUNTER — HOME CARE VISIT (OUTPATIENT)
Dept: HOME HEALTH SERVICES | Facility: HOME HEALTHCARE | Age: 77
End: 2022-05-09

## 2022-05-09 DIAGNOSIS — H69.83 DYSFUNCTION OF BOTH EUSTACHIAN TUBES: Primary | ICD-10-CM

## 2022-05-09 DIAGNOSIS — H93.13 TINNITUS OF BOTH EARS: ICD-10-CM

## 2022-05-09 NOTE — DISCHARGE SUMMARY
"    [facility] Medicine Services  DISCHARGE SUMMARY    Patient Name: Lorrie Pagan  : 1945  MRN: [id number]    Date of Admission: 2022  1:26 PM  Date of Discharge:  4/15/22  Primary Care Physician: Dacia Viera MD    Consults     No orders found from 3/13/2022 to 2022.          Hospital Course     Presenting Problem:   COPD exacerbation (HCC) [J44.1]  Acute respiratory distress [R06.03]    Active Hospital Problems   No active problems to display.      Resolved Hospital Problems    Diagnosis Date Resolved POA   • Acute respiratory distress [R06.03] 04/15/2022 Yes   • COPD exacerbation (HCC) [J44.1] 04/15/2022 Yes   • Acute on chronic respiratory failure with hypoxia (HCC) [J96.21] 04/15/2022 Yes          Hospital Course:  Lorrie Pagan is a 76 y.o. female with PMH of COPD, DM2, RLS, HTN, chronic diastolic heart failure,GERD, gastroparesis, HLD, RENETTA, peripheral neuropathy, dementia presenting with wheezing, dyspnea, productive cough for 2 weeks, worsening over the last 3 days.        COPD exacerbation  CAP  Human metapneumovirus  -steroid taper   -po doxy      DMII  -resume home regimen      Chronic diastolic heart failure  -BNP within normal limits continue Lasix and potassium  -ECHO from  EF 60%      Peripheral neuropathy  -Cymbalta     HTN/HLD  -zetia at home.  BP controlled off medication     Dementia  -Aricept, Namenda      RLS  -requip- Pt takes a large dose of requip xl, which is not on formulary. I suspect this is what is causing her to feel \"drugged\" Review of records show that it has been an issue on previous admissions. Will decrease to 4mg BID   -zanaflex      Discharge Follow Up Recommendations for outpatient labs/diagnostics:   PCP    Day of Discharge     HPI:   Pt reports breathing is back to baseline     Review of Systems  Gen- No fevers, chills  CV- No chest pain, palpitations  Resp- No cough, dyspnea  GI- No N/V/D, abd " pain        Vital Signs:          Physical Exam:  Constitutional: No acute distress, awake, alert  HENT: NCAT, mucous membranes moist  Respiratory: Clear to auscultation bilaterally, respiratory effort normal on 5L NC  Cardiovascular: RRR, no murmurs, rubs, or gallops  Gastrointestinal: Positive bowel sounds, soft, nontender, nondistended  Musculoskeletal: No bilateral ankle edema  Psychiatric: Appropriate affect, cooperative  Neurologic: Oriented x 3, , speech clear  Skin: No rashes      Pertinent  and/or Most Recent Results     LAB RESULTS:                              Brief Urine Lab Results  (Last result in the past 365 days)      Color   Clarity   Blood   Leuk Est   Nitrite   Protein   CREAT   Urine HCG        10/01/21 1204 Yellow   Clear   Negative   Negative   Negative   Negative               Microbiology Results (last 10 days)     Procedure Component Value - Date/Time    COVID-19 PCR, [facility], NP SWAB IN [facility]SPORT MEDIA/ORAL SWISH 24-30 HR TAT - Swab, Nasopharynx [[id number]] Collected: 04/29/22 0949    Lab Status: Final result Specimen: Swab from Nasopharynx Updated: 04/30/22 1732     SARS-CoV-2 KATELYNN Not Detected          No radiology results for the last 10 days            Results for orders placed during the hospital encounter of 09/25/20    Adult Transthoracic Echo Complete W/ Cont if Necessary Per Protocol    Interpretation Summary  · The left atrial cavity is borderline dilated.  · Mild aortic valve regurgitation is present.  · Mild tricuspid valve regurgitation is present.  · Estimated right ventricular systolic pressure from tricuspid regurgitation is normal (<35 mmHg).  · Estimated left ventricular EF = 60% Left ventricular ejection fraction appears to be 56 - 60%. Left ventricular systolic function is normal.      Plan for Follow-up of Pending Labs/Results:     Discharge Details        Discharge Medications      New Medications      Instructions Start Date   saccharomyces boulardii  250 MG capsule  Commonly known as: FLORASTOR   250 mg, Oral, 2 Times Daily         Changes to Medications      Instructions Start Date   azelastine 0.1 % nasal spray  Commonly known as: ASTELIN  What changed:   when to take this  reasons to take this   2 sprays, Nasal, 2 Times Daily      fluticasone 50 MCG/ACT nasal spray  Commonly known as: Flonase  What changed:   when to take this  reasons to take this   2 sprays, Nasal, Daily      Fluticasone-Umeclidin-Vilant 200-62.5-25 MCG/INH inhaler  Commonly known as: TRELEGY  What changed: when to take this   1 puff, Inhalation, Daily - RT      pantoprazole 40 MG EC tablet  Commonly known as: PROTONIX  What changed:   when to take this  reasons to take this   40 mg, Oral, 2 times daily         Continue These Medications      Instructions Start Date   acetaminophen 500 MG tablet  Commonly known as: TYLENOL   1,000 mg, Oral, Every 6 Hours PRN      AeroChamber Plus Morris-Vu misc   As Needed      albuterol sulfate  (90 Base) MCG/ACT inhaler  Commonly known as: PROVENTIL HFA;VENTOLIN HFA;PROAIR HFA   2 puffs, Inhalation, Every 6 Hours PRN      albuterol 1.25 MG/3ML nebulizer solution  Commonly known as: ACCUNEB   1.25 mg, Nebulization, Every 6 Hours PRN      chlorhexidine 0.12 % solution  Commonly known as: PERIDEX   15 mL, Mouth/Throat, 2 Times Daily PRN, For 14 days, w/1 refill       Dapsone 5 % topical gel   Topical, Daily PRN      donepezil 10 MG tablet  Commonly known as: ARICEPT   10 mg, Oral, Daily      DULoxetine 60 MG capsule  Commonly known as: CYMBALTA   TAKE 1 CAPSULE BY MOUTH DAILY      furosemide 40 MG tablet  Commonly known as: LASIX   40 mg, Oral, Daily      glimepiride 2 MG tablet  Commonly known as: AMARYL   TAKE 1 TABLET BY MOUTH TWICE DAILY      ketoconazole 2 % shampoo  Commonly known as: NIZORAL   1 application, Topical, As Needed      ketoconazole 2 % cream  Commonly known as: NIZORAL   1 application, Topical, Daily PRN      latanoprost 0.005 %  "ophthalmic solution  Commonly known as: XALATAN   1 drop, Both Eyes, Nightly      memantine 5 MG tablet  Commonly known as: NAMENDA   5 mg, Oral, Daily      metOLazone 2.5 MG tablet  Commonly known as: ZAROXOLYN   2.5 mg, Oral, Daily      ondansetron ODT 4 MG disintegrating tablet  Commonly known as: ZOFRAN-ODT   4 mg, Translingual, Every 8 Hours PRN      potassium chloride 10 MEQ CR capsule  Commonly known as: MICRO-K   3 capsules, Oral, 2 Times Daily      rOPINIRole XL 8 MG 24 hr tablet  Commonly known as: REQUIP XL   8 mg, Oral, 2 times daily      timolol 0.25 % ophthalmic solution  Commonly known as: TIMOPTIC   1 drop, Right Eye, Every Morning      tiZANidine 2 MG tablet  Commonly known as: ZANAFLEX   2 mg, Oral, Nightly PRN      vitamin D 1.25 MG (35987 UT) capsule capsule  Commonly known as: ERGOCALCIFEROL   50,000 Units, Oral, Weekly, Friday?         ASK your doctor about these medications      Instructions Start Date   doxycycline 100 MG capsule  Commonly known as: MONODOX  Ask about: Should I take this medication?   100 mg, Oral, Every 12 Hours Scheduled      predniSONE 20 MG tablet  Commonly known as: DELTASONE  Ask about: Should I take this medication?   Take 1 tablet by mouth 2 (Two) Times a Day for 5 days, THEN 0.5 tablets 2 (Two) Times a Day for 5 days, THEN 0.5 tablets Daily for 5 days.   Start Date: April 15, 2022            Allergies   Allergen Reactions   • Hydrocodone Hallucinations     \"With high doses\"   • Statins Myalgia     myalgia   • Metoclopramide Other (See Comments)     EXACERBATED RLS   • Penicillins Rash     rash   • Tramadol Nausea And Vomiting and GI Intolerance     VERY MILD PER PT         Discharge Disposition:  Home or Self Care    Diet:  Hospital:No active diet order      Activity:  Activity Instructions     Activity as Tolerated            Restrictions or Other Recommendations:         CODE STATUS:    Code Status and Medical Interventions:   Ordered at: 04/11/22 6979     Medical " Intervention Limits:    NO intubation (DNI)     Code Status (Patient has no pulse and is not breathing):    No CPR (Do Not Attempt to Resuscitate)     Medical Interventions (Patient has pulse or is breathing):    Limited Support       Future Appointments   Date Time Provider Department Center   5/9/2022 To Be Determined Zaida Hopkins RN  [facility] None   5/12/2022 To Be Determined Zaida Hopkins RN  [facility] None   5/31/2022  9:45 AM RAD TECH [facility]   5/31/2022 10:00 AM Saray Retana APRN [facility]   6/1/2022 11:00 AM Dacia Viera MD [facility]   6/13/2022  9:30 AM [facility] CT 1 BH [facility] CT SO [facility]   9/16/2022 12:45 PM Dacia Viera MD [facility]   10/25/2022 10:30 AM [facility] BR FOLLOW-UP BH [facility] BR 60 [facility]       Additional Instructions for the Follow-ups that You Need to Schedule     Discharge Follow-up with PCP   As directed       Currently Documented PCP:    Dacia Viera MD    PCP Phone Number:    [phone]     Follow Up Details: 1-2 weeks         Discharge Follow-up with Specified Provider: pulmonology; 1 Month   As directed      To: pulmonology    Follow Up: 1 Month                     Asya Childs DO  05/08/22      Time Spent on Discharge:  I spent  25 minutes on this discharge activity which included: face-to-face encounter with the patient, reviewing the data in the system, coordination of the care with the nursing staff as well as consultants, documentation, and entering orders.

## 2022-05-09 NOTE — TELEPHONE ENCOUNTER
Caller: Lorrie Pagan    Relationship: Self    Best call back number: [phone]    What is the medical concern/diagnosis: HEARING LOSS    What specialty or service is being requested: ENT    Any additional details: PATIENT WAS IN FOR AN APPOINTMENT ON Tuesday AND WAS GIVEN MEDICATION FOR HER EARS. IT IS NOT BETTER SHE WOULD LIKE A QUICK REFERRAL TO AN ENT.    PLEASE CALL AND ADVISE

## 2022-05-09 NOTE — CASE COMMUNICATION
SN missed visit for 5. 9.22  Declines further homecare nursing services and wishes to be discharged from homecare.

## 2022-05-12 ENCOUNTER — TELEPHONE (OUTPATIENT)
Dept: INTERNAL MEDICINE | Facility: CLINIC | Age: 77
End: 2022-05-12

## 2022-05-12 ENCOUNTER — HOME CARE VISIT (OUTPATIENT)
Dept: HOME HEALTH SERVICES | Facility: HOME HEALTHCARE | Age: 77
End: 2022-05-12

## 2022-05-12 ENCOUNTER — PATIENT OUTREACH (OUTPATIENT)
Dept: CASE MANAGEMENT | Facility: OTHER | Age: 77
End: 2022-05-12

## 2022-05-12 DIAGNOSIS — J44.9 COPD MIXED TYPE: ICD-10-CM

## 2022-05-12 DIAGNOSIS — I50.32 CHRONIC DIASTOLIC HEART FAILURE: Primary | ICD-10-CM

## 2022-05-12 NOTE — OUTREACH NOTE
AMBULATORY CASE MANAGEMENT NOTE    Name and Relationship of Patient/Support Person: Ej Lorrie White - Self    Patient Outreach    Spoke with patient regarding a recent hospitalization. She stated she is doing okay. She cancelled her home health services as she says they were only checking her vitals. Continues to receive Palliative services, next Palliative visit is in June. [facility]. She lives in the [facility]. Meals are prepared there. Denies needing assistance for ADLs. Explained CCM program and potential co-pays, she is agreeable for CCM services.     Patient reports a 12 lb weight gain in 1 week, mild increase in SOB but remains on her baseline 5-6L. She has doubled her dose of lasix and potassium. Reports she uses too much salt and doesn't eat much protein. Discussed diet modifications with patient. Messaged PCP via Epic messaging. Notified patient the her PCP can see her tomorrow at 4:15 p.m. Patient is agreeable. ACM will follow up in a couple weeks, patient has my contact info should she need assistance before then.     Adult Patient Profile  Questions/Answers    Flowsheet Row Most Recent Value   How to be Addressed Therese   How Well Do You Speak English? very well   Source of Information patient   Patient Aware of Diagnosis yes   Admission in Past 90 Days hospital, skilled nursing facility   Current or Previous  Service none   Palliative Care Plan other (see comments)  [Patient receives Palliative services]   Hearing Difficulty or Deaf no   Wear Glasses or Blind yes   Concentrating, Remembering or Making Decisions Difficulty no   Difficulty Communicating no   Difficulty Eating/Swallowing no   Dressing/Bathing Difficulty no   Doing Errands Independently Difficulty (such as shopping) no   Equipment Currently Used at Home nebulizer, oxygen  [Wears 5-6L continuously, uses [employer]]   Change in Functional Status Since Onset of Current  Illness/Injury no   Advance Directive Status Patient has advance directive, copy in chart   Primary Source of Support/Comfort child(casey)   Name of Support/Comfort Primary Source Peggy Pagan   People in Home alone  [Lives at the [facility]]   Family Caregiver if Needed child(casey), adult   Current Living Arrangements assisted living facility  [[facility]]   Resource/Environmental Concerns none      SDOH updated and reviewed with the patient during this program:  Financial Resource Strain: Low Risk    • Difficulty of Paying Living Expenses: Not hard at all      Food Insecurity: No Food Insecurity   • Worried About Running Out of Food in the Last Year: Never true   • Ran Out of Food in the Last Year: Never true      Transportation Needs: No Transportation Needs   • Lack of Transportation (Medical): No   • Lack of Transportation (Non-Medical): No       JI RIOS  Ambulatory Case Management    5/12/2022, 17:11 EDT

## 2022-05-12 NOTE — TELEPHONE ENCOUNTER
Caller: KEVIN    Relationship: Other [facility]    Best call back number:     What is the best time to reach you :8-430P MON AND FRIDAY    Who are you requesting to speak with (clinical staff, provider,  specific staff member): CLINICAL STAFF    Do you know the name of the person who called: KEVIN    What was the call regarding:DR SETHI SIGNED A FACE TO FACE ENCOUNTER FOR THE PATIENT ON MAY 3; SHE WAS BEING SEEN FOR COPD, HOWEVER THAT IS NOT IN HER CHART NOTES AND  THE ORDER NEEDS TO INCLUDE THE DIAGNOSIS OF COPD.     Do you require a callback: YES

## 2022-05-13 ENCOUNTER — READMISSION MANAGEMENT (OUTPATIENT)
Dept: CALL CENTER | Facility: HOSPITAL | Age: 77
End: 2022-05-13

## 2022-05-13 NOTE — OUTREACH NOTE
COPD/PN Week 4 Survey    Flowsheet Row Responses   [facility] patient discharged from? [place]   Does the patient have one of the following disease processes/diagnoses(primary or secondary)? COPD/Pneumonia   Was the primary reason for admission: COPD exacerbation   Week 4 attempt successful? Yes   Call start time 0820   Call end time 0823   Discharge diagnosis Copd   Meds reviewed with patient/caregiver? Yes   Is the patient taking all medications as directed (includes completed medication regime)? Yes   Has the patient kept scheduled appointments due by today? Yes   Is the patient still receiving Home Health Services? No   Home health comments pt canceled home health-did not feel it was necessary    Pulse Ox monitoring Intermittent   Pulse Ox device source Patient   O2 Sat comments O2 sats 96-97% on 5LO2   O2 Sat: education provided Sat levels, Monitoring frequency, When to seek care   What is the patient's perception of their health status since discharge? Same   Is the patient able to teach back COPD zones? Yes   Patient reports what zone on this call? Yellow Zone   Green Zone Reports doing well, Breathing without shortness of breath   Yellow Zone Increased shortness of air, Poor sleep and symptoms work patient up   Yellow interventions Get plenty of rest, Continue to use daily medications   Week 4 call completed? Yes   Would the patient like one additional call? No   Graduated Yes   Did the patient feel the follow up calls were helpful during their recovery period? Yes   Was the number of calls appropriate? Yes            - Registered Nurse

## 2022-05-13 NOTE — TELEPHONE ENCOUNTER
KEVIN STATES THAT COPD NEEDS TO BE INCLUDED IN THE OFFICE NOTE OF MAY 3, 2022, NOT NECESSARILY THE ORDER.

## 2022-05-23 ENCOUNTER — TELEPHONE (OUTPATIENT)
Dept: INTERNAL MEDICINE | Facility: CLINIC | Age: 77
End: 2022-05-23

## 2022-05-23 DIAGNOSIS — R13.19 ESOPHAGEAL DYSPHAGIA: Primary | ICD-10-CM

## 2022-05-23 DIAGNOSIS — K31.84 GASTROPARESIS: ICD-10-CM

## 2022-05-23 DIAGNOSIS — K22.4 ESOPHAGEAL DYSMOTILITY: ICD-10-CM

## 2022-05-23 DIAGNOSIS — K21.00 GASTROESOPHAGEAL REFLUX DISEASE WITH ESOPHAGITIS WITHOUT HEMORRHAGE: ICD-10-CM

## 2022-05-23 NOTE — TELEPHONE ENCOUNTER
Caller: Lorrie Pagan    Relationship: Self    Best call back number: [phone]    What is the medical concern/diagnosis: GASTROINTEROLOGY    What specialty or service is being requested: GASTROINTEROLOGY    What is the provider, practice or medical service name: SOMEONE OTHER THAN [facility]    What is the office location:     What is the office phone number:     Any additional details:  PREFERS A FEMALE DOCTOR

## 2022-05-25 PROCEDURE — G0180 MD CERTIFICATION HHA PATIENT: HCPCS | Performed by: INTERNAL MEDICINE

## 2022-05-26 ENCOUNTER — PATIENT OUTREACH (OUTPATIENT)
Dept: CASE MANAGEMENT | Facility: OTHER | Age: 77
End: 2022-05-26

## 2022-05-26 DIAGNOSIS — I50.32 CHRONIC DIASTOLIC HEART FAILURE: Primary | ICD-10-CM

## 2022-05-26 DIAGNOSIS — J44.9 COPD MIXED TYPE: ICD-10-CM

## 2022-05-26 PROCEDURE — 99439 CHRNC CARE MGMT STAF EA ADDL: CPT | Performed by: INTERNAL MEDICINE

## 2022-05-26 PROCEDURE — 99490 CHRNC CARE MGMT STAFF 1ST 20: CPT | Performed by: INTERNAL MEDICINE

## 2022-05-26 NOTE — OUTREACH NOTE
[facility] End of Month Documentation    This Chronic Medical Management Care Plan for Lorrie Pagan, 76 y.o. female, has been monitored and managed; revised and a new plan of care implemented for the month of May.  A cumulative time of 40 minutes was spent on this patient record this month, including phone call with patient.    Regarding the patient's problems: has Bronchiectasis (CMS/Formerly [facility]); H/O: lung cancer (Prior resection 2016); Chronic respiratory failure; RENETTA (Previous CPAP); Hiatal hernia (Prior Nissen 2008); Mycobacterium avium complex colonization; Irritable bowel syndrome with diarrhea; Peripheral edema; Aortic valve regurgitation; Benign hypertension; Impairment of balance; Peripheral neuropathy; Peripheral venous insufficiency; Chronic diastolic heart failure (Formerly [facility]); Hypokalemia; Hypomagnesemia; Stage II, moderate, COPD; Memory loss; Restless leg syndrome; Controlled type 2 diabetes mellitus without complication, without long-term current use of insulin (Formerly [facility]); Vitamin D deficiency; Mixed hyperlipidemia; Allergic rhinitis; Normocytic anemia; Gastroesophageal reflux disease with esophagitis without hemorrhage; Acquired hypothyroidism; Esophageal dysmotility; Dysphagia; Gastroparesis; Degenerative disc disease, cervical; Degenerative disc disease, lumbar; Globus sensation; Sialorrhea; Drooling; Dysfunction of both eustachian tubes; and Tinnitus of both ears on their problem list., the following items were addressed: medical records and any changes can be found within the plan section of the note.  A detailed listing of time spent for chronic care management is tracked within each outreach encounter.  Current medications include:  has a current medication list which includes the following prescription(s): acetaminophen, albuterol, albuterol sulfate hfa, azelastine, chlorhexidine, dapsone, donepezil, duloxetine, fluticasone, fluticasone-umeclidin-vilant, furosemide, glimepiride, ketoconazole, ketoconazole,  latanoprost, memantine, metolazone, o2, ondansetron odt, pantoprazole, potassium chloride, pseudoephedrine, ropinirole xl, saccharomyces boulardii, aerochamber plus feng-vu, timolol, tizanidine, and vitamin d. and the patient is reported to be patient is noncompliant with medication protocol,  Medications are reported to be non-effective in controlling symptoms and changes have been made to the medication protocol.  Regarding these diagnoses, referrals were made to the following provider(s):  Primary Care Provvider.  All notes on chart for PCP to review.    The patient was monitored remotely for weight; medications.    The patient's physical needs include:  medication education.     The patient's mental support needs include:  needs met    The patient's cognitive support needs include:  needs met    The patient's psychosocial support needs include:  needs met    The patient's functional needs include: needs met    The patient's environmental needs include:  not applicable    Care Plan overall comments:  No data recorded    Refer to previous outreach notes for more information on the areas listed above.    Monthly Billing Diagnoses  (I50.32) Chronic diastolic heart failure (HCC)    (J44.9) Stage II, moderate, COPD    Medications   · Medications have been reconciled    Care Plan progress this month:      Recently Modified Care Plans Updates made since 4/25/2022 12:00 AM     Heart Failure         Problem Priority Last Modified      --  5/26/2022 10:35 AM by Helena Jimenez RN              Goal Recent Progress Last Modified      --  5/26/2022 10:35 AM by Helena Jimenez RN              Task Due Date Last Modified     Educate patient on daily weight tracking and signs of extremity edema --  5/12/2022  5:07 PM by Helena Jimenez RN        Educate patient on fluid management & how too little or too much affects the heart --  5/12/2022  5:07 PM by Helena Jimenez RN        Instruct patient to take weight every day and call  provider for weight gain of 2 lbs overnight or 5 lbs in a week --  5/12/2022  5:07 PM by Helena Jimenez RN        Instruct patient to do a daily self-check for symptoms of extra fluid (shortness of breath, weakness, trouble sleeping, body swelling, or dizziness) --  5/12/2022  5:07 PM by Helena Jimenez RN              Problem Priority Last Modified      --  5/26/2022 10:35 AM by Helena Jimenez RN              Goal Recent Progress Last Modified      --  5/26/2022 10:35 AM by Helena Jimenez RN              Task Due Date Last Modified     Educate patient on daily sodium intake & how sodium affects the heart --  5/12/2022  5:07 PM by Helena Jimenez RN        Instruct patient to eat less salt and watch fluids. No added salt or no more than 2 grams (2000mgs) of sodium or 2 liters of fluid in a 24-hour period, or follow provider's specific recommendations. --  5/12/2022  5:07 PM by Helena Jimenez RN                 Heart Failure (Adult)         Problem Priority Last Modified     ELS SYMPTOM EXACERBATION (HEART FAILURE) (ADULT) --  5/26/2022 10:35 AM by Helena Jimenez RN              Goal Recent Progress Last Modified     Symptom Exacerbation Prevented or Minimized --  5/26/2022 10:35 AM by Helena Jimenez RN     Evidence-based guidance:   Perform or review cognitive and/or health literacy screening.   Assess understanding of adherence and barriers to treatment plan, as well as lifestyle changes; develop strategies to address barriers.   Establish a [id number] early intervention process to communicate with primary care provider when signs/symptoms worsen.   Facilitate timely posthospital discharge or emergency department treatment that includes intensive follow-up via telephone calls, home visit, telehealth monitoring and care at multidisciplinary heart failure clinic.   Adjust frequency and intensity of follow-up based on presentation, number of emergency department visits, hospital admissions and frequency and  severity of symptom exacerbation.   Facilitate timely visit, usually within 1 week, with primary care provider following hospital discharge.   Collaborate with clinical pharmacist to address adverse drug reactions, drug interactions, subtherapeutic dosage, patient and family education.   Regularly screen for presence of depressive symptoms using a validated tool; consider pharmacologic therapy and/or referral for cognitive behavioral therapy when present.   Refer to community-based services, such as a heart failure support group, community health worker or peer support program.   Review immunization status; arrange receipt of needed vaccinations.   Prepare patient for home oxygen use based on signs/symptoms.    Notes:              Task Due Date Last Modified     Identify and Minimize Risk of Heart Failure Exacerbation --  5/26/2022 10:35 AM by Helena Jimenez, RN     Care Management Activities:      - healthy lifestyle promoted  - medication-adherence assessment completed  - self-awareness of signs/symptoms of worsening disease encouraged      Notes:              Problem Priority Last Modified     ELS DISEASE PROGRESSION (HEART FAILURE) (ADULT) --  5/26/2022 10:35 AM by Helena Jimenez, NEO              Goal Recent Progress Last Modified     Health Optimized --  5/26/2022 10:35 AM by Helena Jimenez, RN     Evidence-based guidance:   Use brief intervention, such as 5 A's (Ask, Advise, Assess, Assist, Arrange) to encourage smoking cessation; refer to smoking cessation program, if ready for more intensive intervention.   Perform or refer to a registered dietitian for a nutrition assessment and nutrition-focused physical exam.    Identify potential micronutrient deficiencies, such as iron, vitamin D and thiamin.   Assess need for potential diet and fluid modification, such as reduced sodium or fluid intake.   Minimize unnecessary dietary restrictions to increase oral intake. Note: Sodium restriction should be individualized  to the patient and clinical status.   Facilitate home monitoring of weight.    Notes:              Task Due Date Last Modified     Optimize Health --  5/26/2022 10:35 AM by Helena Jimenez RN     Care Management Activities:      - fluid modification encouraged  - home monitoring of weight gain or loss encouraged  - optimal nutrition intake promoted  - weight gain or loss reviewed and trended      Notes:              Goal Recent Progress Last Modified     Comorbidities Identified and Managed --  5/26/2022 10:35 AM by Helena Jimenez RN     Evidence-based guidance:   Assess and address signs/symptoms of comorbidity, including dyslipidemia, diabetes, iron deficiency, gout, arthritis, dysrhythmia, hypertension, cachexia, coronary artery disease, kidney dysfunction and lung disease.   Prepare patient for laboratory and diagnostic exams based on risk and presentation.   Prepare for use of pharmacologic therapy that may include statin, angiotensin converting enzyme (ACE) inhibitor, angiotensin receptor blocker (ARB), beta-blocker, digoxin, antidysrhythmic, diuretic or omega-3 fatty acid.   Monitor side effects and anticipate need for periodic adjustments.   Prepare patient for potential invasive treatment, such as implantable cardioverter-defibrillator, cardiac resynchronization therapy or heart transplant as disease progresses.    Notes:              Task Due Date Last Modified     Identify and Manage Comorbidities --  5/26/2022 10:35 AM by Helena Jimenez RN     Care Management Activities:      - not discussed during this outreach      Notes:              Problem Priority Last Modified     ELS ACTIVITY TOLERANCE (HEART FAILURE) (ADULT) --  5/26/2022 10:35 AM by Helena Jimenez RN              Goal Recent Progress Last Modified     Activity Tolerance Optimized --  5/26/2022 10:35 AM by Helena Jimenez RN     Evidence-based guidance:   Promote daily physical activity that improves functional ability, cognition and quality of  life.   Encourage reduction in sedentary time.    Encourage optimal, safe functional mobility and self-care performance based on ability and tolerance.    Promote breathing and energy conservation techniques, such as pursed-lip breathing, preplanning and pacing of activity, balancing activity and rest.   Encourage participation in cardiac rehabilitation services.    Notes:              Task Due Date Last Modified     Maintain Strength and Functional Ability --  5/26/2022 10:35 AM by Helena Jimenez RN     Care Management Activities:      - activity or exercise based on tolerance encouraged  - barriers to activities addressed  - self-care encouraged      Notes:              Problem Priority Last Modified     ELS OBSTRUCTIVE SLEEP APNEA (HEART FAILURE) (ADULT) --  5/26/2022 10:35 AM by Helena Jimenez RN              Goal Recent Progress Last Modified     Effective Breathing Pattern During Sleep --  5/26/2022 10:35 AM by Helena Jimenez RN     Evidence-based guidance:   Use a validated screening tool to identify risk for obstructive sleep apnea (RENETTA).   Encourage a nonsupine sleep position, such as side-lying, head of bed elevated, multiple pillows, to prevent airway collapse.   Encourage the use of sleep hygiene techniques.   Anticipate a referral for sleep study (polysomnography).   If RENETTA is identified, prepare patient for treatment options, such as nighttime oxygen therapy and CPAP (continuous positive airway pressure) or BiPAP (bilevel positive airway pressure).    Notes:              Task Due Date Last Modified     Monitor and Manage Obstructive Sleep Apnea (RENETTA) --  5/26/2022 10:35 AM by Helena Jimenez RN     Care Management Activities:      - not discussed during this outreach      Notes:                      · Current Specialty Plan of Care Status signed by both patient and provider    Instructions   · Patient was provided an electronic copy of care plan  · CCM services were explained and offered and patient has  accepted these services.  · Patient has given their written consent to receive CCM services and understands that this includes the authorization of electronic communication of medical information with the other treating providers.  · Patient understands that they may stop CCM services at any time and these changes will be effective at the end of the calendar month and will effectively revocate the agreement of CCM services.  · Patient understands that only one practitioner can furnish and be paid for CCM services during one calendar month.  Patient also understands that there may be co-payment or deductible fees in association with CCM services.  · Patient will continue with at least monthly follow-up calls with the Nurse Navigator.    JI RIOS  Ambulatory Case Management    5/26/2022, 11:07 EDT

## 2022-05-26 NOTE — TELEPHONE ENCOUNTER
Spoke with patient and she stated she is still experiencing those symptoms but they have gotten worse and she has gastroparesis.

## 2022-05-26 NOTE — OUTREACH NOTE
AMBULATORY CASE MANAGEMENT NOTE    Name and Relationship of Patient/Support Person: Lorrie Pagan White - Self    Patient Outreach    Spoke with patient regarding CHF management. Patient reports she is weighing herself and is down 5 lbs. She stated her lower extremity swelling has improved compared to previously. She stated she has stopped adding salt and is walking more and she has more energy. Her battery is dead in her motorized scooter, she stated this is why she is walking more. She remains on 5L NC oxygen and has not had to use 6L with ambulation. She stated her oxygen has remained in the mid to upper 90s on 5L at rest and during ambulation. She reports feeling better overall. We discussed hydration and due to gastroparesis she is not able to take in a lot of fluids. She c/o often being thirsty, recommended ice and chewing gum.     Patient denied current needs and is doing well in making adjustments to manage her health problems. She is agreeable for continued ACM outreach. Patient has my contact information should she need assistance.     JI RIOS  Ambulatory Case Management    5/26/2022, 10:53 EDT

## 2022-05-31 ENCOUNTER — OFFICE VISIT (OUTPATIENT)
Dept: PULMONOLOGY | Facility: CLINIC | Age: 77
End: 2022-05-31

## 2022-05-31 VITALS
WEIGHT: 182 LBS | HEART RATE: 93 BPM | BODY MASS INDEX: 32.25 KG/M2 | HEIGHT: 63 IN | SYSTOLIC BLOOD PRESSURE: 138 MMHG | OXYGEN SATURATION: 100 % | TEMPERATURE: 97.5 F | DIASTOLIC BLOOD PRESSURE: 64 MMHG

## 2022-05-31 DIAGNOSIS — J96.11 CHRONIC RESPIRATORY FAILURE WITH HYPOXIA: ICD-10-CM

## 2022-05-31 DIAGNOSIS — E55.9 VITAMIN D DEFICIENCY: ICD-10-CM

## 2022-05-31 DIAGNOSIS — G62.89 OTHER POLYNEUROPATHY: ICD-10-CM

## 2022-05-31 DIAGNOSIS — J47.9 IDIOPATHIC BRONCHIECTASIS: Primary | ICD-10-CM

## 2022-05-31 DIAGNOSIS — J44.9 COPD MIXED TYPE: ICD-10-CM

## 2022-05-31 PROCEDURE — 99214 OFFICE O/P EST MOD 30 MIN: CPT | Performed by: NURSE PRACTITIONER

## 2022-05-31 PROCEDURE — 94618 PULMONARY STRESS TESTING: CPT | Performed by: NURSE PRACTITIONER

## 2022-05-31 RX ORDER — ERGOCALCIFEROL 1.25 MG/1
50000 CAPSULE ORAL WEEKLY
Qty: 12 CAPSULE | Refills: 1 | Status: SHIPPED | OUTPATIENT
Start: 2022-05-31 | End: 2022-10-27

## 2022-05-31 RX ORDER — DULOXETIN HYDROCHLORIDE 60 MG/1
CAPSULE, DELAYED RELEASE ORAL
Qty: 90 CAPSULE | Refills: 3 | Status: SHIPPED | OUTPATIENT
Start: 2022-05-31

## 2022-06-01 ENCOUNTER — OFFICE VISIT (OUTPATIENT)
Dept: INTERNAL MEDICINE | Facility: CLINIC | Age: 77
End: 2022-06-01

## 2022-06-01 VITALS
DIASTOLIC BLOOD PRESSURE: 62 MMHG | WEIGHT: 185 LBS | SYSTOLIC BLOOD PRESSURE: 136 MMHG | HEIGHT: 63 IN | HEART RATE: 86 BPM | BODY MASS INDEX: 32.78 KG/M2 | OXYGEN SATURATION: 98 % | TEMPERATURE: 97.4 F

## 2022-06-01 DIAGNOSIS — K31.84 GASTROPARESIS: ICD-10-CM

## 2022-06-01 DIAGNOSIS — E11.9 CONTROLLED TYPE 2 DIABETES MELLITUS WITHOUT COMPLICATION, WITHOUT LONG-TERM CURRENT USE OF INSULIN: ICD-10-CM

## 2022-06-01 DIAGNOSIS — L03.115 CELLULITIS OF RIGHT LOWER EXTREMITY: Primary | ICD-10-CM

## 2022-06-01 DIAGNOSIS — J44.9 COPD MIXED TYPE: ICD-10-CM

## 2022-06-01 DIAGNOSIS — K22.4 ESOPHAGEAL DYSMOTILITY: ICD-10-CM

## 2022-06-01 DIAGNOSIS — K21.00 GASTROESOPHAGEAL REFLUX DISEASE WITH ESOPHAGITIS WITHOUT HEMORRHAGE: ICD-10-CM

## 2022-06-01 DIAGNOSIS — R13.19 ESOPHAGEAL DYSPHAGIA: ICD-10-CM

## 2022-06-01 PROCEDURE — 99214 OFFICE O/P EST MOD 30 MIN: CPT | Performed by: INTERNAL MEDICINE

## 2022-06-01 RX ORDER — CEPHALEXIN 500 MG/1
500 CAPSULE ORAL 4 TIMES DAILY
Qty: 28 CAPSULE | Refills: 0 | Status: SHIPPED | OUTPATIENT
Start: 2022-06-01 | End: 2022-06-08

## 2022-06-01 RX ORDER — PANTOPRAZOLE SODIUM 40 MG/1
40 TABLET, DELAYED RELEASE ORAL 2 TIMES DAILY
Qty: 180 TABLET | Refills: 3 | Status: SHIPPED | OUTPATIENT
Start: 2022-06-01

## 2022-06-01 RX ORDER — FAMOTIDINE 20 MG/1
20 TABLET, FILM COATED ORAL 2 TIMES DAILY
Qty: 8 TABLET | Refills: 0 | COMMUNITY
Start: 2022-06-01

## 2022-06-01 RX ORDER — TRIAMCINOLONE ACETONIDE 55 UG/1
2 SPRAY, METERED NASAL DAILY
COMMUNITY

## 2022-06-01 NOTE — PROGRESS NOTES
Internal Medicine Follow Up    Chief Complaint  Lorrie Pagan is a 76 y.o. female who presents today for follow up of chronic medical conditions outlined below.    Chief Complaint   Patient presents with   • Diabetes     Follow up. A1c was 7.0 on 4/11/22   • Hypertension     Follow up        HPI  Ms. Pagan comes in today for follow up. She notes increase in indigestion, burping over the last few days. She has ongoing issues with dysphagia and regurgitation of liquids and solids. Worse with liquids. She is prescribed pantoprazole but not certain she is taking it as prescribed. She does not manage her own medications. She has been referred to another GI at her request. She notes ongoing edema in both legs, R leg red. Both oozing. She cannot get compression stockings on but has wrapped both legs with some improvement in the LLE. She has had recent follow up with pulmonary who did a 6 minute walk test and recommended reducing oxygen from 5L to 3.5L. She reports that she is very winded with exertion when on 3.5L. Palliative care has been coming to her home, working on advanced directives, MOST form. She did not bring these with her today.       Review of Systems  Review of Systems   Constitutional: Negative.    Respiratory: Positive for shortness of breath. Negative for cough.    Cardiovascular: Positive for leg swelling. Negative for chest pain.   Gastrointestinal: Positive for abdominal pain, nausea, GERD and indigestion. Negative for anal bleeding, blood in stool and vomiting.        +belching   Skin: Positive for color change.        Current Medications  Current Outpatient Medications on File Prior to Visit   Medication Sig Dispense Refill   • acetaminophen (TYLENOL) 500 MG tablet Take 1,000 mg by mouth Every 6 (Six) Hours As Needed for Mild Pain .     • albuterol (ACCUNEB) 1.25 MG/3ML nebulizer solution Take 3 mL by nebulization Every 6 (Six) Hours As Needed for Shortness of Air. 90 each 5   • albuterol  sulfate  (90 Base) MCG/ACT inhaler Inhale 2 puffs Every 6 (Six) Hours As Needed for Wheezing. 18 g 2   • azelastine (ASTELIN) 0.1 % nasal spray 2 sprays into the nostril(s) as directed by provider 2 (Two) Times a Day. (Patient taking differently: 2 sprays into the nostril(s) as directed by provider 2 (Two) Times a Day As Needed for Rhinitis or Allergies.) 30 mL 5   • chlorhexidine (PERIDEX) 0.12 % solution Apply 15 mL to the mouth or throat 2 (Two) Times a Day As Needed. For 14 days, w/1 refill     • Dapsone 5 % topical gel Apply  topically to the appropriate area as directed Daily As Needed (apply to legs).     • donepezil (ARICEPT) 10 MG tablet Take 10 mg by mouth Daily.     • DULoxetine (CYMBALTA) 60 MG capsule TAKE 1 CAPSULE BY MOUTH EVERY DAY 90 capsule 3   • Fluticasone-Umeclidin-Vilant (TRELEGY) 200-62.5-25 MCG/INH inhaler Inhale 1 puff Daily. (Patient taking differently: Inhale 1 puff Every Night.) 1 each 5   • furosemide (LASIX) 40 MG tablet Take 40 mg by mouth Daily.     • glimepiride (AMARYL) 2 MG tablet TAKE 1 TABLET BY MOUTH TWICE DAILY 180 tablet 1   • ketoconazole (NIZORAL) 2 % cream Apply 1 application topically to the appropriate area as directed Daily As Needed for Itching (apply to back and legs).     • ketoconazole (NIZORAL) 2 % shampoo Apply 1 application topically to the appropriate area as directed As Needed for Irritation (for head).     • latanoprost (XALATAN) 0.005 % ophthalmic solution Administer 1 drop to both eyes Every Night.     • memantine (NAMENDA) 5 MG tablet Take 5 mg by mouth Daily.     • metOLazone (ZAROXOLYN) 2.5 MG tablet Take 2.5 mg by mouth Daily.     • O2 (OXYGEN) Inhale 2 L/min Continuous.     • ondansetron ODT (ZOFRAN-ODT) 4 MG disintegrating tablet Place 1 tablet on the tongue Every 8 (Eight) Hours As Needed for Nausea or Vomiting. 30 tablet 0   • potassium chloride (MICRO-K) 10 MEQ CR capsule Take 3 capsules by mouth 2 (Two) Times a Day.     • pseudoephedrine  "(Sudafed 12 Hour) 120 MG 12 hr tablet Take 1 tablet by mouth Every 12 (Twelve) Hours As Needed for Congestion (ear fullness). 30 tablet 0   • rOPINIRole XL (REQUIP XL) 8 MG 24 hr tablet Take 8 mg by mouth 2 (two) times a day.     • saccharomyces boulardii (FLORASTOR) 250 MG capsule Take 1 capsule by mouth 2 (Two) Times a Day. 60 capsule 0   • Spacer/Aero-Holding Chambers (AeroChamber Plus Morris-Vu) misc As Needed.     • timolol (TIMOPTIC) 0.25 % ophthalmic solution Administer 1 drop to the right eye Every Morning.     • tiZANidine (ZANAFLEX) 2 MG tablet TAKE 1 TABLET BY MOUTH AT NIGHT AS NEEDED FOR MUSCLE SPASMS. 30 tablet 0   • Triamcinolone Acetonide (NASACORT) 55 MCG/ACT nasal inhaler 2 sprays into the nostril(s) as directed by provider Daily.     • vitamin D (ERGOCALCIFEROL) 1.25 MG (32848 UT) capsule capsule Take 1 capsule by mouth 1 (One) Time Per Week. 12 capsule 1   • [DISCONTINUED] fluticasone (Flonase) 50 MCG/ACT nasal spray 2 sprays into the nostril(s) as directed by provider Daily. (Patient taking differently: 2 sprays into the nostril(s) as directed by provider Daily As Needed for Rhinitis or Allergies.) 18.2 mL 11   • [DISCONTINUED] pantoprazole (PROTONIX) 40 MG EC tablet Take 1 tablet by mouth 2 (two) times a day. (Patient taking differently: Take 40 mg by mouth Daily As Needed (heartburn).) 180 tablet 3     No current facility-administered medications on file prior to visit.       Allergies  Allergies   Allergen Reactions   • Hydrocodone Hallucinations     \"With high doses\"   • Statins Myalgia     myalgia   • Metoclopramide Other (See Comments)     EXACERBATED RLS   • Penicillins Rash     rash   • Tramadol Nausea And Vomiting and GI Intolerance     VERY MILD PER PT       Objective  Visit Vitals  /62   Pulse 86   Temp 97.4 °F (36.3 °C)   Ht 160 cm (63\")   Wt 83.9 kg (185 lb)   LMP  (LMP Unknown)   SpO2 98%   BMI 32.77 kg/m²        Physical Exam  Physical Exam  Vitals and nursing note reviewed. "   Constitutional:       General: She is not in acute distress.     Appearance: She is well-developed. She is not ill-appearing or toxic-appearing.   HENT:      Head: Normocephalic and atraumatic.   Eyes:      Conjunctiva/sclera: Conjunctivae normal.   Pulmonary:      Effort: Pulmonary effort is normal. No respiratory distress.   Musculoskeletal:      Right lower leg: Edema (1-2+) present.      Left lower leg: Edema (1+) present.   Skin:     General: Skin is warm and dry.      Findings: Erythema (R shin, also warm to touch) present.   Neurological:      Mental Status: She is alert and oriented to person, place, and time. Mental status is at baseline.      Gait: Gait abnormal (using rollator).         Results  Results for orders placed or performed in visit on 04/29/22   COVID-19 PCR, [facility], NP SWAB IN [employer] VIRAL TRANSPORT MEDIA/ORAL SWISH 24-30 HR TAT - Swab, Nasopharynx    Specimen: Nasopharynx; Swab   Result Value Ref Range    SARS-CoV-2 KATELYNN Not Detected Not Detected        Assessment and Plan  Diagnoses and all orders for this visit:    Cellulitis of right lower extremity  - R shin warm, red and more swollen than LLE  - Will start keflex 500mg four time daily x7d. Has rash to penicillin, has not had issue with cephalosporin which is preferable to doxycycline (large pill with risk of esophagitis), bactrim (due to age), clindamycin (risk of cdiff).    Gastroparesis, Esophageal dysphagia, Esophageal dysmotility, Gastroesophageal reflux disease with esophagitis without hemorrhage  - uncontrolled, has regurgitation of food and liquids, belching.  - prior EGD with reflux esophagitis and empiric dilation to 20mm  - Not sure if compliant with PPI, reinforced need to take protonix 40mg BID. Als provided samples of pepcid to be used BID PRN.  - Discussed gastroparesis diet  - Intolerant of reglan due to restlessness, worsened RLS  - She is awaiting appt with Dr. Suero    Controlled type 2 diabetes mellitus without  complication, without long-term current use of insulin (HCC)  - Has been well controlled, A1c today 6.7.  - Continue glimepiride 2mg BID.    Chronic respiratory failure due to Stage II, moderate, COPD  - Last PFTs with FEV1 61% 5/2022  - Recently had 6 minute walk test indicating that she should reduce O2 to 3.5L NC however she does not feel well unless on 4-5L NC  - Following with pulmonary and palliative care       Health Maintenance  - Pap smear: no longer indicated  - Mammogram: 5/2021 [facility] 3 with axillary adenopathy, 8/2021 [facility] 4, had FNA of R axillary node which was negative. 3/2022 [facility] 3, next in 6m.  - Colonoscopy: 2018  - HCV: negative  - Immunizations: pneumovax complete, COVID booster due, shingrix #1  - Depression screening: negative 2/2022    Return in about 4 months (around 9/16/2022) for as scheduled.

## 2022-06-06 ENCOUNTER — TELEPHONE (OUTPATIENT)
Dept: INTERNAL MEDICINE | Facility: CLINIC | Age: 77
End: 2022-06-06

## 2022-06-06 NOTE — TELEPHONE ENCOUNTER
Caller: Lorrie Pagan    Relationship: Self    Best call back number:    What is the best time to reach you: ANYTIME    Who are you requesting to speak with (clinical staff, provider,  specific staff member): CLINICAL STAFF    Do you know the name of the person who called:LORRIE    What was the call regarding: PATIENT ADVISED THAT SHE ONLY TOOK THE ANITI BOTIC FOR 2 DAYS AS IT MESSED UP HER STOMACH; SHE ALSO STATED THAT HER LEG IS NO LONGER WARM (PATIENT DIDN'T KNOW THE NAME OF HER ANTIBOTIC)    Do you require a callback: NO, UNLESS NEEDED

## 2022-06-08 ENCOUNTER — TELEPHONE (OUTPATIENT)
Dept: INTERNAL MEDICINE | Facility: CLINIC | Age: 77
End: 2022-06-08

## 2022-06-08 DIAGNOSIS — I50.32 CHRONIC DIASTOLIC HEART FAILURE: ICD-10-CM

## 2022-06-08 RX ORDER — FUROSEMIDE 40 MG/1
40 TABLET ORAL DAILY
Qty: 90 TABLET | Refills: 1 | Status: SHIPPED | OUTPATIENT
Start: 2022-06-08 | End: 2022-10-11

## 2022-06-08 NOTE — TELEPHONE ENCOUNTER
Caller: Lorrie Pagan Sangita    Relationship: Self    Best call back number: [phone]    What medications are you currently taking:   Current Outpatient Medications on File Prior to Visit   Medication Sig Dispense Refill   • acetaminophen (TYLENOL) 500 MG tablet Take 1,000 mg by mouth Every 6 (Six) Hours As Needed for Mild Pain .     • albuterol (ACCUNEB) 1.25 MG/3ML nebulizer solution Take 3 mL by nebulization Every 6 (Six) Hours As Needed for Shortness of Air. 90 each 5   • albuterol sulfate  (90 Base) MCG/ACT inhaler Inhale 2 puffs Every 6 (Six) Hours As Needed for Wheezing. 18 g 2   • azelastine (ASTELIN) 0.1 % nasal spray 2 sprays into the nostril(s) as directed by provider 2 (Two) Times a Day. (Patient taking differently: 2 sprays into the nostril(s) as directed by provider 2 (Two) Times a Day As Needed for Rhinitis or Allergies.) 30 mL 5   • cephalexin (Keflex) 500 MG capsule Take 1 capsule by mouth 4 (Four) Times a Day for 7 days. 28 capsule 0   • chlorhexidine (PERIDEX) 0.12 % solution Apply 15 mL to the mouth or throat 2 (Two) Times a Day As Needed. For 14 days, w/1 refill     • Dapsone 5 % topical gel Apply  topically to the appropriate area as directed Daily As Needed (apply to legs).     • donepezil (ARICEPT) 10 MG tablet Take 10 mg by mouth Daily.     • DULoxetine (CYMBALTA) 60 MG capsule TAKE 1 CAPSULE BY MOUTH EVERY DAY 90 capsule 3   • famotidine (Pepcid AC Maximum Strength) 20 MG tablet Take 1 tablet by mouth 2 (Two) Times a Day. 8 tablet 0   • Fluticasone-Umeclidin-Vilant (TRELEGY) 200-62.5-25 MCG/INH inhaler Inhale 1 puff Daily. (Patient taking differently: Inhale 1 puff Every Night.) 1 each 5   • furosemide (LASIX) 40 MG tablet Take 40 mg by mouth Daily.     • glimepiride (AMARYL) 2 MG tablet TAKE 1 TABLET BY MOUTH TWICE DAILY 180 tablet 1   • ketoconazole (NIZORAL) 2 % cream Apply 1 application topically to the appropriate area as directed Daily As Needed for Itching (apply to back  and legs).     • ketoconazole (NIZORAL) 2 % shampoo Apply 1 application topically to the appropriate area as directed As Needed for Irritation (for head).     • latanoprost (XALATAN) 0.005 % ophthalmic solution Administer 1 drop to both eyes Every Night.     • memantine (NAMENDA) 5 MG tablet Take 5 mg by mouth Daily.     • metOLazone (ZAROXOLYN) 2.5 MG tablet Take 2.5 mg by mouth Daily.     • O2 (OXYGEN) Inhale 2 L/min Continuous.     • ondansetron ODT (ZOFRAN-ODT) 4 MG disintegrating tablet Place 1 tablet on the tongue Every 8 (Eight) Hours As Needed for Nausea or Vomiting. 30 tablet 0   • pantoprazole (PROTONIX) 40 MG EC tablet Take 1 tablet by mouth 2 (Two) Times a Day. 180 tablet 3   • potassium chloride (MICRO-K) 10 MEQ CR capsule Take 3 capsules by mouth 2 (Two) Times a Day.     • pseudoephedrine (Sudafed 12 Hour) 120 MG 12 hr tablet Take 1 tablet by mouth Every 12 (Twelve) Hours As Needed for Congestion (ear fullness). 30 tablet 0   • rOPINIRole XL (REQUIP XL) 8 MG 24 hr tablet Take 8 mg by mouth 2 (two) times a day.     • saccharomyces boulardii (FLORASTOR) 250 MG capsule Take 1 capsule by mouth 2 (Two) Times a Day. 60 capsule 0   • Spacer/Aero-Holding Chambers (AeroChamber Plus Morris-Vu) misc As Needed.     • timolol (TIMOPTIC) 0.25 % ophthalmic solution Administer 1 drop to the right eye Every Morning.     • tiZANidine (ZANAFLEX) 2 MG tablet TAKE 1 TABLET BY MOUTH AT NIGHT AS NEEDED FOR MUSCLE SPASMS. 30 tablet 0   • Triamcinolone Acetonide (NASACORT) 55 MCG/ACT nasal inhaler 2 sprays into the nostril(s) as directed by provider Daily.     • vitamin D (ERGOCALCIFEROL) 1.25 MG (08313 UT) capsule capsule Take 1 capsule by mouth 1 (One) Time Per Week. 12 capsule 1     No current facility-administered medications on file prior to visit.      Which medication are you concerned about: furosemide (LASIX) 40 MG tablet    Who prescribed you this medication: JOSSIE    What are your concerns: PATIENT STATED THAT HER  ANKLES ARE SWOLLEN BADLY.  PATIENT FOUND OUT THAT THERE IS NO LASIX IN HER MEDICINE TRAY.  PLEASE SEND TO [employer]

## 2022-06-13 ENCOUNTER — HOSPITAL ENCOUNTER (OUTPATIENT)
Dept: CT IMAGING | Facility: HOSPITAL | Age: 77
Discharge: HOME | End: 2022-06-13
Admitting: NURSE PRACTITIONER

## 2022-06-13 ENCOUNTER — TELEPHONE (OUTPATIENT)
Dept: PULMONOLOGY | Facility: CLINIC | Age: 77
End: 2022-06-13

## 2022-06-13 DIAGNOSIS — Z85.118 H/O: LUNG CANCER: ICD-10-CM

## 2022-06-13 PROCEDURE — 71250 CT THORAX DX C-: CPT

## 2022-06-13 NOTE — TELEPHONE ENCOUNTER
Dr. Arboleda is currently out of the office, if she would like to push her appointment up sooner to see me in the next week or 2 we could talk about any procedures we can get things started.

## 2022-06-13 NOTE — TELEPHONE ENCOUNTER
Pt called today requesting a call back @ [phone] to discuss options on having a bronchoscopy done. Pt has an apt scheduled for 8/31/22 but wants to discuss sooner.

## 2022-06-21 ENCOUNTER — OFFICE VISIT (OUTPATIENT)
Dept: PULMONOLOGY | Facility: CLINIC | Age: 77
End: 2022-06-21

## 2022-06-21 ENCOUNTER — TELEPHONE (OUTPATIENT)
Dept: INTERNAL MEDICINE | Facility: CLINIC | Age: 77
End: 2022-06-21

## 2022-06-21 VITALS
HEIGHT: 63 IN | TEMPERATURE: 97.5 F | HEART RATE: 93 BPM | SYSTOLIC BLOOD PRESSURE: 142 MMHG | OXYGEN SATURATION: 90 % | BODY MASS INDEX: 32.32 KG/M2 | DIASTOLIC BLOOD PRESSURE: 66 MMHG | WEIGHT: 182.4 LBS

## 2022-06-21 DIAGNOSIS — J96.11 CHRONIC RESPIRATORY FAILURE WITH HYPOXIA: Primary | ICD-10-CM

## 2022-06-21 DIAGNOSIS — J47.9 IDIOPATHIC BRONCHIECTASIS: ICD-10-CM

## 2022-06-21 DIAGNOSIS — J44.9 COPD MIXED TYPE: ICD-10-CM

## 2022-06-21 PROCEDURE — 99214 OFFICE O/P EST MOD 30 MIN: CPT | Performed by: NURSE PRACTITIONER

## 2022-06-21 RX ORDER — MONTELUKAST SODIUM 10 MG/1
10 TABLET ORAL NIGHTLY
Qty: 90 TABLET | Refills: 3 | Status: SHIPPED | OUTPATIENT
Start: 2022-06-21

## 2022-06-21 NOTE — TELEPHONE ENCOUNTER
Caller: Lorrie Pagan    Relationship: Self    Best call back number: [phone]    What was the call regarding:   PATIENT STATED THAT SHE WAS RETURNING A CALL TO THE OFFICE BUT NOT VOICEMAIL WAS LEFT AND NO DOCUMENTATION AS TO WHOM CALLED THE PATIENT AND WHAT THE CALL WAS REGARDING     Do you require a callback: YES

## 2022-06-21 NOTE — PROGRESS NOTES
"[facility] Pulmonary Follow up      Chief Complaint  Bronchiectasis (CMS/HCC) (F/U)    Subjective          Lorrie Pagan presents to [facility] to discuss the possibilities of having a therapeutic bronchoscopy.  Prior to moving to the area when she was seeing a pulmonologist in [place] she had a bronchoscopy for secretion clearance.  She noted she did feel quite a bit better afterwards.    She does have a history of COPD with bronchiectasis.  She was recently hospital in May and has recovered fully from it.    She does continue to have a cough with thick sputum production.  It has not changed or worsened in the last few weeks.  Currently she is using her Trelegy daily.  She has not been using her Mucinex or her nebulizers.  She also has a flutter valve at home that she has not been using.    She does notice that the cough is worse in the mornings.  She does use her Astelin and Nasacort at night.  She has gastroparesis and chronic reflux.  Sometimes she coughs and gags related to her reflux.  She had no overt aspiration episodes recently.  She does sleep elevated at night.          Objective     Vital Signs:   /66 (BP Location: Right arm, Patient Position: Sitting, Cuff Size: Adult)   Pulse 93   Temp 97.5 °F (36.4 °C) (Infrared)   Ht 160 cm (63\")   Wt 82.7 kg (182 lb 6.4 oz)   SpO2 90%   PF (!) 4 L/min Comment: resting pulse  BMI 32.31 kg/m²       Immunization History   Administered Date(s) Administered   • COVID-19 (MODERNA) 1st, 2nd, 3rd Dose Only 02/13/2021, 03/13/2021, 09/17/2021   • Flu Vaccine Quad PF >36MO 10/31/2019, 10/25/2020   • Flu Vaccine Split Quad 10/31/2019   • Fluzone High Dose =>65 Years ([employer] ONLY) 10/28/2018, 10/21/2019, 10/16/2020   • Fluzone High-Dose 65+yrs 11/17/2021   • Influenza TIV (IM) 11/01/2017   • Pneumococcal Conjugate 13-Valent (PCV13) 08/03/2018, 10/28/2018   • Pneumococcal Polysaccharide (PPSV23) 10/16/2020 "   • Pneumococcal, Unspecified 01/01/2016   • Shingrix 03/31/2021       Physical Exam  Vitals reviewed.   Constitutional:       General: She is not in acute distress.     Appearance: She is well-developed. She is obese. She is not ill-appearing.   HENT:      Head: Normocephalic and atraumatic.   Eyes:      Pupils: Pupils are equal, round, and reactive to light.   Cardiovascular:      Rate and Rhythm: Normal rate and regular rhythm.      Heart sounds: No murmur heard.  Pulmonary:      Effort: Pulmonary effort is normal. No respiratory distress.      Breath sounds: Rhonchi present. No wheezing or rales.   Abdominal:      General: Bowel sounds are normal. There is no distension.      Palpations: Abdomen is soft.   Musculoskeletal:         General: Normal range of motion.      Cervical back: Normal range of motion and neck supple.      Right lower leg: Edema present.      Left lower leg: Edema present.   Skin:     General: Skin is warm and dry.      Findings: No erythema.   Neurological:      Mental Status: She is alert and oriented to person, place, and time.   Psychiatric:         Behavior: Behavior normal.          Result Review :       Data reviewed: Radiologic studies CT of chest 6/13/2022     FINDINGS:  There is no pathologic axillary adenopathy or other worrisome body wall  soft tissue finding in the chest. There are no acute findings present in  the partially characterized upper abdomen. There is no pleural or  pericardial effusion. Mildly prominent but otherwise not strictly  enlarged mediastinal lymph nodes are stable from prior, for example a 8  mm AP window lymph node. Atherosclerotic nonaneurysmal thoracic aorta.  Incidentally noted calcific coronary artery atherosclerosis is present.  Evaluation of the osseous structures demonstrates no evidence of acute  fracture or aggressive osseous lesion. Evaluation of the lung fields  demonstrates similar postoperative change along the fissure on the left,  without  new abnormal soft tissue nodularity present. Areas of tree in  bud nodularity previously present at the right lung base on most recent  comparison appear resolved, likely treated pneumonia. There is no acute  infectious or inflammatory process. Moderate emphysematous changes are  again noted. There are no new or enlarging suspicious pulmonary nodules.  Dependent atelectasis is unchanged.      IMPRESSION:  No evidence of recurrent or metastatic disease. Most recently noted  right lower lobe pneumonia appears resolved evidence of previously  enlarged right paratracheal lymph nodes are significantly decreased in  size, likely reactive.                 Assessment and Plan    Problem List Items Addressed This Visit        Pulmonary and Pneumonias    Bronchiectasis (CMS/HCC)    Relevant Medications    montelukast (SINGULAIR) 10 MG tablet    Chronic respiratory failure - Primary    Stage II, moderate, COPD    Relevant Medications    montelukast (SINGULAIR) 10 MG tablet        We discussed the risks and benefits of a therapeutic bronchoscopy for secretion clearance today in the office.    I did encourage her to try to resume her chronic airway management measures such as her nebulizers 2-3 times a day, with the use of her flutter valve especially in the morning with the secretions.  I would like for her to resume Mucinex twice daily to help with thinning of the secretions.  We also discussed adding on montelukast nightly.  I sent that into her pharmacy today.    I will follow-up with Dr. Arboleda regarding her request for a bronchoscopy.  I will let her know what he thinks.    She will continue with her routine follow-up as scheduled.    Follow Up     Return for Next scheduled follow up.  Patient was given instructions and counseling regarding her condition or for health maintenance advice. Please see specific information pulled into the AVS if appropriate.         Moderate level of Medical Decision Making complexity based  on 2 or more chronic stable illnesses and prescription drug management.    DONALD Sylvester, ACNP  [facility]  Electronically signed

## 2022-06-27 ENCOUNTER — PATIENT OUTREACH (OUTPATIENT)
Dept: CASE MANAGEMENT | Facility: OTHER | Age: 77
End: 2022-06-27

## 2022-06-27 ENCOUNTER — TELEPHONE (OUTPATIENT)
Dept: INTERNAL MEDICINE | Facility: CLINIC | Age: 77
End: 2022-06-27

## 2022-06-27 DIAGNOSIS — J44.9 COPD MIXED TYPE: ICD-10-CM

## 2022-06-27 DIAGNOSIS — I50.32 CHRONIC DIASTOLIC HEART FAILURE: Primary | ICD-10-CM

## 2022-06-27 NOTE — TELEPHONE ENCOUNTER
IVETTE HARRISON CALLED at 3:35pm AND SAID SHE JUST MISSED A CALL FROM SOMEONE HERE IN THE OFFICE.  PLEASE CALL HER BACK. [phone]  THANK YOU

## 2022-06-27 NOTE — OUTREACH NOTE
AMBULATORY CASE MANAGEMENT NOTE    Name and Relationship of Patient/Support Person: Lorrie Pagan - Self    CCM Interim Update    Patient returned my call. She stated she would call me back in a day or 2.     JI RIOS  Ambulatory Case Management    6/27/2022, 16:25 EDT

## 2022-06-29 ENCOUNTER — TELEPHONE (OUTPATIENT)
Dept: INTERNAL MEDICINE | Facility: CLINIC | Age: 77
End: 2022-06-29

## 2022-06-29 ENCOUNTER — DOCUMENTATION (OUTPATIENT)
Dept: PULMONOLOGY | Facility: CLINIC | Age: 77
End: 2022-06-29

## 2022-06-29 NOTE — TELEPHONE ENCOUNTER
Hub staff attempted to follow warm transfer process and was unsuccessful     Caller: Lorrie Pagan    Relationship to patient: Self    Patient call back: [phone]    What is the best time to reach you: ALL DAY    Who are you requesting to speak with (clinical staff, provider,  specific staff member): CLINICAL STAFF    Do you know the name of the person who called: JI    What was the call regarding:NOT SURE

## 2022-06-29 NOTE — PROGRESS NOTES
The patient continues to have issues with mucus clearance  She had some atelectasis on her prior/recent chest x-ray  I think its not unreasonable to consider a bronchoscopy to evaluate for mucous plugging, obtain a BAL, to evaluate for evidence of reflux and aspiration  We will get that arranged as an outpatient

## 2022-07-05 ENCOUNTER — LAB (OUTPATIENT)
Dept: LAB | Facility: HOSPITAL | Age: 77
End: 2022-07-05

## 2022-07-05 ENCOUNTER — PATIENT OUTREACH (OUTPATIENT)
Dept: CASE MANAGEMENT | Facility: OTHER | Age: 77
End: 2022-07-05

## 2022-07-05 ENCOUNTER — OFFICE VISIT (OUTPATIENT)
Dept: INTERNAL MEDICINE | Facility: CLINIC | Age: 77
End: 2022-07-05

## 2022-07-05 VITALS
SYSTOLIC BLOOD PRESSURE: 138 MMHG | RESPIRATION RATE: 20 BRPM | OXYGEN SATURATION: 99 % | DIASTOLIC BLOOD PRESSURE: 60 MMHG | WEIGHT: 188 LBS | BODY MASS INDEX: 33.3 KG/M2 | HEART RATE: 84 BPM | TEMPERATURE: 97.4 F

## 2022-07-05 DIAGNOSIS — R55 SYNCOPE, UNSPECIFIED SYNCOPE TYPE: ICD-10-CM

## 2022-07-05 DIAGNOSIS — I50.32 CHRONIC DIASTOLIC HEART FAILURE: Primary | ICD-10-CM

## 2022-07-05 DIAGNOSIS — R60.9 PERIPHERAL EDEMA: Primary | ICD-10-CM

## 2022-07-05 DIAGNOSIS — R94.31 ABNORMAL FINDING ON EKG: ICD-10-CM

## 2022-07-05 DIAGNOSIS — R06.02 SHORTNESS OF BREATH: ICD-10-CM

## 2022-07-05 LAB
ALBUMIN SERPL-MCNC: 4.5 G/DL (ref 3.5–5.2)
ALBUMIN/GLOB SERPL: 1.7 G/DL
ALP SERPL-CCNC: 115 U/L (ref 39–117)
ALT SERPL W P-5'-P-CCNC: 10 U/L (ref 1–33)
ANION GAP SERPL CALCULATED.3IONS-SCNC: 11.5 MMOL/L (ref 5–15)
AST SERPL-CCNC: 18 U/L (ref 1–32)
BASOPHILS # BLD AUTO: 0.03 10*3/MM3 (ref 0–0.2)
BASOPHILS NFR BLD AUTO: 0.3 % (ref 0–1.5)
BILIRUB SERPL-MCNC: 0.3 MG/DL (ref 0–1.2)
BUN SERPL-MCNC: 15 MG/DL (ref 8–23)
BUN/CREAT SERPL: 23.4 (ref 7–25)
CALCIUM SPEC-SCNC: 10.1 MG/DL (ref 8.6–10.5)
CHLORIDE SERPL-SCNC: 97 MMOL/L (ref 98–107)
CO2 SERPL-SCNC: 34.5 MMOL/L (ref 22–29)
CREAT SERPL-MCNC: 0.64 MG/DL (ref 0.57–1)
DEPRECATED RDW RBC AUTO: 42.3 FL (ref 37–54)
EGFRCR SERPLBLD CKD-EPI 2021: 90.6 ML/MIN/1.73
EOSINOPHIL # BLD AUTO: 0.17 10*3/MM3 (ref 0–0.4)
EOSINOPHIL NFR BLD AUTO: 1.7 % (ref 0.3–6.2)
ERYTHROCYTE [DISTWIDTH] IN BLOOD BY AUTOMATED COUNT: 14.8 % (ref 12.3–15.4)
GLOBULIN UR ELPH-MCNC: 2.7 GM/DL
GLUCOSE SERPL-MCNC: 100 MG/DL (ref 65–99)
HCT VFR BLD AUTO: 35.4 % (ref 34–46.6)
HGB BLD-MCNC: 11 G/DL (ref 12–15.9)
IMM GRANULOCYTES # BLD AUTO: 0.07 10*3/MM3 (ref 0–0.05)
IMM GRANULOCYTES NFR BLD AUTO: 0.7 % (ref 0–0.5)
LYMPHOCYTES # BLD AUTO: 1.52 10*3/MM3 (ref 0.7–3.1)
LYMPHOCYTES NFR BLD AUTO: 15.1 % (ref 19.6–45.3)
MCH RBC QN AUTO: 25.2 PG (ref 26.6–33)
MCHC RBC AUTO-ENTMCNC: 31.1 G/DL (ref 31.5–35.7)
MCV RBC AUTO: 81.2 FL (ref 79–97)
MONOCYTES # BLD AUTO: 0.58 10*3/MM3 (ref 0.1–0.9)
MONOCYTES NFR BLD AUTO: 5.7 % (ref 5–12)
NEUTROPHILS NFR BLD AUTO: 7.72 10*3/MM3 (ref 1.7–7)
NEUTROPHILS NFR BLD AUTO: 76.5 % (ref 42.7–76)
NRBC BLD AUTO-RTO: 0 /100 WBC (ref 0–0.2)
NT-PROBNP SERPL-MCNC: 57.8 PG/ML (ref 0–1800)
PLATELET # BLD AUTO: 420 10*3/MM3 (ref 140–450)
PMV BLD AUTO: 9.6 FL (ref 6–12)
POTASSIUM SERPL-SCNC: 3.6 MMOL/L (ref 3.5–5.2)
PROT SERPL-MCNC: 7.2 G/DL (ref 6–8.5)
RBC # BLD AUTO: 4.36 10*6/MM3 (ref 3.77–5.28)
SODIUM SERPL-SCNC: 143 MMOL/L (ref 136–145)
WBC NRBC COR # BLD: 10.09 10*3/MM3 (ref 3.4–10.8)

## 2022-07-05 PROCEDURE — 80053 COMPREHEN METABOLIC PANEL: CPT | Performed by: NURSE PRACTITIONER

## 2022-07-05 PROCEDURE — 85025 COMPLETE CBC W/AUTO DIFF WBC: CPT | Performed by: NURSE PRACTITIONER

## 2022-07-05 PROCEDURE — 83880 ASSAY OF NATRIURETIC PEPTIDE: CPT | Performed by: NURSE PRACTITIONER

## 2022-07-05 PROCEDURE — 36415 COLL VENOUS BLD VENIPUNCTURE: CPT

## 2022-07-05 PROCEDURE — 93000 ELECTROCARDIOGRAM COMPLETE: CPT | Performed by: NURSE PRACTITIONER

## 2022-07-05 PROCEDURE — 99214 OFFICE O/P EST MOD 30 MIN: CPT | Performed by: NURSE PRACTITIONER

## 2022-07-05 NOTE — PROGRESS NOTES
Follow Up Office Visit      Date: 2022   Patient Name: Lorrie Pagna  : 1945   MRN: [id number]     Chief Complaint:    Chief Complaint   Patient presents with   • Edema     Swelling for months and it has been getting worse in her legs over the last few days.     • Loss of Consciousness     Pt stated she has randomly passed out three times in the last few days.       History of Present Illness: Lorrie Pagan is a 76 y.o. female who is here today for a sick visit related to edema of bilateral LE and syncopal episodes.  She was treated for cellulitis last month and reports that she was unable to complete her antibiotic due to the size of the tablet and her gastroparesis related symptoms.  She denies systemic signs and symptoms of infection.  She reports increased generalized swelling over the past week, most notable in her legs bilaterally.  She attempted to double her furosemide Rx without relief of LE edema.  About 3 days after increasing her fluid pill, she began having syncopal episodes.  She has experienced syncopal episodes similar to this in the past.  She denies any exacerbating symptoms, noting 1 time she was painting and woke up with paint on her face and her paintbrush in the floor, and another time where she was eating soup and woke up with her face on the table.  She denies ever hitting her head.  She denies chest pain, but does complain of not breathing very well.  An outpatient bronchoscopy is currently being planned by her pulmonologist.  Additionally, she reports BRBPR with every BM which she attributes to hemorrhoids.  She is scheduled to establish care with a new GI doctor at the end of this month.      Subjective      Review of Systems:   Review of Systems   Constitutional: Positive for fatigue and unexpected weight gain (+6lbs over 2 days). Negative for chills and fever.   HENT: Positive for congestion (feels like regular COPD congestion), rhinorrhea, sinus  "pressure and sneezing. Negative for postnasal drip and sore throat.    Eyes: Negative for blurred vision and double vision.   Respiratory: Positive for cough (productive, clear mucous ) and shortness of breath (worse with exertion ). Negative for wheezing.    Cardiovascular: Positive for leg swelling. Negative for chest pain.   Gastrointestinal: Positive for blood in stool (with every BM, bright red). Negative for abdominal pain, diarrhea, nausea and vomiting.   Endocrine: Negative for cold intolerance and heat intolerance.   Genitourinary: Negative for dysuria.   Musculoskeletal: Positive for myalgias (\"charley horses\"). Negative for arthralgias.   Skin: Negative for rash and bruise.   Neurological: Negative for headache.   Hematological: Negative for adenopathy. Does not bruise/bleed easily.   Psychiatric/Behavioral: Negative for depressed mood. The patient is not nervous/anxious.        I have reviewed the patients family history, social history, past medical history, past surgical history and have updated it as appropriate.     Medications:     Current Outpatient Medications:   •  acetaminophen (TYLENOL) 500 MG tablet, Take 1,000 mg by mouth Every 6 (Six) Hours As Needed for Mild Pain ., Disp: , Rfl:   •  albuterol (ACCUNEB) 1.25 MG/3ML nebulizer solution, Take 3 mL by nebulization Every 6 (Six) Hours As Needed for Shortness of Air., Disp: 90 each, Rfl: 5  •  albuterol sulfate  (90 Base) MCG/ACT inhaler, Inhale 2 puffs Every 6 (Six) Hours As Needed for Wheezing., Disp: 18 g, Rfl: 2  •  azelastine (ASTELIN) 0.1 % nasal spray, 2 sprays into the nostril(s) as directed by provider 2 (Two) Times a Day. (Patient taking differently: 2 sprays into the nostril(s) as directed by provider 2 (Two) Times a Day As Needed for Rhinitis or Allergies.), Disp: 30 mL, Rfl: 5  •  chlorhexidine (PERIDEX) 0.12 % solution, Apply 15 mL to the mouth or throat 2 (Two) Times a Day As Needed. For 14 days, w/1 refill, Disp: , Rfl:   • "  Dapsone 5 % topical gel, Apply  topically to the appropriate area as directed Daily As Needed (apply to legs)., Disp: , Rfl:   •  donepezil (ARICEPT) 10 MG tablet, Take 10 mg by mouth Daily., Disp: , Rfl:   •  DULoxetine (CYMBALTA) 60 MG capsule, TAKE 1 CAPSULE BY MOUTH EVERY DAY, Disp: 90 capsule, Rfl: 3  •  famotidine (Pepcid AC Maximum Strength) 20 MG tablet, Take 1 tablet by mouth 2 (Two) Times a Day., Disp: 8 tablet, Rfl: 0  •  furosemide (LASIX) 40 MG tablet, Take 1 tablet by mouth Daily., Disp: 90 tablet, Rfl: 1  •  glimepiride (AMARYL) 2 MG tablet, TAKE 1 TABLET BY MOUTH TWICE DAILY, Disp: 180 tablet, Rfl: 1  •  ketoconazole (NIZORAL) 2 % cream, Apply 1 application topically to the appropriate area as directed Daily As Needed for Itching (apply to back and legs)., Disp: , Rfl:   •  ketoconazole (NIZORAL) 2 % shampoo, Apply 1 application topically to the appropriate area as directed As Needed for Irritation (for head)., Disp: , Rfl:   •  latanoprost (XALATAN) 0.005 % ophthalmic solution, Administer 1 drop to both eyes Every Night., Disp: , Rfl:   •  memantine (NAMENDA) 5 MG tablet, Take 5 mg by mouth Daily., Disp: , Rfl:   •  metOLazone (ZAROXOLYN) 2.5 MG tablet, Take 2.5 mg by mouth Daily., Disp: , Rfl:   •  montelukast (SINGULAIR) 10 MG tablet, Take 1 tablet by mouth Every Night., Disp: 90 tablet, Rfl: 3  •  O2 (OXYGEN), Inhale 2 L/min Continuous., Disp: , Rfl:   •  pantoprazole (PROTONIX) 40 MG EC tablet, Take 1 tablet by mouth 2 (Two) Times a Day., Disp: 180 tablet, Rfl: 3  •  potassium chloride (MICRO-K) 10 MEQ CR capsule, Take 3 capsules by mouth 2 (Two) Times a Day., Disp: , Rfl:   •  rOPINIRole XL (REQUIP XL) 8 MG 24 hr tablet, Take 8 mg by mouth 2 (two) times a day., Disp: , Rfl:   •  saccharomyces boulardii (FLORASTOR) 250 MG capsule, Take 1 capsule by mouth 2 (Two) Times a Day., Disp: 60 capsule, Rfl: 0  •  Spacer/Aero-Holding Chambers (AeroChamber Plus Morris-Vu) misc, As Needed., Disp: , Rfl:   •   "timolol (TIMOPTIC) 0.25 % ophthalmic solution, Administer 1 drop to the right eye Every Morning., Disp: , Rfl:   •  tiZANidine (ZANAFLEX) 2 MG tablet, TAKE 1 TABLET BY MOUTH AT NIGHT AS NEEDED FOR MUSCLE SPASMS., Disp: 30 tablet, Rfl: 0  •  Triamcinolone Acetonide (NASACORT) 55 MCG/ACT nasal inhaler, 2 sprays into the nostril(s) as directed by provider Daily., Disp: , Rfl:   •  vitamin D (ERGOCALCIFEROL) 1.25 MG (83572 UT) capsule capsule, Take 1 capsule by mouth 1 (One) Time Per Week., Disp: 12 capsule, Rfl: 1  •  ondansetron ODT (ZOFRAN-ODT) 4 MG disintegrating tablet, Place 1 tablet on the tongue Every 8 (Eight) Hours As Needed for Nausea or Vomiting., Disp: 30 tablet, Rfl: 0    Allergies:   Allergies   Allergen Reactions   • Hydrocodone Hallucinations     \"With high doses\"   • Statins Myalgia     myalgia   • Metoclopramide Other (See Comments)     EXACERBATED RLS   • Penicillins Rash     rash   • Tramadol Nausea And Vomiting and GI Intolerance     VERY MILD PER PT       Objective     Physical Exam: Please see above  Vital Signs:   Vitals:    07/05/22 0935   BP: 138/60   Pulse: 84   Resp: 20   Temp: 97.4 °F (36.3 °C)   SpO2: 99%   Weight: 85.3 kg (188 lb)   PainSc: 0-No pain     Body mass index is 33.3 kg/m².    Physical Exam  Vitals and nursing note reviewed.   Constitutional:       General: She is awake. She is not in acute distress.     Appearance: Normal appearance. She is well-developed. She is not ill-appearing or diaphoretic.   HENT:      Head: Normocephalic and atraumatic.      Right Ear: Tympanic membrane and ear canal normal.      Left Ear: Tympanic membrane and ear canal normal.      Mouth/Throat:      Mouth: Mucous membranes are moist.      Pharynx: Oropharynx is clear. No oropharyngeal exudate or posterior oropharyngeal erythema.   Eyes:      Extraocular Movements: Extraocular movements intact.      Pupils: Pupils are equal, round, and reactive to light.   Neck:      Vascular: No JVD. "   Cardiovascular:      Rate and Rhythm: Normal rate and regular rhythm.      Pulses: Normal pulses.      Heart sounds: Normal heart sounds.     No S3 or S4 sounds.   Pulmonary:      Effort: Pulmonary effort is normal. No respiratory distress.      Breath sounds: Normal breath sounds and air entry.   Abdominal:      General: Bowel sounds are normal.      Palpations: Abdomen is soft.      Tenderness: There is no guarding.   Musculoskeletal:      Cervical back: Normal range of motion and neck supple.      Right lower leg: Edema (+1) present.      Left lower leg: Edema (+1) present.   Lymphadenopathy:      Cervical: No cervical adenopathy.   Skin:     General: Skin is warm and dry.      Capillary Refill: Capillary refill takes less than 2 seconds.      Findings: Erythema (R shin, with increased warmth to palpation) present.   Neurological:      General: No focal deficit present.      Mental Status: She is alert and oriented to person, place, and time. Mental status is at baseline.      Cranial Nerves: Cranial nerves are intact.   Psychiatric:         Attention and Perception: Attention and perception normal.         Mood and Affect: Mood and affect normal.         Speech: Speech normal.         Behavior: Behavior normal.         Thought Content: Thought content normal.         Judgment: Judgment normal.           ECG 12 Lead    Date/Time: 7/5/2022 10:36 AM  Performed by: Pippa Brooks APRN  Authorized by: Pippa Brooks APRN   Comparison: compared with previous ECG from 4/23/2022  Comparison to previous ECG: New heart block, 1 degree   Rhythm: sinus rhythm  Rate: normal  Conduction: 1st degree AV block  QRS axis: normal    Clinical impression: abnormal EKG                  Results:   Labs: reviewed recent labs     Assessment / Plan      Assessment/Plan:   Diagnoses and all orders for this visit:    1. Peripheral edema (Primary)  -I will wait for lab results prior to recommending any medication dose  adjustments or addition of any new medications.   -In the meantime compression and elevation lower extremities  2. Syncope, unspecified syncope type  -     CBC & Differential; Future  -     Comprehensive Metabolic Panel; Future  -     proBNP; Future  -     ECG 12 Lead  -     Adult Transthoracic Echo Complete W/ Cont if Necessary Per Protocol; Future  -     Ambulatory Referral to Cardiology    3. Shortness of breath  -     CBC & Differential; Future  -     Comprehensive Metabolic Panel; Future  -     proBNP; Future  -     ECG 12 Lead  -     Adult Transthoracic Echo Complete W/ Cont if Necessary Per Protocol; Future    4. Abnormal finding on EKG  -     Ambulatory Referral to Cardiology         Follow Up:   Return in about 1 week (around 7/12/2022).    DONALD Farnz  [facility] [place]

## 2022-07-05 NOTE — OUTREACH NOTE
CCM Interim Update    Patient Outreach    Spoke with patient briefly during today's PCP visit. She reports continued fatigue, lower extremity swelling, and shortness of breath. Patient is currently on 5L NC continuously. Symptoms limit her ability to perform routine chores like cooking and laundry. She reports decreasing her salt intake.     Patient is agreeable for referrals to community agencies to help with light housekeeping. She wants to remain as independent as possible and continues to be active with her friends. She currently has Palliative services. Patient is interested in a rollator/walker, she has tripped and fell 2x recently. She stated she has PT services but no OT services. Rollator order pending, will arrange for delivery. Follow up outreach scheduled.     Adult Patient Profile  Questions/Answers    Flowsheet Row Most Recent Value   Taking Medications Not Prescribed yes, over-the-counter medicines   OTC Medications pain or fever relief, like Aleve, Motrin, Advil, Tylenol   Missed Doses of Prescribed Medications During Past Week no   Taken Prescribed Medications at Different Time or Schedule During Past Week no   Taken More or Less Medication Than Prescribed no   Barriers to Taking Medication as Prescribed none  [Patient has pre-packaged medications delivered by [facility]]        SDOH updated and reviewed with the patient during this program:    Continuing Care   Community [facility]    [address] , [facility] [address]    Phone: [phone]    Resource for: Social Connections   [facility] [place] [facility] [place]    [address] [place] [address], [facility] [address]    Phone: [phone]    Resource for: Food Insecurity, Physical Activity

## 2022-07-06 ENCOUNTER — TELEPHONE (OUTPATIENT)
Dept: INTERNAL MEDICINE | Facility: CLINIC | Age: 77
End: 2022-07-06

## 2022-07-06 NOTE — TELEPHONE ENCOUNTER
Caller: Lorrie Pagan    Relationship: Self    Best call back number: [phone]    What test was performed: BLOOD TEST    When was the test performed: 7/5/2022    Where was the test performed: IN OFFICE    Additional notes: PATIENT WOULD LIKE SOMEONE TO CALL TO DISCUSS THOSE RESULTS. SHE WAS INFORMED THEY WOULD BE BACK WITHIN 24 HOURS.

## 2022-07-07 ENCOUNTER — PATIENT OUTREACH (OUTPATIENT)
Dept: CASE MANAGEMENT | Facility: OTHER | Age: 77
End: 2022-07-07

## 2022-07-07 DIAGNOSIS — I50.32 CHRONIC DIASTOLIC HEART FAILURE: Primary | ICD-10-CM

## 2022-07-07 DIAGNOSIS — J44.9 COPD MIXED TYPE: ICD-10-CM

## 2022-07-07 DIAGNOSIS — R26.89 IMPAIRMENT OF BALANCE: Primary | ICD-10-CM

## 2022-07-07 RX ORDER — CALCIUM CARBONATE 160(400)MG
1 TABLET,CHEWABLE ORAL TAKE AS DIRECTED
Qty: 1 EACH | Refills: 0 | OUTPATIENT
Start: 2022-07-07 | End: 2022-08-09

## 2022-07-07 NOTE — TELEPHONE ENCOUNTER
"HUB TO READ     \" Left message with patient to let her know labs have not been reviewed yet but once they have she will receive a phone call to discuss.\"   "

## 2022-07-07 NOTE — OUTREACH NOTE
AMBULATORY CASE MANAGEMENT NOTE    Name and Relationship of Patient/Support Person: Ej Lorrie White - Self    CCM Interim Update    Returned patient's call. Updated PCP regarding request for a rollator. Provided patient with the number for [facility]. Referral for [facility] placed on Tuesday. Patient stated a referral has been placed for her to see cardiology due to syncope and abnormal EKG. Encouraged patient to seek medical attention for syncope/near syncope. She has a Life Alert type system and is agreeable to use it if this occurs as well. Lab discussion with PCP pending lab review. Patient denied further questions/needs at this time. F/U outreach scheduled.    JI RIOS  Ambulatory Case Management    7/7/2022, 14:18 EDT

## 2022-07-08 ENCOUNTER — TELEPHONE (OUTPATIENT)
Dept: INTERNAL MEDICINE | Facility: CLINIC | Age: 77
End: 2022-07-08

## 2022-07-08 NOTE — TELEPHONE ENCOUNTER
Caller: Lorrie Pagan    Relationship to patient: Self    Best call back number: [phone]    Patient is needing: PATIENT WANTING HER TEST RESULTS. PATIENT IS WANTING TO HEAR BACK BEFORE THE WEEKEND

## 2022-07-08 NOTE — TELEPHONE ENCOUNTER
Called by daughter Peggy Pagan [phone]    Wants to know about lab results - has left messages 3 days in a row and no one is returning calls - mother is having leg swelling with redness and weeping and secondary leg pain.    States cardiology referral made for abnl EKG    Reviewed labs with patient's daughter - discussed new relative anemia compared to her baseline, which could be contributing to SOB and syncopal episodes. Discussed WBC nl, therefore less likely cellulitis in legs    Reviewed stable CMP and BNP (high HCO3 at baseline).    Reviewed EKG - which is stable compared to EKG from 10/21; sinus with 1st deg AVB    Advised if leg pain and weeping worsens over the weekend,she should go to ER.  Otherwise needs f/u next wk with , for further eval of anemia and for consideration of referral to wound clinic for edema therapy.    Patient's daughter expressed appreciation and had no other questions.

## 2022-07-10 ENCOUNTER — APPOINTMENT (OUTPATIENT)
Dept: GENERAL RADIOLOGY | Facility: HOSPITAL | Age: 77
End: 2022-07-10

## 2022-07-10 ENCOUNTER — HOSPITAL ENCOUNTER (INPATIENT)
Facility: HOSPITAL | Age: 77
LOS: 3 days | Discharge: HOME | End: 2022-07-15
Attending: EMERGENCY MEDICINE | Admitting: HOSPITALIST

## 2022-07-10 DIAGNOSIS — D50.0 IRON DEFICIENCY ANEMIA DUE TO CHRONIC BLOOD LOSS: Primary | ICD-10-CM

## 2022-07-10 DIAGNOSIS — L03.116 CELLULITIS OF BOTH LOWER EXTREMITIES: ICD-10-CM

## 2022-07-10 DIAGNOSIS — R60.9 PERIPHERAL EDEMA: ICD-10-CM

## 2022-07-10 DIAGNOSIS — K62.5 BRBPR (BRIGHT RED BLOOD PER RECTUM): ICD-10-CM

## 2022-07-10 DIAGNOSIS — R91.8 OPACITY OF LUNG ON IMAGING STUDY: ICD-10-CM

## 2022-07-10 DIAGNOSIS — L03.119 CELLULITIS OF LOWER EXTREMITY, UNSPECIFIED LATERALITY: ICD-10-CM

## 2022-07-10 DIAGNOSIS — J96.11 CHRONIC RESPIRATORY FAILURE WITH HYPOXIA: ICD-10-CM

## 2022-07-10 DIAGNOSIS — L03.115 CELLULITIS OF BOTH LOWER EXTREMITIES: ICD-10-CM

## 2022-07-10 PROBLEM — R60.0 PERIPHERAL EDEMA: Status: ACTIVE | Noted: 2020-04-27

## 2022-07-10 LAB
ALBUMIN SERPL-MCNC: 3.8 G/DL (ref 3.5–5.2)
ALBUMIN/GLOB SERPL: 1.2 G/DL
ALP SERPL-CCNC: 114 U/L (ref 39–117)
ALT SERPL W P-5'-P-CCNC: 9 U/L (ref 1–33)
ANION GAP SERPL CALCULATED.3IONS-SCNC: 5 MMOL/L (ref 5–15)
AST SERPL-CCNC: 14 U/L (ref 1–32)
BASOPHILS # BLD AUTO: 0.03 10*3/MM3 (ref 0–0.2)
BASOPHILS NFR BLD AUTO: 0.3 % (ref 0–1.5)
BILIRUB SERPL-MCNC: 0.2 MG/DL (ref 0–1.2)
BUN SERPL-MCNC: 12 MG/DL (ref 8–23)
BUN/CREAT SERPL: 17.1 (ref 7–25)
CALCIUM SPEC-SCNC: 9.3 MG/DL (ref 8.6–10.5)
CHLORIDE SERPL-SCNC: 98 MMOL/L (ref 98–107)
CO2 SERPL-SCNC: 36 MMOL/L (ref 22–29)
CREAT SERPL-MCNC: 0.7 MG/DL (ref 0.57–1)
CRP SERPL-MCNC: 1.63 MG/DL (ref 0–0.5)
D DIMER PPP FEU-MCNC: 0.32 MCGFEU/ML (ref 0.01–0.5)
D-LACTATE SERPL-SCNC: 0.6 MMOL/L (ref 0.5–2)
DEPRECATED RDW RBC AUTO: 48.7 FL (ref 37–54)
EGFRCR SERPLBLD CKD-EPI 2021: 88.7 ML/MIN/1.73
EOSINOPHIL # BLD AUTO: 0.2 10*3/MM3 (ref 0–0.4)
EOSINOPHIL NFR BLD AUTO: 2.1 % (ref 0.3–6.2)
ERYTHROCYTE [DISTWIDTH] IN BLOOD BY AUTOMATED COUNT: 15.3 % (ref 12.3–15.4)
ERYTHROCYTE [SEDIMENTATION RATE] IN BLOOD: 30 MM/HR (ref 0–30)
FLUAV RNA RESP QL NAA+PROBE: NOT DETECTED
FLUBV RNA RESP QL NAA+PROBE: NOT DETECTED
GLOBULIN UR ELPH-MCNC: 3.1 GM/DL
GLUCOSE BLDC GLUCOMTR-MCNC: 94 MG/DL (ref 70–130)
GLUCOSE SERPL-MCNC: 99 MG/DL (ref 65–99)
HCT VFR BLD AUTO: 34.7 % (ref 34–46.6)
HGB BLD-MCNC: 10.1 G/DL (ref 12–15.9)
IMM GRANULOCYTES # BLD AUTO: 0.06 10*3/MM3 (ref 0–0.05)
IMM GRANULOCYTES NFR BLD AUTO: 0.6 % (ref 0–0.5)
LYMPHOCYTES # BLD AUTO: 1.85 10*3/MM3 (ref 0.7–3.1)
LYMPHOCYTES NFR BLD AUTO: 19.3 % (ref 19.6–45.3)
MCH RBC QN AUTO: 25.4 PG (ref 26.6–33)
MCHC RBC AUTO-ENTMCNC: 29.1 G/DL (ref 31.5–35.7)
MCV RBC AUTO: 87.4 FL (ref 79–97)
MONOCYTES # BLD AUTO: 0.63 10*3/MM3 (ref 0.1–0.9)
MONOCYTES NFR BLD AUTO: 6.6 % (ref 5–12)
NEUTROPHILS NFR BLD AUTO: 6.81 10*3/MM3 (ref 1.7–7)
NEUTROPHILS NFR BLD AUTO: 71.1 % (ref 42.7–76)
NRBC BLD AUTO-RTO: 0 /100 WBC (ref 0–0.2)
NT-PROBNP SERPL-MCNC: 26.9 PG/ML (ref 0–1800)
PLATELET # BLD AUTO: 364 10*3/MM3 (ref 140–450)
PMV BLD AUTO: 9.2 FL (ref 6–12)
POTASSIUM SERPL-SCNC: 3.5 MMOL/L (ref 3.5–5.2)
PROCALCITONIN SERPL-MCNC: 0.07 NG/ML (ref 0–0.25)
PROT SERPL-MCNC: 6.9 G/DL (ref 6–8.5)
RBC # BLD AUTO: 3.97 10*6/MM3 (ref 3.77–5.28)
SARS-COV-2 RNA RESP QL NAA+PROBE: NOT DETECTED
SODIUM SERPL-SCNC: 139 MMOL/L (ref 136–145)
TROPONIN T SERPL-MCNC: <0.01 NG/ML (ref 0–0.03)
WBC NRBC COR # BLD: 9.58 10*3/MM3 (ref 3.4–10.8)

## 2022-07-10 PROCEDURE — 93005 ELECTROCARDIOGRAM TRACING: CPT | Performed by: EMERGENCY MEDICINE

## 2022-07-10 PROCEDURE — 25010000002 FUROSEMIDE PER 20 MG: Performed by: EMERGENCY MEDICINE

## 2022-07-10 PROCEDURE — 84484 ASSAY OF TROPONIN QUANT: CPT | Performed by: EMERGENCY MEDICINE

## 2022-07-10 PROCEDURE — 87040 BLOOD CULTURE FOR BACTERIA: CPT | Performed by: EMERGENCY MEDICINE

## 2022-07-10 PROCEDURE — 84145 PROCALCITONIN (PCT): CPT | Performed by: INTERNAL MEDICINE

## 2022-07-10 PROCEDURE — 82962 GLUCOSE BLOOD TEST: CPT

## 2022-07-10 PROCEDURE — 80053 COMPREHEN METABOLIC PANEL: CPT | Performed by: EMERGENCY MEDICINE

## 2022-07-10 PROCEDURE — 99223 1ST HOSP IP/OBS HIGH 75: CPT | Performed by: INTERNAL MEDICINE

## 2022-07-10 PROCEDURE — 85379 FIBRIN DEGRADATION QUANT: CPT | Performed by: INTERNAL MEDICINE

## 2022-07-10 PROCEDURE — 83880 ASSAY OF NATRIURETIC PEPTIDE: CPT | Performed by: EMERGENCY MEDICINE

## 2022-07-10 PROCEDURE — G0378 HOSPITAL OBSERVATION PER HR: HCPCS

## 2022-07-10 PROCEDURE — 71045 X-RAY EXAM CHEST 1 VIEW: CPT

## 2022-07-10 PROCEDURE — 99284 EMERGENCY DEPT VISIT MOD MDM: CPT

## 2022-07-10 PROCEDURE — 85652 RBC SED RATE AUTOMATED: CPT | Performed by: INTERNAL MEDICINE

## 2022-07-10 PROCEDURE — 85025 COMPLETE CBC W/AUTO DIFF WBC: CPT | Performed by: EMERGENCY MEDICINE

## 2022-07-10 PROCEDURE — 83605 ASSAY OF LACTIC ACID: CPT | Performed by: EMERGENCY MEDICINE

## 2022-07-10 PROCEDURE — 87636 SARSCOV2 & INF A&B AMP PRB: CPT | Performed by: EMERGENCY MEDICINE

## 2022-07-10 PROCEDURE — 86140 C-REACTIVE PROTEIN: CPT | Performed by: INTERNAL MEDICINE

## 2022-07-10 RX ORDER — FUROSEMIDE 10 MG/ML
80 INJECTION INTRAMUSCULAR; INTRAVENOUS ONCE
Status: COMPLETED | OUTPATIENT
Start: 2022-07-10 | End: 2022-07-10

## 2022-07-10 RX ORDER — NICOTINE POLACRILEX 4 MG
15 LOZENGE BUCCAL
Status: DISCONTINUED | OUTPATIENT
Start: 2022-07-10 | End: 2022-07-15 | Stop reason: HOSPADM

## 2022-07-10 RX ORDER — DEXTROSE MONOHYDRATE 25 G/50ML
25 INJECTION, SOLUTION INTRAVENOUS
Status: DISCONTINUED | OUTPATIENT
Start: 2022-07-10 | End: 2022-07-15 | Stop reason: HOSPADM

## 2022-07-10 RX ORDER — HEPARIN SODIUM 5000 [USP'U]/ML
5000 INJECTION, SOLUTION INTRAVENOUS; SUBCUTANEOUS EVERY 12 HOURS SCHEDULED
Status: CANCELLED | OUTPATIENT
Start: 2022-07-10

## 2022-07-10 RX ORDER — ROPINIROLE 2 MG/1
4 TABLET, FILM COATED ORAL ONCE
Status: COMPLETED | OUTPATIENT
Start: 2022-07-10 | End: 2022-07-10

## 2022-07-10 RX ORDER — INSULIN LISPRO 100 [IU]/ML
0-7 INJECTION, SOLUTION INTRAVENOUS; SUBCUTANEOUS
Status: DISCONTINUED | OUTPATIENT
Start: 2022-07-11 | End: 2022-07-15 | Stop reason: HOSPADM

## 2022-07-10 RX ORDER — SODIUM CHLORIDE 0.9 % (FLUSH) 0.9 %
10 SYRINGE (ML) INJECTION AS NEEDED
Status: DISCONTINUED | OUTPATIENT
Start: 2022-07-10 | End: 2022-07-15 | Stop reason: HOSPADM

## 2022-07-10 RX ADMIN — ROPINIROLE HYDROCHLORIDE 4 MG: 2 TABLET, FILM COATED ORAL at 18:26

## 2022-07-10 RX ADMIN — FUROSEMIDE 80 MG: 10 INJECTION, SOLUTION INTRAMUSCULAR; INTRAVENOUS at 18:26

## 2022-07-10 NOTE — ED PROVIDER NOTES
Subjective   76-year-old female who presents with complaint of increased lower extremity swelling with increased erythema.  The patient reports baseline lower extremity edema that has been present for years.  She states that the erythema to lower extremities has been present for the last couple months.  Just within the last 2 days she has had increased lower extremity edema and redness that has began to track up the medial aspect of the bilateral thighs.  The patient's typical redness is between the ankle and the knee.  She denies systemic symptoms of infection including no fever.  She may have had with mild chills.  She at baseline wears 4 L of nasal cannula oxygen and denies significant increase shortness of breath with her baseline oxygen.  She denies sick contacts.  No chest or abdominal pain.  No reported change in bowel or urinary function.  She does have a diuretic and she has been taking that as prescribed.  The patient also reports intermittent episodes of syncope that she states has been evaluated in detail in the past.  She denies a current change or recent syncopal episode.  No other acute complaints.          Review of Systems   Constitutional: Negative for chills, fatigue and fever.   HENT: Negative for congestion, ear pain, postnasal drip, sinus pressure and sore throat.    Eyes: Negative for pain, redness and visual disturbance.   Respiratory: Negative for cough, chest tightness and shortness of breath.    Cardiovascular: Positive for leg swelling. Negative for chest pain and palpitations.   Gastrointestinal: Negative for abdominal pain, anal bleeding, blood in stool, diarrhea, nausea and vomiting.   Endocrine: Negative for polydipsia and polyuria.   Genitourinary: Negative for difficulty urinating, dysuria, frequency and urgency.   Musculoskeletal: Negative for arthralgias, back pain and neck pain.   Skin: Positive for color change. Negative for pallor and rash.   Allergic/Immunologic: Negative for  "environmental allergies and immunocompromised state.   Neurological: Positive for syncope. Negative for dizziness, weakness and headaches.   Hematological: Negative for adenopathy.   Psychiatric/Behavioral: Negative for confusion, self-injury and suicidal ideas. The patient is not nervous/anxious.    All other systems reviewed and are negative.      Past Medical History:   Diagnosis Date   • Back pain    • Bronchiectasis (HCC)    • Bronchitis 01/2018   • Cancer (HCC)     History of lung cancer and skin cancer    • Cardiac murmur    • Chronic cough    • Chronic obstructive pulmonary disease (HCC)    • Colon polyp    • DDD (degenerative disc disease), lumbar    • Depression    • Diabetes mellitus (HCC)     DX: 2019, CHECK BS QOD, LAST A1C 6.3??   • Disease of thyroid gland    • Esophageal dilatation 9/20/2019    Added automatically from request for surgery [id number]   • Esophageal dysphagia 10/24/2019    Added automatically from request for surgery [id number]   • Former smoker (None since 1992) 8/29/2019   • GERD (gastroesophageal reflux disease)    • Glaucoma    • Globus sensation 1/27/2020   • History of colonic polyps    • History of transfusion 1988    NO REACTION    • Hyperlipidemia    • Hypertension    • Irritable bowel syndrome     Constipation/diarrhea   • Legally blind    • Lung cancer (HCC)    • Macular degeneration    • Neuropathy    • Obesity 2/26/2020   • Osteoarthritis    • Oxygen dependent     5L   • Pneumonia    • Sleep apnea     NON COMPLIANT WITH CPAP USE   • UTI (urinary tract infection)    • Wears glasses        Allergies   Allergen Reactions   • Hydrocodone Hallucinations     \"With high doses\"   • Statins Myalgia     myalgia   • Metoclopramide Other (See Comments)     EXACERBATED RLS   • Penicillins Rash     rash   • Tramadol Nausea And Vomiting and GI Intolerance     VERY MILD PER PT       Past Surgical History:   Procedure Laterality Date   • BACK SURGERY      X2 (LUMBAR)   • BREAST BIOPSY      20+ " years ago   • BREAST BIOPSY Right 2021    fna ln   • CATARACT EXTRACTION Bilateral    • CHOLECYSTECTOMY     • COLONOSCOPY     • ENDOSCOPY N/A 2019    Procedure: ESOPHAGOGASTRODUODENOSCOPY;  Surgeon: Cortes Millan MD;  Location:  [facility];  Service: Gastroenterology   • ENDOSCOPY N/A 2021    Procedure: ESOPHAGOGASTRODUODENOSCOPY;  Surgeon: Cortes Millan MD;  Location:  [facility];  Service: Gastroenterology;  Laterality: N/A;  balloon dilation    • HAND SURGERY Right     laceration repair   • INCONTINENCE SURGERY      BLADDER   • LUNG REMOVAL, PARTIAL Left    • NISSEN FUNDOPLICATION     • TOTAL ABDOMINAL HYSTERECTOMY  1988    benign cyst   • US GUIDED FINE NEEDLE ASPIRATION  2021       Family History   Problem Relation Age of Onset   • Colon polyps Mother    • Emphysema Mother    • Hypertension Mother    • Colon polyps Father    • Dementia Father    • Heart disease Father    • Hyperlipidemia Father    • Macular degeneration Father    • Glaucoma Father    • Colon cancer Neg Hx    • Breast cancer Neg Hx    • Ovarian cancer Neg Hx        Social History     Socioeconomic History   • Marital status: Single   Tobacco Use   • Smoking status: Former Smoker     Packs/day: 1.50     Years: 25.00     Pack years: 37.50     Types: Cigarettes     Quit date: 1992     Years since quittin.5   • Smokeless tobacco: Never Used   Vaping Use   • Vaping Use: Never used   Substance and Sexual Activity   • Alcohol use: Yes     Alcohol/week: 2.0 standard drinks     Types: 2 Standard drinks or equivalent per week     Comment: a glass of wine or bourbon a couple times a WEEK   • Drug use: No   • Sexual activity: Never           Objective   Physical Exam  Vitals and nursing note reviewed.   Constitutional:       General: She is not in acute distress.     Appearance: Normal appearance. She is well-developed. She is not toxic-appearing or diaphoretic.   HENT:      Head:  Normocephalic and atraumatic.      Right Ear: External ear normal.      Left Ear: External ear normal.      Nose: Nose normal.   Eyes:      General: Lids are normal.      Pupils: Pupils are equal, round, and reactive to light.   Neck:      Trachea: No tracheal deviation.   Cardiovascular:      Rate and Rhythm: Normal rate and regular rhythm.      Pulses: No decreased pulses.      Heart sounds: Normal heart sounds. No murmur heard.    No friction rub. No gallop.      Comments: The patient has bilateral lower extremity edema that is 4+ at the calf level and the edema tracks into the thighs.  Pulmonary:      Effort: Pulmonary effort is normal. No respiratory distress.      Breath sounds: Normal breath sounds. No decreased breath sounds, wheezing, rhonchi or rales.   Abdominal:      General: Bowel sounds are normal.      Palpations: Abdomen is soft.      Tenderness: There is no abdominal tenderness. There is no guarding or rebound.   Musculoskeletal:         General: No deformity. Normal range of motion.      Cervical back: Normal range of motion and neck supple.      Right lower le+ Pitting Edema present.      Left lower le+ Pitting Edema present.   Lymphadenopathy:      Cervical: No cervical adenopathy.   Skin:     General: Skin is warm and dry.      Findings: Erythema present. No rash.          Neurological:      Mental Status: She is alert and oriented to person, place, and time.      Cranial Nerves: No cranial nerve deficit.      Sensory: No sensory deficit.   Psychiatric:         Speech: Speech normal.         Behavior: Behavior normal.         Thought Content: Thought content normal.         Judgment: Judgment normal.         Procedures           ED Course                                           MDM  Number of Diagnoses or Management Options  Cellulitis of lower extremity, unspecified laterality: new and requires workup  Opacity of lung on imaging study: new and requires workup  Peripheral edema: new  and requires workup  Diagnosis management comments: The patient presents with complaint of increasing lower extremity edema and with erythema that now is tracking from the legs up into the medial aspect of the thighs bilaterally.    She denies fever or systemic symptoms of infection.  Lab evaluation shows normal white blood cell count the patient is afebrile here.    The patient's lower extremity erythema may represent venous stasis dermatitis.  However due to the tracking/progressing nature of the erythema I feel initiation of antibiotics, and admission for diuresis and ID consultation is warranted.    Discussed the patient with the hospitalist.  The hospital service will consult on the patient to determine status admission.       Amount and/or Complexity of Data Reviewed  Clinical lab tests: ordered and reviewed  Tests in the radiology section of CPT®: ordered and reviewed  Review and summarize past medical records: yes  Discuss the patient with other providers: yes  Independent visualization of images, tracings, or specimens: yes        Final diagnoses:   Peripheral edema   Cellulitis of lower extremity, unspecified laterality   Opacity of lung on imaging study       ED Disposition  ED Disposition     ED Disposition   Decision to Admit    Condition   --    Comment   Level of Care: Telemetry [5]   Diagnosis: Peripheral edema [205175]   Admitting Physician: TAMIKA DOUGLAS [[id number]]   Attending Physician: TAMIKA DOUGLAS [[id number]]   Bed Request Comments: CDU               No follow-up provider specified.       Medication List      No changes were made to your prescriptions during this visit.          Riley Ingram MD  07/11/22 6898

## 2022-07-11 ENCOUNTER — APPOINTMENT (OUTPATIENT)
Dept: CARDIOLOGY | Facility: HOSPITAL | Age: 77
End: 2022-07-11

## 2022-07-11 PROBLEM — D50.9 IRON DEFICIENCY ANEMIA: Status: ACTIVE | Noted: 2022-07-11

## 2022-07-11 LAB
ALBUMIN SERPL-MCNC: 3.7 G/DL (ref 3.5–5.2)
ALBUMIN/GLOB SERPL: 1.3 G/DL
ALP SERPL-CCNC: 116 U/L (ref 39–117)
ALT SERPL W P-5'-P-CCNC: 9 U/L (ref 1–33)
ANION GAP SERPL CALCULATED.3IONS-SCNC: 8 MMOL/L (ref 5–15)
AST SERPL-CCNC: 14 U/L (ref 1–32)
BILIRUB SERPL-MCNC: 0.2 MG/DL (ref 0–1.2)
BUN SERPL-MCNC: 15 MG/DL (ref 8–23)
BUN/CREAT SERPL: 22.7 (ref 7–25)
CALCIUM SPEC-SCNC: 8.8 MG/DL (ref 8.6–10.5)
CHLORIDE SERPL-SCNC: 98 MMOL/L (ref 98–107)
CO2 SERPL-SCNC: 38 MMOL/L (ref 22–29)
CREAT SERPL-MCNC: 0.66 MG/DL (ref 0.57–1)
DEPRECATED RDW RBC AUTO: 46.7 FL (ref 37–54)
EGFRCR SERPLBLD CKD-EPI 2021: 89.9 ML/MIN/1.73
ERYTHROCYTE [DISTWIDTH] IN BLOOD BY AUTOMATED COUNT: 15.4 % (ref 12.3–15.4)
FERRITIN SERPL-MCNC: 23.17 NG/ML (ref 13–150)
GLOBULIN UR ELPH-MCNC: 2.8 GM/DL
GLUCOSE BLDC GLUCOMTR-MCNC: 136 MG/DL (ref 70–130)
GLUCOSE BLDC GLUCOMTR-MCNC: 138 MG/DL (ref 70–130)
GLUCOSE BLDC GLUCOMTR-MCNC: 178 MG/DL (ref 70–130)
GLUCOSE SERPL-MCNC: 140 MG/DL (ref 65–99)
HBA1C MFR BLD: 6.5 % (ref 4.8–5.6)
HCT VFR BLD AUTO: 33.8 % (ref 34–46.6)
HEMOCCULT STL QL: NEGATIVE
HGB BLD-MCNC: 10 G/DL (ref 12–15.9)
IRON 24H UR-MRATE: 26 MCG/DL (ref 37–145)
IRON SATN MFR SERPL: 6 % (ref 20–50)
MCH RBC QN AUTO: 24.8 PG (ref 26.6–33)
MCHC RBC AUTO-ENTMCNC: 29.6 G/DL (ref 31.5–35.7)
MCV RBC AUTO: 83.9 FL (ref 79–97)
PLATELET # BLD AUTO: 383 10*3/MM3 (ref 140–450)
PMV BLD AUTO: 9.4 FL (ref 6–12)
POTASSIUM SERPL-SCNC: 3.7 MMOL/L (ref 3.5–5.2)
POTASSIUM SERPL-SCNC: 4 MMOL/L (ref 3.5–5.2)
PROT SERPL-MCNC: 6.5 G/DL (ref 6–8.5)
RBC # BLD AUTO: 4.03 10*6/MM3 (ref 3.77–5.28)
SODIUM SERPL-SCNC: 144 MMOL/L (ref 136–145)
TIBC SERPL-MCNC: 440 MCG/DL (ref 298–536)
TRANSFERRIN SERPL-MCNC: 295 MG/DL (ref 200–360)
TSH SERPL DL<=0.05 MIU/L-ACNC: 2.42 UIU/ML (ref 0.27–4.2)
WBC NRBC COR # BLD: 8.58 10*3/MM3 (ref 3.4–10.8)

## 2022-07-11 PROCEDURE — 93306 TTE W/DOPPLER COMPLETE: CPT

## 2022-07-11 PROCEDURE — 84466 ASSAY OF TRANSFERRIN: CPT | Performed by: PHYSICIAN ASSISTANT

## 2022-07-11 PROCEDURE — G0378 HOSPITAL OBSERVATION PER HR: HCPCS

## 2022-07-11 PROCEDURE — 99233 SBSQ HOSP IP/OBS HIGH 50: CPT | Performed by: HOSPITALIST

## 2022-07-11 PROCEDURE — 25010000002 NA FERRIC GLUC CPLX PER 12.5 MG: Performed by: HOSPITALIST

## 2022-07-11 PROCEDURE — 84132 ASSAY OF SERUM POTASSIUM: CPT | Performed by: HOSPITALIST

## 2022-07-11 PROCEDURE — 99214 OFFICE O/P EST MOD 30 MIN: CPT | Performed by: PHYSICIAN ASSISTANT

## 2022-07-11 PROCEDURE — 25010000002 VANCOMYCIN PER 500 MG: Performed by: INTERNAL MEDICINE

## 2022-07-11 PROCEDURE — 97161 PT EVAL LOW COMPLEX 20 MIN: CPT

## 2022-07-11 PROCEDURE — 63710000001 INSULIN LISPRO (HUMAN) PER 5 UNITS: Performed by: PHYSICIAN ASSISTANT

## 2022-07-11 PROCEDURE — 93970 EXTREMITY STUDY: CPT

## 2022-07-11 PROCEDURE — 83540 ASSAY OF IRON: CPT | Performed by: PHYSICIAN ASSISTANT

## 2022-07-11 PROCEDURE — 80053 COMPREHEN METABOLIC PANEL: CPT | Performed by: PHYSICIAN ASSISTANT

## 2022-07-11 PROCEDURE — 92610 EVALUATE SWALLOWING FUNCTION: CPT

## 2022-07-11 PROCEDURE — 85027 COMPLETE CBC AUTOMATED: CPT | Performed by: PHYSICIAN ASSISTANT

## 2022-07-11 PROCEDURE — 83036 HEMOGLOBIN GLYCOSYLATED A1C: CPT | Performed by: PHYSICIAN ASSISTANT

## 2022-07-11 PROCEDURE — 82728 ASSAY OF FERRITIN: CPT | Performed by: PHYSICIAN ASSISTANT

## 2022-07-11 PROCEDURE — 97530 THERAPEUTIC ACTIVITIES: CPT

## 2022-07-11 PROCEDURE — 25010000002 VANCOMYCIN 10 G RECONSTITUTED SOLUTION: Performed by: INTERNAL MEDICINE

## 2022-07-11 PROCEDURE — 82272 OCCULT BLD FECES 1-3 TESTS: CPT | Performed by: PHYSICIAN ASSISTANT

## 2022-07-11 PROCEDURE — 25010000002 CEFTRIAXONE PER 250 MG: Performed by: PHYSICIAN ASSISTANT

## 2022-07-11 PROCEDURE — 84443 ASSAY THYROID STIM HORMONE: CPT | Performed by: PHYSICIAN ASSISTANT

## 2022-07-11 PROCEDURE — 82962 GLUCOSE BLOOD TEST: CPT

## 2022-07-11 RX ORDER — FUROSEMIDE 40 MG/1
40 TABLET ORAL DAILY
Status: DISCONTINUED | OUTPATIENT
Start: 2022-07-11 | End: 2022-07-12

## 2022-07-11 RX ORDER — DONEPEZIL HYDROCHLORIDE 10 MG/1
10 TABLET, FILM COATED ORAL DAILY
Status: DISCONTINUED | OUTPATIENT
Start: 2022-07-11 | End: 2022-07-15 | Stop reason: HOSPADM

## 2022-07-11 RX ORDER — SACCHAROMYCES BOULARDII 250 MG
250 CAPSULE ORAL 2 TIMES DAILY
Status: DISCONTINUED | OUTPATIENT
Start: 2022-07-11 | End: 2022-07-15 | Stop reason: HOSPADM

## 2022-07-11 RX ORDER — MONTELUKAST SODIUM 10 MG/1
10 TABLET ORAL NIGHTLY
Status: DISCONTINUED | OUTPATIENT
Start: 2022-07-11 | End: 2022-07-15 | Stop reason: HOSPADM

## 2022-07-11 RX ORDER — KETOCONAZOLE 20 MG/G
1 CREAM TOPICAL
Status: DISCONTINUED | OUTPATIENT
Start: 2022-07-11 | End: 2022-07-15 | Stop reason: HOSPADM

## 2022-07-11 RX ORDER — ONDANSETRON 2 MG/ML
4 INJECTION INTRAMUSCULAR; INTRAVENOUS EVERY 6 HOURS PRN
Status: DISCONTINUED | OUTPATIENT
Start: 2022-07-11 | End: 2022-07-15 | Stop reason: HOSPADM

## 2022-07-11 RX ORDER — SODIUM CHLORIDE 0.9 % (FLUSH) 0.9 %
10 SYRINGE (ML) INJECTION EVERY 12 HOURS SCHEDULED
Status: DISCONTINUED | OUTPATIENT
Start: 2022-07-11 | End: 2022-07-15 | Stop reason: HOSPADM

## 2022-07-11 RX ORDER — MEMANTINE HYDROCHLORIDE 10 MG/1
5 TABLET ORAL DAILY
Status: DISCONTINUED | OUTPATIENT
Start: 2022-07-11 | End: 2022-07-15 | Stop reason: HOSPADM

## 2022-07-11 RX ORDER — METOLAZONE 2.5 MG/1
2.5 TABLET ORAL DAILY
Status: DISCONTINUED | OUTPATIENT
Start: 2022-07-11 | End: 2022-07-15 | Stop reason: HOSPADM

## 2022-07-11 RX ORDER — ACETAMINOPHEN 325 MG/1
650 TABLET ORAL EVERY 4 HOURS PRN
Status: DISCONTINUED | OUTPATIENT
Start: 2022-07-11 | End: 2022-07-15 | Stop reason: HOSPADM

## 2022-07-11 RX ORDER — POTASSIUM CHLORIDE 1.5 G/1.77G
40 POWDER, FOR SOLUTION ORAL AS NEEDED
Status: DISCONTINUED | OUTPATIENT
Start: 2022-07-11 | End: 2022-07-15 | Stop reason: HOSPADM

## 2022-07-11 RX ORDER — POTASSIUM CHLORIDE 7.45 MG/ML
10 INJECTION INTRAVENOUS
Status: ACTIVE | OUTPATIENT
Start: 2022-07-11 | End: 2022-07-11

## 2022-07-11 RX ORDER — POTASSIUM CHLORIDE 750 MG/1
40 CAPSULE, EXTENDED RELEASE ORAL ONCE
Status: COMPLETED | OUTPATIENT
Start: 2022-07-11 | End: 2022-07-11

## 2022-07-11 RX ORDER — DULOXETIN HYDROCHLORIDE 60 MG/1
60 CAPSULE, DELAYED RELEASE ORAL DAILY
Status: DISCONTINUED | OUTPATIENT
Start: 2022-07-11 | End: 2022-07-15 | Stop reason: HOSPADM

## 2022-07-11 RX ORDER — ROPINIROLE 2 MG/1
4 TABLET, FILM COATED ORAL 2 TIMES DAILY
Status: DISCONTINUED | OUTPATIENT
Start: 2022-07-11 | End: 2022-07-11

## 2022-07-11 RX ORDER — POTASSIUM CHLORIDE 750 MG/1
40 CAPSULE, EXTENDED RELEASE ORAL AS NEEDED
Status: DISCONTINUED | OUTPATIENT
Start: 2022-07-11 | End: 2022-07-15 | Stop reason: HOSPADM

## 2022-07-11 RX ORDER — CHOLECALCIFEROL (VITAMIN D3) 125 MCG
5 CAPSULE ORAL NIGHTLY PRN
Status: DISCONTINUED | OUTPATIENT
Start: 2022-07-11 | End: 2022-07-15 | Stop reason: HOSPADM

## 2022-07-11 RX ORDER — PANTOPRAZOLE SODIUM 40 MG/1
40 TABLET, DELAYED RELEASE ORAL 2 TIMES DAILY
Status: DISCONTINUED | OUTPATIENT
Start: 2022-07-11 | End: 2022-07-15 | Stop reason: HOSPADM

## 2022-07-11 RX ORDER — SODIUM CHLORIDE 0.9 % (FLUSH) 0.9 %
10 SYRINGE (ML) INJECTION AS NEEDED
Status: DISCONTINUED | OUTPATIENT
Start: 2022-07-11 | End: 2022-07-15 | Stop reason: HOSPADM

## 2022-07-11 RX ORDER — ALBUTEROL SULFATE 2.5 MG/3ML
2.5 SOLUTION RESPIRATORY (INHALATION) EVERY 6 HOURS PRN
Status: DISCONTINUED | OUTPATIENT
Start: 2022-07-11 | End: 2022-07-13

## 2022-07-11 RX ADMIN — MONTELUKAST 10 MG: 10 TABLET, FILM COATED ORAL at 21:07

## 2022-07-11 RX ADMIN — POTASSIUM CHLORIDE 40 MEQ: 750 CAPSULE, EXTENDED RELEASE ORAL at 01:59

## 2022-07-11 RX ADMIN — VANCOMYCIN HYDROCHLORIDE 750 MG: 750 INJECTION, SOLUTION INTRAVENOUS at 15:41

## 2022-07-11 RX ADMIN — FUROSEMIDE 40 MG: 40 TABLET ORAL at 08:33

## 2022-07-11 RX ADMIN — DOXYCYCLINE 100 MG: 100 INJECTION, POWDER, LYOPHILIZED, FOR SOLUTION INTRAVENOUS at 21:05

## 2022-07-11 RX ADMIN — PANTOPRAZOLE SODIUM 40 MG: 40 TABLET, DELAYED RELEASE ORAL at 21:07

## 2022-07-11 RX ADMIN — VANCOMYCIN HYDROCHLORIDE 1750 MG: 10 INJECTION, POWDER, LYOPHILIZED, FOR SOLUTION INTRAVENOUS at 04:42

## 2022-07-11 RX ADMIN — ROPINIROLE HYDROCHLORIDE 2.75 MG: 2 TABLET, FILM COATED ORAL at 21:06

## 2022-07-11 RX ADMIN — INSULIN LISPRO 2 UNITS: 100 INJECTION, SOLUTION INTRAVENOUS; SUBCUTANEOUS at 18:25

## 2022-07-11 RX ADMIN — POTASSIUM CHLORIDE 40 MEQ: 750 CAPSULE, EXTENDED RELEASE ORAL at 06:04

## 2022-07-11 RX ADMIN — Medication 250 MG: at 21:07

## 2022-07-11 RX ADMIN — PANTOPRAZOLE SODIUM 40 MG: 40 TABLET, DELAYED RELEASE ORAL at 08:33

## 2022-07-11 RX ADMIN — METOLAZONE 2.5 MG: 2.5 TABLET ORAL at 08:34

## 2022-07-11 RX ADMIN — Medication 250 MG: at 08:34

## 2022-07-11 RX ADMIN — ROPINIROLE HYDROCHLORIDE 2.75 MG: 2 TABLET, FILM COATED ORAL at 03:03

## 2022-07-11 RX ADMIN — SODIUM CHLORIDE 1 G: 900 INJECTION INTRAVENOUS at 21:05

## 2022-07-11 RX ADMIN — DOXYCYCLINE 100 MG: 100 INJECTION, POWDER, LYOPHILIZED, FOR SOLUTION INTRAVENOUS at 02:01

## 2022-07-11 RX ADMIN — MEMANTINE 5 MG: 10 TABLET ORAL at 08:33

## 2022-07-11 RX ADMIN — ROPINIROLE HYDROCHLORIDE 2.75 MG: 2 TABLET, FILM COATED ORAL at 15:32

## 2022-07-11 RX ADMIN — Medication 10 ML: at 08:34

## 2022-07-11 RX ADMIN — Medication 10 ML: at 21:10

## 2022-07-11 RX ADMIN — DONEPEZIL HYDROCHLORIDE 10 MG: 10 TABLET ORAL at 08:34

## 2022-07-11 RX ADMIN — SODIUM CHLORIDE 1 G: 900 INJECTION INTRAVENOUS at 02:00

## 2022-07-11 RX ADMIN — DOXYCYCLINE 100 MG: 100 INJECTION, POWDER, LYOPHILIZED, FOR SOLUTION INTRAVENOUS at 11:11

## 2022-07-11 RX ADMIN — DULOXETINE HYDROCHLORIDE 60 MG: 60 CAPSULE, DELAYED RELEASE ORAL at 08:34

## 2022-07-11 NOTE — CASE MANAGEMENT/SOCIAL WORK
Discharge Planning Assessment  [facility]     Patient Name: Lorrie Pagan  MRN: [id number]  Today's Date: 7/11/2022    Admit Date: 7/10/2022     Discharge Needs Assessment     Row Name 07/11/22 1047       Living Environment    People in Home alone    Current Living Arrangements assisted living facility    Primary Care Provided by self    Provides Primary Care For no one, unable/limited ability to care for self    Family Caregiver if Needed none       Resource/Environmental Concerns    Resource/Environmental Concerns none       Transition Planning    Patient/Family Anticipates Transition to --  assisted living    Patient/Family Anticipated Services at Transition     Transportation Anticipated family or friend will provide       Discharge Needs Assessment    Equipment Currently Used at Home oxygen;nebulizer;rollator  motorized scooter    Concerns to be Addressed denies needs/concerns at this time    Discharge Coordination/Progress Patient is a resident of [facility].  Patient has oxygen, a nebulizer, and an electric scooter.  Patient also stated that her PCP has ordered a rollator for her.  Patient is being seen by [facility] once per month.  Patient does have an advanced directive.               Discharge Plan     Row Name 07/11/22 1050       Plan    Plan TBD    Patient/Family in Agreement with Plan yes    Plan Comments Spoke with patient at bedside.  Patient is a resident of [facility], an assisted living facility.  Patient has a nebulizer, oxygen and an electric scooter.  Patient also stated that her PCP has ordered a rollator for her.  Patient states that she is being seen by [facility] for home health. Spoke with [facility] primary and palliative care, who confirmed that they have seen patient for June.  Patient does have an advanced directive.  Guero's PCP is Dacia Viera, and she gets her prescriptions filled at [facility] off of  [address] in [place]. There are no issues affording her medications, and [facility] delivers her medications. If patient is discharged over the weekend, then she will need help with transportation.  If it is during the week, then she does not need help with transportation.    Final Discharge Disposition Code 30 - still a patient              Continued Care and Services - Admitted Since 7/10/2022    Coordination has not been started for this encounter.     Selected Continued Care - Episodes Includes selections from active Coordinated Care Management episodes    Chronic Care Management Episode start date: 5/12/2022    Community & DME     Service Provider Selected Services Address Phone Fax Patient Preferred    [facility]  [facility] [address] , [place] [id number] [phone] -- --    [facility] [address] [address] [id number] [phone] -- --    [facility]  Durable Medical Equipment [address], [place] [id number] [phone] --                    Expected Discharge Date and Time     Expected Discharge Date Expected Discharge Time    Jul 11, 2022          Demographic Summary    No documentation.                Functional Status     Row Name 07/11/22 1047       Functional Status    Usual Activity Tolerance fair    Current Activity Tolerance fair       Functional Status, IADL    Medications assistive equipment    Meal Preparation assistive equipment    Housekeeping assistive equipment    Laundry assistive equipment    Shopping assistive equipment       Mental Status    General Appearance WDL WDL               Psychosocial    No documentation.                Abuse/Neglect    No documentation.                Legal    No documentation.                Substance Abuse    No documentation.                Patient Forms    No documentation.                    Aleida Pérez, RN

## 2022-07-11 NOTE — THERAPY EVALUATION
Patient Name: Lorrie Pagan  : 1945    MRN: [id number]                              Today's Date: 2022       Admit Date: 7/10/2022    Visit Dx:     ICD-10-CM ICD-9-CM   1. Peripheral edema  R60.9 782.3   2. Cellulitis of lower extremity, unspecified laterality  L03.119 682.6   3. Opacity of lung on imaging study  R91.8 793.19     Patient Active Problem List   Diagnosis   • Bronchiectasis (CMS/HCC)   • H/O: lung cancer (Prior resection )   • Chronic respiratory failure   • RENETTA (Previous CPAP)   • Hiatal hernia (Prior Nissen )   • Mycobacterium avium complex colonization   • Irritable bowel syndrome with diarrhea   • Cellulitis of both lower extremities   • Aortic valve regurgitation   • Benign hypertension   • Peripheral edema   • Impairment of balance   • Peripheral neuropathy   • Peripheral venous insufficiency   • Chronic diastolic heart failure ([facility])   • Syncope   • Hypokalemia   • Hypomagnesemia   • Stage II, moderate, COPD   • Memory loss   • Restless leg syndrome   • Controlled type 2 diabetes mellitus without complication, without long-term current use of insulin ([facility])   • Vitamin D deficiency   • Mixed hyperlipidemia   • Allergic rhinitis   • Normocytic anemia   • Gastroesophageal reflux disease with esophagitis without hemorrhage   • Acquired hypothyroidism   • Esophageal dysmotility   • Dysphagia   • Gastroparesis   • Degenerative disc disease, cervical   • Degenerative disc disease, lumbar   • Globus sensation   • Sialorrhea   • Drooling   • Dysfunction of both eustachian tubes   • Tinnitus of both ears   • BRBPR (bright red blood per rectum)   • Iron deficiency anemia     Past Medical History:   Diagnosis Date   • Back pain    • Bronchiectasis (HCC)    • Bronchitis 2018   • Cancer ([facility])     History of lung cancer and skin cancer    • Cardiac murmur    • Chronic cough    • Chronic obstructive pulmonary disease (HCC)    • Colon polyp    • DDD (degenerative disc disease),  lumbar    • Depression    • Diabetes mellitus (HCC)     DX: 2019, CHECK BS QOD, LAST A1C 6.3??   • Disease of thyroid gland    • Esophageal dilatation 9/20/2019    Added automatically from request for surgery [id number]   • Esophageal dysphagia 10/24/2019    Added automatically from request for surgery [id number]   • Former smoker (None since 1992) 8/29/2019   • GERD (gastroesophageal reflux disease)    • Glaucoma    • Globus sensation 1/27/2020   • History of colonic polyps    • History of transfusion 1988    NO REACTION    • Hyperlipidemia    • Hypertension    • Irritable bowel syndrome     Constipation/diarrhea   • Legally blind    • Lung cancer (HCC)    • Macular degeneration    • Neuropathy    • Obesity 2/26/2020   • Osteoarthritis    • Oxygen dependent     5L   • Pneumonia    • Sleep apnea     NON COMPLIANT WITH CPAP USE   • UTI (urinary tract infection)    • Wears glasses      Past Surgical History:   Procedure Laterality Date   • BACK SURGERY      X2 (LUMBAR)   • BREAST BIOPSY      20+ years ago   • BREAST BIOPSY Right 08/2021    fna ln   • CATARACT EXTRACTION Bilateral    • CHOLECYSTECTOMY     • COLONOSCOPY     • ENDOSCOPY N/A 11/06/2019    Procedure: ESOPHAGOGASTRODUODENOSCOPY;  Surgeon: Cortes Millan MD;  Location:  [facility];  Service: Gastroenterology   • ENDOSCOPY N/A 04/29/2021    Procedure: ESOPHAGOGASTRODUODENOSCOPY;  Surgeon: Cortes Millan MD;  Location:  [facility];  Service: Gastroenterology;  Laterality: N/A;  balloon dilation    • HAND SURGERY Right     laceration repair   • INCONTINENCE SURGERY      BLADDER   • LUNG REMOVAL, PARTIAL Left 2016   • NISSEN FUNDOPLICATION     • TOTAL ABDOMINAL HYSTERECTOMY  1988    benign cyst   • US GUIDED FINE NEEDLE ASPIRATION  08/27/2021      General Information     Row Name 07/11/22 1519          Physical Therapy Time and Intention    Document Type evaluation  -LO     Mode of Treatment individual therapy;physical therapy   -LO     Row Name 07/11/22 1519          General Information    Patient Profile Reviewed yes  -LO     Prior Level of Function independent:;bed mobility;transfer;gait;all household mobility  Reports used a motorized scooter for community mobility, did not used an AD inside home but PCP is ordering her a rollator for home mobility.  -LO     Existing Precautions/Restrictions oxygen therapy device and L/min;fall;other (see comments)  hx dementia, COPD, peripheral neuropathy; currently with BLE redness/swelling  -LO     Barriers to Rehab medically complex;previous functional deficit;impaired sensation  -LO     Row Name 07/11/22 1519          Living Environment    People in Home alone;other (see comments)  lives in independent living  -LO     Row Name 07/11/22 1519          Home Main Entrance    Number of Stairs, Main Entrance none  -LO     Row Name 07/11/22 1519          Stairs Within Home, Primary    Number of Stairs, Within Home, Primary none  -LO     Row Name 07/11/22 1519          Cognition    Orientation Status (Cognition) oriented x 3  -LO     Row Name 07/11/22 1519          Safety Issues, Functional Mobility    Safety Issues Affecting Function (Mobility) awareness of need for assistance;insight into deficits/self-awareness;impulsivity  -LO     Impairments Affecting Function (Mobility) balance;endurance/activity tolerance;strength;shortness of breath  -LO           User Key  (r) = Recorded By, (t) = Taken By, (c) = Cosigned By    Initials Name Provider Type    Ashley Thompson, PT Physical Therapist               Mobility     Row Name 07/11/22 1524          Bed Mobility    Bed Mobility supine-sit  -LO     Supine-Sit [place] (Bed Mobility) contact guard  -LO     Assistive Device (Bed Mobility) bed rails;head of bed elevated  -LO     Comment, (Bed Mobility) vc for use of bedrails, extra time for effort  -LO     Row Name 07/11/22 1524          Transfers    Comment, (Transfers) EOB>FWW>transport chair with FWW CGA;  vc for HP  -LO     Row Name 07/11/22 1524          Bed-Chair Transfer    Bed-Chair [place] (Transfers) contact guard;verbal cues  -LO     Assistive Device (Bed-Chair Transfers) walker, front-wheeled  -LO     Row Name 07/11/22 1524          Sit-Stand Transfer    Sit-Stand [place] (Transfers) contact guard;verbal cues  -LO     Assistive Device (Sit-Stand Transfers) walker, front-wheeled  -LO     Row Name 07/11/22 1524          Gait/Stairs (Locomotion)    [place] Level (Gait) contact guard;verbal cues;nonverbal cues (demo/gesture)  -LO     Assistive Device (Gait) walker, front-wheeled  -LO     Distance in Feet (Gait) 4  -LO     Deviations/Abnormal Patterns (Gait) bilateral deviations;gait speed decreased;stride length decreased  -LO     Bilateral Gait Deviations forward flexed posture;heel strike decreased  -LO     [place] Level (Stairs) not tested  -LO     Comment, (Gait/Stairs) Ambulates with FWW x 4' from bed to transport chair, demonstrates reduced step height and length  -LO           User Key  (r) = Recorded By, (t) = Taken By, (c) = Cosigned By    Initials Name Provider Type    LO Ashley Romero, PT Physical Therapist               Obj/Interventions     Row Name 07/11/22 1528          Range of Motion Comprehensive    General Range of Motion lower extremity range of motion deficits identified  -LO     Comment, General Range of Motion knee and ankle flexion reduced approx 25% related to swelling  -LO     Row Name 07/11/22 1528          Strength Comprehensive (MMT)    General Manual Muscle Testing (MMT) Assessment lower extremity strength deficits identified  -LO     Comment, General Manual Muscle Testing (MMT) Assessment BLE grossly 3+/5  -LO     Row Name 07/11/22 1528          Motor Skills    Motor Skills functional endurance  -LO     Functional Endurance tolerates 1-2 min of activity before requires a rest break  -LO     Row Name 07/11/22 1528          Balance    Balance Assessment sitting  static balance;sitting dynamic balance;standing static balance;standing dynamic balance  -LO     Static Sitting Balance supervision  -LO     Dynamic Sitting Balance supervision  -LO     Position, Sitting Balance unsupported;sitting edge of bed  -LO     Static Standing Balance contact guard  -LO     Dynamic Standing Balance contact guard  -LO     Position/Device Used, Standing Balance supported;walker, rolling  -LO     Comment, Balance Requires FWW and CGA for safety in standing  -LO     Row Name 07/11/22 1528          Sensory Assessment (Somatosensory)    Sensory Assessment (Somatosensory) other (see comments)  Reports reduced sensation B plantar feet  -LO           User Key  (r) = Recorded By, (t) = Taken By, (c) = Cosigned By    Initials Name Provider Type    Ashley Thompson, PT Physical Therapist               Goals/Plan     Row Name 07/11/22 1534          Bed Mobility Goal 1 (PT)    Activity/Assistive Device (Bed Mobility Goal 1, PT) scooting;rolling to right;rolling to left;sit to supine;supine to sit  -LO     [place] Level/Cues Needed (Bed Mobility Goal 1, PT) independent  -LO     Time Frame (Bed Mobility Goal 1, PT) long term goal (LTG);10 days  -     Row Name 07/11/22 1534          Transfer Goal 1 (PT)    Activity/Assistive Device (Transfer Goal 1, PT) sit-to-stand/stand-to-sit;bed-to-chair/chair-to-bed;walker, rolling  -LO     [place] Level/Cues Needed (Transfer Goal 1, PT) modified independence  -LO     Time Frame (Transfer Goal 1, PT) long term goal (LTG);10 days  -     Row Name 07/11/22 1534          Gait Training Goal 1 (PT)    Activity/Assistive Device (Gait Training Goal 1, PT) gait (walking locomotion);assistive device use;diminish gait deviation;decrease fall risk;improve balance and speed;increase endurance/gait distance;walker, rolling  -LO     [place] Level (Gait Training Goal 1, PT) modified independence  -LO     Distance (Gait Training Goal 1, PT) 50  -LO     Time Frame (Gait  Training Goal 1, PT) long term goal (LTG);10 days  -LO     Row Name 07/11/22 1534          Patient Education Goal (PT)    Activity (Patient Education Goal, PT) Patient will demonstrate safe and effective sequencing for bed mobility and transfers.  -LO     [place]/Cues/Accuracy (Memory Goal 2, PT) demonstrates adequately  -LO     Time Frame (Patient Education Goal, PT) long term goal (LTG);10 days  -LO     Row Name 07/11/22 1534          Therapy Assessment/Plan (PT)    Planned Therapy Interventions (PT) balance training;bed mobility training;gait training;home exercise program;patient/family education;neuromuscular re-education;strengthening;transfer training  -LO           User Key  (r) = Recorded By, (t) = Taken By, (c) = Cosigned By    Initials Name Provider Type    Ashley Thompson, PT Physical Therapist               Clinical Impression     Row Name 07/11/22 1531          Pain    Pretreatment Pain Rating 0/10 - no pain  -LO     Posttreatment Pain Rating 0/10 - no pain  -LO     Row Name 07/11/22 1531          Plan of Care Review    Plan of Care Reviewed With patient  -LO     Progress no change  -LO     Outcome Evaluation PT eval completed. Patient alert and oriented x3. Presents with deficits in BLE ROM/strength, standing balance, and functional endurance effecting functional mobility below baseline. Requiring CGA for bed mobility, transfers, and ambulation x 4' with FWW. Spo2 levels maintain > 90% throughout on 2 liters of O2. Patient will benefit from skilled IP PT services to address impairments in order to return to PLOF. Recommend return to independent living with HHPT at [place].  -LO     Row Name 07/11/22 1531          Therapy Assessment/Plan (PT)    Patient/Family Therapy Goals Statement (PT) return home, resume therapy  -LO     Rehab Potential (PT) good, to achieve stated therapy goals  -LO     Criteria for Skilled Interventions Met (PT) yes;meets criteria;skilled treatment is necessary  -LO     Therapy  Frequency (PT) daily  -LO     Row Name 07/11/22 1531          Vital Signs    Pre Systolic BP Rehab 135  -LO     Pre Treatment Diastolic BP 61  -LO     Pretreatment Heart Rate (beats/min) 80  -LO     Pre SpO2 (%) 96  -LO     O2 Delivery Pre Treatment supplemental O2  -LO     Intra SpO2 (%) 92  -LO     O2 Delivery Intra Treatment supplemental O2  -LO     Post SpO2 (%) 92  -LO     O2 Delivery Post Treatment supplemental O2  -LO     Pre Patient Position Supine  -LO     Intra Patient Position Standing  -LO     Post Patient Position Sitting  -LO     Rest Breaks  1  -LO     Row Name 07/11/22 1531          Positioning and Restraints    Pre-Treatment Position in bed  -LO     Post Treatment Position chair  transport chair  -LO     In Chair with nsg  -LO           User Key  (r) = Recorded By, (t) = Taken By, (c) = Cosigned By    Initials Name Provider Type    Ashley Thompson, TELLO Physical Therapist               Outcome Measures     Row Name 07/11/22 1537          How much help from another person do you currently need...    Turning from your back to your side while in flat bed without using bedrails? 3  -LO     Moving from lying on back to sitting on the side of a flat bed without bedrails? 3  -LO     Moving to and from a bed to a chair (including a wheelchair)? 3  -LO     Standing up from a chair using your arms (e.g., wheelchair, bedside chair)? 4  -LO     Climbing 3-5 steps with a railing? 3  -LO     To walk in hospital room? 3  -LO     AM-PAC 6 Clicks Score (PT) 19  -LO     Highest level of mobility 6 --> Walked 10 steps or more  -LO     Row Name 07/11/22 1537          Functional Assessment    Outcome Measure Options AM-PAC 6 Clicks Basic Mobility (PT)  -LO           User Key  (r) = Recorded By, (t) = Taken By, (c) = Cosigned By    Initials Name Provider Type    Ashley Thompson PT Physical Therapist                             Physical Therapy Education                 Title: PT OT SLP Therapies (Done)     Topic: Physical  Therapy (Done)     Point: Mobility training (Done)     Learning Progress Summary           Patient Acceptance, E, VU,NR by  at 7/11/2022 1333    Comment: Patient education regarding PT POC                   Point: Home exercise program (Done)     Learning Progress Summary           Patient Acceptance, E, VU,NR by  at 7/11/2022 1333    Comment: Patient education regarding PT POC                   Point: Body mechanics (Done)     Learning Progress Summary           Patient Acceptance, E, VU,NR by  at 7/11/2022 1333    Comment: Patient education regarding PT POC                   Point: Precautions (Done)     Learning Progress Summary           Patient Acceptance, E, VU,NR by  at 7/11/2022 1333    Comment: Patient education regarding PT POC                               User Key     Initials Effective Dates Name Provider Type Discipline     06/16/21 -  Ashley Romero, PT Physical Therapist PT              PT Recommendation and Plan  Planned Therapy Interventions (PT): balance training, bed mobility training, gait training, home exercise program, patient/family education, neuromuscular re-education, strengthening, transfer training  Plan of Care Reviewed With: patient  Progress: no change  Outcome Evaluation: PT eval completed. Patient alert and oriented x3. Presents with deficits in BLE ROM/strength, standing balance, and functional endurance effecting functional mobility below baseline. Requiring CGA for bed mobility, transfers, and ambulation x 4' with FWW. Spo2 levels maintain > 90% throughout on 2 liters of O2. Patient will benefit from skilled IP PT services to address impairments in order to return to PLOF. Recommend return to independent living with HHPT at [place].     Time Calculation:    PT Charges     Row Name 07/11/22 1333             Time Calculation    Start Time 1333  -      PT Received On 07/11/22  -      PT Goal Re-Cert Due Date 07/21/22  -              Timed Charges    89146 - Gait Training  Minutes  5  -LO      33275 - PT Therapeutic Activity Minutes 12  -LO              Untimed Charges    PT Eval/Re-eval Minutes 36  -LO              Total Minutes    Timed Charges Total Minutes 17  -LO      Untimed Charges Total Minutes 36  -LO       Total Minutes 53  -LO            User Key  (r) = Recorded By, (t) = Taken By, (c) = Cosigned By    Initials Name Provider Type    Ashley Thompson, PT Physical Therapist              Therapy Charges for Today     Code Description Service Date Service Provider Modifiers Qty    30760455737 HC PT THERAPEUTIC ACT EA 15 MIN 7/11/2022 Ashley Romero, PT GP 1    13861040257 HC PT EVAL LOW COMPLEXITY 3 7/11/2022 Ashley Romero, PT GP 1          PT G-Codes  Outcome Measure Options: AM-PAC 6 Clicks Basic Mobility (PT)  AM-PAC 6 Clicks Score (PT): 19    Ashley Romero, PT  7/11/2022

## 2022-07-11 NOTE — PLAN OF CARE
Goal Outcome Evaluation:  Plan of Care Reviewed With: patient        Progress: no change   SLP evaluation completed. Will  f/u for further swallow assessment/intervention, as aligns w/ GOC.  Please see note for further details and recommendations.

## 2022-07-11 NOTE — H&P
[facility]  HISTORY AND PHYSICAL    Patient Name: Lorrie Pagan  : 1945  MRN: [id number]  Primary Care Physician: Dacia Viera MD  Date of admission: 7/10/2022    Subjective   Subjective     Chief Complaint:  Lower extremity edema     HPI:  Lorrie Pagan is a 76 y.o. female with a past medical history significant for chronic respiratory failure with COPD on 4L supplemental oxygen, chronic diastolic CHF,hypertension, HLD, DM2 with gastroparesis and peripheral neuropathy, dementia, RENETTA, GERD, hypothyroidism, and anxiety/depression. She presents today with multiple complaints. Reports lower extremities are increasingly swollen with associated redness, weeping, and tenderness bilaterally. States she has been unable to wear compression stocking to control swelling due to redness/tenderness. Patient was initially seen 22 by PCP and prescribed Keflex. Reports only taking one pill due to N/V/ GI upset related to gastroparesis. States she opted to double her Lasix for three day periods, twice over the past month without improvement in swelling without improvement.   Patient also complaining of dysphasia, regurgitation, and intermittent intolerance of both solids and liquids. She was followed by Dr. Aldana per GI and undergone multiple upper and lower endoscopies. Also undergone esophageal dilatation in the past. Has been taking PPI with some improvement. She does have an upcoming appointment with unknown GI due to acute worsening of BRBPR. She attributed this to hemorrhoids but states bleeding is worse than usual and reports a steady drop in hemoglobin over the past two months. She is not anticoagulated and denies NSAID use.  Lastly, patient reports multiple episodes of syncope. States she has passed out, randomly 3 times this week while seated. Episodes are unwitnessed but she denies head trauma. States she has woken up in food and pain  "after an unknown period of time \"passed out\". Reports similar episodes in the past, but apparently did not have a \"full workup\".  Currently there are no complaints of fever, cough, congestion, or chest pain. No headache or focal weakness/parathesias. No abdominal pain or N/v/D. No dysuria or flank pain. She has never had COVID-19. Currently fully vaccinated. Will admit to inpatient for further evaluation and treatment.      Review of Systems   Constitutional: Negative for chills, fatigue and fever.   HENT: Positive for trouble swallowing. Negative for congestion.    Eyes: Negative for photophobia and visual disturbance.   Respiratory: Positive for shortness of breath. Negative for cough.    Cardiovascular: Positive for leg swelling. Negative for chest pain.   Gastrointestinal: Negative for abdominal pain, diarrhea, nausea and vomiting.   Endocrine: Negative for cold intolerance and heat intolerance.   Genitourinary: Negative for dysuria and flank pain.   Musculoskeletal: Negative for back pain and gait problem.   Skin: Positive for color change and wound.   Allergic/Immunologic: Positive for immunocompromised state.   Neurological: Positive for weakness. Negative for dizziness and numbness.   Hematological: Negative for adenopathy.   Psychiatric/Behavioral: Negative for agitation and confusion.        All other systems reviewed and are negative.     Personal History     Past Medical History:   Diagnosis Date   • Back pain    • Bronchiectasis (HCC)    • Bronchitis 01/2018   • Cancer (HCC)     History of lung cancer and skin cancer    • Cardiac murmur    • Chronic cough    • Chronic obstructive pulmonary disease (HCC)    • Colon polyp    • DDD (degenerative disc disease), lumbar    • Depression    • Diabetes mellitus (HCC)     DX: 2019, CHECK BS QOD, LAST A1C 6.3??   • Disease of thyroid gland    • Esophageal dilatation 9/20/2019    Added automatically from request for surgery [id number]   • Esophageal dysphagia " 10/24/2019    Added automatically from request for surgery [id number]   • Former smoker (None since 1992) 8/29/2019   • GERD (gastroesophageal reflux disease)    • Glaucoma    • Globus sensation 1/27/2020   • History of colonic polyps    • History of transfusion 1988    NO REACTION    • Hyperlipidemia    • Hypertension    • Irritable bowel syndrome     Constipation/diarrhea   • Legally blind    • Lung cancer (HCC)    • Macular degeneration    • Neuropathy    • Obesity 2/26/2020   • Osteoarthritis    • Oxygen dependent     5L   • Pneumonia    • Sleep apnea     NON COMPLIANT WITH CPAP USE   • UTI (urinary tract infection)    • Wears glasses              Past Surgical History:   Procedure Laterality Date   • BACK SURGERY      X2 (LUMBAR)   • BREAST BIOPSY      20+ years ago   • BREAST BIOPSY Right 08/2021    fna ln   • CATARACT EXTRACTION Bilateral    • CHOLECYSTECTOMY     • COLONOSCOPY     • ENDOSCOPY N/A 11/06/2019    Procedure: ESOPHAGOGASTRODUODENOSCOPY;  Surgeon: Cortes Millan MD;  Location:  [facility];  Service: Gastroenterology   • ENDOSCOPY N/A 04/29/2021    Procedure: ESOPHAGOGASTRODUODENOSCOPY;  Surgeon: Cortes Millan MD;  Location:  [facility];  Service: Gastroenterology;  Laterality: N/A;  balloon dilation    • HAND SURGERY Right     laceration repair   • INCONTINENCE SURGERY      BLADDER   • LUNG REMOVAL, PARTIAL Left 2016   • NISSEN FUNDOPLICATION     • TOTAL ABDOMINAL HYSTERECTOMY  1988    benign cyst   • US GUIDED FINE NEEDLE ASPIRATION  08/27/2021       Family History:  family history includes Colon polyps in her father and mother; Dementia in her father; Emphysema in her mother; Glaucoma in her father; Heart disease in her father; Hyperlipidemia in her father; Hypertension in her mother; Macular degeneration in her father. Otherwise pertinent FHx was reviewed and unremarkable.     Social History:  reports that she quit smoking about 30 years ago. Her smoking use  "included cigarettes. She has a 37.50 pack-year smoking history. She has never used smokeless tobacco. She reports current alcohol use of about 2.0 standard drinks of alcohol per week. She reports that she does not use drugs.  Social History     Social History Narrative    Previously lived in [facility], recently moved here        Retired    Previously smoked approximately 1 pack cigarettes per day for 25 years and stop smoking in 1992    Drinks 3-4 alcoholic beverages on a weekly basis    caffeine use: 1 serving of coffee or tea weekly       Medications:  AeroChamber Plus Morris-Vu, DULoxetine, Dapsone, O2, Rollator Ultra-Light, Triamcinolone Acetonide, acetaminophen, albuterol, albuterol sulfate HFA, azelastine, chlorhexidine, donepezil, famotidine, furosemide, glimepiride, ketoconazole, latanoprost, memantine, metOLazone, montelukast, ondansetron ODT, pantoprazole, potassium chloride, rOPINIRole XL, saccharomyces boulardii, tiZANidine, timolol, and vitamin D    Allergies   Allergen Reactions   • Hydrocodone Hallucinations     \"With high doses\"   • Statins Myalgia     myalgia   • Metoclopramide Other (See Comments)     EXACERBATED RLS   • Penicillins Rash     rash   • Tramadol Nausea And Vomiting and GI Intolerance     VERY MILD PER PT       Objective   Objective     Vital Signs:   Temp:  [98.3 °F (36.8 °C)] 98.3 °F (36.8 °C)  Heart Rate:  [82] 82  Resp:  [22] 22  BP: (125)/(58) 125/58  Flow (L/min):  [4] 4    Physical Exam   Constitutional: Awake, alert  Eyes: PERRLA, sclerae anicteric, no conjunctival injection  HENT: NCAT, mucous membranes moist  Neck: Supple, no thyromegaly, no lymphadenopathy, trachea midline  Respiratory: Clear to auscultation bilaterally, nonlabored respirations   Cardiovascular: RRR, no murmurs, rubs, or gallops, palpable pedal pulses bilaterally  Gastrointestinal: Positive bowel sounds, soft, nontender, nondistended  Musculoskeletal: BLE, no clubbing or cyanosis to " extremities  Psychiatric: Appropriate affect, cooperative  Neurologic: Oriented x 3, strength symmetric in all extremities, Cranial Nerves grossly intact to confrontation, speech clear  Skin:  BLE erythema, edema, warn and TTP      Results Reviewed:  I have personally reviewed most recent indicated data and agree with findings including:  [x]  Laboratory  [x]  Radiology  []  EKG/Telemetry  []  Pathology  []  Cardiac/Vascular Studies  [x]  Old records  []  Other:  Most pertinent findings include: vitals stable. Hemoglobin 10.1. chemistry and hematology.       LAB RESULTS:      Lab 07/10/22  1815 07/05/22  1053   WBC 9.58 10.09   HEMOGLOBIN 10.1* 11.0*   HEMATOCRIT 34.7 35.4   PLATELETS 364 420   NEUTROS ABS 6.81 7.72*   IMMATURE GRANS (ABS) 0.06* 0.07*   LYMPHS ABS 1.85 1.52   MONOS ABS 0.63 0.58   EOS ABS 0.20 0.17   MCV 87.4 81.2   SED RATE 30  --    CRP 1.63*  --    PROCALCITONIN 0.07  --    LACTATE 0.6  --    D DIMER QUANT 0.32  --          Lab 07/10/22  1815 07/05/22  1053   SODIUM 139 143   POTASSIUM 3.5 3.6   CHLORIDE 98 97*   CO2 36.0* 34.5*   ANION GAP 5.0 11.5   BUN 12 15   CREATININE 0.70 0.64   EGFR 88.7 90.6   GLUCOSE 99 100*   CALCIUM 9.3 10.1         Lab 07/10/22  1815 07/05/22  1053   TOTAL PROTEIN 6.9 7.2   ALBUMIN 3.80 4.50   GLOBULIN 3.1 2.7   ALT (SGPT) 9 10   AST (SGOT) 14 18   BILIRUBIN 0.2 0.3   ALK PHOS 114 115         Lab 07/10/22  1815 07/05/22  1053   PROBNP 26.9 57.8   TROPONIN T <0.010  --                  Brief Urine Lab Results  (Last result in the past 365 days)      Color   Clarity   Blood   Leuk Est   Nitrite   Protein   CREAT   Urine HCG        10/01/21 1204 Yellow   Clear   Negative   Negative   Negative   Negative               Microbiology Results (last 10 days)     Procedure Component Value - Date/Time    COVID PRE-OP / PRE-PROCEDURE SCREENING ORDER (NO ISOLATION) - Swab, Nasopharynx [[id number]]  (Normal) Collected: 07/10/22 1925    Lab Status: Final result Specimen: Swab from  Nasopharynx Updated: 07/10/22 1955    Narrative:      The following orders were created for panel order COVID PRE-OP / PRE-PROCEDURE SCREENING ORDER (NO ISOLATION) - Swab, Nasopharynx.  Procedure                               Abnormality         Status                     ---------                               -----------         ------                     COVID-19 and FLU A/B PCR...[[id number]]  Normal              Final result                 Please view results for these tests on the individual orders.    COVID-19 and FLU A/B PCR - Swab, Nasopharynx [[id number]]  (Normal) Collected: 07/10/22 1925    Lab Status: Final result Specimen: Swab from Nasopharynx Updated: 07/10/22 1955     COVID19 Not Detected     Influenza A PCR Not Detected     Influenza B PCR Not Detected    Narrative:      Fact sheet for providers: https://www.fda.gov/media/959717/download    Fact sheet for patients: https://www.fda.gov/media/033054/download    Test performed by PCR.          XR Chest 1 View    Result Date: 7/10/2022  XR CHEST 1 VW-  Date of Exam: 7/10/2022 5:34 PM  Indication: dyspnea.  Comparison:?05/31/2022  Technique:?A single view of the chest was obtained.  FINDINGS:  ?Heart size and pulmonary vessels are within normal limits.  There is new left basilar airspace disease which may be secondary to atelectasis or pneumonia.  There are postoperative changes of the left upper lobe. Right lung is clear.  No definite pleural effusion or pneumothorax. Spinal neurostimulator device remains in place.  Judging over the lower thoracic spine.        Impression:   1.  New left basilar airspace disease which may be secondary to atelectasis or pneumonia.   This report was finalized on 7/10/2022 5:52 PM by Darrian Ralph MD.        Results for orders placed during the hospital encounter of 09/25/20    Adult Transthoracic Echo Complete W/ Cont if Necessary Per Protocol    Interpretation Summary  · The left atrial cavity is borderline dilated.  ·  Mild aortic valve regurgitation is present.  · Mild tricuspid valve regurgitation is present.  · Estimated right ventricular systolic pressure from tricuspid regurgitation is normal (<35 mmHg).  · Estimated left ventricular EF = 60% Left ventricular ejection fraction appears to be 56 - 60%. Left ventricular systolic function is normal.      Assessment & Plan   Assessment & Plan       Cellulitis of both lower extremities    Peripheral edema    Dysphagia    BRBPR (bright red blood per rectum)    Benign hypertension    Chronic diastolic heart failure (HCC)    Stage II, moderate, COPD    Controlled type 2 diabetes mellitus without complication, without long-term current use of insulin ([facility])    H/O: lung cancer (Prior resection 2016)    RENETTA (Previous CPAP)    Aortic valve regurgitation    Peripheral neuropathy    Restless leg syndrome    Mixed hyperlipidemia    Gastroesophageal reflux disease with esophagitis without hemorrhage    Acquired hypothyroidism    Gastroparesis      Cellulitis of Both Lower Extremities   BLE edema/chronic diastolic CHF  - failed outpatient treatment. Afebrile without leukocytosis  initially thought to be stasis dermatitis which is certainly playing a role, however patient with severe tenderness and increased warmth. Favor a superimposed cellulitis  - check d-dimer, inflammatory markers, blood cultures  - consider Doppler  - rocephin (PCN allergy), Vancomycin  - administered 40 mg lasix in ED  - continue home Lasix and Zaroxolyn  - daily weights  - strict I&O  - ECHO from 2020 EF 60%, repeat echocardiogram  - duplex, doubt DVT  - check TSH, magnesium  - consider cardiology input  - am labs       BRBPR:  Dysphagia  - continue PPI  - SLP treat eval  - 1 g drop in hemoglobin from 7/5/22 to 7/10/22  - check iron studies  - occult blood stool  - type and screen  - consult to GI  - monitor H/H    Recurrent syncope  --check echo  --orthostatics  --consider cards eval  --dimer negative    Chronic  Respiratory Failure:  Stage II COPD   H/P lung cancer s/p resection 2016  - on 4L supplemental oxygen  - follows with pulmonology, Saray Retana.   - Has upcoming outpatient bronchoscopy scheduled    DM2:  - hold oral hypoglycemics  - Hb A1c 7.00 4/2022. Well controlled  - SSI with scheduled accu checks    Dementia  - continue  Aricept, Namenda     Peripheral neuropathy:  RLS  - continue requip, cymbalta  - PT/OT    Hypothyroidism:  - continue synthroid    DVT prophylaxis: scds given bleed    CODE STATUS:  Full code  Level Of Support Discussed With: Patient  Code Status (Patient has no pulse and is not breathing): CPR (Attempt to Resuscitate)  Medical Interventions (Patient has pulse or is breathing): Full Support      This note has been completed as part of a split-shared workflow.     Electronically signed by Kaushal Chavis PA-C, 07/10/22, 10:34 PM EDT.          Attending   Admission Attestation       I have seen and examined the patient, performing an independent face-to-face diagnostic evaluation with plan of care reviewed and developed with the advanced practice clinician (APC).      Brief Summary Statement:   Lorrie Pagan is a 76 y.o. female medical history of COPD on 4 L nose,, chronic diastolic CHF, essential hypertension, type 2 diabetes, hyperlipidemia, gastroparesis, dementia, RENETTA, GERD, hypothyroidism, anxiety/depression who presents to the ER with multiple complaints.    Patient reports increasing swelling to her lower extremities with redness, tenderness, increased warmth, and weeping.  She has been unable to wear her compression stockings due to the increased tenderness.  X-rays: Keflex but unable to tolerate the medication due to upset stomach.  Patient has doubled her Lasix for 3-day periods twice over the past month.  She did have some improvement in the swelling.  Shortly after doing this, patient had syncopal episodes.  Reports she was sitting down when this occurred while painting.   Denies any chest pain or pressure or dizziness preceding the event.  Was unconscious for unknown period of time.    Patient complains of dysphagia.  Reports abdominal fullness and bloating with solids and liquids.  She follows with Dr. Ngo's of GI for her gastroparesis.  Reports prior history of esophageal dilation.    Recently, patient has noticed bright red blood per rectum every time she goes to the bathroom.  Reports this is worse than usual.  He attributes this to hemorrhoids but has never been this persistent.  Denies any anticoagulants or NSAID use.  Remainder of detailed HPI is as noted by APC and has been reviewed and/or edited by me for completeness.    Attending Physical Exam:  Constitutional: Awake, alert, nontoxic  Eyes: PERRLA, sclerae anicteric, no conjunctival injection  HENT: NCAT, mucous membranes moist  Neck: Supple, no thyromegaly, no lymphadenopathy, trachea midline  Respiratory: Clear to auscultation bilaterally, nonlabored respirations   Cardiovascular: RRR, no murmurs, rubs, or gallops, palpable pedal pulses bilaterally  Gastrointestinal: Positive bowel sounds, soft, nontender, nondistended  Musculoskeletal: signficant bilateral ankle edema, no clubbing or cyanosis to extremities  Psychiatric: Appropriate affect, cooperative  Neurologic: Oriented x 3, strength symmetric in all extremities, Cranial Nerves grossly intact to confrontation, speech clear  Skin: significant redness to b/l LE with clear demarcation, increased warmth and tenderness      Brief Assessment/Plan :  See detailed assessment and plan developed with APC which I have reviewed and/or edited for completeness.        Admission Status: I believe that this patient meets OBSERVATION status, however if further evaluation or treatment plans warrant, status may change.  Based upon current information, I predict patient's care encounter to be less than or equal to 2 midnights.          Cruzito Saunders,   07/11/22

## 2022-07-11 NOTE — CONSULTS
[facility]    Referring Provider: Kinsey Recio DO   PCP: Dacia Viera MD    Reason for Consultation: Anemia, Bright red blood per rectum    Chief complaint: Leg pain and swelling    History of present illness:    Lorrie Pagan is a 76 y.o. female who is admitted with bilateral lower extremity cellulitis.  She presented yesterday to the ER with chief complaint of painful, erythematous, edematous lower extremities.   Her labs showed normocytic anemia with H&H of 10 and 33.  Iron stores consistent with deficiency.   She describes intermittent small volume bright red blood mixed in the stools for one year.   She has history of colon polyps.   Her last colonoscopy was in 2018 with Dr. Millan and a hyperplastic polyp was removed.   She additionally has history of gastroparesis and  intermittent dysphagia to solids with previous response to esophageal dilations.   She has fluctuating constipation and diarrhea.      Allergies:  Hydrocodone, Statins, Metoclopramide, Penicillins, and Tramadol    Scheduled Meds:  [START ON 7/12/2022] Pharmacy Consult, , Does not apply, Once  cefTRIAXone, 1 g, Intravenous, Nightly  donepezil, 10 mg, Oral, Daily  doxycycline, 100 mg, Intravenous, Q12H  DULoxetine, 60 mg, Oral, Daily  ferric gluconate, 125 mg, Intravenous, Daily  furosemide, 40 mg, Oral, Daily  insulin lispro, 0-7 Units, Subcutaneous, TID AC  memantine, 5 mg, Oral, Daily  metOLazone, 2.5 mg, Oral, Daily  montelukast, 10 mg, Oral, Nightly  pantoprazole, 40 mg, Oral, BID  rOPINIRole, 2.75 mg, Oral, Q8H  saccharomyces boulardii, 250 mg, Oral, BID  sodium chloride, 10 mL, Intravenous, Q12H  vancomycin, 750 mg, Intravenous, Q12H         Infusions:  Pharmacy to dose vancomycin,         PRN Meds:  acetaminophen  •  albuterol  •  dextrose  •  dextrose  •  glucagon (human recombinant)  •  melatonin  •  ondansetron  •  Pharmacy to dose vancomycin  •  potassium chloride  •  potassium chloride  •   [COMPLETED] Insert peripheral IV **AND** sodium chloride  •  sodium chloride    Home Meds:  Medications Prior to Admission   Medication Sig Dispense Refill Last Dose   • acetaminophen (TYLENOL) 500 MG tablet Take 1,000 mg by mouth Every 6 (Six) Hours As Needed for Mild Pain .      • albuterol (ACCUNEB) 1.25 MG/3ML nebulizer solution Take 3 mL by nebulization Every 6 (Six) Hours As Needed for Shortness of Air. 90 each 5    • albuterol sulfate  (90 Base) MCG/ACT inhaler Inhale 2 puffs Every 6 (Six) Hours As Needed for Wheezing. 18 g 2    • azelastine (ASTELIN) 0.1 % nasal spray 2 sprays into the nostril(s) as directed by provider 2 (Two) Times a Day. (Patient taking differently: 2 sprays into the nostril(s) as directed by provider 2 (Two) Times a Day As Needed for Rhinitis or Allergies.) 30 mL 5    • chlorhexidine (PERIDEX) 0.12 % solution Apply 15 mL to the mouth or throat 2 (Two) Times a Day As Needed. For 14 days, w/1 refill      • Dapsone 5 % topical gel Apply  topically to the appropriate area as directed Daily As Needed (apply to legs).      • donepezil (ARICEPT) 10 MG tablet Take 10 mg by mouth Daily.      • DULoxetine (CYMBALTA) 60 MG capsule TAKE 1 CAPSULE BY MOUTH EVERY DAY 90 capsule 3    • famotidine (Pepcid AC Maximum Strength) 20 MG tablet Take 1 tablet by mouth 2 (Two) Times a Day. 8 tablet 0    • furosemide (LASIX) 40 MG tablet Take 1 tablet by mouth Daily. 90 tablet 1    • glimepiride (AMARYL) 2 MG tablet TAKE 1 TABLET BY MOUTH TWICE DAILY 180 tablet 1    • ketoconazole (NIZORAL) 2 % cream Apply 1 application topically to the appropriate area as directed Daily As Needed for Itching (apply to back and legs).      • ketoconazole (NIZORAL) 2 % shampoo Apply 1 application topically to the appropriate area as directed As Needed for Irritation (for head).      • latanoprost (XALATAN) 0.005 % ophthalmic solution Administer 1 drop to both eyes Every Night.      • memantine (NAMENDA) 5 MG tablet Take 5  mg by mouth Daily.      • metOLazone (ZAROXOLYN) 2.5 MG tablet Take 2.5 mg by mouth Daily.      • Misc. Devices (Rollator Ultra-Light) misc 1 each Take As Directed. 1 each 0    • montelukast (SINGULAIR) 10 MG tablet Take 1 tablet by mouth Every Night. 90 tablet 3    • O2 (OXYGEN) Inhale 2 L/min Continuous.      • ondansetron ODT (ZOFRAN-ODT) 4 MG disintegrating tablet Place 1 tablet on the tongue Every 8 (Eight) Hours As Needed for Nausea or Vomiting. 30 tablet 0    • pantoprazole (PROTONIX) 40 MG EC tablet Take 1 tablet by mouth 2 (Two) Times a Day. 180 tablet 3    • potassium chloride (MICRO-K) 10 MEQ CR capsule Take 3 capsules by mouth 2 (Two) Times a Day.      • rOPINIRole XL (REQUIP XL) 8 MG 24 hr tablet Take 8 mg by mouth 2 (two) times a day.      • saccharomyces boulardii (FLORASTOR) 250 MG capsule Take 1 capsule by mouth 2 (Two) Times a Day. 60 capsule 0    • Spacer/Aero-Holding Chambers (AeroChamber Plus Morris-Vu) misc As Needed.      • timolol (TIMOPTIC) 0.25 % ophthalmic solution Administer 1 drop to the right eye Every Morning.      • tiZANidine (ZANAFLEX) 2 MG tablet TAKE 1 TABLET BY MOUTH AT NIGHT AS NEEDED FOR MUSCLE SPASMS. 30 tablet 0    • Triamcinolone Acetonide (NASACORT) 55 MCG/ACT nasal inhaler 2 sprays into the nostril(s) as directed by provider Daily.      • vitamin D (ERGOCALCIFEROL) 1.25 MG (41900 UT) capsule capsule Take 1 capsule by mouth 1 (One) Time Per Week. 12 capsule 1        ROS: Review of Systems   Constitutional: Positive for activity change and fatigue.   HENT: Negative.    Eyes: Negative.    Respiratory: Positive for cough and shortness of breath.    Cardiovascular: Positive for leg swelling.   Gastrointestinal: Positive for blood in stool, constipation and vomiting.   Endocrine: Negative.    Genitourinary: Negative.    Musculoskeletal: Negative.    Skin: Positive for color change.   Allergic/Immunologic: Negative.    Neurological: Positive for weakness.   Hematological: Negative.     Psychiatric/Behavioral: Negative.        PAST MED HX:  Past Medical History:   Diagnosis Date   • Back pain    • Bronchiectasis (HCC)    • Bronchitis 01/2018   • Cancer (HCC)     History of lung cancer and skin cancer    • Cardiac murmur    • Chronic cough    • Chronic obstructive pulmonary disease (HCC)    • Colon polyp    • DDD (degenerative disc disease), lumbar    • Depression    • Diabetes mellitus (HCC)     DX: 2019, CHECK BS QOD, LAST A1C 6.3??   • Disease of thyroid gland    • Esophageal dilatation 9/20/2019    Added automatically from request for surgery [id number]   • Esophageal dysphagia 10/24/2019    Added automatically from request for surgery [id number]   • Former smoker (None since 1992) 8/29/2019   • GERD (gastroesophageal reflux disease)    • Glaucoma    • Globus sensation 1/27/2020   • History of colonic polyps    • History of transfusion 1988    NO REACTION    • Hyperlipidemia    • Hypertension    • Irritable bowel syndrome     Constipation/diarrhea   • Legally blind    • Lung cancer (HCC)    • Macular degeneration    • Neuropathy    • Obesity 2/26/2020   • Osteoarthritis    • Oxygen dependent     5L   • Pneumonia    • Sleep apnea     NON COMPLIANT WITH CPAP USE   • UTI (urinary tract infection)    • Wears glasses        PAST SURG HX:  Past Surgical History:   Procedure Laterality Date   • BACK SURGERY      X2 (LUMBAR)   • BREAST BIOPSY      20+ years ago   • BREAST BIOPSY Right 08/2021    fna ln   • CATARACT EXTRACTION Bilateral    • CHOLECYSTECTOMY     • COLONOSCOPY     • ENDOSCOPY N/A 11/06/2019    Procedure: ESOPHAGOGASTRODUODENOSCOPY;  Surgeon: Cortes Millan MD;  Location:  [facility];  Service: Gastroenterology   • ENDOSCOPY N/A 04/29/2021    Procedure: ESOPHAGOGASTRODUODENOSCOPY;  Surgeon: Cortes Millan MD;  Location:  [facility];  Service: Gastroenterology;  Laterality: N/A;  balloon dilation    • HAND SURGERY Right     laceration repair   •  "INCONTINENCE SURGERY      BLADDER   • LUNG REMOVAL, PARTIAL Left    • NISSEN FUNDOPLICATION     • TOTAL ABDOMINAL HYSTERECTOMY      benign cyst   • US GUIDED FINE NEEDLE ASPIRATION  2021       FAM HX:  Family History   Problem Relation Age of Onset   • Colon polyps Mother    • Emphysema Mother    • Hypertension Mother    • Colon polyps Father    • Dementia Father    • Heart disease Father    • Hyperlipidemia Father    • Macular degeneration Father    • Glaucoma Father    • Colon cancer Neg Hx    • Breast cancer Neg Hx    • Ovarian cancer Neg Hx        SOC HX:  Social History     Socioeconomic History   • Marital status: Single   Tobacco Use   • Smoking status: Former Smoker     Packs/day: 1.50     Years: 25.00     Pack years: 37.50     Types: Cigarettes     Quit date: 1992     Years since quittin.5   • Smokeless tobacco: Never Used   Vaping Use   • Vaping Use: Never used   Substance and Sexual Activity   • Alcohol use: Yes     Alcohol/week: 2.0 standard drinks     Types: 2 Standard drinks or equivalent per week     Comment: a glass of wine or bourbon a couple times a WEEK   • Drug use: No   • Sexual activity: Never     PHYSICAL EXAM  /61 (BP Location: Right arm, Patient Position: Sitting)   Pulse 91   Temp 97.6 °F (36.4 °C) (Oral)   Resp 18   Ht 161.3 cm (63.5\")   Wt 82.7 kg (182 lb 6.4 oz)   LMP  (LMP Unknown)   SpO2 (!) 89%   BMI 31.80 kg/m²   Wt Readings from Last 3 Encounters:   07/10/22 82.7 kg (182 lb 6.4 oz)   22 85.3 kg (188 lb)   22 82.7 kg (182 lb 6.4 oz)   ,body mass index is 31.8 kg/m².  Physical Exam  Constitutional:       General: She is not in acute distress.  HENT:      Head: Normocephalic and atraumatic.      Mouth/Throat:      Mouth: Mucous membranes are moist.   Eyes:      General: No scleral icterus.  Cardiovascular:      Rate and Rhythm: Normal rate and regular rhythm.   Pulmonary:      Effort: Pulmonary effort is normal. No respiratory distress. "      Comments: On 3 L O2 via nasal cannula  Abdominal:      General: Bowel sounds are normal.      Palpations: Abdomen is soft.      Tenderness: There is no abdominal tenderness.   Musculoskeletal:      Right lower leg: Edema present.      Left lower leg: Edema present.   Skin:     Comments: Bilateral below the knee erythema, edema and warmth    Neurological:      Mental Status: She is alert and oriented to person, place, and time.   Psychiatric:         Behavior: Behavior normal.       Results Review:   I reviewed the patient's new clinical results.    Lab Results   Component Value Date    WBC 8.58 07/11/2022    HGB 10.0 (L) 07/11/2022    HGB 10.1 (L) 07/10/2022    HGB 11.0 (L) 07/05/2022    HCT 33.8 (L) 07/11/2022    MCV 83.9 07/11/2022     07/11/2022       Lab Results   Component Value Date    INR 1.02 09/25/2020       Lab Results   Component Value Date    GLUCOSE 140 (H) 07/11/2022    BUN 15 07/11/2022    CREATININE 0.66 07/11/2022    EGFRIFNONA 90 10/15/2021    BCR 22.7 07/11/2022     07/11/2022    K 3.7 07/11/2022    CO2 38.0 (H) 07/11/2022    CALCIUM 8.8 07/11/2022    ALBUMIN 3.70 07/11/2022    ALKPHOS 116 07/11/2022    BILITOT 0.2 07/11/2022    ALT 9 07/11/2022    AST 14 07/11/2022     I personally reviewed her pertinent previous medical records including colonoscopy report performed by Dr. Millan in May 2018 with a hyperplastic polyp removed.   I also reviewed the most recent outpatient progress notes.       ASSESSMENTS/PLANS    1. Iron deficiency anemia   2. Bright red blood per rectum   3. History of colon polyps   4. History of gastroparesis   5. Bilateral lower extremity cellulitis     Mrs. Pagan is a 76 year old female admitted for bilateral cellulitis with findings of iron deficiency anemia.   Hgb is 10.   She describes intermittent bright red blood per rectum for one year.  She has history of colon polyps with most recent colonoscopy in 2018.     We discussed the differential of  her bleeding to include hemorrhoids, irritated polyp and malignancy.  Her chief concern at this time is her lower extremity pain and edema.   She states she does not think she can tolerate a colonoscopy at this time.        >>> Patient received IV Iron today but declines any further doses as she states it makes her feel ill  >>> Add Anusol suppository BID for 5 days for treatment of internal hemorrhoids  >>> Outpatient colonoscopy with Dr. Millan when she has recovered from her cellulitis     We will sign off.  Please call for any questions or concerns.       I discussed the patient's findings and my recommendations with patient and Dr. Recio.     SACHIN Davison  07/11/22  11:21 EDT

## 2022-07-11 NOTE — PROGRESS NOTES
[facility] Medicine Services  PROGRESS NOTE    Patient Name: Lorrie Pagan  : 1945  MRN: [id number]    Date of Admission: 7/10/2022  Primary Care Physician: Dacia Viera MD    Subjective   Subjective     CC:  LE swelling    HPI:  Patient lying in bed, states she is doing ok. Ongoing swelling with redness of her b/l LE present. Patient states she wants to eat. She states she cannot take PO iron and isn't sure how IV iron is going to go.    ROS:  Gen- No fevers, chills  CV- No chest pain, palpitations  Resp- No cough, dyspnea  GI- No N/V/D, abd pain  +b/l LE erythema, swelling and weeping  +reflux, trouble swallowing     Objective   Objective     Vital Signs:   Temp:  [97.6 °F (36.4 °C)-98.6 °F (37 °C)] 97.6 °F (36.4 °C)  Heart Rate:  [] 91  Resp:  [17-22] 18  BP: (113-145)/(51-76) 145/61  Flow (L/min):  [2-4] 2     Physical Exam:  Constitutional: No acute distress, awake, alert  HENT: NCAT, mucous membranes moist  Respiratory: Clear to auscultation bilaterally, respiratory effort normal   Cardiovascular: RRR, no murmurs, rubs, or gallops  Gastrointestinal: Positive bowel sounds, soft, nontender, nondistended  Musculoskeletal: 1+ bilateral ankle edema  Psychiatric: Appropriate affect, cooperative  Neurologic: Oriented x 3, strength symmetric in all extremities, Cranial Nerves grossly intact to confrontation, speech clear  Skin: No rashes, significant b/l LE erythema with weeping of b/l legs, warm to palpation of b/l LE anteriorly    Results Reviewed:  LAB RESULTS:      Lab 22  0457 07/10/22  1815 22  1053   WBC 8.58 9.58 10.09   HEMOGLOBIN 10.0* 10.1* 11.0*   HEMATOCRIT 33.8* 34.7 35.4   PLATELETS 383 364 420   NEUTROS ABS  --  6.81 7.72*   IMMATURE GRANS (ABS)  --  0.06* 0.07*   LYMPHS ABS  --  1.85 1.52   MONOS ABS  --  0.63 0.58   EOS ABS  --  0.20 0.17   MCV 83.9 87.4 81.2   SED RATE  --  30  --    CRP  --  1.63*  --    PROCALCITONIN  --  0.07   --    LACTATE  --  0.6  --    D DIMER QUANT  --  0.32  --          Lab 07/11/22  0457 07/10/22  1815 07/05/22  1053   SODIUM 144 139 143   POTASSIUM 3.7 3.5 3.6   CHLORIDE 98 98 97*   CO2 38.0* 36.0* 34.5*   ANION GAP 8.0 5.0 11.5   BUN 15 12 15   CREATININE 0.66 0.70 0.64   EGFR 89.9 88.7 90.6   GLUCOSE 140* 99 100*   CALCIUM 8.8 9.3 10.1   HEMOGLOBIN A1C 6.50*  --   --    TSH 2.420  --   --          Lab 07/11/22  0457 07/10/22  1815 07/05/22  1053   TOTAL PROTEIN 6.5 6.9 7.2   ALBUMIN 3.70 3.80 4.50   GLOBULIN 2.8 3.1 2.7   ALT (SGPT) 9 9 10   AST (SGOT) 14 14 18   BILIRUBIN 0.2 0.2 0.3   ALK PHOS 116 114 115         Lab 07/10/22  1815 07/05/22  1053   PROBNP 26.9 57.8   TROPONIN T <0.010  --              Lab 07/11/22  0457   IRON 26*   IRON SATURATION 6*   TIBC 440   TRANSFERRIN 295   FERRITIN 23.17         Brief Urine Lab Results  (Last result in the past 365 days)      Color   Clarity   Blood   Leuk Est   Nitrite   Protein   CREAT   Urine HCG        10/01/21 1204 Yellow   Clear   Negative   Negative   Negative   Negative                 Microbiology Results Abnormal     Procedure Component Value - Date/Time    COVID PRE-OP / PRE-PROCEDURE SCREENING ORDER (NO ISOLATION) - Swab, Nasopharynx [[id number]]  (Normal) Collected: 07/10/22 1925    Lab Status: Final result Specimen: Swab from Nasopharynx Updated: 07/10/22 1955    Narrative:      The following orders were created for panel order COVID PRE-OP / PRE-PROCEDURE SCREENING ORDER (NO ISOLATION) - Swab, Nasopharynx.  Procedure                               Abnormality         Status                     ---------                               -----------         ------                     COVID-19 and FLU A/B PCR...[[id number]]  Normal              Final result                 Please view results for these tests on the individual orders.    COVID-19 and FLU A/B PCR - Swab, Nasopharynx [[id number]]  (Normal) Collected: 07/10/22 1925    Lab Status: Final result  Specimen: Swab from Nasopharynx Updated: 07/10/22 1955     COVID19 Not Detected     Influenza A PCR Not Detected     Influenza B PCR Not Detected    Narrative:      Fact sheet for providers: https://www.fda.gov/media/286188/download    Fact sheet for patients: https://www.fda.gov/media/556571/download    Test performed by PCR.          XR Chest 1 View    Result Date: 7/10/2022  XR CHEST 1 VW-  Date of Exam: 7/10/2022 5:34 PM  Indication: dyspnea.  Comparison:?05/31/2022  Technique:?A single view of the chest was obtained.  FINDINGS:  ?Heart size and pulmonary vessels are within normal limits.  There is new left basilar airspace disease which may be secondary to atelectasis or pneumonia.  There are postoperative changes of the left upper lobe. Right lung is clear.  No definite pleural effusion or pneumothorax. Spinal neurostimulator device remains in place.  Judging over the lower thoracic spine.        Impression:   1.  New left basilar airspace disease which may be secondary to atelectasis or pneumonia.   This report was finalized on 7/10/2022 5:52 PM by Darrian Ralph MD.        Results for orders placed during the hospital encounter of 09/25/20    Adult Transthoracic Echo Complete W/ Cont if Necessary Per Protocol    Interpretation Summary  · The left atrial cavity is borderline dilated.  · Mild aortic valve regurgitation is present.  · Mild tricuspid valve regurgitation is present.  · Estimated right ventricular systolic pressure from tricuspid regurgitation is normal (<35 mmHg).  · Estimated left ventricular EF = 60% Left ventricular ejection fraction appears to be 56 - 60%. Left ventricular systolic function is normal.      I have reviewed the medications:  Scheduled Meds:[START ON 7/12/2022] Pharmacy Consult, , Does not apply, Once  cefTRIAXone, 1 g, Intravenous, Nightly  donepezil, 10 mg, Oral, Daily  doxycycline, 100 mg, Intravenous, Q12H  DULoxetine, 60 mg, Oral, Daily  ferric gluconate, 125 mg,  Intravenous, Daily  furosemide, 40 mg, Oral, Daily  insulin lispro, 0-7 Units, Subcutaneous, TID AC  memantine, 5 mg, Oral, Daily  metOLazone, 2.5 mg, Oral, Daily  montelukast, 10 mg, Oral, Nightly  pantoprazole, 40 mg, Oral, BID  rOPINIRole, 2.75 mg, Oral, Q8H  saccharomyces boulardii, 250 mg, Oral, BID  sodium chloride, 10 mL, Intravenous, Q12H  vancomycin, 750 mg, Intravenous, Q12H      Continuous Infusions:Pharmacy to dose vancomycin,       PRN Meds:.acetaminophen  •  albuterol  •  dextrose  •  dextrose  •  glucagon (human recombinant)  •  melatonin  •  ondansetron  •  Pharmacy to dose vancomycin  •  potassium chloride  •  potassium chloride  •  [COMPLETED] Insert peripheral IV **AND** sodium chloride  •  sodium chloride    Assessment & Plan   Assessment & Plan     Active Hospital Problems    Diagnosis  POA   • **Cellulitis of both lower extremities [L03.115, L03.116]  Yes   • Iron deficiency anemia [D50.9]  Yes   • BRBPR (bright red blood per rectum) [K62.5]  Yes   • Gastroparesis [K31.84]  Yes   • Dysphagia [R13.10]  Yes   • Acquired hypothyroidism [E03.9]  Yes   • Gastroesophageal reflux disease with esophagitis without hemorrhage [K21.00]  Yes   • Restless leg syndrome [G25.81]  Yes   • Controlled type 2 diabetes mellitus without complication, without long-term current use of insulin (HCC) [E11.9]  Yes   • Mixed hyperlipidemia [E78.2]  Yes   • Stage II, moderate, COPD [J44.9]  Yes   • Syncope [R55]  Yes   • Chronic diastolic heart failure (HCC) [I50.32]  Yes   • Benign hypertension [I10]  Yes   • Aortic valve regurgitation [I35.1]  Yes   • Peripheral edema [R60.9]  Yes   • RENETTA (Previous CPAP) [G47.33, Z99.89]  Not Applicable   • H/O: lung cancer (Prior resection 2016) [Z85.118]  Not Applicable   • Peripheral neuropathy [G62.9]  Yes      Resolved Hospital Problems   No resolved problems to display.        Brief Hospital Course to date:  Lorrie Pagan is a 76 y.o. female with a past medical history  significant for chronic respiratory failure with COPD on 4L supplemental oxygen, chronic diastolic CHF,hypertension, HLD, DM2 with gastroparesis and peripheral neuropathy, dementia, RENETTA, GERD, hypothyroidism, and anxiety/depression that presented to the ED complaining of LE redness, weeping and swelling along with dysphagia. Patient admitted to the hospitalist service for further evaluation.    This patient's problems and plans were partially entered by my partner and updated as appropriate by me 07/11/22.    All problems are new to me today.    Cellulitis of Both Lower Extremities   BLE edema/chronic diastolic CHF  - failed outpatient treatment. Afebrile without leukocytosis  initially thought to be stasis dermatitis which is certainly playing a role, however patient with severe tenderness and increased warmth. Favor a superimposed cellulitis  - d-dimer stable, inflammatory markers stable  - blood cultures pending  - LE venous doppler b/l pending  - continue rocephin (PCN allergy), Vancomycin  - administered 40 mg lasix in ED  - continue home Lasix and Zaroxolyn  - daily weights  - strict I&O  - ECHO from 2020 EF 60%, repeat echocardiogram pending  - duplex, doubt DVT  - TSH stable, magnesium pending     BRBPR:  Dysphagia  Iron deficiency anemia  - continue PPI  - SLP treat eval  - 1 g drop in hemoglobin from 7/5/22 to 7/10/22  - iron low at 26- transfuse IV daily x 3 days  - occult blood stool negative  - She is followed by Dr. Aldana per GI and has undergone multiple upper and lower endoscopies. Also undergone esophageal dilatation in the past.   - type and screen  - GI saw the patient and would like to scheduled outpatient colonoscopy   - monitor H/H     Recurrent syncope  --echo pending  --orthostatics  --dimer negative     Chronic Respiratory Failure:  Stage II COPD   H/P lung cancer s/p resection 2016  - on 4L supplemental oxygen  - follows with pulmonology, Saray Retana.   - Has upcoming outpatient  bronchoscopy scheduled     DM2:  - hold oral hypoglycemics  - Hb A1c 7.00 4/2022. Well controlled  - SSI with scheduled accu checks     Dementia  - continue  Aricept, Namenda      Peripheral neuropathy:  RLS  - continue requip, cymbalta  - PT/OT     Hypothyroidism:  - continue synthroid  - TSH stable    Expected Discharge Location and Transportation: home via family/transport  Expected Discharge Date: 7/15/22    DVT prophylaxis:  Mechanical DVT prophylaxis orders are present.          CODE STATUS:   Code Status and Medical Interventions:   Ordered at: 07/10/22 9543     Level Of Support Discussed With:    Patient     Code Status (Patient has no pulse and is not breathing):    CPR (Attempt to Resuscitate)     Medical Interventions (Patient has pulse or is breathing):    Full Support       Kinsey Recio DO  07/11/22

## 2022-07-11 NOTE — THERAPY EVALUATION
Acute Care - Speech Language Pathology   Swallow Initial Evaluation [facility]   Clinical Swallow Evaluation     Patient Name: Lorrie Pagan  : 1945  MRN: [id number]  Today's Date: 2022               Admit Date: 7/10/2022    Visit Dx:     ICD-10-CM ICD-9-CM   1. Peripheral edema  R60.9 782.3   2. Cellulitis of lower extremity, unspecified laterality  L03.119 682.6   3. Opacity of lung on imaging study  R91.8 793.19   4. Dysphagia, unspecified type  R13.10 787.20     Patient Active Problem List   Diagnosis   • Bronchiectasis (CMS/HCC)   • H/O: lung cancer (Prior resection )   • Chronic respiratory failure   • RENETTA (Previous CPAP)   • Hiatal hernia (Prior Nissen )   • Mycobacterium avium complex colonization   • Irritable bowel syndrome with diarrhea   • Cellulitis of both lower extremities   • Aortic valve regurgitation   • Benign hypertension   • Peripheral edema   • Impairment of balance   • Peripheral neuropathy   • Peripheral venous insufficiency   • Chronic diastolic heart failure (HCC)   • Syncope   • Hypokalemia   • Hypomagnesemia   • Stage II, moderate, COPD   • Memory loss   • Restless leg syndrome   • Controlled type 2 diabetes mellitus without complication, without long-term current use of insulin ([facility])   • Vitamin D deficiency   • Mixed hyperlipidemia   • Allergic rhinitis   • Normocytic anemia   • Gastroesophageal reflux disease with esophagitis without hemorrhage   • Acquired hypothyroidism   • Esophageal dysmotility   • Dysphagia   • Gastroparesis   • Degenerative disc disease, cervical   • Degenerative disc disease, lumbar   • Globus sensation   • Sialorrhea   • Drooling   • Dysfunction of both eustachian tubes   • Tinnitus of both ears   • BRBPR (bright red blood per rectum)   • Iron deficiency anemia     Past Medical History:   Diagnosis Date   • Back pain    • Bronchiectasis (HCC)    • Bronchitis 2018   • Cancer ([facility])     History of lung cancer and skin cancer    •  Cardiac murmur    • Chronic cough    • Chronic obstructive pulmonary disease (HCC)    • Colon polyp    • DDD (degenerative disc disease), lumbar    • Depression    • Diabetes mellitus (HCC)     DX: 2019, CHECK BS QOD, LAST A1C 6.3??   • Disease of thyroid gland    • Esophageal dilatation 9/20/2019    Added automatically from request for surgery [id number]   • Esophageal dysphagia 10/24/2019    Added automatically from request for surgery [id number]   • Former smoker (None since 1992) 8/29/2019   • GERD (gastroesophageal reflux disease)    • Glaucoma    • Globus sensation 1/27/2020   • History of colonic polyps    • History of transfusion 1988    NO REACTION    • Hyperlipidemia    • Hypertension    • Irritable bowel syndrome     Constipation/diarrhea   • Legally blind    • Lung cancer (HCC)    • Macular degeneration    • Neuropathy    • Obesity 2/26/2020   • Osteoarthritis    • Oxygen dependent     5L   • Pneumonia    • Sleep apnea     NON COMPLIANT WITH CPAP USE   • UTI (urinary tract infection)    • Wears glasses      Past Surgical History:   Procedure Laterality Date   • BACK SURGERY      X2 (LUMBAR)   • BREAST BIOPSY      20+ years ago   • BREAST BIOPSY Right 08/2021    fna ln   • CATARACT EXTRACTION Bilateral    • CHOLECYSTECTOMY     • COLONOSCOPY     • ENDOSCOPY N/A 11/06/2019    Procedure: ESOPHAGOGASTRODUODENOSCOPY;  Surgeon: Cortes Millan MD;  Location:  [facility];  Service: Gastroenterology   • ENDOSCOPY N/A 04/29/2021    Procedure: ESOPHAGOGASTRODUODENOSCOPY;  Surgeon: Cortes Millan MD;  Location:  [facility];  Service: Gastroenterology;  Laterality: N/A;  balloon dilation    • HAND SURGERY Right     laceration repair   • INCONTINENCE SURGERY      BLADDER   • LUNG REMOVAL, PARTIAL Left 2016   • NISSEN FUNDOPLICATION     • TOTAL ABDOMINAL HYSTERECTOMY  1988    benign cyst   • US GUIDED FINE NEEDLE ASPIRATION  08/27/2021       SLP Recommendation and Plan  SLP Swallowing  Diagnosis: R/O pharyngeal dysphagia (07/11/22 1625)  SLP Diet Recommendation: other (see comments) (limited eval/pt declined PO trials--will defer to physician) (07/11/22 1625)  Recommended Precautions and Strategies: upright posture during/after eating, general aspiration precautions, reflux precautions (07/11/22 1625)  SLP Rec. for Method of Medication Administration: as tolerated (07/11/22 1625)     Monitor for Signs of Aspiration: yes, notify SLP if any concerns (07/11/22 1625)  Recommended Diagnostics: VFSS ([facility]), other (see comments) (pt declined @ this time) (07/11/22 1625)  Swallow Criteria for Skilled Therapeutic Interventions Met: demonstrates skilled criteria (07/11/22 1625)  Anticipated Discharge Disposition (SLP): anticipate therapy at next level of care (07/11/22 1625)  Rehab Potential/Prognosis, Swallowing: good, to achieve stated therapy goals (07/11/22 1625)           Plan of Care Reviewed With: patient  Progress: no change      SWALLOW EVALUATION (last 72 hours)     SLP Adult Swallow Evaluation     Row Name 07/11/22 1625                   Rehab Evaluation    Document Type evaluation  -AC        Subjective Information complains of;fatigue  -AC        Patient Observations alert;cooperative  -AC        Patient/Family/Caregiver Comments/Observations No family present.  -AC        Patient Effort fair  -AC        Comment Reported feeling exhausted.  -AC                  General Information    Patient Profile Reviewed yes  -AC        Pertinent History Of Current Problem Adm w/ BLE cellulitis, possible pna. Hx lung CA s/p rsxn '16, RENETTA, CHF, COPD, GERD, bronchiectasis, HH s/p Nissen '08, DDD c-spine, hx esophageal dilation. MBS completed during previous admission w/ CHF exacerbation 11/'21. Revealed mild pharyngeal dysphagia w/ aspiration of thin liquids via consecutive drinks. SLP recommended regular diet and thin liquids via small sips w/ multiple swallows. Pt reported she follows these recommendations  "\"when she remembers.\"  -AC        Current Method of Nutrition regular textures;thin liquids  -AC        Precautions/Limitations, Vision WFL;for purposes of eval  -AC        Precautions/Limitations, Hearing WFL;for purposes of eval  -AC        Prior Level of Function-Communication unknown  -        Prior Level of Function-Swallowing compensations (maneuvers, postures) needed;esophageal concerns  -        Plans/Goals Discussed with patient  -        Barriers to Rehab previous functional deficit  -        Patient's Goals for Discharge patient did not state  -                  Pain    Additional Documentation Pain Scale: FACES Pre/Post-Treatment (Group)  -AC                  Pain Scale: FACES Pre/Post-Treatment    Pain: FACES Scale, Pretreatment 0-->no hurt  -AC        Posttreatment Pain Rating 0-->no hurt  -AC                  Oral Motor Structure and Function    Dentition Assessment natural, present and adequate  -AC        Secretion Management WNL/WFL  -AC        Mucosal Quality moist, healthy  -AC                  Oral Musculature and Cranial Nerve Assessment    Oral Motor General Assessment WFL  -AC                  General Eating/Swallowing Observations    Respiratory Support Currently in Use nasal cannula  -AC                  Clinical Swallow Eval    Clinical Swallow Evaluation Summary Pt denied swallowing difficulty; however, reported increased RR w/ PO & general cough. Politely & multiple times declined PO trials 2' exhaustion. Provided education re: s/sxs aspiration, risks of aspiration. Discussed that given hx, possible new pna, & pt report, she would likely benefit from MBS as at high risk for aspiration; however, she declined MBS @ this time.  -AC                  SLP Evaluation Clinical Impression    SLP Swallowing Diagnosis R/O pharyngeal dysphagia  -AC        Functional Impact risk of aspiration/pneumonia  -AC        Rehab Potential/Prognosis, Swallowing good, to achieve stated therapy goals  " -        Swallow Criteria for Skilled Therapeutic Interventions Met demonstrates skilled criteria  -                  Recommendations    SLP Diet Recommendation other (see comments)  limited eval/pt declined PO trials--will defer to physician  -        Recommended Diagnostics VFSS (MBS);other (see comments)  pt declined @ this time  -        Recommended Precautions and Strategies upright posture during/after eating;general aspiration precautions;reflux precautions  -        Oral Care Recommendations Oral Care BID/PRN  -        SLP Rec. for Method of Medication Administration as tolerated  -        Monitor for Signs of Aspiration yes;notify SLP if any concerns  -        Anticipated Discharge Disposition (SLP) anticipate therapy at next level of care  -              User Key  (r) = Recorded By, (t) = Taken By, (c) = Cosigned By    Initials Name Effective Dates    Lillian Terry MS CCC-SLP 06/16/21 -                 EDUCATION  The patient has been educated in the following areas:   Dysphagia (Swallowing Impairment).              Time Calculation:    Time Calculation- SLP     Row Name 07/11/22 1654             Time Calculation- [facility]    SLP Start Time 1625  -      SLP Received On 07/11/22  -              Untimed Charges    77528-VU Eval Oral Pharyng Swallow Minutes 29  -AC              Total Minutes    Untimed Charges Total Minutes 29  -AC       Total Minutes 29  -AC            User Key  (r) = Recorded By, (t) = Taken By, (c) = Cosigned By    Initials Name Provider Type    Lillian Terry MS CCC-SLP Speech and Language Pathologist                Therapy Charges for Today     Code Description Service Date Service Provider Modifiers Qty    [id number] HC ST EVAL ORAL PHARYNG SWALLOW 2 7/11/2022 Lillian Ortiz MS CCC-SLP GN 1        Patient was not wearing a face mask and did exhibit coughing during this therapy encounter.  Procedure performed was not aerosolizing, involved close contact (within  6 feet for at least 15 minutes or longer), and did not involve contact with infectious secretions or specimens.  Therapist used appropriate personal protective equipment including gloves, standard procedure mask and eye protection.  Appropriate PPE was worn during the entire therapy session.  Hand hygiene was completed before and after therapy session.          Lillian Ortiz MS CCC-SLP  7/11/2022

## 2022-07-11 NOTE — PLAN OF CARE
Goal Outcome Evaluation:  Plan of Care Reviewed With: patient        Progress: no change  Outcome Evaluation: PT eval completed. Patient alert and oriented x3. Presents with deficits in BLE ROM/strength, standing balance, and functional endurance effecting functional mobility below baseline. Requiring CGA for bed mobility, transfers, and ambulation x 4' with FWW. Spo2 levels maintain > 90% throughout on 2 liters of O2. Patient will benefit from skilled IP PT services to address impairments in order to return to PLOF. Recommend return to independent living with HHPT at [place].

## 2022-07-12 ENCOUNTER — APPOINTMENT (OUTPATIENT)
Dept: GENERAL RADIOLOGY | Facility: HOSPITAL | Age: 77
End: 2022-07-12

## 2022-07-12 LAB
ALBUMIN SERPL-MCNC: 3.6 G/DL (ref 3.5–5.2)
ALBUMIN/GLOB SERPL: 1.2 G/DL
ALP SERPL-CCNC: 120 U/L (ref 39–117)
ALT SERPL W P-5'-P-CCNC: 8 U/L (ref 1–33)
ANION GAP SERPL CALCULATED.3IONS-SCNC: 6 MMOL/L (ref 5–15)
AST SERPL-CCNC: 14 U/L (ref 1–32)
BH CV ECHO MEAS - AI P1/2T: 348.6 MSEC
BH CV ECHO MEAS - AO MAX PG: 9.5 MMHG
BH CV ECHO MEAS - AO MEAN PG: 5.5 MMHG
BH CV ECHO MEAS - AO ROOT DIAM: 3.1 CM
BH CV ECHO MEAS - AO V2 MAX: 154 CM/SEC
BH CV ECHO MEAS - AO V2 VTI: 36.4 CM
BH CV ECHO MEAS - AVA(I,D): 2.37 CM2
BH CV ECHO MEAS - EDV(CUBED): 74.1 ML
BH CV ECHO MEAS - EDV(MOD-SP2): 74.4 ML
BH CV ECHO MEAS - EDV(MOD-SP4): 88.2 ML
BH CV ECHO MEAS - EF(MOD-BP): 65.9 %
BH CV ECHO MEAS - EF(MOD-SP2): 62.5 %
BH CV ECHO MEAS - EF(MOD-SP4): 66.8 %
BH CV ECHO MEAS - ESV(CUBED): 10.6 ML
BH CV ECHO MEAS - ESV(MOD-SP2): 27.9 ML
BH CV ECHO MEAS - ESV(MOD-SP4): 29.3 ML
BH CV ECHO MEAS - FS: 47.6 %
BH CV ECHO MEAS - IVS/LVPW: 1 CM
BH CV ECHO MEAS - IVSD: 0.9 CM
BH CV ECHO MEAS - LA DIMENSION: 4.3 CM
BH CV ECHO MEAS - LAT PEAK E' VEL: 10.4 CM/SEC
BH CV ECHO MEAS - LV MASS(C)D: 118.7 GRAMS
BH CV ECHO MEAS - LV MAX PG: 6 MMHG
BH CV ECHO MEAS - LV MEAN PG: 4 MMHG
BH CV ECHO MEAS - LV V1 MAX: 122 CM/SEC
BH CV ECHO MEAS - LV V1 VTI: 30.4 CM
BH CV ECHO MEAS - LVIDD: 4.2 CM
BH CV ECHO MEAS - LVIDS: 2.2 CM
BH CV ECHO MEAS - LVOT AREA: 2.8 CM2
BH CV ECHO MEAS - LVOT DIAM: 1.9 CM
BH CV ECHO MEAS - LVPWD: 0.9 CM
BH CV ECHO MEAS - MED PEAK E' VEL: 10.9 CM/SEC
BH CV ECHO MEAS - MV A MAX VEL: 106 CM/SEC
BH CV ECHO MEAS - MV DEC SLOPE: 755 CM/SEC2
BH CV ECHO MEAS - MV DEC TIME: 0.2 MSEC
BH CV ECHO MEAS - MV E MAX VEL: 135 CM/SEC
BH CV ECHO MEAS - MV E/A: 1.27
BH CV ECHO MEAS - MV MAX PG: 6.9 MMHG
BH CV ECHO MEAS - MV MEAN PG: 3 MMHG
BH CV ECHO MEAS - MV P1/2T: 49.7 MSEC
BH CV ECHO MEAS - MV V2 VTI: 29 CM
BH CV ECHO MEAS - MVA(P1/2T): 4.4 CM2
BH CV ECHO MEAS - MVA(VTI): 3 CM2
BH CV ECHO MEAS - PA ACC TIME: 0.11 SEC
BH CV ECHO MEAS - PA PR(ACCEL): 31.3 MMHG
BH CV ECHO MEAS - PA V2 MAX: 124 CM/SEC
BH CV ECHO MEAS - PI END-D VEL: 148 CM/SEC
BH CV ECHO MEAS - SV(LVOT): 86.2 ML
BH CV ECHO MEAS - SV(MOD-SP2): 46.5 ML
BH CV ECHO MEAS - SV(MOD-SP4): 58.9 ML
BH CV ECHO MEAS - TAPSE (>1.6): 3.1 CM
BH CV ECHO MEASUREMENTS AVERAGE E/E' RATIO: 12.68
BH CV LOWER VASCULAR LEFT COMMON FEMORAL COMPRESS: NORMAL
BH CV LOWER VASCULAR LEFT COMMON FEMORAL PHASIC: NORMAL
BH CV LOWER VASCULAR LEFT COMMON FEMORAL SPONT: NORMAL
BH CV LOWER VASCULAR LEFT DISTAL FEMORAL COMPRESS: NORMAL
BH CV LOWER VASCULAR LEFT GASTRONEMIUS COMPRESS: NORMAL
BH CV LOWER VASCULAR LEFT GREATER SAPH AK COMPRESS: NORMAL
BH CV LOWER VASCULAR LEFT GREATER SAPH BK COMPRESS: NORMAL
BH CV LOWER VASCULAR LEFT LESSER SAPH COMPRESS: NORMAL
BH CV LOWER VASCULAR LEFT MID FEMORAL AUGMENT: NORMAL
BH CV LOWER VASCULAR LEFT MID FEMORAL COMPRESS: NORMAL
BH CV LOWER VASCULAR LEFT MID FEMORAL PHASIC: NORMAL
BH CV LOWER VASCULAR LEFT MID FEMORAL SPONT: NORMAL
BH CV LOWER VASCULAR LEFT PERONEAL COMPRESS: NORMAL
BH CV LOWER VASCULAR LEFT POPLITEAL AUGMENT: NORMAL
BH CV LOWER VASCULAR LEFT POPLITEAL COMPRESS: NORMAL
BH CV LOWER VASCULAR LEFT POPLITEAL PHASIC: NORMAL
BH CV LOWER VASCULAR LEFT POPLITEAL SPONT: NORMAL
BH CV LOWER VASCULAR LEFT POSTERIOR TIBIAL COMPRESS: NORMAL
BH CV LOWER VASCULAR LEFT PROFUNDA FEMORAL COMPRESS: NORMAL
BH CV LOWER VASCULAR LEFT PROXIMAL FEMORAL COMPRESS: NORMAL
BH CV LOWER VASCULAR LEFT SAPHENOFEMORAL JUNCTION COMPRESS: NORMAL
BH CV LOWER VASCULAR RIGHT COMMON FEMORAL COMPRESS: NORMAL
BH CV LOWER VASCULAR RIGHT COMMON FEMORAL PHASIC: NORMAL
BH CV LOWER VASCULAR RIGHT COMMON FEMORAL SPONT: NORMAL
BH CV LOWER VASCULAR RIGHT DISTAL FEMORAL COMPRESS: NORMAL
BH CV LOWER VASCULAR RIGHT GASTRONEMIUS COMPRESS: NORMAL
BH CV LOWER VASCULAR RIGHT GREATER SAPH AK COMPRESS: NORMAL
BH CV LOWER VASCULAR RIGHT GREATER SAPH BK COMPRESS: NORMAL
BH CV LOWER VASCULAR RIGHT LESSER SAPH COMPRESS: NORMAL
BH CV LOWER VASCULAR RIGHT MID FEMORAL AUGMENT: NORMAL
BH CV LOWER VASCULAR RIGHT MID FEMORAL COMPRESS: NORMAL
BH CV LOWER VASCULAR RIGHT MID FEMORAL PHASIC: NORMAL
BH CV LOWER VASCULAR RIGHT MID FEMORAL SPONT: NORMAL
BH CV LOWER VASCULAR RIGHT PERONEAL COMPRESS: NORMAL
BH CV LOWER VASCULAR RIGHT POPLITEAL AUGMENT: NORMAL
BH CV LOWER VASCULAR RIGHT POPLITEAL COMPRESS: NORMAL
BH CV LOWER VASCULAR RIGHT POPLITEAL PHASIC: NORMAL
BH CV LOWER VASCULAR RIGHT POPLITEAL SPONT: NORMAL
BH CV LOWER VASCULAR RIGHT POSTERIOR TIBIAL COMPRESS: NORMAL
BH CV LOWER VASCULAR RIGHT PROFUNDA FEMORAL COMPRESS: NORMAL
BH CV LOWER VASCULAR RIGHT PROXIMAL FEMORAL COMPRESS: NORMAL
BH CV LOWER VASCULAR RIGHT SAPHENOFEMORAL JUNCTION COMPRESS: NORMAL
BH CV VAS BP LEFT ARM: NORMAL MMHG
BH CV XLRA - RV BASE: 3.8 CM
BH CV XLRA - RV LENGTH: 6 CM
BH CV XLRA - RV MID: 2.7 CM
BH CV XLRA - TDI S': 15.1 CM/SEC
BILIRUB SERPL-MCNC: 0.3 MG/DL (ref 0–1.2)
BUN SERPL-MCNC: 12 MG/DL (ref 8–23)
BUN/CREAT SERPL: 21.4 (ref 7–25)
CALCIUM SPEC-SCNC: 8.9 MG/DL (ref 8.6–10.5)
CHLORIDE SERPL-SCNC: 95 MMOL/L (ref 98–107)
CO2 SERPL-SCNC: 38 MMOL/L (ref 22–29)
CREAT SERPL-MCNC: 0.56 MG/DL (ref 0.57–1)
DEPRECATED RDW RBC AUTO: 47.8 FL (ref 37–54)
EGFRCR SERPLBLD CKD-EPI 2021: 93.5 ML/MIN/1.73
ERYTHROCYTE [DISTWIDTH] IN BLOOD BY AUTOMATED COUNT: 15.5 % (ref 12.3–15.4)
GLOBULIN UR ELPH-MCNC: 2.9 GM/DL
GLUCOSE BLDC GLUCOMTR-MCNC: 114 MG/DL (ref 70–130)
GLUCOSE BLDC GLUCOMTR-MCNC: 128 MG/DL (ref 70–130)
GLUCOSE BLDC GLUCOMTR-MCNC: 146 MG/DL (ref 70–130)
GLUCOSE SERPL-MCNC: 134 MG/DL (ref 65–99)
HCT VFR BLD AUTO: 33.3 % (ref 34–46.6)
HGB BLD-MCNC: 9.9 G/DL (ref 12–15.9)
LEFT ATRIUM VOLUME INDEX: 25.6 ML/M2
LV EF 2D ECHO EST: 65 %
MAXIMAL PREDICTED HEART RATE: 142 BPM
MAXIMAL PREDICTED HEART RATE: 142 BPM
MCH RBC QN AUTO: 25.3 PG (ref 26.6–33)
MCHC RBC AUTO-ENTMCNC: 29.7 G/DL (ref 31.5–35.7)
MCV RBC AUTO: 84.9 FL (ref 79–97)
PLATELET # BLD AUTO: 349 10*3/MM3 (ref 140–450)
PMV BLD AUTO: 9.2 FL (ref 6–12)
POTASSIUM SERPL-SCNC: 3.4 MMOL/L (ref 3.5–5.2)
PROT SERPL-MCNC: 6.5 G/DL (ref 6–8.5)
QT INTERVAL: 356 MS
QTC INTERVAL: 426 MS
RBC # BLD AUTO: 3.92 10*6/MM3 (ref 3.77–5.28)
SODIUM SERPL-SCNC: 139 MMOL/L (ref 136–145)
STRESS TARGET HR: 121 BPM
STRESS TARGET HR: 121 BPM
VANCOMYCIN TROUGH SERPL-MCNC: 11.4 MCG/ML (ref 5–20)
WBC NRBC COR # BLD: 9.67 10*3/MM3 (ref 3.4–10.8)

## 2022-07-12 PROCEDURE — 80053 COMPREHEN METABOLIC PANEL: CPT | Performed by: HOSPITALIST

## 2022-07-12 PROCEDURE — 25010000002 VANCOMYCIN PER 500 MG

## 2022-07-12 PROCEDURE — 25010000002 CEFTRIAXONE PER 250 MG: Performed by: PHYSICIAN ASSISTANT

## 2022-07-12 PROCEDURE — 92610 EVALUATE SWALLOWING FUNCTION: CPT

## 2022-07-12 PROCEDURE — 71045 X-RAY EXAM CHEST 1 VIEW: CPT

## 2022-07-12 PROCEDURE — 82962 GLUCOSE BLOOD TEST: CPT

## 2022-07-12 PROCEDURE — 97165 OT EVAL LOW COMPLEX 30 MIN: CPT

## 2022-07-12 PROCEDURE — 85027 COMPLETE CBC AUTOMATED: CPT | Performed by: HOSPITALIST

## 2022-07-12 PROCEDURE — 25010000002 FUROSEMIDE PER 20 MG: Performed by: HOSPITALIST

## 2022-07-12 PROCEDURE — 97535 SELF CARE MNGMENT TRAINING: CPT

## 2022-07-12 PROCEDURE — 99232 SBSQ HOSP IP/OBS MODERATE 35: CPT | Performed by: HOSPITALIST

## 2022-07-12 PROCEDURE — 80202 ASSAY OF VANCOMYCIN: CPT

## 2022-07-12 RX ORDER — FUROSEMIDE 10 MG/ML
40 INJECTION INTRAMUSCULAR; INTRAVENOUS
Status: DISCONTINUED | OUTPATIENT
Start: 2022-07-12 | End: 2022-07-14

## 2022-07-12 RX ORDER — VANCOMYCIN HYDROCHLORIDE 1 G/200ML
1000 INJECTION, SOLUTION INTRAVENOUS EVERY 12 HOURS
Status: DISCONTINUED | OUTPATIENT
Start: 2022-07-12 | End: 2022-07-12

## 2022-07-12 RX ADMIN — DULOXETINE HYDROCHLORIDE 60 MG: 60 CAPSULE, DELAYED RELEASE ORAL at 09:07

## 2022-07-12 RX ADMIN — ACETAMINOPHEN 325MG 650 MG: 325 TABLET ORAL at 17:19

## 2022-07-12 RX ADMIN — ROPINIROLE HYDROCHLORIDE 2.75 MG: 2 TABLET, FILM COATED ORAL at 21:48

## 2022-07-12 RX ADMIN — PANTOPRAZOLE SODIUM 40 MG: 40 TABLET, DELAYED RELEASE ORAL at 21:48

## 2022-07-12 RX ADMIN — Medication 250 MG: at 21:48

## 2022-07-12 RX ADMIN — POTASSIUM CHLORIDE 40 MEQ: 750 CAPSULE, EXTENDED RELEASE ORAL at 09:28

## 2022-07-12 RX ADMIN — DONEPEZIL HYDROCHLORIDE 10 MG: 10 TABLET ORAL at 09:08

## 2022-07-12 RX ADMIN — ROPINIROLE HYDROCHLORIDE 2.75 MG: 2 TABLET, FILM COATED ORAL at 06:22

## 2022-07-12 RX ADMIN — VANCOMYCIN HYDROCHLORIDE 1000 MG: 1 INJECTION, SOLUTION INTRAVENOUS at 09:26

## 2022-07-12 RX ADMIN — Medication 10 ML: at 21:49

## 2022-07-12 RX ADMIN — MEMANTINE 5 MG: 10 TABLET ORAL at 09:07

## 2022-07-12 RX ADMIN — ACETAMINOPHEN 325MG 650 MG: 325 TABLET ORAL at 09:51

## 2022-07-12 RX ADMIN — ROPINIROLE HYDROCHLORIDE 2.75 MG: 2 TABLET, FILM COATED ORAL at 13:01

## 2022-07-12 RX ADMIN — DOXYCYCLINE 100 MG: 100 INJECTION, POWDER, LYOPHILIZED, FOR SOLUTION INTRAVENOUS at 21:47

## 2022-07-12 RX ADMIN — POTASSIUM CHLORIDE 40 MEQ: 750 CAPSULE, EXTENDED RELEASE ORAL at 13:00

## 2022-07-12 RX ADMIN — MONTELUKAST 10 MG: 10 TABLET, FILM COATED ORAL at 21:48

## 2022-07-12 RX ADMIN — FUROSEMIDE 40 MG: 10 INJECTION, SOLUTION INTRAMUSCULAR; INTRAVENOUS at 09:31

## 2022-07-12 RX ADMIN — DOXYCYCLINE 100 MG: 100 INJECTION, POWDER, LYOPHILIZED, FOR SOLUTION INTRAVENOUS at 11:15

## 2022-07-12 RX ADMIN — Medication 5 MG: at 21:48

## 2022-07-12 RX ADMIN — FUROSEMIDE 40 MG: 10 INJECTION, SOLUTION INTRAMUSCULAR; INTRAVENOUS at 17:22

## 2022-07-12 RX ADMIN — Medication 250 MG: at 09:07

## 2022-07-12 RX ADMIN — METOLAZONE 2.5 MG: 2.5 TABLET ORAL at 09:07

## 2022-07-12 RX ADMIN — SODIUM CHLORIDE 1 G: 900 INJECTION INTRAVENOUS at 21:47

## 2022-07-12 RX ADMIN — ACETAMINOPHEN 325MG 650 MG: 325 TABLET ORAL at 02:32

## 2022-07-12 RX ADMIN — PANTOPRAZOLE SODIUM 40 MG: 40 TABLET, DELAYED RELEASE ORAL at 09:06

## 2022-07-12 NOTE — PLAN OF CARE
Goal Outcome Evaluation:  Plan of Care Reviewed With: patient           Outcome Evaluation: OT eval completed. Pt presents with decreased activity tolerance and increased BLE edema impacting ADL's. Pt performed UB bathing and oral care standing sink side with SUP, seated rest break needed at midpoint. Pt ambulated to/from bathroom with CGA/RW, assist to manage O2 tubing, SUP for toilet tx and toileting tasks. OT to follow to progress self-care. Recommend discharge home with HHOT to support goal of return to PLOF.

## 2022-07-12 NOTE — THERAPY EVALUATION
Patient Name: Lorrie Pagan  : 1945    MRN: [id number]                              Today's Date: 2022       Admit Date: 7/10/2022    Visit Dx:     ICD-10-CM ICD-9-CM   1. Peripheral edema  R60.9 782.3   2. Cellulitis of lower extremity, unspecified laterality  L03.119 682.6   3. Opacity of lung on imaging study  R91.8 793.19   4. Dysphagia, unspecified type  R13.10 787.20     Patient Active Problem List   Diagnosis   • Bronchiectasis (CMS/HCC)   • H/O: lung cancer (Prior resection )   • Chronic respiratory failure   • RENETTA (Previous CPAP)   • Hiatal hernia (Prior Nissen )   • Mycobacterium avium complex colonization   • Irritable bowel syndrome with diarrhea   • Cellulitis of both lower extremities   • Aortic valve regurgitation   • Benign hypertension   • Peripheral edema   • Impairment of balance   • Peripheral neuropathy   • Peripheral venous insufficiency   • Chronic diastolic heart failure (Formerly [facility])   • Syncope   • Hypokalemia   • Hypomagnesemia   • Stage II, moderate, COPD   • Memory loss   • Restless leg syndrome   • Controlled type 2 diabetes mellitus without complication, without long-term current use of insulin (Formerly [facility])   • Vitamin D deficiency   • Mixed hyperlipidemia   • Allergic rhinitis   • Normocytic anemia   • Gastroesophageal reflux disease with esophagitis without hemorrhage   • Acquired hypothyroidism   • Esophageal dysmotility   • Dysphagia   • Gastroparesis   • Degenerative disc disease, cervical   • Degenerative disc disease, lumbar   • Globus sensation   • Sialorrhea   • Drooling   • Dysfunction of both eustachian tubes   • Tinnitus of both ears   • BRBPR (bright red blood per rectum)   • Iron deficiency anemia     Past Medical History:   Diagnosis Date   • Back pain    • Bronchiectasis (HCC)    • Bronchitis 2018   • Cancer (Formerly [facility])     History of lung cancer and skin cancer    • Cardiac murmur    • Chronic cough    • Chronic obstructive pulmonary disease (HCC)    • Colon  polyp    • DDD (degenerative disc disease), lumbar    • Depression    • Diabetes mellitus (HCC)     DX: 2019, CHECK BS QOD, LAST A1C 6.3??   • Disease of thyroid gland    • Esophageal dilatation 9/20/2019    Added automatically from request for surgery [id number]   • Esophageal dysphagia 10/24/2019    Added automatically from request for surgery [id number]   • Former smoker (None since 1992) 8/29/2019   • GERD (gastroesophageal reflux disease)    • Glaucoma    • Globus sensation 1/27/2020   • History of colonic polyps    • History of transfusion 1988    NO REACTION    • Hyperlipidemia    • Hypertension    • Irritable bowel syndrome     Constipation/diarrhea   • Legally blind    • Lung cancer (HCC)    • Macular degeneration    • Neuropathy    • Obesity 2/26/2020   • Osteoarthritis    • Oxygen dependent     5L   • Pneumonia    • Sleep apnea     NON COMPLIANT WITH CPAP USE   • UTI (urinary tract infection)    • Wears glasses      Past Surgical History:   Procedure Laterality Date   • BACK SURGERY      X2 (LUMBAR)   • BREAST BIOPSY      20+ years ago   • BREAST BIOPSY Right 08/2021    fna ln   • CATARACT EXTRACTION Bilateral    • CHOLECYSTECTOMY     • COLONOSCOPY     • ENDOSCOPY N/A 11/06/2019    Procedure: ESOPHAGOGASTRODUODENOSCOPY;  Surgeon: Cortes Millan MD;  Location:  [facility];  Service: Gastroenterology   • ENDOSCOPY N/A 04/29/2021    Procedure: ESOPHAGOGASTRODUODENOSCOPY;  Surgeon: Cortes Millan MD;  Location:  [facility];  Service: Gastroenterology;  Laterality: N/A;  balloon dilation    • HAND SURGERY Right     laceration repair   • INCONTINENCE SURGERY      BLADDER   • LUNG REMOVAL, PARTIAL Left 2016   • NISSEN FUNDOPLICATION     • TOTAL ABDOMINAL HYSTERECTOMY  1988    benign cyst   • US GUIDED FINE NEEDLE ASPIRATION  08/27/2021      General Information     Row Name 07/12/22 1025          OT Time and Intention    Document Type evaluation  -FLORENTIN     Mode of Treatment  occupational therapy  -     Row Name 07/12/22 1025          General Information    Patient Profile Reviewed yes  -FLORENTIN     Prior Level of Function independent:;ADL's;bed mobility;transfer;all household mobility;community mobility;max assist:;home management;cooking  Pt has assist for heavy housework, meals provided in diningroom  -FLORENTIN     Existing Precautions/Restrictions fall;oxygen therapy device and L/min;other (see comments)  4L baseline  -FLORENTIN     Barriers to Rehab medically complex;previous functional deficit;visual deficit;impaired sensation  -FLORENTIN     Row Name 07/12/22 1025          Occupational Profile    Environmental Supports and Barriers (Occupational Profile) Lives in senior living apartments with housekeeping services and main meals provided, getting rollator for household distances, uses motorized scooter for community mobility, on 4L supplemental O2 at baseline.  -     Row Name 07/12/22 1025          Living Environment    People in Home alone;other (see comments)  senior living apartments  -FLORENTIN     Row Name 07/12/22 1025          Home Main Entrance    Number of Stairs, Main Entrance none  -FLORENTIN     Row Name 07/12/22 1025          Stairs Within Home, Primary    Number of Stairs, Within Home, Primary none  -FLORENTIN     Stairs Comment, Within Home, Primary elevator access  -FLORENTIN     Row Name 07/12/22 1025          Cognition    Orientation Status (Cognition) oriented x 3  -     Row Name 07/12/22 1025          Safety Issues, Functional Mobility    Safety Issues Affecting Function (Mobility) awareness of need for assistance;insight into deficits/self-awareness;judgment;problem-solving;safety precaution awareness;safety precautions follow-through/compliance  -FLORENTIN     Impairments Affecting Function (Mobility) balance;endurance/activity tolerance;shortness of breath  -FLORENTIN           User Key  (r) = Recorded By, (t) = Taken By, (c) = Cosigned By    Initials Name Provider Type    Kandy Clements OT Occupational Therapist                  Mobility/ADL's     Row Name 07/12/22 1030          Bed Mobility    Comment, (Bed Mobility) UIC  -     Row Name 07/12/22 1030          Transfers    Transfers sit-stand transfer;stand-sit transfer;toilet transfer  -     Sit-Stand Haskell (Transfers) supervision;verbal cues  -FLORENTIN     Stand-Sit [place] (Transfers) supervision;verbal cues  -FLORENTIN     Haskell Level (Toilet Transfer) supervision  -     Assistive Device (Toilet Transfer) commode;grab bars/safety frame  -     Row Name 07/12/22 1030          Sit-Stand Transfer    Assistive Device (Sit-Stand Transfers) walker, front-wheeled  -     Comment, (Sit-Stand Transfer) cues for line management/safety  -[facility] Name 07/12/22 1030          Stand-Sit Transfer    Assistive Device (Stand-Sit Transfers) walker, front-wheeled  -[facility] Name 07/12/22 1030          Toilet Transfer    Type (Toilet Transfer) stand pivot/stand step  -[facility] Name 07/12/22 1030          Functional Mobility    Functional Mobility- Ind. Level contact guard assist  -     Functional Mobility- Device walker, front-wheeled  -     Functional Mobility-Distance (Feet) 16  -FLORENTIN     Functional Mobility- Safety Issues supplemental O2  -     Functional Mobility- Comment Pt ambulated to/from bathroom with CGA/assist to manage O2 tubing  -[facility] Name 07/12/22 1030          Activities of Daily Living    BADL Assessment/Intervention lower body dressing;bathing;grooming;toileting  -     Row Name 07/12/22 1030          Lower Body Dressing Assessment/Training    Haskell Level (Lower Body Dressing) don;shoes/slippers;set up  -FLORENTIN     Position (Lower Body Dressing) unsupported sitting  -FLORENTIN     Comment, (Lower Body Dressing) donning slip-on shoes while seated in chair  -     Row Name 07/12/22 1030          Bathing Assessment/Intervention    Haskell Level (Bathing) upper body;set up  -FLORENTIN     Position (Bathing) sink side;supported standing  -FLORENTIN      Comment, (Bathing) Pt performed UB sponge bath standing sink side with BARRERA/SUP  -     Row Name 07/12/22 1030          Grooming Assessment/Training    [place] Level (Grooming) hair care, combing/brushing;oral care regimen;wash face, hands;supervision  -     Position (Grooming) sink side;unsupported sitting  -     Comment, (Grooming) Pt performed following seated rest break from bathing activity  -     Row Name 07/12/22 1030          Toileting Assessment/Training    [place] Level (Toileting) adjust/manage clothing;perform perineal hygiene;supervision  -     Assistive Devices (Toileting) commode;grab bar/safety frame  -     Position (Toileting) unsupported sitting;supported standing  -           User Key  (r) = Recorded By, (t) = Taken By, (c) = Cosigned By    Initials Name Provider Type    Kandy Clements OT Occupational Therapist               Obj/Interventions     Row Name 07/12/22 1034          Sensory Assessment (Somatosensory)    Sensory Assessment (Somatosensory) UE sensation intact  -[facility] Name 07/12/22 1034          Vision Assessment/Intervention    Visual Impairment/Limitations corrective lenses full-time;other (see comments)  Pt reports hx of glaucoma and macular degeneration  -     Row Name 07/12/22 1034          Range of Motion Comprehensive    General Range of Motion bilateral upper extremity ROM WFL  -     Row Name 07/12/22 1034          Strength Comprehensive (MMT)    Comment, General Manual Muscle Testing (MMT) Assessment BUE's grossly 4-/5  -     Row Name 07/12/22 1034          Balance    Balance Assessment sitting static balance;sitting dynamic balance;standing static balance;standing dynamic balance  -     Static Sitting Balance standby assist  -     Dynamic Sitting Balance standby assist  -FLORENTIN     Position, Sitting Balance unsupported;sitting in chair  -     Static Standing Balance supervision  -     Dynamic Standing Balance contact guard  -      Position/Device Used, Standing Balance supported;walker, front-wheeled  -FLORENTIN     Balance Interventions sit to stand;occupation based/functional task;UE activity with balance activity  -FLORENTIN     Comment, Balance SUP for toilet transfer and clothing management in standing while utilizing grab bar  -FLORENTIN           User Key  (r) = Recorded By, (t) = Taken By, (c) = Cosigned By    Initials Name Provider Type    Kandy Clements OT Occupational Therapist               Goals/Plan     Row Name 07/12/22 1041          Bed Mobility Goal 1 (OT)    Activity/Assistive Device (Bed Mobility Goal 1, OT) sit to supine;supine to sit  -FLORENTIN     Mitchell Level/Cues Needed (Bed Mobility Goal 1, OT) standby assist  -FLORENTIN     Time Frame (Bed Mobility Goal 1, OT) long term goal (LTG);by discharge  -FLORENTIN     Progress/Outcomes (Bed Mobility Goal 1, OT) new goal  -FLORENTIN     Row Name 07/12/22 1041          Transfer Goal 1 (OT)    Activity/Assistive Device (Transfer Goal 1, OT) sit-to-stand/stand-to-sit;toilet;walker, rolling  -FLORENTIN     Mitchell Level/Cues Needed (Transfer Goal 1, OT) modified independence  -FLORENTIN     Time Frame (Transfer Goal 1, OT) long term goal (LTG);by discharge  -FLORENTIN     Progress/Outcome (Transfer Goal 1, OT) new goal  -FLORENTIN     Row Name 07/12/22 1041          Toileting Goal 1 (OT)    Activity/Device (Toileting Goal 1, OT) adjust/manage clothing;perform perineal hygiene;commode;grab bar/safety frame  -FLORENTIN     Mitchell Level/Cues Needed (Toileting Goal 1, OT) modified independence  -FLORENTIN     Time Frame (Toileting Goal 1, OT) long term goal (LTG);by discharge  -FLORENTIN     Progress/Outcome (Toileting Goal 1, OT) new goal  -FLORENTIN     Row Name 07/12/22 1041          Therapy Assessment/Plan (OT)    Planned Therapy Interventions (OT) activity tolerance training;BADL retraining;functional balance retraining;occupation/activity based interventions;patient/caregiver education/training;ROM/therapeutic exercise;strengthening exercise;transfer/mobility  retraining  -           User Key  (r) = Recorded By, (t) = Taken By, (c) = Cosigned By    Initials Name Provider Type    Kandy Clements, OT Occupational Therapist               Clinical Impression     Row Name 07/12/22 1036          Pain Scale: FACES Pre/Post-Treatment    Pain: FACES Scale, Pretreatment 0-->no hurt  -FLORENTIN     Posttreatment Pain Rating 0-->no hurt  -FLORENTIN     Row Name 07/12/22 1036          Plan of Care Review    Plan of Care Reviewed With patient  -     Outcome Evaluation OT eval completed. Pt presents with decreased activity tolerance and increased BLE edema impacting ADL's. Pt performed UB bathing and oral care standing sink side with SUP, seated rest break needed at midpoint. Pt ambulated to/from bathroom with CGA/RW, assist to manage O2 tubing, SUP for toilet tx and toileting tasks. OT to follow to progress self-care. Recommend discharge home with HHOT to support goal of return to PLOF.  -     Row Name 07/12/22 1036          Therapy Assessment/Plan (OT)    Rehab Potential (OT) good, to achieve stated therapy goals  -     Criteria for Skilled Therapeutic Interventions Met (OT) yes;meets criteria;skilled treatment is necessary  -     Therapy Frequency (OT) daily  -     Row Name 07/12/22 1036          Therapy Plan Review/Discharge Plan (OT)    Anticipated Discharge Disposition (OT) home;home with home health  -     Row Name 07/12/22 1036          Vital Signs    Pre Systolic BP Rehab 132  -FLORENTIN     Pre Treatment Diastolic BP 52  -FLORENTIN     Post Systolic BP Rehab 150  -FLORENTIN     Post Treatment Diastolic BP 65  -FLORENTIN     Pretreatment Heart Rate (beats/min) 90  -FLORENTIN     Intratreatment Heart Rate (beats/min) 110  -FLORENTIN     Posttreatment Heart Rate (beats/min) 88  -FLORENTIN     Pre SpO2 (%) 95  -FLORENTIN     O2 Delivery Pre Treatment supplemental O2  -FLORENTIN     Intra SpO2 (%) 92  -FLORENTIN     O2 Delivery Intra Treatment supplemental O2  -FLORENTIN     Post SpO2 (%) 96  -FLORENTIN     O2 Delivery Post Treatment supplemental O2  -FLORENTIN     Pre  Patient Position Sitting  -FLORENTIN     Intra Patient Position Standing  -FLORENTIN     Post Patient Position Sitting  -FLORENTIN     Row Name 07/12/22 1036          Positioning and Restraints    Post Treatment Position chair  -FLORENTIN     In Chair sitting;call light within reach;encouraged to call for assist;exit alarm on;waffle cushion;with nsg  -FLORENTIN           User Key  (r) = Recorded By, (t) = Taken By, (c) = Cosigned By    Initials Name Provider Type    Kandy Clements OT Occupational Therapist               Outcome Measures     Row Name 07/12/22 1042          How much help from another is currently needed...    Putting on and taking off regular lower body clothing? 3  -FLORENTIN     Bathing (including washing, rinsing, and drying) 3  -FLORENTIN     Toileting (which includes using toilet bed pan or urinal) 3  -FLORENTIN     Putting on and taking off regular upper body clothing 3  -FLORENTIN     Taking care of personal grooming (such as brushing teeth) 3  -FLORENTIN     Eating meals 3  -FLORENTIN     AM-PAC 6 Clicks Score (OT) 18  -FLORENTIN     Row Name 07/12/22 1042          Functional Assessment    Outcome Measure Options AM-PAC 6 Clicks Daily Activity (OT)  -FLORENTIN           User Key  (r) = Recorded By, (t) = Taken By, (c) = Cosigned By    Initials Name Provider Type    Kandy Clements OT Occupational Therapist                Occupational Therapy Education                 Title: PT OT SLP Therapies (In Progress)     Topic: Occupational Therapy (In Progress)     Point: ADL training (Done)     Description:   Instruct learner(s) on proper safety adaptation and remediation techniques during self care or transfers.   Instruct in proper use of assistive devices.              Learning Progress Summary           Patient Acceptance, E, VU by FLORENTIN at 7/12/2022 1043    Comment: OT POC, fall prevention, discharge planning                   Point: Home exercise program (Not Started)     Description:   Instruct learner(s) on appropriate technique for monitoring, assisting and/or progressing  therapeutic exercises/activities.              Learner Progress:  Not documented in this visit.          Point: Precautions (Done)     Description:   Instruct learner(s) on prescribed precautions during self-care and functional transfers.              Learning Progress Summary           Patient Acceptance, E, VU by FLORENTIN at 7/12/2022 1043    Comment: OT POC, fall prevention, discharge planning                   Point: Body mechanics (Done)     Description:   Instruct learner(s) on proper positioning and spine alignment during self-care, functional mobility activities and/or exercises.              Learning Progress Summary           Patient Acceptance, E, VU by FLORENTIN at 7/12/2022 1043    Comment: OT POC, fall prevention, discharge planning                               User Key     Initials Effective Dates Name Provider Type Discipline    FLORENTIN 06/16/21 -  Kandy Messer OT Occupational Therapist OT              OT Recommendation and Plan  Planned Therapy Interventions (OT): activity tolerance training, BADL retraining, functional balance retraining, occupation/activity based interventions, patient/caregiver education/training, ROM/therapeutic exercise, strengthening exercise, transfer/mobility retraining  Therapy Frequency (OT): daily  Plan of Care Review  Plan of Care Reviewed With: patient  Outcome Evaluation: OT eval completed. Pt presents with decreased activity tolerance and increased BLE edema impacting ADL's. Pt performed UB bathing and oral care standing sink side with SUP, seated rest break needed at midpoint. Pt ambulated to/from bathroom with CGA/RW, assist to manage O2 tubing, SUP for toilet tx and toileting tasks. OT to follow to progress self-care. Recommend discharge home with HHOT to support goal of return to PLOF.     Time Calculation:    Time Calculation- OT     Row Name 07/12/22 0835             Time Calculation- OT    OT Start Time 0835  -FLORENTIN      OT Received On 07/12/22  -      OT Goal Re-Cert Due Date  07/22/22  -FLORENTIN              Timed Charges    99904 - OT Self Care/Mgmt Minutes 15  -FLORENTIN              Untimed Charges    OT Eval/Re-eval Minutes 50  -FLORENTIN              Total Minutes    Timed Charges Total Minutes 15  -FLORENTIN      Untimed Charges Total Minutes 50  -FLORENTIN       Total Minutes 65  -FLORENTIN            User Key  (r) = Recorded By, (t) = Taken By, (c) = Cosigned By    Initials Name Provider Type    Kandy Clements OT Occupational Therapist              Therapy Charges for Today     Code Description Service Date Service Provider Modifiers Qty    72625646076 HC OT SELF CARE/MGMT/TRAIN EA 15 MIN 7/12/2022 Kandy Messer OT GO 1    [id number] HC OT EVAL LOW COMPLEXITY 4 7/12/2022 Kandy Messer OT GO 1               Kandy Messer OT  7/12/2022

## 2022-07-12 NOTE — PLAN OF CARE
Goal Outcome Evaluation:  Plan of Care Reviewed With: patient      SLP re-evaluation completed. Will continue to address dysphagia via MBS on 7/13. Please see note for further details and recommendations.

## 2022-07-12 NOTE — CASE MANAGEMENT/SOCIAL WORK
Continued Stay Note   [place]     Patient Name: Lorrie Pagan  MRN: [id number]  Today's Date: 7/12/2022    Admit Date: 7/10/2022     Discharge Plan     Row Name 07/12/22 1420       Plan    Plan Home    Patient/Family in Agreement with Plan yes    Plan Comments PT and OT have worked with patient and are recommending home health.  Spoke with patient at bedside.  Patient denies wanting home health, stating that there is a facility that is near her where she lives, and they also offer OT.  Patient denied any other needs prior to discharge.    Final Discharge Disposition Code 01 - home or self-care               Discharge Codes    No documentation.               Expected Discharge Date and Time     Expected Discharge Date Expected Discharge Time    Jul 14, 2022             Aleida Pérez RN

## 2022-07-12 NOTE — PROGRESS NOTES
[facility] Medicine Services  PROGRESS NOTE    Patient Name: Lorrie Pagan  : 1945  MRN: [id number]    Date of Admission: 7/10/2022  Primary Care Physician: Dacia Viera MD    Subjective   Subjective     CC:  LE swelling    HPI:  Patient sitting up in bedside chair, states she is more short of air and oxygen had to be increased from 3L to 4L this am. She has more course sounding lung sounds this am as well.    ROS:  Gen- No fevers, chills  CV- No chest pain, palpitations  Resp- No cough, +dyspnea  GI- No N/V/D, abd pain  +b/l LE erythema, swelling and weeping  +reflux, trouble swallowing     Objective   Objective     Vital Signs:   Temp:  [96.7 °F (35.9 °C)-97.7 °F (36.5 °C)] 97.7 °F (36.5 °C)  Heart Rate:  [71-99] 82  Resp:  [18] 18  BP: (122-145)/(52-70) 132/52  Flow (L/min):  [2-3] 3     Physical Exam:  Constitutional: No acute distress, awake, alert  HENT: NCAT, mucous membranes moist  Respiratory: course breath sounds with crackles in b/l bases, respiratory effort normal   Cardiovascular: RRR, no murmurs, rubs, or gallops  Gastrointestinal: Positive bowel sounds, soft, nontender, nondistended  Musculoskeletal: 1+ bilateral ankle edema  Psychiatric: Appropriate affect, cooperative  Neurologic: Oriented x 3, strength symmetric in all extremities, Cranial Nerves grossly intact to confrontation, speech clear  Skin: No rashes, significant b/l LE erythema with weeping of b/l legs, warm to palpation of b/l LE anteriorly    Results Reviewed:  LAB RESULTS:      Lab 22  0437 22  0457 07/10/22  1815 22  1053   WBC 9.67 8.58 9.58 10.09   HEMOGLOBIN 9.9* 10.0* 10.1* 11.0*   HEMATOCRIT 33.3* 33.8* 34.7 35.4   PLATELETS 349 383 364 420   NEUTROS ABS  --   --  6.81 7.72*   IMMATURE GRANS (ABS)  --   --  0.06* 0.07*   LYMPHS ABS  --   --  1.85 1.52   MONOS ABS  --   --  0.63 0.58   EOS ABS  --   --  0.20 0.17   MCV 84.9 83.9 87.4 81.2   SED RATE  --   --  30   --    CRP  --   --  1.63*  --    PROCALCITONIN  --   --  0.07  --    LACTATE  --   --  0.6  --    D DIMER QUANT  --   --  0.32  --          Lab 07/12/22  0437 07/11/22  1106 07/11/22  0457 07/10/22  1815 07/05/22  1053   SODIUM 139  --  144 139 143   POTASSIUM 3.4* 4.0 3.7 3.5 3.6   CHLORIDE 95*  --  98 98 97*   CO2 38.0*  --  38.0* 36.0* 34.5*   ANION GAP 6.0  --  8.0 5.0 11.5   BUN 12  --  15 12 15   CREATININE 0.56*  --  0.66 0.70 0.64   EGFR 93.5  --  89.9 88.7 90.6   GLUCOSE 134*  --  140* 99 100*   CALCIUM 8.9  --  8.8 9.3 10.1   HEMOGLOBIN A1C  --   --  6.50*  --   --    TSH  --   --  2.420  --   --          Lab 07/12/22  0437 07/11/22  0457 07/10/22  1815 07/05/22  1053   TOTAL PROTEIN 6.5 6.5 6.9 7.2   ALBUMIN 3.60 3.70 3.80 4.50   GLOBULIN 2.9 2.8 3.1 2.7   ALT (SGPT) 8 9 9 10   AST (SGOT) 14 14 14 18   BILIRUBIN 0.3 0.2 0.2 0.3   ALK PHOS 120* 116 114 115         Lab 07/10/22  1815 07/05/22  1053   PROBNP 26.9 57.8   TROPONIN T <0.010  --              Lab 07/11/22 0457   IRON 26*   IRON SATURATION 6*   TIBC 440   TRANSFERRIN 295   FERRITIN 23.17         Brief Urine Lab Results  (Last result in the past 365 days)      Color   Clarity   Blood   Leuk Est   Nitrite   Protein   CREAT   Urine HCG        10/01/21 1204 Yellow   Clear   Negative   Negative   Negative   Negative                 Microbiology Results Abnormal     Procedure Component Value - Date/Time    Blood Culture - Blood, Arm, Right [[id number]]  (Normal) Collected: 07/10/22 1815    Lab Status: Preliminary result Specimen: Blood from Arm, Right Updated: 07/11/22 2017     Blood Culture No growth at 24 hours    Blood Culture - Blood, Arm, Left [[id number]]  (Normal) Collected: 07/10/22 1850    Lab Status: Preliminary result Specimen: Blood from Arm, Left Updated: 07/11/22 2002     Blood Culture No growth at 24 hours    Narrative:      Aerobic bottle only      COVID PRE-OP / PRE-PROCEDURE SCREENING ORDER (NO ISOLATION) - Swab, Nasopharynx [[id number]]   (Normal) Collected: 07/10/22 1925    Lab Status: Final result Specimen: Swab from Nasopharynx Updated: 07/10/22 1955    Narrative:      The following orders were created for panel order COVID PRE-OP / PRE-PROCEDURE SCREENING ORDER (NO ISOLATION) - Swab, Nasopharynx.  Procedure                               Abnormality         Status                     ---------                               -----------         ------                     COVID-19 and FLU A/B PCR...[[id number]]  Normal              Final result                 Please view results for these tests on the individual orders.    COVID-19 and FLU A/B PCR - Swab, Nasopharynx [[id number]]  (Normal) Collected: 07/10/22 1925    Lab Status: Final result Specimen: Swab from Nasopharynx Updated: 07/10/22 1955     COVID19 Not Detected     Influenza A PCR Not Detected     Influenza B PCR Not Detected    Narrative:      Fact sheet for providers: https://www.fda.gov/media/813041/download    Fact sheet for patients: https://www.fda.gov/media/905508/download    Test performed by PCR.          XR Chest 1 View    Result Date: 7/10/2022  XR CHEST 1 VW-  Date of Exam: 7/10/2022 5:34 PM  Indication: dyspnea.  Comparison:?05/31/2022  Technique:?A single view of the chest was obtained.  FINDINGS:  ?Heart size and pulmonary vessels are within normal limits.  There is new left basilar airspace disease which may be secondary to atelectasis or pneumonia.  There are postoperative changes of the left upper lobe. Right lung is clear.  No definite pleural effusion or pneumothorax. Spinal neurostimulator device remains in place.  Judging over the lower thoracic spine.        Impression:   1.  New left basilar airspace disease which may be secondary to atelectasis or pneumonia.   This report was finalized on 7/10/2022 5:52 PM by Darrian Ralph MD.        Results for orders placed during the hospital encounter of 09/25/20    Adult Transthoracic Echo Complete W/ Cont if Necessary Per  Protocol    Interpretation Summary  · The left atrial cavity is borderline dilated.  · Mild aortic valve regurgitation is present.  · Mild tricuspid valve regurgitation is present.  · Estimated right ventricular systolic pressure from tricuspid regurgitation is normal (<35 mmHg).  · Estimated left ventricular EF = 60% Left ventricular ejection fraction appears to be 56 - 60%. Left ventricular systolic function is normal.      I have reviewed the medications:  Scheduled Meds:cefTRIAXone, 1 g, Intravenous, Nightly  donepezil, 10 mg, Oral, Daily  doxycycline, 100 mg, Intravenous, Q12H  DULoxetine, 60 mg, Oral, Daily  furosemide, 40 mg, Intravenous, Q12H  insulin lispro, 0-7 Units, Subcutaneous, TID AC  ketoconazole, 1 application, Topical, Q24H  memantine, 5 mg, Oral, Daily  metOLazone, 2.5 mg, Oral, Daily  montelukast, 10 mg, Oral, Nightly  pantoprazole, 40 mg, Oral, BID  rOPINIRole, 2.75 mg, Oral, Q8H  saccharomyces boulardii, 250 mg, Oral, BID  sodium chloride, 10 mL, Intravenous, Q12H  vancomycin, 1,000 mg, Intravenous, Q12H      Continuous Infusions:Pharmacy to dose vancomycin,       PRN Meds:.•  acetaminophen  •  albuterol  •  dextrose  •  dextrose  •  glucagon (human recombinant)  •  melatonin  •  ondansetron  •  Pharmacy to dose vancomycin  •  potassium chloride  •  potassium chloride  •  [COMPLETED] Insert peripheral IV **AND** sodium chloride  •  sodium chloride    Assessment & Plan   Assessment & Plan     Active Hospital Problems    Diagnosis  POA   • **Cellulitis of both lower extremities [L03.115, L03.116]  Yes   • Iron deficiency anemia [D50.9]  Yes   • BRBPR (bright red blood per rectum) [K62.5]  Yes   • Gastroparesis [K31.84]  Yes   • Dysphagia [R13.10]  Yes   • Acquired hypothyroidism [E03.9]  Yes   • Gastroesophageal reflux disease with esophagitis without hemorrhage [K21.00]  Yes   • Restless leg syndrome [G25.81]  Yes   • Controlled type 2 diabetes mellitus without complication, without long-term  current use of insulin (HCC) [E11.9]  Yes   • Mixed hyperlipidemia [E78.2]  Yes   • Stage II, moderate, COPD [J44.9]  Yes   • Syncope [R55]  Yes   • Chronic diastolic heart failure (HCC) [I50.32]  Yes   • Benign hypertension [I10]  Yes   • Aortic valve regurgitation [I35.1]  Yes   • Peripheral edema [R60.9]  Yes   • RENETTA (Previous CPAP) [G47.33, Z99.89]  Not Applicable   • H/O: lung cancer (Prior resection 2016) [Z85.118]  Not Applicable   • Peripheral neuropathy [G62.9]  Yes      Resolved Hospital Problems   No resolved problems to display.        Brief Hospital Course to date:  Lorrie Pagan is a 76 y.o. female with a past medical history significant for chronic respiratory failure with COPD on 4L supplemental oxygen, chronic diastolic CHF,hypertension, HLD, DM2 with gastroparesis and peripheral neuropathy, dementia, RENETTA, GERD, hypothyroidism, and anxiety/depression that presented to the ED complaining of LE redness, weeping and swelling along with dysphagia. Patient admitted to the hospitalist service for further evaluation.    This patient's problems and plans were partially entered by my partner and updated as appropriate by me 07/12/22.    Cellulitis of Both Lower Extremities   BLE edema/chronic diastolic CHF  - failed outpatient treatment. Afebrile without leukocytosis  initially thought to be stasis dermatitis which is certainly playing a role, however patient with severe tenderness and increased warmth. Favor a superimposed cellulitis  - d-dimer stable, inflammatory markers stable  - blood cultures pending  - LE venous doppler b/l negative for DVT  - continue rocephin (PCN allergy), Vancomycin  - administered 40 mg lasix in ED  - continue home Zaroxolyn  - hold home PO Lasix and change to Lasix 40mg IV BID due to worsened shortness of air and lung sounds on physical exam today.  - CXR pending  - daily weights  - strict I&O  - ECHO from 2020 EF 60%  - 7/11/22 ECHO: EF of 65% with tricuspid  regurgitation   - TSH stable     BRBPR:  Dysphagia  Iron deficiency anemia  - continue PPI  - SLP treat eval  - 1 g drop in hemoglobin from 7/5/22 to 7/10/22  - iron low at 26- transfuse IV daily x 3 days  - occult blood stool negative  - She is followed by Dr. Aldana per GI and has undergone multiple upper and lower endoscopies. Also undergone esophageal dilatation in the past.   - type and screen  - GI saw the patient and would like to scheduled outpatient colonoscopy   - monitor H/H     Recurrent syncope  --echo pending  --orthostatics  --dimer negative     Chronic Respiratory Failure:  Stage II COPD   H/P lung cancer s/p resection 2016  - on 4L supplemental oxygen  - follows with pulmonology, Saray Retana.   - Has upcoming outpatient bronchoscopy scheduled     DM2:  - hold oral hypoglycemics  - Hb A1c 7.00 4/2022. Well controlled  - SSI with scheduled accu checks     Dementia  - continue  Aricept, Namenda      Peripheral neuropathy:  RLS  - continue requip, cymbalta  - PT/OT     Hypothyroidism:  - continue synthroid  - TSH stable    Expected Discharge Location and Transportation: home via family/transport  Expected Discharge Date: 7/15/22    DVT prophylaxis:  Mechanical DVT prophylaxis orders are present.     AM-PAC 6 Clicks Score (PT): 19 (07/11/22 1737)    CODE STATUS:   Code Status and Medical Interventions:   Ordered at: 07/10/22 2166     Level Of Support Discussed With:    Patient     Code Status (Patient has no pulse and is not breathing):    CPR (Attempt to Resuscitate)     Medical Interventions (Patient has pulse or is breathing):    Full Support       Kinsey Recio DO  07/12/22

## 2022-07-12 NOTE — PROGRESS NOTES
Pharmacy Consult-Vancomycin Dosing  Lorrie Pagan is a  76 y.o. female receiving vancomycin therapy.     Indication:   skin / soft-tissue infection  Consulting Provider:  Dr Gonzalez  ID Consult: no    Goal AUC: 400 - 600 mg/L*hr    Current Antimicrobial Therapy  Anti-Infectives (From admission, onward)      Ordered     Dose/Rate Route Frequency Start Stop    07/12/22 0628  vancomycin (VANCOCIN) 1000 mg/200 mL dextrose 5% IVPB        Ordering Provider: Elias Stone IV, PharmD    1,000 mg  over 60 Minutes Intravenous Every 12 Hours 07/12/22 0800 07/18/22 0759    07/11/22 0039  doxycycline (VIBRAMYCIN) 100 mg in sodium chloride 0.9 % 250 mL IVPB-VTB        Ordering Provider: Cruzito Saunders DO    100 mg Intravenous Every 12 Hours Scheduled 07/11/22 0045 07/15/22 2059    07/10/22 2149  cefTRIAXone (ROCEPHIN) 1 g/100 mL 0.9% NS (MBP)        Ordering Provider: Kaushal Chavis PA-C    1 g  over 30 Minutes Intravenous Nightly 07/10/22 2200 07/17/22 2059    07/10/22 2154  vancomycin 1750 mg/500 mL 0.9% NS IVPB ([facility])        Ordering Provider: Aidee Gonzalez DO    20 mg/kg × 81.6 kg Intravenous Once 07/10/22 2156 07/11/22 0834    07/10/22 2149  Pharmacy to dose vancomycin        Ordering Provider: Kaushal Chavis PA-C     Does not apply Continuous PRN 07/10/22 2148 07/17/22 2147            Allergies  Allergies as of 07/10/2022 - Reviewed 07/10/2022   Allergen Reaction Noted    Hydrocodone Hallucinations 01/27/2022    Statins Myalgia     Metoclopramide Other (See Comments) 04/28/2021    Penicillins Rash     Tramadol Nausea And Vomiting and GI Intolerance 04/27/2020       Labs    Results from last 7 days   Lab Units 07/12/22  0437 07/11/22  0457 07/10/22  1815   BUN mg/dL 12 15 12   CREATININE mg/dL 0.56* 0.66 0.70       Results from last 7 days   Lab Units 07/12/22  0437 07/11/22  0457 07/10/22  1815   WBC 10*3/mm3 9.67 8.58 9.58       Evaluation of Dosing     Last Dose Received in the ED/Outside Facility:  "first dose 20 mg/kg (1,750 mg) sent to ED 11pm 7/10/22  Is Patient on Dialysis or Renal Replacement: no    Ht - 161.3 cm (63.5\")  Wt - 82.7 kg (182 lb 5.1 oz)    Estimated Creatinine Clearance: 85.2 mL/min (A) (by C-G formula based on SCr of 0.56 mg/dL (L)).    Intake & Output (last 3 days)         07/09 0701  07/10 0700 07/10 0701 07/11 0700 07/11 0701 07/12 0700    Urine (mL/kg/hr)  200 975 (0.5)    Stool   0    Total Output  200 975    Net  -200 -975           Stool Unmeasured Occurrence   2 x            Microbiology and Radiology  Microbiology Results (last 10 days)       Procedure Component Value - Date/Time    COVID PRE-OP / PRE-PROCEDURE SCREENING ORDER (NO ISOLATION) - Swab, Nasopharynx [[id number]]  (Normal) Collected: 07/10/22 1925    Lab Status: Final result Specimen: Swab from Nasopharynx Updated: 07/10/22 1955    Narrative:      The following orders were created for panel order COVID PRE-OP / PRE-PROCEDURE SCREENING ORDER (NO ISOLATION) - Swab, Nasopharynx.  Procedure                               Abnormality         Status                     ---------                               -----------         ------                     COVID-19 and FLU A/B PCR...[[id number]]  Normal              Final result                 Please view results for these tests on the individual orders.    COVID-19 and FLU A/B PCR - Swab, Nasopharynx [[id number]]  (Normal) Collected: 07/10/22 1925    Lab Status: Final result Specimen: Swab from Nasopharynx Updated: 07/10/22 1955     COVID19 Not Detected     Influenza A PCR Not Detected     Influenza B PCR Not Detected    Narrative:      Fact sheet for providers: https://www.fda.gov/media/557584/download    Fact sheet for patients: https://www.fda.gov/media/811391/download    Test performed by PCR.    Blood Culture - Blood, Arm, Left [[id number]]  (Normal) Collected: 07/10/22 1850    Lab Status: Preliminary result Specimen: Blood from Arm, Left Updated: 07/11/22 2002     Blood " Culture No growth at 24 hours    Narrative:      Aerobic bottle only      Blood Culture - Blood, Arm, Right [[id number]]  (Normal) Collected: 07/10/22 1815    Lab Status: Preliminary result Specimen: Blood from Arm, Right Updated: 07/11/22 2017     Blood Culture No growth at 24 hours          Lab Results   Component Value Date    GEO 11.40 07/12/2022       Assessment/Plan:    Pharmacy to dose vancomycin for BLE cellulitis. Goal -600 mg/L*hr.  Patient received a loading dose of vancomycin 1750mg IV (20 mg/kg), and was empirically started on a regimen of vancomycin 750mg IV q12h (9 mg/kg).   Trough this morning reflective of subtherapeutic exposure   mg/L*hr  Increase to 1000 mg IV q12h this morning  Estimated  mg/L*hr at steady state  Further levels per rounding pharmacist pending reassessment    Thank you for this consult.  Elias Stone IV, PharmD, BCPS  7/12/2022  06:30 EDT

## 2022-07-12 NOTE — THERAPY RE-EVALUATION
Acute Care - Speech Language Pathology   Swallow Re-Evaluation [facility]   Clinical Swallow Evaluation       Patient Name: Lorrie Pagan  : 1945  MRN: [id number]  Today's Date: 2022               Admit Date: 7/10/2022    Visit Dx:     ICD-10-CM ICD-9-CM   1. Peripheral edema  R60.9 782.3   2. Cellulitis of lower extremity, unspecified laterality  L03.119 682.6   3. Opacity of lung on imaging study  R91.8 793.19   4. Dysphagia, unspecified type  R13.10 787.20     Patient Active Problem List   Diagnosis   • Bronchiectasis (CMS/HCC)   • H/O: lung cancer (Prior resection )   • Chronic respiratory failure   • RENETTA (Previous CPAP)   • Hiatal hernia (Prior Nissen )   • Mycobacterium avium complex colonization   • Irritable bowel syndrome with diarrhea   • Cellulitis of both lower extremities   • Aortic valve regurgitation   • Benign hypertension   • Peripheral edema   • Impairment of balance   • Peripheral neuropathy   • Peripheral venous insufficiency   • Chronic diastolic heart failure (HCC)   • Syncope   • Hypokalemia   • Hypomagnesemia   • Stage II, moderate, COPD   • Memory loss   • Restless leg syndrome   • Controlled type 2 diabetes mellitus without complication, without long-term current use of insulin ([facility])   • Vitamin D deficiency   • Mixed hyperlipidemia   • Allergic rhinitis   • Normocytic anemia   • Gastroesophageal reflux disease with esophagitis without hemorrhage   • Acquired hypothyroidism   • Esophageal dysmotility   • Dysphagia   • Gastroparesis   • Degenerative disc disease, cervical   • Degenerative disc disease, lumbar   • Globus sensation   • Sialorrhea   • Drooling   • Dysfunction of both eustachian tubes   • Tinnitus of both ears   • BRBPR (bright red blood per rectum)   • Iron deficiency anemia     Past Medical History:   Diagnosis Date   • Back pain    • Bronchiectasis (HCC)    • Bronchitis 2018   • Cancer ([facility])     History of lung cancer and skin cancer    •  Cardiac murmur    • Chronic cough    • Chronic obstructive pulmonary disease (HCC)    • Colon polyp    • DDD (degenerative disc disease), lumbar    • Depression    • Diabetes mellitus (HCC)     DX: 2019, CHECK BS QOD, LAST A1C 6.3??   • Disease of thyroid gland    • Esophageal dilatation 9/20/2019    Added automatically from request for surgery [id number]   • Esophageal dysphagia 10/24/2019    Added automatically from request for surgery [id number]   • Former smoker (None since 1992) 8/29/2019   • GERD (gastroesophageal reflux disease)    • Glaucoma    • Globus sensation 1/27/2020   • History of colonic polyps    • History of transfusion 1988    NO REACTION    • Hyperlipidemia    • Hypertension    • Irritable bowel syndrome     Constipation/diarrhea   • Legally blind    • Lung cancer (HCC)    • Macular degeneration    • Neuropathy    • Obesity 2/26/2020   • Osteoarthritis    • Oxygen dependent     5L   • Pneumonia    • Sleep apnea     NON COMPLIANT WITH CPAP USE   • UTI (urinary tract infection)    • Wears glasses      Past Surgical History:   Procedure Laterality Date   • BACK SURGERY      X2 (LUMBAR)   • BREAST BIOPSY      20+ years ago   • BREAST BIOPSY Right 08/2021    fna ln   • CATARACT EXTRACTION Bilateral    • CHOLECYSTECTOMY     • COLONOSCOPY     • ENDOSCOPY N/A 11/06/2019    Procedure: ESOPHAGOGASTRODUODENOSCOPY;  Surgeon: Cortes Millan MD;  Location:  [facility];  Service: Gastroenterology   • ENDOSCOPY N/A 04/29/2021    Procedure: ESOPHAGOGASTRODUODENOSCOPY;  Surgeon: Cortes Millan MD;  Location:  [facility];  Service: Gastroenterology;  Laterality: N/A;  balloon dilation    • HAND SURGERY Right     laceration repair   • INCONTINENCE SURGERY      BLADDER   • LUNG REMOVAL, PARTIAL Left 2016   • NISSEN FUNDOPLICATION     • TOTAL ABDOMINAL HYSTERECTOMY  1988    benign cyst   • US GUIDED FINE NEEDLE ASPIRATION  08/27/2021       SLP Recommendation and Plan  SLP Swallowing  Diagnosis: R/O pharyngeal dysphagia (07/12/22 0945)  SLP Diet Recommendation: regular textures, thin liquids (07/12/22 0945)  Recommended Precautions and Strategies: upright posture during/after eating, general aspiration precautions, reflux precautions (07/12/22 0945)  SLP Rec. for Method of Medication Administration: as tolerated (07/12/22 0945)     Monitor for Signs of Aspiration: yes, notify SLP if any concerns (07/12/22 0945)  Recommended Diagnostics: VFSS (MBS) (07/12/22 0945)  Swallow Criteria for Skilled Therapeutic Interventions Met: demonstrates skilled criteria (07/12/22 0945)  Anticipated Discharge Disposition (SLP): anticipate therapy at next level of care (07/12/22 0945)  Rehab Potential/Prognosis, Swallowing: good, to achieve stated therapy goals (07/12/22 0945)                                        Plan of Care Reviewed With: patient      SWALLOW EVALUATION (last 72 hours)     SLP Adult Swallow Evaluation     Row Name 07/12/22 09 07/11/22 4695                Rehab Evaluation    Document Type re-evaluation  - evaluation  -       Subjective Information no complaints  - complains of;fatigue  -       Patient Observations alert;cooperative;agree to therapy  - alert;cooperative  -       Patient/Family/Caregiver Comments/Observations -- No family present.  -       Patient Effort good  - fair  -       Comment -- Reported feeling exhausted.  -                General Information    Patient Profile Reviewed yes  -KL yes  -AC       Pertinent History Of Current Problem See prev. evaluation.  -KL Adm w/ BLE cellulitis, possible pna. Hx lung CA s/p rsxn '16, RENETTA, CHF, COPD, GERD, bronchiectasis, HH s/p Nissen '08, DDD c-spine, hx esophageal dilation. MBS completed during previous admission w/ CHF exacerbation 11/'21. Revealed mild pharyngeal dysphagia w/ aspiration of thin liquids via consecutive drinks. SLP recommended regular diet and thin liquids via small sips w/ multiple swallows. Pt  "reported she follows these recommendations \"when she remembers.\"  -       Current Method of Nutrition regular textures;thin liquids  - regular textures;thin liquids  -AC       Precautions/Limitations, Vision WFL;for purposes of eval  -KL WFL;for purposes of eval  -AC       Precautions/Limitations, Hearing WFL;for purposes of eval  -KL WFL;for purposes of eval  -AC       Prior Level of Function-Communication unknown  - unknown  -       Prior Level of Function-Swallowing compensations (maneuvers, postures) needed;esophageal concerns  - compensations (maneuvers, postures) needed;esophageal concerns  -AC       Plans/Goals Discussed with patient  - patient  -       Barriers to Rehab previous functional deficit  - previous functional deficit  -       Patient's Goals for Discharge patient did not state  - patient did not state  -                Pain    Additional Documentation Pain Scale: FACES Pre/Post-Treatment (Group)  -KL Pain Scale: FACES Pre/Post-Treatment (Group)  -AC                Pain Scale: FACES Pre/Post-Treatment    Pain: FACES Scale, Pretreatment 0-->no hurt  -KL 0-->no hurt  -AC       Posttreatment Pain Rating 0-->no hurt  -KL 0-->no hurt  -AC                Oral Motor Structure and Function    Dentition Assessment natural, present and adequate  - natural, present and adequate  -AC       Secretion Management WNL/WFL  -KL WNL/WFL  -AC       Mucosal Quality moist, healthy  - moist, healthy  -                Oral Musculature and Cranial Nerve Assessment    Oral Motor General Assessment WFL  - WFL  -AC                General Eating/Swallowing Observations    Respiratory Support Currently in Use nasal cannula  - nasal cannula  -       Eating/Swallowing Skills self-fed  - --       Positioning During Eating upright 90 degree;upright in bed  - --       Utensils Used spoon;straw  - --       Consistencies Trialed regular textures;pureed;thin liquids  - --                " Clinical Swallow Eval    Oral Prep Phase WFL  -KL --       Oral Transit WFL  -KL --       Oral Residue WFL  -KL --       Pharyngeal Phase suspected pharyngeal impairment  -KL --       Esophageal Phase suspected esophageal impairment  -KL --       Clinical Swallow Evaluation Summary CSE completed. Pt reported getting choked when drinking too fast and food/liquid feeling stuck in chest. Pt accepted trials of thin liquid via straw, puree, and regular solid. Pt with no overt clinical s/sx of aspiration with any consistency. Recommend pt remain on regular diet with thin liquids at this time, small, single sips/bites, PNA/aspiration precautions, and consistent oral care. Pt is at high risk of aspiration and has history of mild pharyngeal dysphagia. ST recommend MBS to further assess oropharyngeal dysphagia and safest, least restrictive diet. ST discussed MBS reasoning and procedure with pt, pt stated understanding and in agreement at this time.  -KL Pt denied swallowing difficulty; however, reported increased RR w/ PO & general cough. Politely & multiple times declined PO trials 2' exhaustion. Provided education re: s/sxs aspiration, risks of aspiration. Discussed that given hx, possible new pna, & pt report, she would likely benefit from MBS as at high risk for aspiration; however, she declined MBS @ this time.  -AC                Pharyngeal Phase Concerns    Pharyngeal Phase Concerns other (see comments)  history of pharyngeal dysphagia  -KL --                Esophageal Phase Concerns    Esophageal Phase Concerns other (see comments)  Pt with c/o freq belching and PO feeling stuck in chest  - --                SLP Evaluation Clinical Impression    SLP Swallowing Diagnosis R/O pharyngeal dysphagia  -KL R/O pharyngeal dysphagia  -       Functional Impact risk of aspiration/pneumonia  - risk of aspiration/pneumonia  -AC       Rehab Potential/Prognosis, Swallowing good, to achieve stated therapy goals  -KL good, to  achieve stated therapy goals  -       Swallow Criteria for Skilled Therapeutic Interventions Met demonstrates skilled criteria  - demonstrates skilled criteria  -                Recommendations    SLP Diet Recommendation regular textures;thin liquids  - other (see comments)  limited eval/pt declined PO trials--will defer to physician  -       Recommended Diagnostics VFSS (MBS)  -KL VFSS (MBS);other (see comments)  pt declined @ this time  -       Recommended Precautions and Strategies upright posture during/after eating;general aspiration precautions;reflux precautions  - upright posture during/after eating;general aspiration precautions;reflux precautions  -       Oral Care Recommendations Oral Care BID/PRN  -KL Oral Care BID/PRN  -       SLP Rec. for Method of Medication Administration as tolerated  - as tolerated  -       Monitor for Signs of Aspiration yes;notify SLP if any concerns  - yes;notify SLP if any concerns  -       Anticipated Discharge Disposition (SLP) anticipate therapy at next level of care  - anticipate therapy at next level of care  -             User Key  (r) = Recorded By, (t) = Taken By, (c) = Cosigned By    Initials Name Effective Dates    AC Lillian Ortiz MS CCC-SLP 06/16/21 -      Eufemia Monaco MS CCC-SLP 06/15/21 -                 EDUCATION  The patient has been educated in the following areas:   Dysphagia (Swallowing Impairment).              Time Calculation:    Time Calculation- SLP     Row Name 07/12/22 1121             Time Calculation- SLP    SLP Start Time 0945  -KL      SLP Received On 07/12/22  -              Untimed Charges    SLP Eval/Re-eval  ST Eval Oral Pharyng Swallow - [id number]  -      [id number] Eval Oral Pharyng Swallow Minutes 45  -KL              Total Minutes    Untimed Charges Total Minutes 45  -KL       Total Minutes 45  -KL            User Key  (r) = Recorded By, (t) = Taken By, (c) = Cosigned By    Initials Name Provider Type      Eufemia Monaco MS CCC-SLP Speech and Language Pathologist                Therapy Charges for Today     Code Description Service Date Service Provider Modifiers Qty    [id number] HC ST EVAL ORAL PHARYNG SWALLOW 3 7/12/2022 Eufemia Monaco MS CCC-SLP GN 1            Patient was not wearing a face mask and did not exhibit coughing during this therapy encounter.  Procedure performed was not aerosolizing, involved close contact (within 6 feet for at least 15 minutes or longer), and did not involve contact with infectious secretions or specimens.  Therapist used appropriate personal protective equipment including gloves, standard procedure mask and eye protection.  Appropriate PPE was worn during the entire therapy session.  Hand hygiene was completed before and after therapy session.       MS CHARLOTTE Lora  7/12/2022

## 2022-07-13 ENCOUNTER — APPOINTMENT (OUTPATIENT)
Dept: GENERAL RADIOLOGY | Facility: HOSPITAL | Age: 77
End: 2022-07-13

## 2022-07-13 LAB
ALBUMIN SERPL-MCNC: 3.9 G/DL (ref 3.5–5.2)
ALBUMIN/GLOB SERPL: 1.1 G/DL
ALP SERPL-CCNC: 129 U/L (ref 39–117)
ALT SERPL W P-5'-P-CCNC: 8 U/L (ref 1–33)
ANION GAP SERPL CALCULATED.3IONS-SCNC: 7 MMOL/L (ref 5–15)
AST SERPL-CCNC: 16 U/L (ref 1–32)
BILIRUB SERPL-MCNC: 0.3 MG/DL (ref 0–1.2)
BUN SERPL-MCNC: 15 MG/DL (ref 8–23)
BUN/CREAT SERPL: 19.7 (ref 7–25)
CALCIUM SPEC-SCNC: 9.4 MG/DL (ref 8.6–10.5)
CHLORIDE SERPL-SCNC: 84 MMOL/L (ref 98–107)
CO2 SERPL-SCNC: 40 MMOL/L (ref 22–29)
CREAT SERPL-MCNC: 0.76 MG/DL (ref 0.57–1)
DEPRECATED RDW RBC AUTO: 46.8 FL (ref 37–54)
EGFRCR SERPLBLD CKD-EPI 2021: 80.3 ML/MIN/1.73
ERYTHROCYTE [DISTWIDTH] IN BLOOD BY AUTOMATED COUNT: 15.4 % (ref 12.3–15.4)
GLOBULIN UR ELPH-MCNC: 3.5 GM/DL
GLUCOSE BLDC GLUCOMTR-MCNC: 118 MG/DL (ref 70–130)
GLUCOSE BLDC GLUCOMTR-MCNC: 147 MG/DL (ref 70–130)
GLUCOSE BLDC GLUCOMTR-MCNC: 153 MG/DL (ref 70–130)
GLUCOSE SERPL-MCNC: 132 MG/DL (ref 65–99)
HCT VFR BLD AUTO: 36.5 % (ref 34–46.6)
HGB BLD-MCNC: 10.9 G/DL (ref 12–15.9)
MCH RBC QN AUTO: 25.2 PG (ref 26.6–33)
MCHC RBC AUTO-ENTMCNC: 29.9 G/DL (ref 31.5–35.7)
MCV RBC AUTO: 84.5 FL (ref 79–97)
PLATELET # BLD AUTO: 397 10*3/MM3 (ref 140–450)
PMV BLD AUTO: 9.3 FL (ref 6–12)
POTASSIUM SERPL-SCNC: 3.3 MMOL/L (ref 3.5–5.2)
POTASSIUM SERPL-SCNC: 3.3 MMOL/L (ref 3.5–5.2)
PROT SERPL-MCNC: 7.4 G/DL (ref 6–8.5)
RBC # BLD AUTO: 4.32 10*6/MM3 (ref 3.77–5.28)
SODIUM SERPL-SCNC: 131 MMOL/L (ref 136–145)
WBC NRBC COR # BLD: 9.23 10*3/MM3 (ref 3.4–10.8)

## 2022-07-13 PROCEDURE — 84132 ASSAY OF SERUM POTASSIUM: CPT | Performed by: HOSPITALIST

## 2022-07-13 PROCEDURE — 82962 GLUCOSE BLOOD TEST: CPT

## 2022-07-13 PROCEDURE — 63710000001 INSULIN LISPRO (HUMAN) PER 5 UNITS: Performed by: PHYSICIAN ASSISTANT

## 2022-07-13 PROCEDURE — 74230 X-RAY XM SWLNG FUNCJ C+: CPT

## 2022-07-13 PROCEDURE — 74240 X-RAY XM UPR GI TRC 1CNTRST: CPT

## 2022-07-13 PROCEDURE — 25010000002 CEFTRIAXONE PER 250 MG: Performed by: PHYSICIAN ASSISTANT

## 2022-07-13 PROCEDURE — 94799 UNLISTED PULMONARY SVC/PX: CPT

## 2022-07-13 PROCEDURE — 94640 AIRWAY INHALATION TREATMENT: CPT

## 2022-07-13 PROCEDURE — 85027 COMPLETE CBC AUTOMATED: CPT | Performed by: HOSPITALIST

## 2022-07-13 PROCEDURE — 25010000002 FUROSEMIDE PER 20 MG: Performed by: HOSPITALIST

## 2022-07-13 PROCEDURE — 99232 SBSQ HOSP IP/OBS MODERATE 35: CPT | Performed by: HOSPITALIST

## 2022-07-13 PROCEDURE — 92611 MOTION FLUOROSCOPY/SWALLOW: CPT

## 2022-07-13 PROCEDURE — 80053 COMPREHEN METABOLIC PANEL: CPT | Performed by: HOSPITALIST

## 2022-07-13 RX ORDER — ALBUTEROL SULFATE 2.5 MG/3ML
2.5 SOLUTION RESPIRATORY (INHALATION) EVERY 12 HOURS
Status: DISCONTINUED | OUTPATIENT
Start: 2022-07-13 | End: 2022-07-15 | Stop reason: HOSPADM

## 2022-07-13 RX ADMIN — Medication 10 ML: at 09:55

## 2022-07-13 RX ADMIN — ROPINIROLE HYDROCHLORIDE 2.75 MG: 2 TABLET, FILM COATED ORAL at 05:33

## 2022-07-13 RX ADMIN — POTASSIUM CHLORIDE 40 MEQ: 750 CAPSULE, EXTENDED RELEASE ORAL at 17:15

## 2022-07-13 RX ADMIN — DULOXETINE HYDROCHLORIDE 60 MG: 60 CAPSULE, DELAYED RELEASE ORAL at 09:55

## 2022-07-13 RX ADMIN — KETOCONAZOLE 1 APPLICATION: 20 CREAM TOPICAL at 09:56

## 2022-07-13 RX ADMIN — ROPINIROLE HYDROCHLORIDE 2.75 MG: 2 TABLET, FILM COATED ORAL at 20:48

## 2022-07-13 RX ADMIN — ROPINIROLE HYDROCHLORIDE 2.75 MG: 2 TABLET, FILM COATED ORAL at 15:47

## 2022-07-13 RX ADMIN — Medication 250 MG: at 20:48

## 2022-07-13 RX ADMIN — FUROSEMIDE 40 MG: 10 INJECTION, SOLUTION INTRAMUSCULAR; INTRAVENOUS at 17:15

## 2022-07-13 RX ADMIN — MONTELUKAST 10 MG: 10 TABLET, FILM COATED ORAL at 20:49

## 2022-07-13 RX ADMIN — SODIUM CHLORIDE 1 G: 900 INJECTION INTRAVENOUS at 20:46

## 2022-07-13 RX ADMIN — ALBUTEROL SULFATE 2.5 MG: 2.5 SOLUTION RESPIRATORY (INHALATION) at 12:31

## 2022-07-13 RX ADMIN — INSULIN LISPRO 2 UNITS: 100 INJECTION, SOLUTION INTRAVENOUS; SUBCUTANEOUS at 12:49

## 2022-07-13 RX ADMIN — ACETAMINOPHEN 325MG 650 MG: 325 TABLET ORAL at 05:32

## 2022-07-13 RX ADMIN — BARIUM SULFATE 20 ML: 400 PASTE ORAL at 15:06

## 2022-07-13 RX ADMIN — FUROSEMIDE 40 MG: 10 INJECTION, SOLUTION INTRAMUSCULAR; INTRAVENOUS at 09:54

## 2022-07-13 RX ADMIN — PANTOPRAZOLE SODIUM 40 MG: 40 TABLET, DELAYED RELEASE ORAL at 09:55

## 2022-07-13 RX ADMIN — Medication 250 MG: at 09:55

## 2022-07-13 RX ADMIN — BARIUM SULFATE 100 ML: 0.81 POWDER, FOR SUSPENSION ORAL at 15:06

## 2022-07-13 RX ADMIN — POTASSIUM CHLORIDE 40 MEQ: 750 CAPSULE, EXTENDED RELEASE ORAL at 10:36

## 2022-07-13 RX ADMIN — DOXYCYCLINE 100 MG: 100 INJECTION, POWDER, LYOPHILIZED, FOR SOLUTION INTRAVENOUS at 20:49

## 2022-07-13 RX ADMIN — MEMANTINE 5 MG: 10 TABLET ORAL at 09:55

## 2022-07-13 RX ADMIN — METOLAZONE 2.5 MG: 2.5 TABLET ORAL at 09:54

## 2022-07-13 RX ADMIN — DONEPEZIL HYDROCHLORIDE 10 MG: 10 TABLET ORAL at 09:55

## 2022-07-13 RX ADMIN — DOXYCYCLINE 100 MG: 100 INJECTION, POWDER, LYOPHILIZED, FOR SOLUTION INTRAVENOUS at 10:48

## 2022-07-13 RX ADMIN — PANTOPRAZOLE SODIUM 40 MG: 40 TABLET, DELAYED RELEASE ORAL at 20:48

## 2022-07-13 RX ADMIN — Medication 10 ML: at 20:57

## 2022-07-13 RX ADMIN — ALBUTEROL SULFATE 2.5 MG: 2.5 SOLUTION RESPIRATORY (INHALATION) at 20:53

## 2022-07-13 NOTE — PROGRESS NOTES
[facility] Medicine Services  PROGRESS NOTE    Patient Name: Lorrie Pagan  : 1945  MRN: [id number]    Date of Admission: 7/10/2022  Primary Care Physician: Dacia Viera MD    Subjective   Subjective     CC:  LE swelling    HPI:  Patient sitting up in bed, states the redness and warmth in her legs is much better. She states the right leg seems to be healing faster than the left leg. She requests breathing treatments. No new complaints.    ROS:  Gen- No fevers, chills  CV- No chest pain, palpitations  Resp- No cough, +dyspnea  GI- No N/V/D, abd pain  +b/l LE erythema improving    Objective   Objective     Vital Signs:   Temp:  [97.9 °F (36.6 °C)-98.3 °F (36.8 °C)] 97.9 °F (36.6 °C)  Heart Rate:  [] 81  Resp:  [18] 18  BP: (124-136)/(50-61) 124/50  Flow (L/min):  [4] 4     Physical Exam:  Constitutional: No acute distress, awake, alert  HENT: NCAT, mucous membranes moist  Respiratory: course breath sounds with crackles/wheezing in b/l bases, respiratory effort normal on 4L NC  Cardiovascular: RRR, no murmurs, rubs, or gallops  Gastrointestinal: Positive bowel sounds, soft, nontender, nondistended  Musculoskeletal: 1+ bilateral ankle edema  Psychiatric: Appropriate affect, cooperative  Neurologic: Oriented x 3, strength symmetric in all extremities, Cranial Nerves grossly intact to confrontation, speech clear  Skin: No rashes, improving b/l LE erythema without weeping with more erythema to the LLE than the right, decreased warmth to palpation of b/l LE anteriorly    Results Reviewed:  LAB RESULTS:      Lab 22  0513 22  0437 22  0457 07/10/22  1815   WBC 9.23 9.67 8.58 9.58   HEMOGLOBIN 10.9* 9.9* 10.0* 10.1*   HEMATOCRIT 36.5 33.3* 33.8* 34.7   PLATELETS 397 349 383 364   NEUTROS ABS  --   --   --  6.81   IMMATURE GRANS (ABS)  --   --   --  0.06*   LYMPHS ABS  --   --   --  1.85   MONOS ABS  --   --   --  0.63   EOS ABS  --   --   --  0.20   MCV  84.5 84.9 83.9 87.4   SED RATE  --   --   --  30   CRP  --   --   --  1.63*   PROCALCITONIN  --   --   --  0.07   LACTATE  --   --   --  0.6   D DIMER QUANT  --   --   --  0.32         Lab 07/13/22  0513 07/12/22  0437 07/11/22  1106 07/11/22 0457 07/10/22  1815   SODIUM 131* 139  --  144 139   POTASSIUM 3.3* 3.4* 4.0 3.7 3.5   CHLORIDE 84* 95*  --  98 98   CO2 40.0* 38.0*  --  38.0* 36.0*   ANION GAP 7.0 6.0  --  8.0 5.0   BUN 15 12  --  15 12   CREATININE 0.76 0.56*  --  0.66 0.70   EGFR 80.3 93.5  --  89.9 88.7   GLUCOSE 132* 134*  --  140* 99   CALCIUM 9.4 8.9  --  8.8 9.3   HEMOGLOBIN A1C  --   --   --  6.50*  --    TSH  --   --   --  2.420  --          Lab 07/13/22  0513 07/12/22 0437 07/11/22  0457 07/10/22  1815   TOTAL PROTEIN 7.4 6.5 6.5 6.9   ALBUMIN 3.90 3.60 3.70 3.80   GLOBULIN 3.5 2.9 2.8 3.1   ALT (SGPT) 8 8 9 9   AST (SGOT) 16 14 14 14   BILIRUBIN 0.3 0.3 0.2 0.2   ALK PHOS 129* 120* 116 114         Lab 07/10/22  1815   PROBNP 26.9   TROPONIN T <0.010             Lab 07/11/22 0457   IRON 26*   IRON SATURATION 6*   TIBC 440   TRANSFERRIN 295   FERRITIN 23.17         Brief Urine Lab Results  (Last result in the past 365 days)      Color   Clarity   Blood   Leuk Est   Nitrite   Protein   CREAT   Urine HCG        10/01/21 1204 Yellow   Clear   Negative   Negative   Negative   Negative                 Microbiology Results Abnormal     Procedure Component Value - Date/Time    Blood Culture - Blood, Arm, Right [[id number]]  (Normal) Collected: 07/10/22 1815    Lab Status: Preliminary result Specimen: Blood from Arm, Right Updated: 07/12/22 2015     Blood Culture No growth at 2 days    Blood Culture - Blood, Arm, Left [[id number]]  (Normal) Collected: 07/10/22 1850    Lab Status: Preliminary result Specimen: Blood from Arm, Left Updated: 07/12/22 2002     Blood Culture No growth at 2 days    Narrative:      Aerobic bottle only      COVID PRE-OP / PRE-PROCEDURE SCREENING ORDER (NO ISOLATION) - Swab,  Nasopharynx [[id number]]  (Normal) Collected: 07/10/22 1925    Lab Status: Final result Specimen: Swab from Nasopharynx Updated: 07/10/22 1955    Narrative:      The following orders were created for panel order COVID PRE-OP / PRE-PROCEDURE SCREENING ORDER (NO ISOLATION) - Swab, Nasopharynx.  Procedure                               Abnormality         Status                     ---------                               -----------         ------                     COVID-19 and FLU A/B PCR...[[id number]]  Normal              Final result                 Please view results for these tests on the individual orders.    COVID-19 and FLU A/B PCR - Swab, Nasopharynx [[id number]]  (Normal) Collected: 07/10/22 1925    Lab Status: Final result Specimen: Swab from Nasopharynx Updated: 07/10/22 1955     COVID19 Not Detected     Influenza A PCR Not Detected     Influenza B PCR Not Detected    Narrative:      Fact sheet for providers: https://www.fda.gov/media/406894/download    Fact sheet for patients: https://www.fda.gov/media/162575/download    Test performed by PCR.          Adult Transthoracic Echo Complete W/ Cont if Necessary Per Protocol    Result Date: 7/12/2022  · Moderate aortic valve regurgitation is present. · Estimated left ventricular EF = 65% Left ventricular systolic function is normal.      XR Chest 1 View    Result Date: 7/12/2022   DATE OF EXAM: 7/12/2022 9:37 AM  PROCEDURE: XR CHEST 1 VW-  INDICATIONS: SOA; R60.9-Edema, unspecified; L03.119-Cellulitis of unspecified part of limb; R91.8-Other nonspecific abnormal finding of lung field; R13.10-Dysphagia, unspecified  COMPARISON: 7/10/2022 at 5:20 PM  TECHNIQUE: [Portable chest radiograph]  FINDINGS: Chronic changes are again seen with evidence for scarring throughout the mid to lower lungs bilaterally. No new infiltrates or pleural effusions are observed. The cardiac silhouette and mediastinum are stable. No acute osseous abnormalities are observed. Electrodes  from a spinal stimulator device are noted.      Impression: Chronic changes are noted which are stable. There is no definitive acute cardiopulmonary process.  This report was finalized on 7/12/2022 10:00 AM by Red Agrawal MD.      Duplex Venous Lower Extremity - Bilateral CAR    Result Date: 7/12/2022  · Normal bilateral lower extremity venous duplex scan.        Results for orders placed during the hospital encounter of 07/10/22    Adult Transthoracic Echo Complete W/ Cont if Necessary Per Protocol    Interpretation Summary  · Moderate aortic valve regurgitation is present.  · Estimated left ventricular EF = 65% Left ventricular systolic function is normal.      I have reviewed the medications:  Scheduled Meds:cefTRIAXone, 1 g, Intravenous, Nightly  donepezil, 10 mg, Oral, Daily  doxycycline, 100 mg, Intravenous, Q12H  DULoxetine, 60 mg, Oral, Daily  furosemide, 40 mg, Intravenous, BID  insulin lispro, 0-7 Units, Subcutaneous, TID AC  ketoconazole, 1 application, Topical, Q24H  memantine, 5 mg, Oral, Daily  metOLazone, 2.5 mg, Oral, Daily  montelukast, 10 mg, Oral, Nightly  pantoprazole, 40 mg, Oral, BID  rOPINIRole, 2.75 mg, Oral, Q8H  saccharomyces boulardii, 250 mg, Oral, BID  sodium chloride, 10 mL, Intravenous, Q12H      Continuous Infusions:   PRN Meds:.•  acetaminophen  •  albuterol  •  dextrose  •  dextrose  •  glucagon (human recombinant)  •  melatonin  •  ondansetron  •  potassium chloride  •  potassium chloride  •  [COMPLETED] Insert peripheral IV **AND** sodium chloride  •  sodium chloride    Assessment & Plan   Assessment & Plan     Active Hospital Problems    Diagnosis  POA   • **Cellulitis of both lower extremities [L03.115, L03.116]  Yes   • Iron deficiency anemia [D50.9]  Yes   • BRBPR (bright red blood per rectum) [K62.5]  Yes   • Gastroparesis [K31.84]  Yes   • Dysphagia [R13.10]  Yes   • Acquired hypothyroidism [E03.9]  Yes   • Gastroesophageal reflux disease with esophagitis without  hemorrhage [K21.00]  Yes   • Restless leg syndrome [G25.81]  Yes   • Controlled type 2 diabetes mellitus without complication, without long-term current use of insulin (HCC) [E11.9]  Yes   • Mixed hyperlipidemia [E78.2]  Yes   • Stage II, moderate, COPD [J44.9]  Yes   • Syncope [R55]  Yes   • Chronic diastolic heart failure (HCC) [I50.32]  Yes   • Benign hypertension [I10]  Yes   • Aortic valve regurgitation [I35.1]  Yes   • Peripheral edema [R60.9]  Yes   • RENETTA (Previous CPAP) [G47.33, Z99.89]  Not Applicable   • H/O: lung cancer (Prior resection 2016) [Z85.118]  Not Applicable   • Peripheral neuropathy [G62.9]  Yes      Resolved Hospital Problems   No resolved problems to display.        Brief Hospital Course to date:  Lorrie Pagan is a 76 y.o. female with a past medical history significant for chronic respiratory failure with COPD on 4L supplemental oxygen, chronic diastolic CHF,hypertension, HLD, DM2 with gastroparesis and peripheral neuropathy, dementia, RENETTA, GERD, hypothyroidism, and anxiety/depression that presented to the ED complaining of LE redness, weeping and swelling along with dysphagia. Patient admitted to the hospitalist service for further evaluation.    This patient's problems and plans were partially entered by my partner and updated as appropriate by me 07/13/22.    Cellulitis of Both Lower Extremities   BLE edema/chronic diastolic CHF  - failed outpatient treatment. Afebrile without leukocytosis  initially thought to be stasis dermatitis which is certainly playing a role, however patient with severe tenderness and increased warmth. Favor a superimposed cellulitis  - d-dimer stable, inflammatory markers stable  - blood cultures pending  - LE venous doppler b/l negative for DVT  - continue rocephin (PCN allergy), Doxycycline  - administered 40 mg lasix in ED  - continue home Zaroxolyn  - hold home PO Lasix and change to Lasix 40mg IV BID due to worsened shortness of air and lung sounds on  physical exam   - CXR stable  - daily weights  - strict I&O  - ECHO from 2020 EF 60%  - 7/11/22 ECHO: EF of 65% with tricuspid regurgitation   - TSH stable     BRBPR:  Dysphagia  Iron deficiency anemia  - continue PPI  - SLP treat eval  - 1 g drop in hemoglobin from 7/5/22 to 7/10/22  - iron low at 26- transfuse IV daily x 3 days  - occult blood stool negative  - She is followed by Dr. Aldana per GI and has undergone multiple upper and lower endoscopies. Also undergone esophageal dilatation in the past.   - type and screen  - GI saw the patient and would like to scheduled outpatient colonoscopy   - monitor H/H     Recurrent syncope  --echo pending  --orthostatics  --dimer negative     Chronic Respiratory Failure:  Stage II COPD   H/P lung cancer s/p resection 2016  - on 4L supplemental oxygen  - follows with pulmonology, Saray Retana.   - Has upcoming outpatient bronchoscopy scheduled     DM2:  - hold oral hypoglycemics  - Hb A1c 7.00 4/2022. Well controlled  - SSI with scheduled accu checks     Dementia  - continue  Aricept, Namenda      Peripheral neuropathy:  RLS  - continue requip, cymbalta  - PT/OT     Hypothyroidism:  - continue synthroid  - TSH stable    Expected Discharge Location and Transportation: home via family/transport  Expected Discharge Date: 7/15/22    DVT prophylaxis:  Mechanical DVT prophylaxis orders are present.     AM-PAC 6 Clicks Score (PT): 19 (07/11/22 6487)    CODE STATUS:   Code Status and Medical Interventions:   Ordered at: 07/10/22 7345     Level Of Support Discussed With:    Patient     Code Status (Patient has no pulse and is not breathing):    CPR (Attempt to Resuscitate)     Medical Interventions (Patient has pulse or is breathing):    Full Support       Kinsey Recio DO  07/13/22

## 2022-07-13 NOTE — MBS/VFSS/FEES
Acute Care - Speech Language Pathology   Swallow Initial Evaluation  [facility]   Modified Barium Swallow Study (MBS)     Patient Name: Lorrie Pagan  : 1945  MRN: [id number]  Today's Date: 2022               Admit Date: 7/10/2022    Visit Dx:     ICD-10-CM ICD-9-CM   1. Peripheral edema  R60.9 782.3   2. Cellulitis of lower extremity, unspecified laterality  L03.119 682.6   3. Opacity of lung on imaging study  R91.8 793.19   4. Dysphagia, unspecified type  R13.10 787.20     Patient Active Problem List   Diagnosis   • Bronchiectasis (CMS/HCC)   • H/O: lung cancer (Prior resection )   • Chronic respiratory failure   • RENETTA (Previous CPAP)   • Hiatal hernia (Prior Nissen )   • Mycobacterium avium complex colonization   • Irritable bowel syndrome with diarrhea   • Cellulitis of both lower extremities   • Aortic valve regurgitation   • Benign hypertension   • Peripheral edema   • Impairment of balance   • Peripheral neuropathy   • Peripheral venous insufficiency   • Chronic diastolic heart failure (HCC)   • Syncope   • Hypokalemia   • Hypomagnesemia   • Stage II, moderate, COPD   • Memory loss   • Restless leg syndrome   • Controlled type 2 diabetes mellitus without complication, without long-term current use of insulin ([facility])   • Vitamin D deficiency   • Mixed hyperlipidemia   • Allergic rhinitis   • Normocytic anemia   • Gastroesophageal reflux disease with esophagitis without hemorrhage   • Acquired hypothyroidism   • Esophageal dysmotility   • Dysphagia   • Gastroparesis   • Degenerative disc disease, cervical   • Degenerative disc disease, lumbar   • Globus sensation   • Sialorrhea   • Drooling   • Dysfunction of both eustachian tubes   • Tinnitus of both ears   • BRBPR (bright red blood per rectum)   • Iron deficiency anemia     Past Medical History:   Diagnosis Date   • Back pain    • Bronchiectasis (HCC)    • Bronchitis 2018   • Cancer ([facility])     History of lung cancer and skin  cancer    • Cardiac murmur    • Chronic cough    • Chronic obstructive pulmonary disease (HCC)    • Colon polyp    • DDD (degenerative disc disease), lumbar    • Depression    • Diabetes mellitus (HCC)     DX: 2019, CHECK BS QOD, LAST A1C 6.3??   • Disease of thyroid gland    • Esophageal dilatation 9/20/2019    Added automatically from request for surgery [id number]   • Esophageal dysphagia 10/24/2019    Added automatically from request for surgery [id number]   • Former smoker (None since 1992) 8/29/2019   • GERD (gastroesophageal reflux disease)    • Glaucoma    • Globus sensation 1/27/2020   • History of colonic polyps    • History of transfusion 1988    NO REACTION    • Hyperlipidemia    • Hypertension    • Irritable bowel syndrome     Constipation/diarrhea   • Legally blind    • Lung cancer (HCC)    • Macular degeneration    • Neuropathy    • Obesity 2/26/2020   • Osteoarthritis    • Oxygen dependent     5L   • Pneumonia    • Sleep apnea     NON COMPLIANT WITH CPAP USE   • UTI (urinary tract infection)    • Wears glasses      Past Surgical History:   Procedure Laterality Date   • BACK SURGERY      X2 (LUMBAR)   • BREAST BIOPSY      20+ years ago   • BREAST BIOPSY Right 08/2021    fna ln   • CATARACT EXTRACTION Bilateral    • CHOLECYSTECTOMY     • COLONOSCOPY     • ENDOSCOPY N/A 11/06/2019    Procedure: ESOPHAGOGASTRODUODENOSCOPY;  Surgeon: Cortes Millan MD;  Location:  [facility];  Service: Gastroenterology   • ENDOSCOPY N/A 04/29/2021    Procedure: ESOPHAGOGASTRODUODENOSCOPY;  Surgeon: Cortes Millan MD;  Location:  [facility];  Service: Gastroenterology;  Laterality: N/A;  balloon dilation    • HAND SURGERY Right     laceration repair   • INCONTINENCE SURGERY      BLADDER   • LUNG REMOVAL, PARTIAL Left 2016   • NISSEN FUNDOPLICATION     • TOTAL ABDOMINAL HYSTERECTOMY  1988    benign cyst   • US GUIDED FINE NEEDLE ASPIRATION  08/27/2021       SLP Recommendation and Plan  SLP  Swallowing Diagnosis: functional oral phase, functional pharyngeal phase (07/13/22 1455)  SLP Diet Recommendation: regular textures, thin liquids (07/13/22 1455)  Recommended Precautions and Strategies: upright posture during/after eating, general aspiration precautions, reflux precautions (07/13/22 1455)  SLP Rec. for Method of Medication Administration: meds whole, with thin liquids, as tolerated (07/13/22 1455)     Monitor for Signs of Aspiration: yes, notify SLP if any concerns (07/13/22 1455)     Swallow Criteria for Skilled Therapeutic Interventions Met: no problems identified which require skilled intervention (07/13/22 1455)  Anticipated Discharge Disposition (SLP): unknown (07/13/22 1455)     Therapy Frequency (Swallow): evaluation only (07/13/22 1455)                                     Plan of Care Reviewed With: patient      SWALLOW EVALUATION (last 72 hours)     SLP Adult Swallow Evaluation     Row Name 07/13/22 1455 07/12/22 0945 07/11/22 1625             Rehab Evaluation    Document Type evaluation  -MP re-evaluation  -KL evaluation  -      Subjective Information no complaints  -MP no complaints  -KL complains of;fatigue  -AC      Patient Observations alert;cooperative  -MP alert;cooperative;agree to therapy  -KL alert;cooperative  -AC      Patient/Family/Caregiver Comments/Observations No family present  -MP -- No family present.  -AC      Patient Effort good  -MP good  -KL fair  -AC      Comment -- -- Reported feeling exhausted.  -AC              General Information    Patient Profile Reviewed yes  -MP yes  -KL yes  -AC      Pertinent History Of Current Problem Adm w/ BLE cellulitis, possible pna. Hx lung CA s/p rsxn '16, RENETTA, CHF, COPD, GERD, bronchiectasis, HH s/p Nissen '08, DDD c-spine, hx esophageal dilation. MBS completed during previous admission w/ CHF exacerbation 11/'21. Revealed mild pharyngeal dysphagia w/ aspiration of thin liquids via consecutive drinks. SLP recommended regular diet  "and thin liquids via small sips w/ multiple swallows.  -MP See prev. evaluation.  -KL Adm w/ BLE cellulitis, possible pna. Hx lung CA s/p rsxn '16, RENETTA, CHF, COPD, GERD, bronchiectasis, HH s/p Nissen '08, DDD c-spine, hx esophageal dilation. MBS completed during previous admission w/ CHF exacerbation 11/'21. Revealed mild pharyngeal dysphagia w/ aspiration of thin liquids via consecutive drinks. SLP recommended regular diet and thin liquids via small sips w/ multiple swallows. Pt reported she follows these recommendations \"when she remembers.\"  -AC      Current Method of Nutrition regular textures;thin liquids  -MP regular textures;thin liquids  -KL regular textures;thin liquids  -AC      Precautions/Limitations, Vision WFL;for purposes of eval  -MP WFL;for purposes of eval  -KL WFL;for purposes of eval  -AC      Precautions/Limitations, Hearing WFL;for purposes of eval  -MP WFL;for purposes of eval  -KL WFL;for purposes of eval  -AC      Prior Level of Function-Communication unknown  -MP unknown  -KL unknown  -AC      Prior Level of Function-Swallowing compensations (maneuvers, postures) needed;esophageal concerns  -MP compensations (maneuvers, postures) needed;esophageal concerns  -KL compensations (maneuvers, postures) needed;esophageal concerns  -AC      Plans/Goals Discussed with patient  -MP patient  -KL patient  -AC      Barriers to Rehab previous functional deficit  -MP previous functional deficit  -KL previous functional deficit  -AC      Patient's Goals for Discharge patient did not state  -MP patient did not state  -KL patient did not state  -AC              Pain    Additional Documentation Pain Scale: FACES Pre/Post-Treatment (Group)  -MP Pain Scale: FACES Pre/Post-Treatment (Group)  -KL Pain Scale: FACES Pre/Post-Treatment (Group)  -AC              Pain Scale: FACES Pre/Post-Treatment    Pain: FACES Scale, Pretreatment 0-->no hurt  -MP 0-->no hurt  -KL 0-->no hurt  -AC      Posttreatment Pain Rating " 0-->no hurt  -MP 0-->no hurt  -KL 0-->no hurt  -AC              Oral Motor Structure and Function    Dentition Assessment -- natural, present and adequate  -KL natural, present and adequate  -AC      Secretion Management -- WNL/WFL  -KL WNL/WFL  -AC      Mucosal Quality -- moist, healthy  -KL moist, healthy  -AC              Oral Musculature and Cranial Nerve Assessment    Oral Motor General Assessment -- WFL  -KL WFL  -AC              General Eating/Swallowing Observations    Respiratory Support Currently in Use -- nasal cannula  - nasal cannula  -      Eating/Swallowing Skills -- self-fed  - --      Positioning During Eating -- upright 90 degree;upright in bed  - --      Utensils Used -- spoon;straw  - --      Consistencies Trialed -- regular textures;pureed;thin liquids  - --              Clinical Swallow Eval    Oral Prep Phase -- WFL  -KL --      Oral Transit -- WFL  -KL --      Oral Residue -- WFL  -KL --      Pharyngeal Phase -- suspected pharyngeal impairment  - --      Esophageal Phase -- suspected esophageal impairment  - --      Clinical Swallow Evaluation Summary -- CSE completed. Pt reported getting choked when drinking too fast and food/liquid feeling stuck in chest. Pt accepted trials of thin liquid via straw, puree, and regular solid. Pt with no overt clinical s/sx of aspiration with any consistency. Recommend pt remain on regular diet with thin liquids at this time, small, single sips/bites, PNA/aspiration precautions, and consistent oral care. Pt is at high risk of aspiration and has history of mild pharyngeal dysphagia. ST recommend MBS to further assess oropharyngeal dysphagia and safest, least restrictive diet. ST discussed MBS reasoning and procedure with pt, pt stated understanding and in agreement at this time.  -KL Pt denied swallowing difficulty; however, reported increased RR w/ PO & general cough. Politely & multiple times declined PO trials 2' exhaustion. Provided  education re: s/sxs aspiration, risks of aspiration. Discussed that given hx, possible new pna, & pt report, she would likely benefit from MBS as at high risk for aspiration; however, she declined MBS @ this time.  -AC              Pharyngeal Phase Concerns    Pharyngeal Phase Concerns -- other (see comments)  history of pharyngeal dysphagia  -KL --              Esophageal Phase Concerns    Esophageal Phase Concerns -- other (see comments)  Pt with c/o freq belching and PO feeling stuck in chest  - --              MBS/VFSS    Utensils Used spoon;cup;straw  -MP -- --      Consistencies Trialed thin liquids;pudding thick;regular textures  -MP -- --              MBS/VFSS Interpretation    Oral Prep Phase WFL  -MP -- --      Oral Transit Phase WFL  -MP -- --      Oral Residue WFL  -MP -- --      VFSS Summary Grossly functional oropharyngeal swallow. No penetration/aspiration w/ any consistency tested. Mild vallecular & pyriform residue across consistencies - mostly cleared w/ consecutive swallows. See Radiology report for limited upper GI results. Rec regular diet, thin liquids, standard aspiration & reflux precautions.  -MP -- --              Initiation of Pharyngeal Swallow    Initiation of Pharyngeal Swallow WFL  -MP -- --      Pharyngeal Phase functional pharyngeal phase of swallowing  -MP -- --      Pharyngeal Residue all consistencies tested;valleculae;pyriform sinuses  -MP -- --      Response to Residue cleared residue with spontaneous subsequent swallow  -MP -- --              Esophageal Phase    Esophageal Phase see radiology report for further details  -MP -- --              SLP Evaluation Clinical Impression    SLP Swallowing Diagnosis functional oral phase;functional pharyngeal phase  -MP R/O pharyngeal dysphagia  -KL R/O pharyngeal dysphagia  -AC      Functional Impact -- risk of aspiration/pneumonia  - risk of aspiration/pneumonia  -AC      Rehab Potential/Prognosis, Swallowing -- good, to achieve  stated therapy goals  -KL good, to achieve stated therapy goals  -      Swallow Criteria for Skilled Therapeutic Interventions Met no problems identified which require skilled intervention  -MP demonstrates skilled criteria  -KL demonstrates skilled criteria  -              Recommendations    Therapy Frequency (Swallow) evaluation only  -MP -- --      SLP Diet Recommendation regular textures;thin liquids  -MP regular textures;thin liquids  -KL other (see comments)  limited eval/pt declined PO trials--will defer to physician  -      Recommended Diagnostics -- VFSS (MBS)  -KL VFSS (MBS);other (see comments)  pt declined @ this time  -      Recommended Precautions and Strategies upright posture during/after eating;general aspiration precautions;reflux precautions  -MP upright posture during/after eating;general aspiration precautions;reflux precautions  -KL upright posture during/after eating;general aspiration precautions;reflux precautions  -      Oral Care Recommendations Oral Care BID/PRN  -MP Oral Care BID/PRN  -KL Oral Care BID/PRN  -AC      SLP Rec. for Method of Medication Administration meds whole;with thin liquids;as tolerated  -MP as tolerated  -KL as tolerated  -      Monitor for Signs of Aspiration yes;notify SLP if any concerns  -MP yes;notify SLP if any concerns  -KL yes;notify SLP if any concerns  -      Anticipated Discharge Disposition (SLP) unknown  -MP anticipate therapy at next level of care  -KL anticipate therapy at next level of care  -            User Key  (r) = Recorded By, (t) = Taken By, (c) = Cosigned By    Initials Name Effective Dates    AC Lillian Ortiz, MS CCC-SLP 06/16/21 -     Ricardo Harris, MS CCC-SLP 12/28/21 -     Eufemia Mills MS CCC-SLP 06/15/21 -                 EDUCATION  The patient has been educated in the following areas:   Dysphagia (Swallowing Impairment).              Time Calculation:    Time Calculation- SLP     Row Name 07/13/22 5922              Time Calculation- SLP    SLP Start Time 1455  -MP      SLP Received On 07/13/22  -MP              Untimed Charges    56593-DJ [employer] Fluoro Eval Swallow Minutes 60  -MP              Total Minutes    Untimed Charges Total Minutes 60  -MP       Total Minutes 60  -MP            User Key  (r) = Recorded By, (t) = Taken By, (c) = Cosigned By    Initials Name Provider Type    Ricardo Harris MS CCC-SLP Speech and Language Pathologist                Therapy Charges for Today     Code Description Service Date Service Provider Modifiers Qty    [id number]  ST MOTION FLUORO EVAL SWALLOW 4 7/13/2022 Ricardo Ritchie MS CCC-SLP GN 1               MS [place] León  7/13/2022

## 2022-07-13 NOTE — PLAN OF CARE
Goal Outcome Evaluation:  Plan of Care Reviewed With: patient            SLP MBS evaluation completed. Will sign-off. Please see note for further details and recommendations.

## 2022-07-14 ENCOUNTER — APPOINTMENT (OUTPATIENT)
Dept: CARDIOLOGY | Facility: HOSPITAL | Age: 77
End: 2022-07-14

## 2022-07-14 DIAGNOSIS — E11.9 CONTROLLED TYPE 2 DIABETES MELLITUS WITHOUT COMPLICATION, WITHOUT LONG-TERM CURRENT USE OF INSULIN: ICD-10-CM

## 2022-07-14 LAB
ALBUMIN SERPL-MCNC: 3.7 G/DL (ref 3.5–5.2)
ALBUMIN/GLOB SERPL: 1 G/DL
ALP SERPL-CCNC: 116 U/L (ref 39–117)
ALT SERPL W P-5'-P-CCNC: 7 U/L (ref 1–33)
ANION GAP SERPL CALCULATED.3IONS-SCNC: 8 MMOL/L (ref 5–15)
AST SERPL-CCNC: 18 U/L (ref 1–32)
BILIRUB SERPL-MCNC: 0.3 MG/DL (ref 0–1.2)
BUN SERPL-MCNC: 21 MG/DL (ref 8–23)
BUN/CREAT SERPL: 30.4 (ref 7–25)
CALCIUM SPEC-SCNC: 8.9 MG/DL (ref 8.6–10.5)
CHLORIDE SERPL-SCNC: 83 MMOL/L (ref 98–107)
CO2 SERPL-SCNC: 41 MMOL/L (ref 22–29)
CREAT SERPL-MCNC: 0.69 MG/DL (ref 0.57–1)
DEPRECATED RDW RBC AUTO: 46.8 FL (ref 37–54)
EGFRCR SERPLBLD CKD-EPI 2021: 89 ML/MIN/1.73
ERYTHROCYTE [DISTWIDTH] IN BLOOD BY AUTOMATED COUNT: 15.5 % (ref 12.3–15.4)
GLOBULIN UR ELPH-MCNC: 3.6 GM/DL
GLUCOSE BLDC GLUCOMTR-MCNC: 133 MG/DL (ref 70–130)
GLUCOSE BLDC GLUCOMTR-MCNC: 150 MG/DL (ref 70–130)
GLUCOSE BLDC GLUCOMTR-MCNC: 150 MG/DL (ref 70–130)
GLUCOSE BLDC GLUCOMTR-MCNC: 261 MG/DL (ref 70–130)
GLUCOSE SERPL-MCNC: 133 MG/DL (ref 65–99)
HCT VFR BLD AUTO: 36 % (ref 34–46.6)
HGB BLD-MCNC: 11 G/DL (ref 12–15.9)
MCH RBC QN AUTO: 25.5 PG (ref 26.6–33)
MCHC RBC AUTO-ENTMCNC: 30.6 G/DL (ref 31.5–35.7)
MCV RBC AUTO: 83.5 FL (ref 79–97)
PLATELET # BLD AUTO: 387 10*3/MM3 (ref 140–450)
PMV BLD AUTO: 9.1 FL (ref 6–12)
POTASSIUM SERPL-SCNC: 3.1 MMOL/L (ref 3.5–5.2)
POTASSIUM SERPL-SCNC: 3.5 MMOL/L (ref 3.5–5.2)
PROT SERPL-MCNC: 7.3 G/DL (ref 6–8.5)
RBC # BLD AUTO: 4.31 10*6/MM3 (ref 3.77–5.28)
SODIUM SERPL-SCNC: 132 MMOL/L (ref 136–145)
WBC NRBC COR # BLD: 7.84 10*3/MM3 (ref 3.4–10.8)

## 2022-07-14 PROCEDURE — 63710000001 INSULIN LISPRO (HUMAN) PER 5 UNITS: Performed by: PHYSICIAN ASSISTANT

## 2022-07-14 PROCEDURE — 80053 COMPREHEN METABOLIC PANEL: CPT | Performed by: HOSPITALIST

## 2022-07-14 PROCEDURE — 25010000002 FUROSEMIDE PER 20 MG: Performed by: HOSPITALIST

## 2022-07-14 PROCEDURE — 99232 SBSQ HOSP IP/OBS MODERATE 35: CPT | Performed by: HOSPITALIST

## 2022-07-14 PROCEDURE — 82962 GLUCOSE BLOOD TEST: CPT

## 2022-07-14 PROCEDURE — 94799 UNLISTED PULMONARY SVC/PX: CPT

## 2022-07-14 PROCEDURE — 85027 COMPLETE CBC AUTOMATED: CPT | Performed by: HOSPITALIST

## 2022-07-14 PROCEDURE — 84132 ASSAY OF SERUM POTASSIUM: CPT | Performed by: HOSPITALIST

## 2022-07-14 RX ORDER — GLIMEPIRIDE 2 MG/1
TABLET ORAL
Qty: 60 TABLET | Refills: 0 | Status: SHIPPED | OUTPATIENT
Start: 2022-07-14 | End: 2022-10-27

## 2022-07-14 RX ORDER — FUROSEMIDE 40 MG/1
40 TABLET ORAL DAILY
Status: DISCONTINUED | OUTPATIENT
Start: 2022-07-15 | End: 2022-07-15 | Stop reason: HOSPADM

## 2022-07-14 RX ORDER — DOXYCYCLINE 100 MG/1
100 CAPSULE ORAL EVERY 12 HOURS SCHEDULED
Status: DISCONTINUED | OUTPATIENT
Start: 2022-07-14 | End: 2022-07-14

## 2022-07-14 RX ADMIN — ALBUTEROL SULFATE 2.5 MG: 2.5 SOLUTION RESPIRATORY (INHALATION) at 22:58

## 2022-07-14 RX ADMIN — Medication 10 ML: at 21:10

## 2022-07-14 RX ADMIN — POTASSIUM CHLORIDE 40 MEQ: 750 CAPSULE, EXTENDED RELEASE ORAL at 09:45

## 2022-07-14 RX ADMIN — INSULIN LISPRO 2 UNITS: 100 INJECTION, SOLUTION INTRAVENOUS; SUBCUTANEOUS at 12:20

## 2022-07-14 RX ADMIN — ROPINIROLE HYDROCHLORIDE 2.75 MG: 2 TABLET, FILM COATED ORAL at 05:22

## 2022-07-14 RX ADMIN — ROPINIROLE HYDROCHLORIDE 2.75 MG: 2 TABLET, FILM COATED ORAL at 21:08

## 2022-07-14 RX ADMIN — Medication 250 MG: at 21:09

## 2022-07-14 RX ADMIN — PANTOPRAZOLE SODIUM 40 MG: 40 TABLET, DELAYED RELEASE ORAL at 09:44

## 2022-07-14 RX ADMIN — DONEPEZIL HYDROCHLORIDE 10 MG: 10 TABLET ORAL at 09:44

## 2022-07-14 RX ADMIN — MEMANTINE 5 MG: 10 TABLET ORAL at 09:44

## 2022-07-14 RX ADMIN — Medication 250 MG: at 09:44

## 2022-07-14 RX ADMIN — INSULIN LISPRO 2 UNITS: 100 INJECTION, SOLUTION INTRAVENOUS; SUBCUTANEOUS at 17:19

## 2022-07-14 RX ADMIN — FUROSEMIDE 40 MG: 10 INJECTION, SOLUTION INTRAMUSCULAR; INTRAVENOUS at 09:45

## 2022-07-14 RX ADMIN — POTASSIUM CHLORIDE 40 MEQ: 750 CAPSULE, EXTENDED RELEASE ORAL at 18:20

## 2022-07-14 RX ADMIN — DULOXETINE HYDROCHLORIDE 60 MG: 60 CAPSULE, DELAYED RELEASE ORAL at 09:44

## 2022-07-14 RX ADMIN — KETOCONAZOLE 1 APPLICATION: 20 CREAM TOPICAL at 09:46

## 2022-07-14 RX ADMIN — POTASSIUM CHLORIDE 40 MEQ: 750 CAPSULE, EXTENDED RELEASE ORAL at 14:34

## 2022-07-14 RX ADMIN — ROPINIROLE HYDROCHLORIDE 2.75 MG: 2 TABLET, FILM COATED ORAL at 14:36

## 2022-07-14 RX ADMIN — DOXYCYCLINE 100 MG: 100 INJECTION, POWDER, LYOPHILIZED, FOR SOLUTION INTRAVENOUS at 11:10

## 2022-07-14 RX ADMIN — PANTOPRAZOLE SODIUM 40 MG: 40 TABLET, DELAYED RELEASE ORAL at 21:09

## 2022-07-14 RX ADMIN — Medication 5 MG: at 22:54

## 2022-07-14 RX ADMIN — MONTELUKAST 10 MG: 10 TABLET, FILM COATED ORAL at 21:09

## 2022-07-14 RX ADMIN — ALBUTEROL SULFATE 2.5 MG: 2.5 SOLUTION RESPIRATORY (INHALATION) at 08:00

## 2022-07-14 RX ADMIN — METOLAZONE 2.5 MG: 2.5 TABLET ORAL at 09:44

## 2022-07-14 RX ADMIN — Medication 10 ML: at 09:45

## 2022-07-14 NOTE — CONSULTS
INFECTIOUS DISEASE CONSULT/INITIAL HOSPITAL VISIT    Lorrie Pagan  1945  [id number]    Date of Consult: 7/14/2022    Admission Date: 7/10/2022      Requesting Provider: No ref. provider found  Evaluating Physician: Gerard Chi MD    Reason for Consultation: Questionable leg cellulitis    History of present illness:    Patient is a 76 y.o. female presents to emergency room with painful erythematous edematous lower extremities patient has history of anemia and has been seen by gastroenterology for bright red bright red blood per rectum.  Patient has comorbidities including COPD, chronic diastolic congestive heart failure hyperlipidemia diabetes mellitus mellitus type II and gastroparesis as well as dementia sleep apnea GERD hypothyroidism anxiety and depression.    Patient reports lower extremities have been increasingly swollen with redness and weeping bilaterally patient is unable to wear compression stockings because of tenderness.  Patient has been given empiric antibiotic courses by primary care provider including Keflex.  Patient had complications with oral medicines which cause nausea vomiting and GI upset.  Patient is also been given intermittent diuretics.    States that she has had bilateral leg swelling /redness for months; recalls that unna boots helped her redness, swelling and made her feel better    Past Medical History:   Diagnosis Date   • Back pain    • Bronchiectasis (HCC)    • Bronchitis 01/2018   • Cancer (HCC)     History of lung cancer and skin cancer    • Cardiac murmur    • Chronic cough    • Chronic obstructive pulmonary disease (HCC)    • Colon polyp    • DDD (degenerative disc disease), lumbar    • Depression    • Diabetes mellitus (HCC)     DX: 2019, CHECK BS QOD, LAST A1C 6.3??   • Disease of thyroid gland    • Esophageal dilatation 9/20/2019    Added automatically from request for surgery [id number]   • Esophageal dysphagia 10/24/2019    Added automatically from  request for surgery [id number]   • Former smoker (None since 1992) 8/29/2019   • GERD (gastroesophageal reflux disease)    • Glaucoma    • Globus sensation 1/27/2020   • History of colonic polyps    • History of transfusion 1988    NO REACTION    • Hyperlipidemia    • Hypertension    • Irritable bowel syndrome     Constipation/diarrhea   • Legally blind    • Lung cancer (HCC)    • Macular degeneration    • Neuropathy    • Obesity 2/26/2020   • Osteoarthritis    • Oxygen dependent     5L   • Pneumonia    • Sleep apnea     NON COMPLIANT WITH CPAP USE   • UTI (urinary tract infection)    • Wears glasses        Past Surgical History:   Procedure Laterality Date   • BACK SURGERY      X2 (LUMBAR)   • BREAST BIOPSY      20+ years ago   • BREAST BIOPSY Right 08/2021    fna ln   • CATARACT EXTRACTION Bilateral    • CHOLECYSTECTOMY     • COLONOSCOPY     • ENDOSCOPY N/A 11/06/2019    Procedure: ESOPHAGOGASTRODUODENOSCOPY;  Surgeon: Cortes Millan MD;  Location:  [facility];  Service: Gastroenterology   • ENDOSCOPY N/A 04/29/2021    Procedure: ESOPHAGOGASTRODUODENOSCOPY;  Surgeon: Cortes Millan MD;  Location:  [facility];  Service: Gastroenterology;  Laterality: N/A;  balloon dilation    • HAND SURGERY Right     laceration repair   • INCONTINENCE SURGERY      BLADDER   • LUNG REMOVAL, PARTIAL Left 2016   • NISSEN FUNDOPLICATION     • TOTAL ABDOMINAL HYSTERECTOMY  1988    benign cyst   • US GUIDED FINE NEEDLE ASPIRATION  08/27/2021       Family History   Problem Relation Age of Onset   • Colon polyps Mother    • Emphysema Mother    • Hypertension Mother    • Colon polyps Father    • Dementia Father    • Heart disease Father    • Hyperlipidemia Father    • Macular degeneration Father    • Glaucoma Father    • Colon cancer Neg Hx    • Breast cancer Neg Hx    • Ovarian cancer Neg Hx        Social History     Socioeconomic History   • Marital status: Single   Tobacco Use   • Smoking status:  "Former Smoker     Packs/day: 1.50     Years: 25.00     Pack years: 37.50     Types: Cigarettes     Quit date: 1992     Years since quittin.5   • Smokeless tobacco: Never Used   Vaping Use   • Vaping Use: Never used   Substance and Sexual Activity   • Alcohol use: Yes     Alcohol/week: 2.0 standard drinks     Types: 2 Standard drinks or equivalent per week     Comment: a glass of wine or bourbon a couple times a WEEK   • Drug use: No   • Sexual activity: Never       Allergies   Allergen Reactions   • Hydrocodone Hallucinations     \"With high doses\"   • Statins Myalgia     myalgia   • Metoclopramide Other (See Comments)     EXACERBATED RLS   • Penicillins Rash     Rash    Tolerated Ceftriaxone   • Tramadol Nausea And Vomiting and GI Intolerance     VERY MILD PER PT         Medication:    Current Facility-Administered Medications:   •  acetaminophen (TYLENOL) tablet 650 mg, 650 mg, Oral, Q4H PRN, Kaushal Chavis PA-DARRELL, 650 mg at 22 0532  •  albuterol (PROVENTIL) nebulizer solution 0.083% 2.5 mg/3mL, 2.5 mg, Nebulization, Q12H, Kinsey Recio DO, 2.5 mg at 22 0800  •  cefTRIAXone (ROCEPHIN) 1 g/100 mL 0.9% NS (MBP), 1 g, Intravenous, Nightly, Kaushal Chavis PA-C, Last Rate: 0 mL/hr at 22 0135, 1 g at 22 2046  •  dextrose (D50W) (25 g/50 mL) IV injection 25 g, 25 g, Intravenous, Q15 Min PRN, Kaushal Chavis PA-C  •  dextrose (GLUTOSE) oral gel 15 g, 15 g, Oral, Q15 Min PRN, Kaushal Chavis PA-C  •  donepezil (ARICEPT) tablet 10 mg, 10 mg, Oral, Daily, Kaushal Chavis PA-C, 10 mg at 22 0944  •  doxycycline (MONODOX) capsule 100 mg, 100 mg, Oral, Q12H, Cynthia Krueger, PharmD  •  DULoxetine (CYMBALTA) DR capsule 60 mg, 60 mg, Oral, Daily, Kaushal Chavis PA-C, 60 mg at 22 0944  •  [START ON 7/15/2022] furosemide (LASIX) tablet 40 mg, 40 mg, Oral, Daily, Cory Robert MD  •  glucagon (human recombinant) (GLUCAGEN DIAGNOSTIC) injection 1 mg, 1 mg, " Intramuscular, Q15 Min PRN, Kaushal Chavis PA-C  •  Insulin Lispro (humaLOG) injection 0-7 Units, 0-7 Units, Subcutaneous, TID AC, Kaushal Chavis PA-C, 2 Units at 07/14/22 1220  •  ketoconazole (NIZORAL) 2 % cream 1 application, 1 application, Topical, Q24H, Kinsey Recio, , 1 application at 07/14/22 0946  •  melatonin tablet 5 mg, 5 mg, Oral, Nightly PRN, Kaushal Chavis PA-C, 5 mg at 07/12/22 2148  •  memantine (NAMENDA) tablet 5 mg, 5 mg, Oral, Daily, Kaushal Chavis PA-DARRELL, 5 mg at 07/14/22 0944  •  metOLazone (ZAROXOLYN) tablet 2.5 mg, 2.5 mg, Oral, Daily, Kaushal Chavis PA-C, 2.5 mg at 07/14/22 0944  •  montelukast (SINGULAIR) tablet 10 mg, 10 mg, Oral, Nightly, Kaushal Chavis PA-C, 10 mg at 07/13/22 2049  •  ondansetron (ZOFRAN) injection 4 mg, 4 mg, Intravenous, Q6H PRN, Kaushal Chavis PA-C  •  pantoprazole (PROTONIX) EC tablet 40 mg, 40 mg, Oral, BID, Kaushal Chavis PA-C, 40 mg at 07/14/22 0944  •  potassium chloride (KLOR-CON) packet 40 mEq, 40 mEq, Oral, PRN, [place], [place]ito, DO  •  potassium chloride (MICRO-K) CR capsule 40 mEq, 40 mEq, Oral, PRN, Nicky, Cruzito, DO, 40 mEq at 07/14/22 1434  •  rOPINIRole (REQUIP) tablet 2.75 mg, 2.75 mg, Oral, Q8H, [employer], Myrtle, APRN, 2.75 mg at 07/14/22 1436  •  saccharomyces boulardii (FLORASTOR) capsule 250 mg, 250 mg, Oral, BID, Kauhsal Chavis PA-C, 250 mg at 07/14/22 0944  •  [COMPLETED] Insert peripheral IV, , , Once **AND** sodium chloride 0.9 % flush 10 mL, 10 mL, Intravenous, PRN, Riley Ingram MD  •  sodium chloride 0.9 % flush 10 mL, 10 mL, Intravenous, Q12H, Kaushal Chavis PA-C, 10 mL at 07/14/22 0945  •  sodium chloride 0.9 % flush 10 mL, 10 mL, Intravenous, PRN, Kaushal Chavis PA-C    Antibiotics:  Anti-Infectives (From admission, onward)    Ordered     Dose/Rate Route Frequency Start Stop    07/14/22 1120  doxycycline (MONODOX) capsule 100 mg        Ordering Provider: Cynthia Krueger, PharmD    100 mg Oral  Every 12 Hours Scheduled 22 2100 07/15/22 2059    07/10/22 2149  cefTRIAXone (ROCEPHIN) 1 g/100 mL 0.9% NS (MBP)        Ordering Provider: Kaushal Chavis PA-C    1 g  over 30 Minutes Intravenous Nightly 07/10/22 2200 07/17/22 2059    07/10/22 2154  vancomycin 1750 mg/500 mL 0.9% NS IVPB ([facility])        Ordering Provider: Aidee Gonzalez DO    20 mg/kg × 81.6 kg Intravenous Once 07/10/22 2156 07/11/22 0834            Review of Systems:  Remark for trouble swallowing, shortness of breath, leg swelling, color change, leg wounds, immunocompromise state, weakness    Rest of review of systems were discussed and were negative    Physical Exam:   Vital Signs  Temp (24hrs), Av °F (36.7 °C), Min:97.4 °F (36.3 °C), Max:98.4 °F (36.9 °C)    Temp  Min: 97.4 °F (36.3 °C)  Max: 98.4 °F (36.9 °C)  BP  Min: 120/60  Max: 147/73  Pulse  Min: 78  Max: 103  Resp  Min: 15  Max: 18  SpO2  Min: 90 %  Max: 96 %    GENERAL: Awake and alert, in no acute distress.   HEENT: Normocephalic, atraumatic.  PERRL. EOMI. No conjunctival injection. No icterus. Oropharynx clear without evidence of thrush or exudate. No evidence of peridontal disease.      HEART: RRR; No murmur  LUNGS: Clear to auscultation bilaterally  ABDOMEN: Soft, nontender, nondistended.    EXT:  No cyanosis, clubbing or edema. No cord.  :  Without Simental catheter.  MSK:  Bilateral leg warmth, redness, no ulceration  SKIN: Warm and dry without cutaneous eruptions on Inspection/palpation.    NEURO: Oriented to PPT.  PSYCHIATRIC: Normal insight and judgement. Cooperative with PE    Laboratory Data    Results from last 7 days   Lab Units 22  0721 22  0513 22  0437   WBC 10*3/mm3 7.84 9.23 9.67   HEMOGLOBIN g/dL 11.0* 10.9* 9.9*   HEMATOCRIT % 36.0 36.5 33.3*   PLATELETS 10*3/mm3 387 397 349     Results from last 7 days   Lab Units 22  0721   SODIUM mmol/L 132*   POTASSIUM mmol/L 3.1*   CHLORIDE mmol/L 83*   CO2 mmol/L 41.0*   BUN mg/dL 21    CREATININE mg/dL 0.69   GLUCOSE mg/dL 133*   CALCIUM mg/dL 8.9     Results from last 7 days   Lab Units 07/14/22  0721   ALK PHOS U/L 116   BILIRUBIN mg/dL 0.3   ALT (SGPT) U/L 7   AST (SGOT) U/L 18     Results from last 7 days   Lab Units 07/10/22  1815   SED RATE mm/hr 30     Results from last 7 days   Lab Units 07/10/22  1815   CRP mg/dL 1.63*     Results from last 7 days   Lab Units 07/10/22  1815   LACTATE mmol/L 0.6         Results from last 7 days   Lab Units 07/12/22  0437   VANCOMYCIN TR mcg/mL 11.40     Estimated Creatinine Clearance: 70.6 mL/min (by C-G formula based on SCr of 0.69 mg/dL).      Microbiology:  Blood Culture   Date Value Ref Range Status   07/10/2022 No growth at 3 days  Preliminary     No results found for: BCIDPCR, CXREFLEX, CSFCX, CULTURETIS  No results found for: CULTURES, HSVCX, URCX  No results found for: EYECULTURE, GCCX, HSVCULTURE, LABHSV  No results found for: LEGIONELLA, MRSACX, MUMPSCX, MYCOPLASCX  No results found for: NOCARDIACX, STOOLCX  No results found for: THROATCX, UNSTIMCULT, URINECX, CULTURE, VZVCULTUR  No results found for: VIRALCULTU, WOUNDCX        Radiology:  Imaging Results (Last 72 Hours)     Procedure Component Value Units Date/Time    FL Video Swallow With Speech Single Contrast [[id number]] Collected: 07/13/22 1635     Updated: 07/13/22 1638    Narrative:      EXAMINATION: FL VIDEO SWALLOW W SPEECH SINGLE-CONTRAST-, FL LIMITED UGI  FOR MBS REFLUX SINGLE-CONTRAST-     INDICATION: Assess oropharyngeal swallow; esophageal screen;  R60.9-Edema, unspecified; L03.119-Cellulitis of unspecified part of  limb; R91.8-Other nonspecific abnormal finding of lung field;  R13.10-Dysphagia, unspecified     TECHNIQUE: 48 seconds of fluoroscopic time was used for this exam. 7  associated fluoroscopic loops were saved. The patient was evaluated in  the seated lateral position while taking a variety of consistencies of  barium by mouth under the direction of speech pathology.      COMPARISON: NONE     FINDINGS: Fluoroscopy provided for a modified barium swallow, and  limited upper GI series. Please see speech therapy report for full  details and recommendations. Limited upper GI series demonstrated mild  potential smooth luminal narrowing of the distal esophagus around the  level of the gastroesophageal junction, which may represent stricture,  or spasm. Please consider endoscopy if clinically relevant. There was a  mild delay in esophageal emptying, with mild intraesophageal reflux to  the level of the midesophagus.          Impression:      Modified barium swallow: Please see speech therapy report  for full details and recommendations.     Limited upper GI series:  1. Possible mild smooth luminal narrowing of the distal esophagus, which  may represent stricture, or spasm. Endoscopy may be considered if  clinically relevant.  2. Mild delay in esophageal emptying, with mild intraesophageal reflux  to the level of the midesophagus         This report was finalized on 7/13/2022 4:35 PM by Elias Sin.       FL Limited Ugi For Mbs Reflux Single-Contrast [[id number]] Collected: 07/13/22 1635     Updated: 07/13/22 1638    Narrative:      EXAMINATION: FL VIDEO SWALLOW W SPEECH SINGLE-CONTRAST-, FL LIMITED UGI  FOR MBS REFLUX SINGLE-CONTRAST-     INDICATION: Assess oropharyngeal swallow; esophageal screen;  R60.9-Edema, unspecified; L03.119-Cellulitis of unspecified part of  limb; R91.8-Other nonspecific abnormal finding of lung field;  R13.10-Dysphagia, unspecified     TECHNIQUE: 48 seconds of fluoroscopic time was used for this exam. 7  associated fluoroscopic loops were saved. The patient was evaluated in  the seated lateral position while taking a variety of consistencies of  barium by mouth under the direction of speech pathology.     COMPARISON: NONE     FINDINGS: Fluoroscopy provided for a modified barium swallow, and  limited upper GI series. Please see speech therapy report for  full  details and recommendations. Limited upper GI series demonstrated mild  potential smooth luminal narrowing of the distal esophagus around the  level of the gastroesophageal junction, which may represent stricture,  or spasm. Please consider endoscopy if clinically relevant. There was a  mild delay in esophageal emptying, with mild intraesophageal reflux to  the level of the midesophagus.          Impression:      Modified barium swallow: Please see speech therapy report  for full details and recommendations.     Limited upper GI series:  1. Possible mild smooth luminal narrowing of the distal esophagus, which  may represent stricture, or spasm. Endoscopy may be considered if  clinically relevant.  2. Mild delay in esophageal emptying, with mild intraesophageal reflux  to the level of the midesophagus         This report was finalized on 7/13/2022 4:35 PM by Elias Sin.       XR Chest 1 View [[id number]] Collected: 07/12/22 1000     Updated: 07/12/22 1003    Narrative:         DATE OF EXAM:   7/12/2022 9:37 AM     PROCEDURE:   XR CHEST 1 VW-     INDICATIONS:   SOA; R60.9-Edema, unspecified; L03.119-Cellulitis of unspecified part of  limb; R91.8-Other nonspecific abnormal finding of lung field;  R13.10-Dysphagia, unspecified     COMPARISON:  7/10/2022 at 5:20 PM     TECHNIQUE:   [Portable chest radiograph]     FINDINGS:   Chronic changes are again seen with evidence for scarring throughout the  mid to lower lungs bilaterally. No new infiltrates or pleural effusions  are observed. The cardiac silhouette and mediastinum are stable. No  acute osseous abnormalities are observed. Electrodes from a spinal  stimulator device are noted.        Impression:      Chronic changes are noted which are stable. There is no definitive acute  cardiopulmonary process.     This report was finalized on 7/12/2022 10:00 AM by Red Agrawal MD.               Impression:   Venous stasis dermatitis  Bilateral lower extremity  edema  Diastolic congestive heart failure  Anemia  Chronic respiratory failure  Diabetes mellitus type II  Dementia        PLAN/RECOMMENDATIONS:   Thank you for asking us to see Lorrie Pagan, I recommend the following:  Cellulitis is unusual to occur bilateral extremities; patient likely has chronic venous stasis exacerbated by volume overload from her previous of heart failure.    Consultation of wound care for Unna boots by physical therapy is probably the best place to start.    I have very low suspicion of active infection given absence of fevers and leukocytosis.    Patient is started on ceftriaxone in general there is a low suspicion for infection however be in favor of stopping antibiotics hoping to avoid antibiotic selection of resistant bacteria     Favor diuresis Unna boot placement and long-term compressive therapy for venous stasis     D/c rocephin    D/c doxycyline  Gerard Chi MD  7/14/2022  15:12 EDT

## 2022-07-14 NOTE — PROGRESS NOTES
[facility] Medicine Services  PROGRESS NOTE    Patient Name: Lorrie Pagan  : 1945  MRN: [id number]    Date of Admission: 7/10/2022  Primary Care Physician: Dacia Viera MD    Subjective   Subjective     CC:  LE swelling    HPI:  Increased LE redness/warmth, edema better however. Burning today. No dyspnea.    Review of Systems   Constitutional: Positive for activity change and fatigue.   HENT: Negative.    Respiratory: Negative.    Cardiovascular: Negative.    Gastrointestinal: Negative.    Skin: Positive for color change and rash.   Neurological: Positive for light-headedness.         Objective   Objective     Vital Signs:   Temp:  [97.4 °F (36.3 °C)-98.4 °F (36.9 °C)] 97.4 °F (36.3 °C)  Heart Rate:  [] 96  Resp:  [15-18] 16  BP: (120-147)/(59-73) 120/60  Flow (L/min):  [4] 4     Physical Exam:  NAD, alert and oriented  OP clear MMM  Neck supple  No LAD  Tachy  Faint L basilar crackles only  HERNANDEZ  B LE edema, intensely erythematous/warm B LE, TTP    Results Reviewed:  LAB RESULTS:      Lab 22  0721 22  0513 22  0437 22  0457 07/10/22  1815   WBC 7.84 9.23 9.67 8.58 9.58   HEMOGLOBIN 11.0* 10.9* 9.9* 10.0* 10.1*   HEMATOCRIT 36.0 36.5 33.3* 33.8* 34.7   PLATELETS 387 397 349 383 364   NEUTROS ABS  --   --   --   --  6.81   IMMATURE GRANS (ABS)  --   --   --   --  0.06*   LYMPHS ABS  --   --   --   --  1.85   MONOS ABS  --   --   --   --  0.63   EOS ABS  --   --   --   --  0.20   MCV 83.5 84.5 84.9 83.9 87.4   SED RATE  --   --   --   --  30   CRP  --   --   --   --  1.63*   PROCALCITONIN  --   --   --   --  0.07   LACTATE  --   --   --   --  0.6   D DIMER QUANT  --   --   --   --  0.32         Lab 22  0721 22  2034 22  0513 22  0437 22  1106 22  0457 07/10/22  1815   SODIUM 132*  --  131* 139  --  144 139   POTASSIUM 3.1* 3.3* 3.3* 3.4* 4.0 3.7 3.5   CHLORIDE 83*  --  84* 95*  --  98 98   CO2 41.0*  --   40.0* 38.0*  --  38.0* 36.0*   ANION GAP 8.0  --  7.0 6.0  --  8.0 5.0   BUN 21  --  15 12  --  15 12   CREATININE 0.69  --  0.76 0.56*  --  0.66 0.70   EGFR 89.0  --  80.3 93.5  --  89.9 88.7   GLUCOSE 133*  --  132* 134*  --  140* 99   CALCIUM 8.9  --  9.4 8.9  --  8.8 9.3   HEMOGLOBIN A1C  --   --   --   --   --  6.50*  --    TSH  --   --   --   --   --  2.420  --          Lab 07/14/22  0721 07/13/22  0513 07/12/22  0437 07/11/22  0457 07/10/22  1815   TOTAL PROTEIN 7.3 7.4 6.5 6.5 6.9   ALBUMIN 3.70 3.90 3.60 3.70 3.80   GLOBULIN 3.6 3.5 2.9 2.8 3.1   ALT (SGPT) 7 8 8 9 9   AST (SGOT) 18 16 14 14 14   BILIRUBIN 0.3 0.3 0.3 0.2 0.2   ALK PHOS 116 129* 120* 116 114         Lab 07/10/22  1815   PROBNP 26.9   TROPONIN T <0.010             Lab 07/11/22  0457   IRON 26*   IRON SATURATION 6*   TIBC 440   TRANSFERRIN 295   FERRITIN 23.17         Brief Urine Lab Results  (Last result in the past 365 days)      Color   Clarity   Blood   Leuk Est   Nitrite   Protein   CREAT   Urine HCG        10/01/21 1204 Yellow   Clear   Negative   Negative   Negative   Negative                 Microbiology Results Abnormal     Procedure Component Value - Date/Time    Blood Culture - Blood, Arm, Right [[id number]]  (Normal) Collected: 07/10/22 1815    Lab Status: Preliminary result Specimen: Blood from Arm, Right Updated: 07/13/22 2017     Blood Culture No growth at 3 days    Blood Culture - Blood, Arm, Left [[id number]]  (Normal) Collected: 07/10/22 1850    Lab Status: Preliminary result Specimen: Blood from Arm, Left Updated: 07/13/22 2002     Blood Culture No growth at 3 days    Narrative:      Aerobic bottle only      COVID PRE-OP / PRE-PROCEDURE SCREENING ORDER (NO ISOLATION) - Swab, Nasopharynx [[id number]]  (Normal) Collected: 07/10/22 1925    Lab Status: Final result Specimen: Swab from Nasopharynx Updated: 07/10/22 1955    Narrative:      The following orders were created for panel order COVID PRE-OP / PRE-PROCEDURE SCREENING  ORDER (NO ISOLATION) - Swab, Nasopharynx.  Procedure                               Abnormality         Status                     ---------                               -----------         ------                     COVID-19 and FLU A/B PCR...[[id number]]  Normal              Final result                 Please view results for these tests on the individual orders.    COVID-19 and FLU A/B PCR - Swab, Nasopharynx [[id number]]  (Normal) Collected: 07/10/22 1925    Lab Status: Final result Specimen: Swab from Nasopharynx Updated: 07/10/22 1955     COVID19 Not Detected     Influenza A PCR Not Detected     Influenza B PCR Not Detected    Narrative:      Fact sheet for providers: https://www.fda.gov/media/116764/download    Fact sheet for patients: https://www.fda.gov/media/102546/download    Test performed by PCR.          FL Video Swallow With Speech Single Contrast    Result Date: 7/13/2022  EXAMINATION: FL VIDEO SWALLOW W SPEECH SINGLE-CONTRAST-, FL LIMITED UGI FOR MBS REFLUX SINGLE-CONTRAST-  INDICATION: Assess oropharyngeal swallow; esophageal screen; R60.9-Edema, unspecified; L03.119-Cellulitis of unspecified part of limb; R91.8-Other nonspecific abnormal finding of lung field; R13.10-Dysphagia, unspecified  TECHNIQUE: 48 seconds of fluoroscopic time was used for this exam. 7 associated fluoroscopic loops were saved. The patient was evaluated in the seated lateral position while taking a variety of consistencies of barium by mouth under the direction of speech pathology.  COMPARISON: NONE  FINDINGS: Fluoroscopy provided for a modified barium swallow, and limited upper GI series. Please see speech therapy report for full details and recommendations. Limited upper GI series demonstrated mild potential smooth luminal narrowing of the distal esophagus around the level of the gastroesophageal junction, which may represent stricture, or spasm. Please consider endoscopy if clinically relevant. There was a mild delay in  esophageal emptying, with mild intraesophageal reflux to the level of the midesophagus.       Impression: Modified barium swallow: Please see speech therapy report for full details and recommendations.  Limited upper GI series: 1. Possible mild smooth luminal narrowing of the distal esophagus, which may represent stricture, or spasm. Endoscopy may be considered if clinically relevant. 2. Mild delay in esophageal emptying, with mild intraesophageal reflux to the level of the midesophagus    This report was finalized on 7/13/2022 4:35 PM by Elias Sin.      FL Limited Ugi For Mbs Reflux Single-Contrast    Result Date: 7/13/2022  EXAMINATION: FL VIDEO SWALLOW W SPEECH SINGLE-CONTRAST-, FL LIMITED UGI FOR MBS REFLUX SINGLE-CONTRAST-  INDICATION: Assess oropharyngeal swallow; esophageal screen; R60.9-Edema, unspecified; L03.119-Cellulitis of unspecified part of limb; R91.8-Other nonspecific abnormal finding of lung field; R13.10-Dysphagia, unspecified  TECHNIQUE: 48 seconds of fluoroscopic time was used for this exam. 7 associated fluoroscopic loops were saved. The patient was evaluated in the seated lateral position while taking a variety of consistencies of barium by mouth under the direction of speech pathology.  COMPARISON: NONE  FINDINGS: Fluoroscopy provided for a modified barium swallow, and limited upper GI series. Please see speech therapy report for full details and recommendations. Limited upper GI series demonstrated mild potential smooth luminal narrowing of the distal esophagus around the level of the gastroesophageal junction, which may represent stricture, or spasm. Please consider endoscopy if clinically relevant. There was a mild delay in esophageal emptying, with mild intraesophageal reflux to the level of the midesophagus.       Impression: Modified barium swallow: Please see speech therapy report for full details and recommendations.  Limited upper GI series: 1. Possible mild smooth luminal  narrowing of the distal esophagus, which may represent stricture, or spasm. Endoscopy may be considered if clinically relevant. 2. Mild delay in esophageal emptying, with mild intraesophageal reflux to the level of the midesophagus    This report was finalized on 7/13/2022 4:35 PM by Elias Sin.        Results for orders placed during the hospital encounter of 07/10/22    Adult Transthoracic Echo Complete W/ Cont if Necessary Per Protocol    Interpretation Summary  · Moderate aortic valve regurgitation is present.  · Estimated left ventricular EF = 65% Left ventricular systolic function is normal.      I have reviewed the medications:  Scheduled Meds:albuterol, 2.5 mg, Nebulization, Q12H  cefTRIAXone, 1 g, Intravenous, Nightly  donepezil, 10 mg, Oral, Daily  doxycycline, 100 mg, Intravenous, Q12H  DULoxetine, 60 mg, Oral, Daily  [START ON 7/15/2022] furosemide, 40 mg, Oral, Daily  insulin lispro, 0-7 Units, Subcutaneous, TID AC  ketoconazole, 1 application, Topical, Q24H  memantine, 5 mg, Oral, Daily  metOLazone, 2.5 mg, Oral, Daily  montelukast, 10 mg, Oral, Nightly  pantoprazole, 40 mg, Oral, BID  rOPINIRole, 2.75 mg, Oral, Q8H  saccharomyces boulardii, 250 mg, Oral, BID  sodium chloride, 10 mL, Intravenous, Q12H      Continuous Infusions:   PRN Meds:.•  acetaminophen  •  dextrose  •  dextrose  •  glucagon (human recombinant)  •  melatonin  •  ondansetron  •  potassium chloride  •  potassium chloride  •  [COMPLETED] Insert peripheral IV **AND** sodium chloride  •  sodium chloride    Assessment & Plan   Assessment & Plan     Active Hospital Problems    Diagnosis  POA   • **Cellulitis of both lower extremities [L03.115, L03.116]  Yes   • Iron deficiency anemia [D50.9]  Yes   • BRBPR (bright red blood per rectum) [K62.5]  Yes   • Gastroparesis [K31.84]  Yes   • Dysphagia [R13.10]  Yes   • Acquired hypothyroidism [E03.9]  Yes   • Gastroesophageal reflux disease with esophagitis without hemorrhage [K21.00]  Yes    • Restless leg syndrome [G25.81]  Yes   • Controlled type 2 diabetes mellitus without complication, without long-term current use of insulin (HCC) [E11.9]  Yes   • Mixed hyperlipidemia [E78.2]  Yes   • Stage II, moderate, COPD [J44.9]  Yes   • Syncope [R55]  Yes   • Chronic diastolic heart failure (HCC) [I50.32]  Yes   • Benign hypertension [I10]  Yes   • Aortic valve regurgitation [I35.1]  Yes   • Peripheral edema [R60.9]  Yes   • RENETTA (Previous CPAP) [G47.33, Z99.89]  Not Applicable   • H/O: lung cancer (Prior resection 2016) [Z85.118]  Not Applicable   • Peripheral neuropathy [G62.9]  Yes      Resolved Hospital Problems   No resolved problems to display.        Brief Hospital Course to date:  Lorrie Pagan is a 76 y.o. female with a past medical history significant for chronic respiratory failure with COPD on 4L supplemental oxygen, chronic diastolic CHF,hypertension, HLD, DM2 with gastroparesis and peripheral neuropathy, dementia, RENETTA, GERD, hypothyroidism, and anxiety/depression that presented to the ED complaining of LE redness, weeping and swelling along with dysphagia. Patient admitted to the hospitalist service for further evaluation.    This patient's problems and plans were partially entered by my partner and updated as appropriate by me 07/14/22.    Cellulitis of Both Lower Extremities  BLE edema/chronic diastolic CHF  -continue antibiotics, ask for ID opinion, may benefit from IV antibiotics outpatient  -duplex negative for DVT    HFpEF  -s/p IV lasix, resume PO     BRBPR:  Dysphagia  Iron deficiency anemia  - continue PPI  - GI saw the patient and would like to schedule outpatient colonoscopy   - monitor H/H     Recurrent syncope  - ECHO reviewed, monitor orthostasis     Chronic Respiratory Failure:  Stage II COPD   H/P lung cancer s/p resection 2016  - on 4L supplemental oxygen  - follows with pulmonology, Saray Retana.   - Has upcoming outpatient bronchoscopy  scheduled     DM2:  -SSI    Dementia  - continue  Aricept, Namenda      Peripheral neuropathy:  RLS  - continue requip, cymbalta  - PT/OT     Hypothyroidism:  - continue synthroid  - TSH stable    Expected Discharge Location and Transportation: home via family/transport  Expected Discharge Date: 7/15/22    DVT prophylaxis:  Mechanical DVT prophylaxis orders are present.     AM-PAC 6 Clicks Score (PT): 23 (07/13/22 0800)    CODE STATUS:   Code Status and Medical Interventions:   Ordered at: 07/10/22 2088     Level Of Support Discussed With:    Patient     Code Status (Patient has no pulse and is not breathing):    CPR (Attempt to Resuscitate)     Medical Interventions (Patient has pulse or is breathing):    Full Support       Cory Robert MD  07/14/22

## 2022-07-14 NOTE — CASE MANAGEMENT/SOCIAL WORK
Continued Stay Note  [facility]     Patient Name: Lorrie Pagan  MRN: [id number]  Today's Date: 7/14/2022    Admit Date: 7/10/2022     Discharge Plan     Row Name 07/14/22 1136       Plan    Plan Home    Patient/Family in Agreement with Plan yes    Plan Comments Spoke with Raine Vega [facility].  Since patient resides at the [place], and [facility] is next door, Silvia was able to offer some insight.  Silvia stated that there is a therapy place in the [place] that the patient could go to, and all she would need is an order for physical therapy and occupational therapy.  Both orders have been placed, printed, and given to patient.  Both orders are in the green bag on the couch that contains her clothes.  Spoke with patient at bedside.  Patient denies any needs at this time.  CM will continue to follow.    Final Discharge Disposition Code 01 - home or self-care               Discharge Codes    No documentation.               Expected Discharge Date and Time     Expected Discharge Date Expected Discharge Time    Jul 15, 2022             Aleida Pérez RN

## 2022-07-15 ENCOUNTER — APPOINTMENT (OUTPATIENT)
Dept: GENERAL RADIOLOGY | Facility: HOSPITAL | Age: 77
End: 2022-07-15

## 2022-07-15 ENCOUNTER — READMISSION MANAGEMENT (OUTPATIENT)
Dept: CALL CENTER | Facility: HOSPITAL | Age: 77
End: 2022-07-15

## 2022-07-15 VITALS
TEMPERATURE: 97.6 F | DIASTOLIC BLOOD PRESSURE: 60 MMHG | HEIGHT: 64 IN | HEART RATE: 84 BPM | WEIGHT: 177.1 LBS | RESPIRATION RATE: 17 BRPM | SYSTOLIC BLOOD PRESSURE: 137 MMHG | BODY MASS INDEX: 30.23 KG/M2 | OXYGEN SATURATION: 95 %

## 2022-07-15 LAB
ANION GAP SERPL CALCULATED.3IONS-SCNC: 9 MMOL/L (ref 5–15)
BACTERIA SPEC AEROBE CULT: NORMAL
BACTERIA SPEC AEROBE CULT: NORMAL
BUN SERPL-MCNC: 28 MG/DL (ref 8–23)
BUN/CREAT SERPL: 39.4 (ref 7–25)
CALCIUM SPEC-SCNC: 8.9 MG/DL (ref 8.6–10.5)
CHLORIDE SERPL-SCNC: 87 MMOL/L (ref 98–107)
CO2 SERPL-SCNC: 38 MMOL/L (ref 22–29)
CREAT SERPL-MCNC: 0.71 MG/DL (ref 0.57–1)
DEPRECATED RDW RBC AUTO: 46.9 FL (ref 37–54)
EGFRCR SERPLBLD CKD-EPI 2021: 87.2 ML/MIN/1.73
ERYTHROCYTE [DISTWIDTH] IN BLOOD BY AUTOMATED COUNT: 15.7 % (ref 12.3–15.4)
GLUCOSE BLDC GLUCOMTR-MCNC: 106 MG/DL (ref 70–130)
GLUCOSE BLDC GLUCOMTR-MCNC: 131 MG/DL (ref 70–130)
GLUCOSE BLDC GLUCOMTR-MCNC: 204 MG/DL (ref 70–130)
GLUCOSE SERPL-MCNC: 156 MG/DL (ref 65–99)
HCT VFR BLD AUTO: 37.7 % (ref 34–46.6)
HGB BLD-MCNC: 11.3 G/DL (ref 12–15.9)
MCH RBC QN AUTO: 24.9 PG (ref 26.6–33)
MCHC RBC AUTO-ENTMCNC: 30 G/DL (ref 31.5–35.7)
MCV RBC AUTO: 83.2 FL (ref 79–97)
PLATELET # BLD AUTO: 434 10*3/MM3 (ref 140–450)
PMV BLD AUTO: 9.3 FL (ref 6–12)
POTASSIUM SERPL-SCNC: 3.2 MMOL/L (ref 3.5–5.2)
POTASSIUM SERPL-SCNC: 3.7 MMOL/L (ref 3.5–5.2)
RBC # BLD AUTO: 4.53 10*6/MM3 (ref 3.77–5.28)
SODIUM SERPL-SCNC: 134 MMOL/L (ref 136–145)
WBC NRBC COR # BLD: 8.65 10*3/MM3 (ref 3.4–10.8)

## 2022-07-15 PROCEDURE — 99239 HOSP IP/OBS DSCHRG MGMT >30: CPT | Performed by: HOSPITALIST

## 2022-07-15 PROCEDURE — 84132 ASSAY OF SERUM POTASSIUM: CPT | Performed by: HOSPITALIST

## 2022-07-15 PROCEDURE — 82962 GLUCOSE BLOOD TEST: CPT

## 2022-07-15 PROCEDURE — 63710000001 INSULIN LISPRO (HUMAN) PER 5 UNITS: Performed by: PHYSICIAN ASSISTANT

## 2022-07-15 PROCEDURE — 85027 COMPLETE CBC AUTOMATED: CPT | Performed by: HOSPITALIST

## 2022-07-15 PROCEDURE — 29580 STRAPPING UNNA BOOT: CPT

## 2022-07-15 PROCEDURE — 80048 BASIC METABOLIC PNL TOTAL CA: CPT | Performed by: HOSPITALIST

## 2022-07-15 PROCEDURE — 97164 PT RE-EVAL EST PLAN CARE: CPT

## 2022-07-15 PROCEDURE — 71045 X-RAY EXAM CHEST 1 VIEW: CPT

## 2022-07-15 RX ADMIN — ROPINIROLE HYDROCHLORIDE 2.75 MG: 2 TABLET, FILM COATED ORAL at 05:36

## 2022-07-15 RX ADMIN — DULOXETINE HYDROCHLORIDE 60 MG: 60 CAPSULE, DELAYED RELEASE ORAL at 08:03

## 2022-07-15 RX ADMIN — ROPINIROLE HYDROCHLORIDE 2.75 MG: 2 TABLET, FILM COATED ORAL at 16:16

## 2022-07-15 RX ADMIN — MEMANTINE 5 MG: 10 TABLET ORAL at 08:03

## 2022-07-15 RX ADMIN — Medication 10 ML: at 08:05

## 2022-07-15 RX ADMIN — POTASSIUM CHLORIDE 40 MEQ: 750 CAPSULE, EXTENDED RELEASE ORAL at 05:36

## 2022-07-15 RX ADMIN — FUROSEMIDE 40 MG: 40 TABLET ORAL at 08:03

## 2022-07-15 RX ADMIN — KETOCONAZOLE 1 APPLICATION: 20 CREAM TOPICAL at 08:05

## 2022-07-15 RX ADMIN — PANTOPRAZOLE SODIUM 40 MG: 40 TABLET, DELAYED RELEASE ORAL at 08:03

## 2022-07-15 RX ADMIN — Medication 250 MG: at 08:02

## 2022-07-15 RX ADMIN — INSULIN LISPRO 3 UNITS: 100 INJECTION, SOLUTION INTRAVENOUS; SUBCUTANEOUS at 08:05

## 2022-07-15 RX ADMIN — METOLAZONE 2.5 MG: 2.5 TABLET ORAL at 08:03

## 2022-07-15 RX ADMIN — DONEPEZIL HYDROCHLORIDE 10 MG: 10 TABLET ORAL at 08:03

## 2022-07-15 RX ADMIN — POTASSIUM CHLORIDE 40 MEQ: 750 CAPSULE, EXTENDED RELEASE ORAL at 02:05

## 2022-07-15 NOTE — DISCHARGE SUMMARY
[facility] Medicine Services  DISCHARGE SUMMARY    Patient Name: Lorrie Pagan  : 1945  MRN: [id number]    Date of Admission: 7/10/2022  2:39 PM  Date of Discharge:  7/15/2022  Primary Care Physician: Dacia Viera MD    Consults     Date and Time Order Name Status Description    2022 10:56 AM Inpatient Infectious Diseases Consult Completed     7/10/2022 10:49 PM Inpatient Gastroenterology Consult Completed           Hospital Course     Presenting Problem:   Peripheral edema [R60.9]    Active Hospital Problems    Diagnosis  POA   • **Cellulitis of both lower extremities [L03.115, L03.116]  Yes   • Iron deficiency anemia [D50.9]  Yes   • BRBPR (bright red blood per rectum) [K62.5]  Yes   • Gastroparesis [K31.84]  Yes   • Dysphagia [R13.10]  Yes   • Acquired hypothyroidism [E03.9]  Yes   • Gastroesophageal reflux disease with esophagitis without hemorrhage [K21.00]  Yes   • Restless leg syndrome [G25.81]  Yes   • Controlled type 2 diabetes mellitus without complication, without long-term current use of insulin (HCC) [E11.9]  Yes   • Mixed hyperlipidemia [E78.2]  Yes   • Stage II, moderate, COPD [J44.9]  Yes   • Syncope [R55]  Yes   • Chronic diastolic heart failure (HCC) [I50.32]  Yes   • Benign hypertension [I10]  Yes   • Aortic valve regurgitation [I35.1]  Yes   • Peripheral edema [R60.9]  Yes   • RENETTA (Previous CPAP) [G47.33, Z99.89]  Not Applicable   • H/O: lung cancer (Prior resection ) [Z85.118]  Not Applicable   • Peripheral neuropathy [G62.9]  Yes      Resolved Hospital Problems   No resolved problems to display.      Lorrie Pagan is a 76 y.o. female with a past medical history significant for chronic respiratory failure with COPD on 4L supplemental oxygen, chronic diastolic CHF,hypertension, HLD, DM2 with gastroparesis and peripheral neuropathy, dementia, RENETTA, GERD, hypothyroidism, and anxiety/depression that presented to the ED complaining of  LE redness, weeping and swelling along with dysphagia. Patient admitted to the hospitalist service for further evaluation.     This patient's problems and plans were partially entered by my partner and updated as appropriate by me 07/14/22.     Cellulitis of Both Lower Extremities  BLE edema/chronic diastolic CHF  -She was treated with IV antibiotics and diuresed. ID felt that this was likely venous stasis. UNNA boots/compression wraps will be placed and she will follow up with her PCP next week. Duplex demonstrated no DVT.     HFpEF  -She received IV diuretics and will resume her home regimen     BRBPR:  Dysphagia  Iron deficiency anemia  -She will continue her PPI and an ambulatory referral to GI for colonoscopy was placed        Discharge Follow Up Recommendations for outpatient labs/diagnostics:  PCP next week    Day of Discharge     HPI:   Redness better. Edema in legs remains, but improving in ankles. NO f/c. No n/v. Ready to go home    Review of Systems  As above    Vital Signs:   Temp:  [97 °F (36.1 °C)-97.6 °F (36.4 °C)] 97.6 °F (36.4 °C)  Heart Rate:  [] 92  Resp:  [16-18] 17  BP: (135-145)/(57-69) 137/60  Flow (L/min):  [4] 4      Physical Exam:  NAD, alert and oriented  OP clear, MMM  Neck supple   No LAD  RRR  Decreased at bases, otherwise clear  +BS, soft  Ankle edema improved, erythema improved in LE  HERNANDEZ  Normal affect    Pertinent  and/or Most Recent Results     LAB RESULTS:      Lab 07/15/22  0440 07/14/22  0721 07/13/22  0513 07/12/22  0437 07/11/22  0457 07/10/22  1815   WBC 8.65 7.84 9.23 9.67 8.58 9.58   HEMOGLOBIN 11.3* 11.0* 10.9* 9.9* 10.0* 10.1*   HEMATOCRIT 37.7 36.0 36.5 33.3* 33.8* 34.7   PLATELETS 434 387 397 349 383 364   NEUTROS ABS  --   --   --   --   --  6.81   IMMATURE GRANS (ABS)  --   --   --   --   --  0.06*   LYMPHS ABS  --   --   --   --   --  1.85   MONOS ABS  --   --   --   --   --  0.63   EOS ABS  --   --   --   --   --  0.20   MCV 83.2 83.5 84.5 84.9 83.9 87.4   SED  RATE  --   --   --   --   --  30   CRP  --   --   --   --   --  1.63*   PROCALCITONIN  --   --   --   --   --  0.07   LACTATE  --   --   --   --   --  0.6   D DIMER QUANT  --   --   --   --   --  0.32         Lab 07/15/22  0440 07/14/22  2218 07/14/22 0721 07/13/22  2034 07/13/22 0513 07/12/22 0437 07/11/22  1106 07/11/22 0457   SODIUM 134*  --  132*  --  131* 139  --  144   POTASSIUM 3.2* 3.5 3.1* 3.3* 3.3* 3.4*   < > 3.7   CHLORIDE 87*  --  83*  --  84* 95*  --  98   CO2 38.0*  --  41.0*  --  40.0* 38.0*  --  38.0*   ANION GAP 9.0  --  8.0  --  7.0 6.0  --  8.0   BUN 28*  --  21  --  15 12  --  15   CREATININE 0.71  --  0.69  --  0.76 0.56*  --  0.66   EGFR 87.2  --  89.0  --  80.3 93.5  --  89.9   GLUCOSE 156*  --  133*  --  132* 134*  --  140*   CALCIUM 8.9  --  8.9  --  9.4 8.9  --  8.8   HEMOGLOBIN A1C  --   --   --   --   --   --   --  6.50*   TSH  --   --   --   --   --   --   --  2.420    < > = values in this interval not displayed.         Lab 07/14/22  0721 07/13/22  0513 07/12/22  0437 07/11/22  0457 07/10/22  1815   TOTAL PROTEIN 7.3 7.4 6.5 6.5 6.9   ALBUMIN 3.70 3.90 3.60 3.70 3.80   GLOBULIN 3.6 3.5 2.9 2.8 3.1   ALT (SGPT) 7 8 8 9 9   AST (SGOT) 18 16 14 14 14   BILIRUBIN 0.3 0.3 0.3 0.2 0.2   ALK PHOS 116 129* 120* 116 114         Lab 07/10/22  1815   PROBNP 26.9   TROPONIN T <0.010             Lab 07/11/22  0457   IRON 26*   IRON SATURATION 6*   TIBC 440   TRANSFERRIN 295   FERRITIN 23.17         Brief Urine Lab Results  (Last result in the past 365 days)      Color   Clarity   Blood   Leuk Est   Nitrite   Protein   CREAT   Urine HCG        10/01/21 1204 Yellow   Clear   Negative   Negative   Negative   Negative               Microbiology Results (last 10 days)     Procedure Component Value - Date/Time    COVID PRE-OP / PRE-PROCEDURE SCREENING ORDER (NO ISOLATION) - Swab, Nasopharynx [[id number]]  (Normal) Collected: 07/10/22 1925    Lab Status: Final result Specimen: Swab from Nasopharynx  Updated: 07/10/22 1955    Narrative:      The following orders were created for panel order COVID PRE-OP / PRE-PROCEDURE SCREENING ORDER (NO ISOLATION) - Swab, Nasopharynx.  Procedure                               Abnormality         Status                     ---------                               -----------         ------                     COVID-19 and FLU A/B PCR...[[id number]]  Normal              Final result                 Please view results for these tests on the individual orders.    COVID-19 and FLU A/B PCR - Swab, Nasopharynx [[id number]]  (Normal) Collected: 07/10/22 1925    Lab Status: Final result Specimen: Swab from Nasopharynx Updated: 07/10/22 1955     COVID19 Not Detected     Influenza A PCR Not Detected     Influenza B PCR Not Detected    Narrative:      Fact sheet for providers: https://www.fda.gov/media/211806/download    Fact sheet for patients: https://www.fda.gov/media/160457/download    Test performed by PCR.    Blood Culture - Blood, Arm, Left [[id number]]  (Normal) Collected: 07/10/22 1850    Lab Status: Preliminary result Specimen: Blood from Arm, Left Updated: 07/14/22 2002     Blood Culture No growth at 4 days    Narrative:      Aerobic bottle only      Blood Culture - Blood, Arm, Right [[id number]]  (Normal) Collected: 07/10/22 1815    Lab Status: Preliminary result Specimen: Blood from Arm, Right Updated: 07/14/22 2017     Blood Culture No growth at 4 days          Adult Transthoracic Echo Complete W/ Cont if Necessary Per Protocol    Result Date: 7/12/2022  · Moderate aortic valve regurgitation is present. · Estimated left ventricular EF = 65% Left ventricular systolic function is normal.      FL Video Swallow With Speech Single Contrast    Result Date: 7/13/2022  EXAMINATION: FL VIDEO SWALLOW W SPEECH SINGLE-CONTRAST-, FL LIMITED UGI FOR MBS REFLUX SINGLE-CONTRAST-  INDICATION: Assess oropharyngeal swallow; esophageal screen; R60.9-Edema, unspecified; L03.119-Cellulitis of  unspecified part of limb; R91.8-Other nonspecific abnormal finding of lung field; R13.10-Dysphagia, unspecified  TECHNIQUE: 48 seconds of fluoroscopic time was used for this exam. 7 associated fluoroscopic loops were saved. The patient was evaluated in the seated lateral position while taking a variety of consistencies of barium by mouth under the direction of speech pathology.  COMPARISON: NONE  FINDINGS: Fluoroscopy provided for a modified barium swallow, and limited upper GI series. Please see speech therapy report for full details and recommendations. Limited upper GI series demonstrated mild potential smooth luminal narrowing of the distal esophagus around the level of the gastroesophageal junction, which may represent stricture, or spasm. Please consider endoscopy if clinically relevant. There was a mild delay in esophageal emptying, with mild intraesophageal reflux to the level of the midesophagus.       Modified barium swallow: Please see speech therapy report for full details and recommendations.  Limited upper GI series: 1. Possible mild smooth luminal narrowing of the distal esophagus, which may represent stricture, or spasm. Endoscopy may be considered if clinically relevant. 2. Mild delay in esophageal emptying, with mild intraesophageal reflux to the level of the midesophagus    This report was finalized on 7/13/2022 4:35 PM by Elias Sin.      XR Chest 1 View    Result Date: 7/15/2022   DATE OF EXAM: 7/15/2022 4:30 AM  PROCEDURE: XR CHEST 1 VW-  INDICATIONS: DYSPNEA, ON EXERTION  COMPARISON: 7/12/2022 9:26 AM  TECHNIQUE: [Portable chest radiograph]  FINDINGS: Residual atelectasis versus infiltrates are noted in the left lung base. Chronic changes are seen throughout the lungs. The cardiac silhouette and mediastinum are stable. Electrodes from a spinal stimulator are noted.      Residual atelectasis versus infiltrates are noted within the left lung base.  This report was finalized on 7/15/2022  8:17 AM by Red Agrawal MD.      XR Chest 1 View    Result Date: 7/12/2022   DATE OF EXAM: 7/12/2022 9:37 AM  PROCEDURE: XR CHEST 1 VW-  INDICATIONS: SOA; R60.9-Edema, unspecified; L03.119-Cellulitis of unspecified part of limb; R91.8-Other nonspecific abnormal finding of lung field; R13.10-Dysphagia, unspecified  COMPARISON: 7/10/2022 at 5:20 PM  TECHNIQUE: [Portable chest radiograph]  FINDINGS: Chronic changes are again seen with evidence for scarring throughout the mid to lower lungs bilaterally. No new infiltrates or pleural effusions are observed. The cardiac silhouette and mediastinum are stable. No acute osseous abnormalities are observed. Electrodes from a spinal stimulator device are noted.      Chronic changes are noted which are stable. There is no definitive acute cardiopulmonary process.  This report was finalized on 7/12/2022 10:00 AM by Red Agrawal MD.      XR Chest 1 View    Result Date: 7/10/2022  XR CHEST 1 VW-  Date of Exam: 7/10/2022 5:34 PM  Indication: dyspnea.  Comparison:?05/31/2022  Technique:?A single view of the chest was obtained.  FINDINGS:  ?Heart size and pulmonary vessels are within normal limits.  There is new left basilar airspace disease which may be secondary to atelectasis or pneumonia.  There are postoperative changes of the left upper lobe. Right lung is clear.  No definite pleural effusion or pneumothorax. Spinal neurostimulator device remains in place.  Judging over the lower thoracic spine.          1.  New left basilar airspace disease which may be secondary to atelectasis or pneumonia.   This report was finalized on 7/10/2022 5:52 PM by Darrian Ralph MD.      Duplex Venous Lower Extremity - Bilateral CAR    Result Date: 7/12/2022  · Normal bilateral lower extremity venous duplex scan.      FL Limited Ugi For Mbs Reflux Single-Contrast    Result Date: 7/13/2022  EXAMINATION: FL VIDEO SWALLOW W SPEECH SINGLE-CONTRAST-, FL LIMITED UGI FOR MBS REFLUX SINGLE-CONTRAST-   INDICATION: Assess oropharyngeal swallow; esophageal screen; R60.9-Edema, unspecified; L03.119-Cellulitis of unspecified part of limb; R91.8-Other nonspecific abnormal finding of lung field; R13.10-Dysphagia, unspecified  TECHNIQUE: 48 seconds of fluoroscopic time was used for this exam. 7 associated fluoroscopic loops were saved. The patient was evaluated in the seated lateral position while taking a variety of consistencies of barium by mouth under the direction of speech pathology.  COMPARISON: NONE  FINDINGS: Fluoroscopy provided for a modified barium swallow, and limited upper GI series. Please see speech therapy report for full details and recommendations. Limited upper GI series demonstrated mild potential smooth luminal narrowing of the distal esophagus around the level of the gastroesophageal junction, which may represent stricture, or spasm. Please consider endoscopy if clinically relevant. There was a mild delay in esophageal emptying, with mild intraesophageal reflux to the level of the midesophagus.       Modified barium swallow: Please see speech therapy report for full details and recommendations.  Limited upper GI series: 1. Possible mild smooth luminal narrowing of the distal esophagus, which may represent stricture, or spasm. Endoscopy may be considered if clinically relevant. 2. Mild delay in esophageal emptying, with mild intraesophageal reflux to the level of the midesophagus    This report was finalized on 7/13/2022 4:35 PM by Elias Sin.        Results for orders placed during the hospital encounter of 07/10/22    Duplex Venous Lower Extremity - Bilateral CAR    Interpretation Summary  · Normal bilateral lower extremity venous duplex scan.      Results for orders placed during the hospital encounter of 07/10/22    Duplex Venous Lower Extremity - Bilateral CAR    Interpretation Summary  · Normal bilateral lower extremity venous duplex scan.      Results for orders placed during the  hospital encounter of 07/10/22    Adult Transthoracic Echo Complete W/ Cont if Necessary Per Protocol    Interpretation Summary  · Moderate aortic valve regurgitation is present.  · Estimated left ventricular EF = 65% Left ventricular systolic function is normal.      Plan for Follow-up of Pending Labs/Results: Reviewed  Pending Labs     Order Current Status    Potassium In process    Blood Culture - Blood, Arm, Left Preliminary result    Blood Culture - Blood, Arm, Right Preliminary result        Discharge Details        Discharge Medications      Changes to Medications      Instructions Start Date   azelastine 0.1 % nasal spray  Commonly known as: ASTELIN  What changed:   · when to take this  · reasons to take this   2 sprays, Nasal, 2 Times Daily         Continue These Medications      Instructions Start Date   acetaminophen 500 MG tablet  Commonly known as: TYLENOL   1,000 mg, Oral, Every 6 Hours PRN      AeroChamber Plus Morris-Vu misc   As Needed      albuterol sulfate  (90 Base) MCG/ACT inhaler  Commonly known as: PROVENTIL HFA;VENTOLIN HFA;PROAIR HFA   2 puffs, Inhalation, Every 6 Hours PRN      albuterol 1.25 MG/3ML nebulizer solution  Commonly known as: ACCUNEB   1.25 mg, Nebulization, Every 6 Hours PRN      chlorhexidine 0.12 % solution  Commonly known as: PERIDEX   15 mL, Mouth/Throat, 2 Times Daily PRN, For 14 days, w/1 refill       Dapsone 5 % topical gel   Topical, Daily PRN      donepezil 10 MG tablet  Commonly known as: ARICEPT   10 mg, Oral, Daily      DULoxetine 60 MG capsule  Commonly known as: CYMBALTA   TAKE 1 CAPSULE BY MOUTH EVERY DAY      famotidine 20 MG tablet  Commonly known as: Pepcid AC Maximum Strength   20 mg, Oral, 2 Times Daily      furosemide 40 MG tablet  Commonly known as: LASIX   40 mg, Oral, Daily      glimepiride 2 MG tablet  Commonly known as: AMARYL   TAKE 1 TABLET BY MOUTH TWICE DAILY      ketoconazole 2 % shampoo  Commonly known as: NIZORAL   1 application, Topical, As  "Needed      ketoconazole 2 % cream  Commonly known as: NIZORAL   1 application, Topical, Daily PRN      latanoprost 0.005 % ophthalmic solution  Commonly known as: XALATAN   1 drop, Both Eyes, Nightly      memantine 5 MG tablet  Commonly known as: NAMENDA   5 mg, Oral, Daily      metOLazone 2.5 MG tablet  Commonly known as: ZAROXOLYN   2.5 mg, Oral, Daily      montelukast 10 MG tablet  Commonly known as: SINGULAIR   10 mg, Oral, Nightly      O2  Commonly known as: OXYGEN   2 L/min, Inhalation, Continuous      ondansetron ODT 4 MG disintegrating tablet  Commonly known as: ZOFRAN-ODT   4 mg, Translingual, Every 8 Hours PRN      pantoprazole 40 MG EC tablet  Commonly known as: PROTONIX   40 mg, Oral, 2 Times Daily      potassium chloride 10 MEQ CR capsule  Commonly known as: MICRO-K   3 capsules, Oral, 2 Times Daily      Rollator Ultra-Light misc   1 each, Does not apply, Take As Directed      rOPINIRole XL 8 MG 24 hr tablet  Commonly known as: REQUIP XL   8 mg, Oral, 2 times daily      saccharomyces boulardii 250 MG capsule  Commonly known as: FLORASTOR   250 mg, Oral, 2 Times Daily      timolol 0.25 % ophthalmic solution  Commonly known as: TIMOPTIC   1 drop, Right Eye, Every Morning      tiZANidine 2 MG tablet  Commonly known as: ZANAFLEX   2 mg, Oral, Nightly PRN      Triamcinolone Acetonide 55 MCG/ACT nasal inhaler  Commonly known as: NASACORT   2 sprays, Nasal, Daily      vitamin D 1.25 MG (88226 UT) capsule capsule  Commonly known as: ERGOCALCIFEROL   50,000 Units, Oral, Weekly             Allergies   Allergen Reactions   • Hydrocodone Hallucinations     \"With high doses\"   • Statins Myalgia     myalgia   • Metoclopramide Other (See Comments)     EXACERBATED RLS   • Penicillins Rash     Rash    Tolerated Ceftriaxone   • Tramadol Nausea And Vomiting and GI Intolerance     VERY MILD PER PT         Discharge Disposition:  Home or Self Care    Diet:  Hospital:  Diet Order   Procedures   • Diet Regular; Consistent " Carbohydrate       Activity:  Activity Instructions     Activity as Tolerated            Restrictions or Other Recommendations:         CODE STATUS:    Code Status and Medical Interventions:   Ordered at: 07/10/22 2144     Level Of Support Discussed With:    Patient     Code Status (Patient has no pulse and is not breathing):    CPR (Attempt to Resuscitate)     Medical Interventions (Patient has pulse or is breathing):    Full Support       Future Appointments   Date Time Provider Department Center   7/20/2022  3:00 PM Dacia Viera MD [facility]   7/21/2022  8:45 AM Mary Jo Albarado APRN [facility]   8/23/2022  8:30 AM Elias Luther PA [facility]   8/31/2022 10:00 AM Saray Retana APRN [facility]   9/16/2022 12:45 PM Dacia Viera MD [facility]   10/25/2022 10:30 AM [facility] FOLLOW-UP [facility]       Additional Instructions for the Follow-ups that You Need to Schedule     Ambulatory Referral to Occupational Therapy   As directed      Admitted for cellulitis    Order Comments: Admitted for cellulitis     Specialty needed: Evaluate and treat         Ambulatory Referral to Physical Therapy Evaluate and treat   As directed      Admitted for cellulitis    Order Comments: Admitted for cellulitis     Specialty needed: Evaluate and treat         Discharge Follow-up with PCP   As directed       Currently Documented PCP:    Dacia Viera MD    PCP Phone Number:    [phone]     Follow Up Details: NExt week as scheduled                     Cory Robert MD  07/15/22      Time Spent on Discharge:  I spent  33  minutes on this discharge activity which included: face-to-face encounter with the patient, reviewing the data in the system, coordination of the care with the nursing staff as well as consultants, documentation, and entering orders.

## 2022-07-15 NOTE — PROGRESS NOTES
Lorrie Pagan  1945  [id number]    Date of Consult: 7/15/2022    Admission Date: 7/10/2022      Requesting Provider: No ref. provider found  Evaluating Physician: Gerard Chi MD    Reason for Consultation: Questionable leg cellulitis    History of present illness:    Patient is a 76 y.o. female presents to emergency room with painful erythematous edematous lower extremities patient has history of anemia and has been seen by gastroenterology for bright red bright red blood per rectum.  Patient has comorbidities including COPD, chronic diastolic congestive heart failure hyperlipidemia diabetes mellitus mellitus type II and gastroparesis as well as dementia sleep apnea GERD hypothyroidism anxiety and depression.    Patient reports lower extremities have been increasingly swollen with redness and weeping bilaterally patient is unable to wear compression stockings because of tenderness.  Patient has been given empiric antibiotic courses by primary care provider including Keflex.  Patient had complications with oral medicines which cause nausea vomiting and GI upset.  Patient is also been given intermittent diuretics.    States that she has had bilateral leg swelling /redness for months; recalls that unna boots helped her redness, swelling and made her feel better    7/15/22; doing well; no unna boots on legs yet; no complaints    Past Medical History:   Diagnosis Date   • Back pain    • Bronchiectasis (HCC)    • Bronchitis 01/2018   • Cancer (HCC)     History of lung cancer and skin cancer    • Cardiac murmur    • Chronic cough    • Chronic obstructive pulmonary disease (HCC)    • Colon polyp    • DDD (degenerative disc disease), lumbar    • Depression    • Diabetes mellitus (HCC)     DX: 2019, CHECK BS QOD, LAST A1C 6.3??   • Disease of thyroid gland    • Esophageal dilatation 9/20/2019    Added automatically from request for surgery [id number]   • Esophageal dysphagia 10/24/2019    Added  automatically from request for surgery [id number]   • Former smoker (None since 1992) 8/29/2019   • GERD (gastroesophageal reflux disease)    • Glaucoma    • Globus sensation 1/27/2020   • History of colonic polyps    • History of transfusion 1988    NO REACTION    • Hyperlipidemia    • Hypertension    • Irritable bowel syndrome     Constipation/diarrhea   • Legally blind    • Lung cancer (HCC)    • Macular degeneration    • Neuropathy    • Obesity 2/26/2020   • Osteoarthritis    • Oxygen dependent     5L   • Pneumonia    • Sleep apnea     NON COMPLIANT WITH CPAP USE   • UTI (urinary tract infection)    • Wears glasses        Past Surgical History:   Procedure Laterality Date   • BACK SURGERY      X2 (LUMBAR)   • BREAST BIOPSY      20+ years ago   • BREAST BIOPSY Right 08/2021    fna ln   • CATARACT EXTRACTION Bilateral    • CHOLECYSTECTOMY     • COLONOSCOPY     • ENDOSCOPY N/A 11/06/2019    Procedure: ESOPHAGOGASTRODUODENOSCOPY;  Surgeon: Cortes Millan MD;  Location:  [facility];  Service: Gastroenterology   • ENDOSCOPY N/A 04/29/2021    Procedure: ESOPHAGOGASTRODUODENOSCOPY;  Surgeon: Cortes Millan MD;  Location:  [facility];  Service: Gastroenterology;  Laterality: N/A;  balloon dilation    • HAND SURGERY Right     laceration repair   • INCONTINENCE SURGERY      BLADDER   • LUNG REMOVAL, PARTIAL Left 2016   • NISSEN FUNDOPLICATION     • TOTAL ABDOMINAL HYSTERECTOMY  1988    benign cyst   • US GUIDED FINE NEEDLE ASPIRATION  08/27/2021       Family History   Problem Relation Age of Onset   • Colon polyps Mother    • Emphysema Mother    • Hypertension Mother    • Colon polyps Father    • Dementia Father    • Heart disease Father    • Hyperlipidemia Father    • Macular degeneration Father    • Glaucoma Father    • Colon cancer Neg Hx    • Breast cancer Neg Hx    • Ovarian cancer Neg Hx        Social History     Socioeconomic History   • Marital status: Single   Tobacco Use   •  "Smoking status: Former Smoker     Packs/day: 1.50     Years: 25.00     Pack years: 37.50     Types: Cigarettes     Quit date: 1992     Years since quittin.5   • Smokeless tobacco: Never Used   Vaping Use   • Vaping Use: Never used   Substance and Sexual Activity   • Alcohol use: Yes     Alcohol/week: 2.0 standard drinks     Types: 2 Standard drinks or equivalent per week     Comment: a glass of wine or bourbon a couple times a WEEK   • Drug use: No   • Sexual activity: Never       Allergies   Allergen Reactions   • Hydrocodone Hallucinations     \"With high doses\"   • Statins Myalgia     myalgia   • Metoclopramide Other (See Comments)     EXACERBATED RLS   • Penicillins Rash     Rash    Tolerated Ceftriaxone   • Tramadol Nausea And Vomiting and GI Intolerance     VERY MILD PER PT         Medication:    Current Facility-Administered Medications:   •  acetaminophen (TYLENOL) tablet 650 mg, 650 mg, Oral, Q4H PRN, Kaushal Chavis PA-C, 650 mg at 22 0532  •  albuterol (PROVENTIL) nebulizer solution 0.083% 2.5 mg/3mL, 2.5 mg, Nebulization, Q12H, Kinsey Recio DO, 2.5 mg at 22 9077  •  dextrose (D50W) (25 g/50 mL) IV injection 25 g, 25 g, Intravenous, Q15 Min PRN, Kaushal Chavis PA-C  •  dextrose (GLUTOSE) oral gel 15 g, 15 g, Oral, Q15 Min PRN, Kaushal Chavis PA-C  •  donepezil (ARICEPT) tablet 10 mg, 10 mg, Oral, Daily, Kaushal Chavis PA-C, 10 mg at 07/15/22 0803  •  DULoxetine (CYMBALTA) DR capsule 60 mg, 60 mg, Oral, Daily, Kaushal Chavis PA-C, 60 mg at 07/15/22 0803  •  furosemide (LASIX) tablet 40 mg, 40 mg, Oral, Daily, Cory Robert MD, 40 mg at 07/15/22 0803  •  glucagon (human recombinant) (GLUCAGEN DIAGNOSTIC) injection 1 mg, 1 mg, Intramuscular, Q15 Min PRN, Kaushal Chavis PA-C  •  Insulin Lispro (humaLOG) injection 0-7 Units, 0-7 Units, Subcutaneous, TID MILAN, Kaushal Chavis, ERICA, 3 Units at 07/15/22 0805  •  ketoconazole (NIZORAL) 2 % cream 1 application, 1 " application, Topical, Q24H, Kinsey Recio, , 1 application at 07/15/22 08  •  melatonin tablet 5 mg, 5 mg, Oral, Nightly PRN, Kaushal Chavis, PA-C, 5 mg at 22  •  memantine (NAMENDA) tablet 5 mg, 5 mg, Oral, Daily, Partha, Haileyia L, PA-C, 5 mg at 07/15/22 0803  •  metOLazone (ZAROXOLYN) tablet 2.5 mg, 2.5 mg, Oral, Daily, Hailey Chavisia L, PA-C, 2.5 mg at 07/15/22 0803  •  montelukast (SINGULAIR) tablet 10 mg, 10 mg, Oral, Nightly, Kaushal Chavis, PA-C, 10 mg at 22  •  ondansetron (ZOFRAN) injection 4 mg, 4 mg, Intravenous, Q6H PRN, Kaushal Chavis, PA-C  •  pantoprazole (PROTONIX) EC tablet 40 mg, 40 mg, Oral, BID, Hailey Chavisia JORGE, PA-C, 40 mg at 07/15/22 0803  •  potassium chloride (KLOR-CON) packet 40 mEq, 40 mEq, Oral, PRN, [place], Cruzito, DO  •  potassium chloride (MICRO-K) CR capsule 40 mEq, 40 mEq, Oral, PRN, Nicky, Cruzito, DO, 40 mEq at 07/15/22 0536  •  rOPINIRole (REQUIP) tablet 2.75 mg, 2.75 mg, Oral, Q8H, Myrtle Bowens, APRN, 2.75 mg at 07/15/22 0536  •  saccharomyces boulardii (FLORASTOR) capsule 250 mg, 250 mg, Oral, BID, Kaushal Chavis, PA-C, 250 mg at 07/15/22 08  •  [COMPLETED] Insert peripheral IV, , , Once **AND** sodium chloride 0.9 % flush 10 mL, 10 mL, Intravenous, PRN, Riley Ingram MD  •  sodium chloride 0.9 % flush 10 mL, 10 mL, Intravenous, Q12H, Kaushal Chavis, PA-C, 10 mL at 07/15/22 0805  •  sodium chloride 0.9 % flush 10 mL, 10 mL, Intravenous, PRN, Kaushal Chavis PA-C    Antibiotics:  Anti-Infectives (From admission, onward)    Ordered     Dose/Rate Route Frequency Start Stop    07/10/22 2154  vancomycin 1750 mg/500 mL 0.9% NS IVPB ([facility])        Ordering Provider: Aidee Gonzalez DO    20 mg/kg × 81.6 kg Intravenous Once 07/10/22 2156 07/11/22 0834            Review of Systems:  See  hpi    Physical Exam:   Vital Signs  Temp (24hrs), Av.4 °F (36.3 °C), Min:97 °F (36.1 °C), Max:97.6 °F (36.4 °C)    Temp  Min: 97 °F (36.1  °C)  Max: 97.6 °F (36.4 °C)  BP  Min: 135/60  Max: 145/69  Pulse  Min: 81  Max: 97  Resp  Min: 16  Max: 18  SpO2  Min: 91 %  Max: 96 %    GENERAL: Awake and alert, in no acute distress.   HEENT: Normocephalic, atraumatic.  PERRL. EOMI. No conjunctival injection. No icterus. Oropharynx clear without evidence of thrush or exudate. No evidence of peridontal disease.      HEART: RRR; No murmur  LUNGS: Clear to auscultation bilaterally  ABDOMEN: Soft, nontender, nondistended.    EXT:  No cyanosis, clubbing or edema. No cord.  :  Without Simental catheter.  MSK:  Bilateral leg warmth, redness, no ulceration  SKIN: Warm and dry without cutaneous eruptions on Inspection/palpation.    NEURO: Oriented to PPT.  PSYCHIATRIC: Normal insight and judgement. Cooperative with PE    Laboratory Data    Results from last 7 days   Lab Units 07/15/22  0440 07/14/22  0721 07/13/22  0513   WBC 10*3/mm3 8.65 7.84 9.23   HEMOGLOBIN g/dL 11.3* 11.0* 10.9*   HEMATOCRIT % 37.7 36.0 36.5   PLATELETS 10*3/mm3 434 387 397     Results from last 7 days   Lab Units 07/15/22  1027 07/15/22  0440   SODIUM mmol/L  --  134*   POTASSIUM mmol/L 3.7 3.2*   CHLORIDE mmol/L  --  87*   CO2 mmol/L  --  38.0*   BUN mg/dL  --  28*   CREATININE mg/dL  --  0.71   GLUCOSE mg/dL  --  156*   CALCIUM mg/dL  --  8.9     Results from last 7 days   Lab Units 07/14/22  0721   ALK PHOS U/L 116   BILIRUBIN mg/dL 0.3   ALT (SGPT) U/L 7   AST (SGOT) U/L 18     Results from last 7 days   Lab Units 07/10/22  1815   SED RATE mm/hr 30     Results from last 7 days   Lab Units 07/10/22  1815   CRP mg/dL 1.63*     Results from last 7 days   Lab Units 07/10/22  1815   LACTATE mmol/L 0.6         Results from last 7 days   Lab Units 07/12/22  0437   VANCOMYCIN TR mcg/mL 11.40     Estimated Creatinine Clearance: 66.3 mL/min (by C-G formula based on SCr of 0.71 mg/dL).      Microbiology:  Blood Culture   Date Value Ref Range Status   07/10/2022 No growth at 3 days  Preliminary     No  results found for: BCIDPCR, CXREFLEX, CSFCX, CULTURETIS  No results found for: CULTURES, HSVCX, URCX  No results found for: EYECULTURE, GCCX, HSVCULTURE, LABHSV  No results found for: LEGIONELLA, MRSACX, MUMPSCX, MYCOPLASCX  No results found for: NOCARDIACX, STOOLCX  No results found for: THROATCX, UNSTIMCULT, URINECX, CULTURE, VZVCULTUR  No results found for: VIRALCULTU, WOUNDCX        Radiology:  Imaging Results (Last 72 Hours)     Procedure Component Value Units Date/Time    XR Chest 1 View [[id number]] Collected: 07/15/22 0816     Updated: 07/15/22 0820    Narrative:         DATE OF EXAM:   7/15/2022 4:30 AM     PROCEDURE:   XR CHEST 1 VW-     INDICATIONS:   DYSPNEA, ON EXERTION     COMPARISON:  7/12/2022 9:26 AM     TECHNIQUE:   [Portable chest radiograph]     FINDINGS:   Residual atelectasis versus infiltrates are noted in the left lung base.  Chronic changes are seen throughout the lungs. The cardiac silhouette  and mediastinum are stable. Electrodes from a spinal stimulator are  noted.        Impression:      Residual atelectasis versus infiltrates are noted within the left lung  base.     This report was finalized on 7/15/2022 8:17 AM by Red Agrawal MD.       FL Video Swallow With Speech Single Contrast [[id number]] Collected: 07/13/22 1635     Updated: 07/13/22 1638    Narrative:      EXAMINATION: FL VIDEO SWALLOW W SPEECH SINGLE-CONTRAST-, FL LIMITED UGI  FOR MBS REFLUX SINGLE-CONTRAST-     INDICATION: Assess oropharyngeal swallow; esophageal screen;  R60.9-Edema, unspecified; L03.119-Cellulitis of unspecified part of  limb; R91.8-Other nonspecific abnormal finding of lung field;  R13.10-Dysphagia, unspecified     TECHNIQUE: 48 seconds of fluoroscopic time was used for this exam. 7  associated fluoroscopic loops were saved. The patient was evaluated in  the seated lateral position while taking a variety of consistencies of  barium by mouth under the direction of speech pathology.     COMPARISON: NONE      FINDINGS: Fluoroscopy provided for a modified barium swallow, and  limited upper GI series. Please see speech therapy report for full  details and recommendations. Limited upper GI series demonstrated mild  potential smooth luminal narrowing of the distal esophagus around the  level of the gastroesophageal junction, which may represent stricture,  or spasm. Please consider endoscopy if clinically relevant. There was a  mild delay in esophageal emptying, with mild intraesophageal reflux to  the level of the midesophagus.          Impression:      Modified barium swallow: Please see speech therapy report  for full details and recommendations.     Limited upper GI series:  1. Possible mild smooth luminal narrowing of the distal esophagus, which  may represent stricture, or spasm. Endoscopy may be considered if  clinically relevant.  2. Mild delay in esophageal emptying, with mild intraesophageal reflux  to the level of the midesophagus         This report was finalized on 7/13/2022 4:35 PM by Elias Sin.       FL Limited Ugi For Mbs Reflux Single-Contrast [[id number]] Collected: 07/13/22 1635     Updated: 07/13/22 1638    Narrative:      EXAMINATION: FL VIDEO SWALLOW W SPEECH SINGLE-CONTRAST-, FL LIMITED UGI  FOR MBS REFLUX SINGLE-CONTRAST-     INDICATION: Assess oropharyngeal swallow; esophageal screen;  R60.9-Edema, unspecified; L03.119-Cellulitis of unspecified part of  limb; R91.8-Other nonspecific abnormal finding of lung field;  R13.10-Dysphagia, unspecified     TECHNIQUE: 48 seconds of fluoroscopic time was used for this exam. 7  associated fluoroscopic loops were saved. The patient was evaluated in  the seated lateral position while taking a variety of consistencies of  barium by mouth under the direction of speech pathology.     COMPARISON: NONE     FINDINGS: Fluoroscopy provided for a modified barium swallow, and  limited upper GI series. Please see speech therapy report for full  details and  recommendations. Limited upper GI series demonstrated mild  potential smooth luminal narrowing of the distal esophagus around the  level of the gastroesophageal junction, which may represent stricture,  or spasm. Please consider endoscopy if clinically relevant. There was a  mild delay in esophageal emptying, with mild intraesophageal reflux to  the level of the midesophagus.          Impression:      Modified barium swallow: Please see speech therapy report  for full details and recommendations.     Limited upper GI series:  1. Possible mild smooth luminal narrowing of the distal esophagus, which  may represent stricture, or spasm. Endoscopy may be considered if  clinically relevant.  2. Mild delay in esophageal emptying, with mild intraesophageal reflux  to the level of the midesophagus         This report was finalized on 7/13/2022 4:35 PM by Elias Sin.               Impression:   Venous stasis dermatitis  Bilateral lower extremity edema  Diastolic congestive heart failure  Anemia  Chronic respiratory failure  Diabetes mellitus type II  Dementia        PLAN/RECOMMENDATIONS:   Thank you for asking us to see Lorrie Pagan, I recommend the following:  Cellulitis is unusual to occur bilateral extremities; patient likely has chronic venous stasis exacerbated by volume overload from her previous of heart failure.    Consultation of wound care for Unna boots by physical therapy is probably the best place to start.    I have very low suspicion of active infection given absence of fevers and leukocytosis.    Patient is started on ceftriaxone in general there is a low suspicion for infection however be in favor of stopping antibiotics hoping to avoid antibiotic selection of resistant bacteria     Favor diuresis Unna boot placement and long-term compressive therapy for venous stasis     Needs unna boots and f/u with PT for outpatient management    Will sign off  No f/u with ID required  Gerard Chi,  MD  7/15/2022  14:44 EDT

## 2022-07-15 NOTE — THERAPY WOUND CARE TREATMENT
Acute Care - Wound/Debridement Initial Evaluation  [facility]     Patient Name: Lorrie Pagan  : 1945  MRN: [id number]  Today's Date: 7/15/2022                Admit Date: 7/10/2022    Visit Dx:    ICD-10-CM ICD-9-CM   1. Iron deficiency anemia due to chronic blood loss  D50.0 280.0   2. Peripheral edema  R60.9 782.3   3. Cellulitis of lower extremity, unspecified laterality  L03.119 682.6   4. Opacity of lung on imaging study  R91.8 793.19   5. Chronic respiratory failure  J96.11 518.83     799.02   6. BRBPR (bright red blood per rectum)  K62.5 569.3   7. Cellulitis of both lower extremities  L03.115 682.6    L03.116        Patient Active Problem List   Diagnosis   • Bronchiectasis (CMS/HCC)   • H/O: lung cancer (Prior resection )   • Chronic respiratory failure   • RENETTA (Previous CPAP)   • Hiatal hernia (Prior Nissen )   • Mycobacterium avium complex colonization   • Irritable bowel syndrome with diarrhea   • Cellulitis of both lower extremities   • Aortic valve regurgitation   • Benign hypertension   • Peripheral edema   • Impairment of balance   • Peripheral neuropathy   • Peripheral venous insufficiency   • Chronic diastolic heart failure (HCC)   • Syncope   • Hypokalemia   • Hypomagnesemia   • Stage II, moderate, COPD   • Memory loss   • Restless leg syndrome   • Controlled type 2 diabetes mellitus without complication, without long-term current use of insulin ([facility])   • Vitamin D deficiency   • Mixed hyperlipidemia   • Allergic rhinitis   • Normocytic anemia   • Gastroesophageal reflux disease with esophagitis without hemorrhage   • Acquired hypothyroidism   • Esophageal dysmotility   • Dysphagia   • Gastroparesis   • Degenerative disc disease, cervical   • Degenerative disc disease, lumbar   • Globus sensation   • Sialorrhea   • Drooling   • Dysfunction of both eustachian tubes   • Tinnitus of both ears   • BRBPR (bright red blood per rectum)   • Iron deficiency anemia        Past  Medical History:   Diagnosis Date   • Back pain    • Bronchiectasis (HCC)    • Bronchitis 01/2018   • Cancer (HCC)     History of lung cancer and skin cancer    • Cardiac murmur    • Chronic cough    • Chronic obstructive pulmonary disease (HCC)    • Colon polyp    • DDD (degenerative disc disease), lumbar    • Depression    • Diabetes mellitus (HCC)     DX: 2019, CHECK BS QOD, LAST A1C 6.3??   • Disease of thyroid gland    • Esophageal dilatation 9/20/2019    Added automatically from request for surgery [id number]   • Esophageal dysphagia 10/24/2019    Added automatically from request for surgery [id number]   • Former smoker (None since 1992) 8/29/2019   • GERD (gastroesophageal reflux disease)    • Glaucoma    • Globus sensation 1/27/2020   • History of colonic polyps    • History of transfusion 1988    NO REACTION    • Hyperlipidemia    • Hypertension    • Irritable bowel syndrome     Constipation/diarrhea   • Legally blind    • Lung cancer (HCC)    • Macular degeneration    • Neuropathy    • Obesity 2/26/2020   • Osteoarthritis    • Oxygen dependent     5L   • Pneumonia    • Sleep apnea     NON COMPLIANT WITH CPAP USE   • UTI (urinary tract infection)    • Wears glasses         Past Surgical History:   Procedure Laterality Date   • BACK SURGERY      X2 (LUMBAR)   • BREAST BIOPSY      20+ years ago   • BREAST BIOPSY Right 08/2021    fna ln   • CATARACT EXTRACTION Bilateral    • CHOLECYSTECTOMY     • COLONOSCOPY     • ENDOSCOPY N/A 11/06/2019    Procedure: ESOPHAGOGASTRODUODENOSCOPY;  Surgeon: Cortes Millan MD;  Location:  [facility];  Service: Gastroenterology   • ENDOSCOPY N/A 04/29/2021    Procedure: ESOPHAGOGASTRODUODENOSCOPY;  Surgeon: Cortes Millan MD;  Location:  [facility];  Service: Gastroenterology;  Laterality: N/A;  balloon dilation    • HAND SURGERY Right     laceration repair   • INCONTINENCE SURGERY      BLADDER   • LUNG REMOVAL, PARTIAL Left 2016   • NISSEN  FUNDOPLICATION     • TOTAL ABDOMINAL HYSTERECTOMY  1988    benign cyst   • US GUIDED FINE NEEDLE ASPIRATION  08/27/2021               Lymphedema     Row Name 07/15/22 1400             Lymphedema Edema Assessment    Ptting Edema Category By severity  -KW      Pitting Edema Moderate;Severe  -KW              Skin Changes/Observations    Location/Assessment Lower Extremity  -KW      Lower Extremity Conditions bilateral:;intact;inflamed;hairless;dry  -KW      Lower Extremity Color/Pigment bilateral:;red;erythema  -KW              Lymphedema Pulses/Capillary Refill    Lymphedema Pulses/Capillary Refill capillary refill  -KW      Capillary Refill lower extremity capillary refill  -KW      Lower Extremity Capillary Refill right:;left:;less than 3 seconds  -KW              Lymphedema Measurements    Measurement Type(s) Quick Girth  -KW      Quick Girth Areas Lower extremities  -KW              LLE Quick Girth (cm)    Met-heads 20.5 cm  -KW      Mid foot 22 cm  -KW      Smallest ankle 24.2 cm  -KW      Largest calf 38.9 cm  -KW      Tib tuberosity 39 cm  -KW              RLE Quick Girth (cm)    Met-heads 21 cm  -KW      Mid foot 21.2 cm  -KW      Smallest ankle 22.5 cm  -KW      Largest calf 40.2 cm  -KW      Tib tuberosity 39 cm  -KW      RLE Quick Girth Total 143.9  -KW              Compression/Skin Care    Compression/Skin Care skin care;wrapping location  -KW      Skin Care washed/dried  -KW      Wrapping Location lower extremity  -KW      Wrapping Location LE bilateral:;foot to knee  -KW      Wrapping Comments unna boots in clamshell pattern, coban at 50% stretch and overlap, secured with spandage  -KW            User Key  (r) = Recorded By, (t) = Taken By, (c) = Cosigned By    Initials Name Provider Type    Chayito Harden, PT Physical Therapist                WOUND DEBRIDEMENT                     PT Assessment (last 12 hours)     PT Evaluation and Treatment     Row Name 07/15/22 7301          Physical Therapy  Time and Intention    Subjective Information no complaints  -KW     Document Type evaluation  -KW     Mode of Treatment physical therapy;individual therapy  -     Row Name 07/15/22 1055          General Information    Patient Profile Reviewed yes  -KW     Row Name 07/15/22 1055          Pain    Pretreatment Pain Rating 0/10 - no pain  -KW     Row Name 07/15/22 1055          Plan of Care Review    Plan of Care Reviewed With patient  -KW     Progress no change  -KW     Outcome Evaluation Wound eval complete. Patient with moderate-severe B LE swelling with red inflammed skin. She would benefit from unna boots to manage edema and skin integrity.  -[employer]     Row Name 07/15/22 1055          Positioning and Restraints    Pre-Treatment Position in bed  -KW     Post Treatment Position bed  -KW     In Bed supine;call light within reach;encouraged to call for assist  -[employer]     Row Name 07/15/22 1055          PT Evaluation Complexity    History, PT Evaluation Complexity 3 or more personal factors and/or comorbidities  -KW     Examination of Body Systems (PT Eval Complexity) 1-2 elements  -KW     Clinical Presentation (PT Evaluation Complexity) evolving  -KW     Clinical Decision Making (PT Evaluation Complexity) moderate complexity  -KW     Overall Complexity (PT Evaluation Complexity) low complexity  -KW     Row Name 07/15/22 1055          Physical Therapy Goals    Wound Care Goal Selection (PT) wound care, PT goal 1;wound care, PT goal 2  -     Row Name 07/15/22 1055          Wound Care Goal 1 (PT)    Wound Care Goal 1 (PT) Patient's B fullest calf will measure <35cm  -KW     Time Frame (Wound Care Goal 1, PT) 10 days  -KW     Row Name 07/15/22 1055          Wound Care Goal 2 (PT)    Wound Care Goal 2 (PT) Patient's B LE will demonstrate normalized skin color and texture  -KW     Time Frame (Wound Care Goal 2, PT) 10 days  -KW           User Key  (r) = Recorded By, (t) = Taken By, (c) = Cosigned By    Initials Name Provider  Type    KW Chayito Zeng, PT Physical Therapist              Physical Therapy Education                 Title: PT OT SLP Therapies (In Progress)     Topic: Physical Therapy (Done)     Point: Mobility training (Done)     Learning Progress Summary           Patient Acceptance, E,TB, VU by KW at 7/15/2022 1055    Comment: Patient edcuated about benefits of unna boots and anticipated next change    Acceptance, E, VU,NR by  at 7/11/2022 1333    Comment: Patient education regarding PT POC                   Point: Home exercise program (Done)     Learning Progress Summary           Patient Acceptance, E,TB, VU by KW at 7/15/2022 1055    Comment: Patient edcuated about benefits of unna boots and anticipated next change    Acceptance, E, VU,NR by LO at 7/11/2022 1333    Comment: Patient education regarding PT POC                   Point: Body mechanics (Done)     Learning Progress Summary           Patient Acceptance, E,TB, VU by KW at 7/15/2022 1055    Comment: Patient edcuated about benefits of unna boots and anticipated next change    Acceptance, E, VU,NR by  at 7/11/2022 1333    Comment: Patient education regarding PT POC                   Point: Precautions (Done)     Learning Progress Summary           Patient Acceptance, E,TB, VU by KW at 7/15/2022 1055    Comment: Patient edcuated about benefits of unna boots and anticipated next change    Acceptance, E, VU,NR by  at 7/11/2022 1333    Comment: Patient education regarding PT POC                               User Key     Initials Effective Dates Name Provider Type Discipline     06/16/21 -  Ashley Romero, PT Physical Therapist PT     01/27/22 -  Chayito Zeng, TELLO Physical Therapist PT                Recommendation and Plan  Anticipated Equipment Needs at Discharge (PT): other (see comments) (none)  Anticipated Discharge Disposition (PT): home with home health  Planned Therapy Interventions (PT): balance training, bed mobility training, gait  training, home exercise program, patient/family education, neuromuscular re-education, strengthening, transfer training  Therapy Frequency (PT): daily  Plan of Care Reviewed With: patient   Progress: no change       Progress: no change  Outcome Evaluation: Wound eval complete. Patient with moderate-severe B LE swelling with red inflammed skin. She would benefit from unna boots to manage edema and skin integrity.  Plan of Care Reviewed With: patient            Time Calculation   PT Charges     Row Name 07/15/22 1055             Time Calculation    Start Time 1055  -KW              Untimed Charges    PT Eval/Re-eval Minutes 40  -KW      29580-Unna Boot 20  -KW              Total Minutes    Untimed Charges Total Minutes 60  -KW       Total Minutes 60  -KW            User Key  (r) = Recorded By, (t) = Taken By, (c) = Cosigned By    Initials Name Provider Type    Chayito Harden, TELLO Physical Therapist                  Therapy Charges for Today     Code Description Service Date Service Provider Modifiers Qty    64696474849 HC PT STAPPING UNNA BOOT 7/15/2022 Chayito Zeng, TELLO GP 1    79219254869 HC PT RE-EVAL ESTABLISHED PLAN 2 7/15/2022 Chayito Zeng PT GP 1            PT G-Codes  Outcome Measure Options: AM-PAC 6 Clicks Daily Activity (OT)  AM-PAC 6 Clicks Score (PT): 23  AM-PAC 6 Clicks Score (OT): 18       Chayito Zeng PT  7/15/2022

## 2022-07-15 NOTE — CASE MANAGEMENT/SOCIAL WORK
Case Management Discharge Note      Final Note: Patient is up for discharge.  Spoke with patient at bedside.  Patient agrees that the [place] PT clinic would not be able to handle Unna Boots.  Patient is agreeable to a referral to the PT wound clinic here, and states that she does have transportation over the week days, but not on the weekends.  A referral to PT Wound Clinic has been sent down to the clinic, and they will call her to schedule appointments.  Patient already has the orders for PT/OT. Patient denied any other needs prior to discharge.         Selected Continued Care - Admitted Since 7/10/2022     Destination    No services have been selected for the patient.              Durable Medical Equipment    No services have been selected for the patient.              Dialysis/Infusion    No services have been selected for the patient.              Home Medical Care    No services have been selected for the patient.              Therapy    No services have been selected for the patient.              Community Resources    No services have been selected for the patient.              Community & DME    No services have been selected for the patient.                Selected Continued Care - Episodes Includes selections from active Coordinated Care Management episodes    Chronic Care Management Episode start date: 5/12/2022    Community & DME     Service Provider Selected Services Address Phone Fax Patient Preferred    [facility]  [facility] [address], [place] [id number] [phone] -- --    [facility] [address] [address] [id number] [phone] -- --    [facility] D[facility]  Durable Medical Equipment [address] [facility] [address] [address] [phone] --                         Final Discharge Disposition Code: 01 - home or self-care

## 2022-07-15 NOTE — PLAN OF CARE
Goal Outcome Evaluation:  Plan of Care Reviewed With: patient        Progress: no change  Outcome Evaluation: Wound eval complete. Patient with moderate-severe B LE swelling with red inflammed skin. She would benefit from unna boots to manage edema and skin integrity.

## 2022-07-15 NOTE — OUTREACH NOTE
Prep Survey    Flowsheet Row Responses   [facility] facility patient discharged from? [facility]   Is LACE score < 7 ? No   Emergency Room discharge w/ pulse ox? No   Eligibility [facility]   Date of Admission 07/10/22   Date of Discharge 07/15/22   Discharge Disposition Home or Self Care   Discharge diagnosis Cellultitis   Does the patient have one of the following disease processes/diagnoses(primary or secondary)? Other   Does the patient have Home health ordered? No   Is there a DME ordered? No   Prep survey completed? Yes          MIGUEL ZUNIGA - Registered Nurse

## 2022-07-18 ENCOUNTER — TRANSITIONAL CARE MANAGEMENT TELEPHONE ENCOUNTER (OUTPATIENT)
Dept: CALL CENTER | Facility: HOSPITAL | Age: 77
End: 2022-07-18

## 2022-07-18 NOTE — OUTREACH NOTE
Call Center TCM Note    Flowsheet Row Responses   [facility] patient discharged from? [place]   Does the patient have one of the following disease processes/diagnoses(primary or secondary)? Other   TCM attempt successful? No   Unsuccessful attempts Attempt 1          Jenny Mackey RN    7/18/2022, 10:59 EDT

## 2022-07-18 NOTE — OUTREACH NOTE
Call Center TCM Note    Flowsheet Row Responses   [facility] patient discharged from? [place]   Does the patient have one of the following disease processes/diagnoses(primary or secondary)? Other   TCM attempt successful? No   Unsuccessful attempts Attempt 2          Jenny Mackey RN    7/18/2022, 12:37 EDT

## 2022-07-19 ENCOUNTER — HOSPITAL ENCOUNTER (OUTPATIENT)
Dept: OCCUPATIONAL THERAPY | Facility: HOSPITAL | Age: 77
Setting detail: THERAPIES SERIES
Discharge: HOME | End: 2022-07-19

## 2022-07-19 ENCOUNTER — TRANSITIONAL CARE MANAGEMENT TELEPHONE ENCOUNTER (OUTPATIENT)
Dept: CALL CENTER | Facility: HOSPITAL | Age: 77
End: 2022-07-19

## 2022-07-19 DIAGNOSIS — R60.0 BILATERAL LOWER EXTREMITY EDEMA: Primary | ICD-10-CM

## 2022-07-19 DIAGNOSIS — I87.2 VENOUS STASIS DERMATITIS OF BOTH LOWER EXTREMITIES: ICD-10-CM

## 2022-07-19 PROCEDURE — 97166 OT EVAL MOD COMPLEX 45 MIN: CPT

## 2022-07-19 NOTE — THERAPY EVALUATION
Outpatient Occupational Therapy Lymphedema Initial Evaluation  [facility]     Patient Name: Lorrie Pagan  : 1945  MRN: [id number]  Today's Date: 2022      Visit Date: 2022    Patient Active Problem List   Diagnosis   • Bronchiectasis (CMS/HCC)   • H/O: lung cancer (Prior resection )   • Chronic respiratory failure   • RENETTA (Previous CPAP)   • Hiatal hernia (Prior Nissen )   • Mycobacterium avium complex colonization   • Irritable bowel syndrome with diarrhea   • Cellulitis of both lower extremities   • Aortic valve regurgitation   • Benign hypertension   • Peripheral edema   • Impairment of balance   • Peripheral neuropathy   • Peripheral venous insufficiency   • Chronic diastolic heart failure (HCC)   • Syncope   • Hypokalemia   • Hypomagnesemia   • Stage II, moderate, COPD   • Memory loss   • Restless leg syndrome   • Controlled type 2 diabetes mellitus without complication, without long-term current use of insulin ([facility])   • Vitamin D deficiency   • Mixed hyperlipidemia   • Allergic rhinitis   • Normocytic anemia   • Gastroesophageal reflux disease with esophagitis without hemorrhage   • Acquired hypothyroidism   • Esophageal dysmotility   • Dysphagia   • Gastroparesis   • Degenerative disc disease, cervical   • Degenerative disc disease, lumbar   • Globus sensation   • Sialorrhea   • Drooling   • Dysfunction of both eustachian tubes   • Tinnitus of both ears   • BRBPR (bright red blood per rectum)   • Iron deficiency anemia        Past Medical History:   Diagnosis Date   • Back pain    • Bronchiectasis (HCC)    • Bronchitis 2018   • Cancer (HCC)     History of lung cancer and skin cancer    • Cardiac murmur    • Chronic cough    • Chronic obstructive pulmonary disease (HCC)    • Colon polyp    • DDD (degenerative disc disease), lumbar    • Depression    • Diabetes mellitus (HCC)     DX: 2019, CHECK BS QOD, LAST A1C 6.3??   • Disease of thyroid gland    • Esophageal  dilatation 9/20/2019    Added automatically from request for surgery [id number]   • Esophageal dysphagia 10/24/2019    Added automatically from request for surgery [id number]   • Former smoker (None since 1992) 8/29/2019   • GERD (gastroesophageal reflux disease)    • Glaucoma    • Globus sensation 1/27/2020   • History of colonic polyps    • History of transfusion 1988    NO REACTION    • Hyperlipidemia    • Hypertension    • Irritable bowel syndrome     Constipation/diarrhea   • Legally blind    • Lung cancer (HCC)    • Macular degeneration    • Neuropathy    • Obesity 2/26/2020   • Osteoarthritis    • Oxygen dependent     5L   • Pneumonia    • Sleep apnea     NON COMPLIANT WITH CPAP USE   • UTI (urinary tract infection)    • Wears glasses         Past Surgical History:   Procedure Laterality Date   • BACK SURGERY      X2 (LUMBAR)   • BREAST BIOPSY      20+ years ago   • BREAST BIOPSY Right 08/2021    fna ln   • CATARACT EXTRACTION Bilateral    • CHOLECYSTECTOMY     • COLONOSCOPY     • ENDOSCOPY N/A 11/06/2019    Procedure: ESOPHAGOGASTRODUODENOSCOPY;  Surgeon: Cortes Millan MD;  Location:  [facility];  Service: Gastroenterology   • ENDOSCOPY N/A 04/29/2021    Procedure: ESOPHAGOGASTRODUODENOSCOPY;  Surgeon: Cortes Millan MD;  Location:  [facility];  Service: Gastroenterology;  Laterality: N/A;  balloon dilation    • HAND SURGERY Right     laceration repair   • INCONTINENCE SURGERY      BLADDER   • LUNG REMOVAL, PARTIAL Left 2016   • NISSEN FUNDOPLICATION     • TOTAL ABDOMINAL HYSTERECTOMY  1988    benign cyst   • US GUIDED FINE NEEDLE ASPIRATION  08/27/2021         Visit Dx:     ICD-10-CM ICD-9-CM   1. Bilateral lower extremity edema  R60.0 782.3   2. Venous stasis dermatitis of both lower extremities  I87.2 454.1        Patient History     Row Name 07/19/22 1400             History    Chief Complaint Swelling;Pain  -SG      Type of Pain Lower Extremity / Leg  -SG      Date  Current Problem(s) Began --  Pt reports has had current issue w/ swelling and redness for years  -SG      Brief Description of Current Complaint Patient is a 76 y.o. female presented to emergency room on 7/10 with painful erythematous edematous lower extremities.   Patient has comorbidities including COPD, chronic diastolic congestive heart failure, hyperlipidemia, diabetes mellitus mellitus type II and gastroparesis as well as dementia, sleep apnea, GERD, hypothyroidism, anxiety, and depression.  Patient reported lower extremities have been increasingly swollen with redness and weeping bilaterally. She is unable to wear compression stockings because of tenderness but recalls that unna boots helped her redness, swelling and made her feel better.  Patient has been given empiric antibiotic courses by primary care provider including Keflex for suspected cellulitis.  Patient had complications with oral medicines which cause nausea vomiting and GI upset.  Patient is also been given intermittent diuretics.  Infectious Disease saw pt in hosptial for consult and reported that cellulitis is unusual to occur bilateral extremities; patient likely has chronic venous stasis exacerbated by volume overload from her previous of heart failure. She presents today for assistance w/ swelling, to reduce edema and risk for further complications.  -SG      Patient/Caregiver Goals Relieve pain;Decrease swelling;Know what to do to help the symptoms  -SG      Results of Clinical Tests Negative for DVT  -SG              Pain     Pain Location Leg  -SG      Pain at Present 3  -SG      Pain at Best 0  -SG      Pain at Worst 7  -SG      Pain Description Aching;Discomfort;Dull;Heaviness;Sore;Tightness  -SG      What Performance Factors Make the Current Problem(s) WORSE? Resting, elevating  -SG      What Performance Factors Make the Current Problem(s) BETTER? Being up and on feet more  -SG      Tolerance Time- Standing Limited  -SG      Tolerance  Time- Walking Limited  -SG      Difficulties with ADL's? Yes  -SG      Difficulties with recreational activities? Yes  -SG              Daily Activities    Pt Participated in POC and Goals Yes  -SG            User Key  (r) = Recorded By, (t) = Taken By, (c) = Cosigned By    Initials Name Provider Type    Charlotte Berman OTR/L Occupational Therapist                    07/18/22 1400   Subjective Pain   Able to rate subjective pain? yes   Pre-Treatment Pain Level 0   Post-Treatment Pain Level 0   Lymphedema Edema Assessment   Ptting Edema Category By severity   Pitting Edema Moderate   Edema Assessment Comment Edema present below the knee   Skin Changes/Observations   Location/Assessment Lower Extremity   Lower Extremity Conditions bilateral:;intact;dry   Lower Extremity Color/Pigment bilateral:;erythema;red;fibrosis   Skin Observations Comment painful erythematous edematous lower extremities.   Lymphedema Sensation   Lymphedema Sensation Reports RLE:;LLE:;numbness   Lymphedema Sensation Comments Pt has peripheral neuropathy in feet   Lymphedema Measurements   Measurement Type(s) Quick Girth   Quick Girth Areas Lower extremities   LLE Quick Girth (cm)   Met-heads 21.5 cm   Mid foot 22.5 cm   Smallest ankle 25 cm   Largest calf 41 cm   Tib tuberosity 39 cm   Mid patella 44.5 cm   LLE Quick Girth Total 172   RLE Quick Girth (cm)   Met-heads 21.5 cm   Mid foot 22.5 cm   Smallest ankle 24 cm   Largest calf 42 cm   Tib tuberosity 40 cm   Mid patella 46 cm   RLE Quick Girth Total 196   Compression/Skin Care   Compression/Skin Care compression garment   Compression Garment Comments Compressogrip size 4 from base of toes to above ankle, size 5 from ankle to below knee   Compression/Skin Care Comments Pt did not want any lotion on BLE at this time as she states that it makes her legs hot               Therapy Education  Education Details: Pt edu on edema reduction techniques including compression, elevation, MLD if needed  and/or tolerated, and exercise. Progress pt as tolerated. Instructed on compressogrip and if it gives her issues to just remove. Pt edu on goals, POC, and schedule.  Given: HEP, Symptoms/condition management, Edema management  Program: New  How Provided: Verbal, Demonstration  Provided to: Patient  Level of Understanding: Verbalized         OT Goals     Row Name 07/19/22 1400          OT Short Term Goals    STG Date to Achieve 08/18/22  -SG     STG 1 • Pt will understand edema precautions to decrease the risk of infection and exacerbation of the edema.  -SG     STG 2 • Pt will develop a tolerance for wearing compression between treatment sessions to facilitate limb decongestion.  -SG     STG 3 • Pt will experience a decrease in pitting edema which will improve tissue health and decrease the risk of infection.  -SG            Long Term Goals    LTG 1 • Pt will obtain appropriate compression and be ind w/ donning/doffing which will enable regular daily garment wear  -SG     LTG 2 • Treatment will achieve maximum edema reduction to enable functional improvements and a return to PLOF  -SG     LTG 3 • Pt will be ind w/ HEP and edema management to help prevent edema relapse and reduce the risk of infection  -SG            Time Calculation    OT Goal Re-Cert Due Date 10/17/22  -SG           User Key  (r) = Recorded By, (t) = Taken By, (c) = Cosigned By    Initials Name Provider Type    Charlotte Berman OTR/L Occupational Therapist                 OT Assessment/Plan     Row Name 07/19/22 1400          OT Assessment    Functional Limitations Performance in self-care ADL;Limitations in functional capacity and performance  -SG     Impairments Edema  -SG     Assessment Comments Patient is a 76 y.o. female who presents w/ painful erythematous edematous lower extremities.   Patient has comorbidities including COPD, chronic diastolic congestive heart failure, hyperlipidemia, diabetes mellitus mellitus type II and gastroparesis as  well as dementia, sleep apnea, GERD, hypothyroidism, anxiety, and depression.   Infectious Disease saw pt in hosptial for consult and reported that cellulitis is unusual to occur bilateral extremities; patient likely has chronic venous stasis exacerbated by volume overload from her previous of heart failure.  Pt presents w/ edema and redness in BLEs resulting in pain and decreased participation in ADL/IADLs. She will benefit from OP OT to reduce swelling and thus improve daily activity function and decrease risk of infection.  -SG     Please refer to paper survey for additional self-reported information Yes  -SG     OT Diagnosis Venous stasis dermatitis, Bilateral lower extremity edema  -SG     OT Rehab Potential Good  -SG     Patient/caregiver participated in establishment of treatment plan and goals Yes  -SG     Patient would benefit from skilled therapy intervention Yes  -SG            OT Plan    OT Frequency 1x/week  -SG     Predicted Duration of Therapy Intervention (OT) 20 visits  -SG     Planned CPT's? OT EVAL MOD COMPLEXITY: 98617;OT THER ACT EA 15 MIN: 31358BS;OT THER PROC EA 15 MIN: 79490FY;OT SELF CARE/MGMT/TRAIN 15 MIN: 29765;OT MANUAL THERAPY EA 15 MIN: 09858  -SG     Planned Therapy Interventions (Optional Details) home exercise program;manual therapy techniques;patient/family education;ROM (Range of Motion);strengthening;stretching  -SG     OT Plan Comments Plan to address edema w/ compression as tolerated by pt. Start w/ compressogrip and progress to short stretch bandage wrapping if tolerated. Can try unna boots if pt is unable to tolerate other methods of compression.  -SG           User Key  (r) = Recorded By, (t) = Taken By, (c) = Cosigned By    Initials Name Provider Type    Charlotte Berman OTR/L Occupational Therapist                Outcome Measure Options: Lower Extremity Functional Scale (LEFS)  Lower Extremity Functional Index  Any of your usual work, housework or school activities: Quite  a bit of difficulty  Your usual hobbies, recreational or sporting activities: Moderate difficulty  Getting into or out of the bath: No difficulty  Walking between rooms: No difficulty  Putting on your shoes or socks: No difficulty  Squatting: Moderate difficulty  Lifting an object, like a bag of groceries from the floor: Moderate difficulty  Performing light activities around your home: Moderate difficulty  Performing heavy activities around your home: Quite a bit of difficulty  Getting into or out of a car: A little bit of difficulty  Walking 2 blocks: Quite a bit of difficulty  Walking a mile: Extreme difficulty or unable to perform activity  Going up or down 10 stairs (about 1 flight of stairs): Quite a bit of difficulty  Standing for 1 hour: Extreme difficulty or unable to perform activity  Sitting for 1 hour: Moderate difficulty  Running on even ground: Extreme difficulty or unable to perform activity  Running on uneven ground: Extreme difficulty or unable to perform activity  Making sharp turns while running fast: Extreme difficulty or unable to perform activity  Hopping: Extreme difficulty or unable to perform activity  Rolling over in bed: No difficulty  Total: 33      Time Calculation:   OT Start Time: 1400     Therapy Charges for Today     Code Description Service Date Service Provider Modifiers Qty    [id number]  OT EVAL MOD COMPLEXITY 4 7/19/2022 Charlotte Valera OTR/L GO 1                    Charlotte Valera OTR/L  7/19/2022

## 2022-07-19 NOTE — OUTREACH NOTE
Call Center TCM Note    Flowsheet Row Responses   [facility] patient discharged from? [place]   Does the patient have one of the following disease processes/diagnoses(primary or secondary)? Other   TCM attempt successful? No   Unsuccessful attempts Attempt 3          Evangelina Parada RN    7/19/2022, 08:48 EDT

## 2022-07-20 ENCOUNTER — OFFICE VISIT (OUTPATIENT)
Dept: INTERNAL MEDICINE | Facility: CLINIC | Age: 77
End: 2022-07-20

## 2022-07-20 VITALS
BODY MASS INDEX: 31.71 KG/M2 | WEIGHT: 179 LBS | DIASTOLIC BLOOD PRESSURE: 58 MMHG | HEIGHT: 63 IN | HEART RATE: 89 BPM | OXYGEN SATURATION: 96 % | TEMPERATURE: 98.9 F | SYSTOLIC BLOOD PRESSURE: 138 MMHG

## 2022-07-20 DIAGNOSIS — I87.2 PERIPHERAL VENOUS INSUFFICIENCY: Primary | ICD-10-CM

## 2022-07-20 DIAGNOSIS — D50.9 IRON DEFICIENCY ANEMIA, UNSPECIFIED IRON DEFICIENCY ANEMIA TYPE: ICD-10-CM

## 2022-07-20 DIAGNOSIS — J44.9 COPD MIXED TYPE: ICD-10-CM

## 2022-07-20 DIAGNOSIS — J96.11 CHRONIC RESPIRATORY FAILURE WITH HYPOXIA: ICD-10-CM

## 2022-07-20 PROBLEM — L03.115 CELLULITIS OF BOTH LOWER EXTREMITIES: Status: RESOLVED | Noted: 2020-01-27 | Resolved: 2022-07-20

## 2022-07-20 PROBLEM — L03.116 CELLULITIS OF BOTH LOWER EXTREMITIES: Status: RESOLVED | Noted: 2020-01-27 | Resolved: 2022-07-20

## 2022-07-20 PROBLEM — K62.5 BRBPR (BRIGHT RED BLOOD PER RECTUM): Status: RESOLVED | Noted: 2022-07-10 | Resolved: 2022-07-20

## 2022-07-20 PROCEDURE — 1111F DSCHRG MED/CURRENT MED MERGE: CPT | Performed by: INTERNAL MEDICINE

## 2022-07-20 PROCEDURE — 99495 TRANSJ CARE MGMT MOD F2F 14D: CPT | Performed by: INTERNAL MEDICINE

## 2022-07-20 RX ORDER — GUAIFENESIN 600 MG/1
1200 TABLET, EXTENDED RELEASE ORAL 2 TIMES DAILY
Qty: 180 TABLET | Refills: 3 | Status: SHIPPED | OUTPATIENT
Start: 2022-07-20 | End: 2022-10-26

## 2022-07-20 NOTE — PROGRESS NOTES
Transitional Care Follow Up Visit  Subjective     Lorrie Pagan is a 76 y.o. female who presents for a transitional care management visit.    Within 48 business hours after discharge our office contacted her via telephone to coordinate her care and needs.      I reviewed and discussed the details of that call along with the discharge summary, hospital problems, inpatient lab results, inpatient diagnostic studies, and consultation reports with Lorrie.     Current outpatient and discharge medications have been reconciled for the patient.  Reviewed by: Dacia Viera MD      Date of TCM Phone Call 7/15/2022   [facility]   Date of Admission 7/10/2022   Date of Discharge 7/15/2022   Discharge Disposition Home or Self Care     Risk for Readmission (LACE) Score: 17 (7/15/2022  6:02 AM)      History of Present Illness   Course During Hospital Stay:  Ms. Pagan was admitted 7/10-7/15/2022 with lower extremity edema, redness, pain. She was initially treated with abx however with ID input abx were discontinued favoring diagnosis of venous stasis dermatitis. Venous duplex was negative. She was diuresed and unna boots applied. She was also noted to have anemia due to iron deficiency. She had GI evaluation with recommendation for anusol for hemorrhoids and outpatient workup. She had already been referred to Dr. Junior prior to her hospitalization and has upcoming appt. She denies issues with hemorrhoids, BRBPR or melena. She was ordered IV iron while admitted but per patient did not tolerate it due to GI distress so unable to get full infusion. She cannot tolerate oral iron. She has followed up as an outpatient in the lymphedema clinic with ongoing improvement in her venous stasis.    She has had increase in cough with thick mucus production since 1-2 days after her discharge. No change in baseline SOA or baseline O2 requirement which is around 4L NC.    The following portions of the patient's history were  reviewed and updated as appropriate: allergies, current medications, past medical history and problem list.    Review of Systems   Constitutional: Positive for fatigue. Negative for fever.   Respiratory: Positive for cough (increased) and shortness of breath (stable).    Cardiovascular: Positive for leg swelling.   Gastrointestinal: Negative for anal bleeding, blood in stool, constipation and diarrhea.   Skin: Negative for color change.       Objective   Physical Exam  Vitals and nursing note reviewed.   Constitutional:       General: She is not in acute distress.     Appearance: She is well-developed. She is not ill-appearing or toxic-appearing.   HENT:      Head: Normocephalic and atraumatic.   Eyes:      Conjunctiva/sclera: Conjunctivae normal.   Cardiovascular:      Rate and Rhythm: Normal rate and regular rhythm.      Heart sounds: Normal heart sounds.   Pulmonary:      Effort: Pulmonary effort is normal. No respiratory distress.      Breath sounds: Decreased air movement present. Decreased breath sounds present.      Comments: On supplemental oxygen  Musculoskeletal:      Right lower leg: Edema (1+) present.      Left lower leg: Edema (1+) present.      Comments: Wearing compression stockings bilaterally   Skin:     General: Skin is warm and dry.   Neurological:      Mental Status: She is alert and oriented to person, place, and time. Mental status is at baseline.         Assessment & Plan   Diagnoses and all orders for this visit:    Peripheral venous insufficiency  - Improved with diuresis and compression.  - Continue weekly appt with lymphedema clinic and lasix 40mg daily with metolazone 2.5mg daily.    Iron deficiency anemia, unspecified iron deficiency anemia type  - Intermittent anemia in the past but mild 11-12. While hospitalized was 9.9.  - GI consulted due to BRBPR, felt to have hemorrhoids and recommended anusol however patient denies current issues with hemorrhoids or rectal bleeding. Fecal occult  negative in hospital.  - She did not tolerate IV iron infusion nor does she tolerate oral iron per her report.  - She already has upcoming GI evaluation with Dr. Junior as arranged prior to her hospitalization  - Will refer her to hematology given issues with iron supplementation     Chronic respiratory failure due to Stage II, moderate, COPD  - Last PFTs with FEV1 61% 5/2022  - Recently had 6 minute walk test indicating that she should reduce O2 to 3.5L NC however she does not feel well unless on 4-5L NC  - On trelegy and albuterol  - Increase in productive cough since discharge but O2 requirement stable and no focal findings on exam to suggest PNA. Will start mucinex and encouraged use of flutter valve.  - Following with pulmonary and palliative care      Health Maintenance  - Pap smear: no longer indicated  - Mammogram: 5/2021 [facility] 3 with axillary adenopathy, 8/2021 [facility] 4, had FNA of R axillary node which was negative. 3/2022 [facility] 3, next in 6m.  - Colonoscopy: 2018  - HCV: negative  - Immunizations: pneumococcal complete, COVID booster due, shingrix #1  - Depression screening: negative 2/2022         Return in about 4 weeks (around 8/17/2022) for Follow up 30 weeks.

## 2022-07-21 ENCOUNTER — OFFICE VISIT (OUTPATIENT)
Dept: CARDIOLOGY | Facility: HOSPITAL | Age: 77
End: 2022-07-21

## 2022-07-21 ENCOUNTER — PATIENT OUTREACH (OUTPATIENT)
Dept: CASE MANAGEMENT | Facility: OTHER | Age: 77
End: 2022-07-21

## 2022-07-21 VITALS
OXYGEN SATURATION: 96 % | WEIGHT: 176.5 LBS | SYSTOLIC BLOOD PRESSURE: 127 MMHG | DIASTOLIC BLOOD PRESSURE: 65 MMHG | BODY MASS INDEX: 31.27 KG/M2 | HEIGHT: 63 IN | HEART RATE: 85 BPM

## 2022-07-21 DIAGNOSIS — D64.9 NORMOCYTIC ANEMIA: ICD-10-CM

## 2022-07-21 DIAGNOSIS — I50.32 CHRONIC DIASTOLIC HEART FAILURE: Primary | ICD-10-CM

## 2022-07-21 DIAGNOSIS — I87.2 PERIPHERAL VENOUS INSUFFICIENCY: ICD-10-CM

## 2022-07-21 DIAGNOSIS — I10 BENIGN HYPERTENSION: ICD-10-CM

## 2022-07-21 DIAGNOSIS — J44.9 COPD MIXED TYPE: ICD-10-CM

## 2022-07-21 PROCEDURE — 99441 PR PHYS/QHP TELEPHONE EVALUATION 5-10 MIN: CPT | Performed by: NURSE PRACTITIONER

## 2022-07-21 NOTE — OUTREACH NOTE
AMBULATORY CASE MANAGEMENT NOTE    Name and Relationship of Patient/Support Person: Lorrie Pagan White - Self    CCM Interim Update    Spoke with patient as a follow up call regarding syncopy, rollator, and PT wound care. Patient stated she did not feel she needs a rollator at this time. Her apartment is small and she has a motorized scooter. She denied further episodes of syncope. She has followed up with her PCP, cardiology, and PT wound care. Patient stated she is doing well. She denied questions/needs at this time.     JI RIOS  Ambulatory Case Management    7/21/2022, 12:12 EDT

## 2022-07-21 NOTE — PROGRESS NOTES
"Chief Complaint  Follow-up (DHF)    Subjective    History of Present Illness {CC  Problem List  Visit  Diagnosis   Encounters  Notes  Medications  Labs  Result Review Imaging  Media :23}     You have chosen to receive care through the use of telemedicine. Telemedicine enables health care providers at different locations to provide safe, effective, and convenient care through the use of technology. As with any health care service, there are risks associated with the use of telemedicine, including equipment failure, poor connections, and  issues.    • Do you understand the risks and benefits of telemedicine as I have explained them to you? Yes  • Have your questions regarding telemedicine been answered? Yes  • Do you consent to the use of telemedicine in your medical care today? Yes    History of Present Illness   76-year-old female with telephone visit today for ongoing evaluation of her diastolic heart failure.  Patiently recently hospitalized at [facility] with diastolic heart failure exacerbation and cellulitis.  She was evaluated by ID given IV antibiotics.  She is currently wearing Unna boots.  Reports swelling in bilateral lower extremities is improving.  She reports mild dyspnea on exertion. Patient does have a history of chronic respiratory failure due to stage II COPD.  She also has iron deficiency anemia and will be establishing with GI in the near future.  Currently denies any bright red blood per rectum.  Notes blood pressures have been well controlled in the 120s and blood pressures 70s to 80s.  Currently denies chest pain, palpitations, presyncope, syncope, orthopnea, PND.  Objective     Vital Signs:   Vitals:    07/21/22 0846   BP: 127/65   Pulse: 85   SpO2: 96%   Weight: 80.1 kg (176 lb 8 oz)   Height: 160 cm (63\")     Body mass index is 31.27 kg/m².  Physical Exam  Vitals and nursing note reviewed.   Constitutional:       Appearance: Normal appearance. "   Pulmonary:      Effort: Pulmonary effort is normal.   Neurological:      Mental Status: She is alert and oriented to person, place, and time.   Psychiatric:         Mood and Affect: Mood normal.         Behavior: Behavior normal.         Thought Content: Thought content normal.              Result Review  Data Reviewed:{ Labs  Result Review  Imaging  Med Tab  Media :23}     Lab Results   Component Value Date    GLUCOSE 156 (H) 07/15/2022    CALCIUM 8.9 07/15/2022     (L) 07/15/2022    K 3.7 07/15/2022    CO2 38.0 (H) 07/15/2022    CL 87 (L) 07/15/2022    BUN 28 (H) 07/15/2022    CREATININE 0.71 07/15/2022    EGFRIFNONA 90 10/15/2021    BCR 39.4 (H) 07/15/2022    ANIONGAP 9.0 07/15/2022     Lab Results   Component Value Date    WBC 8.65 07/15/2022    HGB 11.3 (L) 07/15/2022    HCT 37.7 07/15/2022    MCV 83.2 07/15/2022     07/15/2022                  Assessment and Plan {CC Problem List  Visit Diagnosis  ROS  Review (Popup)  Health Maintenance  Quality  BestPractice  Medications  SmartSets  SnapShot Encounters  Media :23}   1. Chronic diastolic heart failure (HCC)  Status post recent hospitalization with diuresis  Continue Lasix, metolazone  Reviewed signs and symptoms of heart failure, role of heart valve center  Encouraged low-sodium diet, daily weights  2. Peripheral venous insufficiency  Continue Unna boost per lymphedema clinic    3. Benign hypertension  Well-controlled, continue Lasix, metolazone    4. Normocytic anemia    Will be establishing with GI in the near future      Telephone time 8 minutes    Follow Up {Instructions Charge Capture  Follow-up Communications :23}   Return if symptoms worsen or fail to improve.    Patient was given instructions and counseling regarding her condition or for health maintenance advice. Please see specific information pulled into the AVS if appropriate.  Patient was instructed to call the [facility] with any questions, concerns,  or worsening symptoms.

## 2022-07-22 ENCOUNTER — TELEPHONE (OUTPATIENT)
Dept: INTERNAL MEDICINE | Facility: CLINIC | Age: 77
End: 2022-07-22

## 2022-07-22 NOTE — TELEPHONE ENCOUNTER
I signed discharge papers for home health that I received today. If this does not suffice she can let me know.

## 2022-07-22 NOTE — TELEPHONE ENCOUNTER
Caller: Lorrie Pagan    Relationship: Self    Best call back number: [phone]    What is the best time to reach you: ANY    Who are you requesting to speak with (clinical staff, provider,  specific staff member): DR. SETHI/CLINICAL    What was the call regarding: PATIENT CALLED STATING THAT IN ORDER FOR HER TO START PHYSICAL THERAPY WITH [facility]GEILITY PHYSICAL THERAPY SHE NEEDS TO BE RELEASED FROM HOME HEALTH CARE.    Do you require a callback: YES, PLEASE. PATIENT WOULD LIKE A CALL BACK WITH ANY INFORMATION.     THANK YOU.

## 2022-07-25 ENCOUNTER — TELEPHONE (OUTPATIENT)
Dept: PULMONOLOGY | Facility: CLINIC | Age: 77
End: 2022-07-25

## 2022-07-25 NOTE — TELEPHONE ENCOUNTER
Mrs. Harrison called today in follow-up of her bronchoscopy.  She had been admitted to the hospital again with full lower extremity edema and possible cellulitis.  She is ready to proceed with her bronchoscopy to help with airway clearance.  I will go ahead and try to get that set up in the next week or 2 with Dr. Arboleda.

## 2022-07-25 NOTE — TELEPHONE ENCOUNTER
Left voice mail for patient that Dr Viera signed the discharge papers for home health and if this does suffice please call back and let us know

## 2022-07-26 ENCOUNTER — READMISSION MANAGEMENT (OUTPATIENT)
Dept: CALL CENTER | Facility: HOSPITAL | Age: 77
End: 2022-07-26

## 2022-07-26 NOTE — OUTREACH NOTE
Medical Week 2 Survey    Flowsheet Row Responses   [facility] patient discharged from? [facility]   Does the patient have one of the following disease processes/diagnoses(primary or secondary)? Other   Week 2 attempt successful? Yes   Call start time 1211   Discharge diagnosis Cellultitis   Call end time 1217   Is patient permission given to speak with other caregiver? Yes   Person spoke with today (if not patient) and relationship Peggy and Svetlana Delgados reviewed with patient/caregiver? Yes   Is the patient having any side effects they believe may be caused by any medication additions or changes? No   Does the patient have all medications ordered at discharge? Yes   Is the patient taking all medications as directed (includes completed medication regime)? Yes   Medication comments Mucinex   Does the patient have a primary care provider?  Yes   Does the patient have an appointment with their PCP within 7 days of discharge? Yes   Has the patient kept scheduled appointments due by today? Yes   Has home health visited the patient within 72 hours of discharge? N/A   Psychosocial issues? No   Did the patient receive a copy of their discharge instructions? Yes   Nursing interventions Reviewed instructions with patient   What is the patient's perception of their health status since discharge? Improving   Is the patient/caregiver able to teach back signs and symptoms related to disease process for when to call PCP? Yes   Is the patient/caregiver able to teach back signs and symptoms related to disease process for when to call 911? Yes   Is the patient/caregiver able to teach back the hierarchy of who to call/visit for symptoms/problems? PCP, Specialist, Home health nurse, Urgent Care, ED, 911 Yes   If the patient is a current smoker, are they able to teach back resources for cessation? Not a smoker   Additional teach back comments She said her legs are some better, encouraged daily weights, encouraged DM educator.    Week 2 Call Completed? Yes   Wrap up additional comments She is doing some better.          GABE RODRIGUEZ - Registered Nurse

## 2022-07-27 ENCOUNTER — HOSPITAL ENCOUNTER (OUTPATIENT)
Dept: OCCUPATIONAL THERAPY | Facility: HOSPITAL | Age: 77
Setting detail: THERAPIES SERIES
Discharge: HOME | End: 2022-07-27

## 2022-07-27 ENCOUNTER — PREP FOR SURGERY (OUTPATIENT)
Dept: OTHER | Facility: HOSPITAL | Age: 77
End: 2022-07-27

## 2022-07-27 DIAGNOSIS — R60.0 BILATERAL LOWER EXTREMITY EDEMA: Primary | ICD-10-CM

## 2022-07-27 DIAGNOSIS — K62.5 BRIGHT RED BLOOD PER RECTUM: Primary | ICD-10-CM

## 2022-07-27 DIAGNOSIS — I87.2 VENOUS STASIS DERMATITIS OF BOTH LOWER EXTREMITIES: ICD-10-CM

## 2022-07-27 PROCEDURE — 97140 MANUAL THERAPY 1/> REGIONS: CPT

## 2022-07-27 NOTE — THERAPY TREATMENT NOTE
Outpatient Occupational Therapy Lymphedema Treatment Note  [facility]     Patient Name: Lorrie Pagan  : 1945  MRN: [id number]  Today's Date: 2022      Visit Date: 2022    Patient Active Problem List   Diagnosis   • Bronchiectasis (CMS/HCC)   • H/O: lung cancer (Prior resection )   • Chronic respiratory failure   • RENETTA (Previous CPAP)   • Hiatal hernia (Prior Nissen )   • Mycobacterium avium complex colonization   • Irritable bowel syndrome with diarrhea   • Aortic valve regurgitation   • Benign hypertension   • Peripheral edema   • Impairment of balance   • Peripheral neuropathy   • Peripheral venous insufficiency   • Chronic diastolic heart failure (HCC)   • Syncope   • Hypokalemia   • Hypomagnesemia   • Stage II, moderate, COPD   • Memory loss   • Restless leg syndrome   • Controlled type 2 diabetes mellitus without complication, without long-term current use of insulin ([facility])   • Vitamin D deficiency   • Mixed hyperlipidemia   • Allergic rhinitis   • Normocytic anemia   • Gastroesophageal reflux disease with esophagitis without hemorrhage   • Acquired hypothyroidism   • Esophageal dysmotility   • Dysphagia   • Gastroparesis   • Degenerative disc disease, cervical   • Degenerative disc disease, lumbar   • Globus sensation   • Sialorrhea   • Drooling   • Dysfunction of both eustachian tubes   • Tinnitus of both ears   • Iron deficiency anemia        Past Medical History:   Diagnosis Date   • Back pain    • BRBPR (bright red blood per rectum) 7/10/2022   • Bronchiectasis (HCC)    • Bronchitis 2018   • Cancer (HCC)     History of lung cancer and skin cancer    • Cardiac murmur    • Cellulitis of both lower extremities 2020   • Chronic cough    • Chronic obstructive pulmonary disease (HCC)    • Colon polyp    • DDD (degenerative disc disease), lumbar    • Depression    • Diabetes mellitus (HCC)     DX: 2019, CHECK BS QOD, LAST A1C 6.3??   • Disease of thyroid gland    •  Esophageal dilatation 9/20/2019    Added automatically from request for surgery [id number]   • Esophageal dysphagia 10/24/2019    Added automatically from request for surgery [id number]   • Former smoker (None since 1992) 8/29/2019   • GERD (gastroesophageal reflux disease)    • Glaucoma    • Globus sensation 1/27/2020   • History of colonic polyps    • History of transfusion 1988    NO REACTION    • Hyperlipidemia    • Hypertension    • Irritable bowel syndrome     Constipation/diarrhea   • Legally blind    • Lung cancer (HCC)    • Macular degeneration    • Neuropathy    • Obesity 2/26/2020   • Osteoarthritis    • Oxygen dependent     5L   • Pneumonia    • Sleep apnea     NON COMPLIANT WITH CPAP USE   • UTI (urinary tract infection)    • Wears glasses         Past Surgical History:   Procedure Laterality Date   • BACK SURGERY      X2 (LUMBAR)   • BREAST BIOPSY      20+ years ago   • BREAST BIOPSY Right 08/2021    fna ln   • CATARACT EXTRACTION Bilateral    • CHOLECYSTECTOMY     • COLONOSCOPY     • ENDOSCOPY N/A 11/06/2019    Procedure: ESOPHAGOGASTRODUODENOSCOPY;  Surgeon: Cortes Millan MD;  Location:  [facility];  Service: Gastroenterology   • ENDOSCOPY N/A 04/29/2021    Procedure: ESOPHAGOGASTRODUODENOSCOPY;  Surgeon: Cortes Millan MD;  Location:  [facility];  Service: Gastroenterology;  Laterality: N/A;  balloon dilation    • HAND SURGERY Right     laceration repair   • INCONTINENCE SURGERY      BLADDER   • LUNG REMOVAL, PARTIAL Left 2016   • NISSEN FUNDOPLICATION     • TOTAL ABDOMINAL HYSTERECTOMY  1988    benign cyst   • US GUIDED FINE NEEDLE ASPIRATION  08/27/2021         Visit Dx:      ICD-10-CM ICD-9-CM   1. Bilateral lower extremity edema  R60.0 782.3   2. Venous stasis dermatitis of both lower extremities  I87.2 454.1        Lymphedema     Row Name 07/27/22 1000             Subjective Pain    Able to rate subjective pain? yes  -SG      Pre-Treatment Pain Level 0  -SG       Post-Treatment Pain Level 0  -SG              Subjective Comments    Subjective Comments Pt reports that she has been wearing the compression daily and it has been helping her swelling.  -SG              Lymphedema Edema Assessment    Ptting Edema Category By severity  -      Pitting Edema Moderate  -SG      Edema Assessment Comment Edema present below the knee  -SG              Skin Changes/Observations    Location/Assessment Lower Extremity  -SG      Lower Extremity Conditions bilateral:;intact;dry  -SG      Lower Extremity Color/Pigment bilateral:;erythema;red;fibrosis  -SG      Skin Observations Comment painful erythematous edematous lower extremities.  -SG              Lymphedema Sensation    Lymphedema Sensation Reports RLE:;LLE:;numbness  -SG      Lymphedema Sensation Comments Pt has peripheral neuropathy in feet  -SG              Lymphedema Measurements    Measurement Type(s) Quick Girth  -SG      Quick Girth Areas Lower extremities  -SG              Manual Lymphatic Drainage    Manual Lymphatic Drainage initial sequence;opened regional lymph nodes;extremity treatment  -SG      Initial Sequence short neck;cervical;supraclavicular;shoulder collectors;abdomen;diaphragmatic breathing  -SG      Cervical right;left  -SG      Supraclavicular right;left  -SG      Shoulder Collectors right;left  -SG      Abdomen superficial  -SG      Diaphragmatic Breathing Yes  -SG      Opened Regional Lymph Nodes inguinal  -SG      Inguinal right;left  -SG      Extremity Treatment MLD to full limb  -SG              Compression/Skin Care    Compression/Skin Care compression garment;skin care  -SG      Skin Care moisturizing lotion applied  -SG      Compression Garment Comments Compressogrip size 4 from base of toes to above ankle, size 5 from ankle to below knee  -SG            User Key  (r) = Recorded By, (t) = Taken By, (c) = Cosigned By    Initials Name Provider Type    Charlotte Berman OTR/L Occupational Therapist                          OT Assessment/Plan     Row Name 07/27/22 1000          OT Assessment    Functional Limitations Performance in self-care ADL;Limitations in functional capacity and performance  -SG     Impairments Edema  -SG     Assessment Comments Patient is a 76 y.o. female who presents w/ painful erythematous edematous lower extremities.   Patient has comorbidities including COPD, chronic diastolic congestive heart failure, hyperlipidemia, diabetes mellitus mellitus type II and gastroparesis as well as dementia, sleep apnea, GERD, hypothyroidism, anxiety, and depression.   Infectious Disease saw pt in hosptial for consult and reported that cellulitis is unusual to occur bilateral extremities; patient likely has chronic venous stasis exacerbated by volume overload from her previous of heart failure.  Pt presents w/ edema and redness in BLEs resulting in pain and decreased participation in ADL/IADLs. She will benefit from OP OT to reduce swelling and thus improve daily activity function and decrease risk of infection.  -SG     OT Diagnosis Venous stasis dermatitis, Bilateral lower extremity edema  -SG     OT Rehab Potential Good  -SG     Patient/caregiver participated in establishment of treatment plan and goals Yes  -SG     Patient would benefit from skilled therapy intervention Yes  -SG            OT Plan    OT Frequency 1x/week  -SG     Planned CPT's? OT EVAL MOD COMPLEXITY: 78957;OT THER ACT EA 15 MIN: [id number];OT THER PROC EA 15 MIN: 73545WE;OT SELF CARE/MGMT/TRAIN 15 MIN: 82655;OT MANUAL THERAPY EA 15 MIN: 23140  -SG     Planned Therapy Interventions (Optional Details) home exercise program;manual therapy techniques;patient/family education;ROM (Range of Motion);strengthening;stretching  -SG     OT Plan Comments Plan to address edema w/ compression as tolerated by pt. Start w/ compressogrip and progress to short stretch bandage wrapping if tolerated. Can try unna boots if pt is unable to tolerate other methods  of compression.  -           User Key  (r) = Recorded By, (t) = Taken By, (c) = Cosigned By    Initials Name Provider Type    Charlotte Berman OTR/L Occupational Therapist                    Manual Rx (last 36 hours)     Manual Treatments     Row Name 07/27/22 1000             Total Minutes    21385 - OT Manual Therapy Minutes 45  -SG            User Key  (r) = Recorded By, (t) = Taken By, (c) = Cosigned By    Initials Name Provider Type    Charlotte Berman OTR/L Occupational Therapist                  Therapy Education  Education Details: Pt encouraged to continue w/ daily compression wear  Given: HEP, Symptoms/condition management, Edema management  Program: Reinforced  How Provided: Verbal, Demonstration  Provided to: Patient  Level of Understanding: Verbalized                Time Calculation:   OT Start Time: 1000  Timed Charges  33535 - OT Manual Therapy Minutes: 45  Total Minutes  Timed Charges Total Minutes: 45   Total Minutes: 45     Therapy Charges for Today     Code Description Service Date Service Provider Modifiers Qty    [id number] HC OT MANUAL THERAPY EA 15 MIN 7/27/2022 Charlotte Valera OTR/L GO 3                      HILLARY Bone/JORGE  7/27/2022

## 2022-07-28 ENCOUNTER — PATIENT OUTREACH (OUTPATIENT)
Dept: CASE MANAGEMENT | Facility: OTHER | Age: 77
End: 2022-07-28

## 2022-07-28 DIAGNOSIS — J44.9 COPD MIXED TYPE: ICD-10-CM

## 2022-07-28 DIAGNOSIS — I50.32 CHRONIC DIASTOLIC HEART FAILURE: Primary | ICD-10-CM

## 2022-07-28 PROBLEM — K62.5 BRIGHT RED BLOOD PER RECTUM: Status: ACTIVE | Noted: 2022-07-28

## 2022-07-28 PROCEDURE — 99487 CPLX CHRNC CARE 1ST 60 MIN: CPT | Performed by: INTERNAL MEDICINE

## 2022-07-28 RX ORDER — SODIUM, POTASSIUM,MAG SULFATES 17.5-3.13G
SOLUTION, RECONSTITUTED, ORAL ORAL
Qty: 354 ML | Refills: 0 | Status: ON HOLD | OUTPATIENT
Start: 2022-07-28 | End: 2022-08-23

## 2022-07-28 NOTE — OUTREACH NOTE
[facility] End of Month Documentation    This Chronic Medical Management Care Plan for Lorrie Pagan, 76 y.o. female, has been monitored and managed and a new plan of care implemented for the month of July.  A cumulative time of 67 minutes was spent on this patient record this month, including face to face visit with provider and patient.    Regarding the patient's problems: has Bronchiectasis (CMS/[facility]); H/O: lung cancer (Prior resection 2016); Chronic respiratory failure; RENETTA (Previous CPAP); Hiatal hernia (Prior Nissen 2008); Mycobacterium avium complex colonization; Irritable bowel syndrome with diarrhea; Aortic valve regurgitation; Benign hypertension; Peripheral edema; Impairment of balance; Peripheral neuropathy; Peripheral venous insufficiency; Chronic diastolic heart failure ([facility]); Syncope; Hypokalemia; Hypomagnesemia; Stage II, moderate, COPD; Memory loss; Restless leg syndrome; Controlled type 2 diabetes mellitus without complication, without long-term current use of insulin ([facility]); Vitamin D deficiency; Mixed hyperlipidemia; Allergic rhinitis; Normocytic anemia; Gastroesophageal reflux disease with esophagitis without hemorrhage; Acquired hypothyroidism; Esophageal dysmotility; Dysphagia; Gastroparesis; Degenerative disc disease, cervical; Degenerative disc disease, lumbar; Globus sensation; Sialorrhea; Drooling; Dysfunction of both eustachian tubes; Tinnitus of both ears; and Iron deficiency anemia on their problem list., the following items were addressed: medical records and any changes can be found within the plan section of the note.  A detailed listing of time spent for chronic care management is tracked within each outreach encounter.  Current medications include:  has a current medication list which includes the following prescription(s): acetaminophen, albuterol, albuterol sulfate hfa, azelastine, chlorhexidine, dapsone, donepezil, duloxetine, famotidine, fluticasone-umeclidin-vilant, furosemide,  glimepiride, guaifenesin, ketoconazole, ketoconazole, latanoprost, memantine, metolazone, rollator ultra-light, montelukast, o2, pantoprazole, potassium chloride, ropinirole xl, saccharomyces boulardii, aerochamber plus feng-vu, timolol, triamcinolone acetonide, and vitamin d. and the patient is reported to be patient is compliant with medication protocol,  Medications are reported to be non-effective in controlling symptoms and changes have been made to the medication protocol.  Regarding these diagnoses, referrals were made to the following provider(s):  [facility], [facility]. All notes on chart for PCP to review.    The patient was monitored remotely for weight; medications.    The patient's physical needs include:  DME supplies; resources for disability needs.     The patient's mental support needs include:  continued support    The patient's cognitive support needs include:  continued support    The patient's psychosocial support needs include:  continued support    The patient's functional needs include: DME; needs assistance for ADLs    The patient's environmental needs include:  No data recorded    Care Plan overall comments:  No data recorded    Refer to previous outreach notes for more information on the areas listed above.    Monthly Billing Diagnoses  (I50.32) Chronic diastolic heart failure (HCC)    (J44.9) Stage II, moderate, COPD    Medications   · Medications have been reconciled    Care Plan progress this month:      Recently Modified Care Plans Updates made since 6/27/2022 12:00 AM     Heart Failure (Adult)         Problem Priority Last Modified     ELS SYMPTOM EXACERBATION (HEART FAILURE) (ADULT) --  5/26/2022 10:35 AM by Helena Jimenez RN              Goal Recent Progress Last Modified     Symptom Exacerbation Prevented or Minimized --  7/5/2022 10:34 AM by Helena Jimenez RN              Task Due Date Last Modified     Identify and Minimize Risk of Heart Failure Exacerbation --  7/5/2022  10:45 AM by Helena Jimenez RN     Care Management Activities:      - in-home support services arranged  - medication-adherence assessment completed  - self-awareness of signs/symptoms of worsening disease encouraged      Notes: 7/5/2022             Problem Priority Last Modified     ELS DISEASE PROGRESSION (HEART FAILURE) (ADULT) --  5/26/2022 10:35 AM by Helena Jimenez RN              Goal Recent Progress Last Modified     Health Optimized --  7/5/2022 10:35 AM by Helena Jimenez RN              Task Due Date Last Modified     Optimize Health --  7/5/2022 10:36 AM by Helena Jimenez RN     Care Management Activities:      - barriers to sufficient nutrient intake addressed      Notes:              Goal Recent Progress Last Modified     Comorbidities Identified and Managed --  7/5/2022 10:36 AM by Helena Jimenez RN              Task Due Date Last Modified     Identify and Manage Comorbidities --  7/5/2022 10:41 AM by Helena Jimenez RN     Care Management Activities:      - response to pharmacologic therapy monitored      Notes:              Problem Priority Last Modified     ELS ACTIVITY TOLERANCE (HEART FAILURE) (ADULT) --  5/26/2022 10:35 AM by Helena Jimenez RN              Goal Recent Progress Last Modified     Activity Tolerance Optimized --  7/5/2022 10:31 AM by Helena Jimenez RN              Task Due Date Last Modified     Maintain Strength and Functional Ability --  7/5/2022 10:34 AM by Helena Jimenez RN     Care Management Activities:      - barriers to activities addressed  - maximum independence promoted      Notes: 7/5/2022             Problem Priority Last Modified     ELS OBSTRUCTIVE SLEEP APNEA (HEART FAILURE) (ADULT) --  5/26/2022 10:35 AM by Helena Jimenez RN              Goal Recent Progress Last Modified     Effective Breathing Pattern During Sleep --  7/5/2022 10:38 AM by Helena Jimenez RN              Task Due Date Last Modified     Monitor and Manage Obstructive Sleep Apnea (RENETTA) --  7/5/2022  10:38 AM by Helena Jimenez RN     Care Management Activities:      - use of oxygen therapy promoted      Notes: 7/5/2022                     · Current Specialty Plan of Care Status signed by both patient and provider    Instructions   · Patient was provided an electronic copy of care plan  · CCM services were explained and offered and patient has accepted these services.  · Patient has given their written consent to receive CCM services and understands that this includes the authorization of electronic communication of medical information with the other treating providers.  · Patient understands that they may stop CCM services at any time and these changes will be effective at the end of the calendar month and will effectively revocate the agreement of CCM services.  · Patient understands that only one practitioner can furnish and be paid for CCM services during one calendar month.  Patient also understands that there may be co-payment or deductible fees in association with CCM services.  · Patient will continue with at least monthly follow-up calls with the Nurse Navigator.    HELENA RIOS  Ambulatory Case Management    7/28/2022, 09:29 EDT

## 2022-08-09 ENCOUNTER — OFFICE VISIT (OUTPATIENT)
Dept: CARDIOLOGY | Facility: CLINIC | Age: 77
End: 2022-08-09

## 2022-08-09 VITALS
DIASTOLIC BLOOD PRESSURE: 72 MMHG | BODY MASS INDEX: 31.24 KG/M2 | SYSTOLIC BLOOD PRESSURE: 130 MMHG | HEIGHT: 64 IN | HEART RATE: 87 BPM | OXYGEN SATURATION: 98 % | WEIGHT: 183 LBS

## 2022-08-09 DIAGNOSIS — E87.6 HYPOKALEMIA: Primary | ICD-10-CM

## 2022-08-09 PROCEDURE — 99214 OFFICE O/P EST MOD 30 MIN: CPT | Performed by: PHYSICIAN ASSISTANT

## 2022-08-09 PROCEDURE — 93000 ELECTROCARDIOGRAM COMPLETE: CPT | Performed by: PHYSICIAN ASSISTANT

## 2022-08-09 RX ORDER — AMITRIPTYLINE HYDROCHLORIDE 10 MG/1
10 TABLET, FILM COATED ORAL AS NEEDED
COMMUNITY
End: 2022-10-19

## 2022-08-09 NOTE — PROGRESS NOTES
"[facility] at [facility]  INITIAL OFFICE CONSULT      Lorrie Pagan  1945  PCP: Dacia Viera MD    SUBJECTIVE:   Lorrie Pagan is a 76 y.o. 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 She  Follows with [facility] BENNETT for Chronic DHF She states Dr. Chaidez \"Can't see you anymore\" therefore she was referred to our office. She presents today for a hospital follow up . She was admitted to [facility] for cellulitis of Lower extremities, COPD excerebration oxygen, and GIB. Since her hospitalization she continues to have difficulty with Lower extremity edema and SOB. She denies any chest pain suggesting angina. She denies any palpitations, or dizziness.   She had a significant gastroparesis with constant GERD symptoms. She states she has had two episodes of syncope. She states the first time she was painting and she had sudden \"passed out\" with no warning. She awakened with clear mind and knew exactly where she was. No loss of bladder or bowel control. She had another event when she was eating dinner and she just passed out with no warning. She denies any palpitations or fluttering in her chest.       Cardiac PMH: (Old records have been reviewed and summarized below)  1. Chronic DHF  a. Echocardiogram 7/11/2022 normal EF, Moderate AI  b. Followed by Dr. Chaidez and [facility].  c. Negative Stress MPS with Dr. Chaidez.   2. COPD Stage, chronic oxygen followed by Dr. Arboleda  3. Chronic Lower extremity edema  4. Cellulitis " LE, Recent antibiotics.   5. Anemia, Iron def.       Past Medical History, Past Surgical History, Family history, Social History, and Medications were all reviewed with the patient today and updated as necessary.     Current Outpatient Medications   Medication Sig Dispense Refill   • acetaminophen (TYLENOL) 500 MG tablet Take 1,000 mg by mouth Every 6 (Six) Hours As Needed for Mild Pain .     • albuterol (ACCUNEB) 1.25 MG/3ML nebulizer solution Take 3 mL by nebulization Every 6 (Six) Hours As Needed for Shortness of Air. 90 each 5   • albuterol sulfate  (90 Base) MCG/ACT inhaler Inhale 2 puffs Every 6 (Six) Hours As Needed for Wheezing. 18 g 2   • amitriptyline (ELAVIL) 10 MG tablet Take 10 mg by mouth As Needed for Sleep.     • azelastine (ASTELIN) 0.1 % nasal spray 2 sprays into the nostril(s) as directed by provider 2 (Two) Times a Day. (Patient taking differently: 2 sprays into the nostril(s) as directed by provider 2 (Two) Times a Day As Needed for Rhinitis or Allergies.) 30 mL 5   • chlorhexidine (PERIDEX) 0.12 % solution Apply 15 mL to the mouth or throat 2 (Two) Times a Day As Needed. For 14 days, w/1 refill     • Dapsone 5 % topical gel Apply  topically to the appropriate area as directed Daily As Needed (apply to legs).     • donepezil (ARICEPT) 10 MG tablet Take 10 mg by mouth Daily.     • DULoxetine (CYMBALTA) 60 MG capsule TAKE 1 CAPSULE BY MOUTH EVERY DAY 90 capsule 3   • famotidine (Pepcid AC Maximum Strength) 20 MG tablet Take 1 tablet by mouth 2 (Two) Times a Day. 8 tablet 0   • furosemide (LASIX) 40 MG tablet Take 1 tablet by mouth Daily. 90 tablet 1   • glimepiride (AMARYL) 2 MG tablet TAKE 1 TABLET BY MOUTH TWICE DAILY 60 tablet 0   • guaiFENesin (Mucinex) 600 MG 12 hr tablet Take 2 tablets by mouth 2 (Two) Times a Day. 180 tablet 3   • ketoconazole (NIZORAL) 2 % cream Apply 1 application topically to the appropriate area as directed Daily As Needed for Itching (apply to back and legs).    "  • ketoconazole (NIZORAL) 2 % shampoo Apply 1 application topically to the appropriate area as directed As Needed for Irritation (for head).     • latanoprost (XALATAN) 0.005 % ophthalmic solution Administer 1 drop to both eyes Every Night.     • memantine (NAMENDA) 5 MG tablet Take 5 mg by mouth Daily.     • metOLazone (ZAROXOLYN) 2.5 MG tablet Take 2.5 mg by mouth Daily.     • montelukast (SINGULAIR) 10 MG tablet Take 1 tablet by mouth Every Night. 90 tablet 3   • O2 (OXYGEN) Inhale 4 L/min Continuous.     • pantoprazole (PROTONIX) 40 MG EC tablet Take 1 tablet by mouth 2 (Two) Times a Day. 180 tablet 3   • potassium chloride (MICRO-K) 10 MEQ CR capsule Take 3 capsules by mouth 2 (Two) Times a Day.     • rOPINIRole XL (REQUIP XL) 8 MG 24 hr tablet Take 8 mg by mouth 2 (two) times a day.     • saccharomyces boulardii (FLORASTOR) 250 MG capsule Take 1 capsule by mouth 2 (Two) Times a Day. 60 capsule 0   • sodium-potassium-magnesium sulfates (Suprep Bowel Prep Kit) 17.5-3.13-1.6 GM/177ML solution oral solution Please follow the directions in the letter mailed to you by our office for colonoscopy prep. If you have any questions call our office at [phone]. 354 mL 0   • Spacer/Aero-Holding Chambers (AeroChamber Plus Morris-Vu) misc As Needed.     • timolol (TIMOPTIC) 0.25 % ophthalmic solution Administer 1 drop to the right eye Every Morning.     • Triamcinolone Acetonide (NASACORT) 55 MCG/ACT nasal inhaler 2 sprays into the nostril(s) as directed by provider Daily.     • vitamin D (ERGOCALCIFEROL) 1.25 MG (47614 UT) capsule capsule Take 1 capsule by mouth 1 (One) Time Per Week. 12 capsule 1     No current facility-administered medications for this visit.     Allergies   Allergen Reactions   • Hydrocodone Hallucinations     \"With high doses\"   • Statins Myalgia     myalgia   • Tizanidine Hallucinations   • Metoclopramide Other (See Comments)     EXACERBATED RLS   • Penicillins Rash     Rash    Tolerated Ceftriaxone "   • Tramadol Nausea And Vomiting and GI Intolerance     VERY MILD PER PT         Past Medical History:   Diagnosis Date   • Back pain    • BRBPR (bright red blood per rectum) 7/10/2022   • Bronchiectasis (HCC)    • Bronchitis 01/2018   • Cancer (HCC)     History of lung cancer and skin cancer    • Cardiac murmur    • Cellulitis of both lower extremities 1/27/2020   • Chronic cough    • Chronic obstructive pulmonary disease (HCC)    • Colon polyp    • DDD (degenerative disc disease), lumbar    • Depression    • Diabetes mellitus (HCC)     DX: 2019, CHECK BS QOD, LAST A1C 6.3??   • Disease of thyroid gland    • Esophageal dilatation 9/20/2019    Added automatically from request for surgery [id number]   • Esophageal dysphagia 10/24/2019    Added automatically from request for surgery [id number]   • Former smoker (None since 1992) 8/29/2019   • GERD (gastroesophageal reflux disease)    • Glaucoma    • Globus sensation 1/27/2020   • History of colonic polyps    • History of transfusion 1988    NO REACTION    • Hyperlipidemia    • Hypertension    • Irritable bowel syndrome     Constipation/diarrhea   • Legally blind    • Lung cancer (HCC)    • Macular degeneration    • Neuropathy    • Obesity 2/26/2020   • Osteoarthritis    • Oxygen dependent     5L   • Pneumonia    • Sleep apnea     NON COMPLIANT WITH CPAP USE   • UTI (urinary tract infection)    • Wears glasses      Past Surgical History:   Procedure Laterality Date   • BACK SURGERY      X2 (LUMBAR)   • BREAST BIOPSY      20+ years ago   • BREAST BIOPSY Right 08/2021    fna ln   • CATARACT EXTRACTION Bilateral    • CHOLECYSTECTOMY     • COLONOSCOPY     • ENDOSCOPY N/A 11/06/2019    Procedure: ESOPHAGOGASTRODUODENOSCOPY;  Surgeon: Cortes Millan MD;  Location: [facility] ENDOSCOPY;  Service: Gastroenterology   • ENDOSCOPY N/A 04/29/2021    Procedure: ESOPHAGOGASTRODUODENOSCOPY;  Surgeon: Cortes Millan MD;  Location: [facility] ENDOSCOPY;  Service:  "Gastroenterology;  Laterality: N/A;  balloon dilation    • HAND SURGERY Right     laceration repair   • INCONTINENCE SURGERY      BLADDER   • LUNG REMOVAL, PARTIAL Left    • NISSEN FUNDOPLICATION     • TOTAL ABDOMINAL HYSTERECTOMY      benign cyst   • US GUIDED FINE NEEDLE ASPIRATION  2021     Family History   Problem Relation Age of Onset   • Colon polyps Mother    • Emphysema Mother    • Hypertension Mother    • Colon polyps Father    • Dementia Father    • Heart disease Father    • Hyperlipidemia Father    • Macular degeneration Father    • Glaucoma Father    • Colon cancer Neg Hx    • Breast cancer Neg Hx    • Ovarian cancer Neg Hx      Social History     Tobacco Use   • Smoking status: Former Smoker     Packs/day: 1.50     Years: 25.00     Pack years: 37.50     Types: Cigarettes     Quit date: 1992     Years since quittin.6   • Smokeless tobacco: Never Used   Substance Use Topics   • Alcohol use: Yes     Alcohol/week: 2.0 standard drinks     Types: 2 Standard drinks or equivalent per week     Comment: a glass of wine or bourbon a couple times a WEEK       ROS:  Review of Symptoms:  General: + fatigue  Skin: no rashes, lumps, or other skin changes  HEENT: no dizziness, lightheadedness, + Syncope  Respiratory: Chronic COPD  Cardiovascular: no palpitations, and tachycardia  Gastrointestinal: + GIB  Urinary: no change in frequency or urgency  Peripheral Vascular: no claudication or leg cramps  Musculoskeletal: no muscle or joint pain/stiffness  Psychiatric: no depression or excessive stress  Neurological: no sensory or motor loss, no syncope  Hematologic: + Anemia   Endocrine: no thyroid problems, nor heat or cold intolerance         PHYSICAL EXAM:   /72 (BP Location: Right arm, Patient Position: Sitting)   Pulse 87   Ht 161.3 cm (63.5\")   Wt 83 kg (183 lb)   LMP  (LMP Unknown)   SpO2 98%   BMI 31.91 kg/m²      Wt Readings from Last 5 Encounters:   22 83 kg (183 lb) "   07/21/22 80.1 kg (176 lb 8 oz)   07/20/22 81.2 kg (179 lb)   07/15/22 80.3 kg (177 lb 1.6 oz)   07/05/22 85.3 kg (188 lb)     BP Readings from Last 5 Encounters:   08/09/22 130/72   07/21/22 127/65   07/20/22 138/58   07/15/22 137/60   07/05/22 138/60       General-Well Nourished, Well developed  Eyes - PERRLA  Neck- supple, No mass  CV- regular rate and rhythm, no MRG  Lung- clear bilaterally  Abd- soft, +BS  Musc/skel - Norm strength and range of motion  Skin- warm and dry, + cellulitis   Neuro - Alert & Oriented x 3, appropriate mood.    Patient's external notes were reviewed.  Independent interpretation of test performed by another physician in facility were reviewed.  Outside laboratory data was also reviewed.    Medical problems and test results were reviewed with the patient today.           Lab Results   Component Value Date    CHOL 217 (H) 02/02/2022    HDL 55 02/02/2022     (H) 02/02/2022    VLDL 19 02/02/2022       EKG:  (EKG/Tracing has been independently visualized by me and summarized below)      ECG 12 Lead    Date/Time: 8/9/2022 2:55 PM  Performed by: Elias Luther PA  Authorized by: Elias Luther PA   Rhythm: sinus rhythm  Rate: normal  Conduction: conduction normal  ST Segments: ST segments normal  T Waves: T waves normal  QRS axis: normal  Other: no other findings    Clinical impression: normal ECG            ASSESSMENT   1. Syncope in setting of  COPD, Anemia, DHF.   2. Stage II COPD< chronic oxygen  3. Chronic DHF, followed by [facility]T and Dr. Chaidez  4. Previous negative stress MPS Dr. Chaidez  5. Chronic Lower extremity edema, recent cellulitis. Negative LE Doppler.   5. Severe anemia, plan for upcoming coloscopy next week. Hb 11.   7. Gastroparesis, DM Type II, Hypokalemia, chronic: on replacement      PLAN  · zio monitor  · Increase lasix to 80mg for three days then back to daily.   · Follow up with CHF clinic one 3-4 days.   · Replace potassium, check  Magnesium  · LE Wraps/Yuridia boots per PT. Cellulitis per ID, Dr. Chi.    · Follow up with Pulm. As scheduled.   · Follow up 6 months or sooner as needed.   Cardiology/Electrophysiology  08/09/22  14:31 EDT  Will Ashkan MALDONADO

## 2022-08-15 ENCOUNTER — HOSPITAL ENCOUNTER (OUTPATIENT)
Dept: OCCUPATIONAL THERAPY | Facility: HOSPITAL | Age: 77
Setting detail: THERAPIES SERIES
Discharge: HOME | End: 2022-08-15

## 2022-08-15 DIAGNOSIS — R60.0 BILATERAL LOWER EXTREMITY EDEMA: Primary | ICD-10-CM

## 2022-08-15 DIAGNOSIS — I87.2 VENOUS STASIS DERMATITIS OF BOTH LOWER EXTREMITIES: ICD-10-CM

## 2022-08-15 PROCEDURE — 97140 MANUAL THERAPY 1/> REGIONS: CPT

## 2022-08-15 PROCEDURE — 97535 SELF CARE MNGMENT TRAINING: CPT

## 2022-08-15 NOTE — THERAPY PROGRESS REPORT/RE-CERT
Outpatient Occupational Therapy Lymphedema Progress Note  [facility]     Patient Name: Lorrie Pagan  : 1945  MRN: [id number]  Today's Date: 8/15/2022      Visit Date: 08/15/2022    Patient Active Problem List   Diagnosis   • Bronchiectasis (CMS/HCC)   • H/O: lung cancer (Prior resection )   • Chronic respiratory failure   • RENETTA (Previous CPAP)   • Hiatal hernia (Prior Nissen )   • Mycobacterium avium complex colonization   • Irritable bowel syndrome with diarrhea   • Aortic valve regurgitation   • Benign hypertension   • Peripheral edema   • Impairment of balance   • Peripheral neuropathy   • Peripheral venous insufficiency   • Chronic diastolic heart failure (HCC)   • Syncope   • Hypokalemia   • Hypomagnesemia   • Stage II, moderate, COPD   • Memory loss   • Restless leg syndrome   • Controlled type 2 diabetes mellitus without complication, without long-term current use of insulin ([facility])   • Vitamin D deficiency   • Mixed hyperlipidemia   • Allergic rhinitis   • Normocytic anemia   • Gastroesophageal reflux disease with esophagitis without hemorrhage   • Acquired hypothyroidism   • Esophageal dysmotility   • Dysphagia   • Gastroparesis   • Degenerative disc disease, cervical   • Degenerative disc disease, lumbar   • Globus sensation   • Sialorrhea   • Drooling   • Dysfunction of both eustachian tubes   • Tinnitus of both ears   • Iron deficiency anemia   • Bright red blood per rectum        Past Medical History:   Diagnosis Date   • Back pain    • BRBPR (bright red blood per rectum) 7/10/2022   • Bronchiectasis (HCC)    • Bronchitis 2018   • Cancer (HCC)     History of lung cancer and skin cancer    • Cardiac murmur    • Cellulitis of both lower extremities 2020   • Chronic cough    • Chronic obstructive pulmonary disease (HCC)    • Colon polyp    • DDD (degenerative disc disease), lumbar    • Depression    • Diabetes mellitus (HCC)     DX: 2019, CHECK BS QOD, LAST A1C 6.3??   •  Disease of thyroid gland    • Esophageal dilatation 9/20/2019    Added automatically from request for surgery [id number]   • Esophageal dysphagia 10/24/2019    Added automatically from request for surgery [id number]   • Former smoker (None since 1992) 8/29/2019   • GERD (gastroesophageal reflux disease)    • Glaucoma    • Globus sensation 1/27/2020   • History of colonic polyps    • History of transfusion 1988    NO REACTION    • Hyperlipidemia    • Hypertension    • Irritable bowel syndrome     Constipation/diarrhea   • Legally blind    • Lung cancer (HCC)    • Macular degeneration    • Neuropathy    • Obesity 2/26/2020   • Osteoarthritis    • Oxygen dependent     5L   • Pneumonia    • Sleep apnea     NON COMPLIANT WITH CPAP USE   • UTI (urinary tract infection)    • Wears glasses         Past Surgical History:   Procedure Laterality Date   • BACK SURGERY      X2 (LUMBAR)   • BREAST BIOPSY      20+ years ago   • BREAST BIOPSY Right 08/2021    fna ln   • CATARACT EXTRACTION Bilateral    • CHOLECYSTECTOMY     • COLONOSCOPY     • ENDOSCOPY N/A 11/06/2019    Procedure: ESOPHAGOGASTRODUODENOSCOPY;  Surgeon: Cortes Millan MD;  Location:  [facility];  Service: Gastroenterology   • ENDOSCOPY N/A 04/29/2021    Procedure: ESOPHAGOGASTRODUODENOSCOPY;  Surgeon: Cortes Millan MD;  Location:  [facility];  Service: Gastroenterology;  Laterality: N/A;  balloon dilation    • HAND SURGERY Right     laceration repair   • INCONTINENCE SURGERY      BLADDER   • LUNG REMOVAL, PARTIAL Left 2016   • NISSEN FUNDOPLICATION     • TOTAL ABDOMINAL HYSTERECTOMY  1988    benign cyst   • US GUIDED FINE NEEDLE ASPIRATION  08/27/2021         Visit Dx:      ICD-10-CM ICD-9-CM   1. Bilateral lower extremity edema  R60.0 782.3   2. Venous stasis dermatitis of both lower extremities  I87.2 454.1        Lymphedema     Row Name 08/15/22 1100             Subjective Pain    Able to rate subjective pain? yes  -SG       Pre-Treatment Pain Level 0  -SG      Post-Treatment Pain Level 0  -SG              Subjective Comments    Subjective Comments Pt reports that she hasn't been able to wear the compression on her legs for the past week or so because she felt like they were cutting into her legs too much. We discussed other options today.  -SG              Lymphedema Edema Assessment    Ptting Edema Category By severity  -SG      Pitting Edema Moderate  -SG      Edema Assessment Comment Edema present below the knee  -SG              Skin Changes/Observations    Location/Assessment Lower Extremity  -SG      Lower Extremity Conditions bilateral:;intact;dry  -SG      Lower Extremity Color/Pigment bilateral:;erythema;red;fibrosis  -SG      Skin Observations Comment painful erythematous edematous lower extremities.  -SG              Lymphedema Sensation    Lymphedema Sensation Reports RLE:;LLE:;numbness  -SG      Lymphedema Sensation Comments Pt has peripheral neuropathy in feet  -SG              Lymphedema Measurements    Measurement Type(s) Quick Girth  -SG      Quick Girth Areas Lower extremities  -SG              LLE Quick Girth (cm)    Met-heads 21.5 cm  -SG      Mid foot 22.5 cm  -SG      Smallest ankle 25 cm  -SG      Largest calf 42.5 cm  -SG      Tib tuberosity 40 cm  -SG      Mid patella 44.5 cm  -SG              RLE Quick Girth (cm)    Met-heads 21.5 cm  -SG      Mid foot 22.5 cm  -SG      Smallest ankle 24 cm  -SG      Largest calf 42 cm  -SG      Tib tuberosity 40 cm  -SG      Mid patella 46 cm  -SG      RLE Quick Girth Total 196  -SG              Manual Lymphatic Drainage    Manual Lymphatic Drainage initial sequence;opened regional lymph nodes;extremity treatment  -SG      Initial Sequence short neck;cervical;supraclavicular;shoulder collectors;abdomen;diaphragmatic breathing  -SG      Cervical right;left  -SG      Supraclavicular right;left  -SG      Shoulder Collectors right;left  -SG      Abdomen superficial  -SG       Opened Regional Lymph Nodes inguinal  -SG      Inguinal right;left  -SG      Extremity Treatment MLD to full limb  -SG              Compression/Skin Care    Compression/Skin Care compression garment;skin care  -SG      Skin Care moisturizing lotion applied  -SG      Compression Garment Comments Compressogrip size 4 from base of toes to above ankle, size 6 from ankle to below knee  -SG            User Key  (r) = Recorded By, (t) = Taken By, (c) = Cosigned By    Initials Name Provider Type    Charlotte Berman OTR/L Occupational Therapist                         OT Assessment/Plan     Row Name 08/15/22 1100          OT Assessment    Functional Limitations Performance in self-care ADL;Limitations in functional capacity and performance  -SG     Impairments Edema  -SG     Assessment Comments Patient is a 76 y.o. female who presents w/ painful erythematous edematous lower extremities.   Patient has comorbidities including COPD, chronic diastolic congestive heart failure, hyperlipidemia, diabetes mellitus mellitus type II and gastroparesis as well as dementia, sleep apnea, GERD, hypothyroidism, anxiety, and depression.   Infectious Disease saw pt in hosptial for consult and reported that cellulitis is unusual to occur bilateral extremities; patient likely has chronic venous stasis exacerbated by volume overload from her previous of heart failure.  Pt presents w/ edema and redness in BLEs resulting in pain and decreased participation in ADL/IADLs. She will benefit from OP OT to reduce swelling and thus improve daily activity function and decrease risk of infection.  -SG     OT Diagnosis Venous stasis dermatitis, Bilateral lower extremity edema  -SG     OT Rehab Potential Good  -SG     Patient/caregiver participated in establishment of treatment plan and goals Yes  -SG     Patient would benefit from skilled therapy intervention Yes  -SG            OT Plan    OT Frequency 1x/week  -SG     Planned CPT's? OT EVAL MOD  COMPLEXITY: 80418;OT THER ACT EA 15 MIN: [id number];OT THER PROC EA 15 MIN: [id number];OT SELF CARE/MGMT/TRAIN 15 MIN: 45274;OT MANUAL THERAPY EA 15 MIN: 99646  -SG     Planned Therapy Interventions (Optional Details) home exercise program;manual therapy techniques;patient/family education;ROM (Range of Motion);strengthening;stretching  -SG     OT Plan Comments Plan to address edema w/ compression as tolerated by pt. Start w/ compressogrip and progress to short stretch bandage wrapping if tolerated. Can try unna boots if pt is unable to tolerate other methods of compression.  -SG           User Key  (r) = Recorded By, (t) = Taken By, (c) = Cosigned By    Initials Name Provider Type    Charlotte Berman OTR/L Occupational Therapist                    Manual Rx (last 36 hours)     Manual Treatments     Row Name 08/15/22 1100             Total Minutes    77518 - OT Manual Therapy Minutes 35  -SG            User Key  (r) = Recorded By, (t) = Taken By, (c) = Cosigned By    Initials Name Provider Type    Charlotte Berman OTR/L Occupational Therapist               OT Goals     Row Name 08/15/22 1100          OT Short Term Goals    STG Date to Achieve 08/18/22  -SG     STG 1 • Pt will understand edema precautions to decrease the risk of infection and exacerbation of the edema.  -SG     STG 1 Progress Met  -SG     STG 2 • Pt will develop a tolerance for wearing compression between treatment sessions to facilitate limb decongestion.  -SG     STG 2 Progress Progressing  -SG     STG 3 • Pt will experience a decrease in pitting edema which will improve tissue health and decrease the risk of infection.  -SG     STG 3 Progress Progressing  -SG            Long Term Goals    LTG 1 • Pt will obtain appropriate compression and be ind w/ donning/doffing which will enable regular daily garment wear  -SG     LTG 1 Progress Progressing  -SG     LTG 2 • Treatment will achieve maximum edema reduction to enable functional improvements and a  return to PLOF  -SG     LTG 2 Progress Progressing  -SG     LTG 3 • Pt will be ind w/ HEP and edema management to help prevent edema relapse and reduce the risk of infection  -SG     LTG 3 Progress Progressing  -SG            Time Calculation    OT Goal Re-Cert Due Date 10/17/22  -SG           User Key  (r) = Recorded By, (t) = Taken By, (c) = Cosigned By    Initials Name Provider Type    Charlotte Berman OTR/L Occupational Therapist                Therapy Education  Education Details: We discussed compression garment options including velcro compression which would be the best for her in terms of giving her enough compression without pain and without difficulty donning/doffing. They are a little pricey, so she is going to hold off on those for now. She will continue w/ copmressogrip but we sized up in efforts to avoid pain below knee.  Given: HEP, Symptoms/condition management, Edema management  Program: Reinforced, New  How Provided: Verbal, Demonstration  Provided to: Patient  Level of Understanding: Verbalized  83676 - OT Self Care/Mgmt Minutes: 18                Time Calculation:   OT Start Time: 1100  Timed Charges  23818 - OT Manual Therapy Minutes: 35  04499 - OT Self Care/Mgmt Minutes: 18  Total Minutes  Timed Charges Total Minutes: 53   Total Minutes: 53     Therapy Charges for Today     Code Description Service Date Service Provider Modifiers Qty    68134379170 HC OT MANUAL THERAPY EA 15 MIN 8/15/2022 Charlotte Valera OTR/L GO 3    51737576478 HC OT SELF CARE/MGMT/TRAIN EA 15 MIN 8/15/2022 Charlotte Valera OTR/L GO 1                      HILLARY Bone/JORGE  8/15/2022

## 2022-08-16 ENCOUNTER — TELEPHONE (OUTPATIENT)
Dept: GASTROENTEROLOGY | Facility: CLINIC | Age: 77
End: 2022-08-16

## 2022-08-16 ENCOUNTER — OFFICE VISIT (OUTPATIENT)
Dept: CARDIOLOGY | Facility: HOSPITAL | Age: 77
End: 2022-08-16

## 2022-08-16 ENCOUNTER — PRE-ADMISSION TESTING (OUTPATIENT)
Dept: PREADMISSION TESTING | Facility: HOSPITAL | Age: 77
End: 2022-08-16

## 2022-08-16 ENCOUNTER — PREP FOR SURGERY (OUTPATIENT)
Dept: OTHER | Facility: HOSPITAL | Age: 77
End: 2022-08-16

## 2022-08-16 VITALS
BODY MASS INDEX: 30.99 KG/M2 | RESPIRATION RATE: 16 BRPM | TEMPERATURE: 97.3 F | HEIGHT: 64 IN | SYSTOLIC BLOOD PRESSURE: 137 MMHG | HEART RATE: 84 BPM | WEIGHT: 181.5 LBS | OXYGEN SATURATION: 100 % | DIASTOLIC BLOOD PRESSURE: 63 MMHG

## 2022-08-16 VITALS — BODY MASS INDEX: 32.13 KG/M2 | HEIGHT: 63 IN | WEIGHT: 181.33 LBS

## 2022-08-16 DIAGNOSIS — I50.33 ACUTE ON CHRONIC DIASTOLIC CHF (CONGESTIVE HEART FAILURE): Primary | ICD-10-CM

## 2022-08-16 DIAGNOSIS — Z22.39 MYCOBACTERIUM AVIUM COMPLEX COLONIZATION: Primary | ICD-10-CM

## 2022-08-16 DIAGNOSIS — E87.6 HYPOKALEMIA: ICD-10-CM

## 2022-08-16 DIAGNOSIS — I10 BENIGN HYPERTENSION: ICD-10-CM

## 2022-08-16 DIAGNOSIS — J47.9 IDIOPATHIC BRONCHIECTASIS: ICD-10-CM

## 2022-08-16 LAB
DEPRECATED RDW RBC AUTO: 47.2 FL (ref 37–54)
ERYTHROCYTE [DISTWIDTH] IN BLOOD BY AUTOMATED COUNT: 15.5 % (ref 12.3–15.4)
HCT VFR BLD AUTO: 34.9 % (ref 34–46.6)
HGB BLD-MCNC: 10.7 G/DL (ref 12–15.9)
MCH RBC QN AUTO: 25.7 PG (ref 26.6–33)
MCHC RBC AUTO-ENTMCNC: 30.7 G/DL (ref 31.5–35.7)
MCV RBC AUTO: 83.9 FL (ref 79–97)
PLATELET # BLD AUTO: 343 10*3/MM3 (ref 140–450)
PMV BLD AUTO: 9.2 FL (ref 6–12)
POTASSIUM SERPL-SCNC: 3 MMOL/L (ref 3.5–5.2)
RBC # BLD AUTO: 4.16 10*6/MM3 (ref 3.77–5.28)
WBC NRBC COR # BLD: 9.11 10*3/MM3 (ref 3.4–10.8)

## 2022-08-16 PROCEDURE — 85027 COMPLETE CBC AUTOMATED: CPT

## 2022-08-16 PROCEDURE — 84132 ASSAY OF SERUM POTASSIUM: CPT

## 2022-08-16 PROCEDURE — 36415 COLL VENOUS BLD VENIPUNCTURE: CPT

## 2022-08-16 PROCEDURE — 99214 OFFICE O/P EST MOD 30 MIN: CPT | Performed by: NURSE PRACTITIONER

## 2022-08-16 RX ORDER — SPIRONOLACTONE 25 MG/1
25 TABLET ORAL DAILY
Qty: 30 TABLET | Refills: 11 | Status: ON HOLD | OUTPATIENT
Start: 2022-08-16 | End: 2022-08-23 | Stop reason: SDUPTHER

## 2022-08-16 RX ORDER — LIDOCAINE HYDROCHLORIDE 40 MG/ML
4 INJECTION, SOLUTION RETROBULBAR; TOPICAL ONCE
Status: CANCELLED | OUTPATIENT
Start: 2022-08-16 | End: 2022-08-16

## 2022-08-16 NOTE — PROGRESS NOTES
"Chief Complaint  Edema and Follow-up    Subjective    History of Present Illness {  Problem List  Visit  Diagnosis   Encounters  Notes  Medications  Labs  Result Review Imaging  Media :23}       History of Present Illness   76-year-old female presents to the office today at the request of [facility] for ongoing evaluation of her acute on chronic diastolic heart failure.  Patient had Lasix increased to 80 mg daily x3 days last week and notes mild improvement in her dyspnea and pedal edema.  Patient is scheduled for colonoscopy on Thursday with Dr. Ngo's and had labs drawn in [facility] today.  Potassium was 3.0.  Patient currently takes 60 mEq of potassium daily.  Patient currently denies any myalgias.  Patient does report mild to moderate dyspnea on exertion.  She is wearing her oxygen 24 hours a day. Currently wearing a Zio patch due to recent syncopal spells.  Objective     Vital Signs:   Vitals:    08/16/22 1042   BP: 137/63   BP Location: Left arm   Patient Position: Sitting   Cuff Size: Adult   Pulse: 84   Resp: 16   Temp: 97.3 °F (36.3 °C)   TempSrc: Temporal   SpO2: 100%   Weight: 82.3 kg (181 lb 8 oz)   Height: 161.3 cm (63.5\")     Body mass index is 31.65 kg/m².  Physical Exam  Vitals and nursing note reviewed.   Constitutional:       Appearance: Normal appearance.   HENT:      Head: Normocephalic.   Eyes:      Pupils: Pupils are equal, round, and reactive to light.   Cardiovascular:      Rate and Rhythm: Normal rate and regular rhythm.      Pulses: Normal pulses.      Heart sounds: Normal heart sounds. No murmur heard.  Pulmonary:      Effort: Pulmonary effort is normal.      Breath sounds: Normal breath sounds.   Abdominal:      General: Bowel sounds are normal.      Palpations: Abdomen is soft.   Musculoskeletal:         General: Normal range of motion.      Cervical back: Normal range of motion.      Right lower leg: Edema (1+ pitting ) present.      Left lower leg: Edema (1+ pitting ) present. "   Skin:     General: Skin is warm and dry.      Capillary Refill: Capillary refill takes less than 2 seconds.      Findings: Erythema (bilateral lower extremities ) present.   Neurological:      Mental Status: She is alert and oriented to person, place, and time.   Psychiatric:         Mood and Affect: Mood normal.         Thought Content: Thought content normal.              Result Review  Data Reviewed:{ Labs  Result Review  Imaging  Med Tab  Media :23}     Duplex Venous Lower Extremity - Bilateral CAR (07/11/2022 15:24)  Adult Transthoracic Echo Complete W/ Cont if Necessary Per Protocol (07/11/2022 15:25)  8/16/2022: Potassium 3.0  Lab Results   Component Value Date    WBC 9.11 08/16/2022    HGB 10.7 (L) 08/16/2022    HCT 34.9 08/16/2022    MCV 83.9 08/16/2022     08/16/2022              Assessment and Plan {CC Problem List  Visit Diagnosis  ROS  Review (Popup)  Health Maintenance  Quality  BestPractice  Medications  SmartSets  SnapShot Encounters  Media :23}   1. Acute on chronic diastolic CHF (congestive heart failure) (HCC)  Begin Aldactone 25 mg daily  Recently increase Lasix to 80 mg daily with mild improvement in symptoms.  Did discuss IV diuresis however patient is hypokalemic.  We will continue Lasix 40 mg daily with initiation of Aldactone 25 mg daily    2. Hypokalemia  40 mEq of potassium given in office today p.o.  Continue 60 mEq of potassium daily  Begin Aldactone 25 mg daily today  Repeat potassium precolonoscopy    3. Benign hypertension  Well-controlled without use of antihypertensives    4. Bronchiectasis (CMS/HCC)  Stable    Patient will receive a follow-up call on Friday from heart valve to reassess signs and symptoms of fluid overload.  will determine if IV diuretics are appropriate at that time.  Concerned with IV diuretics today due to upcoming colonoscopy and patient's significant hypokalemia    Follow Up {Instructions Charge Capture  Follow-up Communications :23}    Return if symptoms worsen or fail to improve.    Patient was given instructions and counseling regarding her condition or for health maintenance advice. Please see specific information pulled into the AVS if appropriate.  Patient was instructed to call the [facility] with any questions, concerns, or worsening symptoms.

## 2022-08-16 NOTE — DISCHARGE INSTRUCTIONS
The following information and instructions were given:    Do not eat, drink, smoke or chew gum after midnight the night before surgery. This includes no mints.  Take all routine, prescribed medications including heart and blood pressure medicines with a sip of water unless otherwise instructed by your physician.   Do NOT take diabetic medication unless instructed by your physician.    DO NOT shave for two days before your procedure.  Do not wear makeup.      DO NOT wear fingernail polish (gel/regular) and/or acrylic/artificial nails on the day of surgery.   If you had a recent manicure and would rather not remove polish or artificial nails, the minimum requirement is that the polish/artificial nails must be removed from the middle finger on each hand.      Remove all jewelry (advise to go to jeweler if unable to remove).  Jewelry, especially rings, can no longer be taped for surgery.    Leave anything you consider valuable at home.    Bring the following with you the day of your procedure (when applicable):       -Picture ID and insurance cards       -Co-pay/deductible required by insurance       -Medications in the original bottles (not a list) including all over-the-counter medications if not brought to PAT       -Copy of advance directive, living will or power of  documents if not brought to PAT       -PAT Pass    Educational handout related to procedure given to patient.  Educational handout also includes general information related to their recovery that mentions signs and symptoms of infection and when to call the doctor.

## 2022-08-16 NOTE — TELEPHONE ENCOUNTER
Pt. called regarding medications prior to upcoming procedure. Transferred to medical assistantBetty.

## 2022-08-17 ENCOUNTER — PREP FOR SURGERY (OUTPATIENT)
Dept: OTHER | Facility: HOSPITAL | Age: 77
End: 2022-08-17

## 2022-08-17 DIAGNOSIS — Z22.39 MYCOBACTERIUM AVIUM COMPLEX COLONIZATION: Primary | ICD-10-CM

## 2022-08-18 ENCOUNTER — HOSPITAL ENCOUNTER (OUTPATIENT)
Facility: HOSPITAL | Age: 77
Setting detail: HOSPITAL OUTPATIENT SURGERY
End: 2022-08-18
Attending: INTERNAL MEDICINE | Admitting: INTERNAL MEDICINE

## 2022-08-18 ENCOUNTER — ANESTHESIA (OUTPATIENT)
Dept: GASTROENTEROLOGY | Facility: HOSPITAL | Age: 77
End: 2022-08-18

## 2022-08-18 ENCOUNTER — HOSPITAL ENCOUNTER (OUTPATIENT)
Facility: HOSPITAL | Age: 77
Setting detail: HOSPITAL OUTPATIENT SURGERY
Discharge: HOME | End: 2022-08-18
Attending: INTERNAL MEDICINE | Admitting: INTERNAL MEDICINE

## 2022-08-18 ENCOUNTER — ANESTHESIA EVENT (OUTPATIENT)
Dept: GASTROENTEROLOGY | Facility: HOSPITAL | Age: 77
End: 2022-08-18

## 2022-08-18 VITALS
TEMPERATURE: 97 F | SYSTOLIC BLOOD PRESSURE: 107 MMHG | RESPIRATION RATE: 18 BRPM | OXYGEN SATURATION: 98 % | HEART RATE: 92 BPM | DIASTOLIC BLOOD PRESSURE: 63 MMHG

## 2022-08-18 DIAGNOSIS — K62.5 BRIGHT RED BLOOD PER RECTUM: ICD-10-CM

## 2022-08-18 LAB — GLUCOSE BLDC GLUCOMTR-MCNC: 113 MG/DL (ref 70–130)

## 2022-08-18 PROCEDURE — 45380 COLONOSCOPY AND BIOPSY: CPT | Performed by: INTERNAL MEDICINE

## 2022-08-18 PROCEDURE — 82962 GLUCOSE BLOOD TEST: CPT

## 2022-08-18 PROCEDURE — 25010000002 PROPOFOL 10 MG/ML EMULSION: Performed by: NURSE ANESTHETIST, CERTIFIED REGISTERED

## 2022-08-18 PROCEDURE — 88305 TISSUE EXAM BY PATHOLOGIST: CPT | Performed by: INTERNAL MEDICINE

## 2022-08-18 RX ORDER — SODIUM CHLORIDE 0.9 % (FLUSH) 0.9 %
3 SYRINGE (ML) INJECTION EVERY 12 HOURS SCHEDULED
Status: CANCELLED | OUTPATIENT
Start: 2022-08-18

## 2022-08-18 RX ORDER — LIDOCAINE HYDROCHLORIDE 40 MG/ML
4 INJECTION, SOLUTION RETROBULBAR; TOPICAL ONCE
Status: CANCELLED | OUTPATIENT
Start: 2022-08-18 | End: 2022-08-18

## 2022-08-18 RX ORDER — SODIUM CHLORIDE 9 MG/ML
125 INJECTION, SOLUTION INTRAVENOUS CONTINUOUS
Status: CANCELLED | OUTPATIENT
Start: 2022-08-18

## 2022-08-18 RX ORDER — SODIUM CHLORIDE, SODIUM LACTATE, POTASSIUM CHLORIDE, AND CALCIUM CHLORIDE .6; .31; .03; .02 G/100ML; G/100ML; G/100ML; G/100ML
20 INJECTION, SOLUTION INTRAVENOUS CONTINUOUS
Status: DISCONTINUED | OUTPATIENT
Start: 2022-08-18 | End: 2022-08-24 | Stop reason: HOSPADM

## 2022-08-18 RX ORDER — SODIUM CHLORIDE 0.9 % (FLUSH) 0.9 %
10 SYRINGE (ML) INJECTION AS NEEDED
Status: CANCELLED | OUTPATIENT
Start: 2022-08-18

## 2022-08-18 RX ORDER — PROPOFOL 10 MG/ML
VIAL (ML) INTRAVENOUS AS NEEDED
Status: DISCONTINUED | OUTPATIENT
Start: 2022-08-18 | End: 2022-08-18 | Stop reason: SURG

## 2022-08-18 RX ORDER — LIDOCAINE HYDROCHLORIDE 10 MG/ML
INJECTION, SOLUTION EPIDURAL; INFILTRATION; INTRACAUDAL; PERINEURAL AS NEEDED
Status: DISCONTINUED | OUTPATIENT
Start: 2022-08-18 | End: 2022-08-18 | Stop reason: SURG

## 2022-08-18 RX ADMIN — PROPOFOL 120 MCG/KG/MIN: 10 INJECTION, EMULSION INTRAVENOUS at 15:09

## 2022-08-18 RX ADMIN — PROPOFOL 50 MG: 10 INJECTION, EMULSION INTRAVENOUS at 15:08

## 2022-08-18 RX ADMIN — SODIUM CHLORIDE, POTASSIUM CHLORIDE, SODIUM LACTATE AND CALCIUM CHLORIDE 20 ML/HR: 600; 310; 30; 20 INJECTION, SOLUTION INTRAVENOUS at 13:59

## 2022-08-18 RX ADMIN — LIDOCAINE HYDROCHLORIDE 50 MG: 10 INJECTION, SOLUTION EPIDURAL; INFILTRATION; INTRACAUDAL; PERINEURAL at 15:08

## 2022-08-18 NOTE — H&P
GASTROENTEROLOGY OFFICE NOTE  Lorrie Pagan  [id number]  1945      CHIEF COMPLAINT   Hematochezia    HISTORY OF PRESENT ILLNESS:  Patient is a 76-year-old white female.  She is known to me.  She has GI problems related to dysphagia to solids for which we have had to dilate her esophagus in the past when the more pressing issue of gastroparesis which plagues her with recurrent regurgitation and postprandial fullness and belching.  She has failed to respond to metoclopramide and trial of Motegrity/prucalopride has also been ineffective.  This is her biggest complaint from a GI standpoint.    She is here today however to address the issue of recurrent hematochezia in the absence of melena, or diarrhea.  She tends to be constipated.    PAST MEDICAL HISTORY  Past Medical History:   Diagnosis Date   • Asthma    • Back pain    • BRBPR (bright red blood per rectum) 07/10/2022   • Bronchiectasis (HCC)    • Bronchitis 01/2018   • Cancer (HCC)     History of lung cancer and skin cancer    • Cardiac murmur    • Cellulitis of both lower extremities 01/27/2020   • Chronic cough    • Chronic obstructive pulmonary disease (HCC)    • Colon polyp    • DDD (degenerative disc disease), lumbar    • Depression    • Diabetes mellitus (HCC)     DX: 2019, CHECK BS QOD, LAST A1C 6.3??   • Disease of thyroid gland    • Esophageal dilatation 09/20/2019    Added automatically from request for surgery [id number]   • Esophageal dysphagia 10/24/2019    Added automatically from request for surgery [id number]   • Former smoker (None since 1992) 08/29/2019   • Gastroparesis    • GERD (gastroesophageal reflux disease)    • Glaucoma    • Globus sensation 01/27/2020   • History of colonic polyps    • History of transfusion 1988    NO REACTION    • Hyperlipidemia    • Hypertension    • Irritable bowel syndrome     Constipation/diarrhea   • Legally blind    • Lung cancer (HCC)    • Macular degeneration    • Neuropathy    • Obesity 02/26/2020   •  "Osteoarthritis    • Oxygen dependent     5L   • Pneumonia    • Sleep apnea     NON COMPLIANT WITH CPAP USE   • UTI (urinary tract infection)    • Wears glasses         PAST SURGICAL HISTORY  Past Surgical History:   Procedure Laterality Date   • BACK SURGERY      X2 (LUMBAR)   • BREAST BIOPSY      20+ years ago   • BREAST BIOPSY Right 08/2021    fna ln   • CATARACT EXTRACTION Bilateral    • CHOLECYSTECTOMY     • COLONOSCOPY     • ENDOSCOPY N/A 11/06/2019    Procedure: ESOPHAGOGASTRODUODENOSCOPY;  Surgeon: Cortes Millan MD;  Location:  [facility];  Service: Gastroenterology   • ENDOSCOPY N/A 04/29/2021    Procedure: ESOPHAGOGASTRODUODENOSCOPY;  Surgeon: Cortes Millan MD;  Location:  [facility];  Service: Gastroenterology;  Laterality: N/A;  balloon dilation    • HAND SURGERY Right     laceration repair   • INCONTINENCE SURGERY      BLADDER   • LUNG REMOVAL, PARTIAL Left 2016   • NISSEN FUNDOPLICATION     • SKIN BIOPSY     • SPINAL CORD STIMULATOR IMPLANT      right buttock, in place but no longer working   • TOTAL ABDOMINAL HYSTERECTOMY  1988    benign cyst   • US GUIDED FINE NEEDLE ASPIRATION  08/27/2021        MEDICATIONS:    Current Facility-Administered Medications:   •  lactated ringers solution, 20 mL/hr, Intravenous, Continuous, Erick Berry MD, Last Rate: 20 mL/hr at 08/18/22 1359, 20 mL/hr at 08/18/22 1359    ALLERGIES  Allergies   Allergen Reactions   • Hydrocodone Hallucinations     \"With high doses\"   • Statins Myalgia     myalgia   • Metoclopramide Other (See Comments)     EXACERBATED RLS   • Penicillins Rash     Rash    Tolerated Ceftriaxone   • Tizanidine Hallucinations   • Tramadol Nausea And Vomiting and GI Intolerance     VERY MILD PER PT       FAMILY HISTORY:  Family History   Problem Relation Age of Onset   • Colon polyps Mother    • Emphysema Mother    • Hypertension Mother    • Colon polyps Father    • Dementia Father    • Heart disease Father    • " Hyperlipidemia Father    • Macular degeneration Father    • Glaucoma Father    • Colon cancer Neg Hx    • Breast cancer Neg Hx    • Ovarian cancer Neg Hx        SOCIAL HISTORY  Social History     Socioeconomic History   • Marital status: Single   Tobacco Use   • Smoking status: Former Smoker     Packs/day: 1.50     Years: 25.00     Pack years: 37.50     Types: Cigarettes     Quit date: 1992     Years since quittin.6   • Smokeless tobacco: Never Used   Vaping Use   • Vaping Use: Never used   Substance and Sexual Activity   • Alcohol use: Yes     Alcohol/week: 2.0 standard drinks     Types: 2 Standard drinks or equivalent per week     Comment: a glass of wine or bourbon a couple times a WEEK   • Drug use: No   • Sexual activity: Never     Socioeconomic History:  She is a retired microbiologist.  Used to work as an  at the [employer].  2 sons and 5 grandchildren she rarely drinks alcoholic beverages and quit smoking in        REVIEW OF SYSTEMS  Review of Systems  General, GI, cardiovascular and pulmonary review of systems pertinent as reviewed above    PHYSICAL EXAM   /60 (BP Location: Left arm, Patient Position: Lying)   Pulse 84   Temp 97.5 °F (36.4 °C) (Temporal)   Resp 16   LMP  (LMP Unknown)   SpO2 100%   General: Alert and oriented x 3. In no apparent or acute distress.  and No stigmata of chronic liver disease  HEENT: Anicteric sclerae. Normal oropharynx  Neck: Supple. Without lymphadenopathy  CV: Regular rate and rhythm, S1, S2  Lungs: Clear to ausculation. Without rales, rhonchi and wheezing  Abdomen:  Soft,non-distended without palpable masses or hepatosplenomeagaly, areas of rebound tenderness or guarding.   Extremeties: without clubbing, cyanosis or edema  Neurologic:  Alert and oriented x 3 without focal motor or sensory deficits  Rectal exam: deferred       ASSESSMENT  1.-  Hematochezia rule out colonic neoplasia.  Proceed with colonoscopy today  2.-   Gastroparesis.  She is open to starting domperidone 10 mg p.o. 3 times daily realizing it is not FDA approved.  I reviewed with her risk for QT prolongation/arrhythmias.  She wishes to proceed.  3.-  History of recurrent dysphagia to solids somewhat responsive to esophageal dilation in the past.    PLAN  1.-  Colonoscopy today.  Risk, benefits, options reviewed  2.-   Trial of domperidone.  Patient will obtain from [facility] online.  Patient prior electrocardiograms revealed normal QT interval      Cortes Millan MD  8/18/2022   14:59 EDT

## 2022-08-18 NOTE — ANESTHESIA PREPROCEDURE EVALUATION
Anesthesia Evaluation     Patient summary reviewed and Nursing notes reviewed   NPO Solid Status: > 8 hours  NPO Liquid Status: > 8 hours           Airway   Mallampati: III  TM distance: >3 FB  Neck ROM: full  Possible difficult intubation  Dental      Pulmonary    (+) a smoker (remote >25 yrs ago) Former, lung cancer (6 yrs ago), COPD (neb Rx) moderate, asthma,home oxygen, sleep apnea,   (-) pneumonia, shortness of breath, recent URI  Cardiovascular     ECG reviewed    (+) hypertension, valvular problems/murmurs AI, CHF Diastolic >=55%, hyperlipidemia,   (-) past MI, dysrhythmias, angina, cardiac stents    ROS comment: ECG NSR   ECHO 2022 · Moderate AI EF = 65%         Neuro/Psych  (+) syncope, numbness, psychiatric history,    (-) seizures, CVA  GI/Hepatic/Renal/Endo    (+) obesity,  hiatal hernia, GERD, GI bleeding , diabetes mellitus (A1C<7    4 yr hx) type 2 well controlled, thyroid problem hypothyroidism  (-) morbid obesity, no renal disease    Musculoskeletal     (+) back pain,   Abdominal    Substance History      OB/GYN          Other   arthritis,    history of cancer (lung)    ROS/Med Hx Other: Syncope w/u Zio monitor                 Anesthesia Plan    ASA 3     general and MAC     (PFL)  intravenous induction     Anesthetic plan, risks, benefits, and alternatives have been provided, discussed and informed consent has been obtained with: patient.    Plan discussed with CRNA.        CODE STATUS:

## 2022-08-18 NOTE — ANESTHESIA POSTPROCEDURE EVALUATION
Patient: Lorrie Pagan    Procedure Summary     Date: 08/18/22 Room / [facility]:  [facility]    Anesthesia Start: 1502 Anesthesia Stop: 1547    Procedure: COLONOSCOPY WITH POSSIBLE POLYPECTOMY, BIOPSY, AND CONTROL OF GI BLEEDING (N/A ) Diagnosis:       Bright red blood per rectum      (Bright red blood per rectum [K62.5])    Surgeons: Cortes Millan MD Provider: Eddie Desai MD    Anesthesia Type: general, MAC ASA Status: 3          Anesthesia Type: general, MAC    Vitals  Vitals Value Taken Time   /58 08/18/22 1547   Temp 97 °F (36.1 °C) 08/18/22 1547   Pulse 88 08/18/22 1547   Resp 18 08/18/22 1547   SpO2 97 % 08/18/22 1547           Post Anesthesia Care and Evaluation    Patient location during evaluation: PACU  Patient participation: complete - patient participated  Level of consciousness: awake and alert  Pain management: adequate    Airway patency: patent  Anesthetic complications: No anesthetic complications  PONV Status: none  Cardiovascular status: hemodynamically stable and acceptable  Respiratory status: nonlabored ventilation, acceptable and nasal cannula  Hydration status: acceptable

## 2022-08-19 ENCOUNTER — TELEPHONE (OUTPATIENT)
Dept: GASTROENTEROLOGY | Facility: CLINIC | Age: 77
End: 2022-08-19

## 2022-08-19 ENCOUNTER — APPOINTMENT (OUTPATIENT)
Dept: PREADMISSION TESTING | Facility: HOSPITAL | Age: 77
End: 2022-08-19

## 2022-08-19 DIAGNOSIS — K31.84 GASTROPARESIS: Primary | ICD-10-CM

## 2022-08-22 ENCOUNTER — TELEPHONE (OUTPATIENT)
Dept: CARDIOLOGY | Facility: HOSPITAL | Age: 77
End: 2022-08-22

## 2022-08-22 ENCOUNTER — TELEPHONE (OUTPATIENT)
Dept: GASTROENTEROLOGY | Facility: CLINIC | Age: 77
End: 2022-08-22

## 2022-08-22 NOTE — TELEPHONE ENCOUNTER
spoke with patient who reports very little improvement in symptoms after increasing Lasix to 80 mg daily over the weekend.  Offered appointment today however patient is unable to come in today.  Patient will be seen tomorrow in [facility] for IV diuretics.  Patient verbalized understanding.

## 2022-08-23 ENCOUNTER — OFFICE VISIT (OUTPATIENT)
Dept: CARDIOLOGY | Facility: HOSPITAL | Age: 77
End: 2022-08-23

## 2022-08-23 ENCOUNTER — APPOINTMENT (OUTPATIENT)
Dept: PREADMISSION TESTING | Facility: HOSPITAL | Age: 77
End: 2022-08-23

## 2022-08-23 ENCOUNTER — TELEPHONE (OUTPATIENT)
Dept: CARDIOLOGY | Facility: HOSPITAL | Age: 77
End: 2022-08-23

## 2022-08-23 ENCOUNTER — PRE-ADMISSION TESTING (OUTPATIENT)
Dept: PREADMISSION TESTING | Facility: HOSPITAL | Age: 77
End: 2022-08-23

## 2022-08-23 DIAGNOSIS — R00.2 PALPITATIONS: Primary | ICD-10-CM

## 2022-08-23 DIAGNOSIS — E87.6 HYPOKALEMIA: ICD-10-CM

## 2022-08-23 DIAGNOSIS — Z22.39 MYCOBACTERIUM AVIUM COMPLEX COLONIZATION: ICD-10-CM

## 2022-08-23 DIAGNOSIS — I50.33 ACUTE ON CHRONIC DIASTOLIC CHF (CONGESTIVE HEART FAILURE): Primary | ICD-10-CM

## 2022-08-23 DIAGNOSIS — I10 BENIGN HYPERTENSION: ICD-10-CM

## 2022-08-23 LAB
ALBUMIN SERPL-MCNC: 3.8 G/DL (ref 3.5–5.2)
ALBUMIN/GLOB SERPL: 1.2 G/DL
ALP SERPL-CCNC: 103 U/L (ref 39–117)
ALT SERPL W P-5'-P-CCNC: 10 U/L (ref 1–33)
ANION GAP SERPL CALCULATED.3IONS-SCNC: 11.4 MMOL/L (ref 5–15)
ANION GAP SERPL CALCULATED.3IONS-SCNC: 7.7 MMOL/L (ref 5–15)
AST SERPL-CCNC: 17 U/L (ref 1–32)
BASOPHILS # BLD AUTO: 0.04 10*3/MM3 (ref 0–0.2)
BASOPHILS NFR BLD AUTO: 0.4 % (ref 0–1.5)
BILIRUB SERPL-MCNC: 0.3 MG/DL (ref 0–1.2)
BUN SERPL-MCNC: 22 MG/DL (ref 8–23)
BUN SERPL-MCNC: 23 MG/DL (ref 8–23)
BUN/CREAT SERPL: 27.5 (ref 7–25)
BUN/CREAT SERPL: 28 (ref 7–25)
CALCIUM SPEC-SCNC: 9.3 MG/DL (ref 8.6–10.5)
CALCIUM SPEC-SCNC: 9.4 MG/DL (ref 8.6–10.5)
CHLORIDE SERPL-SCNC: 87 MMOL/L (ref 98–107)
CHLORIDE SERPL-SCNC: 89 MMOL/L (ref 98–107)
CO2 SERPL-SCNC: 37.6 MMOL/L (ref 22–29)
CO2 SERPL-SCNC: 41.3 MMOL/L (ref 22–29)
CREAT SERPL-MCNC: 0.8 MG/DL (ref 0.57–1)
CREAT SERPL-MCNC: 0.82 MG/DL (ref 0.57–1)
DEPRECATED RDW RBC AUTO: 46.6 FL (ref 37–54)
EGFRCR SERPLBLD CKD-EPI 2021: 73.3 ML/MIN/1.73
EGFRCR SERPLBLD CKD-EPI 2021: 75.5 ML/MIN/1.73
EOSINOPHIL # BLD AUTO: 0.13 10*3/MM3 (ref 0–0.4)
EOSINOPHIL NFR BLD AUTO: 1.1 % (ref 0.3–6.2)
ERYTHROCYTE [DISTWIDTH] IN BLOOD BY AUTOMATED COUNT: 15.1 % (ref 12.3–15.4)
GLOBULIN UR ELPH-MCNC: 3.1 GM/DL
GLUCOSE SERPL-MCNC: 107 MG/DL (ref 65–99)
GLUCOSE SERPL-MCNC: 109 MG/DL (ref 65–99)
HCT VFR BLD AUTO: 40.4 % (ref 34–46.6)
HGB BLD-MCNC: 12 G/DL (ref 12–15.9)
IMM GRANULOCYTES # BLD AUTO: 0.07 10*3/MM3 (ref 0–0.05)
IMM GRANULOCYTES NFR BLD AUTO: 0.6 % (ref 0–0.5)
LYMPHOCYTES # BLD AUTO: 1.73 10*3/MM3 (ref 0.7–3.1)
LYMPHOCYTES NFR BLD AUTO: 15.2 % (ref 19.6–45.3)
MCH RBC QN AUTO: 25.3 PG (ref 26.6–33)
MCHC RBC AUTO-ENTMCNC: 29.7 G/DL (ref 31.5–35.7)
MCV RBC AUTO: 85.1 FL (ref 79–97)
MONOCYTES # BLD AUTO: 0.89 10*3/MM3 (ref 0.1–0.9)
MONOCYTES NFR BLD AUTO: 7.8 % (ref 5–12)
NEUTROPHILS NFR BLD AUTO: 74.9 % (ref 42.7–76)
NEUTROPHILS NFR BLD AUTO: 8.55 10*3/MM3 (ref 1.7–7)
NRBC BLD AUTO-RTO: 0 /100 WBC (ref 0–0.2)
PLATELET # BLD AUTO: 466 10*3/MM3 (ref 140–450)
PMV BLD AUTO: 9.6 FL (ref 6–12)
POTASSIUM SERPL-SCNC: 2.5 MMOL/L (ref 3.5–5.2)
POTASSIUM SERPL-SCNC: 2.6 MMOL/L (ref 3.5–5.2)
PROT SERPL-MCNC: 6.9 G/DL (ref 6–8.5)
RBC # BLD AUTO: 4.75 10*6/MM3 (ref 3.77–5.28)
SODIUM SERPL-SCNC: 136 MMOL/L (ref 136–145)
SODIUM SERPL-SCNC: 138 MMOL/L (ref 136–145)
WBC NRBC COR # BLD: 11.41 10*3/MM3 (ref 3.4–10.8)

## 2022-08-23 PROCEDURE — 85025 COMPLETE CBC W/AUTO DIFF WBC: CPT

## 2022-08-23 PROCEDURE — 99214 OFFICE O/P EST MOD 30 MIN: CPT | Performed by: NURSE PRACTITIONER

## 2022-08-23 PROCEDURE — 80053 COMPREHEN METABOLIC PANEL: CPT

## 2022-08-23 PROCEDURE — 36415 COLL VENOUS BLD VENIPUNCTURE: CPT

## 2022-08-23 RX ORDER — SPIRONOLACTONE 25 MG/1
50 TABLET ORAL DAILY
Qty: 30 TABLET | Refills: 11
Start: 2022-08-23 | End: 2022-09-07 | Stop reason: SDUPTHER

## 2022-08-24 ENCOUNTER — ANESTHESIA (OUTPATIENT)
Dept: GASTROENTEROLOGY | Facility: HOSPITAL | Age: 77
End: 2022-08-24

## 2022-08-24 ENCOUNTER — ANESTHESIA EVENT (OUTPATIENT)
Dept: GASTROENTEROLOGY | Facility: HOSPITAL | Age: 77
End: 2022-08-24

## 2022-08-24 ENCOUNTER — HOSPITAL ENCOUNTER (OUTPATIENT)
Facility: HOSPITAL | Age: 77
Setting detail: HOSPITAL OUTPATIENT SURGERY
Discharge: HOME | End: 2022-08-24
Attending: INTERNAL MEDICINE | Admitting: INTERNAL MEDICINE

## 2022-08-24 VITALS
DIASTOLIC BLOOD PRESSURE: 59 MMHG | TEMPERATURE: 97 F | OXYGEN SATURATION: 95 % | SYSTOLIC BLOOD PRESSURE: 113 MMHG | RESPIRATION RATE: 16 BRPM | HEART RATE: 82 BPM

## 2022-08-24 DIAGNOSIS — Z22.39 MYCOBACTERIUM AVIUM COMPLEX COLONIZATION: ICD-10-CM

## 2022-08-24 LAB
GLUCOSE BLDC GLUCOMTR-MCNC: 143 MG/DL (ref 70–130)
GLUCOSE BLDC GLUCOMTR-MCNC: 146 MG/DL (ref 70–130)
POTASSIUM SERPL-SCNC: 2.9 MMOL/L (ref 3.5–5.2)

## 2022-08-24 PROCEDURE — 25010000002 DEXAMETHASONE PER 1 MG

## 2022-08-24 PROCEDURE — 87116 MYCOBACTERIA CULTURE: CPT | Performed by: INTERNAL MEDICINE

## 2022-08-24 PROCEDURE — 87070 CULTURE OTHR SPECIMN AEROBIC: CPT | Performed by: INTERNAL MEDICINE

## 2022-08-24 PROCEDURE — 84132 ASSAY OF SERUM POTASSIUM: CPT | Performed by: ANESTHESIOLOGY

## 2022-08-24 PROCEDURE — 25010000002 PROPOFOL 10 MG/ML EMULSION

## 2022-08-24 PROCEDURE — 87206 SMEAR FLUORESCENT/ACID STAI: CPT | Performed by: INTERNAL MEDICINE

## 2022-08-24 PROCEDURE — 31623 DX BRONCHOSCOPE/BRUSH: CPT | Performed by: INTERNAL MEDICINE

## 2022-08-24 PROCEDURE — S0260 H&P FOR SURGERY: HCPCS | Performed by: INTERNAL MEDICINE

## 2022-08-24 PROCEDURE — 31652 BRONCH EBUS SAMPLNG 1/2 NODE: CPT | Performed by: INTERNAL MEDICINE

## 2022-08-24 PROCEDURE — 88305 TISSUE EXAM BY PATHOLOGIST: CPT | Performed by: INTERNAL MEDICINE

## 2022-08-24 PROCEDURE — 94640 AIRWAY INHALATION TREATMENT: CPT

## 2022-08-24 PROCEDURE — 87205 SMEAR GRAM STAIN: CPT | Performed by: INTERNAL MEDICINE

## 2022-08-24 PROCEDURE — 25010000002 ONDANSETRON PER 1 MG

## 2022-08-24 PROCEDURE — 31624 DX BRONCHOSCOPE/LAVAGE: CPT | Performed by: INTERNAL MEDICINE

## 2022-08-24 PROCEDURE — 87102 FUNGUS ISOLATION CULTURE: CPT | Performed by: INTERNAL MEDICINE

## 2022-08-24 PROCEDURE — 82962 GLUCOSE BLOOD TEST: CPT

## 2022-08-24 PROCEDURE — 63710000001 INSULIN LISPRO (HUMAN) PER 5 UNITS

## 2022-08-24 RX ORDER — ROCURONIUM BROMIDE 10 MG/ML
INJECTION, SOLUTION INTRAVENOUS AS NEEDED
Status: DISCONTINUED | OUTPATIENT
Start: 2022-08-24 | End: 2022-08-24 | Stop reason: SURG

## 2022-08-24 RX ORDER — SODIUM CHLORIDE 0.9 % (FLUSH) 0.9 %
3 SYRINGE (ML) INJECTION EVERY 12 HOURS SCHEDULED
Status: DISCONTINUED | OUTPATIENT
Start: 2022-08-24 | End: 2022-08-24 | Stop reason: HOSPADM

## 2022-08-24 RX ORDER — FAMOTIDINE 20 MG/1
20 TABLET, FILM COATED ORAL ONCE
Status: CANCELLED | OUTPATIENT
Start: 2022-08-24 | End: 2022-08-24

## 2022-08-24 RX ORDER — IPRATROPIUM BROMIDE AND ALBUTEROL SULFATE 2.5; .5 MG/3ML; MG/3ML
3 SOLUTION RESPIRATORY (INHALATION) ONCE AS NEEDED
Status: DISCONTINUED | OUTPATIENT
Start: 2022-08-24 | End: 2022-08-24 | Stop reason: HOSPADM

## 2022-08-24 RX ORDER — SODIUM CHLORIDE 0.9 % (FLUSH) 0.9 %
3-10 SYRINGE (ML) INJECTION AS NEEDED
Status: DISCONTINUED | OUTPATIENT
Start: 2022-08-24 | End: 2022-08-24 | Stop reason: HOSPADM

## 2022-08-24 RX ORDER — SODIUM CHLORIDE 0.9 % (FLUSH) 0.9 %
10 SYRINGE (ML) INJECTION AS NEEDED
Status: DISCONTINUED | OUTPATIENT
Start: 2022-08-24 | End: 2022-08-24 | Stop reason: HOSPADM

## 2022-08-24 RX ORDER — INSULIN LISPRO 100 [IU]/ML
0-9 INJECTION, SOLUTION INTRAVENOUS; SUBCUTANEOUS
Status: DISCONTINUED | OUTPATIENT
Start: 2022-08-24 | End: 2022-08-24

## 2022-08-24 RX ORDER — INSULIN LISPRO 100 [IU]/ML
0-9 INJECTION, SOLUTION INTRAVENOUS; SUBCUTANEOUS
Status: DISCONTINUED | OUTPATIENT
Start: 2022-08-24 | End: 2022-08-24 | Stop reason: HOSPADM

## 2022-08-24 RX ORDER — IPRATROPIUM BROMIDE AND ALBUTEROL SULFATE 2.5; .5 MG/3ML; MG/3ML
3 SOLUTION RESPIRATORY (INHALATION) ONCE AS NEEDED
Status: COMPLETED | OUTPATIENT
Start: 2022-08-24 | End: 2022-08-24

## 2022-08-24 RX ORDER — ONDANSETRON 2 MG/ML
4 INJECTION INTRAMUSCULAR; INTRAVENOUS ONCE AS NEEDED
Status: DISCONTINUED | OUTPATIENT
Start: 2022-08-24 | End: 2022-08-24 | Stop reason: HOSPADM

## 2022-08-24 RX ORDER — LABETALOL HYDROCHLORIDE 5 MG/ML
5 INJECTION, SOLUTION INTRAVENOUS
Status: DISCONTINUED | OUTPATIENT
Start: 2022-08-24 | End: 2022-08-24

## 2022-08-24 RX ORDER — DROPERIDOL 2.5 MG/ML
0.62 INJECTION, SOLUTION INTRAMUSCULAR; INTRAVENOUS
Status: DISCONTINUED | OUTPATIENT
Start: 2022-08-24 | End: 2022-08-24 | Stop reason: HOSPADM

## 2022-08-24 RX ORDER — DROPERIDOL 2.5 MG/ML
0.62 INJECTION, SOLUTION INTRAMUSCULAR; INTRAVENOUS
Status: DISCONTINUED | OUTPATIENT
Start: 2022-08-24 | End: 2022-08-24

## 2022-08-24 RX ORDER — PROMETHAZINE HYDROCHLORIDE 25 MG/1
25 SUPPOSITORY RECTAL ONCE AS NEEDED
Status: DISCONTINUED | OUTPATIENT
Start: 2022-08-24 | End: 2022-08-24 | Stop reason: HOSPADM

## 2022-08-24 RX ORDER — LIDOCAINE HYDROCHLORIDE 10 MG/ML
INJECTION, SOLUTION EPIDURAL; INFILTRATION; INTRACAUDAL; PERINEURAL AS NEEDED
Status: DISCONTINUED | OUTPATIENT
Start: 2022-08-24 | End: 2022-08-24 | Stop reason: SURG

## 2022-08-24 RX ORDER — SODIUM CHLORIDE 0.9 % (FLUSH) 0.9 %
10 SYRINGE (ML) INJECTION AS NEEDED
Status: CANCELLED | OUTPATIENT
Start: 2022-08-24

## 2022-08-24 RX ORDER — LIDOCAINE HYDROCHLORIDE 40 MG/ML
4 INJECTION, SOLUTION RETROBULBAR; TOPICAL ONCE
Status: DISCONTINUED | OUTPATIENT
Start: 2022-08-24 | End: 2022-08-24 | Stop reason: HOSPADM

## 2022-08-24 RX ORDER — PROMETHAZINE HYDROCHLORIDE 25 MG/1
25 TABLET ORAL ONCE AS NEEDED
Status: DISCONTINUED | OUTPATIENT
Start: 2022-08-24 | End: 2022-08-24 | Stop reason: SDUPTHER

## 2022-08-24 RX ORDER — LIDOCAINE HYDROCHLORIDE 10 MG/ML
0.5 INJECTION, SOLUTION EPIDURAL; INFILTRATION; INTRACAUDAL; PERINEURAL ONCE AS NEEDED
Status: CANCELLED | OUTPATIENT
Start: 2022-08-24

## 2022-08-24 RX ORDER — MIDAZOLAM HYDROCHLORIDE 1 MG/ML
0.5 INJECTION INTRAMUSCULAR; INTRAVENOUS
Status: CANCELLED | OUTPATIENT
Start: 2022-08-24

## 2022-08-24 RX ORDER — PROPOFOL 10 MG/ML
VIAL (ML) INTRAVENOUS AS NEEDED
Status: DISCONTINUED | OUTPATIENT
Start: 2022-08-24 | End: 2022-08-24 | Stop reason: SURG

## 2022-08-24 RX ORDER — FAMOTIDINE 20 MG/1
20 TABLET, FILM COATED ORAL ONCE
Status: DISCONTINUED | OUTPATIENT
Start: 2022-08-24 | End: 2022-08-24 | Stop reason: HOSPADM

## 2022-08-24 RX ORDER — FAMOTIDINE 10 MG/ML
20 INJECTION, SOLUTION INTRAVENOUS ONCE
Status: COMPLETED | OUTPATIENT
Start: 2022-08-24 | End: 2022-08-24

## 2022-08-24 RX ORDER — MIDAZOLAM HYDROCHLORIDE 1 MG/ML
0.5 INJECTION INTRAMUSCULAR; INTRAVENOUS
Status: DISCONTINUED | OUTPATIENT
Start: 2022-08-24 | End: 2022-08-24 | Stop reason: HOSPADM

## 2022-08-24 RX ORDER — SODIUM CHLORIDE 0.9 % (FLUSH) 0.9 %
10 SYRINGE (ML) INJECTION EVERY 12 HOURS SCHEDULED
Status: DISCONTINUED | OUTPATIENT
Start: 2022-08-24 | End: 2022-08-24 | Stop reason: HOSPADM

## 2022-08-24 RX ORDER — ONDANSETRON 2 MG/ML
4 INJECTION INTRAMUSCULAR; INTRAVENOUS ONCE AS NEEDED
Status: DISCONTINUED | OUTPATIENT
Start: 2022-08-24 | End: 2022-08-24

## 2022-08-24 RX ORDER — DEXAMETHASONE SODIUM PHOSPHATE 4 MG/ML
INJECTION, SOLUTION INTRA-ARTICULAR; INTRALESIONAL; INTRAMUSCULAR; INTRAVENOUS; SOFT TISSUE AS NEEDED
Status: DISCONTINUED | OUTPATIENT
Start: 2022-08-24 | End: 2022-08-24 | Stop reason: SURG

## 2022-08-24 RX ORDER — LIDOCAINE HYDROCHLORIDE 10 MG/ML
0.5 INJECTION, SOLUTION EPIDURAL; INFILTRATION; INTRACAUDAL; PERINEURAL ONCE AS NEEDED
Status: DISCONTINUED | OUTPATIENT
Start: 2022-08-24 | End: 2022-08-24 | Stop reason: HOSPADM

## 2022-08-24 RX ORDER — DROPERIDOL 2.5 MG/ML
0.62 INJECTION, SOLUTION INTRAMUSCULAR; INTRAVENOUS ONCE AS NEEDED
Status: DISCONTINUED | OUTPATIENT
Start: 2022-08-24 | End: 2022-08-24

## 2022-08-24 RX ORDER — SODIUM CHLORIDE 0.9 % (FLUSH) 0.9 %
10 SYRINGE (ML) INJECTION EVERY 12 HOURS SCHEDULED
Status: CANCELLED | OUTPATIENT
Start: 2022-08-24

## 2022-08-24 RX ORDER — NALOXONE HCL 0.4 MG/ML
0.4 VIAL (ML) INJECTION AS NEEDED
Status: DISCONTINUED | OUTPATIENT
Start: 2022-08-24 | End: 2022-08-24

## 2022-08-24 RX ORDER — PROMETHAZINE HYDROCHLORIDE 25 MG/1
25 TABLET ORAL ONCE AS NEEDED
Status: DISCONTINUED | OUTPATIENT
Start: 2022-08-24 | End: 2022-08-24 | Stop reason: HOSPADM

## 2022-08-24 RX ORDER — NALOXONE HCL 0.4 MG/ML
0.4 VIAL (ML) INJECTION AS NEEDED
Status: DISCONTINUED | OUTPATIENT
Start: 2022-08-24 | End: 2022-08-24 | Stop reason: HOSPADM

## 2022-08-24 RX ORDER — SODIUM CHLORIDE 9 MG/ML
125 INJECTION, SOLUTION INTRAVENOUS CONTINUOUS
Status: DISCONTINUED | OUTPATIENT
Start: 2022-08-24 | End: 2022-08-24 | Stop reason: HOSPADM

## 2022-08-24 RX ORDER — FAMOTIDINE 10 MG/ML
20 INJECTION, SOLUTION INTRAVENOUS ONCE
Status: CANCELLED | OUTPATIENT
Start: 2022-08-24 | End: 2022-08-24

## 2022-08-24 RX ORDER — PROMETHAZINE HYDROCHLORIDE 25 MG/1
25 SUPPOSITORY RECTAL ONCE AS NEEDED
Status: DISCONTINUED | OUTPATIENT
Start: 2022-08-24 | End: 2022-08-24 | Stop reason: SDUPTHER

## 2022-08-24 RX ORDER — ONDANSETRON 2 MG/ML
INJECTION INTRAMUSCULAR; INTRAVENOUS AS NEEDED
Status: DISCONTINUED | OUTPATIENT
Start: 2022-08-24 | End: 2022-08-24 | Stop reason: SURG

## 2022-08-24 RX ORDER — SODIUM CHLORIDE, SODIUM LACTATE, POTASSIUM CHLORIDE, CALCIUM CHLORIDE 600; 310; 30; 20 MG/100ML; MG/100ML; MG/100ML; MG/100ML
9 INJECTION, SOLUTION INTRAVENOUS CONTINUOUS
Status: CANCELLED | OUTPATIENT
Start: 2022-08-24

## 2022-08-24 RX ORDER — DROPERIDOL 2.5 MG/ML
0.62 INJECTION, SOLUTION INTRAMUSCULAR; INTRAVENOUS ONCE AS NEEDED
Status: DISCONTINUED | OUTPATIENT
Start: 2022-08-24 | End: 2022-08-24 | Stop reason: HOSPADM

## 2022-08-24 RX ORDER — LABETALOL HYDROCHLORIDE 5 MG/ML
5 INJECTION, SOLUTION INTRAVENOUS
Status: DISCONTINUED | OUTPATIENT
Start: 2022-08-24 | End: 2022-08-24 | Stop reason: HOSPADM

## 2022-08-24 RX ORDER — SODIUM CHLORIDE, SODIUM LACTATE, POTASSIUM CHLORIDE, CALCIUM CHLORIDE 600; 310; 30; 20 MG/100ML; MG/100ML; MG/100ML; MG/100ML
9 INJECTION, SOLUTION INTRAVENOUS CONTINUOUS
Status: DISCONTINUED | OUTPATIENT
Start: 2022-08-24 | End: 2022-08-24 | Stop reason: HOSPADM

## 2022-08-24 RX ADMIN — DEXAMETHASONE SODIUM PHOSPHATE 4 MG: 4 INJECTION, SOLUTION INTRA-ARTICULAR; INTRALESIONAL; INTRAMUSCULAR; INTRAVENOUS; SOFT TISSUE at 10:18

## 2022-08-24 RX ADMIN — LIDOCAINE HYDROCHLORIDE 50 MG: 10 INJECTION, SOLUTION EPIDURAL; INFILTRATION; INTRACAUDAL; PERINEURAL at 10:10

## 2022-08-24 RX ADMIN — SODIUM CHLORIDE, POTASSIUM CHLORIDE, SODIUM LACTATE AND CALCIUM CHLORIDE 9 ML/HR: 600; 310; 30; 20 INJECTION, SOLUTION INTRAVENOUS at 09:17

## 2022-08-24 RX ADMIN — ROCURONIUM BROMIDE 30 MG: 50 INJECTION, SOLUTION INTRAVENOUS at 10:10

## 2022-08-24 RX ADMIN — IPRATROPIUM BROMIDE AND ALBUTEROL SULFATE 3 ML: .5; 3 SOLUTION RESPIRATORY (INHALATION) at 11:23

## 2022-08-24 RX ADMIN — FAMOTIDINE 20 MG: 10 INJECTION INTRAVENOUS at 09:18

## 2022-08-24 RX ADMIN — ONDANSETRON 4 MG: 2 INJECTION INTRAMUSCULAR; INTRAVENOUS at 10:18

## 2022-08-24 RX ADMIN — Medication 10 ML: at 09:17

## 2022-08-24 RX ADMIN — SUGAMMADEX 200 MG: 100 INJECTION, SOLUTION INTRAVENOUS at 10:32

## 2022-08-24 RX ADMIN — PROPOFOL 150 MG: 10 INJECTION, EMULSION INTRAVENOUS at 10:10

## 2022-08-24 NOTE — OP NOTE
Bronchoscopy Procedure Note    Pre-op Diagnosis: Persistent LLL infiltrate, mediastinal LN    Post-op Diagnosis: same, no endobronchial lesions    Surgeon: Vladislav Arboleda MD    Anesthesia: General    Operation: Flexible fiberoptic bronchoscopy    Findings: No endobronchial lesions    Specimen(s): BAL and cytology brush LLL, TBNA station 7, 10L    Estimated Blood Loss: Minimal/None    Complications: No immediate    Indications and History:  Lorrie Pagan is a 76 y.o. female  with Persistent LLL infiltrate and prominent mediastinal lymphadenopathy.  The risks, benefits, complications, treatment options and expected outcomes were discussed with the patient.  The possibilities of reaction to medication, pulmonary aspiration, perforation of a viscus, bleeding, failure to diagnose a condition and creating a complication requiring transfusion or operation were discussed with the patient who freely signed the consent.      Description of Procedure:  The patient was seen in the Holding Room and an immediate patient assessment was done prior to the administration of moderate and/or deep conscious sedation.  The patient was taken to the Endoscopy Suite, identified as Lorrie Pagan  and the procedure verified as Flexible Fiberoptic Bronchoscopy.  A Time Out was held and the above information confirmed.    After induction of general anesthesia the patient was intubated with an 8.5 ETT    The bronchoscope was introduced via the ETT which confirmed good position in the distal 1/3 of the tracheal.    Scope was advanced in the left mainstem bronchus.  The left upper lobe, lingula, left lower lobe, and superior segmental left lower lobe revealed no discrete endobronchial lesions and minimal clear secretions.    The scope was advanced into the right mainstem bronchus.  The right upper lobe, bronchus intermedius, right middle lobe, right lower lobe, and superior segment of the right lower lobe revealed no  endobronchial lesions and minimal clear secretions.    The scope was moved back over the left lower lobe where it was wedged.  A BAL was obtained.  60 mL instilled and 30 mL return.  A 7 mm cytology brushing specimen was obtained as well.    The scope was removed and the endobronchial ultrasound scope was introduced.  It was advanced to station 4, then 7, then 10.  Multiple passes were taken with a 21-gauge needle at station 7 and station 10 L.  These were submitted for routine histopathology.    The EBUS scope was removed the regular scope was reintroduced.  The airways were suctioned clear and at the end of the procedure there was no significant residual blood and no active bleeding.  The scope was removed without difficulty.    The Patient was taken to the Endoscopy Recovery area in satisfactory condition.    Attestation: I performed the procedure    Vladislav Arboleda MD, [facility]

## 2022-08-24 NOTE — TELEPHONE ENCOUNTER
Notified of critical test result .  Spoke with patient and instructed her to take 60 meq extra of potassium tonight and to increase aldactone to 50 mg daily starting today. Patient verbalized understanding. Will recheck bmp in 2 days

## 2022-08-24 NOTE — H&P
"[facility] Pulmonary Follow up        Chief Complaint  Bronchiectasis (CMS/HCC) (F/U)        Subjective              Lorrie Pagan presents to [facility] to discuss the possibilities of having a therapeutic bronchoscopy.  Prior to moving to the area when she was seeing a pulmonologist in [place] she had a bronchoscopy for secretion clearance.  She noted she did feel quite a bit better afterwards.     She does have a history of COPD with bronchiectasis.  She was recently hospital in May and has recovered fully from it.     She does continue to have a cough with thick sputum production.  It has not changed or worsened in the last few weeks.  Currently she is using her Trelegy daily.  She has not been using her Mucinex or her nebulizers.  She also has a flutter valve at home that she has not been using.     She does notice that the cough is worse in the mornings.  She does use her Astelin and Nasacort at night.  She has gastroparesis and chronic reflux.  Sometimes she coughs and gags related to her reflux.  She had no overt aspiration episodes recently.  She does sleep elevated at night.                    Objective         Vital Signs:   /66 (BP Location: Right arm, Patient Position: Sitting, Cuff Size: Adult)   Pulse 93   Temp 97.5 °F (36.4 °C) (Infrared)   Ht 160 cm (63\")   Wt 82.7 kg (182 lb 6.4 oz)   SpO2 90%   PF (!) 4 L/min Comment: resting pulse  BMI 32.31 kg/m²             Immunization History   Administered Date(s) Administered   • COVID-19 (MODERNA) 1st, 2nd, 3rd Dose Only 02/13/2021, 03/13/2021, 09/17/2021   • Flu Vaccine Quad PF >36MO 10/31/2019, 10/25/2020   • Flu Vaccine Split Quad 10/31/2019   • Fluzone High Dose =>65 Years ([employer] ONLY) 10/28/2018, 10/21/2019, 10/16/2020   • Fluzone High-Dose 65+yrs 11/17/2021   • Influenza TIV (IM) 11/01/2017   • Pneumococcal Conjugate 13-Valent (PCV13) 08/03/2018, 10/28/2018   • Pneumococcal " Polysaccharide (PPSV23) 10/16/2020   • Pneumococcal, Unspecified 01/01/2016   • Shingrix 03/31/2021         Physical Exam  Vitals reviewed.   Constitutional:       General: She is not in acute distress.     Appearance: She is well-developed. She is obese. She is not ill-appearing.   HENT:      Head: Normocephalic and atraumatic.   Eyes:      Pupils: Pupils are equal, round, and reactive to light.   Cardiovascular:      Rate and Rhythm: Normal rate and regular rhythm.      Heart sounds: No murmur heard.  Pulmonary:      Effort: Pulmonary effort is normal. No respiratory distress.      Breath sounds: Rhonchi present. No wheezing or rales.   Abdominal:      General: Bowel sounds are normal. There is no distension.      Palpations: Abdomen is soft.   Musculoskeletal:         General: Normal range of motion.      Cervical back: Normal range of motion and neck supple.      Right lower leg: Edema present.      Left lower leg: Edema present.   Skin:     General: Skin is warm and dry.      Findings: No erythema.   Neurological:      Mental Status: She is alert and oriented to person, place, and time.   Psychiatric:         Behavior: Behavior normal.                  Result Review    :         Data reviewed: Radiologic studies CT of chest 6/13/2022      FINDINGS:  There is no pathologic axillary adenopathy or other worrisome body wall  soft tissue finding in the chest. There are no acute findings present in  the partially characterized upper abdomen. There is no pleural or  pericardial effusion. Mildly prominent but otherwise not strictly  enlarged mediastinal lymph nodes are stable from prior, for example a 8  mm AP window lymph node. Atherosclerotic nonaneurysmal thoracic aorta.  Incidentally noted calcific coronary artery atherosclerosis is present.  Evaluation of the osseous structures demonstrates no evidence of acute  fracture or aggressive osseous lesion. Evaluation of the lung fields  demonstrates similar  postoperative change along the fissure on the left,  without new abnormal soft tissue nodularity present. Areas of tree in  bud nodularity previously present at the right lung base on most recent  comparison appear resolved, likely treated pneumonia. There is no acute  infectious or inflammatory process. Moderate emphysematous changes are  again noted. There are no new or enlarging suspicious pulmonary nodules.  Dependent atelectasis is unchanged.      IMPRESSION:  No evidence of recurrent or metastatic disease. Most recently noted  right lower lobe pneumonia appears resolved evidence of previously  enlarged right paratracheal lymph nodes are significantly decreased in  size, likely reactive.                     Assessment         Assessment and Plan         Problem List Items Addressed This Visit                 Pulmonary and Pneumonias      Bronchiectasis (CMS/HCC)      Relevant Medications      montelukast (SINGULAIR) 10 MG tablet      Chronic respiratory failure - Primary      Stage II, moderate, COPD      Relevant Medications      montelukast (SINGULAIR) 10 MG tablet           We discussed the risks and benefits of a therapeutic bronchoscopy for secretion clearance today in the office.     I did encourage her to try to resume her chronic airway management measures such as her nebulizers 2-3 times a day, with the use of her flutter valve especially in the morning with the secretions.  I would like for her to resume Mucinex twice daily to help with thinning of the secretions.  We also discussed adding on montelukast nightly.  I sent that into her pharmacy today.     I will follow-up with Dr. Arboleda regarding her request for a bronchoscopy.  I will let her know what he thinks.     She will continue with her routine follow-up as scheduled.     Follow Up      Return for Next scheduled follow up.  Patient was given instructions and counseling regarding her condition or for health maintenance advice. Please see  specific information pulled into the AVS if appropriate.      The patient presents today for a bronchoscopy to obtain bronchoscopy BAL to evaluate the status of her chronic respiratory infections and antimicrobial therapy.  However, after reviewing her CT scan from April, at which time she had fairly prominent anterior mediastinal, subcarinal, and some hilar adenopathy I think it would be prudent to go ahead and convert her to an EBUS.  The alternative would be just to do a regular bronchoscopy today then repeat her CAT scan which was going to be done in October anyway, and then repeat a bronchoscopy with EBUS if the lymph nodes are still enlarged.    I discussed this with the patient and she would like to go ahead and do the EBUS with the regular bronchoscopy today      STACIA Arboleda MD  Pulmonary and Critical Care Medicine

## 2022-08-24 NOTE — ANESTHESIA POSTPROCEDURE EVALUATION
Patient: Lorrie Pagan    Procedure Summary     Date: 08/24/22 Room / [facility]:  [facility]    Anesthesia Start: 0959 Anesthesia Stop: 1047    Procedure: BRONCHOSCOPY with ENDOBRONCHIAL ULTRASOUND (N/A Bronchus) Diagnosis:       Mycobacterium avium complex colonization      (Mycobacterium avium complex colonization [Z22.39])    Surgeons: Vladislav Arboleda MD Provider: Felix Griffith MD    Anesthesia Type: general ASA Status: 2          Anesthesia Type: general    Vitals  Vitals Value Taken Time   BP     Temp     Pulse 89 08/24/22 1046   Resp     SpO2 93 % 08/24/22 1046   Vitals shown include unvalidated device data.        Post Anesthesia Care and Evaluation    Patient location during evaluation: PACU  Patient participation: complete - patient participated  Level of consciousness: awake and alert  Pain management: adequate    Airway patency: patent  Anesthetic complications: No anesthetic complications  PONV Status: none  Cardiovascular status: hemodynamically stable and acceptable  Respiratory status: nonlabored ventilation, acceptable and nasal cannula  Hydration status: acceptable

## 2022-08-24 NOTE — ANESTHESIA POSTPROCEDURE EVALUATION
Patient: Lorrie Pagan    Procedure Summary     Date: 08/24/22 Room / Location:  [facility]    Anesthesia Start: 0959 Anesthesia Stop: 1047    Procedure: BRONCHOSCOPY with ENDOBRONCHIAL ULTRASOUND (N/A Bronchus) Diagnosis:       Mycobacterium avium complex colonization      (Mycobacterium avium complex colonization [Z22.39])    Surgeons: Vladislav Arboleda MD Provider: Felix Griffith MD    Anesthesia Type: general ASA Status: 2          Anesthesia Type: general    Vitals  Vitals Value Taken Time   BP     Temp     Pulse 87 08/24/22 1047   Resp     SpO2 90 % 08/24/22 1047   Vitals shown include unvalidated device data.    /49    Anesthesia Post Evaluation

## 2022-08-24 NOTE — ANESTHESIA PROCEDURE NOTES
Airway  Urgency: elective    Date/Time: 8/24/2022 10:15 AM  Airway not difficult    General Information and Staff    Patient location during procedure: OR  Anesthesiologist: Felix Griffith MD  CRNA/CAA: Ally Frazier CRNA    Indications and Patient Condition  Indications for airway management: airway protection    Preoxygenated: yes  MILS not maintained throughout  Mask difficulty assessment: 1 - vent by mask    Final Airway Details  Final airway type: endotracheal airway      Successful airway: ETT  Cuffed: yes   Successful intubation technique: direct laryngoscopy  Facilitating devices/methods: intubating stylet and cricoid pressure  Endotracheal tube insertion site: oral  Blade: Cynthia  Blade size: 3  ETT size (mm): 8.5  Cormack-Lehane Classification: grade IIa - partial view of glottis  Placement verified by: chest auscultation and capnometry   Measured from: lips  ETT/EBT  to lips (cm): 21  Number of attempts at approach: 1  Assessment: lips, teeth, and gum same as pre-op and atraumatic intubation    Additional Comments  Negative epigastric sounds, Breath sound equal bilaterally with symmetric chest rise and fall

## 2022-08-24 NOTE — ANESTHESIA PREPROCEDURE EVALUATION
Anesthesia Evaluation     Patient summary reviewed and Nursing notes reviewed   no history of anesthetic complications:  NPO Solid Status: > 8 hours  NPO Liquid Status: > 8 hours           Airway   Mallampati: II  TM distance: >3 FB  Neck ROM: full  No difficulty expected  Dental      Pulmonary - normal exam   (+) pneumonia , lung cancer, COPD, asthma,sleep apnea,   Cardiovascular - normal exam    (+) hypertension, valvular problems/murmurs, hyperlipidemia,       Neuro/Psych  (+) syncope, numbness, psychiatric history,    GI/Hepatic/Renal/Endo    (+) morbid obesity, hiatal hernia, GERD, GI bleeding , diabetes mellitus, thyroid problem     Musculoskeletal     (+) back pain,   Abdominal    Substance History      OB/GYN          Other   arthritis,                      Anesthesia Plan    ASA 2     general     intravenous induction     Anesthetic plan, risks, benefits, and alternatives have been provided, discussed and informed consent has been obtained with: patient.    Plan discussed with CRNA.        CODE STATUS:

## 2022-08-25 ENCOUNTER — PATIENT OUTREACH (OUTPATIENT)
Dept: CASE MANAGEMENT | Facility: OTHER | Age: 77
End: 2022-08-25

## 2022-08-25 ENCOUNTER — OFFICE VISIT (OUTPATIENT)
Dept: INTERNAL MEDICINE | Facility: CLINIC | Age: 77
End: 2022-08-25

## 2022-08-25 VITALS
HEIGHT: 63 IN | OXYGEN SATURATION: 97 % | BODY MASS INDEX: 31.89 KG/M2 | TEMPERATURE: 98.3 F | HEART RATE: 86 BPM | DIASTOLIC BLOOD PRESSURE: 58 MMHG | WEIGHT: 180 LBS | SYSTOLIC BLOOD PRESSURE: 128 MMHG

## 2022-08-25 DIAGNOSIS — K31.84 GASTROPARESIS: ICD-10-CM

## 2022-08-25 DIAGNOSIS — J44.9 COPD MIXED TYPE: ICD-10-CM

## 2022-08-25 DIAGNOSIS — I50.32 CHRONIC DIASTOLIC HEART FAILURE: ICD-10-CM

## 2022-08-25 DIAGNOSIS — R59.0 HILAR ADENOPATHY: ICD-10-CM

## 2022-08-25 DIAGNOSIS — T50.2X5A DIURETIC-INDUCED HYPOKALEMIA: ICD-10-CM

## 2022-08-25 DIAGNOSIS — I50.32 CHRONIC DIASTOLIC HEART FAILURE: Primary | ICD-10-CM

## 2022-08-25 DIAGNOSIS — E87.6 DIURETIC-INDUCED HYPOKALEMIA: ICD-10-CM

## 2022-08-25 DIAGNOSIS — I87.2 PERIPHERAL VENOUS INSUFFICIENCY: ICD-10-CM

## 2022-08-25 DIAGNOSIS — D50.9 IRON DEFICIENCY ANEMIA, UNSPECIFIED IRON DEFICIENCY ANEMIA TYPE: Primary | ICD-10-CM

## 2022-08-25 DIAGNOSIS — J96.11 CHRONIC RESPIRATORY FAILURE WITH HYPOXIA: ICD-10-CM

## 2022-08-25 DIAGNOSIS — R59.0 MEDIASTINAL ADENOPATHY: ICD-10-CM

## 2022-08-25 DIAGNOSIS — K64.8 INTERNAL HEMORRHOIDS: ICD-10-CM

## 2022-08-25 LAB
GIE STN SPEC: NORMAL
REF LAB TEST METHOD: NORMAL

## 2022-08-25 PROCEDURE — 99215 OFFICE O/P EST HI 40 MIN: CPT | Performed by: INTERNAL MEDICINE

## 2022-08-25 PROCEDURE — G2212 PROLONG OUTPT/OFFICE VIS: HCPCS | Performed by: INTERNAL MEDICINE

## 2022-08-25 NOTE — PROGRESS NOTES
Internal Medicine Follow Up    Chief Complaint  Lorrie Pagan is a 76 y.o. female who presents today for follow up of chronic medical conditions outlined below.    Chief Complaint   Patient presents with   • Peripheral venous insufficiency        HPI  Ms. Pagan comes in today for follow up. Since last visit she has had many appointments. She continues to see the lymphedema clinic for her peripheral edema. This is currently well controlled. She is not wearing compression stockings or unna boot today. She has been using lasix and metolazone. She has been seeing cardiology with adjustments to lasix from 40mg to 80mg and now back to 40mg daily. She is also on metolazone. She has had IV lasix a couple days ago. She has had low K due to diuretic use so cardiology has also started her on aldactone and recently increased dose to 50mg daily. She had improvement in K to 2.9 on labs yesterday. She needs to schedule cardiology follow up. She is going to be seeing hematology tomorrow. Her anemia had resolved on labs from 8/23. She is not taking any iron. She does still have bleeding hemorrhoids. Had colonoscopy showing a couple sessile polpys and large internal hemorrhoids. She was told these were too far internalized to treat with topicals. She was started on domperidone for her gastroparesis but has to obtain this from a [facility]. She has not tried to do this yet. She had therapeutic bronchoscopy as well as EBUS yesterday. Notes less cough overnight. She remains on 3-4L supplemental O2. Uses 3L when out so that she does not run out, will use 4L when at home.       Review of Systems  Review of Systems   Constitutional: Negative for unexpected weight gain.   Respiratory: Positive for cough (improved) and shortness of breath (stable).    Cardiovascular: Positive for leg swelling (improved).   Gastrointestinal: Positive for anal bleeding, blood in stool, nausea and vomiting.   Musculoskeletal: Positive for gait  problem (fell recently due to shoes, no injury).        Current Medications  Current Outpatient Medications on File Prior to Visit   Medication Sig Dispense Refill   • acetaminophen (TYLENOL) 500 MG tablet Take 1,000 mg by mouth Every 6 (Six) Hours As Needed for Mild Pain .     • albuterol (ACCUNEB) 1.25 MG/3ML nebulizer solution Take 3 mL by nebulization Every 6 (Six) Hours As Needed for Shortness of Air. 90 each 5   • albuterol sulfate  (90 Base) MCG/ACT inhaler Inhale 2 puffs Every 6 (Six) Hours As Needed for Wheezing. 18 g 2   • amitriptyline (ELAVIL) 10 MG tablet Take 10 mg by mouth As Needed for Sleep (to stop drooling at night).     • azelastine (ASTELIN) 0.1 % nasal spray 2 sprays into the nostril(s) as directed by provider 2 (Two) Times a Day. (Patient taking differently: 2 sprays into the nostril(s) as directed by provider 2 (Two) Times a Day As Needed for Rhinitis or Allergies.) 30 mL 5   • chlorhexidine (PERIDEX) 0.12 % solution Apply 15 mL to the mouth or throat 2 (Two) Times a Day As Needed. For 14 days, w/1 refill     • Dapsone 5 % topical gel Apply 1 application topically to the appropriate area as directed Daily As Needed (apply to legs).     • donepezil (ARICEPT) 10 MG tablet Take 10 mg by mouth Daily.     • DULoxetine (CYMBALTA) 60 MG capsule TAKE 1 CAPSULE BY MOUTH EVERY DAY (Patient taking differently: Take 60 mg by mouth Daily.) 90 capsule 3   • famotidine (Pepcid AC Maximum Strength) 20 MG tablet Take 1 tablet by mouth 2 (Two) Times a Day. 8 tablet 0   • furosemide (LASIX) 40 MG tablet Take 1 tablet by mouth Daily. (Patient taking differently: Take 80 mg by mouth Daily.) 90 tablet 1   • glimepiride (AMARYL) 2 MG tablet TAKE 1 TABLET BY MOUTH TWICE DAILY (Patient taking differently: Take 2 mg by mouth 2 (Two) Times a Day.) 60 tablet 0   • guaiFENesin (Mucinex) 600 MG 12 hr tablet Take 2 tablets by mouth 2 (Two) Times a Day. 180 tablet 3   • ketoconazole (NIZORAL) 2 % cream Apply 1  application topically to the appropriate area as directed Daily As Needed for Itching (apply to back and legs).     • ketoconazole (NIZORAL) 2 % shampoo Apply 1 application topically to the appropriate area as directed As Needed for Irritation (for head).     • latanoprost (XALATAN) 0.005 % ophthalmic solution Administer 1 drop to both eyes Every Night.     • memantine (NAMENDA) 5 MG tablet Take 5 mg by mouth Daily.     • metOLazone (ZAROXOLYN) 2.5 MG tablet Take 2.5 mg by mouth Daily.     • montelukast (SINGULAIR) 10 MG tablet Take 1 tablet by mouth Every Night. 90 tablet 3   • O2 (OXYGEN) Inhale 4 L/min Continuous.     • pantoprazole (PROTONIX) 40 MG EC tablet Take 1 tablet by mouth 2 (Two) Times a Day. 180 tablet 3   • potassium chloride (MICRO-K) 10 MEQ CR capsule Take 3 capsules by mouth 2 (Two) Times a Day.     • rOPINIRole XL (REQUIP XL) 8 MG 24 hr tablet Take 8 mg by mouth 2 (two) times a day.     • saccharomyces boulardii (FLORASTOR) 250 MG capsule Take 1 capsule by mouth 2 (Two) Times a Day. 60 capsule 0   • Spacer/Aero-Holding Chambers (AeroChamber Plus Morris-Vu) misc As Needed.     • spironolactone (ALDACTONE) 25 MG tablet Take 2 tablets by mouth Daily. 30 tablet 11   • timolol (TIMOPTIC) 0.25 % ophthalmic solution Administer 1 drop to the right eye Every Morning.     • Triamcinolone Acetonide (NASACORT) 55 MCG/ACT nasal inhaler 2 sprays into the nostril(s) as directed by provider Daily.     • vitamin D (ERGOCALCIFEROL) 1.25 MG (85024 UT) capsule capsule Take 1 capsule by mouth 1 (One) Time Per Week. (Patient taking differently: Take 50,000 Units by mouth 1 (One) Time Per Week. Wednesday) 12 capsule 1     Current Facility-Administered Medications on File Prior to Visit   Medication Dose Route Frequency Provider Last Rate Last Admin   • [COMPLETED] ipratropium-albuterol (DUO-NEB) nebulizer solution 3 mL  3 mL Nebulization Once PRN Ally Frazier CRNA   3 mL at 08/24/22 1123   • [DISCONTINUED]  dexamethasone (DECADRON) injection   Intravenous PRN Ally Frazier CRNA   4 mg at 08/24/22 1018   • [DISCONTINUED] droperidol (INAPSINE) injection 0.625 mg  0.625 mg Intravenous Q15 Min PRN Ally Frazier, LORELEI       • [DISCONTINUED] droperidol (INAPSINE) injection 0.625 mg  0.625 mg Intramuscular Once PRN Ally Frazier CRNA       • [DISCONTINUED] droperidol (INAPSINE) injection 0.625 mg  0.625 mg Intravenous Q15 Min PRN Ally Frazier, CRNA       • [DISCONTINUED] droperidol (INAPSINE) injection 0.625 mg  0.625 mg Intramuscular Once PRN Ally Frazier CRNA       • [DISCONTINUED] famotidine (PEPCID) tablet 20 mg  20 mg Oral Once Felix Griffith MD       • [DISCONTINUED] Insulin Lispro (humaLOG) injection 0-9 Units  0-9 Units Subcutaneous TID AC Ally Frazier CRNA       • [DISCONTINUED] Insulin Lispro (humaLOG) injection 0-9 Units  0-9 Units Subcutaneous TID AC Ally Frazier CRNA       • [DISCONTINUED] ipratropium-albuterol (DUO-NEB) nebulizer solution 3 mL  3 mL Nebulization Once PRN Ally Frazier CRNA       • [DISCONTINUED] labetalol (NORMODYNE,TRANDATE) injection 5 mg  5 mg Intravenous Q5 Min PRN Ally Frazier CRNA       • [DISCONTINUED] labetalol (NORMODYNE,TRANDATE) injection 5 mg  5 mg Intravenous Q5 Min PRN Ally Frazier, CRNA       • [DISCONTINUED] lactated ringers bolus 250 mL  250 mL Intravenous Once PRN Ally Frazier CRNA       • [DISCONTINUED] lactated ringers bolus 250 mL  250 mL Intravenous Once PRN Ally Frazier CRNA       • [DISCONTINUED] lactated ringers infusion  9 mL/hr Intravenous Continuous Felix Griffith MD 9 mL/hr at 08/24/22 0959 Restarted at 08/24/22 1047   • [DISCONTINUED] lidocaine (XYLOCAINE) 4 % nebulizer solution 4 mL  4 mL Nebulization Once Vladislav Arboleda MD       • [DISCONTINUED] lidocaine (XYLOCAINE) 4 % nebulizer solution 4 mL  4 mL Nebulization Once Vladislav Arboleda MD       • [DISCONTINUED]  lidocaine PF 1% (XYLOCAINE) injection 0.5 mL  0.5 mL Injection Once PRN Felix Griffith MD       • [DISCONTINUED] lidocaine PF 1% (XYLOCAINE) injection   Intravenous PRN Ally Frazier CRNA   50 mg at 08/24/22 1010   • [DISCONTINUED] midazolam (VERSED) injection 0.5 mg  0.5 mg Intravenous Q10 Min PRN Felix Griffith MD       • [DISCONTINUED] naloxone (NARCAN) injection 0.4 mg  0.4 mg Intravenous PRN Ally Frazier, CRNA       • [DISCONTINUED] naloxone (NARCAN) injection 0.4 mg  0.4 mg Intravenous PRN Ally Frazier, CRNA       • [DISCONTINUED] ondansetron (ZOFRAN) injection 4 mg  4 mg Intravenous Once PRN Ally Frazier CRNA       • [DISCONTINUED] ondansetron (ZOFRAN) injection 4 mg  4 mg Intravenous Once PRN Ally Frazier, CRNA       • [DISCONTINUED] ondansetron (ZOFRAN) injection   Intravenous PRN Ally Frazier CRNA   4 mg at 08/24/22 1018   • [DISCONTINUED] promethazine (PHENERGAN) suppository 25 mg  25 mg Rectal Once PRN Ally Frazier CRNA       • [DISCONTINUED] promethazine (PHENERGAN) tablet 25 mg  25 mg Oral Once PRN Ally Frazier, CRNA       • [DISCONTINUED] Propofol (DIPRIVAN) injection   Intravenous PRN Ally Frazier, CRNA   150 mg at 08/24/22 1010   • [DISCONTINUED] rocuronium (ZEMURON) injection   Intravenous PRN Ally Frazier CRNA   30 mg at 08/24/22 1010   • [DISCONTINUED] sodium chloride 0.9 % flush 10 mL  10 mL Intravenous PRN Vladislav Arboleda MD       • [DISCONTINUED] sodium chloride 0.9 % flush 10 mL  10 mL Intravenous Q12H Felix Griffith MD       • [DISCONTINUED] sodium chloride 0.9 % flush 10 mL  10 mL Intravenous PRN Felix Griffith MD   10 mL at 08/24/22 0917   • [DISCONTINUED] sodium chloride 0.9 % flush 3 mL  3 mL Intravenous Q12H Vladislav Arboleda MD       • [DISCONTINUED] sodium chloride 0.9 % flush 3 mL  3 mL Intravenous Q12H Ally Frazier CRNA       • [DISCONTINUED] sodium chloride 0.9 % flush 3 mL   "3 mL Intravenous Q12H Ally Frazier CRNA       • [DISCONTINUED] sodium chloride 0.9 % flush 3-10 mL  3-10 mL Intravenous PRN Ally Frazier CRNA       • [DISCONTINUED] sodium chloride 0.9 % flush 3-10 mL  3-10 mL Intravenous PRN Ally Frazier CRNA       • [DISCONTINUED] sodium chloride 0.9 % infusion  125 mL/hr Intravenous Continuous Vladislav Arboleda MD       • [DISCONTINUED] sugammadex (BRIDION) injection   Intravenous PRN Ally Frazier CRNA   200 mg at 08/24/22 1032       Allergies  Allergies   Allergen Reactions   • Hydrocodone Hallucinations     \"With high doses\"   • Statins Myalgia     myalgia   • Metoclopramide Other (See Comments)     EXACERBATED RLS   • Penicillins Rash     Rash    Tolerated Ceftriaxone   • Tizanidine Hallucinations   • Tramadol Nausea And Vomiting and GI Intolerance     VERY MILD PER PT       Objective  Visit Vitals  /58   Pulse 86   Temp 98.3 °F (36.8 °C)   Ht 160 cm (63\")   Wt 81.6 kg (180 lb)   LMP  (LMP Unknown)   SpO2 97%   BMI 31.89 kg/m²        Physical Exam  Physical Exam  Vitals and nursing note reviewed.   Constitutional:       General: She is not in acute distress.     Appearance: She is well-developed. She is not ill-appearing or toxic-appearing.   HENT:      Head: Normocephalic and atraumatic.   Eyes:      Conjunctiva/sclera: Conjunctivae normal.   Cardiovascular:      Rate and Rhythm: Normal rate and regular rhythm.      Heart sounds: Normal heart sounds.   Pulmonary:      Effort: Pulmonary effort is normal. No respiratory distress.      Breath sounds: Decreased breath sounds (throughout) and wheezing (anterior lung fields) present.      Comments: On supplemental oxygen via nasal cannula  Musculoskeletal:      Right lower leg: Edema (trace) present.      Left lower leg: Edema (trace) present.   Skin:     General: Skin is warm and dry.      Comments: Jhon skin on ankles, shins   Neurological:      Mental Status: She is alert and oriented to " person, place, and time. Mental status is at baseline.      Gait: Gait abnormal.         Results  Results for orders placed or performed during the hospital encounter of 22   Respiratory Culture - Lavage, Lung, Left Lower Lobe    Specimen: Lung, Left Lower Lobe; Lavage   Result Value Ref Range    Gram Stain Few (2+) WBCs per low power field     Gram Stain No Epithelial cells per low power field     Gram Stain No organisms seen    Potassium    Specimen: Blood   Result Value Ref Range    Potassium 2.9 (L) 3.5 - 5.2 mmol/L   POC Glucose Once    Specimen: Blood   Result Value Ref Range    Glucose 143 (H) 70 - 130 mg/dL   POC Glucose Once    Specimen: Blood   Result Value Ref Range    Glucose 146 (H) 70 - 130 mg/dL   [facility]TOLOGY, [facility] (KAYLA,COR,MAD,MONY)    Specimen: A: Lung, Left Lower Lobe; Lavage    D: Lung, Left Lower Lobe; Brushing   Result Value Ref Range    Reference Lab Report       [facility]  [address] [place] [address]  Phone: [phone] or [phone]  Fax: [phone]  Bj Muhammad M.D., Medical Director    PATIENT NAME                                 LABORATORY NO.  [id number]   MARLENE WILBURN.                      [id number]  [id number]                                     AGE                SEX     SSN            CLIENT REF #  [facility]                       76       1945   F       [id number]    [id number]  [address]                          REQUESTING M.D.       ATTENDING MANDRAE.        [place] [address]                            MOI LOPES  DATE COLLECTED        DATE RECEIVED          DATE REPORTED  2022    DIAGNOSIS:  A.      BRONCHIAL LAVAGE, LEFT LOWER LOBE:  Negative for malignant cells.  B.      BRONCH BRUSH, LEFT LOWER LOBE:  Negative for malignant cells.    MICROSCOPIC DESCRIPTION:  A.     Ciliated  respiratory epithelial cells and  pulmonary macrophages.  B.     Very rare ciliated respiratory epithelial cells.    Professional interpretation rendered by Wesly Allen M.D., [facility] at [facility], [address] [place] [address].    CLINICAL HISTORY:  Mycobacterium avium complex colonization    SPECIMENS SUBMITTED:  A.    BRONCHIAL LAVAGE, LEFT LOWER LOBE  B.    BRONCH BRUSH, LEFT LOWER LOBE    GROSS SPECIMEN DESCRIPTION:  A.     30CC COLORLESS FLUID IN FIXATIVE  B.     1 BRUSH IN FIXATIVE    REVIEWED, DIAGNOSED AND ELECTRONICALLY  SIGNED BY:    Wesly Allen M.D., TASHA.  CPT CODES:  88112x2          Assessment and Plan  Diagnoses and all orders for this visit:    Iron deficiency anemia, unspecified iron deficiency anemia type  - Intermittently anemic, most recent CBC with hgb improved to 12  - Has had colonoscopy with large internal hemorrhoids and she does report some BRBPR. She has been prescribed anusol but reports that her hemorrhoids are too far internalized for this to work.  - She did not tolerate IV iron infusion nor does she tolerate oral iron  - Will see hematology tomorrow given issues with iron supplementation     Chronic respiratory failure due to Stage II, moderate, COPD  - Last PFTs with FEV1 61% 5/2022  - Recently had 6 minute walk test indicating that she should reduce O2 to 3.5L NC however she does not feel well unless on 4-5L NC  - had therapeutic bronchoscopy yesterday with BAL negative for malignancy, resp cx prelim negative, afb and fungal cx pending  - On trelegy and albuterol, singulair, mucinex with flutter valve  - Follows with palliative care    Peripheral venous insufficiency  - Improved with diuresis and compression.  - Continue weekly appt with lymphedema clinic and lasix 40mg daily with metolazone 2.5mg daily.    Mediastinal adenopathy, Hilar adenopathy  - Seen on CT scan in April, likely reactive  - Had EBUS with bronchoscopy yesterday, path pending    Gastroparesis  - uncontrolled, has  regurgitation of food and liquids, belching.  - prior EGD with reflux esophagitis and empiric dilation to 20mm  - Discussed gastroparesis diet and on pantoprazole 40mg BID  - Intolerant of reglan due to restlessness, worsened RLS  - Plan to add domperidone per GI    Diuretic-induced hypokalemia  - K has been as low as 2.5 due to diuretics to manage edema  - On aldactone 50mg daily and KCl 30meq BID per cardiology  - K yesterday improving to 2.9  - Will arrange for cardiology follow up as this was not scheduled after last appt    Internal hemorrhoids  - These bleed intermittently and likely cause of her iron deficiency anemia  - She has been prescribed anusol but has been told the hemorrhoids are too far internalized to use topical treatments.   - She will follow up with GI     Health Maintenance  - Pap smear: no longer indicated  - Mammogram: 5/2021 [facility] 3 with axillary adenopathy, 8/2021 [facility] 4, had FNA of R axillary node which was negative. 3/2022 [facility] 3, next in 6m.  - Colonoscopy: 8/2022, 3y repeat  - HCV: negative  - Immunizations: pneumococcal complete, COVID booster due, shingrix #1  - Depression screening: negative 2/2022    Return in about 22 days (around 9/16/2022) for as scheduled.     Today I have spent a total of 87 minutes caring for Lorrie Pagan.  This includes time spent preparing for the visit, reviewing tests, performing a medically appropriate examination and/or evaluation , counseling and educating the patient/family/caregiver, ordering medications, tests, or procedures and documenting information in the medical record.

## 2022-08-25 NOTE — OUTREACH NOTE
AMBULATORY CASE MANAGEMENT NOTE    Name and Relationship of Patient/Support Person: Lorrie Pagan White - Self    Care Coordination    Spoke with patient during today's PCP visit. She reports feeling much better after her bronchoscopy, lower extremity swelling has improved. Completed forms for [facility] (PH: [phone]) with patient for Domperidone and faxed to [phone]. Original provided to patient, copy in folder to scan to chart.     Spoke with staff at Dr. Chaidez's office, PCP requested patient follow up with cardiology in the next 2 weeks. Faxed bronchoscopy AVS to Dr. Chaidez's office, they will contact patient for an appointment once AVS is reviewed. Patient wants to see an MD, she does not want to see an APRN or PA. F/U outreach planned for next week. Patient denied further questions/needs at this time.     JI RIOS  Ambulatory Case Management    8/25/2022, 14:15 EDT

## 2022-08-26 ENCOUNTER — CONSULT (OUTPATIENT)
Dept: ONCOLOGY | Facility: CLINIC | Age: 77
End: 2022-08-26

## 2022-08-26 ENCOUNTER — LAB (OUTPATIENT)
Dept: LAB | Facility: HOSPITAL | Age: 77
End: 2022-08-26

## 2022-08-26 ENCOUNTER — TELEPHONE (OUTPATIENT)
Dept: CARDIOLOGY | Facility: HOSPITAL | Age: 77
End: 2022-08-26

## 2022-08-26 ENCOUNTER — DOCUMENTATION (OUTPATIENT)
Dept: PULMONOLOGY | Facility: CLINIC | Age: 77
End: 2022-08-26

## 2022-08-26 VITALS
DIASTOLIC BLOOD PRESSURE: 67 MMHG | HEIGHT: 63 IN | SYSTOLIC BLOOD PRESSURE: 153 MMHG | BODY MASS INDEX: 32.07 KG/M2 | WEIGHT: 181 LBS | TEMPERATURE: 97.1 F | OXYGEN SATURATION: 96 % | RESPIRATION RATE: 18 BRPM | HEART RATE: 95 BPM

## 2022-08-26 DIAGNOSIS — R79.9 ABNORMAL FINDING OF BLOOD CHEMISTRY, UNSPECIFIED: ICD-10-CM

## 2022-08-26 DIAGNOSIS — D64.89 ANEMIA DUE TO OTHER CAUSE, NOT CLASSIFIED: Primary | ICD-10-CM

## 2022-08-26 DIAGNOSIS — D64.89 ANEMIA DUE TO OTHER CAUSE, NOT CLASSIFIED: ICD-10-CM

## 2022-08-26 LAB
ALBUMIN SERPL-MCNC: 4.1 G/DL (ref 3.5–5.2)
ALBUMIN/GLOB SERPL: 1.4 G/DL
ALP SERPL-CCNC: 95 U/L (ref 39–117)
ALT SERPL W P-5'-P-CCNC: 10 U/L (ref 1–33)
ANION GAP SERPL CALCULATED.3IONS-SCNC: 6 MMOL/L (ref 5–15)
AST SERPL-CCNC: 18 U/L (ref 1–32)
B2 MICROGLOB SERPL-MCNC: 3 MG/L (ref 0.8–2.2)
BACTERIA SPEC RESP CULT: NORMAL
BASOPHILS # BLD AUTO: 0.06 10*3/MM3 (ref 0–0.2)
BASOPHILS NFR BLD AUTO: 0.7 % (ref 0–1.5)
BILIRUB SERPL-MCNC: 0.2 MG/DL (ref 0–1.2)
BUN SERPL-MCNC: 22 MG/DL (ref 8–23)
BUN/CREAT SERPL: 32.4 (ref 7–25)
CALCIUM SPEC-SCNC: 9.2 MG/DL (ref 8.6–10.5)
CHLORIDE SERPL-SCNC: 99 MMOL/L (ref 98–107)
CO2 SERPL-SCNC: 37 MMOL/L (ref 22–29)
CREAT SERPL-MCNC: 0.68 MG/DL (ref 0.57–1)
CYTO UR: NORMAL
DEPRECATED RDW RBC AUTO: 49.8 FL (ref 37–54)
EGFRCR SERPLBLD CKD-EPI 2021: 89.3 ML/MIN/1.73
EOSINOPHIL # BLD AUTO: 0.24 10*3/MM3 (ref 0–0.4)
EOSINOPHIL NFR BLD AUTO: 2.6 % (ref 0.3–6.2)
ERYTHROCYTE [DISTWIDTH] IN BLOOD BY AUTOMATED COUNT: 15.8 % (ref 12.3–15.4)
FERRITIN SERPL-MCNC: 30.91 NG/ML (ref 13–150)
FOLATE SERPL-MCNC: 6.47 NG/ML (ref 4.78–24.2)
GLOBULIN UR ELPH-MCNC: 2.9 GM/DL
GLUCOSE SERPL-MCNC: 143 MG/DL (ref 65–99)
GRAM STN SPEC: NORMAL
HAPTOGLOB SERPL-MCNC: 187 MG/DL (ref 30–200)
HCT VFR BLD AUTO: 35.6 % (ref 34–46.6)
HGB BLD-MCNC: 10.6 G/DL (ref 12–15.9)
IMM GRANULOCYTES # BLD AUTO: 0.08 10*3/MM3 (ref 0–0.05)
IMM GRANULOCYTES NFR BLD AUTO: 0.9 % (ref 0–0.5)
IRON 24H UR-MRATE: 30 MCG/DL (ref 37–145)
IRON SATN MFR SERPL: 7 % (ref 20–50)
LAB AP CASE REPORT: NORMAL
LAB AP CLINICAL INFORMATION: NORMAL
LAB AP DIAGNOSIS COMMENT: NORMAL
LDH SERPL-CCNC: 176 U/L (ref 135–214)
LYMPHOCYTES # BLD AUTO: 2.33 10*3/MM3 (ref 0.7–3.1)
LYMPHOCYTES NFR BLD AUTO: 25.5 % (ref 19.6–45.3)
MCH RBC QN AUTO: 25.8 PG (ref 26.6–33)
MCHC RBC AUTO-ENTMCNC: 29.8 G/DL (ref 31.5–35.7)
MCV RBC AUTO: 86.6 FL (ref 79–97)
MONOCYTES # BLD AUTO: 0.67 10*3/MM3 (ref 0.1–0.9)
MONOCYTES NFR BLD AUTO: 7.3 % (ref 5–12)
NEUTROPHILS NFR BLD AUTO: 5.77 10*3/MM3 (ref 1.7–7)
NEUTROPHILS NFR BLD AUTO: 63 % (ref 42.7–76)
NRBC BLD AUTO-RTO: 0 /100 WBC (ref 0–0.2)
PATH REPORT.FINAL DX SPEC: NORMAL
PATH REPORT.GROSS SPEC: NORMAL
PLATELET # BLD AUTO: 417 10*3/MM3 (ref 140–450)
PMV BLD AUTO: 9.6 FL (ref 6–12)
POTASSIUM SERPL-SCNC: 3.5 MMOL/L (ref 3.5–5.2)
PROT SERPL-MCNC: 7 G/DL (ref 6–8.5)
RBC # BLD AUTO: 4.11 10*6/MM3 (ref 3.77–5.28)
RETICS # AUTO: 0.07 10*6/MM3 (ref 0.02–0.13)
RETICS/RBC NFR AUTO: 1.61 % (ref 0.7–1.9)
SODIUM SERPL-SCNC: 142 MMOL/L (ref 136–145)
TIBC SERPL-MCNC: 438 MCG/DL (ref 298–536)
TRANSFERRIN SERPL-MCNC: 294 MG/DL (ref 200–360)
VIT B12 BLD-MCNC: 324 PG/ML (ref 211–946)
WBC NRBC COR # BLD: 9.15 10*3/MM3 (ref 3.4–10.8)

## 2022-08-26 PROCEDURE — 82784 ASSAY IGA/IGD/IGG/IGM EACH: CPT

## 2022-08-26 PROCEDURE — 84165 PROTEIN E-PHORESIS SERUM: CPT

## 2022-08-26 PROCEDURE — 83010 ASSAY OF HAPTOGLOBIN QUANT: CPT

## 2022-08-26 PROCEDURE — 82746 ASSAY OF FOLIC ACID SERUM: CPT

## 2022-08-26 PROCEDURE — 36415 COLL VENOUS BLD VENIPUNCTURE: CPT

## 2022-08-26 PROCEDURE — 85045 AUTOMATED RETICULOCYTE COUNT: CPT

## 2022-08-26 PROCEDURE — 83540 ASSAY OF IRON: CPT

## 2022-08-26 PROCEDURE — 85060 BLOOD SMEAR INTERPRETATION: CPT

## 2022-08-26 PROCEDURE — 86334 IMMUNOFIX E-PHORESIS SERUM: CPT

## 2022-08-26 PROCEDURE — 85025 COMPLETE CBC W/AUTO DIFF WBC: CPT

## 2022-08-26 PROCEDURE — 83615 LACTATE (LD) (LDH) ENZYME: CPT

## 2022-08-26 PROCEDURE — 80053 COMPREHEN METABOLIC PANEL: CPT

## 2022-08-26 PROCEDURE — 82728 ASSAY OF FERRITIN: CPT

## 2022-08-26 PROCEDURE — 83521 IG LIGHT CHAINS FREE EACH: CPT

## 2022-08-26 PROCEDURE — 82607 VITAMIN B-12: CPT

## 2022-08-26 PROCEDURE — 82232 ASSAY OF BETA-2 PROTEIN: CPT

## 2022-08-26 PROCEDURE — 99204 OFFICE O/P NEW MOD 45 MIN: CPT | Performed by: INTERNAL MEDICINE

## 2022-08-26 PROCEDURE — 84466 ASSAY OF TRANSFERRIN: CPT

## 2022-08-26 NOTE — PROGRESS NOTES
Patient underwent a bronchoscopy  So far BAL cultures are negative although it is very early  Transbronchial needle aspiration of the 1 lymph node station was negative  The station 7 lymph node biopsy had some striated muscle probably related to overexuberant extension of the transbronchial needle at the time of sampling    I discussed these results with the patient on the phone today    It still very early and it is possible that something may grow out under cultures and we will just have to wait and see    We will plan to see her back in the office in 6 to 8 weeks for follow-up

## 2022-08-26 NOTE — PROGRESS NOTES
New Patient Office Visit      Date: 2022     Patient Name: Lorrie Pagan  MRN: [id number]  : 1945  Referring Physician: Dacia Viera    Chief Complaint: Establish care for anemia    History of Present Illness: Lorrie Pagan is a pleasant 76 y.o. female past medical history of asthma, COPD, type 2 diabetes, gastroparesis, chronic diastolic heart failure, lung cancer status post resection in 2016 who presents today for evaluation of anemia. The patient has been followed by other PCP who has been monitoring CBCs which is been notable for mild anemia range between 10-12 over the past several months.  Patient does note significant fatigue as well as episodes of bright red blood per rectum.  She has recently had a colonoscopy this month which only showed enlarged internal hemorrhoids but no significant bleeding or malignant appearing lesions.  She denies any cravings for ice or restless leg syndrome symptoms.  Has had significant GI upset to oral iron in the past.  States that she also had an infusion of IV Ferrlecit during hospitalization in 2022.  She had significant nausea related to this.  She otherwise follows with pulmonary in regards to her history of lung cancer as well as significant mucus plugging for which she had an endoscopy a few days ago.  Biopsies at that time were negative for malignancy    Oncology History:    Oncology/Hematology History    No history exists.       Subjective      Review of Systems:     Constitutional: Negative for fevers, chills, or weight loss  Eyes: Negative for blurred vision or discharge         Ear/Nose/Throat: Negative for difficulty swallowing, sore throat, LAD                                                       Respiratory: Negative for cough, SOA, wheezing                                                                                        Cardiovascular: Negative for chest pain or palpitations                                                                   Gastrointestinal: Negative for nausea, vomiting or diarrhea                                                                     Genitourinary: Negative for dysuria or hematuria                                                                                           Musculoskeletal: Negative for any joint pains or muscle aches                                                                        Neurologic: Negative for any weakness, headaches, dizziness                                                                         Hematologic: Negative for any easy bleeding or bruising                                                                                   Psychiatric: Negative for anxiety or depression                             Past Medical History:   Past Medical History:   Diagnosis Date   • Asthma    • Back pain    • BRBPR (bright red blood per rectum) 07/10/2022   • Bronchiectasis (HCC)    • Bronchitis 01/2018   • Cancer (HCC)     History of lung cancer and skin cancer    • Cardiac murmur    • Cellulitis of both lower extremities 01/27/2020   • Chronic cough    • Chronic obstructive pulmonary disease (HCC)    • Colon polyp    • DDD (degenerative disc disease), lumbar    • Depression    • Diabetes mellitus (HCC)     DX: 2019, CHECK BS QOD, LAST A1C 6.3??   • Disease of thyroid gland    • Esophageal dilatation 09/20/2019    Added automatically from request for surgery [id number]   • Esophageal dysphagia 10/24/2019    Added automatically from request for surgery [id number]   • Former smoker (None since 1992) 08/29/2019   • Gastroparesis    • GERD (gastroesophageal reflux disease)    • Glaucoma    • Globus sensation 01/27/2020   • History of colonic polyps    • History of transfusion 1988    NO REACTION    • Hyperlipidemia    • Hypertension    • Irritable bowel syndrome     Constipation/diarrhea   • Legally blind    • Lung cancer (HCC)    • Macular degeneration    • Neuropathy    • Obesity  02/26/2020   • Osteoarthritis    • Oxygen dependent     4L   • Pneumonia    • Sleep apnea     NON COMPLIANT WITH CPAP USE   • UTI (urinary tract infection)    • Wears glasses        Past Surgical History:   Past Surgical History:   Procedure Laterality Date   • BACK SURGERY      X2 (LUMBAR)   • BREAST BIOPSY      20+ years ago   • BREAST BIOPSY Right 08/2021    fna ln   • BRONCHOSCOPY N/A 8/24/2022    Procedure: BRONCHOSCOPY WITH ENDOBRONCHIAL ULTRASOUND;  Surgeon: Vladislav Arboleda MD;  Location:  [facility];  Service: Pulmonary;  Laterality: N/A;   • CATARACT EXTRACTION Bilateral    • CHOLECYSTECTOMY     • COLONOSCOPY     • COLONOSCOPY N/A 8/18/2022    Procedure: COLONOSCOPY WITH POLYPECTOMY;  Surgeon: Cortes Millan MD;  Location:  [facility];  Service: Gastroenterology;  Laterality: N/A;   • ENDOSCOPY N/A 11/06/2019    Procedure: ESOPHAGOGASTRODUODENOSCOPY;  Surgeon: Cortes Millan MD;  Location:  [facility];  Service: Gastroenterology   • ENDOSCOPY N/A 04/29/2021    Procedure: ESOPHAGOGASTRODUODENOSCOPY;  Surgeon: Cortes Millan MD;  Location:  [facility];  Service: Gastroenterology;  Laterality: N/A;  balloon dilation    • HAND SURGERY Right     laceration repair   • INCONTINENCE SURGERY      BLADDER   • LUNG REMOVAL, PARTIAL Left 2016   • NISSEN FUNDOPLICATION     • SKIN BIOPSY     • SPINAL CORD STIMULATOR IMPLANT      right buttock, in place but no longer working   • TOTAL ABDOMINAL HYSTERECTOMY  1988    benign cyst   • US GUIDED FINE NEEDLE ASPIRATION  08/27/2021       Family History:   Family History   Problem Relation Age of Onset   • Colon polyps Mother    • Emphysema Mother    • Hypertension Mother    • Colon polyps Father    • Dementia Father    • Heart disease Father    • Hyperlipidemia Father    • Macular degeneration Father    • Glaucoma Father    • Colon cancer Neg Hx    • Breast cancer Neg Hx    • Ovarian cancer Neg Hx         Social History:   Social History     Socioeconomic History   • Marital status: Single   Tobacco Use   • Smoking status: Former Smoker     Packs/day: 1.50     Years: 25.00     Pack years: 37.50     Types: Cigarettes     Quit date: 1992     Years since quittin.6   • Smokeless tobacco: Never Used   Vaping Use   • Vaping Use: Never used   Substance and Sexual Activity   • Alcohol use: Yes     Alcohol/week: 2.0 standard drinks     Types: 2 Standard drinks or equivalent per week     Comment: a glass of wine or bourbon a couple times a WEEK   • Drug use: No   • Sexual activity: Never       Medications:     Current Outpatient Medications:   •  acetaminophen (TYLENOL) 500 MG tablet, Take 1,000 mg by mouth Every 6 (Six) Hours As Needed for Mild Pain ., Disp: , Rfl:   •  albuterol (ACCUNEB) 1.25 MG/3ML nebulizer solution, Take 3 mL by nebulization Every 6 (Six) Hours As Needed for Shortness of Air., Disp: 90 each, Rfl: 5  •  albuterol sulfate  (90 Base) MCG/ACT inhaler, Inhale 2 puffs Every 6 (Six) Hours As Needed for Wheezing., Disp: 18 g, Rfl: 2  •  amitriptyline (ELAVIL) 10 MG tablet, Take 10 mg by mouth As Needed for Sleep (to stop drooling at night)., Disp: , Rfl:   •  azelastine (ASTELIN) 0.1 % nasal spray, 2 sprays into the nostril(s) as directed by provider 2 (Two) Times a Day. (Patient taking differently: 2 sprays into the nostril(s) as directed by provider 2 (Two) Times a Day As Needed for Rhinitis or Allergies.), Disp: 30 mL, Rfl: 5  •  chlorhexidine (PERIDEX) 0.12 % solution, Apply 15 mL to the mouth or throat 2 (Two) Times a Day As Needed. For 14 days, w/1 refill, Disp: , Rfl:   •  Dapsone 5 % topical gel, Apply 1 application topically to the appropriate area as directed Daily As Needed (apply to legs)., Disp: , Rfl:   •  donepezil (ARICEPT) 10 MG tablet, Take 10 mg by mouth Daily., Disp: , Rfl:   •  DULoxetine (CYMBALTA) 60 MG capsule, TAKE 1 CAPSULE BY MOUTH EVERY DAY (Patient taking  differently: Take 60 mg by mouth Daily.), Disp: 90 capsule, Rfl: 3  •  famotidine (Pepcid AC Maximum Strength) 20 MG tablet, Take 1 tablet by mouth 2 (Two) Times a Day., Disp: 8 tablet, Rfl: 0  •  furosemide (LASIX) 40 MG tablet, Take 1 tablet by mouth Daily. (Patient taking differently: Take 80 mg by mouth Daily.), Disp: 90 tablet, Rfl: 1  •  glimepiride (AMARYL) 2 MG tablet, TAKE 1 TABLET BY MOUTH TWICE DAILY (Patient taking differently: Take 2 mg by mouth 2 (Two) Times a Day.), Disp: 60 tablet, Rfl: 0  •  guaiFENesin (Mucinex) 600 MG 12 hr tablet, Take 2 tablets by mouth 2 (Two) Times a Day., Disp: 180 tablet, Rfl: 3  •  ketoconazole (NIZORAL) 2 % cream, Apply 1 application topically to the appropriate area as directed Daily As Needed for Itching (apply to back and legs)., Disp: , Rfl:   •  ketoconazole (NIZORAL) 2 % shampoo, Apply 1 application topically to the appropriate area as directed As Needed for Irritation (for head)., Disp: , Rfl:   •  latanoprost (XALATAN) 0.005 % ophthalmic solution, Administer 1 drop to both eyes Every Night., Disp: , Rfl:   •  memantine (NAMENDA) 5 MG tablet, Take 5 mg by mouth Daily., Disp: , Rfl:   •  metOLazone (ZAROXOLYN) 2.5 MG tablet, Take 2.5 mg by mouth Daily., Disp: , Rfl:   •  montelukast (SINGULAIR) 10 MG tablet, Take 1 tablet by mouth Every Night., Disp: 90 tablet, Rfl: 3  •  O2 (OXYGEN), Inhale 4 L/min Continuous., Disp: , Rfl:   •  pantoprazole (PROTONIX) 40 MG EC tablet, Take 1 tablet by mouth 2 (Two) Times a Day., Disp: 180 tablet, Rfl: 3  •  potassium chloride (MICRO-K) 10 MEQ CR capsule, Take 3 capsules by mouth 2 (Two) Times a Day., Disp: , Rfl:   •  rOPINIRole XL (REQUIP XL) 8 MG 24 hr tablet, Take 8 mg by mouth 2 (two) times a day., Disp: , Rfl:   •  saccharomyces boulardii (FLORASTOR) 250 MG capsule, Take 1 capsule by mouth 2 (Two) Times a Day., Disp: 60 capsule, Rfl: 0  •  Spacer/Aero-Holding Chambers (AeroChamber Plus Morris-Vu) misc, As Needed., Disp: , Rfl:  "  •  spironolactone (ALDACTONE) 25 MG tablet, Take 2 tablets by mouth Daily., Disp: 30 tablet, Rfl: 11  •  timolol (TIMOPTIC) 0.25 % ophthalmic solution, Administer 1 drop to the right eye Every Morning., Disp: , Rfl:   •  Triamcinolone Acetonide (NASACORT) 55 MCG/ACT nasal inhaler, 2 sprays into the nostril(s) as directed by provider Daily., Disp: , Rfl:   •  vitamin D (ERGOCALCIFEROL) 1.25 MG (25520 UT) capsule capsule, Take 1 capsule by mouth 1 (One) Time Per Week. (Patient taking differently: Take 50,000 Units by mouth 1 (One) Time Per Week. Wednesday), Disp: 12 capsule, Rfl: 1    Allergies:   Allergies   Allergen Reactions   • Hydrocodone Hallucinations     \"With high doses\"   • Statins Myalgia     myalgia   • Metoclopramide Other (See Comments)     EXACERBATED RLS   • Penicillins Rash     Rash    Tolerated Ceftriaxone   • Tizanidine Hallucinations   • Tramadol Nausea And Vomiting and GI Intolerance     VERY MILD PER PT       Objective     Physical Exam:  Vital Signs:   Vitals:    08/26/22 1256   BP: 153/67   Pulse: 95   Resp: 18   Temp: 97.1 °F (36.2 °C)   TempSrc: Temporal   SpO2: 96%   Weight: 82.1 kg (181 lb)   Height: 160 cm (62.99\")   PainSc: 0-No pain     Pain Score    08/26/22 1256   PainSc: 0-No pain     ECOG Performance Status: 1 - Symptomatic but completely ambulatory    Constitutional: NAD, ECOG 1  Eyes: PERRLA, scleral anicteric  ENT: No LAD, no thyromegaly  Respiratory: CTAB, no wheezing, rales, rhonchi  Cardiovascular: RRR, no murmurs, pulses 2+ bilaterally  Abdomen: soft, NT/ND, no HSM  Musculoskeletal: strength 5/5 bilaterally, no c/c/e  Neurologic: A&O x 3, CN II-XII intact grossly  Psych: mood and affect congruent, no SI or HI    Results Review:   Admission on 08/24/2022, Discharged on 08/24/2022   Component Date Value Ref Range Status   • Glucose 08/24/2022 143 (A) 70 - 130 mg/dL Final    Meter: [id number] : [id number] Brian James   • Potassium 08/24/2022 2.9 (A) 3.5 - 5.2 mmol/L " Final   • AFB Stain 2022 No acid fast bacilli seen on concentrated smear   Preliminary   • Fungal Stain 2022 No fungal elements seen   Final   • Respiratory Culture 2022 Rare Normal respiratory angelia. No S. aureus or Pseudomonas aeruginosa detected. Final report.   Final   • Gram Stain 2022 Few (2+) WBCs per low power field   Final   • Gram Stain 2022 No Epithelial cells per low power field   Final   • Gram Stain 2022 No organisms seen   Final   • Reference Lab Report 2022    Final                    Value:[facility]  [address] [place] [address]  Phone: [phone] or [phone]  Fax: [phone]  Bj Muhammad M.D., Medical Director    PATIENT NAME                                 LABORATORY NO.  MARLENE JAIN.                      [id number]  [id number]                                     AGE                SEX     SSN            CLIENT REF #  [facility]                       76       1945   F       [id number]    [id number]  [address]                          REQUESTING M.D.       ATTENDING M.[place]Shantanu[place] [address]                            MOI LOPES  DATE COLLECTED        DATE RECEIVED          DATE REPORTED  2022    DIAGNOSIS:  A.      BRONCHIAL LAVAGE, LEFT LOWER LOBE:  Negative for malignant cells.  B.      BRONCH BRUSH, LEFT LOWER LOBE:  Negative for malignant cells.    MICROSCOPIC DESCRIPTION:  A.     Ciliated                           respiratory epithelial cells and pulmonary macrophages.  B.     Very rare ciliated respiratory epithelial cells.    Professional interpretation rendered by Wesly Allen M.D., F.C.A.P. at [facility] [email or web address], [address] [place] [address].    CLINICAL HISTORY:  Mycobacterium avium complex colonization    SPECIMENS SUBMITTED:  A.    BRONCHIAL LAVAGE, LEFT LOWER  LOBE  B.    BRONCH BRUSH, LEFT LOWER LOBE    GROSS SPECIMEN DESCRIPTION:  A.     30CC COLORLESS FLUID IN FIXATIVE  B.     1 BRUSH IN FIXATIVE    REVIEWED, DIAGNOSED AND ELECTRONICALLY  SIGNED BY:    Wesly Allen M.D., F.C.A.P.  CPT CODES:  88112x2     • Case Report 08/24/2022    Final                    Value:Surgical Pathology Report                         Case: [id number]                                  Authorizing Provider:  Vladislav Arboleda MD Collected:           08/24/2022 10:23 AM          Ordering Location:     [facility]   Received:            08/24/2022 11:01 AM                                 ENDO SUITES                                                                  Pathologist:           Helio Coy MD                                                     Specimens:   1) - Lymph Node, Station 7 node tbna bx                                                             2) - Lymph Node, 10L node tbna bx                                                         • Clinical Information 08/24/2022    Final                    Value:This result contains rich text formatting which cannot be displayed here.   • Final Diagnosis 08/24/2022    Final                    Value:This result contains rich text formatting which cannot be displayed here.   • Comment 08/24/2022    Final                    Value:This result contains rich text formatting which cannot be displayed here.   • Gross Description 08/24/2022    Final                    Value:This result contains rich text formatting which cannot be displayed here.   • Microscopic Description 08/24/2022    Final                    Value:This result contains rich text formatting which cannot be displayed here.   • Glucose 08/24/2022 146 (A) 70 - 130 mg/dL Final    Meter: [id number] : [id number] Brian James   Pre-Admission Testing on 08/23/2022   Component Date Value Ref Range Status   • Glucose 08/23/2022 109 (A) 65 - 99 mg/dL  Final   • BUN 08/23/2022 22  8 - 23 mg/dL Final   • Creatinine 08/23/2022 0.80  0.57 - 1.00 mg/dL Final   • Sodium 08/23/2022 138  136 - 145 mmol/L Final   • Potassium 08/23/2022 2.6 (A) 3.5 - 5.2 mmol/L Final   • Chloride 08/23/2022 89 (A) 98 - 107 mmol/L Final   • CO2 08/23/2022 37.6 (A) 22.0 - 29.0 mmol/L Final   • Calcium 08/23/2022 9.3  8.6 - 10.5 mg/dL Final   • Total Protein 08/23/2022 6.9  6.0 - 8.5 g/dL Final   • Albumin 08/23/2022 3.80  3.50 - 5.20 g/dL Final   • ALT (SGPT) 08/23/2022 10  1 - 33 U/L Final   • AST (SGOT) 08/23/2022 17  1 - 32 U/L Final   • Alkaline Phosphatase 08/23/2022 103  39 - 117 U/L Final   • Total Bilirubin 08/23/2022 0.3  0.0 - 1.2 mg/dL Final   • Globulin 08/23/2022 3.1  gm/dL Final   • A/G Ratio 08/23/2022 1.2  g/dL Final   • BUN/Creatinine Ratio 08/23/2022 27.5 (A) 7.0 - 25.0 Final   • Anion Gap 08/23/2022 11.4  5.0 - 15.0 mmol/L Final   • eGFR 08/23/2022 75.5  >60.0 mL/min/1.73 Final    National Kidney Foundation and American Society of Nephrology (ASN) Task Force recommended calculation based on the Chronic Kidney Disease Epidemiology Collaboration (CKD-EPI) equation refit without adjustment for race.   • WBC 08/23/2022 11.41 (A) 3.40 - 10.80 10*3/mm3 Final   • RBC 08/23/2022 4.75  3.77 - 5.28 10*6/mm3 Final   • Hemoglobin 08/23/2022 12.0  12.0 - 15.9 g/dL Final   • Hematocrit 08/23/2022 40.4  34.0 - 46.6 % Final   • MCV 08/23/2022 85.1  79.0 - 97.0 fL Final   • MCH 08/23/2022 25.3 (A) 26.6 - 33.0 pg Final   • MCHC 08/23/2022 29.7 (A) 31.5 - 35.7 g/dL Final   • RDW 08/23/2022 15.1  12.3 - 15.4 % Final   • RDW-SD 08/23/2022 46.6  37.0 - 54.0 fl Final   • MPV 08/23/2022 9.6  6.0 - 12.0 fL Final   • Platelets 08/23/2022 466 (A) 140 - 450 10*3/mm3 Final   • Neutrophil % 08/23/2022 74.9  42.7 - 76.0 % Final   • Lymphocyte % 08/23/2022 15.2 (A) 19.6 - 45.3 % Final   • Monocyte % 08/23/2022 7.8  5.0 - 12.0 % Final   • Eosinophil % 08/23/2022 1.1  0.3 - 6.2 % Final   • Basophil %  08/23/2022 0.4  0.0 - 1.5 % Final   • Immature Grans % 08/23/2022 0.6 (A) 0.0 - 0.5 % Final   • Neutrophils, Absolute 08/23/2022 8.55 (A) 1.70 - 7.00 10*3/mm3 Final   • Lymphocytes, Absolute 08/23/2022 1.73  0.70 - 3.10 10*3/mm3 Final   • Monocytes, Absolute 08/23/2022 0.89  0.10 - 0.90 10*3/mm3 Final   • Eosinophils, Absolute 08/23/2022 0.13  0.00 - 0.40 10*3/mm3 Final   • Basophils, Absolute 08/23/2022 0.04  0.00 - 0.20 10*3/mm3 Final   • Immature Grans, Absolute 08/23/2022 0.07 (A) 0.00 - 0.05 10*3/mm3 Final   • nRBC 08/23/2022 0.0  0.0 - 0.2 /100 WBC Final   Office Visit on 08/23/2022   Component Date Value Ref Range Status   • Glucose 08/23/2022 107 (A) 65 - 99 mg/dL Final   • BUN 08/23/2022 23  8 - 23 mg/dL Final   • Creatinine 08/23/2022 0.82  0.57 - 1.00 mg/dL Final   • Sodium 08/23/2022 136  136 - 145 mmol/L Final   • Potassium 08/23/2022 2.5 (A) 3.5 - 5.2 mmol/L Final   • Chloride 08/23/2022 87 (A) 98 - 107 mmol/L Final   • CO2 08/23/2022 41.3 (A) 22.0 - 29.0 mmol/L Final   • Calcium 08/23/2022 9.4  8.6 - 10.5 mg/dL Final   • BUN/Creatinine Ratio 08/23/2022 28.0 (A) 7.0 - 25.0 Final   • Anion Gap 08/23/2022 7.7  5.0 - 15.0 mmol/L Final   • eGFR 08/23/2022 73.3  >60.0 mL/min/1.73 Final    National Kidney Foundation and American Society of Nephrology (ASN) Task Force recommended calculation based on the Chronic Kidney Disease Epidemiology Collaboration (CKD-EPI) equation refit without adjustment for race.   Admission on 08/18/2022, Discharged on 08/18/2022   Component Date Value Ref Range Status   • Glucose 08/18/2022 113  70 - 130 mg/dL Final    Meter: [id number] : [id number] Prateek Newberry REE   • Case Report 08/18/2022    Final                    Value:Surgical Pathology Report                         Case: [id number]                                  Authorizing Provider:  Cortes Millan     Collected:           08/18/2022 03:28 PM                                 MD Michael                                                                     Ordering Location:     [facility]   Received:            08/19/2022 09:36 AM                                 ENDO SUITES                                                                  Pathologist:           Patrice Pereyra MD                                                          Specimen:    Large Intestine, Transverse Colon, Transverse colon polyp for pathology                   • Clinical Information 08/18/2022    Final                    Value:This result contains rich text formatting which cannot be displayed here.   • Final Diagnosis 08/18/2022    Final                    Value:This result contains rich text formatting which cannot be displayed here.   • Gross Description 08/18/2022    Final                    Value:This result contains rich text formatting which cannot be displayed here.   • Microscopic Description 08/18/2022    Final                    Value:This result contains rich text formatting which cannot be displayed here.   Pre-Admission Testing on 08/16/2022   Component Date Value Ref Range Status   • Potassium 08/16/2022 3.0 (A) 3.5 - 5.2 mmol/L Final   • WBC 08/16/2022 9.11  3.40 - 10.80 10*3/mm3 Final   • RBC 08/16/2022 4.16  3.77 - 5.28 10*6/mm3 Final   • Hemoglobin 08/16/2022 10.7 (A) 12.0 - 15.9 g/dL Final   • Hematocrit 08/16/2022 34.9  34.0 - 46.6 % Final   • MCV 08/16/2022 83.9  79.0 - 97.0 fL Final   • MCH 08/16/2022 25.7 (A) 26.6 - 33.0 pg Final   • MCHC 08/16/2022 30.7 (A) 31.5 - 35.7 g/dL Final   • RDW 08/16/2022 15.5 (A) 12.3 - 15.4 % Final   • RDW-SD 08/16/2022 47.2  37.0 - 54.0 fl Final   • MPV 08/16/2022 9.2  6.0 - 12.0 fL Final   • Platelets 08/16/2022 343  140 - 450 10*3/mm3 Final       No results found.    Assessment / Plan      Assessment/Plan:   1. Anemia due to other cause, not classified (Primary)  -Unclear etiology at this time  -Intolerant to oral iron secondary to GI upset  -Status post 1 dose of IV Ferrlecit  in July 2022 which she did not tolerate secondary to nausea  -Recent colonoscopy without evidence of bleeding or malignancy but was notable for enlarged internal hemorrhoids which are likely the source of her chronic anemia  -We will check labs as below to rule out a secondary causes  -We will consider IV Venofer in the future  -     CBC & Differential; Future  -     Comprehensive Metabolic Panel; Future  -     Lactate Dehydrogenase; Future  -     Ferritin; Future  -     Folate; Future  -     Iron Profile; Future  -     Vitamin B12; Future  -     Peripheral Blood Smear; Future  -     Haptoglobin; Future  -     Beta 2 Microglobulin, Serum; Future  -     CARLOS + PE; Future  -     Immunoglobulin Free LT Chains Blood; Future  -     Reticulocytes; Future    2. Abnormal finding of blood chemistry, unspecified   -     Ferritin; Future  -     Iron Profile; Future           Follow Up:   Follow-up in 3 weeks     Nakul Pal MD  Hematology and Oncology     Please note that portions of this note may have been completed with a voice recognition program. Efforts were made to edit the dictations, but occasionally words are mistranscribed.

## 2022-08-27 LAB
CYTOLOGIST CVX/VAG CYTO: NORMAL
PATH INTERP BLD-IMP: NORMAL

## 2022-08-29 ENCOUNTER — TELEPHONE (OUTPATIENT)
Dept: CARDIOLOGY | Facility: HOSPITAL | Age: 77
End: 2022-08-29

## 2022-08-29 VITALS
DIASTOLIC BLOOD PRESSURE: 78 MMHG | BODY MASS INDEX: 31.48 KG/M2 | OXYGEN SATURATION: 98 % | HEART RATE: 74 BPM | SYSTOLIC BLOOD PRESSURE: 124 MMHG | WEIGHT: 177.7 LBS | RESPIRATION RATE: 18 BRPM | HEIGHT: 63 IN

## 2022-08-29 DIAGNOSIS — I50.32 CHRONIC DIASTOLIC HEART FAILURE: Primary | ICD-10-CM

## 2022-08-29 LAB
ALBUMIN SERPL ELPH-MCNC: 3.1 G/DL (ref 2.9–4.4)
ALBUMIN/GLOB SERPL: 1 {RATIO} (ref 0.7–1.7)
ALPHA1 GLOB SERPL ELPH-MCNC: 0.3 G/DL (ref 0–0.4)
ALPHA2 GLOB SERPL ELPH-MCNC: 0.8 G/DL (ref 0.4–1)
B-GLOBULIN SERPL ELPH-MCNC: 1.2 G/DL (ref 0.7–1.3)
GAMMA GLOB SERPL ELPH-MCNC: 1 G/DL (ref 0.4–1.8)
GLOBULIN SER-MCNC: 3.3 G/DL (ref 2.2–3.9)
IGA SERPL-MCNC: 347 MG/DL (ref 64–422)
IGG SERPL-MCNC: 898 MG/DL (ref 586–1602)
IGM SERPL-MCNC: 145 MG/DL (ref 26–217)
INTERPRETATION SERPL IEP-IMP: NORMAL
KAPPA LC FREE SER-MCNC: 26.8 MG/L (ref 3.3–19.4)
KAPPA LC FREE/LAMBDA FREE SER: 1.32 {RATIO} (ref 0.26–1.65)
LABORATORY COMMENT REPORT: NORMAL
LAMBDA LC FREE SERPL-MCNC: 20.3 MG/L (ref 5.7–26.3)
M PROTEIN SERPL ELPH-MCNC: NORMAL G/DL
PROT SERPL-MCNC: 6.4 G/DL (ref 6–8.5)

## 2022-08-29 NOTE — TELEPHONE ENCOUNTER
Spoke with patient and she reports that her swelling has improved since IV lasix but shortness of breath has not improved. She notes leg cramps this morning and in the past this has been associated with low potassium.  She reports she initially lost to 3 pounds after IV diuresis but reports this is come back.  Swelling continues to be improved.  She currently is taking Lasix 40 mg daily as well as spironolactone 50 mg daily and Potassium 30 millequivalents daily.  Advised her to get a repeat BMP and magnesium. Further plans pending these results.  Advised to call back if she has any worsening symptoms.

## 2022-08-29 NOTE — TELEPHONE ENCOUNTER
----- Message from Maria Eugneia Castro CMA sent at 8/29/2022  1:39 PM EDT -----  Patient called started experiencing leg cramps today and was wondering if it could be related to her potassium level. She states that she is still taking potassium chloride 3 tablets BID as well as 3 diuretic prescribed by Mary Jo. She was seen in the office last week for fluid overload and states that her ankle edema has improved but she feels as if the shortness of breath is worse.

## 2022-08-30 ENCOUNTER — LAB (OUTPATIENT)
Dept: LAB | Facility: HOSPITAL | Age: 77
End: 2022-08-30

## 2022-08-30 ENCOUNTER — TELEPHONE (OUTPATIENT)
Dept: ADMINISTRATIVE | Facility: OTHER | Age: 77
End: 2022-08-30

## 2022-08-30 ENCOUNTER — PATIENT OUTREACH (OUTPATIENT)
Dept: CASE MANAGEMENT | Facility: OTHER | Age: 77
End: 2022-08-30

## 2022-08-30 DIAGNOSIS — I50.32 CHRONIC DIASTOLIC HEART FAILURE: Primary | ICD-10-CM

## 2022-08-30 DIAGNOSIS — I50.32 CHRONIC DIASTOLIC HEART FAILURE: ICD-10-CM

## 2022-08-30 DIAGNOSIS — J44.9 COPD MIXED TYPE: ICD-10-CM

## 2022-08-30 PROCEDURE — 80048 BASIC METABOLIC PNL TOTAL CA: CPT

## 2022-08-30 PROCEDURE — 36415 COLL VENOUS BLD VENIPUNCTURE: CPT

## 2022-08-30 PROCEDURE — 99487 CPLX CHRNC CARE 1ST 60 MIN: CPT | Performed by: INTERNAL MEDICINE

## 2022-08-30 PROCEDURE — 83735 ASSAY OF MAGNESIUM: CPT

## 2022-08-30 NOTE — PROGRESS NOTES
"Chief Complaint  Follow-up (Acute on chronic diastolic heart failure)    Subjective    History of Present Illness {  Problem List  Visit  Diagnosis   Encounters  Notes  Medications  Labs  Result Review Imaging  Media :23}       History of Present Illness    68-year-old female presents for evaluation of acute on chronic diastolic heart failure.  Patient reports ongoing problems with dyspnea, pedal edema and fatigue.  Patient has also been experiencing hypokalemia and is on high doses of potassium.History of aortic valve regurgitation, hypertension, peripheral venous insufficiency, mixed hyperlipidemia, controlled type 2 diabetes mellitus without insulin, vitamin D deficiency, hiatal hernia, IBS, bronchiectasis, stage II moderate COPD  Vital Signs:   Vitals:    08/23/22 1330   BP: 124/78   BP Location: Left arm   Patient Position: Sitting   Pulse: 74   Resp: 18   SpO2: 98%   Weight: 80.6 kg (177 lb 11.2 oz)   Height: 160 cm (63\")     Body mass index is 31.48 kg/m².  Physical Exam  Vitals and nursing note reviewed.   Constitutional:       Appearance: Normal appearance.   HENT:      Head: Normocephalic.   Eyes:      Pupils: Pupils are equal, round, and reactive to light.   Cardiovascular:      Rate and Rhythm: Normal rate and regular rhythm.      Pulses: Normal pulses.      Heart sounds: Normal heart sounds. No murmur heard.  Pulmonary:      Effort: Pulmonary effort is normal.      Breath sounds: Normal breath sounds.      Comments: Coarse rhonchi, decreased in bases   Abdominal:      General: Bowel sounds are normal.      Palpations: Abdomen is soft.   Musculoskeletal:         General: Normal range of motion.      Cervical back: Normal range of motion.      Right lower leg: Edema (1+ ) present.      Left lower leg: Edema (1+) present.   Skin:     General: Skin is warm and dry.      Capillary Refill: Capillary refill takes less than 2 seconds.      Findings: Erythema (bilateral lower extremities ) present. "   Neurological:      Mental Status: She is alert and oriented to person, place, and time.   Psychiatric:         Mood and Affect: Mood normal.         Thought Content: Thought content normal.              Result Review  Data Reviewed:{ Labs  Result Review  Imaging  Med Tab  Media :23}   Basic Metabolic Panel (08/23/2022 15:15)  Comprehensive Metabolic Panel (08/23/2022 15:36)  CBC and Differential (08/23/2022 15:36)  Protime-INR (08/23/2022 15:36)  APTT (08/23/2022 15:36)             Assessment and Plan {CC Problem List  Visit Diagnosis  ROS  Review (Popup)  Health Maintenance  Quality  BestPractice  Medications  SmartSets  SnapShot Encounters  Media :23}   1. Acute on chronic diastolic CHF (congestive heart failure) (HCC)  [patient given 80 mg of lasix iv through a butterfly . Butterfly was discontinued and area was free of ecchymosis, erythema.  - Basic Metabolic Panel; Future  - Basic Metabolic Panel    2. Hypokalemia  Continue potassium supplementation, aldactone daily  - Basic Metabolic Panel; Future  - Basic Metabolic Panel    3. Benign hypertension    - Basic Metabolic Panel; Future  - Basic Metabolic Panel  Controlled on aldactone         Follow Up {Instructions Charge Capture  Follow-up Communications :23}   Return if symptoms worsen or fail to improve.    Patient was given instructions and counseling regarding her condition or for health maintenance advice. Please see specific information pulled into the AVS if appropriate.  Patient was instructed to call the [facility] with any questions, concerns, or worsening symptoms.

## 2022-08-30 NOTE — OUTREACH NOTE
[facility] End of Month Documentation    This Chronic Medical Management Care Plan for Lorrie Pagan, 76 y.o. female, has been monitored and managed and a new plan of care implemented for the month of August.  A cumulative time of 75 minutes was spent on this patient record this month, including face to face visit with patient; electronic communication with other providers; face to face with provider; electronic communication with pharmacist.    Regarding the patient's problems: has Bronchiectasis (CMS/[facility]); H/O: lung cancer (Prior resection 2016); Chronic respiratory failure; RENETTA (Previous CPAP); Hiatal hernia (Prior Nissen 2008); Mycobacterium avium complex colonization; Irritable bowel syndrome with diarrhea; Aortic valve regurgitation; Benign hypertension; Peripheral edema; Impairment of balance; Peripheral neuropathy; Peripheral venous insufficiency; Chronic diastolic heart failure ([facility]); Syncope; Diuretic-induced hypokalemia; Hypomagnesemia; Stage II, moderate, COPD; Memory loss; Restless leg syndrome; Controlled type 2 diabetes mellitus without complication, without long-term current use of insulin ([facility]); Vitamin D deficiency; Mixed hyperlipidemia; Allergic rhinitis; Anemia; Gastroesophageal reflux disease with esophagitis without hemorrhage; Acquired hypothyroidism; Esophageal dysmotility; Dysphagia; Gastroparesis; Degenerative disc disease, cervical; Degenerative disc disease, lumbar; Globus sensation; Sialorrhea; Drooling; Dysfunction of both eustachian tubes; Tinnitus of both ears; Iron deficiency anemia; Bright red blood per rectum; and Internal hemorrhoids on their problem list., the following items were addressed: medications; medical records and any changes can be found within the plan section of the note.  A detailed listing of time spent for chronic care management is tracked within each outreach encounter.  Current medications include:  has a current medication list which includes the following  prescription(s): acetaminophen, albuterol, albuterol sulfate hfa, amitriptyline, azelastine, chlorhexidine, dapsone, donepezil, duloxetine, famotidine, furosemide, glimepiride, guaifenesin, ketoconazole, ketoconazole, latanoprost, memantine, metolazone, montelukast, o2, pantoprazole, potassium chloride, ropinirole xl, saccharomyces boulardii, aerochamber plus feng-vu, spironolactone, timolol, triamcinolone acetonide, and vitamin d. and the patient is reported to be patient is compliant with medication protocol,  Medications are reported to be effective.  Regarding these diagnoses, referrals were made to the following provider(s):  [facility], PCP.  All notes on chart for PCP to review.    The patient was monitored remotely for weight; blood pressure.    The patient's physical needs include:  needs met.     The patient's mental support needs include:  continued support    The patient's cognitive support needs include:  continued support    The patient's psychosocial support needs include:  coordination of community providers    The patient's functional needs include: needs assistance for ADLs    The patient's environmental needs include:  not applicable    Care Plan overall comments:  No data recorded    Refer to previous outreach notes for more information on the areas listed above.    Monthly Billing Diagnoses  (I50.32) Chronic diastolic heart failure (HCC)    (J44.9) Stage II, moderate, COPD    Medications   · Medications have been reconciled    Care Plan progress this month:      Recently Modified Care Plans Updates made since 7/30/2022 12:00 AM     Heart Failure (Adult)         Problem Priority Last Modified     ELS DISEASE PROGRESSION (HEART FAILURE) (ADULT) --  5/26/2022 10:35 AM by Helena Jimenez, NEO              Goal Recent Progress Last Modified     Health Optimized --  7/5/2022 10:35 AM by Helena Jimenez RN              Task Due Date Last Modified     Optimize Health --  8/25/2022 12:13 PM by Helena Jimenez,  RN     Care Management Activities:      - not discussed during this outreach      Notes: 8/25/2022             Goal Recent Progress Last Modified     Comorbidities Identified and Managed --  7/5/2022 10:36 AM by Helena Jimenez RN              Task Due Date Last Modified     Identify and Manage Comorbidities --  8/25/2022 12:15 PM by Helena Jimenez RN     Care Management Activities:      - response to pharmacologic therapy monitored      Notes: 8/25/2022             Problem Priority Last Modified     ELS ACTIVITY TOLERANCE (HEART FAILURE) (ADULT) --  5/26/2022 10:35 AM by Helena Jimenez RN              Goal Recent Progress Last Modified     Track and Manage Fluids and Swelling --  8/25/2022 12:15 PM by Helena Jimenez RN        - watch for swelling in feet, ankles and legs every day      Notes: 8/25/2022             Problem Priority Last Modified     ELS OBSTRUCTIVE SLEEP APNEA (HEART FAILURE) (ADULT) --  5/26/2022 10:35 AM by Helena Jimenez RN              Goal Recent Progress Last Modified     Effective Breathing Pattern During Sleep --  7/5/2022 10:38 AM by Helena Jimenez RN              Task Due Date Last Modified     Monitor and Manage Obstructive Sleep Apnea (RENETTA) --  8/25/2022 12:13 PM by Helena Jimenez RN     Care Management Activities:      - not discussed during this outreach      Notes: 8/25/2022                     · Current Specialty Plan of Care Status signed by both patient and provider    Instructions   · Patient was provided an electronic copy of care plan  · CCM services were explained and offered and patient has accepted these services.  · Patient has given their written consent to receive CCM services and understands that this includes the authorization of electronic communication of medical information with the other treating providers.  · Patient understands that they may stop CCM services at any time and these changes will be effective at the end of the calendar month and will effectively  revocate the agreement of CCM services.  · Patient understands that only one practitioner can furnish and be paid for CCM services during one calendar month.  Patient also understands that there may be co-payment or deductible fees in association with CCM services.  · Patient will continue with at least monthly follow-up calls with the Nurse Navigator.    JI RIOS  Ambulatory Case Management    8/30/2022, 15:31 EDT

## 2022-08-30 NOTE — OUTREACH NOTE
AMBULATORY CASE MANAGEMENT NOTE    Name and Relationship of Patient/Support Person: Ej Lorrie White - Self    CCM Interim Update    Spoke with patient as a follow up call regarding cardiology follow up and her GI medication that was sent to [facility]. Patient stated she has not heard from cardiology or [facility]. Patient is agreeable for a referral to be placed to Dr. Hook, messaged PCP with a request for a referral. Spoke with Bailey at [facility], they have received patient's order and information. Per Bailey, they will contact the patient to obtain payment.  ACM will f/u with patient on Thursday.     JI RIOS  Ambulatory Case Management    8/30/2022, 15:22 EDT

## 2022-08-31 ENCOUNTER — TELEPHONE (OUTPATIENT)
Dept: INTERNAL MEDICINE | Facility: CLINIC | Age: 77
End: 2022-08-31

## 2022-08-31 ENCOUNTER — OFFICE VISIT (OUTPATIENT)
Dept: PULMONOLOGY | Facility: CLINIC | Age: 77
End: 2022-08-31

## 2022-08-31 ENCOUNTER — TELEPHONE (OUTPATIENT)
Dept: CARDIOLOGY | Facility: HOSPITAL | Age: 77
End: 2022-08-31

## 2022-08-31 VITALS
DIASTOLIC BLOOD PRESSURE: 60 MMHG | RESPIRATION RATE: 20 BRPM | WEIGHT: 178.2 LBS | TEMPERATURE: 97.8 F | BODY MASS INDEX: 31.57 KG/M2 | OXYGEN SATURATION: 97 % | SYSTOLIC BLOOD PRESSURE: 138 MMHG | HEIGHT: 63 IN | HEART RATE: 101 BPM

## 2022-08-31 DIAGNOSIS — R59.9 LYMPH NODE ENLARGEMENT: Primary | ICD-10-CM

## 2022-08-31 DIAGNOSIS — E11.9 CONTROLLED TYPE 2 DIABETES MELLITUS WITHOUT COMPLICATION, WITHOUT LONG-TERM CURRENT USE OF INSULIN: Primary | ICD-10-CM

## 2022-08-31 DIAGNOSIS — J47.9 IDIOPATHIC BRONCHIECTASIS: ICD-10-CM

## 2022-08-31 DIAGNOSIS — J44.9 COPD MIXED TYPE: ICD-10-CM

## 2022-08-31 DIAGNOSIS — E87.6 HYPOKALEMIA: ICD-10-CM

## 2022-08-31 DIAGNOSIS — J96.11 CHRONIC RESPIRATORY FAILURE WITH HYPOXIA: ICD-10-CM

## 2022-08-31 LAB
ANION GAP SERPL CALCULATED.3IONS-SCNC: 7 MMOL/L (ref 5–15)
BUN SERPL-MCNC: 18 MG/DL (ref 8–23)
BUN/CREAT SERPL: 23.7 (ref 7–25)
CALCIUM SPEC-SCNC: 9.7 MG/DL (ref 8.6–10.5)
CHLORIDE SERPL-SCNC: 90 MMOL/L (ref 98–107)
CO2 SERPL-SCNC: 40 MMOL/L (ref 22–29)
CREAT SERPL-MCNC: 0.76 MG/DL (ref 0.57–1)
EGFRCR SERPLBLD CKD-EPI 2021: 80.3 ML/MIN/1.73
GLUCOSE SERPL-MCNC: 158 MG/DL (ref 65–99)
MAGNESIUM SERPL-MCNC: 2.2 MG/DL (ref 1.6–2.4)
POTASSIUM SERPL-SCNC: 4.4 MMOL/L (ref 3.5–5.2)
SODIUM SERPL-SCNC: 137 MMOL/L (ref 136–145)

## 2022-08-31 PROCEDURE — 99214 OFFICE O/P EST MOD 30 MIN: CPT | Performed by: NURSE PRACTITIONER

## 2022-08-31 RX ORDER — GLUCOSAMINE HCL/CHONDROITIN SU 500-400 MG
CAPSULE ORAL
Qty: 100 EACH | Refills: 11 | Status: SHIPPED | OUTPATIENT
Start: 2022-08-31

## 2022-08-31 RX ORDER — BLOOD-GLUCOSE METER
1 KIT MISCELLANEOUS ONCE
Qty: 1 EACH | Refills: 0 | Status: SHIPPED | OUTPATIENT
Start: 2022-08-31 | End: 2022-08-31

## 2022-08-31 RX ORDER — LANCETS 28 GAUGE
EACH MISCELLANEOUS
Qty: 100 EACH | Refills: 12 | Status: SHIPPED | OUTPATIENT
Start: 2022-08-31

## 2022-08-31 RX ORDER — POTASSIUM CHLORIDE 750 MG/1
CAPSULE, EXTENDED RELEASE ORAL
Qty: 90 CAPSULE | Refills: 0
Start: 2022-08-31 | End: 2022-10-12 | Stop reason: SDUPTHER

## 2022-08-31 NOTE — PROGRESS NOTES
"[facility] Pulmonary Follow up      Chief Complaint  COPD and Follow-up    Subjective          Lorrie Pagan presents to [facility] for follow-up after recent bronchoscopy.  She was having quite a bit of congestion and respiratory issues and underwent bronchoscopy.  She did feel better for quite a while but the cough and congestion has again returned.  She is not currently using her nebulizers.  She is also stopped using her Trelegy in the last month or so.  She is not sure why.  She has noticed some memory issues recently.  She attributes it to her low oxygen levels.  At night if her oxygen falls off and she checks her oxygen it will be down into the 70s.  She does wear her oxygen continuously throughout the day.  She complains of quite a bit of fatigue and generalized malaise throughout the day.    She is also following up with Dr. Ngo's for some GI issues.  She has trouble with motility, which causes her quite a bit of bloating and shortness of breath after eating.          Objective     Vital Signs:   /60 (BP Location: Right arm, Patient Position: Sitting)   Pulse 101   Temp 97.8 °F (36.6 °C)   Resp 20   Ht 160 cm (62.99\")   Wt 80.8 kg (178 lb 3.2 oz)   SpO2 97% Comment: resting at O2 4L/min  BMI 31.57 kg/m²       Immunization History   Administered Date(s) Administered   • COVID-19 (MODERNA) 1st, 2nd, 3rd Dose Only 02/13/2021, 03/13/2021, 09/17/2021   • Flu Vaccine Quad PF >36MO 10/31/2019, 10/25/2020   • Flu Vaccine Split Quad 10/31/2019   • Fluzone High Dose =>65 Years ([employer] ONLY) 10/28/2018, 10/21/2019, 10/16/2020   • Fluzone High-Dose 65+yrs 11/17/2021   • Influenza TIV (IM) 11/01/2017   • Pneumococcal Conjugate 13-Valent (PCV13) 08/03/2018, 10/28/2018   • Pneumococcal Polysaccharide (PPSV23) 10/16/2020   • Pneumococcal, Unspecified 01/01/2016   • Shingrix 03/31/2021       Physical Exam  Vitals reviewed.   Constitutional:       " General: She is not in acute distress.     Appearance: She is well-developed. She is not ill-appearing.   HENT:      Head: Normocephalic and atraumatic.   Eyes:      Pupils: Pupils are equal, round, and reactive to light.   Cardiovascular:      Rate and Rhythm: Normal rate and regular rhythm.      Heart sounds: No murmur heard.  Pulmonary:      Effort: Pulmonary effort is normal. No respiratory distress.      Breath sounds: Normal breath sounds. No wheezing or rales.   Abdominal:      General: Bowel sounds are normal. There is no distension.      Palpations: Abdomen is soft.   Musculoskeletal:         General: Normal range of motion.      Cervical back: Normal range of motion and neck supple.   Skin:     General: Skin is warm and dry.      Findings: No erythema.   Neurological:      Mental Status: She is alert and oriented to person, place, and time.   Psychiatric:         Behavior: Behavior normal.          Result Review :       Data reviewed: Radiologic studies CT scan of the chest from April and Pathology and cytology from her recent bronchoscopy                    Assessment and Plan    Problem List Items Addressed This Visit        Pulmonary and Pneumonias    Bronchiectasis (CMS/HCC)    Relevant Medications    Fluticasone-Umeclidin-Vilant (TRELEGY) 200-62.5-25 MCG/INH inhaler    Chronic respiratory failure    Stage II, moderate, COPD    Overview     FEV1 1.04, 56% predicted         Relevant Medications    Fluticasone-Umeclidin-Vilant (TRELEGY) 200-62.5-25 MCG/INH inhaler      Other Visit Diagnoses     Lymph node enlargement    -  Primary    Relevant Orders    CT Chest With Contrast        We reviewed her pathology from her bronchoscopy, the AFB and fungal cultures are still pending and we discussed will grow for several weeks until final.  Her CT scan in April did show some lymphadenopathy which was biopsied by Dr. Arboleda.  We will follow-up on a CT scan of the chest to check on that lymphadenopathy.    We  did discuss her worsening hypoxemia as well as symptoms including her memory loss and could even be attributed to her gastric motility issues.  She does have chronic respiratory failure with history of hypercarbia and COPD.  She requires noninvasive ventilation for her respiratory failure, BiPAP/CPAP has been ruled out due to the need for adjustable pressure support provided by [facility].  Ordering Trilogy for nocturnal and daytime usage.     We also discussed continuing or resuming her chronic maintenance therapy.  She will resume her Trelegy daily.  I sent that into her pharmacy today.  We also discussed the importance of using her nebulizers with her chronic cough and chronic sputum production, and bronchiectasis.    Follow-up here in the office in 6 to 8 weeks to review her CT scan.        Follow Up     No follow-ups on file.  Patient was given instructions and counseling regarding her condition or for health maintenance advice. Please see specific information pulled into the AVS if appropriate.     I spent 35 minutes caring for Lorrie on this date of service. This time includes time spent by me in the following activities:preparing for the visit, reviewing tests, obtaining and/or reviewing a separately obtained history, performing a medically appropriate examination and/or evaluation , counseling and educating the patient/family/caregiver, ordering medications, tests, or procedures, referring and communicating with other health care professionals , documenting information in the medical record and independently interpreting results and communicating that information with the patient/family/caregiver    Moderate level of Medical Decision Making complexity based on 2 or more chronic stable illnesses and prescription drug management.    DONALD Sylvester, ACNP  [facility] Pulmonary Critical Care Medicine  Electronically signed

## 2022-08-31 NOTE — TELEPHONE ENCOUNTER
Reviewed labs with patient. Continue current dosing of lasix 40 mg daily with aldactone 50 mg daily.   Decrease  Potassium to 30 meq daily.

## 2022-08-31 NOTE — TELEPHONE ENCOUNTER
Caller: Lorrie Pagan    Relationship: Self    Best call back number: [phone]    What medication are you requesting: NEW GLUCOMETER AND SUPPLIES      If a prescription is needed, what is your preferred pharmacy and phone number: [facility] [place], [place] - [address] - [phone]  - [phone]     Additional notes:

## 2022-09-02 DIAGNOSIS — R06.02 SHORTNESS OF BREATH: ICD-10-CM

## 2022-09-02 RX ORDER — ALBUTEROL SULFATE 90 UG/1
2 AEROSOL, METERED RESPIRATORY (INHALATION) EVERY 6 HOURS PRN
Qty: 18 G | Refills: 11 | Status: SHIPPED | OUTPATIENT
Start: 2022-09-02

## 2022-09-06 ENCOUNTER — PATIENT OUTREACH (OUTPATIENT)
Dept: CASE MANAGEMENT | Facility: OTHER | Age: 77
End: 2022-09-06

## 2022-09-06 DIAGNOSIS — R00.2 PALPITATIONS: Primary | ICD-10-CM

## 2022-09-06 DIAGNOSIS — I50.32 CHRONIC DIASTOLIC HEART FAILURE: Primary | ICD-10-CM

## 2022-09-06 DIAGNOSIS — J44.9 COPD MIXED TYPE: ICD-10-CM

## 2022-09-06 PROCEDURE — 99490 CHRNC CARE MGMT STAFF 1ST 20: CPT | Performed by: INTERNAL MEDICINE

## 2022-09-06 NOTE — OUTREACH NOTE
AMBULATORY CASE MANAGEMENT NOTE    Name and Relationship of Patient/Support Person: Lorrie Pagan - Self    CCM Interim Update    Received a call from patient. She stated her rent was going up about $100/month, she stated she would not be able to afford Domperidone. She is agreeable to contact [facility] to assist her with her other medications that average $600 every 6 months. Provided patient with the number to [facility] - [facility] - [phone]. She is agreeable to contact Amy. Advised patient may be able to offset enough of her other medications to help pay for Domperidone.     She stated she would not qualify for help with rent as she was over the income limit. She stated someone with cardiology has called her but she didn't know she needed to be seen. Encouraged her to contact cardiology to schedule an appointment as soon as possible. She is agreeable to see DONALD Khan.  F/U outreach scheduled.     SDOH updated and reviewed with the patient during this program:  Financial Resource Strain: Medium Risk   • Difficulty of Paying Living Expenses: Somewhat hard      Food Insecurity: No Food Insecurity   • Worried About Running Out of Food in the Last Year: Never true   • Ran Out of Food in the Last Year: Never true      Housing Stability: Low Risk    • Unable to Pay for Housing in the Last Year: No   • Number of Places Lived in the Last Year: 1   • Unstable Housing in the Last Year: No      Continuing Care   [facility] ON AGING & INDEPENDENT LIVING    [address] , [place] [address]    Phone: [phone]    Resource for: Social Connections   [facility] [place] [facility] [place]    [address], [place] [address]    Phone: [phone]    Resource for: Food Insecurity, Physical Activity   [facility]    [address], [place] [address]    Phone: [phone]   [facility] PRESCRIPTION  [facility]    [address], [place] [address]    Phone: [phone]    Resource for: Financial Resource Strain           JI RIOS  Ambulatory Case Management    9/6/2022, 14:17 EDT

## 2022-09-07 RX ORDER — SPIRONOLACTONE 25 MG/1
50 TABLET ORAL DAILY
Qty: 60 TABLET | Refills: 11 | Status: SHIPPED | OUTPATIENT
Start: 2022-09-07

## 2022-09-09 ENCOUNTER — TELEPHONE (OUTPATIENT)
Dept: CARDIOLOGY | Facility: HOSPITAL | Age: 77
End: 2022-09-09

## 2022-09-09 NOTE — TELEPHONE ENCOUNTER
----- Message from Rosa Comer MA sent at 9/7/2022  1:47 PM EDT -----  Pt stated that she has redness and edema in both legs and has been having muscle cramps in the calf area. She states no significant weight gain fluctuates between 1-2 lbs daily. Would like to know if she needs to change any medication dosages.

## 2022-09-13 ENCOUNTER — TELEPHONE (OUTPATIENT)
Dept: CASE MANAGEMENT | Facility: OTHER | Age: 77
End: 2022-09-13

## 2022-09-13 NOTE — TELEPHONE ENCOUNTER
Attempted to reach patient as a follow up call regarding KPAP. Left VM for a return call to [phone].

## 2022-09-15 ENCOUNTER — PATIENT OUTREACH (OUTPATIENT)
Dept: CASE MANAGEMENT | Facility: OTHER | Age: 77
End: 2022-09-15

## 2022-09-15 DIAGNOSIS — I50.32 CHRONIC DIASTOLIC HEART FAILURE: Primary | ICD-10-CM

## 2022-09-15 DIAGNOSIS — J44.9 COPD MIXED TYPE: ICD-10-CM

## 2022-09-15 NOTE — OUTREACH NOTE
AMBULATORY CASE MANAGEMENT NOTE    Name and Relationship of Patient/Support Person: Lorrie Pagan White - Self    Patient Outreach    Received call from patient. She has not contacted [facility]. She stated she has a meeting with her landlord regarding rent increases. Encouraged her to call me after her meeting to discuss resources should her rent increase beyond her means. She voiced understanding.     JI RIOS  Ambulatory Case Management    9/15/2022, 16:53 EDT

## 2022-09-16 ENCOUNTER — OFFICE VISIT (OUTPATIENT)
Dept: ONCOLOGY | Facility: CLINIC | Age: 77
End: 2022-09-16

## 2022-09-16 VITALS
BODY MASS INDEX: 32.43 KG/M2 | TEMPERATURE: 97.1 F | SYSTOLIC BLOOD PRESSURE: 167 MMHG | WEIGHT: 183 LBS | OXYGEN SATURATION: 92 % | DIASTOLIC BLOOD PRESSURE: 72 MMHG | HEART RATE: 99 BPM | RESPIRATION RATE: 18 BRPM | HEIGHT: 63 IN

## 2022-09-16 DIAGNOSIS — D50.9 IRON DEFICIENCY ANEMIA, UNSPECIFIED IRON DEFICIENCY ANEMIA TYPE: Primary | ICD-10-CM

## 2022-09-16 DIAGNOSIS — K90.9 MALABSORPTION OF IRON: ICD-10-CM

## 2022-09-16 PROCEDURE — 99214 OFFICE O/P EST MOD 30 MIN: CPT | Performed by: INTERNAL MEDICINE

## 2022-09-16 RX ORDER — SODIUM CHLORIDE 9 MG/ML
250 INJECTION, SOLUTION INTRAVENOUS ONCE
Status: CANCELLED | OUTPATIENT
Start: 2022-09-23

## 2022-09-16 RX ORDER — ONDANSETRON 2 MG/ML
8 INJECTION INTRAMUSCULAR; INTRAVENOUS ONCE
Status: CANCELLED
Start: 2022-09-30 | End: 2022-09-30

## 2022-09-16 RX ORDER — ONDANSETRON 2 MG/ML
8 INJECTION INTRAMUSCULAR; INTRAVENOUS ONCE
Status: CANCELLED
Start: 2022-09-23 | End: 2022-09-23

## 2022-09-16 RX ORDER — BLOOD-GLUCOSE METER
KIT MISCELLANEOUS
COMMUNITY
Start: 2022-09-01

## 2022-09-16 RX ORDER — SODIUM CHLORIDE 9 MG/ML
250 INJECTION, SOLUTION INTRAVENOUS ONCE
Status: CANCELLED | OUTPATIENT
Start: 2022-09-30

## 2022-09-16 NOTE — PROGRESS NOTES
Follow Up Office Visit      Date: 2022     Patient Name: Lorrie Pagan  MRN: [id number]  : 1945  Referring Physician: Dacia Viera     Chief Complaint: Follow-up for iron deficiency anemia     History of Present Illness: Lorrie Pagan is a pleasant 76 y.o. female past medical history of asthma, COPD, type 2 diabetes, gastroparesis, chronic diastolic heart failure, lung cancer status post resection in 2016 who presents today for evaluation of anemia. The patient has been followed by other PCP who has been monitoring CBCs which is been notable for mild anemia range between 10-12 over the past several months.  Patient does note significant fatigue as well as episodes of bright red blood per rectum.  She has recently had a colonoscopy this month which only showed enlarged internal hemorrhoids but no significant bleeding or malignant appearing lesions.  She denies any cravings for ice or restless leg syndrome symptoms.  Has had significant GI upset to oral iron in the past.  States that she also had an infusion of IV Ferrlecit during hospitalization in 2022.  She had significant nausea related to this.  She otherwise follows with pulmonary in regards to her history of lung cancer as well as significant mucus plugging for which she had an endoscopy a few days ago.  Biopsies at that time were negative for malignancy    Interval History:  Presents to clinic for follow-up.  Still having significant fatigue and some leg cramping.  Otherwise remains on chronic oxygen.  Denies any worsening shortness of breath or cough    Oncology History:    Oncology/Hematology History    No history exists.       Subjective      Review of Systems:   Constitutional: Negative for fevers, chills, or weight loss  Eyes: Negative for blurred vision or discharge         Ear/Nose/Throat: Negative for difficulty swallowing, sore throat, LAD                                                       Respiratory:  Negative for cough, SOA, wheezing                                                                                        Cardiovascular: Negative for chest pain or palpitations                                                                  Gastrointestinal: Negative for nausea, vomiting or diarrhea                                                                     Genitourinary: Negative for dysuria or hematuria                                                                                           Musculoskeletal: Negative for any joint pains or muscle aches                                                                        Neurologic: Negative for any weakness, headaches, dizziness                                                                         Hematologic: Negative for any easy bleeding or bruising                                                                                   Psychiatric: Negative for anxiety or depression                          Past Medical History/Past Surgical History/ Family History/ Social History: Reviewed by me and unchanged from my previous documentation done on August 2022.     Medications:     Current Outpatient Medications:   •  acetaminophen (TYLENOL) 500 MG tablet, Take 1,000 mg by mouth Every 6 (Six) Hours As Needed for Mild Pain ., Disp: , Rfl:   •  albuterol (ACCUNEB) 1.25 MG/3ML nebulizer solution, Take 3 mL by nebulization Every 6 (Six) Hours As Needed for Shortness of Air., Disp: 90 each, Rfl: 5  •  albuterol sulfate  (90 Base) MCG/ACT inhaler, INHALE 2 PUFFS EVERY 6 (SIX) HOURS AS NEEDED FOR WHEEZING., Disp: 18 g, Rfl: 11  •  amitriptyline (ELAVIL) 10 MG tablet, Take 10 mg by mouth As Needed for Sleep (to stop drooling at night)., Disp: , Rfl:   •  azelastine (ASTELIN) 0.1 % nasal spray, 2 sprays into the nostril(s) as directed by provider 2 (Two) Times a Day. (Patient taking differently: 2 sprays into the nostril(s) as directed by provider 2 (Two)  Times a Day As Needed for Rhinitis or Allergies.), Disp: 30 mL, Rfl: 5  •  Blood Glucose Monitoring Suppl (FreeStyle Freedom Lite) w/Device kit, , Disp: , Rfl:   •  chlorhexidine (PERIDEX) 0.12 % solution, Apply 15 mL to the mouth or throat 2 (Two) Times a Day As Needed. For 14 days, w/1 refill, Disp: , Rfl:   •  Dapsone 5 % topical gel, Apply 1 application topically to the appropriate area as directed Daily As Needed (apply to legs)., Disp: , Rfl:   •  donepezil (ARICEPT) 10 MG tablet, Take 10 mg by mouth Daily., Disp: , Rfl:   •  DULoxetine (CYMBALTA) 60 MG capsule, TAKE 1 CAPSULE BY MOUTH EVERY DAY (Patient taking differently: Take 60 mg by mouth Daily.), Disp: 90 capsule, Rfl: 3  •  famotidine (Pepcid AC Maximum Strength) 20 MG tablet, Take 1 tablet by mouth 2 (Two) Times a Day., Disp: 8 tablet, Rfl: 0  •  Fluticasone-Umeclidin-Vilant (TRELEGY) 200-62.5-25 MCG/INH inhaler, Inhale 1 puff Daily., Disp: 1 each, Rfl: 5  •  furosemide (LASIX) 40 MG tablet, Take 1 tablet by mouth Daily. (Patient taking differently: Take 80 mg by mouth Daily.), Disp: 90 tablet, Rfl: 1  •  glimepiride (AMARYL) 2 MG tablet, TAKE 1 TABLET BY MOUTH TWICE DAILY (Patient taking differently: Take 2 mg by mouth 2 (Two) Times a Day.), Disp: 60 tablet, Rfl: 0  •  Glucose Blood (Blood Glucose Test) strip, Use to test blood sugar daily before breakfast., Disp: 100 each, Rfl: 11  •  guaiFENesin (Mucinex) 600 MG 12 hr tablet, Take 2 tablets by mouth 2 (Two) Times a Day., Disp: 180 tablet, Rfl: 3  •  ketoconazole (NIZORAL) 2 % cream, Apply 1 application topically to the appropriate area as directed Daily As Needed for Itching (apply to back and legs)., Disp: , Rfl:   •  ketoconazole (NIZORAL) 2 % shampoo, Apply 1 application topically to the appropriate area as directed As Needed for Irritation (for head)., Disp: , Rfl:   •  Lancets (freestyle) lancets, Use to test blood sugar daily before breakfast., Disp: 100 each, Rfl: 12  •  latanoprost (XALATAN)  "0.005 % ophthalmic solution, Administer 1 drop to both eyes Every Night., Disp: , Rfl:   •  memantine (NAMENDA) 5 MG tablet, Take 5 mg by mouth Daily., Disp: , Rfl:   •  metOLazone (ZAROXOLYN) 2.5 MG tablet, Take 2.5 mg by mouth Daily., Disp: , Rfl:   •  montelukast (SINGULAIR) 10 MG tablet, Take 1 tablet by mouth Every Night., Disp: 90 tablet, Rfl: 3  •  O2 (OXYGEN), Inhale 4 L/min Continuous., Disp: , Rfl:   •  pantoprazole (PROTONIX) 40 MG EC tablet, Take 1 tablet by mouth 2 (Two) Times a Day., Disp: 180 tablet, Rfl: 3  •  potassium chloride (MICRO-K) 10 MEQ CR capsule, 3 capsules daily, Disp: 90 capsule, Rfl: 0  •  rOPINIRole XL (REQUIP XL) 8 MG 24 hr tablet, Take 8 mg by mouth 2 (two) times a day., Disp: , Rfl:   •  Spacer/Aero-Holding Chambers (AeroChamber Plus Morris-Vu) misc, As Needed., Disp: , Rfl:   •  spironolactone (ALDACTONE) 25 MG tablet, Take 2 tablets by mouth Daily., Disp: 60 tablet, Rfl: 11  •  timolol (TIMOPTIC) 0.25 % ophthalmic solution, Administer 1 drop to the right eye Every Morning., Disp: , Rfl:   •  Triamcinolone Acetonide (NASACORT) 55 MCG/ACT nasal inhaler, 2 sprays into the nostril(s) as directed by provider Daily., Disp: , Rfl:   •  vitamin D (ERGOCALCIFEROL) 1.25 MG (77467 UT) capsule capsule, Take 1 capsule by mouth 1 (One) Time Per Week. (Patient taking differently: Take 50,000 Units by mouth 1 (One) Time Per Week. Wednesday), Disp: 12 capsule, Rfl: 1    Allergies:   Allergies   Allergen Reactions   • Hydrocodone Hallucinations     \"With high doses\"   • Statins Myalgia     myalgia   • Metoclopramide Other (See Comments)     EXACERBATED RLS   • Penicillins Rash     Rash    Tolerated Ceftriaxone   • Tizanidine Hallucinations   • Tramadol Nausea And Vomiting and GI Intolerance     VERY MILD PER PT       Objective     Physical Exam:  Vital Signs:   Vitals:    09/16/22 0953   BP: 167/72   Pulse: 99   Resp: 18   Temp: 97.1 °F (36.2 °C)   TempSrc: Temporal   SpO2: 92%   Weight: 83 kg (183 " "lb)   Height: 160 cm (62.99\")   PainSc: 0-No pain     Pain Score    09/16/22 0953   PainSc: 0-No pain     ECOG Performance Status: 1 - Symptomatic but completely ambulatory    Constitutional: NAD, ECOG 1  Eyes: PERRLA, scleral anicteric  ENT: No LAD, no thyromegaly  Respiratory: CTAB, no wheezing, rales, rhonchi  Cardiovascular: RRR, no murmurs, pulses 2+ bilaterally  Abdomen: soft, NT/ND, no HSM  Musculoskeletal: strength 5/5 bilaterally, no c/c/e  Neurologic: A&O x 3, CN II-XII intact grossly    Results Review:   No visits with results within 2 Week(s) from this visit.   Latest known visit with results is:   Lab on 08/30/2022   Component Date Value Ref Range Status   • Glucose 08/30/2022 158 (A) 65 - 99 mg/dL Final   • BUN 08/30/2022 18  8 - 23 mg/dL Final   • Creatinine 08/30/2022 0.76  0.57 - 1.00 mg/dL Final   • Sodium 08/30/2022 137  136 - 145 mmol/L Final   • Potassium 08/30/2022 4.4  3.5 - 5.2 mmol/L Final   • Chloride 08/30/2022 90 (A) 98 - 107 mmol/L Final   • CO2 08/30/2022 40.0 (A) 22.0 - 29.0 mmol/L Final   • Calcium 08/30/2022 9.7  8.6 - 10.5 mg/dL Final   • BUN/Creatinine Ratio 08/30/2022 23.7  7.0 - 25.0 Final   • Anion Gap 08/30/2022 7.0  5.0 - 15.0 mmol/L Final   • eGFR 08/30/2022 80.3  >60.0 mL/min/1.73 Final    National Kidney Foundation and American Society of Nephrology (ASN) Task Force recommended calculation based on the Chronic Kidney Disease Epidemiology Collaboration (CKD-EPI) equation refit without adjustment for race.   • Magnesium 08/30/2022 2.2  1.6 - 2.4 mg/dL Final       No results found.    Assessment / Plan      Assessment/Plan:   1. Iron deficiency anemia, unspecified iron deficiency anemia type (Primary)/2. Malabsorption of iron  -Unclear etiology at this time  -Intolerant to oral iron secondary to GI upset  -Status post 1 dose of IV Ferrlecit in July 2022 which she did not tolerate secondary to nausea  -Recent colonoscopy without evidence of bleeding or malignancy but was " notable for enlarged internal hemorrhoids which are likely the source of her chronic anemia  -Recent labs only notable for iron deficiency anemia in August 2022  -As she has been intolerant to oral iron and failed Ferrlecit we will plan to transition her to Injectafer x 2 weekly doses  -We will plan to repeat iron studies in 3 months    Follow Up:   Follow-up in 3 months     Nakul Pal MD  Hematology and Oncology     Please note that portions of this note may have been completed with a voice recognition program. Efforts were made to edit the dictations, but occasionally words are mistranscribed.

## 2022-09-20 ENCOUNTER — HOSPITAL ENCOUNTER (OUTPATIENT)
Dept: CT IMAGING | Facility: HOSPITAL | Age: 77
Discharge: HOME | End: 2022-09-20
Admitting: NURSE PRACTITIONER

## 2022-09-20 DIAGNOSIS — R59.9 LYMPH NODE ENLARGEMENT: ICD-10-CM

## 2022-09-20 PROCEDURE — 25010000002 IOPAMIDOL 61 % SOLUTION: Performed by: NURSE PRACTITIONER

## 2022-09-20 PROCEDURE — 71260 CT THORAX DX C+: CPT

## 2022-09-20 RX ADMIN — IOPAMIDOL 85 ML: 612 INJECTION, SOLUTION INTRAVENOUS at 10:57

## 2022-09-23 ENCOUNTER — HOSPITAL ENCOUNTER (OUTPATIENT)
Dept: ONCOLOGY | Facility: HOSPITAL | Age: 77
Setting detail: INFUSION SERIES
Discharge: HOME | End: 2022-09-23

## 2022-09-23 VITALS
SYSTOLIC BLOOD PRESSURE: 136 MMHG | BODY MASS INDEX: 32.43 KG/M2 | TEMPERATURE: 97.7 F | RESPIRATION RATE: 18 BRPM | HEART RATE: 86 BPM | HEIGHT: 63 IN | DIASTOLIC BLOOD PRESSURE: 45 MMHG | WEIGHT: 183 LBS

## 2022-09-23 DIAGNOSIS — D50.9 IRON DEFICIENCY ANEMIA, UNSPECIFIED IRON DEFICIENCY ANEMIA TYPE: ICD-10-CM

## 2022-09-23 DIAGNOSIS — K90.9 MALABSORPTION OF IRON: Primary | ICD-10-CM

## 2022-09-23 PROCEDURE — 96374 THER/PROPH/DIAG INJ IV PUSH: CPT

## 2022-09-23 PROCEDURE — 25010000002 ONDANSETRON PER 1 MG: Performed by: INTERNAL MEDICINE

## 2022-09-23 PROCEDURE — 25010000002 FERRIC CARBOXYMALTOSE 750 MG/15ML SOLUTION 15 ML VIAL: Performed by: INTERNAL MEDICINE

## 2022-09-23 PROCEDURE — 96375 TX/PRO/DX INJ NEW DRUG ADDON: CPT

## 2022-09-23 RX ORDER — SODIUM CHLORIDE 9 MG/ML
250 INJECTION, SOLUTION INTRAVENOUS ONCE
Status: COMPLETED | OUTPATIENT
Start: 2022-09-23 | End: 2022-09-23

## 2022-09-23 RX ADMIN — SODIUM CHLORIDE 250 ML: 9 INJECTION, SOLUTION INTRAVENOUS at 12:40

## 2022-09-23 RX ADMIN — FERRIC CARBOXYMALTOSE INJECTION 750 MG: 50 INJECTION, SOLUTION INTRAVENOUS at 13:10

## 2022-09-23 RX ADMIN — ONDANSETRON 8 MG: 2 INJECTION INTRAMUSCULAR; INTRAVENOUS at 12:42

## 2022-09-29 ENCOUNTER — PATIENT OUTREACH (OUTPATIENT)
Dept: CASE MANAGEMENT | Facility: OTHER | Age: 77
End: 2022-09-29

## 2022-09-29 DIAGNOSIS — J44.9 COPD MIXED TYPE: ICD-10-CM

## 2022-09-29 DIAGNOSIS — I50.32 CHRONIC DIASTOLIC HEART FAILURE: Primary | ICD-10-CM

## 2022-09-29 NOTE — OUTREACH NOTE
[facility] End of Month Documentation    This Chronic Medical Management Care Plan for Lorrie Pagan, 77 y.o. female, has been monitored and managed and a new plan of care implemented for the month of September.  A cumulative time of 33 minutes was spent on this patient record this month, including phone call with patient; phone call with other providers.    Regarding the patient's problems: has Bronchiectasis (CMS/[facility]); H/O: lung cancer (Prior resection 2016); Chronic respiratory failure; RENETTA (Previous CPAP); Hiatal hernia (Prior Nissen 2008); Mycobacterium avium complex colonization; Irritable bowel syndrome with diarrhea; Aortic valve regurgitation; Benign hypertension; Peripheral edema; Impairment of balance; Peripheral neuropathy; Peripheral venous insufficiency; Chronic diastolic heart failure ([facility]); Syncope; Diuretic-induced hypokalemia; Hypomagnesemia; Stage II, moderate, COPD; Memory loss; Restless leg syndrome; Controlled type 2 diabetes mellitus without complication, without long-term current use of insulin ([facility]); Vitamin D deficiency; Mixed hyperlipidemia; Allergic rhinitis; Anemia; Gastroesophageal reflux disease with esophagitis without hemorrhage; Acquired hypothyroidism; Esophageal dysmotility; Dysphagia; Gastroparesis; Degenerative disc disease, cervical; Degenerative disc disease, lumbar; Globus sensation; Sialorrhea; Drooling; Dysfunction of both eustachian tubes; Tinnitus of both ears; Iron deficiency anemia; Bright red blood per rectum; Internal hemorrhoids; and Malabsorption of iron on their problem list., the following items were addressed: medications and any changes can be found within the plan section of the note.  A detailed listing of time spent for chronic care management is tracked within each outreach encounter.  Current medications include:  has a current medication list which includes the following prescription(s): acetaminophen, albuterol, albuterol sulfate hfa, amitriptyline,  azelastine, freestyle freedom lite, chlorhexidine, dapsone, donepezil, duloxetine, famotidine, fluticasone-umeclidin-vilant, furosemide, glimepiride, blood glucose test, guaifenesin, ketoconazole, ketoconazole, freestyle, latanoprost, memantine, metolazone, montelukast, o2, pantoprazole, potassium chloride, ropinirole xl, aerochamber plus feng-vu, spironolactone, timolol, triamcinolone acetonide, and vitamin d. and the patient is reported to be patient is compliant with medication protocol,  Medications are reported to be effective.  Regarding these diagnoses, referrals were made to the following provider(s):  Cardiology and [facility].  All notes on chart for PCP to review.    The patient was monitored remotely for weight; blood pressure.    The patient's physical needs include:  needs met.     The patient's mental support needs include:  continued support    The patient's cognitive support needs include:  increased support    The patient's psychosocial support needs include:  coordination of community providers    The patient's functional needs include: health care coverage    The patient's environmental needs include:  not applicable    Care Plan overall comments:  No data recorded    Refer to previous outreach notes for more information on the areas listed above.    Monthly Billing Diagnoses  (I50.32) Chronic diastolic heart failure (HCC)    (J44.9) Stage II, moderate, COPD    Medications   · Medications have been reconciled    Care Plan progress this month:      Recently Modified Care Plans Updates made since 8/29/2022 12:00 AM    No recently modified care plans.          · Current Specialty Plan of Care Status signed by both patient and provider    Instructions   · Patient was provided an electronic copy of care plan  · CCM services were explained and offered and patient has accepted these services.  · Patient has given their written consent to receive CCM services and understands that this includes the authorization  of electronic communication of medical information with the other treating providers.  · Patient understands that they may stop CCM services at any time and these changes will be effective at the end of the calendar month and will effectively revocate the agreement of CCM services.  · Patient understands that only one practitioner can furnish and be paid for CCM services during one calendar month.  Patient also understands that there may be co-payment or deductible fees in association with CCM services.  · Patient will continue with at least monthly follow-up calls with the Nurse Navigator.    JI RIOS  Ambulatory Case Management    9/29/2022, 16:28 EDT

## 2022-09-30 ENCOUNTER — HOSPITAL ENCOUNTER (OUTPATIENT)
Dept: ONCOLOGY | Facility: HOSPITAL | Age: 77
Setting detail: INFUSION SERIES
Discharge: HOME | End: 2022-09-30

## 2022-09-30 VITALS
BODY MASS INDEX: 32.78 KG/M2 | SYSTOLIC BLOOD PRESSURE: 130 MMHG | WEIGHT: 185 LBS | TEMPERATURE: 98.3 F | DIASTOLIC BLOOD PRESSURE: 48 MMHG | OXYGEN SATURATION: 98 % | HEART RATE: 95 BPM | RESPIRATION RATE: 16 BRPM | HEIGHT: 63 IN

## 2022-09-30 DIAGNOSIS — D50.9 IRON DEFICIENCY ANEMIA, UNSPECIFIED IRON DEFICIENCY ANEMIA TYPE: ICD-10-CM

## 2022-09-30 DIAGNOSIS — K90.9 MALABSORPTION OF IRON: Primary | ICD-10-CM

## 2022-09-30 PROCEDURE — 25010000002 ONDANSETRON PER 1 MG: Performed by: INTERNAL MEDICINE

## 2022-09-30 PROCEDURE — 96375 TX/PRO/DX INJ NEW DRUG ADDON: CPT

## 2022-09-30 PROCEDURE — 25010000002 FERRIC CARBOXYMALTOSE 750 MG/15ML SOLUTION 15 ML VIAL: Performed by: INTERNAL MEDICINE

## 2022-09-30 PROCEDURE — 96374 THER/PROPH/DIAG INJ IV PUSH: CPT

## 2022-09-30 RX ORDER — SODIUM CHLORIDE 9 MG/ML
250 INJECTION, SOLUTION INTRAVENOUS ONCE
Status: COMPLETED | OUTPATIENT
Start: 2022-09-30 | End: 2022-09-30

## 2022-09-30 RX ADMIN — ONDANSETRON 8 MG: 2 INJECTION INTRAMUSCULAR; INTRAVENOUS at 13:15

## 2022-09-30 RX ADMIN — FERRIC CARBOXYMALTOSE INJECTION 750 MG: 50 INJECTION, SOLUTION INTRAVENOUS at 13:35

## 2022-09-30 RX ADMIN — SODIUM CHLORIDE 250 ML: 9 INJECTION, SOLUTION INTRAVENOUS at 13:15

## 2022-10-05 LAB
FUNGUS WND CULT: NORMAL
MYCOBACTERIUM SPEC CULT: NORMAL
NIGHT BLUE STAIN TISS: NORMAL

## 2022-10-10 ENCOUNTER — TELEPHONE (OUTPATIENT)
Dept: CARDIOLOGY | Facility: HOSPITAL | Age: 77
End: 2022-10-10

## 2022-10-10 NOTE — TELEPHONE ENCOUNTER
----- Message from Brandyn Treviño MA sent at 10/10/2022  1:26 PM EDT -----  Patient states Having Edema in feet and Legs, even with Medication swelling hasn't decreased, also states having Neuropathy pain. Patient has been taking Ibuprofen for the pain, Weight is most recent 188lbs, BP is 130/65 most recent. Wants an apt.    
appt tomorrow in [facility]   
[de-identified] : c/o problems hearing for several years and gradually worsening. Problem bilat.  No prior ear problems.

## 2022-10-11 ENCOUNTER — OFFICE VISIT (OUTPATIENT)
Dept: CARDIOLOGY | Facility: HOSPITAL | Age: 77
End: 2022-10-11

## 2022-10-11 VITALS
SYSTOLIC BLOOD PRESSURE: 143 MMHG | HEIGHT: 63 IN | HEART RATE: 64 BPM | TEMPERATURE: 97.2 F | BODY MASS INDEX: 32.6 KG/M2 | RESPIRATION RATE: 19 BRPM | WEIGHT: 184 LBS | DIASTOLIC BLOOD PRESSURE: 62 MMHG | OXYGEN SATURATION: 94 %

## 2022-10-11 DIAGNOSIS — I50.33 ACUTE ON CHRONIC DIASTOLIC CHF (CONGESTIVE HEART FAILURE): Primary | ICD-10-CM

## 2022-10-11 DIAGNOSIS — E87.6 HYPOKALEMIA: ICD-10-CM

## 2022-10-11 DIAGNOSIS — I10 BENIGN HYPERTENSION: ICD-10-CM

## 2022-10-11 DIAGNOSIS — R60.0 LOWER EXTREMITY EDEMA: ICD-10-CM

## 2022-10-11 DIAGNOSIS — L53.9 ERYTHEMA OF LOWER EXTREMITY: ICD-10-CM

## 2022-10-11 DIAGNOSIS — M79.89 LOCALIZED SWELLING OF LOWER EXTREMITY: ICD-10-CM

## 2022-10-11 DIAGNOSIS — J47.9 IDIOPATHIC BRONCHIECTASIS: ICD-10-CM

## 2022-10-11 LAB
ANION GAP SERPL CALCULATED.3IONS-SCNC: 8.1 MMOL/L (ref 5–15)
BUN SERPL-MCNC: 16 MG/DL (ref 8–23)
BUN/CREAT SERPL: 22.9 (ref 7–25)
CALCIUM SPEC-SCNC: 9.3 MG/DL (ref 8.6–10.5)
CHLORIDE SERPL-SCNC: 93 MMOL/L (ref 98–107)
CO2 SERPL-SCNC: 37.9 MMOL/L (ref 22–29)
CREAT SERPL-MCNC: 0.7 MG/DL (ref 0.57–1)
EGFRCR SERPLBLD CKD-EPI 2021: 88.1 ML/MIN/1.73
GLUCOSE SERPL-MCNC: 201 MG/DL (ref 65–99)
POTASSIUM SERPL-SCNC: 2.9 MMOL/L (ref 3.5–5.2)
SODIUM SERPL-SCNC: 139 MMOL/L (ref 136–145)

## 2022-10-11 PROCEDURE — 80048 BASIC METABOLIC PNL TOTAL CA: CPT | Performed by: NURSE PRACTITIONER

## 2022-10-11 PROCEDURE — 99214 OFFICE O/P EST MOD 30 MIN: CPT | Performed by: NURSE PRACTITIONER

## 2022-10-11 RX ORDER — IBUPROFEN 600 MG/1
600 TABLET ORAL 2 TIMES DAILY PRN
COMMUNITY
End: 2022-10-26

## 2022-10-11 RX ORDER — MONTELUKAST SODIUM 4 MG/1
1 TABLET, CHEWABLE ORAL DAILY
COMMUNITY

## 2022-10-11 RX ORDER — BUMETANIDE 2 MG/1
2 TABLET ORAL DAILY
Qty: 30 TABLET | Refills: 3 | Status: SHIPPED | OUTPATIENT
Start: 2022-10-11

## 2022-10-11 NOTE — PROGRESS NOTES
"Chief Complaint  Edema, Shortness of Breath, and Follow-up    Subjective    History of Present Illness {CC  Problem List  Visit  Diagnosis   Encounters  Notes  Medications  Labs  Result Review Imaging  Media :23}       History of Present Illness   77 year old female presents to the office today for ongoing evaluation of her acute on chronic diastolic heart failure.  Patient reports she has been experiencing worsening pedal edema as well as dyspnea over the last few weeks.  She is taking 80 mg of Lasix daily as well as 50 mg of Aldactone.Had bronchoscopy with endobronchial ultrasound per Dr. Arboleda August 2022.  She reports for 1 week after bronchoscopy,  she felt well.  She also notes fatigue.  Currently denies chest pain, dizziness, presyncope, syncope.  Patient also notes erythema in bilateral lower extremities.  Bilateral lower extremities are warm to touch.  She denies any fevers.  Patient recently followed by ID during last hospital stay.  Objective     Vital Signs:   Vitals:    10/11/22 1310   BP: 143/62   BP Location: Left arm   Patient Position: Sitting   Cuff Size: Adult   Pulse: 64   Resp: 19   Temp: 97.2 °F (36.2 °C)   TempSrc: Temporal   SpO2: 94%   Weight: 83.5 kg (184 lb)   Height: 160 cm (63\")     Body mass index is 32.59 kg/m².  Physical Exam  Vitals and nursing note reviewed.   Constitutional:       Appearance: Normal appearance.   HENT:      Head: Normocephalic.   Eyes:      Pupils: Pupils are equal, round, and reactive to light.   Cardiovascular:      Rate and Rhythm: Normal rate and regular rhythm.      Pulses: Normal pulses.      Heart sounds: Normal heart sounds. No murmur heard.  Pulmonary:      Effort: Pulmonary effort is normal.      Breath sounds: Rhonchi present.      Comments: Decreased in bases   Abdominal:      General: Bowel sounds are normal.      Palpations: Abdomen is soft.   Musculoskeletal:         General: Normal range of motion.      Cervical back: Normal range of " motion.      Right lower leg: Edema (1+ pitting edema ankles to knees ) present.      Left lower leg: Edema (1+ pitting edema ankles to knees ) present.   Skin:     General: Skin is warm and dry.      Capillary Refill: Capillary refill takes less than 2 seconds.      Findings: Erythema (Bilateral lower extremities ankles to knees) present.      Comments: Bilateral lower extremities warm to touch    Neurological:      Mental Status: She is alert and oriented to person, place, and time.   Psychiatric:         Mood and Affect: Mood normal.         Thought Content: Thought content normal.              Result Review  Data Reviewed:{ Labs  Result Review  Imaging  Med Tab  Media :23}   Basic Metabolic Panel (08/30/2022 11:27)  Magnesium (08/30/2022 11:27)  Adult Transthoracic Echo Complete W/ Cont if Necessary Per Protocol (07/11/2022 15:25)           Assessment and Plan {CC Problem List  Visit Diagnosis  ROS  Review (Popup)  Health Maintenance  Quality  BestPractice  Medications  SmartSets  SnapShot Encounters  Media :23}   1. Acute on chronic diastolic CHF (congestive heart failure) (HCC)  40 mg of lasix given IV through a butterfly right fa. Butterfly d/c'd and area free of erythema, ecchmyosis  - Basic Metabolic Panel; Future  - Basic Metabolic Panel  - Ambulatory Referral to Physical Therapy Evaluate and treat, Wound Care    2. Benign hypertension  Stable   Continue to monitor    3. Hypokalemia  Currently on potassium supplementation and 50 mg of Aldactone daily  - Basic Metabolic Panel; Future  - Basic Metabolic Panel  4. Bronchiectasis (CMS/HCC)    5. Localized swelling of lower extremity  Will refer to physical therapy for placement of Unna boots or  compression dressings  - Ambulatory Referral to Physical Therapy Evaluate and treat, Wound Care    6. Lower extremity edema    - Ambulatory Referral to Infectious Disease    7. Erythema of lower extremity    - Ambulatory Referral to Infectious  Disease        Follow Up {Instructions Charge Capture  Follow-up Communications :23}   Return in about 1 week (around 10/18/2022) for Office visit, HF.    Patient was given instructions and counseling regarding her condition or for health maintenance advice. Please see specific information pulled into the AVS if appropriate.  Patient was instructed to call the [facility] with any questions, concerns, or worsening symptoms.

## 2022-10-12 ENCOUNTER — TELEPHONE (OUTPATIENT)
Dept: CARDIOLOGY | Facility: HOSPITAL | Age: 77
End: 2022-10-12

## 2022-10-12 DIAGNOSIS — E87.6 HYPOKALEMIA: ICD-10-CM

## 2022-10-12 RX ORDER — POTASSIUM CHLORIDE 750 MG/1
CAPSULE, EXTENDED RELEASE ORAL
Qty: 90 CAPSULE | Refills: 0
Start: 2022-10-12

## 2022-10-12 NOTE — TELEPHONE ENCOUNTER
Reviewed labs from yesterday with patient.  Potassium is now 2.9.  Increase potassium supplementation to 60 mill equivalents daily and continue Aldactone 50 mg daily.  Repeat BMP at the beginning of next week.  Patient verbalized understanding.

## 2022-10-13 ENCOUNTER — HOSPITAL ENCOUNTER (EMERGENCY)
Facility: HOSPITAL | Age: 77
Discharge: HOME | End: 2022-10-13
Attending: EMERGENCY MEDICINE | Admitting: EMERGENCY MEDICINE

## 2022-10-13 ENCOUNTER — APPOINTMENT (OUTPATIENT)
Dept: GENERAL RADIOLOGY | Facility: HOSPITAL | Age: 77
End: 2022-10-13

## 2022-10-13 VITALS
BODY MASS INDEX: 31.89 KG/M2 | OXYGEN SATURATION: 96 % | DIASTOLIC BLOOD PRESSURE: 67 MMHG | HEART RATE: 80 BPM | SYSTOLIC BLOOD PRESSURE: 144 MMHG | WEIGHT: 180 LBS | TEMPERATURE: 98.3 F | HEIGHT: 63 IN | RESPIRATION RATE: 20 BRPM

## 2022-10-13 DIAGNOSIS — I50.33 ACUTE ON CHRONIC DIASTOLIC CHF (CONGESTIVE HEART FAILURE): Primary | ICD-10-CM

## 2022-10-13 LAB
ALBUMIN SERPL-MCNC: 4.1 G/DL (ref 3.5–5.2)
ALBUMIN/GLOB SERPL: 1.4 G/DL
ALP SERPL-CCNC: 101 U/L (ref 39–117)
ALT SERPL W P-5'-P-CCNC: 10 U/L (ref 1–33)
ANION GAP SERPL CALCULATED.3IONS-SCNC: 10 MMOL/L (ref 5–15)
ARTERIAL PATENCY WRIST A: ABNORMAL
AST SERPL-CCNC: 15 U/L (ref 1–32)
ATMOSPHERIC PRESS: ABNORMAL MM[HG]
BACTERIA UR QL AUTO: ABNORMAL /HPF
BASE EXCESS BLDA CALC-SCNC: 17.2 MMOL/L (ref 0–2)
BASOPHILS # BLD AUTO: 0.02 10*3/MM3 (ref 0–0.2)
BASOPHILS NFR BLD AUTO: 0.2 % (ref 0–1.5)
BDY SITE: ABNORMAL
BILIRUB SERPL-MCNC: 0.3 MG/DL (ref 0–1.2)
BILIRUB UR QL STRIP: NEGATIVE
BODY TEMPERATURE: 37 C
BUN SERPL-MCNC: 22 MG/DL (ref 8–23)
BUN/CREAT SERPL: 25.9 (ref 7–25)
CALCIUM SPEC-SCNC: 9.1 MG/DL (ref 8.6–10.5)
CHLORIDE SERPL-SCNC: 92 MMOL/L (ref 98–107)
CLARITY UR: ABNORMAL
CO2 BLDA-SCNC: 46 MMOL/L (ref 22–33)
CO2 SERPL-SCNC: 38 MMOL/L (ref 22–29)
COHGB MFR BLD: 1.2 % (ref 0–2)
COLOR UR: YELLOW
CREAT SERPL-MCNC: 0.85 MG/DL (ref 0.57–1)
D-LACTATE SERPL-SCNC: 1.1 MMOL/L (ref 0.5–2)
DEPRECATED RDW RBC AUTO: 61.7 FL (ref 37–54)
EGFRCR SERPLBLD CKD-EPI 2021: 69.8 ML/MIN/1.73
EOSINOPHIL # BLD AUTO: 0.15 10*3/MM3 (ref 0–0.4)
EOSINOPHIL NFR BLD AUTO: 1.8 % (ref 0.3–6.2)
EPAP: 0
ERYTHROCYTE [DISTWIDTH] IN BLOOD BY AUTOMATED COUNT: 18.9 % (ref 12.3–15.4)
FLUAV SUBTYP SPEC NAA+PROBE: NOT DETECTED
FLUBV RNA ISLT QL NAA+PROBE: NOT DETECTED
GLOBULIN UR ELPH-MCNC: 2.9 GM/DL
GLUCOSE SERPL-MCNC: 152 MG/DL (ref 65–99)
GLUCOSE UR STRIP-MCNC: NEGATIVE MG/DL
HCO3 BLDA-SCNC: 44.1 MMOL/L (ref 20–26)
HCT VFR BLD AUTO: 37.2 % (ref 34–46.6)
HCT VFR BLD CALC: 35.6 % (ref 38–51)
HGB BLD-MCNC: 11.3 G/DL (ref 12–15.9)
HGB BLDA-MCNC: 11.6 G/DL (ref 14–18)
HGB UR QL STRIP.AUTO: ABNORMAL
HOLD SPECIMEN: NORMAL
HYALINE CASTS UR QL AUTO: ABNORMAL /LPF
IMM GRANULOCYTES # BLD AUTO: 0.07 10*3/MM3 (ref 0–0.05)
IMM GRANULOCYTES NFR BLD AUTO: 0.9 % (ref 0–0.5)
INHALED O2 CONCENTRATION: 28 %
IPAP: 0
KETONES UR QL STRIP: ABNORMAL
LEUKOCYTE ESTERASE UR QL STRIP.AUTO: ABNORMAL
LYMPHOCYTES # BLD AUTO: 1.63 10*3/MM3 (ref 0.7–3.1)
LYMPHOCYTES NFR BLD AUTO: 20 % (ref 19.6–45.3)
MAGNESIUM SERPL-MCNC: 1.8 MG/DL (ref 1.6–2.4)
MCH RBC QN AUTO: 27.5 PG (ref 26.6–33)
MCHC RBC AUTO-ENTMCNC: 30.4 G/DL (ref 31.5–35.7)
MCV RBC AUTO: 90.5 FL (ref 79–97)
METHGB BLD QL: 0.4 % (ref 0–1.5)
MODALITY: ABNORMAL
MONOCYTES # BLD AUTO: 0.7 10*3/MM3 (ref 0.1–0.9)
MONOCYTES NFR BLD AUTO: 8.6 % (ref 5–12)
NEUTROPHILS NFR BLD AUTO: 5.59 10*3/MM3 (ref 1.7–7)
NEUTROPHILS NFR BLD AUTO: 68.5 % (ref 42.7–76)
NITRITE UR QL STRIP: NEGATIVE
NOTE: ABNORMAL
NRBC BLD AUTO-RTO: 0 /100 WBC (ref 0–0.2)
NT-PROBNP SERPL-MCNC: 9.8 PG/ML (ref 0–1800)
OXYHGB MFR BLDV: 92.2 % (ref 94–99)
PAW @ PEAK INSP FLOW SETTING VENT: 0 CMH2O
PCO2 BLDA: 63.1 MM HG (ref 35–45)
PCO2 TEMP ADJ BLD: 63.1 MM HG (ref 35–45)
PH BLDA: 7.45 PH UNITS (ref 7.35–7.45)
PH UR STRIP.AUTO: 5.5 [PH] (ref 5–8)
PH, TEMP CORRECTED: 7.45 PH UNITS
PLATELET # BLD AUTO: 291 10*3/MM3 (ref 140–450)
PMV BLD AUTO: 9.2 FL (ref 6–12)
PO2 BLDA: 66.5 MM HG (ref 83–108)
PO2 TEMP ADJ BLD: 66.5 MM HG (ref 83–108)
POTASSIUM SERPL-SCNC: 3 MMOL/L (ref 3.5–5.2)
PROCALCITONIN SERPL-MCNC: 0.06 NG/ML (ref 0–0.25)
PROT SERPL-MCNC: 7 G/DL (ref 6–8.5)
PROT UR QL STRIP: NEGATIVE
RBC # BLD AUTO: 4.11 10*6/MM3 (ref 3.77–5.28)
RBC # UR STRIP: ABNORMAL /HPF
REF LAB TEST METHOD: ABNORMAL
SARS-COV-2 RNA PNL SPEC NAA+PROBE: NOT DETECTED
SODIUM SERPL-SCNC: 140 MMOL/L (ref 136–145)
SP GR UR STRIP: 1.03 (ref 1–1.03)
SQUAMOUS #/AREA URNS HPF: ABNORMAL /HPF
TOTAL RATE: 0 BREATHS/MINUTE
TROPONIN T SERPL-MCNC: <0.01 NG/ML (ref 0–0.03)
UROBILINOGEN UR QL STRIP: ABNORMAL
WBC # UR STRIP: ABNORMAL /HPF
WBC NRBC COR # BLD: 8.16 10*3/MM3 (ref 3.4–10.8)
WHOLE BLOOD HOLD COAG: NORMAL
WHOLE BLOOD HOLD SPECIMEN: NORMAL

## 2022-10-13 PROCEDURE — 83735 ASSAY OF MAGNESIUM: CPT | Performed by: EMERGENCY MEDICINE

## 2022-10-13 PROCEDURE — 85025 COMPLETE CBC W/AUTO DIFF WBC: CPT

## 2022-10-13 PROCEDURE — 71045 X-RAY EXAM CHEST 1 VIEW: CPT

## 2022-10-13 PROCEDURE — 83880 ASSAY OF NATRIURETIC PEPTIDE: CPT | Performed by: EMERGENCY MEDICINE

## 2022-10-13 PROCEDURE — 87636 SARSCOV2 & INF A&B AMP PRB: CPT | Performed by: EMERGENCY MEDICINE

## 2022-10-13 PROCEDURE — 84145 PROCALCITONIN (PCT): CPT | Performed by: EMERGENCY MEDICINE

## 2022-10-13 PROCEDURE — 81001 URINALYSIS AUTO W/SCOPE: CPT

## 2022-10-13 PROCEDURE — 84484 ASSAY OF TROPONIN QUANT: CPT | Performed by: EMERGENCY MEDICINE

## 2022-10-13 PROCEDURE — 83050 HGB METHEMOGLOBIN QUAN: CPT

## 2022-10-13 PROCEDURE — 96374 THER/PROPH/DIAG INJ IV PUSH: CPT

## 2022-10-13 PROCEDURE — 82375 ASSAY CARBOXYHB QUANT: CPT

## 2022-10-13 PROCEDURE — 93005 ELECTROCARDIOGRAM TRACING: CPT | Performed by: EMERGENCY MEDICINE

## 2022-10-13 PROCEDURE — 99284 EMERGENCY DEPT VISIT MOD MDM: CPT

## 2022-10-13 PROCEDURE — 36600 WITHDRAWAL OF ARTERIAL BLOOD: CPT

## 2022-10-13 PROCEDURE — 93005 ELECTROCARDIOGRAM TRACING: CPT

## 2022-10-13 PROCEDURE — 80053 COMPREHEN METABOLIC PANEL: CPT | Performed by: EMERGENCY MEDICINE

## 2022-10-13 PROCEDURE — 82805 BLOOD GASES W/O2 SATURATION: CPT

## 2022-10-13 PROCEDURE — 83605 ASSAY OF LACTIC ACID: CPT | Performed by: EMERGENCY MEDICINE

## 2022-10-13 RX ORDER — METOLAZONE 5 MG/1
5 TABLET ORAL DAILY
Qty: 7 TABLET | Refills: 0 | Status: SHIPPED | OUTPATIENT
Start: 2022-10-13

## 2022-10-13 RX ORDER — SODIUM CHLORIDE 0.9 % (FLUSH) 0.9 %
10 SYRINGE (ML) INJECTION AS NEEDED
Status: DISCONTINUED | OUTPATIENT
Start: 2022-10-13 | End: 2022-10-13 | Stop reason: HOSPADM

## 2022-10-13 RX ORDER — BUMETANIDE 0.25 MG/ML
2 INJECTION INTRAMUSCULAR; INTRAVENOUS ONCE
Status: COMPLETED | OUTPATIENT
Start: 2022-10-13 | End: 2022-10-13

## 2022-10-13 RX ADMIN — BUMETANIDE 2 MG: 0.25 INJECTION INTRAMUSCULAR; INTRAVENOUS at 15:11

## 2022-10-13 NOTE — ED PROVIDER NOTES
Subjective   History of Present Illness    Pt presents with generalized weakness for several days.  She has had bilateral leg swelling, dyspnea, fatigue.  Chronic cough unchanged. No fever or chest pain.  Since yesterday she's been sleepier than usual which concerned family and friends.  She was seen in the CHF clinic 2 days ago for same sx and was given IV Lasix 40 mg and switched from her PO Lasix to PO Bumex.  She's only taken one dose of the Bumex but hasn't noticed increased diuresis.    She wears oxygen all the time. She has nocturnal BIPAP but doesn't wear it.  She has noticed some low sats at night the last few nights.    History provided by:  Patient and relative      Review of Systems   Constitutional: Positive for fatigue. Negative for fever.   Respiratory: Positive for cough and shortness of breath.    Cardiovascular: Positive for leg swelling. Negative for chest pain.   Gastrointestinal: Negative for vomiting.   All other systems reviewed and are negative.      Past Medical History:   Diagnosis Date   • Asthma    • Back pain    • BRBPR (bright red blood per rectum) 07/10/2022   • Bronchiectasis (HCC)    • Bronchitis 01/2018   • Cancer (HCC)     History of lung cancer and skin cancer    • Cardiac murmur    • Cellulitis of both lower extremities 01/27/2020   • Chronic cough    • Chronic obstructive pulmonary disease (HCC)    • Colon polyp    • DDD (degenerative disc disease), lumbar    • Depression    • Diabetes mellitus (HCC)     DX: 2019, CHECK BS QOD, LAST A1C 6.3??   • Disease of thyroid gland    • Esophageal dilatation 09/20/2019    Added automatically from request for surgery [id number]   • Esophageal dysphagia 10/24/2019    Added automatically from request for surgery [id number]   • Former smoker (None since 1992) 08/29/2019   • Gastroparesis    • GERD (gastroesophageal reflux disease)    • Glaucoma    • Globus sensation 01/27/2020   • History of colonic polyps    • History of transfusion 1988    NO  "REACTION    • Hyperlipidemia    • Hypertension    • Irritable bowel syndrome     Constipation/diarrhea   • Legally blind    • Lung cancer (HCC)    • Macular degeneration    • Neuropathy    • Obesity 02/26/2020   • Osteoarthritis    • Oxygen dependent     4L   • Pneumonia    • Sleep apnea     NON COMPLIANT WITH CPAP USE   • UTI (urinary tract infection)    • Wears glasses        Allergies   Allergen Reactions   • Hydrocodone Hallucinations     \"With high doses\"   • Statins Myalgia     myalgia   • Metoclopramide Other (See Comments)     EXACERBATED RLS   • Penicillins Rash     Rash    Tolerated Ceftriaxone   • Tizanidine Hallucinations   • Tramadol Nausea And Vomiting and GI Intolerance     VERY MILD PER PT       Past Surgical History:   Procedure Laterality Date   • BACK SURGERY      X2 (LUMBAR)   • BREAST BIOPSY      20+ years ago   • BREAST BIOPSY Right 08/2021    fna ln   • BRONCHOSCOPY N/A 8/24/2022    Procedure: BRONCHOSCOPY WITH ENDOBRONCHIAL ULTRASOUND;  Surgeon: Vladislav Arboleda MD;  Location:  [facility];  Service: Pulmonary;  Laterality: N/A;   • CATARACT EXTRACTION Bilateral    • CHOLECYSTECTOMY     • COLONOSCOPY     • COLONOSCOPY N/A 8/18/2022    Procedure: COLONOSCOPY WITH POLYPECTOMY;  Surgeon: Cortes Millan MD;  Location:  [facility];  Service: Gastroenterology;  Laterality: N/A;   • ENDOSCOPY N/A 11/06/2019    Procedure: ESOPHAGOGASTRODUODENOSCOPY;  Surgeon: Cortes Millan MD;  Location:  [facility];  Service: Gastroenterology   • ENDOSCOPY N/A 04/29/2021    Procedure: ESOPHAGOGASTRODUODENOSCOPY;  Surgeon: Cortes Millan MD;  Location:  [facility];  Service: Gastroenterology;  Laterality: N/A;  balloon dilation    • HAND SURGERY Right     laceration repair   • INCONTINENCE SURGERY      BLADDER   • LUNG REMOVAL, PARTIAL Left 2016   • NISSEN FUNDOPLICATION     • SKIN BIOPSY     • SPINAL CORD STIMULATOR IMPLANT      right buttock, " in place but no longer working   • TOTAL ABDOMINAL HYSTERECTOMY      benign cyst   • US GUIDED FINE NEEDLE ASPIRATION  2021       Family History   Problem Relation Age of Onset   • Colon polyps Mother    • Emphysema Mother    • Hypertension Mother    • Colon polyps Father    • Dementia Father    • Heart disease Father    • Hyperlipidemia Father    • Macular degeneration Father    • Glaucoma Father    • Colon cancer Neg Hx    • Breast cancer Neg Hx    • Ovarian cancer Neg Hx        Social History     Socioeconomic History   • Marital status: Single   Tobacco Use   • Smoking status: Former     Packs/day: 1.50     Years: 25.00     Pack years: 37.50     Types: Cigarettes     Quit date: 1992     Years since quittin.8   • Smokeless tobacco: Never   Vaping Use   • Vaping Use: Never used   Substance and Sexual Activity   • Alcohol use: Yes     Alcohol/week: 2.0 standard drinks     Types: 2 Standard drinks or equivalent per week     Comment: a glass of wine or bourbon a couple times a WEEK   • Drug use: No   • Sexual activity: Never           Objective   Physical Exam  Vitals and nursing note reviewed.   Constitutional:       General: She is not in acute distress.     Appearance: Normal appearance. She is not ill-appearing.   HENT:      Head: Normocephalic and atraumatic.      Mouth/Throat:      Mouth: Mucous membranes are moist.   Eyes:      General: No scleral icterus.        Right eye: No discharge.         Left eye: No discharge.      Conjunctiva/sclera: Conjunctivae normal.   Cardiovascular:      Rate and Rhythm: Normal rate and regular rhythm.      Heart sounds: No murmur heard.  Pulmonary:      Effort: Pulmonary effort is normal. No respiratory distress.      Breath sounds: Rales (left base) present. No wheezing.   Abdominal:      General: Bowel sounds are normal. There is no distension.      Palpations: Abdomen is soft.      Tenderness: There is no abdominal tenderness. There is no guarding or  rebound.   Musculoskeletal:         General: No swelling. Normal range of motion.      Cervical back: Normal range of motion and neck supple.      Right lower leg: Edema present.      Left lower leg: Edema present.      Comments: BLE edema with chronic venous stasis dermatitis   Skin:     General: Skin is warm and dry.      Findings: No rash.   Neurological:      General: No focal deficit present.      Mental Status: She is alert and oriented to person, place, and time. Mental status is at baseline.   Psychiatric:         Mood and Affect: Mood normal.         Behavior: Behavior normal.         Thought Content: Thought content normal.         Procedures           ED Course         CXR NAD. BNP is remarkably normal. Labs benign.  ABG compensated, no hypercarbia to explain sleepiness. She is wide awake on my evals.  Will add metolazone to diuretics and refer back to CHF clinic but I don't think she needs admission today.  Patient stable on serial rechecks.  Discussed findings, concerns, plan of care, expected course, reasons to return and followup.  Provided the opportunity to ask questions.                                    MDM  Number of Diagnoses or Management Options     Amount and/or Complexity of Data Reviewed  Clinical lab tests: reviewed and ordered  Tests in the radiology section of CPT®: reviewed and ordered  Independent visualization of images, tracings, or specimens: yes        Final diagnoses:   Acute on chronic diastolic CHF (congestive heart failure) (HCC)       ED Disposition  ED Disposition     ED Disposition   Discharge    Condition   Stable    Comment   --             Dacia Viera MD  [address] [place] [id number] [phone]    In 1 week      [facility] CARDIOLOGY  [address] [place] [address] [address]  [facility] [id number] [phone]             Medication List      Changed    azelastine 0.1 % nasal spray  Commonly known as: ASTELIN  2 sprays into the  nostril(s) as directed by provider 2 (Two) Times a Day.  What changed:   · when to take this  · reasons to take this     metOLazone 5 MG tablet  Commonly known as: ZAROXOLYN  Take 1 tablet by mouth Daily.  What changed:   · medication strength  · how much to take     vitamin D 1.25 MG (38900 UT) capsule capsule  Commonly known as: ERGOCALCIFEROL  Take 1 capsule by mouth 1 (One) Time Per Week.  What changed: additional instructions           Where to Get Your Medications      These medications were sent to [facility] [place] - [address]  - [phone]  - [phone]   [address] [facility] [address]    Phone: [phone]   · metOLazone 5 MG tablet          Darrian Aguilera MD  10/13/22 0259

## 2022-10-17 ENCOUNTER — PATIENT OUTREACH (OUTPATIENT)
Dept: CASE MANAGEMENT | Facility: OTHER | Age: 77
End: 2022-10-17

## 2022-10-17 DIAGNOSIS — I50.32 CHRONIC DIASTOLIC HEART FAILURE: Primary | ICD-10-CM

## 2022-10-17 DIAGNOSIS — J44.9 COPD MIXED TYPE: ICD-10-CM

## 2022-10-17 LAB
QT INTERVAL: 416 MS
QTC INTERVAL: 464 MS

## 2022-10-17 NOTE — OUTREACH NOTE
AMBULATORY CASE MANAGEMENT NOTE    Name and Relationship of Patient/Support Person: Lorrie Pagan White - Self    CCM Interim Update    Spoke with patient as a follow up regarding a recent ER visit. Patient reports moderate shortness of breath with activity which has not improved since her last ER visit. Patient stated she can walk from one room to the next and be out of breath. She continues to wear 4L oxygen NC. She stated her NC falls off at night and her oxygen has dropped to 71 as a result.     Patient stated she has began falling as of about 2 weeks ago, she fell last night and lightly hit her head. She denied injury and did not seek medical attention. Patient stated she falls asleep very easily and sometimes falls out of her chair. Discussed when to seek medical attention and will send fall education sheet. Patient lives alone and her daughter is unable to stay with her. She [place] to move to [place] around [place] to live with her son.     Patient requested I update her allergy list to reflect intolerance to Gabapentin, completed. She also requested her PCP try to eliminate some of her medications. Updated PCP. Patient is agreeable to see her PCP on Wednesday at 1245. [facility] follow up outreach scheduled.     Send Education  Questions/Answers    Flowsheet Row Most Recent Value   Patient Outreach Summary (AVS) Send Outreach Summary          JI RIOS  Ambulatory Case Management    10/17/2022, 14:53 EDT

## 2022-10-18 ENCOUNTER — OFFICE VISIT (OUTPATIENT)
Dept: CARDIOLOGY | Facility: HOSPITAL | Age: 77
End: 2022-10-18

## 2022-10-18 ENCOUNTER — TELEPHONE (OUTPATIENT)
Dept: CARDIOLOGY | Facility: HOSPITAL | Age: 77
End: 2022-10-18

## 2022-10-18 VITALS
WEIGHT: 192 LBS | HEART RATE: 85 BPM | OXYGEN SATURATION: 96 % | HEIGHT: 63 IN | SYSTOLIC BLOOD PRESSURE: 139 MMHG | TEMPERATURE: 96.7 F | BODY MASS INDEX: 34.02 KG/M2 | RESPIRATION RATE: 18 BRPM | DIASTOLIC BLOOD PRESSURE: 65 MMHG

## 2022-10-18 DIAGNOSIS — R53.83 OTHER FATIGUE: ICD-10-CM

## 2022-10-18 DIAGNOSIS — E03.9 ACQUIRED HYPOTHYROIDISM: ICD-10-CM

## 2022-10-18 DIAGNOSIS — I50.33 ACUTE ON CHRONIC DIASTOLIC CHF (CONGESTIVE HEART FAILURE): Primary | ICD-10-CM

## 2022-10-18 DIAGNOSIS — I10 BENIGN HYPERTENSION: ICD-10-CM

## 2022-10-18 DIAGNOSIS — E87.6 HYPOKALEMIA: ICD-10-CM

## 2022-10-18 LAB
ANION GAP SERPL CALCULATED.3IONS-SCNC: 6.3 MMOL/L (ref 5–15)
BUN SERPL-MCNC: 15 MG/DL (ref 8–23)
BUN/CREAT SERPL: 20.3 (ref 7–25)
CALCIUM SPEC-SCNC: 9.2 MG/DL (ref 8.6–10.5)
CHLORIDE SERPL-SCNC: 98 MMOL/L (ref 98–107)
CO2 SERPL-SCNC: 35.7 MMOL/L (ref 22–29)
CREAT SERPL-MCNC: 0.74 MG/DL (ref 0.57–1)
EGFRCR SERPLBLD CKD-EPI 2021: 82.4 ML/MIN/1.73
GLUCOSE SERPL-MCNC: 170 MG/DL (ref 65–99)
MAGNESIUM SERPL-MCNC: 2.2 MG/DL (ref 1.6–2.4)
POTASSIUM SERPL-SCNC: 3.5 MMOL/L (ref 3.5–5.2)
SODIUM SERPL-SCNC: 140 MMOL/L (ref 136–145)
T4 FREE SERPL-MCNC: 0.98 NG/DL (ref 0.93–1.7)
TSH SERPL DL<=0.05 MIU/L-ACNC: 4.8 UIU/ML (ref 0.27–4.2)

## 2022-10-18 PROCEDURE — 80048 BASIC METABOLIC PNL TOTAL CA: CPT | Performed by: NURSE PRACTITIONER

## 2022-10-18 PROCEDURE — 84443 ASSAY THYROID STIM HORMONE: CPT | Performed by: NURSE PRACTITIONER

## 2022-10-18 PROCEDURE — 99214 OFFICE O/P EST MOD 30 MIN: CPT | Performed by: NURSE PRACTITIONER

## 2022-10-18 PROCEDURE — 83735 ASSAY OF MAGNESIUM: CPT | Performed by: NURSE PRACTITIONER

## 2022-10-18 PROCEDURE — 84439 ASSAY OF FREE THYROXINE: CPT | Performed by: NURSE PRACTITIONER

## 2022-10-18 RX ORDER — GABAPENTIN 300 MG/1
1 CAPSULE ORAL DAILY PRN
COMMUNITY
Start: 2022-10-14 | End: 2022-10-19

## 2022-10-18 NOTE — PROGRESS NOTES
"Chief Complaint  Follow-up and Congestive Heart Failure    Subjective    History of Present Illness {CC  Problem List  Visit  Diagnosis   Encounters  Notes  Medications  Labs  Result Review Imaging  Media :23}       History of Present Illness     77 year old female presents to the office today for ongoing evaluation of her acute on chronic diastolic heart failure.  Patient reports she has been experiencing worsening pedal edema as well as dyspnea over the last few weeks. Had bronchoscopy with endobronchial ultrasound per Dr. Arboleda August 2022.  She reports for 1 week after bronchoscopy,  she felt well.  She also notes fatigue.  Currently denies chest pain, dizziness, presyncope, syncope.  Patient also notes erythema in bilateral lower extremities.  Bilateral lower extremities are warm to touch.  She denies any fevers.  Patient recently followed by ID during last hospital stay.  Patient was recently transitioned from Lasix to Bumex with no real improvement in her symptoms.  She was seen in the office on 10/11/2022 And she was given 40 mg of IV Lasix.  She then presented to [facility] ED on 10/13/2022 and metolazone was increased to 5 mg daily.  She was given 2 mg of Bumex in the ED.  She does report improvement in her pedal edema but notes ongoing dyspnea as well as fatigue.  Patient reports she is very tired notes that she falls asleep very easily.  She also reports she is only sleeping 2 to 3 hours a night which she notes is her norm.  She reports she just does not feel well.  She is relocating around Thanksgiving to be closer to her family.  Objective     Vital Signs:   Vitals:    10/18/22 0850   BP: 139/65   BP Location: Left arm   Patient Position: Sitting   Cuff Size: Adult   Pulse: 85   Resp: 18   Temp: 96.7 °F (35.9 °C)   TempSrc: Temporal   SpO2: 96%   Weight: 87.1 kg (192 lb)   Height: 160 cm (63\")     Body mass index is 34.01 kg/m².  Physical Exam  Vitals and nursing note " reviewed.   Constitutional:       Appearance: Normal appearance.   HENT:      Head: Normocephalic.   Eyes:      Pupils: Pupils are equal, round, and reactive to light.   Cardiovascular:      Rate and Rhythm: Normal rate and regular rhythm.      Pulses: Normal pulses.      Heart sounds: Normal heart sounds. No murmur heard.  Pulmonary:      Effort: Pulmonary effort is normal.      Comments: Decreased in bases   Abdominal:      General: Bowel sounds are normal.      Palpations: Abdomen is soft.   Musculoskeletal:         General: Normal range of motion.      Cervical back: Normal range of motion.      Right lower leg: Edema (nonpitting edema   ) present.      Left lower leg: Edema (nonpitting edema noted   ) present.   Skin:     General: Skin is warm and dry.      Capillary Refill: Capillary refill takes less than 2 seconds.      Findings: Erythema (Bilateral lower extremities ankles to knees) present.      Comments: Bilateral lower extremities warm to touch    Neurological:      Mental Status: She is alert and oriented to person, place, and time.   Psychiatric:         Mood and Affect: Mood normal.         Thought Content: Thought content normal.              Result Review  Data Reviewed:{ Labs  Result Review  Imaging  Med Tab  Media :23}   Basic Metabolic Panel (08/30/2022 11:27)  Magnesium (08/30/2022 11:27)  Adult Transthoracic Echo Complete W/ Cont if Necessary Per Protocol (07/11/2022 15:25)  Labs pending from today           Assessment and Plan {CC Problem List  Visit Diagnosis  ROS  Review (Popup)  Health Maintenance  Quality  BestPractice  Medications  SmartSets  SnapShot Encounters  Media :23}   1. Acute on chronic diastolic CHF (congestive heart failure) (HCC)  Continue Bumex, Aldactone, metolazone    2. Benign hypertension  Stable   Continue to monitor    3. Hypokalemia  Currently on potassium supplementation and 50 mg of Aldactone daily  - Basic Metabolic Panel; Future  - Basic Metabolic  Panel  4. Acquired hypothyroidism   tsh drawn today   5. Other fatigue   Labs pending from today     Follow Up {Instructions Charge Capture  Follow-up Communications :23}   Return if symptoms worsen or fail to improve.    Patient was given instructions and counseling regarding her condition or for health maintenance advice. Please see specific information pulled into the AVS if appropriate.  Patient was instructed to call the [facility] with any questions, concerns, or worsening symptoms.

## 2022-10-19 ENCOUNTER — OFFICE VISIT (OUTPATIENT)
Dept: INTERNAL MEDICINE | Facility: CLINIC | Age: 77
End: 2022-10-19

## 2022-10-19 VITALS
HEIGHT: 63 IN | OXYGEN SATURATION: 100 % | WEIGHT: 194 LBS | HEART RATE: 82 BPM | TEMPERATURE: 97.4 F | BODY MASS INDEX: 34.38 KG/M2 | SYSTOLIC BLOOD PRESSURE: 140 MMHG | DIASTOLIC BLOOD PRESSURE: 62 MMHG

## 2022-10-19 DIAGNOSIS — R60.9 PERIPHERAL EDEMA: ICD-10-CM

## 2022-10-19 DIAGNOSIS — I50.32 CHRONIC DIASTOLIC HEART FAILURE: ICD-10-CM

## 2022-10-19 DIAGNOSIS — R40.0 DAYTIME SOMNOLENCE: ICD-10-CM

## 2022-10-19 DIAGNOSIS — R06.02 SHORTNESS OF BREATH: Primary | ICD-10-CM

## 2022-10-19 PROCEDURE — 99214 OFFICE O/P EST MOD 30 MIN: CPT | Performed by: INTERNAL MEDICINE

## 2022-10-19 NOTE — PROGRESS NOTES
"Internal Medicine Acute Visit    Chief Complaint   Patient presents with   • Medication Consutation   • Fatigue   • Shortness of Breath        HPI  Ms. Pagan comes in today with her daughter in law due to several days of increasing fatigue, SOA, and edema. Weight has been on uptrend. She was recently transitioned from Lasix to Bumex with no real improvement in her symptoms.  She was seen in the cardiology office on 10/11/2022 And she was given 40 mg of IV Lasix.  She then presented to [facility] ED on 10/13/2022 and metolazone was increased to 5 mg daily and she was given IV bumex 2mg. She has ongoing lower extremity edema and SOA. She remains on 4L NC but is winded with only a few feet of ambulation. She is falling asleep easily. She reports several occasions of falling asleep in recliner and then falling from recliner. She has fallen while making a sandwich and awoke on the floor. She fell asleep in the bathroom for several hours. She has \"happy hour\" on Mondays and fell asleep after a single drink. She had difficulty ambulating in office today. Could not bring her scooter due to lack of accommodating transportation and does not use walker or cane due to having to haul an o2 tank. Portable o2 runs out too quickly.    She is moving to [place] with her son in November.       Review of Systems  Review of Systems   Constitutional: Positive for fatigue. Negative for fever.   Respiratory: Positive for shortness of breath. Negative for cough.    Cardiovascular: Positive for leg swelling. Negative for chest pain and palpitations.   Genitourinary: Negative for decreased urine volume.   Skin: Positive for bruise.   Neurological: Negative for seizures.   Psychiatric/Behavioral: Positive for sleep disturbance.        Medications  Current Outpatient Medications on File Prior to Visit   Medication Sig Dispense Refill   • acetaminophen (TYLENOL) 500 MG tablet Take 1,000 mg by mouth Every 6 (Six) Hours As Needed " for Mild Pain .     • albuterol (ACCUNEB) 1.25 MG/3ML nebulizer solution Take 3 mL by nebulization Every 6 (Six) Hours As Needed for Shortness of Air. 90 each 5   • albuterol sulfate  (90 Base) MCG/ACT inhaler INHALE 2 PUFFS EVERY 6 (SIX) HOURS AS NEEDED FOR WHEEZING. 18 g 11   • azelastine (ASTELIN) 0.1 % nasal spray 2 sprays into the nostril(s) as directed by provider 2 (Two) Times a Day. (Patient taking differently: 2 sprays into the nostril(s) as directed by provider 2 (Two) Times a Day As Needed for Rhinitis or Allergies.) 30 mL 5   • Blood Glucose Monitoring Suppl (FreeStyle Freedom Lite) w/Device kit      • bumetanide (BUMEX) 2 MG tablet Take 1 tablet by mouth Daily. 30 tablet 3   • chlorhexidine (PERIDEX) 0.12 % solution Apply 15 mL to the mouth or throat 2 (Two) Times a Day As Needed. For 14 days, w/1 refill     • colestipol (COLESTID) 1 g tablet Take 1 tablet by mouth Daily.     • Dapsone 5 % topical gel Apply 1 application topically to the appropriate area as directed Daily As Needed (apply to legs).     • donepezil (ARICEPT) 10 MG tablet Take 10 mg by mouth Daily.     • DULoxetine (CYMBALTA) 60 MG capsule TAKE 1 CAPSULE BY MOUTH EVERY DAY (Patient taking differently: Take 1 capsule by mouth Daily.) 90 capsule 3   • famotidine (Pepcid AC Maximum Strength) 20 MG tablet Take 1 tablet by mouth 2 (Two) Times a Day. 8 tablet 0   • Fluticasone-Umeclidin-Vilant (TRELEGY) 200-62.5-25 MCG/INH inhaler Inhale 1 puff Daily. 1 each 5   • glimepiride (AMARYL) 2 MG tablet TAKE 1 TABLET BY MOUTH TWICE DAILY (Patient taking differently: Take 1 tablet by mouth 2 (Two) Times a Day.) 60 tablet 0   • Glucose Blood (Blood Glucose Test) strip Use to test blood sugar daily before breakfast. 100 each 11   • guaiFENesin (Mucinex) 600 MG 12 hr tablet Take 2 tablets by mouth 2 (Two) Times a Day. 180 tablet 3   • ibuprofen (ADVIL,MOTRIN) 600 MG tablet Take 1 tablet by mouth 2 (Two) Times a Day As Needed for Mild Pain.     •  ketoconazole (NIZORAL) 2 % cream Apply 1 application topically to the appropriate area as directed Daily As Needed for Itching (apply to back and legs).     • Lancets (freestyle) lancets Use to test blood sugar daily before breakfast. 100 each 12   • latanoprost (XALATAN) 0.005 % ophthalmic solution Administer 1 drop to both eyes Every Night.     • metOLazone (ZAROXOLYN) 5 MG tablet Take 1 tablet by mouth Daily. 7 tablet 0   • montelukast (SINGULAIR) 10 MG tablet Take 1 tablet by mouth Every Night. 90 tablet 3   • O2 (OXYGEN) Inhale 4 L/min Continuous.     • pantoprazole (PROTONIX) 40 MG EC tablet Take 1 tablet by mouth 2 (Two) Times a Day. 180 tablet 3   • potassium chloride (MICRO-K) 10 MEQ CR capsule 6 capsules daily 90 capsule 0   • rOPINIRole XL (REQUIP XL) 8 MG 24 hr tablet Take 8 mg by mouth 2 (two) times a day.     • Spacer/Aero-Holding Chambers (AeroChamber Plus Morris-Vu) misc As Needed.     • spironolactone (ALDACTONE) 25 MG tablet Take 2 tablets by mouth Daily. 60 tablet 11   • timolol (TIMOPTIC) 0.25 % ophthalmic solution Administer 1 drop to the right eye Every Morning.     • Triamcinolone Acetonide (NASACORT) 55 MCG/ACT nasal inhaler 2 sprays into the nostril(s) as directed by provider Daily.     • vitamin D (ERGOCALCIFEROL) 1.25 MG (15277 UT) capsule capsule Take 1 capsule by mouth 1 (One) Time Per Week. (Patient taking differently: Take 1 capsule by mouth 1 (One) Time Per Week. Wednesday) 12 capsule 1   • memantine (NAMENDA) 5 MG tablet Take 5 mg by mouth Daily.     • [DISCONTINUED] amitriptyline (ELAVIL) 10 MG tablet Take 10 mg by mouth As Needed for Sleep (to stop drooling at night).     • [DISCONTINUED] gabapentin (NEURONTIN) 300 MG capsule Take 1 capsule by mouth Daily As Needed.     • [DISCONTINUED] ketoconazole (NIZORAL) 2 % shampoo Apply 1 application topically to the appropriate area as directed As Needed for Irritation (for head).       No current facility-administered medications on file  "prior to visit.        Allergies  Allergies   Allergen Reactions   • Gabapentin Confusion and Hallucinations   • Hydrocodone Hallucinations     \"With high doses\"   • Statins Myalgia     myalgia   • Metoclopramide Other (See Comments)     EXACERBATED RLS   • Penicillins Rash     Rash    Tolerated Ceftriaxone   • Tizanidine Hallucinations   • Tramadol Nausea And Vomiting and GI Intolerance     VERY MILD PER PT       PMH  Past Medical History:   Diagnosis Date   • Asthma    • Back pain    • BRBPR (bright red blood per rectum) 07/10/2022   • Bronchiectasis (HCC)    • Bronchitis 01/2018   • Cancer (HCC)     History of lung cancer and skin cancer    • Cardiac murmur    • Cellulitis of both lower extremities 01/27/2020   • Chronic cough    • Chronic obstructive pulmonary disease (HCC)    • Colon polyp    • DDD (degenerative disc disease), lumbar    • Depression    • Diabetes mellitus (HCC)     DX: 2019, CHECK BS QOD, LAST A1C 6.3??   • Disease of thyroid gland    • Esophageal dilatation 09/20/2019    Added automatically from request for surgery [id number]   • Esophageal dysphagia 10/24/2019    Added automatically from request for surgery [id number]   • Former smoker (None since 1992) 08/29/2019   • Gastroparesis    • GERD (gastroesophageal reflux disease)    • Glaucoma    • Globus sensation 01/27/2020   • History of colonic polyps    • History of transfusion 1988    NO REACTION    • Hyperlipidemia    • Hypertension    • Irritable bowel syndrome     Constipation/diarrhea   • Legally blind    • Lung cancer (HCC)    • Macular degeneration    • Neuropathy    • Obesity 02/26/2020   • Osteoarthritis    • Oxygen dependent     4L   • Pneumonia    • Sleep apnea     NON COMPLIANT WITH CPAP USE   • UTI (urinary tract infection)    • Wears glasses        Objective  Visit Vitals  /62   Pulse 82   Temp 97.4 °F (36.3 °C)   Ht 160 cm (63\")   Wt 88 kg (194 lb)   LMP  (LMP Unknown)   SpO2 100% Comment: 4 liters at all times   BMI 34.37 " kg/m²        Physical Exam  Physical Exam  Vitals and nursing note reviewed.   Constitutional:       General: She is not in acute distress.     Appearance: She is well-developed. She is not ill-appearing or toxic-appearing.   HENT:      Head: Normocephalic and atraumatic.   Eyes:      Conjunctiva/sclera: Conjunctivae normal.   Cardiovascular:      Rate and Rhythm: Normal rate and regular rhythm.      Heart sounds: Normal heart sounds.   Pulmonary:      Effort: Pulmonary effort is normal. No respiratory distress.      Breath sounds: Rales (bases) present.   Musculoskeletal:      Right lower leg: Edema (1+) present.      Left lower leg: Edema (1+) present.   Skin:     General: Skin is warm and dry.      Findings: Bruising present.   Neurological:      Mental Status: She is alert and oriented to person, place, and time. Mental status is at baseline.      Gait: Gait abnormal.         Results  Results for orders placed or performed in visit on 10/18/22   Basic Metabolic Panel    Specimen: Blood   Result Value Ref Range    Glucose 170 (H) 65 - 99 mg/dL    BUN 15 8 - 23 mg/dL    Creatinine 0.74 0.57 - 1.00 mg/dL    Sodium 140 136 - 145 mmol/L    Potassium 3.5 3.5 - 5.2 mmol/L    Chloride 98 98 - 107 mmol/L    CO2 35.7 (H) 22.0 - 29.0 mmol/L    Calcium 9.2 8.6 - 10.5 mg/dL    BUN/Creatinine Ratio 20.3 7.0 - 25.0    Anion Gap 6.3 5.0 - 15.0 mmol/L    eGFR 82.4 >60.0 mL/min/1.73   TSH Rfx On Abnormal To Free T4    Specimen: Blood   Result Value Ref Range    TSH 4.800 (H) 0.270 - 4.200 uIU/mL   Magnesium    Specimen: Blood   Result Value Ref Range    Magnesium 2.2 1.6 - 2.4 mg/dL   T4, Free    Specimen: Blood   Result Value Ref Range    Free T4 0.98 0.93 - 1.70 ng/dL        Assessment and Plan  Diagnoses and all orders for this visit:    Shortness of breath, Peripheral edema, Chronic diastolic heart failure (HCC)  - progressive weight gain, edema, worsening SOA  - on baseline 4L O2 but functionally cannot walk more than a few  feet now  - will increase bumex to 2mg BID for the next 2 days. Continue metolazone 5mg daily and aldactone 50mg daily.  - call office with update on Friday    Daytime somnolence  - lab workup in ED and with cardiology remarkable for stable hypercapnia, mildly elevated TSH likely normal for age, normal free T4, normal BUN and renal fxn, normal procal  - may be due to polypharmacy which I will address at her visit next week    Health Maintenance  - Pap smear: no longer indicated  - Mammogram: 5/2021 [facility] with axillary adenopathy, 8/2021 [facility], had FNA of R axillary node which was negative. 3/2022 [facility], next in 6m.  - Colonoscopy: 8/2022, 3y repeat  - HCV: negative  - Immunizations: pneumococcal complete, COVID booster due, shingrix #1  - Depression screening: negative 2/2022    Return for Next scheduled follow up.

## 2022-10-21 ENCOUNTER — TELEPHONE (OUTPATIENT)
Dept: INTERNAL MEDICINE | Facility: CLINIC | Age: 77
End: 2022-10-21

## 2022-10-21 NOTE — TELEPHONE ENCOUNTER
Provider: WILMA SETHI    Caller: MARLENE WILBURN    Phone Number: [phone]    Reason for Call: MEDICATION THAT SHE WAS GIVEN IS HELPING WITH SWELLING, WEIGHT IS . SHE IS FEELING SLEEPY.

## 2022-10-24 ENCOUNTER — TELEPHONE (OUTPATIENT)
Dept: ONCOLOGY | Facility: CLINIC | Age: 77
End: 2022-10-24

## 2022-10-24 DIAGNOSIS — D50.9 IRON DEFICIENCY ANEMIA, UNSPECIFIED IRON DEFICIENCY ANEMIA TYPE: Primary | ICD-10-CM

## 2022-10-24 NOTE — TELEPHONE ENCOUNTER
Called patient back and she states that she feels that her fatigue improved for about a week or 2, after her injectafer on 9/23/22 and 9/30/22, but feels symptoms are worse now.  She is also moving to [place], [place] the week of Thanksgiving and is inquiring if Dr. Pal has any hematologist recommendations.

## 2022-10-24 NOTE — TELEPHONE ENCOUNTER
Discussed with Dr. Pal and he will recheck a CBC, Ferritin and Iron profile.  He did not have any personal recommendations for hematologist in [place], [place] but looked up locations and recommended [facility]. Called patient back and she verbalized understanding. She will come get labs collected.

## 2022-10-24 NOTE — TELEPHONE ENCOUNTER
Patient called he had an iron infusion about 2 to 3 weeks ago. She is so tired all she wants to do is sleep all the time. Please call.

## 2022-10-25 ENCOUNTER — DOCUMENTATION (OUTPATIENT)
Dept: OCCUPATIONAL THERAPY | Facility: HOSPITAL | Age: 77
End: 2022-10-25

## 2022-10-25 ENCOUNTER — HOSPITAL ENCOUNTER (OUTPATIENT)
Dept: MAMMOGRAPHY | Facility: HOSPITAL | Age: 77
Discharge: HOME | End: 2022-10-25
Admitting: INTERNAL MEDICINE

## 2022-10-25 DIAGNOSIS — I87.2 VENOUS STASIS DERMATITIS OF BOTH LOWER EXTREMITIES: ICD-10-CM

## 2022-10-25 DIAGNOSIS — R92.8 ABNORMAL MAMMOGRAM: ICD-10-CM

## 2022-10-25 DIAGNOSIS — R60.0 BILATERAL LOWER EXTREMITY EDEMA: Primary | ICD-10-CM

## 2022-10-25 PROCEDURE — G0279 TOMOSYNTHESIS, MAMMO: HCPCS | Performed by: RADIOLOGY

## 2022-10-25 PROCEDURE — G0279 TOMOSYNTHESIS, MAMMO: HCPCS

## 2022-10-25 PROCEDURE — 77066 DX MAMMO INCL CAD BI: CPT | Performed by: RADIOLOGY

## 2022-10-25 PROCEDURE — 77066 DX MAMMO INCL CAD BI: CPT

## 2022-10-25 NOTE — THERAPY DISCHARGE NOTE
Outpatient Occupational Therapy Discharge Summary         Patient Name: Lorrie Pagan  : 1945  MRN: [id number]    Today's Date: 10/25/2022      Visit Date: 10/25/2022           OP OT Discharge Summary  Date of Discharge: 10/25/22  Discharge Instructions: Pt is being d/c from OP OT at this time as she has not been seen in lymphedema clinic for >30 days. At the time of last appt, pt had decreased amount of swelling in legs and was ind w/ lymphedema management. Should she need anything in the future I would be happy to see her again w/ a new referral.  It has been a pleasure being part of Ms. Pagan's care.              Charlotte Valera, OTR/L   10/25/2022

## 2022-10-26 ENCOUNTER — OFFICE VISIT (OUTPATIENT)
Dept: INTERNAL MEDICINE | Facility: CLINIC | Age: 77
End: 2022-10-26

## 2022-10-26 ENCOUNTER — LAB (OUTPATIENT)
Dept: LAB | Facility: HOSPITAL | Age: 77
End: 2022-10-26

## 2022-10-26 ENCOUNTER — TELEPHONE (OUTPATIENT)
Dept: INTERNAL MEDICINE | Facility: CLINIC | Age: 77
End: 2022-10-26

## 2022-10-26 VITALS
BODY MASS INDEX: 33.66 KG/M2 | SYSTOLIC BLOOD PRESSURE: 132 MMHG | DIASTOLIC BLOOD PRESSURE: 60 MMHG | TEMPERATURE: 98.3 F | OXYGEN SATURATION: 99 % | HEART RATE: 64 BPM | WEIGHT: 190 LBS | HEIGHT: 63 IN

## 2022-10-26 DIAGNOSIS — K31.84 GASTROPARESIS: ICD-10-CM

## 2022-10-26 DIAGNOSIS — G60.9 IDIOPATHIC PERIPHERAL NEUROPATHY: ICD-10-CM

## 2022-10-26 DIAGNOSIS — R40.0 DAYTIME SOMNOLENCE: ICD-10-CM

## 2022-10-26 DIAGNOSIS — K22.4 ESOPHAGEAL DYSMOTILITY: ICD-10-CM

## 2022-10-26 DIAGNOSIS — G25.81 RESTLESS LEG SYNDROME: ICD-10-CM

## 2022-10-26 DIAGNOSIS — E78.2 MIXED HYPERLIPIDEMIA: ICD-10-CM

## 2022-10-26 DIAGNOSIS — D50.9 IRON DEFICIENCY ANEMIA, UNSPECIFIED IRON DEFICIENCY ANEMIA TYPE: ICD-10-CM

## 2022-10-26 DIAGNOSIS — J96.11 CHRONIC RESPIRATORY FAILURE WITH HYPOXIA: ICD-10-CM

## 2022-10-26 DIAGNOSIS — J44.9 COPD MIXED TYPE: ICD-10-CM

## 2022-10-26 DIAGNOSIS — R41.3 MEMORY LOSS: ICD-10-CM

## 2022-10-26 DIAGNOSIS — Z23 NEED FOR INFLUENZA VACCINATION: ICD-10-CM

## 2022-10-26 DIAGNOSIS — Z99.89 OSA ON CPAP: ICD-10-CM

## 2022-10-26 DIAGNOSIS — E11.9 CONTROLLED TYPE 2 DIABETES MELLITUS WITHOUT COMPLICATION, WITHOUT LONG-TERM CURRENT USE OF INSULIN: ICD-10-CM

## 2022-10-26 DIAGNOSIS — K58.0 IRRITABLE BOWEL SYNDROME WITH DIARRHEA: ICD-10-CM

## 2022-10-26 DIAGNOSIS — I50.32 CHRONIC DIASTOLIC HEART FAILURE: ICD-10-CM

## 2022-10-26 DIAGNOSIS — K21.00 GASTROESOPHAGEAL REFLUX DISEASE WITH ESOPHAGITIS WITHOUT HEMORRHAGE: ICD-10-CM

## 2022-10-26 DIAGNOSIS — I10 BENIGN HYPERTENSION: ICD-10-CM

## 2022-10-26 DIAGNOSIS — E03.9 ACQUIRED HYPOTHYROIDISM: ICD-10-CM

## 2022-10-26 DIAGNOSIS — T50.2X5A DIURETIC-INDUCED HYPOKALEMIA: ICD-10-CM

## 2022-10-26 DIAGNOSIS — E87.6 DIURETIC-INDUCED HYPOKALEMIA: ICD-10-CM

## 2022-10-26 DIAGNOSIS — R26.89 IMPAIRMENT OF BALANCE: ICD-10-CM

## 2022-10-26 DIAGNOSIS — K64.8 INTERNAL HEMORRHOIDS: ICD-10-CM

## 2022-10-26 DIAGNOSIS — Z00.00 MEDICARE ANNUAL WELLNESS VISIT, SUBSEQUENT: Primary | ICD-10-CM

## 2022-10-26 DIAGNOSIS — G47.33 OSA ON CPAP: ICD-10-CM

## 2022-10-26 LAB
BASOPHILS # BLD AUTO: 0.07 10*3/MM3 (ref 0–0.2)
BASOPHILS NFR BLD AUTO: 0.7 % (ref 0–1.5)
DEPRECATED RDW RBC AUTO: 63.9 FL (ref 37–54)
EOSINOPHIL # BLD AUTO: 0.22 10*3/MM3 (ref 0–0.4)
EOSINOPHIL NFR BLD AUTO: 2.1 % (ref 0.3–6.2)
ERYTHROCYTE [DISTWIDTH] IN BLOOD BY AUTOMATED COUNT: 19 % (ref 12.3–15.4)
EXPIRATION DATE: NORMAL
FERRITIN SERPL-MCNC: 463.1 NG/ML (ref 13–150)
HBA1C MFR BLD: 6.6 %
HCT VFR BLD AUTO: 39.7 % (ref 34–46.6)
HGB BLD-MCNC: 12.4 G/DL (ref 12–15.9)
IMM GRANULOCYTES # BLD AUTO: 0.14 10*3/MM3 (ref 0–0.05)
IMM GRANULOCYTES NFR BLD AUTO: 1.4 % (ref 0–0.5)
IRON 24H UR-MRATE: 53 MCG/DL (ref 37–145)
IRON SATN MFR SERPL: 15 % (ref 20–50)
LYMPHOCYTES # BLD AUTO: 1.54 10*3/MM3 (ref 0.7–3.1)
LYMPHOCYTES NFR BLD AUTO: 15 % (ref 19.6–45.3)
Lab: NORMAL
MCH RBC QN AUTO: 28.8 PG (ref 26.6–33)
MCHC RBC AUTO-ENTMCNC: 31.2 G/DL (ref 31.5–35.7)
MCV RBC AUTO: 92.3 FL (ref 79–97)
MONOCYTES # BLD AUTO: 0.72 10*3/MM3 (ref 0.1–0.9)
MONOCYTES NFR BLD AUTO: 7 % (ref 5–12)
NEUTROPHILS NFR BLD AUTO: 7.57 10*3/MM3 (ref 1.7–7)
NEUTROPHILS NFR BLD AUTO: 73.8 % (ref 42.7–76)
NRBC BLD AUTO-RTO: 0 /100 WBC (ref 0–0.2)
PLAT MORPH BLD: NORMAL
PLATELET # BLD AUTO: 343 10*3/MM3 (ref 140–450)
PMV BLD AUTO: 10.4 FL (ref 6–12)
RBC # BLD AUTO: 4.3 10*6/MM3 (ref 3.77–5.28)
RBC MORPH BLD: NORMAL
TIBC SERPL-MCNC: 355 MCG/DL (ref 298–536)
TRANSFERRIN SERPL-MCNC: 238 MG/DL (ref 200–360)
WBC MORPH BLD: NORMAL
WBC NRBC COR # BLD: 10.26 10*3/MM3 (ref 3.4–10.8)

## 2022-10-26 PROCEDURE — 90662 IIV NO PRSV INCREASED AG IM: CPT | Performed by: INTERNAL MEDICINE

## 2022-10-26 PROCEDURE — 82728 ASSAY OF FERRITIN: CPT

## 2022-10-26 PROCEDURE — 84466 ASSAY OF TRANSFERRIN: CPT

## 2022-10-26 PROCEDURE — 83540 ASSAY OF IRON: CPT

## 2022-10-26 PROCEDURE — 85025 COMPLETE CBC W/AUTO DIFF WBC: CPT

## 2022-10-26 PROCEDURE — 36415 COLL VENOUS BLD VENIPUNCTURE: CPT

## 2022-10-26 PROCEDURE — 80048 BASIC METABOLIC PNL TOTAL CA: CPT

## 2022-10-26 PROCEDURE — 3044F HG A1C LEVEL LT 7.0%: CPT | Performed by: INTERNAL MEDICINE

## 2022-10-26 PROCEDURE — 83036 HEMOGLOBIN GLYCOSYLATED A1C: CPT | Performed by: INTERNAL MEDICINE

## 2022-10-26 PROCEDURE — 1159F MED LIST DOCD IN RCRD: CPT | Performed by: INTERNAL MEDICINE

## 2022-10-26 PROCEDURE — G0008 ADMIN INFLUENZA VIRUS VAC: HCPCS | Performed by: INTERNAL MEDICINE

## 2022-10-26 PROCEDURE — G0439 PPPS, SUBSEQ VISIT: HCPCS | Performed by: INTERNAL MEDICINE

## 2022-10-26 PROCEDURE — 85007 BL SMEAR W/DIFF WBC COUNT: CPT

## 2022-10-26 RX ORDER — CALCIUM CARBONATE 160(400)MG
TABLET,CHEWABLE ORAL
Qty: 1 EACH | Refills: 0 | Status: SHIPPED | OUTPATIENT
Start: 2022-10-26

## 2022-10-26 NOTE — TELEPHONE ENCOUNTER
DR SETHI:  I'M TRYING TO FIND A F/U APPT FOR IVETTE BEFORE SHE MOVES OUT OF STATE.  THE ONLY OPENING I SEE IS ON NOV 14 BUT IT'S ONLY FOR 15 MINUTES NOT 30. HOW WOULD YOU LIKE ME TO SCHEDULE HER? PLEASE ADVISE, THANK YOU!

## 2022-10-26 NOTE — PROGRESS NOTES
The ABCs of the Annual Wellness Visit  Subsequent Medicare Wellness Visit    Chief Complaint   Patient presents with   • Medicare Wellness-subsequent       Subjective   History of Present Illness:  Lorrie Pagan is a 77 y.o. female who presents for a Subsequent Medicare Wellness Visit.    HEALTH RISK ASSESSMENT    Recent Hospitalizations:  Recently treated at the following:  [facility]    Current Medical Providers:  Patient Care Team:  Dacia Viera MD as PCP - General (Internal Medicine)  Elias Chaidez MD as Consulting Physician (Cardiology)  Helena Jimenez RN as Ambulatory  (Population Health)  Saray Retana APRN as Nurse Practitioner (Pulmonary Disease)  Elias Luther PA as Physician Assistant (Cardiology)  Elias Chaidez MD as Consulting Physician (Cardiology)  Cortes Millan MD as Consulting Physician (Gastroenterology)  Nakul Pal MD as Consulting Physician (Hematology and Oncology)    Smoking Status:  Social History     Tobacco Use   Smoking Status Former   • Packs/day: 1.50   • Years: 25.00   • Pack years: 37.50   • Types: Cigarettes   • Quit date: 1992   • Years since quittin.8   Smokeless Tobacco Never       Alcohol Consumption:  Social History     Substance and Sexual Activity   Alcohol Use Yes   • Alcohol/week: 2.0 standard drinks   • Types: 2 Standard drinks or equivalent per week    Comment: a glass of wine or bourbon a couple times a WEEK       Depression Screen:   PHQ-2/PHQ-9 Depression Screening 10/26/2022   Retired PHQ-9 Total Score -   Retired Total Score -   Little Interest or Pleasure in Doing Things 1-->several days   Feeling Down, Depressed or Hopeless 1-->several days   Trouble Falling or Staying Asleep, or Sleeping Too Much 3-->nearly every day   Feeling Tired or Having Little Energy 3-->nearly every day   Poor Appetite or Overeating 3-->nearly every day   Feeling Bad about Yourself - or that You  are a Failure or Have Let Yourself or Your Family Down 0-->not at all   Trouble Concentrating on Things, Such as Reading the Newspaper or Watching Television 0-->not at all   Moving or Speaking So Slowly that Other People Could Have Noticed? Or the Opposite - Being So Fidgety 0-->not at all   Thoughts that You Would be Better Off Dead or of Hurting Yourself in Some Way 0-->not at all   PHQ-9: Brief Depression Severity Measure Score 11   If You Checked Off Any Problems, How Difficult Have These Problems Made It For You to Do Your Work, Take Care of Things at Home, or Get Along with Other People? somewhat difficult       Fall Risk Screen:  SHAZIA Fall Risk Assessment was completed, and patient is at LOW risk for falls.Assessment completed on:10/26/2022    Health Habits and Functional and Cognitive Screening:  Functional & Cognitive Status 10/26/2022   Do you have difficulty preparing food and eating? No   Do you have difficulty bathing yourself, getting dressed or grooming yourself? No   Do you have difficulty using the toilet? No   Do you have difficulty moving around from place to place? No   Do you have trouble with steps or getting out of a bed or a chair? Yes   Current Diet Well Balanced Diet   Dental Exam Not up to date   Eye Exam Up to date   Exercise (times per week) 2 times per week   Current Exercises Include Other   Do you need help using the phone?  No   Are you deaf or do you have serious difficulty hearing?  No   Do you need help with transportation? Yes   Do you need help shopping? Yes   Do you need help preparing meals?  No   Do you need help with housework?  Yes   Do you need help with laundry? Yes   Do you need help taking your medications? No   Do you need help managing money? No   Do you ever drive or ride in a car without wearing a seat belt? No   Have you felt unusual stress, anger or loneliness in the last month? Yes   Who do you live with? Community   If you need help, do you have trouble  finding someone available to you? No   Have you been bothered in the last four weeks by sexual problems? No   Do you have difficulty concentrating, remembering or making decisions? Yes         Does the patient have evidence of cognitive impairment? No    Asprin use counseling:Does not need ASA (and currently is not on it)    Age-appropriate Screening Schedule:  Refer to the list below for future screening recommendations based on patient's age, sex and/or medical conditions. Orders for these recommended tests are listed in the plan section. The patient has been provided with a written plan.    Health Maintenance   Topic Date Due   • ZOSTER VACCINE (2 of 2) 05/26/2021   • URINE MICROALBUMIN  02/22/2022   • INFLUENZA VACCINE  08/01/2022   • TDAP/TD VACCINES (1 - Tdap) 04/04/2023 (Originally 9/19/1964)   • LIPID PANEL  02/02/2023   • DIABETIC EYE EXAM  02/21/2023   • MAMMOGRAM  04/25/2023   • HEMOGLOBIN A1C  04/26/2023   • DXA SCAN  Discontinued          The following portions of the patient's history were reviewed and updated as appropriate: allergies, current medications, past family history, past medical history, past social history, past surgical history and problem list.    Outpatient Medications Prior to Visit   Medication Sig Dispense Refill   • acetaminophen (TYLENOL) 500 MG tablet Take 1,000 mg by mouth Every 6 (Six) Hours As Needed for Mild Pain .     • albuterol (ACCUNEB) 1.25 MG/3ML nebulizer solution Take 3 mL by nebulization Every 6 (Six) Hours As Needed for Shortness of Air. 90 each 5   • albuterol sulfate  (90 Base) MCG/ACT inhaler INHALE 2 PUFFS EVERY 6 (SIX) HOURS AS NEEDED FOR WHEEZING. 18 g 11   • azelastine (ASTELIN) 0.1 % nasal spray 2 sprays into the nostril(s) as directed by provider 2 (Two) Times a Day. (Patient taking differently: 2 sprays into the nostril(s) as directed by provider 2 (Two) Times a Day As Needed for Rhinitis or Allergies.) 30 mL 5   • Blood Glucose Monitoring Suppl  (FreeStyle Freedom Lite) w/Device kit      • bumetanide (BUMEX) 2 MG tablet Take 1 tablet by mouth Daily. 30 tablet 3   • chlorhexidine (PERIDEX) 0.12 % solution Apply 15 mL to the mouth or throat 2 (Two) Times a Day As Needed. For 14 days, w/1 refill     • colestipol (COLESTID) 1 g tablet Take 1 tablet by mouth Daily.     • Dapsone 5 % topical gel Apply 1 application topically to the appropriate area as directed Daily As Needed (apply to legs).     • donepezil (ARICEPT) 10 MG tablet Take 10 mg by mouth Daily.     • DULoxetine (CYMBALTA) 60 MG capsule TAKE 1 CAPSULE BY MOUTH EVERY DAY (Patient taking differently: Take 1 capsule by mouth Daily.) 90 capsule 3   • famotidine (Pepcid AC Maximum Strength) 20 MG tablet Take 1 tablet by mouth 2 (Two) Times a Day. 8 tablet 0   • Fluticasone-Umeclidin-Vilant (TRELEGY) 200-62.5-25 MCG/INH inhaler Inhale 1 puff Daily. 1 each 5   • glimepiride (AMARYL) 2 MG tablet TAKE 1 TABLET BY MOUTH TWICE DAILY (Patient taking differently: Take 1 tablet by mouth 2 (Two) Times a Day.) 60 tablet 0   • Glucose Blood (Blood Glucose Test) strip Use to test blood sugar daily before breakfast. 100 each 11   • guaiFENesin (Mucinex) 600 MG 12 hr tablet Take 2 tablets by mouth 2 (Two) Times a Day. 180 tablet 3   • ibuprofen (ADVIL,MOTRIN) 600 MG tablet Take 1 tablet by mouth 2 (Two) Times a Day As Needed for Mild Pain.     • ketoconazole (NIZORAL) 2 % cream Apply 1 application topically to the appropriate area as directed Daily As Needed for Itching (apply to back and legs).     • Lancets (freestyle) lancets Use to test blood sugar daily before breakfast. 100 each 12   • latanoprost (XALATAN) 0.005 % ophthalmic solution Administer 1 drop to both eyes Every Night.     • memantine (NAMENDA) 5 MG tablet Take 5 mg by mouth Daily.     • metOLazone (ZAROXOLYN) 5 MG tablet Take 1 tablet by mouth Daily. 7 tablet 0   • montelukast (SINGULAIR) 10 MG tablet Take 1 tablet by mouth Every Night. 90 tablet 3   • O2  (OXYGEN) Inhale 4 L/min Continuous.     • pantoprazole (PROTONIX) 40 MG EC tablet Take 1 tablet by mouth 2 (Two) Times a Day. 180 tablet 3   • potassium chloride (MICRO-K) 10 MEQ CR capsule 6 capsules daily 90 capsule 0   • rOPINIRole XL (REQUIP XL) 8 MG 24 hr tablet Take 8 mg by mouth 2 (two) times a day.     • Spacer/Aero-Holding Chambers (AeroChamber Plus Morris-Vu) misc As Needed.     • spironolactone (ALDACTONE) 25 MG tablet Take 2 tablets by mouth Daily. 60 tablet 11   • timolol (TIMOPTIC) 0.25 % ophthalmic solution Administer 1 drop to the right eye Every Morning.     • Triamcinolone Acetonide (NASACORT) 55 MCG/ACT nasal inhaler 2 sprays into the nostril(s) as directed by provider Daily.     • vitamin D (ERGOCALCIFEROL) 1.25 MG (25953 UT) capsule capsule Take 1 capsule by mouth 1 (One) Time Per Week. (Patient taking differently: Take 1 capsule by mouth 1 (One) Time Per Week. Wednesday) 12 capsule 1     No facility-administered medications prior to visit.       Patient Active Problem List   Diagnosis   • Bronchiectasis (CMS/HCC)   • H/O: lung cancer (Prior resection 2016)   • Chronic respiratory failure   • RENETTA (Previous CPAP)   • Hiatal hernia (Prior Nissen 2008)   • Mycobacterium avium complex colonization   • Irritable bowel syndrome with diarrhea   • Aortic valve regurgitation   • Benign hypertension   • Peripheral edema   • Impairment of balance   • Peripheral neuropathy   • Peripheral venous insufficiency   • Chronic diastolic heart failure (HCC)   • Syncope   • Diuretic-induced hypokalemia   • Hypomagnesemia   • Stage II, moderate, COPD   • Memory loss   • Restless leg syndrome   • Controlled type 2 diabetes mellitus without complication, without long-term current use of insulin ([facility])   • Vitamin D deficiency   • Mixed hyperlipidemia   • Allergic rhinitis   • Anemia   • Gastroesophageal reflux disease with esophagitis without hemorrhage   • Acquired hypothyroidism   • Esophageal dysmotility   • Dysphagia  "  • Gastroparesis   • Degenerative disc disease, cervical   • Degenerative disc disease, lumbar   • Globus sensation   • Sialorrhea   • Drooling   • Dysfunction of both eustachian tubes   • Tinnitus of both ears   • Iron deficiency anemia   • Bright red blood per rectum   • Internal hemorrhoids   • Malabsorption of iron       Advanced Care Planning:  ACP discussion was held with the patient during this visit. Patient has an advance directive in EMR which is still valid.     Review of Systems    Compared to one year ago, the patient feels her physical health is worse. Increase difficulty with mobility, SOA.  Compared to one year ago, the patient feels her mental health is worse. [place] temper and worse memory.    Reviewed chart for potential of high risk medication in the elderly: yes  Reviewed chart for potential of harmful drug interactions in the elderly:yes    Objective         Vitals:    10/26/22 1354   BP: 132/60   Pulse: 64   Temp: 98.3 °F (36.8 °C)   SpO2: 99%  Comment: pt on 4 liters of O2   Weight: 86.2 kg (190 lb)   Height: 160 cm (63\")       Body mass index is 33.66 kg/m².  Discussed the patient's BMI with her. The BMI is above average; no BMI management plan is appropriate..    Physical Exam    Lab Results   Component Value Date    HGBA1C 6.6 10/26/2022        Assessment & Plan   Medicare Risks and Personalized Health Plan  CMS Preventative Services Quick Reference  Advance Directive Discussion  Breast Cancer/Mammogram Screening  Fall Risk  Immunizations Discussed/Encouraged (specific immunizations; Influenza and COVID19 )  Polypharmacy    The above risks/problems have been discussed with the patient.  Pertinent information has been shared with the patient in the After Visit Summary.  Follow up plans and orders are seen below in the Assessment/Plan Section.    Diagnoses and all orders for this visit:    Medicare annual wellness visit, subsequent    Controlled type 2 diabetes mellitus without " complication, without long-term current use of insulin (HCC)  - Has been well controlled, A1c today 6.6  - Continue glimepiride 2mg BID.    Chronic diastolic heart failure (HCC)  - Has ongoing LE edema and SOA with exertion  - did not improve significantly with increase in bumex to 2mg BID in addition to metolazone 5mg daily and aldactone 50mg daily. Will have her continue bumex 2mg daily.    Impairment of balance  - needs rollator so that she can transport her O2 tanks and avoid falls. Cannot use cane and motorized scooter cannot always be transported to appointments.    Diuretic-induced hypokalemia  On aldactone 50mg daily and KCl 30meq BID per cardiology    Gastroparesis  - Following with GI, on pantoprazole 40mg BID  - Intolerant of reglan due to restlessness, worsened RLS  - Plan to add domperidone per GI    Gastroesophageal reflux disease with esophagitis without hemorrhage  - Following with GI, on pantoprazole 40mg BID    Acquired hypothyroidism  - stable on levothyroxine 50mcg daily    Mixed hyperlipidemia  - Had been on zetia, will need to discuss next visit why this was discontinued    Restless leg syndrome  - managed by Dr. Saldana, on ropinirole XR 8mg BID    Chronic respiratory failure due to Stage II, moderate, COPD  - Last PFTs with FEV1 61% 5/2022  - Recently had 6 minute walk test indicating that she should reduce O2 to 3.5L NC however she does not feel well unless on 4-5L NC  - had therapeutic bronchoscopy 8/2022 with BAL negative for malignancy, resp cx negative, afb and fungal cx negative  - On trelegy and albuterol, singulair, mucinex with flutter valve  - Has been seen by palliative care but defers further services for now    Internal hemorrhoids  - These bleed intermittently and likely cause of her iron deficiency anemia  - She has been prescribed anusol but has been told the hemorrhoids are too far internalized to use topical treatments.   - She will follow up with GI    Irritable bowel  syndrome with diarrhea  - follows with GI, on colestid PRN    Benign hypertension  - BP acceptable, continue diuretics    RENETTA (Previous CPAP)  - using oxygen at night    Idiopathic peripheral neuropathy  - on duloxetine 60mg daily    Memory loss  - follows with Dr. Saldana, on donepezil 10mg daily    Iron deficiency anemia, unspecified iron deficiency anemia type  - Intermittently anemic, most recent CBC with hgb stable   - Has had colonoscopy with large internal hemorrhoids and she does report some BRBPR. She has been prescribed anusol but reports that her hemorrhoids are too far internalized for this to work.  - She follows with hematology and has had IV iron. Intolerant of oral iron.  - she will complete labs ordered by Dr. Pal today    Daytime somnolence  - improving without interevention  - will get labs to check iron levels and for anemia today. Notably she had labs 2 weeks ago with stable mild anemia    Need for influenza vaccination  -     Fluzone High-Dose 65+yrs     Health Maintenance  - Pap smear: no longer indicated  - Mammogram: 10/2022 [facility] 2  - Colonoscopy: 8/2022, 3y repeat  - HCV: negative  - Immunizations: pneumococcal complete, COVID booster tomorrow, flu today, shingrix #1  - Depression screening: PHQ9 = 11 10/2022     Follow Up:  Return in about 4 weeks (around 11/23/2022) for Follow up 30 minutes, Labs today.     An After Visit Summary and PPPS were given to the patient.

## 2022-10-27 ENCOUNTER — PATIENT OUTREACH (OUTPATIENT)
Dept: CASE MANAGEMENT | Facility: OTHER | Age: 77
End: 2022-10-27

## 2022-10-27 DIAGNOSIS — I50.32 CHRONIC DIASTOLIC HEART FAILURE: Primary | ICD-10-CM

## 2022-10-27 DIAGNOSIS — J44.9 COPD MIXED TYPE: ICD-10-CM

## 2022-10-27 DIAGNOSIS — E11.9 CONTROLLED TYPE 2 DIABETES MELLITUS WITHOUT COMPLICATION, WITHOUT LONG-TERM CURRENT USE OF INSULIN: ICD-10-CM

## 2022-10-27 DIAGNOSIS — E55.9 VITAMIN D DEFICIENCY: ICD-10-CM

## 2022-10-27 DIAGNOSIS — K59.00 CONSTIPATION, UNSPECIFIED CONSTIPATION TYPE: Primary | ICD-10-CM

## 2022-10-27 LAB
ANION GAP SERPL CALCULATED.3IONS-SCNC: 7.7 MMOL/L (ref 5–15)
BUN SERPL-MCNC: 13 MG/DL (ref 8–23)
BUN/CREAT SERPL: 17.1 (ref 7–25)
CALCIUM SPEC-SCNC: 9.8 MG/DL (ref 8.6–10.5)
CHLORIDE SERPL-SCNC: 99 MMOL/L (ref 98–107)
CO2 SERPL-SCNC: 35.3 MMOL/L (ref 22–29)
CREAT SERPL-MCNC: 0.76 MG/DL (ref 0.57–1)
EGFRCR SERPLBLD CKD-EPI 2021: 79.8 ML/MIN/1.73
GLUCOSE SERPL-MCNC: 121 MG/DL (ref 65–99)
POTASSIUM SERPL-SCNC: 3.9 MMOL/L (ref 3.5–5.2)
SODIUM SERPL-SCNC: 142 MMOL/L (ref 136–145)

## 2022-10-27 PROCEDURE — 99439 CHRNC CARE MGMT STAF EA ADDL: CPT | Performed by: INTERNAL MEDICINE

## 2022-10-27 PROCEDURE — 99490 CHRNC CARE MGMT STAFF 1ST 20: CPT | Performed by: INTERNAL MEDICINE

## 2022-10-27 RX ORDER — ERGOCALCIFEROL 1.25 MG/1
50000 CAPSULE ORAL WEEKLY
Qty: 12 CAPSULE | Refills: 0 | Status: SHIPPED | OUTPATIENT
Start: 2022-10-27

## 2022-10-27 RX ORDER — POLYETHYLENE GLYCOL 3350 17 G/17G
17 POWDER, FOR SOLUTION ORAL 2 TIMES DAILY PRN
Qty: 72 EACH | Refills: 0 | Status: SHIPPED | OUTPATIENT
Start: 2022-10-27

## 2022-10-27 RX ORDER — GLIMEPIRIDE 2 MG/1
TABLET ORAL
Qty: 180 TABLET | Refills: 0 | Status: SHIPPED | OUTPATIENT
Start: 2022-10-27

## 2022-10-27 NOTE — OUTREACH NOTE
[facility] End of Month Documentation    This Chronic Medical Management Care Plan for Lorrie Pagan, 77 y.o. female, has been monitored and managed and a new plan of care implemented for the month of October.  A cumulative time of 48 minutes was spent on this patient record this month, including phone call with patient; face to face with provider.    Regarding the patient's problems: has Bronchiectasis (CMS/[facility]); H/O: lung cancer (Prior resection 2016); Chronic respiratory failure; RENETTA (Previous CPAP); Hiatal hernia (Prior Nissen 2008); Mycobacterium avium complex colonization; Irritable bowel syndrome with diarrhea; Aortic valve regurgitation; Benign hypertension; Peripheral edema; Impairment of balance; Peripheral neuropathy; Peripheral venous insufficiency; Chronic diastolic heart failure ([facility]); Syncope; Diuretic-induced hypokalemia; Hypomagnesemia; Stage II, moderate, COPD; Memory loss; Restless leg syndrome; Controlled type 2 diabetes mellitus without complication, without long-term current use of insulin ([facility]); Vitamin D deficiency; Mixed hyperlipidemia; Allergic rhinitis; Anemia; Gastroesophageal reflux disease with esophagitis without hemorrhage; Acquired hypothyroidism; Esophageal dysmotility; Dysphagia; Gastroparesis; Degenerative disc disease, cervical; Degenerative disc disease, lumbar; Globus sensation; Sialorrhea; Drooling; Dysfunction of both eustachian tubes; Tinnitus of both ears; Iron deficiency anemia; Bright red blood per rectum; Internal hemorrhoids; and Malabsorption of iron on their problem list., the following items were addressed: medications and any changes can be found within the plan section of the note.  A detailed listing of time spent for chronic care management is tracked within each outreach encounter.  Current medications include:  has a current medication list which includes the following prescription(s): albuterol, albuterol sulfate hfa, azelastine, freestyle freedom lite,  bumetanide, chlorhexidine, colestipol, donepezil, duloxetine, famotidine, fluticasone-umeclidin-vilant, glimepiride, blood glucose test, freestyle, latanoprost, memantine, metolazone, rollator ultra-light, montelukast, o2, pantoprazole, potassium chloride, ropinirole xl, aerochamber plus feng-vu, spironolactone, timolol, triamcinolone acetonide, and vitamin d. and the patient is reported to be patient is compliant with medication protocol,  Medications are reported to be effective.  Regarding these diagnoses, referrals were made to the following provider(s):  Primary Care Provider.  All notes on chart for PCP to review.    The patient was monitored remotely for weight; blood pressure.    The patient's physical needs include:  needs assistance with ADLs; resources for disability needs; medication education.     The patient's mental support needs include:  continued support    The patient's cognitive support needs include:  increased support; medication; supervision    The patient's psychosocial support needs include:  coordination of community providers; medication management or adherence    The patient's functional needs include: medication education; resources for disability needs    The patient's environmental needs include:  resources for disability needs    Care Plan overall comments:  No data recorded    Refer to previous outreach notes for more information on the areas listed above.    Monthly Billing Diagnoses  (I50.32) Chronic diastolic heart failure (HCC)    (J44.9) Stage II, moderate, COPD    Medications   · Medications have been reconciled    Care Plan progress this month:      Recently Modified Care Plans Updates made since 9/26/2022 12:00 AM     Fall Risk (Adult)         Problem Priority Last Modified     ELS FALL RISK (ADULT) --  10/17/2022  2:41 PM by Helena Jimenez RN              Goal Recent Progress Last Modified     Absence of Fall and Fall-Related Injury --  10/17/2022  2:41 PM by Helena Jimenez  RN     Evidence-based guidance:   Assess fall risk using a validated tool when available. Consider balance and gait impairment, muscle weakness, diminished vision or hearing, environmental hazards, presence of urinary or bowel urgency and/or incontinence.   Communicate fall injury risk to interprofessional healthcare team.   Develop a fall prevention plan with the patient and family.   Promote use of personal vision and auditory aids.   Promote reorientation, appropriate sensory stimulation, and routines to decrease risk of fall when changes in mental status are present.   Assess assistance level required for safe and effective self-care; consider referral for home care.   Encourage physical activity, such as performance of self-care at highest level of ability, strength and balance exercise program, and provision of appropriate assistive devices; refer to rehabilitation therapy.   Refer to community-based fall prevention program where available.   If fall occurs, determine the cause and revise fall injury prevention plan.   Regularly review medication contribution to fall risk; consider risk related to polypharmacy and age.   Refer to pharmacist for consultation when concerns about medications are revealed.   Balance adequate pain management with potential for oversedation.   Provide guidance related to environmental modifications.   Consider supplementation with Vitamin D.    Notes:            Task Due Date Last Modified     Identify and Manage Contributors to Fall Risk --  10/17/2022  2:42 PM by Helena Jimenez, NEO     Care Management Activities:      - activities of daily living skills assessed  - assistive or adaptive device use encouraged      Notes: Discussed 10/17/2022                     · Current Specialty Plan of Care Status signed by both patient and provider    Instructions   · Patient was provided an electronic copy of care plan  · CCM services were explained and offered and patient has accepted these  services.  · Patient has given their written consent to receive CCM services and understands that this includes the authorization of electronic communication of medical information with the other treating providers.  · Patient understands that they may stop CCM services at any time and these changes will be effective at the end of the calendar month and will effectively revocate the agreement of CCM services.  · Patient understands that only one practitioner can furnish and be paid for CCM services during one calendar month.  Patient also understands that there may be co-payment or deductible fees in association with CCM services.  · Patient will continue with at least monthly follow-up calls with the Ambulatory .    JI RIOS  Ambulatory Case Management    10/27/2022, 16:19 EDT

## 2022-10-27 NOTE — TELEPHONE ENCOUNTER
Pt called & stated her weight is up to 197; pt states she feels full; pt sates she feels she needs a stool softner; pt woud like to see what Dr. Viera thinks she should do

## 2022-10-31 ENCOUNTER — OFFICE VISIT (OUTPATIENT)
Dept: PULMONOLOGY | Facility: CLINIC | Age: 77
End: 2022-10-31

## 2022-10-31 VITALS
SYSTOLIC BLOOD PRESSURE: 124 MMHG | RESPIRATION RATE: 12 BRPM | HEART RATE: 89 BPM | TEMPERATURE: 97.4 F | HEIGHT: 63 IN | OXYGEN SATURATION: 98 % | DIASTOLIC BLOOD PRESSURE: 68 MMHG | BODY MASS INDEX: 32.74 KG/M2 | WEIGHT: 184.8 LBS

## 2022-10-31 DIAGNOSIS — J96.11 CHRONIC RESPIRATORY FAILURE WITH HYPOXIA: Primary | ICD-10-CM

## 2022-10-31 DIAGNOSIS — J47.9 IDIOPATHIC BRONCHIECTASIS: ICD-10-CM

## 2022-10-31 DIAGNOSIS — J44.9 COPD MIXED TYPE: ICD-10-CM

## 2022-10-31 PROCEDURE — 99213 OFFICE O/P EST LOW 20 MIN: CPT | Performed by: NURSE PRACTITIONER

## 2022-10-31 NOTE — PROGRESS NOTES
"[facility], [place] Pulmonary Follow up      Chief Complaint  Cough and Shortness of Breath    Subjective          Lorrie Pagan presents to [facility] for routine follow-up.  I follow her here in the office for bronchiectasis with COPD and chronic respiratory failure.    She is on 4 to 5 L continuous flow of oxygen.  She monitors her oxygen saturations at home, with activity she will drop below 88%.  She has a chronic cough with chronic sputum production.  She did recently undergo bronchoscopy by Dr. Arboleda in August, her cultures were negative.  She did have quite a bit of secretions.  She does continue to use her nebulizers as needed.    Presented overall general decline in her functionality.  She has she has had a few falls.  She plans on moving to [place] to live with her son and daughter-in-law by the end of next month.    She is also having quite a bit of worsening edema, internal medicine doctor has been adjusting her diuretics.  She says it actually has improved there is no longer up into her thighs its more in her lower legs.      Objective     Vital Signs:   /68 (BP Location: Right arm, Patient Position: Sitting, Cuff Size: Adult)   Pulse 89   Temp 97.4 °F (36.3 °C) (Infrared)   Resp 12   Ht 160 cm (63\")   Wt 83.8 kg (184 lb 12.8 oz)   SpO2 98%   BMI 32.74 kg/m²       Immunization History   Administered Date(s) Administered   • COVID-19 (MODERNA) 1st, 2nd, 3rd Dose Only 02/13/2021, 03/13/2021, 09/17/2021   • Flu Vaccine Quad PF >36MO 10/31/2019, 10/25/2020   • Flu Vaccine Split Quad 10/31/2019   • Fluzone High Dose =>65 Years (Vaxcare ONLY) 10/28/2018, 10/21/2019, 10/16/2020   • Fluzone High-Dose 65+yrs 10/16/2020, 11/17/2021, 10/26/2022   • Influenza TIV (IM) 11/01/2017   • Pneumococcal Conjugate 13-Valent (PCV13) 08/03/2018, 10/28/2018   • Pneumococcal Polysaccharide (PPSV23) 10/16/2020   • Pneumococcal, Unspecified 01/01/2016   • Shingrix " 03/31/2021       Physical Exam  Vitals reviewed.   Constitutional:       Appearance: She is well-developed.   HENT:      Head: Normocephalic and atraumatic.   Eyes:      Pupils: Pupils are equal, round, and reactive to light.   Cardiovascular:      Rate and Rhythm: Normal rate and regular rhythm.      Heart sounds: No murmur heard.  Pulmonary:      Effort: Pulmonary effort is normal. No respiratory distress.      Breath sounds: Rhonchi present. No wheezing or rales.   Abdominal:      General: Bowel sounds are normal. There is no distension.      Palpations: Abdomen is soft.   Musculoskeletal:         General: Normal range of motion.      Cervical back: Normal range of motion and neck supple.      Right lower leg: Edema present.      Left lower leg: Edema present.   Skin:     General: Skin is warm and dry.      Findings: No erythema.   Neurological:      Mental Status: She is alert and oriented to person, place, and time.   Psychiatric:         Behavior: Behavior normal.          Result Review :            FINDINGS:   Previous exam report indicated no evidence of recurrent disease.     Previously noted shotty superior mediastinal lymph nodes are stable  compared to the prior exam. There is no evidence of new mediastinal node  or mass. No pericardial or pleural effusion is seen. There is upper lobe  bullous change and left apical linear scar. Mild chronic appearing  interstitial changes of the lower lungs appear stable from the prior  exam. No lung consolidation or effusion is seen. Included images of the  upper abdomen show mild diffuse fatty liver change. Clips are seen in  the gallbladder fossa. Spleen, pancreatic tail, adrenal glands, and  upper renal poles appear unremarkable. Dorsal midline neurostimulator  electrodes are again noted. Bony structures appear to be intact. Smooth  deformity of the upper sternum is unchanged and may represent old healed  trauma.              IMPRESSION:  Stable shotty superior  mediastinal lymph nodes. No evidence of recurrent  malignancy or other new chest disease.     This report was finalized on 9/21/2022 10:32 PM by Dr. Cory Kwong MD.               Assessment and Plan    Problem List Items Addressed This Visit        Pulmonary and Pneumonias    Bronchiectasis (CMS/HCC)    Chronic respiratory failure - Primary    Stage II, moderate, COPD    Overview     FEV1 1.04, 56% predicted          At this time she is seems very stable.  Her cough is controlled with her nebulizers.  Continue on her Trelegy daily.  Continue on her oxygen at 4 to 5 L.  She will be in contact with her DME to get set up in [place], there is a office in the same town as her son.  I did give her a copy of some information while she was in the office today, she will establish care with pulmonologist in [place].    She did have her follow-up CT scan which showed some stable mild lymphadenopathy, likely reactive.    She will call if she needs anything prior to her move next month.    Follow Up     No follow-ups on file.  Patient was given instructions and counseling regarding her condition or for health maintenance advice. Please see specific information pulled into the AVS if appropriate.     I spent 25 minutes caring for Lorrie on this date of service. This time includes time spent by me in the following activities:preparing for the visit, reviewing tests, obtaining and/or reviewing a separately obtained history, performing a medically appropriate examination and/or evaluation , counseling and educating the patient/family/caregiver, ordering medications, tests, or procedures, referring and communicating with other health care professionals , documenting information in the medical record and independently interpreting results and communicating that information with the patient/family/caregiver        DONALD Sylvester, ACNP  [facility] Pulmonary Critical Care Medicine  Electronically signed

## 2022-11-03 ENCOUNTER — HOSPITAL ENCOUNTER (OUTPATIENT)
Dept: PHYSICAL THERAPY | Facility: HOSPITAL | Age: 77
Setting detail: THERAPIES SERIES
Discharge: HOME | End: 2022-11-03

## 2022-11-03 DIAGNOSIS — I87.8 VENOUS STASIS OF BOTH LOWER EXTREMITIES: ICD-10-CM

## 2022-11-03 DIAGNOSIS — R60.0 BILATERAL LOWER EXTREMITY EDEMA: Primary | ICD-10-CM

## 2022-11-03 PROCEDURE — 97161 PT EVAL LOW COMPLEX 20 MIN: CPT

## 2022-11-03 PROCEDURE — 29580 STRAPPING UNNA BOOT: CPT

## 2022-11-03 NOTE — THERAPY EVALUATION
Outpatient Rehabilitation - Wound/Debridement Initial Eval   [facility]     Patient Name: Lorrie Pagan  : 1945  MRN: [id number]  Today's Date: 11/3/2022                  Admit Date: 11/3/2022    Visit Dx:    ICD-10-CM ICD-9-CM   1. Bilateral lower extremity edema  R60.0 782.3   2. Venous stasis of both lower extremities  I87.8 459.81       Patient Active Problem List   Diagnosis   • Bronchiectasis (CMS/HCC)   • H/O: lung cancer (Prior resection )   • Chronic respiratory failure   • RENETTA (Previous CPAP)   • Hiatal hernia (Prior Nissen )   • Mycobacterium avium complex colonization   • Irritable bowel syndrome with diarrhea   • Aortic valve regurgitation   • Benign hypertension   • Peripheral edema   • Impairment of balance   • Peripheral neuropathy   • Peripheral venous insufficiency   • Chronic diastolic heart failure ([facility])   • Syncope   • Diuretic-induced hypokalemia   • Hypomagnesemia   • Stage II, moderate, COPD   • Memory loss   • Restless leg syndrome   • Controlled type 2 diabetes mellitus without complication, without long-term current use of insulin ([facility])   • Vitamin D deficiency   • Mixed hyperlipidemia   • Allergic rhinitis   • Anemia   • Gastroesophageal reflux disease with esophagitis without hemorrhage   • Acquired hypothyroidism   • Esophageal dysmotility   • Dysphagia   • Gastroparesis   • Degenerative disc disease, cervical   • Degenerative disc disease, lumbar   • Globus sensation   • Sialorrhea   • Drooling   • Dysfunction of both eustachian tubes   • Tinnitus of both ears   • Iron deficiency anemia   • Bright red blood per rectum   • Internal hemorrhoids   • Malabsorption of iron        Past Medical History:   Diagnosis Date   • Asthma    • Back pain    • BRBPR (bright red blood per rectum) 07/10/2022   • Breast injury     Fell on table and injuried both breasts about    • Bronchiectasis ([facility])    • Bronchitis 2018   • Cancer ([facility])     History of lung cancer and  skin cancer    • Cardiac murmur    • Cellulitis of both lower extremities 01/27/2020   • Chronic cough    • Chronic obstructive pulmonary disease (HCC)    • Colon polyp    • DDD (degenerative disc disease), lumbar    • Depression    • Diabetes mellitus (HCC)     DX: 2019, CHECK BS QOD, LAST A1C 6.3??   • Disease of thyroid gland    • Esophageal dilatation 09/20/2019    Added automatically from request for surgery [id number]   • Esophageal dysphagia 10/24/2019    Added automatically from request for surgery [id number]   • Former smoker (None since 1992) 08/29/2019   • Gastroparesis    • GERD (gastroesophageal reflux disease)    • Glaucoma    • Globus sensation 01/27/2020   • History of colonic polyps    • History of transfusion 1988    NO REACTION    • Hyperlipidemia    • Hypertension    • Irritable bowel syndrome     Constipation/diarrhea   • Legally blind    • Lung cancer (HCC)    • Macular degeneration    • Neuropathy    • Obesity 02/26/2020   • Osteoarthritis    • Oxygen dependent     4L   • Pneumonia    • Sleep apnea     NON COMPLIANT WITH CPAP USE   • UTI (urinary tract infection)    • Wears glasses         Past Surgical History:   Procedure Laterality Date   • BACK SURGERY      X2 (LUMBAR)   • BREAST BIOPSY      20+ years ago   • BREAST BIOPSY Right 08/2021    fna ln   • BRONCHOSCOPY N/A 08/24/2022    Procedure: BRONCHOSCOPY WITH ENDOBRONCHIAL ULTRASOUND;  Surgeon: Vladislav Arboleda MD;  Location: [facility] ENDOSCOPY;  Service: Pulmonary;  Laterality: N/A;   • CATARACT EXTRACTION Bilateral    • CHOLECYSTECTOMY     • COLONOSCOPY     • COLONOSCOPY N/A 08/18/2022    Procedure: COLONOSCOPY WITH POLYPECTOMY;  Surgeon: Cortes Millan MD;  Location:  [facility]COPY;  Service: Gastroenterology;  Laterality: N/A;   • ENDOSCOPY N/A 11/06/2019    Procedure: ESOPHAGOGASTRODUODENOSCOPY;  Surgeon: Cortes Millan MD;  Location:  [facility] ENDOSCOPY;  Service: Gastroenterology   • ENDOSCOPY N/A  04/29/2021    Procedure: ESOPHAGOGASTRODUODENOSCOPY;  Surgeon: Cortes Millan MD;  Location: [facility] ENDOSCOPY;  Service: Gastroenterology;  Laterality: N/A;  balloon dilation    • HAND SURGERY Right     laceration repair   • INCONTINENCE SURGERY      BLADDER   • LUNG REMOVAL, PARTIAL Left 2016   • NISSEN FUNDOPLICATION     • SKIN BIOPSY     • SPINAL CORD STIMULATOR IMPLANT      right buttock, in place but no longer working   • TOTAL ABDOMINAL HYSTERECTOMY  1988    benign cyst   • US GUIDED FINE NEEDLE ASPIRATION  08/27/2021        Patient History     Row Name 11/03/22 1035             History    Chief Complaint Swelling;Pain  -      Type of Pain Lower Extremity / Leg  -      Brief Description of Current Complaint Pt returns to OP care with BLE edema and redness consistent with venous stasis. She has some swelling up in her thighs, but this has improved with changes to her oral diuretics. She has tried compression stockings and socks in the past with little success due to itching and discomfort. She resides in an independent living facility and received OP PT services there. She is moving in about 2.5 weeks to [place] to live with family.  -      Patient/Caregiver Goals Decrease swelling;Know what to do to help the symptoms  -      Patient seeing anyone else for problem(s)? PCP  -      How has patient tried to help current problem? Diuretics only  -         Pain     Pain Location Leg  -      Pain at Present 4  -         Services    Are you currently receiving Home Health services No  -      Do you plan to receive Home Health services in the near future No  -         Daily Activities    Primary Language English  -      Are you able to read Yes  -      Are you able to write Yes  -      How does patient learn best? Listening;Demonstration  -      Teaching needs identified Management of Condition  -      Patient is concerned about/has problems with Difficulty with self care  (i.e. bathing, dressing, toileting:;Transfers (getting out of a chair, bed);Walking;Other (comment)  edema management  -      Barriers to learning None  -MC      Pt Participated in POC and Goals Yes  -MC         Safety    Are you being hurt, hit, or frightened by anyone at home or in your life? No  -MC      Are you being neglected by a caregiver No  -MC            User Key  (r) = Recorded By, (t) = Taken By, (c) = Cosigned By    Initials Name Provider Type    Vijaya Flores PT Physical Therapist                EVALUATION   PT Ortho     Row Name 11/03/22 1037       Subjective Pain    Able to rate subjective pain? yes  -MC    Pre-Treatment Pain Level 4  -MC    Post-Treatment Pain Level 2  -MC       RLE Quick Girth (cm)    Met-heads 22.4 cm  -MC    Mid foot 21.8 cm  -MC    Smallest ankle 24.9 cm  -MC    Largest calf 44.2 cm  -MC    Tib tuberosity 42.8 cm  -MC       LLE Quick Girth (cm)    Met-heads 22.3 cm  -MC    Mid foot 21.6 cm  -MC    Smallest ankle 25.8 cm  -MC    Largest calf 43.2 cm  -MC    Tib tuberosity 41.2 cm  -MC       Transfers    Sit-Stand [place] (Transfers) independent  -MC    Stand-Sit [place] (Transfers) independent  -    Comment, (Transfers) seated for tx  -       Gait/Stairs (Locomotion)    [place] Level (Gait) independent  -MC          User Key  (r) = Recorded By, (t) = Taken By, (c) = Cosigned By    Initials Name Provider Type    Vijaya Flores PT Physical Therapist                 Lymphedema     Row Name 11/03/22 1037             Lymphedema Edema Assessment    Ptting Edema Category By severity  -      Pitting Edema Moderate;Severe  taut edema  -         Skin Changes/Observations    Lower Extremity Conditions bilateral:;intact;dry  -      Lower Extremity Color/Pigment bilateral:;erythema;red;fibrosis  -         Compression/Skin Care    Compression/Skin Care skin care;wrapping location;bandaging  -      Skin Care washed/dried  -      Wrapping  "Location lower extremity  -      Wrapping Location LE bilateral:;foot to knee  -      Wrapping Comments unna boot, 4\" coban, size 6 spandage  -      Bandaging Technique circumferential/spiral;light compression  -            User Key  (r) = Recorded By, (t) = Taken By, (c) = Cosigned By    Initials Name Provider Type    Vijaya Flores, PT Physical Therapist                WOUND DEBRIDEMENT                    Therapy Education     Row Name 11/03/22 1000             Therapy Education    Education Details Reviewed s/sx of infection and role of PT in wound care/edema treatment. Keep UB intact between treatments and elevate legs when able. Go ahead and try to arrange for a new PCP in [place] to prevent a delay in transition of care.  -      Given Bandaging/dressing change;Edema management;Symptoms/condition management  -      Program New  -      How Provided Verbal;Demonstration  -      Provided to Patient  -      Level of Understanding Verbalized  -            User Key  (r) = Recorded By, (t) = Taken By, (c) = Cosigned By    Initials Name Provider Type    Vijaya Flores PT Physical Therapist                Recommendation and Plan   PT Assessment/Plan     Row Name 11/03/22 1000          PT Assessment    Functional Limitations Performance in self-care ADL;Other (comment)  edema management  -     Impairments Integumentary integrity;Impaired venous circulation;Edema;Impaired lymphatic circulation  -     Assessment Comments Pt presents with severe BLE edema and boris redness consistent with chronic venous stasis. She has one intact bulla to the LLE. She reports trying compression in the past but has not tolerated due to discomfort. Pt will benefit from light to moderate compression with unna boots to attempt to manage BLE edema. Pt will likely benefit from treatment by a lymphedema specialist in the future due to LE fibrosis and c/o swelling up into her thighs.  -     Rehab Potential " Fair  -     Patient/caregiver participated in establishment of treatment plan and goals Yes  -     Patient would benefit from skilled therapy intervention Yes  -        PT Plan    PT Frequency 2x/week;3x/week  -     Predicted Duration of Therapy Intervention (PT) 6 visits  prior to moving  -     Planned CPT's? PT EVAL LOW COMPLEXITY: 41643;PT SELF CARE/MGMT/TRAIN 15 MIN: 99404;PT NONSELECT DEBRIDE 15 MIN: 81109;PT CARLO DEBRIDE OPEN WOUND UP TO 20 CM: 43416;PT UNNA BOOT: 03443;PT MULTI LAYER COMP SYS LE  -     Physical Therapy Interventions (Optional Details) wound care;patient/family education  -     PT Plan Comments unna boot  -           User Key  (r) = Recorded By, (t) = Taken By, (c) = Cosigned By    Initials Name Provider Type    Vijaya Flores, PT Physical Therapist                  Goals   PT OP Goals     Row Name 22 1000          PT Short Term Goals    STG 1 Pt will verbalize s/sx of infection.  -        Long Term Goals    LTG 1 Pt will demonstrate only moderate BLE edema to allow for easier transition to out-of-state wound care option.  -     LTG 2 Pt will demonstrate no blistering to the BLE to indicate appropriate edema management.  -        Time Calculation    PT Goal Re-Cert Due Date 23  -           User Key  (r) = Recorded By, (t) = Taken By, (c) = Cosigned By    Initials Name Provider Type    Vijaya Flores, PT Physical Therapist                Time Calculation: Start Time: 1000  Untimed Charges  PT Eval/Re-eval Minutes: 40  Wound Care: 67176 Unna boot  [id number] Boot: 15  Total Minutes  Untimed Charges Total Minutes: 55   Total Minutes: 55  Therapy Charges for Today     Code Description Service Date Service Provider Modifiers Qty    34919463288 HC PT EVAL LOW COMPLEXITY 3 11/3/2022 Vijaya Martines, PT GP 1    41457187977 HC PT STAPPING UNNA BOOT 11/3/2022 Vijaya Martines, PT GP 1                Vijaya Martines, PT  11/3/2022

## 2022-11-06 ENCOUNTER — HOSPITAL ENCOUNTER (OUTPATIENT)
Dept: PHYSICAL THERAPY | Facility: HOSPITAL | Age: 77
Setting detail: THERAPIES SERIES
Discharge: HOME | End: 2022-11-06

## 2022-11-06 DIAGNOSIS — R60.0 BILATERAL LOWER EXTREMITY EDEMA: Primary | ICD-10-CM

## 2022-11-06 DIAGNOSIS — I87.8 VENOUS STASIS OF BOTH LOWER EXTREMITIES: ICD-10-CM

## 2022-11-06 PROCEDURE — 29580 STRAPPING UNNA BOOT: CPT

## 2022-11-06 NOTE — THERAPY WOUND CARE TREATMENT
Outpatient Rehabilitation - Wound/Debridement Treatment Note  [facility]     Patient Name: Lorrie Pagan  : 1945  MRN: [id number]  Today's Date: 2022                 Admit Date: 2022    Visit Dx:    ICD-10-CM ICD-9-CM   1. Bilateral lower extremity edema  R60.0 782.3   2. Venous stasis of both lower extremities  I87.8 459.81       Patient Active Problem List   Diagnosis   • Bronchiectasis (CMS/HCC)   • H/O: lung cancer (Prior resection )   • Chronic respiratory failure   • RENETTA (Previous CPAP)   • Hiatal hernia (Prior Nissen )   • Mycobacterium avium complex colonization   • Irritable bowel syndrome with diarrhea   • Aortic valve regurgitation   • Benign hypertension   • Peripheral edema   • Impairment of balance   • Peripheral neuropathy   • Peripheral venous insufficiency   • Chronic diastolic heart failure ([facility])   • Syncope   • Diuretic-induced hypokalemia   • Hypomagnesemia   • Stage II, moderate, COPD   • Memory loss   • Restless leg syndrome   • Controlled type 2 diabetes mellitus without complication, without long-term current use of insulin ([facility])   • Vitamin D deficiency   • Mixed hyperlipidemia   • Allergic rhinitis   • Anemia   • Gastroesophageal reflux disease with esophagitis without hemorrhage   • Acquired hypothyroidism   • Esophageal dysmotility   • Dysphagia   • Gastroparesis   • Degenerative disc disease, cervical   • Degenerative disc disease, lumbar   • Globus sensation   • Sialorrhea   • Drooling   • Dysfunction of both eustachian tubes   • Tinnitus of both ears   • Iron deficiency anemia   • Bright red blood per rectum   • Internal hemorrhoids   • Malabsorption of iron        Past Medical History:   Diagnosis Date   • Asthma    • Back pain    • BRBPR (bright red blood per rectum) 07/10/2022   • Breast injury     Fell on table and injuried both breasts about    • Bronchiectasis ([facility])    • Bronchitis 2018   • Cancer ([facility])     History of lung cancer and  skin cancer    • Cardiac murmur    • Cellulitis of both lower extremities 01/27/2020   • Chronic cough    • Chronic obstructive pulmonary disease (HCC)    • Colon polyp    • DDD (degenerative disc disease), lumbar    • Depression    • Diabetes mellitus (HCC)     DX: 2019, CHECK BS QOD, LAST A1C 6.3??   • Disease of thyroid gland    • Esophageal dilatation 09/20/2019    Added automatically from request for surgery [id number]   • Esophageal dysphagia 10/24/2019    Added automatically from request for surgery [id number]   • Former smoker (None since 1992) 08/29/2019   • Gastroparesis    • GERD (gastroesophageal reflux disease)    • Glaucoma    • Globus sensation 01/27/2020   • History of colonic polyps    • History of transfusion 1988    NO REACTION    • Hyperlipidemia    • Hypertension    • Irritable bowel syndrome     Constipation/diarrhea   • Legally blind    • Lung cancer (HCC)    • Macular degeneration    • Neuropathy    • Obesity 02/26/2020   • Osteoarthritis    • Oxygen dependent     4L   • Pneumonia    • Sleep apnea     NON COMPLIANT WITH CPAP USE   • UTI (urinary tract infection)    • Wears glasses         Past Surgical History:   Procedure Laterality Date   • BACK SURGERY      X2 (LUMBAR)   • BREAST BIOPSY      20+ years ago   • BREAST BIOPSY Right 08/2021    fna ln   • BRONCHOSCOPY N/A 08/24/2022    Procedure: BRONCHOSCOPY WITH ENDOBRONCHIAL ULTRASOUND;  Surgeon: Vladislav Arboleda MD;  Location: [facility] ENDOSCOPY;  Service: Pulmonary;  Laterality: N/A;   • CATARACT EXTRACTION Bilateral    • CHOLECYSTECTOMY     • COLONOSCOPY     • COLONOSCOPY N/A 08/18/2022    Procedure: COLONOSCOPY WITH POLYPECTOMY;  Surgeon: Cortes Millan MD;  Location:  [facility] ENDOSCOPY;  Service: Gastroenterology;  Laterality: N/A;   • ENDOSCOPY N/A 11/06/2019    Procedure: ESOPHAGOGASTRODUODENOSCOPY;  Surgeon: Cortes Millan MD;  Location:  [facility] ENDOSCOPY;  Service: Gastroenterology   • ENDOSCOPY N/A  "04/29/2021    Procedure: ESOPHAGOGASTRODUODENOSCOPY;  Surgeon: Cortes Millan MD;  Location: [facility] ENDOSCOPY;  Service: Gastroenterology;  Laterality: N/A;  balloon dilation    • HAND SURGERY Right     laceration repair   • INCONTINENCE SURGERY      BLADDER   • LUNG REMOVAL, PARTIAL Left 2016   • NISSEN FUNDOPLICATION     • SKIN BIOPSY     • SPINAL CORD STIMULATOR IMPLANT      right buttock, in place but no longer working   • TOTAL ABDOMINAL HYSTERECTOMY  1988    benign cyst   • US GUIDED FINE NEEDLE ASPIRATION  08/27/2021         EVALUATION   PT Ortho     Row Name 11/06/22 1300       Subjective Comments    Subjective Comments Pt with no new issues. continued mild c/o pain  -MF       Subjective Pain    Able to rate subjective pain? yes  -MF    Pre-Treatment Pain Level 3  -MF    Post-Treatment Pain Level 3  -MF       Transfers    Sit-Stand [place] (Transfers) independent  -MF    Stand-Sit [place] (Transfers) independent  -MF    Comment, (Transfers) seated for tx.  -MF       Gait/Stairs (Locomotion)    [place] Level (Gait) independent  -          User Key  (r) = Recorded By, (t) = Taken By, (c) = Cosigned By    Initials Name Provider Type    Antolin Bourgeois, PT Physical Therapist                   Lymphedema     Row Name 11/06/22 1300             Lymphedema Edema Assessment    Ptting Edema Category By severity  -      Pitting Edema Moderate  -         Compression/Skin Care    Compression/Skin Care skin care;wrapping location;bandaging  -      Skin Care washed/dried  -MF      Wrapping Location lower extremity  -MF      Wrapping Location LE bilateral:;foot to knee  -      Wrapping Comments unna boot, 4\" coban, size 6 spandage  -            User Key  (r) = Recorded By, (t) = Taken By, (c) = Cosigned By    Initials Name Provider Type    Antolin Bourgeois, PT Physical Therapist                WOUND DEBRIDEMENT                       Recommendation and Plan   PT " Assessment/Plan     Row Name 11/06/22 1300          PT Assessment    Functional Limitations Performance in self-care ADL;Other (comment)  -     Impairments Integumentary integrity;Impaired venous circulation;Edema;Impaired lymphatic circulation  -     Assessment Comments Pt with continued c/o pain in LEs, but no new issues / blisters noted to LEs.  PT reapplied unna boots to help further reduce LE edema and improve skin integrity.  -     Rehab Potential Fair  -     Patient/caregiver participated in establishment of treatment plan and goals Yes  -     Patient would benefit from skilled therapy intervention Yes  -        PT Plan    PT Frequency 2x/week;3x/week  -     Physical Therapy Interventions (Optional Details) wound care  -     PT Plan Comments unna boots  -           User Key  (r) = Recorded By, (t) = Taken By, (c) = Cosigned By    Initials Name Provider Type    Antolin Bourgeois, PT Physical Therapist                Goals   PT OP Goals     Row Name 11/06/22 1300          Time Calculation    PT Goal Re-Cert Due Date 02/01/23  -           User Key  (r) = Recorded By, (t) = Taken By, (c) = Cosigned By    Initials Name Provider Type    Antolin Bourgeois, PT Physical Therapist                PT Goal Re-Cert Due Date: 02/01/23            Time Calculation: Start Time: 1300  Untimed Charges  22962-Vvrd Boot: 20  Total Minutes  Untimed Charges Total Minutes: 20   Total Minutes: 20  Therapy Charges for Today     Code Description Service Date Service Provider Modifiers Qty    [id number]  PT STAPPING UNNA BOOT 11/6/2022 Antolin Reyes, PT GP 1                  Antolin Reyes, PT  11/6/2022

## 2022-11-09 ENCOUNTER — DOCUMENTATION (OUTPATIENT)
Dept: PHYSICAL THERAPY | Facility: HOSPITAL | Age: 77
End: 2022-11-09

## 2022-11-09 NOTE — THERAPY DISCHARGE NOTE
Outpatient Rehabilitation - Wound/Debridement D/C Summary        Patient Name: Lorrie Pagan  : 1945  MRN: [id number]  Today's Date: 2022                  Admit Date: (Not on file)    Visit Dx:  No diagnosis found.    Patient Active Problem List   Diagnosis   • Bronchiectasis (CMS/HCC)   • H/O: lung cancer (Prior resection )   • Chronic respiratory failure   • RENETTA (Previous CPAP)   • Hiatal hernia (Prior Nissen )   • Mycobacterium avium complex colonization   • Irritable bowel syndrome with diarrhea   • Aortic valve regurgitation   • Benign hypertension   • Peripheral edema   • Impairment of balance   • Peripheral neuropathy   • Peripheral venous insufficiency   • Chronic diastolic heart failure ([facility])   • Syncope   • Diuretic-induced hypokalemia   • Hypomagnesemia   • Stage II, moderate, COPD   • Memory loss   • Restless leg syndrome   • Controlled type 2 diabetes mellitus without complication, without long-term current use of insulin ([facility])   • Vitamin D deficiency   • Mixed hyperlipidemia   • Allergic rhinitis   • Anemia   • Gastroesophageal reflux disease with esophagitis without hemorrhage   • Acquired hypothyroidism   • Esophageal dysmotility   • Dysphagia   • Gastroparesis   • Degenerative disc disease, cervical   • Degenerative disc disease, lumbar   • Globus sensation   • Sialorrhea   • Drooling   • Dysfunction of both eustachian tubes   • Tinnitus of both ears   • Iron deficiency anemia   • Bright red blood per rectum   • Internal hemorrhoids   • Malabsorption of iron        Past Medical History:   Diagnosis Date   • Asthma    • Back pain    • BRBPR (bright red blood per rectum) 07/10/2022   • Breast injury     Fell on table and injuried both breasts about 2018   • Bronchiectasis (HCC)    • Bronchitis 2018   • Cancer ([facility])     History of lung cancer and skin cancer    • Cardiac murmur    • Cellulitis of both lower extremities 2020   • Chronic cough    • Chronic  obstructive pulmonary disease (HCC)    • Colon polyp    • DDD (degenerative disc disease), lumbar    • Depression    • Diabetes mellitus (HCC)     DX: 2019, CHECK BS QOD, LAST A1C 6.3??   • Disease of thyroid gland    • Esophageal dilatation 09/20/2019    Added automatically from request for surgery [id number]   • Esophageal dysphagia 10/24/2019    Added automatically from request for surgery [id number]   • Former smoker (None since 1992) 08/29/2019   • Gastroparesis    • GERD (gastroesophageal reflux disease)    • Glaucoma    • Globus sensation 01/27/2020   • History of colonic polyps    • History of transfusion 1988    NO REACTION    • Hyperlipidemia    • Hypertension    • Irritable bowel syndrome     Constipation/diarrhea   • Legally blind    • Lung cancer (HCC)    • Macular degeneration    • Neuropathy    • Obesity 02/26/2020   • Osteoarthritis    • Oxygen dependent     4L   • Pneumonia    • Sleep apnea     NON COMPLIANT WITH CPAP USE   • UTI (urinary tract infection)    • Wears glasses         Past Surgical History:   Procedure Laterality Date   • BACK SURGERY      X2 (LUMBAR)   • BREAST BIOPSY      20+ years ago   • BREAST BIOPSY Right 08/2021    fna ln   • BRONCHOSCOPY N/A 08/24/2022    Procedure: BRONCHOSCOPY WITH ENDOBRONCHIAL ULTRASOUND;  Surgeon: Vladislav Arboleda MD;  Location:  [facility] ENDOSCOPY;  Service: Pulmonary;  Laterality: N/A;   • CATARACT EXTRACTION Bilateral    • CHOLECYSTECTOMY     • COLONOSCOPY     • COLONOSCOPY N/A 08/18/2022    Procedure: COLONOSCOPY WITH POLYPECTOMY;  Surgeon: Cortes Millan MD;  Location:  [facility] ENDOSCOPY;  Service: Gastroenterology;  Laterality: N/A;   • ENDOSCOPY N/A 11/06/2019    Procedure: ESOPHAGOGASTRODUODENOSCOPY;  Surgeon: Cortes Millan MD;  Location:  [facility] ENDOSCOPY;  Service: Gastroenterology   • ENDOSCOPY N/A 04/29/2021    Procedure: ESOPHAGOGASTRODUODENOSCOPY;  Surgeon: Cortes Millan MD;  Location:  [facility]  ENDOSCOPY;  Service: Gastroenterology;  Laterality: N/A;  balloon dilation    • HAND SURGERY Right     laceration repair   • INCONTINENCE SURGERY      BLADDER   • LUNG REMOVAL, PARTIAL Left 2016   • NISSEN FUNDOPLICATION     • SKIN BIOPSY     • SPINAL CORD STIMULATOR IMPLANT      right buttock, in place but no longer working   • TOTAL ABDOMINAL HYSTERECTOMY  1988    benign cyst   • US GUIDED FINE NEEDLE ASPIRATION  08/27/2021         EVALUATION                WOUND DEBRIDEMENT                            Recommendation and Plan      Goals   PT OP Goals     Row Name 11/09/22 1000          PT Short Term Goals    STG 1 Pt will verbalize s/sx of infection.  -     STG 1 Progress Not Met  -        Long Term Goals    LTG 1 Pt will demonstrate only moderate BLE edema to allow for easier transition to out-of-state wound care option.  -     LTG 1 Progress Not Met  -     LTG 2 Pt will demonstrate no blistering to the BLE to indicate appropriate edema management.  -     LTG 2 Progress Not Met  -           User Key  (r) = Recorded By, (t) = Taken By, (c) = Cosigned By    Initials Name Provider Type    Vijaya Flores, PT Physical Therapist                Time Calculation:               OP Discharge Summary     Row Name 11/09/22 1019             OP PT Discharge Summary    Date of Discharge 11/09/22  -      Reason for Discharge Patient/Caregiver request  -      Outcomes Achieved Other  did not return for goals assessment  -      Discharge Destination Unknown  -      Discharge Instructions/Additional Comments Pt is moving out of town, so she cancelled her remaining appointments.  -            User Key  (r) = Recorded By, (t) = Taken By, (c) = Cosigned By    Initials Name Provider Type    Vijaya Flores, PT Physical Therapist                Vijaya Martines, PT  11/9/2022

## 2022-11-14 ENCOUNTER — PATIENT OUTREACH (OUTPATIENT)
Dept: CASE MANAGEMENT | Facility: OTHER | Age: 77
End: 2022-11-14

## 2022-11-14 ENCOUNTER — OFFICE VISIT (OUTPATIENT)
Dept: INTERNAL MEDICINE | Facility: CLINIC | Age: 77
End: 2022-11-14

## 2022-11-14 VITALS
WEIGHT: 182 LBS | TEMPERATURE: 97.8 F | SYSTOLIC BLOOD PRESSURE: 142 MMHG | HEART RATE: 89 BPM | OXYGEN SATURATION: 97 % | BODY MASS INDEX: 32.25 KG/M2 | HEIGHT: 63 IN | DIASTOLIC BLOOD PRESSURE: 58 MMHG

## 2022-11-14 DIAGNOSIS — I50.32 CHRONIC DIASTOLIC HEART FAILURE: Primary | ICD-10-CM

## 2022-11-14 DIAGNOSIS — R23.8 PIMPLES: ICD-10-CM

## 2022-11-14 DIAGNOSIS — R26.89 IMPAIRMENT OF BALANCE: ICD-10-CM

## 2022-11-14 DIAGNOSIS — D50.9 IRON DEFICIENCY ANEMIA, UNSPECIFIED IRON DEFICIENCY ANEMIA TYPE: ICD-10-CM

## 2022-11-14 DIAGNOSIS — E78.2 MIXED HYPERLIPIDEMIA: ICD-10-CM

## 2022-11-14 DIAGNOSIS — J96.11 CHRONIC RESPIRATORY FAILURE WITH HYPOXIA: ICD-10-CM

## 2022-11-14 DIAGNOSIS — J44.9 COPD MIXED TYPE: ICD-10-CM

## 2022-11-14 DIAGNOSIS — Z23 NEED FOR VACCINATION: ICD-10-CM

## 2022-11-14 PROCEDURE — 99214 OFFICE O/P EST MOD 30 MIN: CPT | Performed by: INTERNAL MEDICINE

## 2022-11-14 PROCEDURE — 0124A COVID-19 (PFIZER) BIVALENT BOOSTER 12+YRS: CPT | Performed by: INTERNAL MEDICINE

## 2022-11-14 PROCEDURE — 91312 COVID-19 (PFIZER) BIVALENT BOOSTER 12+YRS: CPT | Performed by: INTERNAL MEDICINE

## 2022-11-14 NOTE — PROGRESS NOTES
Internal Medicine Follow Up    Chief Complaint  Lorrie Pagan is a 77 y.o. female who presents today for follow up of chronic medical conditions outlined below.    Chief Complaint   Patient presents with   • Hypertension   • Hypothyroidism   • Hyperlipidemia        HPI  Ms. Pagan comes in today for follow up before moving to [place] next week. She is here with her daughter in law. She is doing well currently. Oxygen stable at 4-5L, edema in her lower extremities is well controlled with daily bumex, metolazone, and aldactone, and she notes stable weight. She chose not to get a rollator due to medicare not covering it. She reports that they will not cover it due to her having a motorized scooter. The scooter she has is too large to transport so she plans to purchase a smaller one out of pocket once in [place]. She is having issues with dry nasal passages and pimples in her ears.       Review of Systems  Review of Systems   Constitutional: Negative.    Respiratory: Negative.    Cardiovascular: Negative.    Musculoskeletal: Positive for gait problem (better lately).   Skin: Positive for dry skin (in nose) and skin lesions (pimples in ears).        Current Medications  Current Outpatient Medications on File Prior to Visit   Medication Sig Dispense Refill   • albuterol (ACCUNEB) 1.25 MG/3ML nebulizer solution Take 3 mL by nebulization Every 6 (Six) Hours As Needed for Shortness of Air. 90 each 5   • albuterol sulfate  (90 Base) MCG/ACT inhaler INHALE 2 PUFFS EVERY 6 (SIX) HOURS AS NEEDED FOR WHEEZING. 18 g 11   • azelastine (ASTELIN) 0.1 % nasal spray 2 sprays into the nostril(s) as directed by provider 2 (Two) Times a Day. (Patient taking differently: 2 sprays into the nostril(s) as directed by provider 2 (Two) Times a Day As Needed for Rhinitis or Allergies.) 30 mL 5   • Blood Glucose Monitoring Suppl (FreeStyle Freedom Lite) w/Device kit      • bumetanide (BUMEX) 2 MG tablet Take 1 tablet by mouth Daily. 30  tablet 3   • chlorhexidine (PERIDEX) 0.12 % solution Apply 15 mL to the mouth or throat 2 (Two) Times a Day As Needed. For 14 days, w/1 refill     • colestipol (COLESTID) 1 g tablet Take 1 tablet by mouth Daily.     • donepezil (ARICEPT) 10 MG tablet Take 10 mg by mouth Daily.     • DULoxetine (CYMBALTA) 60 MG capsule TAKE 1 CAPSULE BY MOUTH EVERY DAY (Patient taking differently: Take 1 capsule by mouth Daily.) 90 capsule 3   • famotidine (Pepcid AC Maximum Strength) 20 MG tablet Take 1 tablet by mouth 2 (Two) Times a Day. 8 tablet 0   • Fluticasone-Umeclidin-Vilant (TRELEGY) 200-62.5-25 MCG/INH inhaler Inhale 1 puff Daily. 1 each 5   • glimepiride (AMARYL) 2 MG tablet TAKE 1 TABLET BY MOUTH TWICE DAILY 180 tablet 0   • Glucose Blood (Blood Glucose Test) strip Use to test blood sugar daily before breakfast. 100 each 11   • Lancets (freestyle) lancets Use to test blood sugar daily before breakfast. 100 each 12   • latanoprost (XALATAN) 0.005 % ophthalmic solution Administer 1 drop to both eyes Every Night.     • memantine (NAMENDA) 5 MG tablet Take 5 mg by mouth Daily.     • metOLazone (ZAROXOLYN) 5 MG tablet Take 1 tablet by mouth Daily. 7 tablet 0   • Misc. Devices (Rollator Ultra-Light) misc 1 each as needed. 1 each 0   • montelukast (SINGULAIR) 10 MG tablet Take 1 tablet by mouth Every Night. 90 tablet 3   • O2 (OXYGEN) Inhale 4 L/min Continuous.     • pantoprazole (PROTONIX) 40 MG EC tablet Take 1 tablet by mouth 2 (Two) Times a Day. 180 tablet 3   • polyethylene glycol (MIRALAX) 17 g packet Take 17 g by mouth 2 (Two) Times a Day As Needed (constipation). 72 each 0   • potassium chloride (MICRO-K) 10 MEQ CR capsule 6 capsules daily 90 capsule 0   • rOPINIRole XL (REQUIP XL) 8 MG 24 hr tablet Take 8 mg by mouth 2 (two) times a day.     • Spacer/Aero-Holding Chambers (AeroChamber Plus Morris-Vu) misc As Needed.     • spironolactone (ALDACTONE) 25 MG tablet Take 2 tablets by mouth Daily. 60 tablet 11   • timolol  "(TIMOPTIC) 0.25 % ophthalmic solution Administer 1 drop to the right eye Every Morning.     • Triamcinolone Acetonide (NASACORT) 55 MCG/ACT nasal inhaler 2 sprays into the nostril(s) as directed by provider Daily.     • vitamin D (ERGOCALCIFEROL) 1.25 MG (98000 UT) capsule capsule TAKE 1 CAPSULE BY MOUTH 1 (ONE) TIME PER WEEK. 12 capsule 0     No current facility-administered medications on file prior to visit.       Allergies  Allergies   Allergen Reactions   • Gabapentin Confusion and Hallucinations   • Hydrocodone Hallucinations     \"With high doses\"   • Statins Myalgia     myalgia   • Hydroxyzine Hallucinations   • Metoclopramide Other (See Comments)     EXACERBATED RLS   • Penicillins Rash     Rash    Tolerated Ceftriaxone   • Tizanidine Hallucinations   • Tramadol Nausea And Vomiting and GI Intolerance     VERY MILD PER PT       Objective  Visit Vitals  /58   Pulse 89   Temp 97.8 °F (36.6 °C)   Ht 160 cm (63\")   Wt 82.6 kg (182 lb)   LMP  (LMP Unknown)   SpO2 97% Comment: 4 liters   BMI 32.24 kg/m²        Physical Exam  Physical Exam  Vitals and nursing note reviewed.   Constitutional:       General: She is not in acute distress.     Appearance: She is well-developed. She is not ill-appearing or toxic-appearing.   HENT:      Head: Normocephalic and atraumatic.   Eyes:      Conjunctiva/sclera: Conjunctivae normal.   Cardiovascular:      Rate and Rhythm: Normal rate and regular rhythm.      Heart sounds: Normal heart sounds.   Pulmonary:      Effort: Pulmonary effort is normal. No respiratory distress.      Breath sounds: Normal breath sounds.      Comments: Diminished throughout but clear  Musculoskeletal:      Right lower leg: Edema (trace) present.      Left lower leg: Edema (trace) present.   Skin:     General: Skin is warm and dry.   Neurological:      Mental Status: She is alert and oriented to person, place, and time. Mental status is at baseline.   Psychiatric:         Mood and Affect: Mood normal. "         Behavior: Behavior normal.         Thought Content: Thought content normal.         Judgment: Judgment normal.         Results  Results for orders placed or performed in visit on 10/26/22   Iron Profile    Specimen: Blood   Result Value Ref Range    Iron 53 37 - 145 mcg/dL    Iron Saturation 15 (L) 20 - 50 %    Transferrin 238 200 - 360 mg/dL    TIBC 355 298 - 536 mcg/dL   Ferritin    Specimen: Blood   Result Value Ref Range    Ferritin 463.10 (H) 13.00 - 150.00 ng/mL   Basic Metabolic Panel    Specimen: Blood   Result Value Ref Range    Glucose 121 (H) 65 - 99 mg/dL    BUN 13 8 - 23 mg/dL    Creatinine 0.76 0.57 - 1.00 mg/dL    Sodium 142 136 - 145 mmol/L    Potassium 3.9 3.5 - 5.2 mmol/L    Chloride 99 98 - 107 mmol/L    CO2 35.3 (H) 22.0 - 29.0 mmol/L    Calcium 9.8 8.6 - 10.5 mg/dL    BUN/Creatinine Ratio 17.1 7.0 - 25.0    Anion Gap 7.7 5.0 - 15.0 mmol/L    eGFR 79.8 >60.0 mL/min/1.73   CBC Auto Differential    Specimen: Blood   Result Value Ref Range    WBC 10.26 3.40 - 10.80 10*3/mm3    RBC 4.30 3.77 - 5.28 10*6/mm3    Hemoglobin 12.4 12.0 - 15.9 g/dL    Hematocrit 39.7 34.0 - 46.6 %    MCV 92.3 79.0 - 97.0 fL    MCH 28.8 26.6 - 33.0 pg    MCHC 31.2 (L) 31.5 - 35.7 g/dL    RDW 19.0 (H) 12.3 - 15.4 %    RDW-SD 63.9 (H) 37.0 - 54.0 fl    MPV 10.4 6.0 - 12.0 fL    Platelets 343 140 - 450 10*3/mm3    Neutrophil % 73.8 42.7 - 76.0 %    Lymphocyte % 15.0 (L) 19.6 - 45.3 %    Monocyte % 7.0 5.0 - 12.0 %    Eosinophil % 2.1 0.3 - 6.2 %    Basophil % 0.7 0.0 - 1.5 %    Immature Grans % 1.4 (H) 0.0 - 0.5 %    Neutrophils, Absolute 7.57 (H) 1.70 - 7.00 10*3/mm3    Lymphocytes, Absolute 1.54 0.70 - 3.10 10*3/mm3    Monocytes, Absolute 0.72 0.10 - 0.90 10*3/mm3    Eosinophils, Absolute 0.22 0.00 - 0.40 10*3/mm3    Basophils, Absolute 0.07 0.00 - 0.20 10*3/mm3    Immature Grans, Absolute 0.14 (H) 0.00 - 0.05 10*3/mm3    nRBC 0.0 0.0 - 0.2 /100 WBC   Scan Slide    Specimen: Blood   Result Value Ref Range    RBC  Morphology Normal Normal    WBC Morphology Normal Normal    Platelet Morphology Normal Normal        Assessment and Plan  Diagnoses and all orders for this visit:    Chronic diastolic heart failure (HCC)  - stable weight, improved edema  - continue bumex 2mg daily, metolazone 5mg daily, and aldactone 50mg daily.    Impairment of balance  - medicare will not cover rollator due to having a motorized scooter. Scooter will not fit in car for transport. She plans to get smaller scooter once in [place].    Chronic respiratory failure due to Stage II, moderate, COPD  - Last PFTs with FEV1 61% 5/2022  - has had 6 minute walk test indicating that she should reduce O2 to 3.5L NC however she does not feel well unless on 4-5L NC. Would benefit from humidification, she will discuss with new [employer].  - had therapeutic bronchoscopy 8/2022 with BAL negative for malignancy, resp cx negative, afb and fungal cx negative  - Stable on trelegy and albuterol, singulair, mucinex with flutter valve  - Has been seen by palliative care but defers further services for now    Iron deficiency anemia, unspecified iron deficiency anemia type  - Has had colonoscopy with large internal hemorrhoids and she does report some BRBPR. She has been prescribed anusol but reports that her hemorrhoids are too far internalized for this to work.  - She follows with hematology and has had IV iron. Intolerant of oral iron.  - most recent CBC with normal hgb 12.4 and normal iron levels    Mixed hyperlipidemia  - previously on zetia but discontinue when admitted to hospital 4/2022 before response could be assessed.   - lab unavailable today to recheck lipids so at this time she will plan to have these checked by new PCP in [place] and then discuss resuming zetia    Pimples  - use hydrogen peroxide and water in 1:1 ratio to clean ears several times per week    Need for vaccination  -     COVID-19 Bivalent Booster (Pfizer) 12+yrs     Health Maintenance  - Pap smear: no  longer indicated  - Mammogram: 10/2022 BIR[facility] 2  - Colonoscopy: 8/2022, 3y repeat  - HCV: negative  - Immunizations: pneumococcal complete, COVID booster today, flu UTD, shingrix #1  - Depression screening: PHQ9 = 11 10/2022    No follow-ups on file. Moving to [place]. Business card provided for new provider to request records.

## 2022-11-14 NOTE — OUTREACH NOTE
AMBULATORY CASE MANAGEMENT NOTE    Name and Relationship of Patient/Support Person: Lorrie Pagan White - Self    Patient Outreach    Spoke with patient during today's PCP visit. Patient appears to be feeling better, she stated she was feeling better. She will be moving to [place] to live with her son. She has been working to establish a PCP and DME supplier there. She stated her records would be in one place. She denied questions/needs at this time and was very appreciative of the help.     JI RIOS  Ambulatory Case Management    11/14/2022, 16:53 EST

## 2022-11-17 ENCOUNTER — PATIENT OUTREACH (OUTPATIENT)
Dept: CASE MANAGEMENT | Facility: OTHER | Age: 77
End: 2022-11-17

## 2022-11-17 DIAGNOSIS — J44.9 COPD MIXED TYPE: ICD-10-CM

## 2022-11-17 DIAGNOSIS — I50.32 CHRONIC DIASTOLIC HEART FAILURE: Primary | ICD-10-CM

## 2022-11-17 NOTE — OUTREACH NOTE
AMBULATORY CASE MANAGEMENT NOTE    Name and Relationship of Patient/Support Person: Ej Lorrieedmundo Quesada - Self    Care Coordination    Patient reports Matt has transferred everything to [place], [place] and that she is not able to get more oxygen while she is in [place]. She plans to leave [place] on 11/22/22. She has 5 large tanks, 3 small tanks, and a concentrator. She wears 4-5L continuous. Patient very short of breath on the phone, unable to complete full sentences. Encouraged patient to seek medical attention. She states she had her oxygen off but felt better when she put it back on.     Spoke with staff a [facility], they will continue to supply patient with oxygen while she is in [place] and provide oxygen for travel. Patient will need to call [facility] 24 hours in advance to have oxygen delivered. Updated patient, she was very appreciative of the help.     [facility] updated and reviewed with the patient during this program:  Continuing Care   [facility] ON [facility] & INDEPENDENT LIVING    [address] [facility] [address]    Phone: [phone]    Resource for: Social Connections   [facility]    [address], [place] [facility] [address]    Phone: [phone]    Resource for: Food Insecurity, Physical Activity   [facility]    [address], [place] [address]    Phone: [phone]   [facility]    [address], [facility] [address]    Phone: [phone]    Resource for: Financial Resource Strain       JI RIOS  Ambulatory Case Management    11/17/2022, 09:52 EST

## 2022-11-29 ENCOUNTER — PATIENT OUTREACH (OUTPATIENT)
Dept: CASE MANAGEMENT | Facility: OTHER | Age: 77
End: 2022-11-29

## 2022-11-29 DIAGNOSIS — J44.9 COPD MIXED TYPE: ICD-10-CM

## 2022-11-29 DIAGNOSIS — I50.32 CHRONIC DIASTOLIC HEART FAILURE: Primary | ICD-10-CM

## 2022-11-29 PROCEDURE — 99490 CHRNC CARE MGMT STAFF 1ST 20: CPT | Performed by: INTERNAL MEDICINE

## 2022-11-29 PROCEDURE — 99439 CHRNC CARE MGMT STAF EA ADDL: CPT | Performed by: INTERNAL MEDICINE

## 2022-11-29 NOTE — OUTREACH NOTE
[facility] End of Month Documentation    This Chronic Medical Management Care Plan for Lorrie Pagan, 77 y.o. female, has been monitored and managed and a new plan of care implemented for the month of November.  A cumulative time of 45 minutes was spent on this patient record this month, including phone call with patient; face to face with provider; phone call with other providers.    Regarding the patient's problems: has Bronchiectasis (CMS/[facility]); H/O: lung cancer (Prior resection 2016); Chronic respiratory failure; RENETTA (Previous CPAP); Hiatal hernia (Prior Nissen 2008); Mycobacterium avium complex colonization; Irritable bowel syndrome with diarrhea; Aortic valve regurgitation; Benign hypertension; Peripheral edema; Impairment of balance; Peripheral neuropathy; Peripheral venous insufficiency; Chronic diastolic heart failure ([facility]); Syncope; Diuretic-induced hypokalemia; Hypomagnesemia; Stage II, moderate, COPD; Memory loss; Restless leg syndrome; Controlled type 2 diabetes mellitus without complication, without long-term current use of insulin ([facility]); Vitamin D deficiency; Mixed hyperlipidemia; Allergic rhinitis; Anemia; Gastroesophageal reflux disease with esophagitis without hemorrhage; Acquired hypothyroidism; Esophageal dysmotility; Dysphagia; Gastroparesis; Degenerative disc disease, cervical; Degenerative disc disease, lumbar; Globus sensation; Sialorrhea; Drooling; Dysfunction of both eustachian tubes; Tinnitus of both ears; Iron deficiency anemia; Bright red blood per rectum; Internal hemorrhoids; and Malabsorption of iron on their problem list., the following items were addressed: transitions to medical care and any changes can be found within the plan section of the note.  A detailed listing of time spent for chronic care management is tracked within each outreach encounter.  Current medications include:  has a current medication list which includes the following prescription(s): albuterol, albuterol  sulfate hfa, azelastine, freestyle freedom lite, bumetanide, chlorhexidine, colestipol, donepezil, duloxetine, famotidine, fluticasone-umeclidin-vilant, glimepiride, blood glucose test, freestyle, latanoprost, memantine, metolazone, rollator ultra-light, montelukast, o2, pantoprazole, polyethylene glycol, potassium chloride, ropinirole xl, aerochamber plus feng-vu, spironolactone, timolol, triamcinolone acetonide, and vitamin d. and the patient is reported to be patient is compliant with medication protocol,  Medications are reported to be effective. All notes on chart for PCP to review.    The patient was monitored remotely for weight; blood pressure.    The patient's physical needs include:  DME supplies.     The patient's mental support needs include:  continued support    The patient's cognitive support needs include:  increased support; medication; supervision    The patient's psychosocial support needs include:  coordination of community providers; medication management or adherence    The patient's functional needs include: needs met    The patient's environmental needs include:  not applicable    Care Plan overall comments:  No data recorded    Refer to previous outreach notes for more information on the areas listed above.    Monthly Billing Diagnoses  (I50.32) Chronic diastolic heart failure (HCC)    (J44.9) Stage II, moderate, COPD    Medications   · Medications have been reconciled    Care Plan progress this month:      Recently Modified Care Plans Updates made since 10/29/2022 12:00 AM    No recently modified care plans.          · Current Specialty Plan of Care Status signed by both patient and provider    Instructions   · Patient was provided an electronic copy of care plan  · CCM services were explained and offered and patient has accepted these services.  · Patient has given their written consent to receive CCM services and understands that this includes the authorization of electronic communication  of medical information with the other treating providers.  · Patient understands that they may stop CCM services at any time and these changes will be effective at the end of the calendar month and will effectively revocate the agreement of CCM services.  · Patient understands that only one practitioner can furnish and be paid for CCM services during one calendar month.  Patient also understands that there may be co-payment or deductible fees in association with CCM services.  · Patient will continue with at least monthly follow-up calls with the Ambulatory .    JI RIOS  Ambulatory Case Management    11/29/2022, 09:15 EST

## 2022-12-15 ENCOUNTER — PATIENT OUTREACH (OUTPATIENT)
Dept: CASE MANAGEMENT | Facility: OTHER | Age: 77
End: 2022-12-15

## 2022-12-15 NOTE — TELEPHONE ENCOUNTER
Last Office Visit: 5/3/22  Next Office Visit: 6/1/22    Labs completed in past 6 months? yes  Labs completed in past year? yes    Last Refill Date: 12/20/20  Quantity: 90 cap  Refills: 3    Pharmacy: [id number] - [place], [place] - [address] [place] [address] AT [facility] & [address] - [phone]  - [phone] [address] [address]        OFFICE VISIT      Patient: Kellie Andrade Date of Service: 12/15/2022   : 1973 MRN: [id number]     CHIEF COMPLAINT:  Kellie Andrade is a 49 year old female who presents today for   Chief Complaint   Patient presents with   • Office Visit   • Refill Request     Valsartan refill    • Follow-up       HPI:  Here for  annual exam   She feels well    HTN- no meds for 2 weeks  Her BP has been elevated    Anxiety - uses clonazepam PRN  Uses Effexor  sx controlled with meds      PAST MEDICAL HISTORY:  Past Medical History:   Diagnosis Date   • Anxiety    • Depressive disorder    • Essential (primary) hypertension        MEDICATIONS:  Current Outpatient Medications   Medication Sig   • valsartan-hydrochlorothiazide (DIOVAN-HCT) 160-25 MG per tablet Take 1 tablet by mouth daily.   • venlafaxine (EFFEXOR) 37.5 MG tablet Take 1 tablet by mouth 1 time for 1 dose.   • clonazePAM (KlonoPIN) 0.5 MG tablet Take 1 tablet by mouth daily as needed for Anxiety.     No current facility-administered medications for this visit.       ALLERGIES:  ALLERGIES:   Allergen Reactions   • Fish Allergy   (Food Or Med) SWELLING and SHORTNESS OF BREATH       PAST SURGICAL HISTORY:  Past Surgical History:   Procedure Laterality Date   • No past surgeries     • Open sleeve gastrectomy     • Partial hysterectomy N/A    • Tubal ligation         FAMILY HISTORY:  Family History   Problem Relation Age of Onset   • Patient is unaware of any medical problems Mother    • Patient is unaware of any medical problems Father    • Cardiomyopathy Sister        SOCIAL HISTORY:  Social History     Tobacco Use   • Smoking status: Never   • Smokeless tobacco: Never   Vaping Use   • Vaping Use: never used   Substance Use Topics   • Alcohol use: Yes   • Drug use: Never       OBGYN:  S/p hyst     Recent PHQ 2/9 Score    PHQ 2:  Date Adult PHQ 2 Score Adult PHQ 2 Interpretation   12/15/2022 2 No further screening needed             Review of Systems    Constitutional: Negative.    Respiratory: Negative.    Cardiovascular: Negative.    Gastrointestinal: Negative.    Neurological: Negative.    Psychiatric/Behavioral: Negative.      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   Constitutional:       Appearance: She is well-developed.   HENT:      Head: Normocephalic and atraumatic.   Eyes:      Pupils: Pupils are equal, round, and reactive to light.   Cardiovascular:      Rate and Rhythm: Normal rate and regular rhythm.      Heart sounds: Normal heart sounds.   Pulmonary:      Effort: Pulmonary effort is normal. No respiratory distress.      Breath sounds: Normal breath sounds.   Musculoskeletal:      Cervical back: Normal range of motion.   Skin:     General: Skin is warm and dry.   Neurological:      Mental Status: She is alert and oriented to person, place, and time.   Psychiatric:         Behavior: Behavior normal.         DIAGNOSTIC STUDIES:   LAB RESULTS:      Office Visit on 12/15/2022   Component Date Value Ref Range Status   •  PAP TEST 08/06/2021 Normal   Final   •  DEXA SCAN 08/19/2021 Abnormal   Final       ASSESSMENT AND PLAN:     Encounter for general adult medical examination w/o abnormal findings  Exam is noraml   - CBC WITH DIFFERENTIAL; Future  - COMPREHENSIVE METABOLIC PANEL; Future  - LIPID PANEL WITH REFLEX; Future  - THYROID STIMULATING HORMONE REFLEX; Future    Primary hypertension  elevated due to no meds for a while  Adv to resume meds  rx sent  - valsartan-hydrochlorothiazide (DIOVAN-HCT) 160-25 MG per tablet; Take 1 tablet by mouth daily.  Dispense: 30 tablet; Refill: 1    Generalized anxiety disorder  Controlled with meds  cpm with meds  Use clonazepam rarely due to addiction potential   -  venlafaxine (EFFEXOR) 37.5 MG tablet; Take 1 tablet by mouth 1 time for 1 dose.  Dispense: 30 tablet; Refill: 1  - clonazePAM (KlonoPIN) 0.5 MG tablet; Take 1 tablet by mouth daily as needed for Anxiety.  Dispense: 30 tablet; Refill: 0    Other screening mammogram  - MAMMO SCREENING BILATERAL W MANNY; Future    Screening for colon cancer  - OCCULT BLOOD - FIT; Future    Flu vaccine need  We have run out  Next time will address        Instructions provided as documented in the AVS.      The patient indicated understanding of the diagnosis and agreed with the plan of care.      Adriana Hi MD

## 2022-12-15 NOTE — OUTREACH NOTE
AMBULATORY CASE MANAGEMENT NOTE    Name and Relationship of Patient/Support Person: Lorrie Pagan - Self    Confirmed with patient, she has moved to [place]. She was appreciative of the follow up. [facility] closed.     JI RIOS  Ambulatory Case Management    12/15/2022, 09:23 EST

## 2022-12-20 NOTE — PROGRESS NOTES
[facility]  Pulmonary Rehabilitation  Daily Treatment Log    Name: Lorrie Pagan : 1945 Date: 2020     Session: 9/ approved: 18          Time In/Out: 1:00/2:30pm    COPD Charges:  x1    Physician : Zaire Arboleda MD      Dx: J44.9      GOLD: 1 []  2 [x]  3 []  4 []     Primary Insurance:                 Secondary Insurance:[place] [employer]    /88   Pulse 80   Resp 14   LMP  (LMP Unknown)   SpO2 99% Comment: 3 lpm     Auscultation:  [x]Clear   []Clear and Decreased   []Insp. Wheeze   []Exp. Wheeze     []Rhonchi   []Rales    Target Heart Rate Zone: 120-130  Max HR: 140    Exercise Sp02% Blood Pressure Heart Rate ARNOL Scale/Fatigue   Treadmill: Speed:       Min:  % Grade:          Distance:  x      Bands:#:        Reps:        Min:  Color Band:  x      6MWT:Min:15        Distance:700    MET:2.0 88 140/88 110 5/3   Step-ups:#:      Reps:     Min:  x      Cybex/Vision/Schwinn Bike:  Level:      Speed:    RPM:       Min:  Distance:                 Seat:  x      Hand Weights: Pounds: 2     Min:15  Curls#12         Sets:2   Presses#10    Sets:2  Fly#8            Sets:2  Shoulder Shrugs#     Sets:x  Wings#10       Sets:2  Wrist Curls#   Sets:x                         98     148/88     83     0/0   NuStep: Level:3     METS:2.5       Min:36   Rest at 15 min  SPM:70         Total Steps:2525  Seat: 8     Arms:8   93  92   148/88   98  100   3/3  2.5/2   Arm Ergometer: Level:     Min:  RPM:            Fwd:      Back:  Total Turns:  x      PLB / DB Spinner: Min:5  P-Flex: Level: 5/4        Min:5  IS: Volume:1,000             Min:10  Warm-up Stretches: Min:x   99    80   0/0      Education and Trainin min  [x]ARNOL Scale  [x]PLB  [x]DB  [x]IMT  [x]IS  [x]Vital Signs   []Target HR Zone  [x]Exercise Vital Signs [] Safe Vital Signs  []Pulmonary Anatomy  []Lung Function  []Lung Disease Process  []Oxygen Use  []Oxygen Safety  [x]Pulmonary Meds  []Med Instruction  []Cough  Technique  []Pulmonary Hygeine  []Infection Prevention  []Signs of Infection  []When to Call MD  []Nutrition  []Weight Management  []Advance Directives  [x]Posture  [x]Body Mechanics []Energy Conservation  []Pacing ADL's  []Stress/Anxiety Management  []Stretching  []Home Exercise  []Smoking Cessation   [x]AZ Progress  []Maintenance Exercise Program    []Pulmonary Knowledge Test/Education  []Review PFT  []Review Specific Disease  [x]Review Home Follow-through Compliance  [x]Safe Exercise [x]Exercise Progression Strategies    Progress Report: Good participation and motivation. Demonstrates effective use of IS/Pflex. She states she is using daily. Mod cues to use PLB with exercising, hand weight exercises. Pt states understanding of goals of [place]. States there is a gym where she lives, but has not been there to see what equipment is there. Encouraged her to walk daily at home for exercise.      Treating Clinicians: [x]Kasia Ramirez, RRT  [] Radha Means RRT    MD in Office: [x]INDY Tom  []STACIA Pagan  []HAILEE Villalta  []JR Arboleda    []ADRIANA Carlos []HOLLY Noel  []STACIA Quesada  [x]STACIA Alvarado  [x]DIANNE Arboleda  []MARY Estes   []ADRIANA Webber  []INDY Lala    Cephalexin Counseling: I counseled the patient regarding use of cephalexin as an antibiotic for prophylactic and/or therapeutic purposes. Cephalexin (commonly prescribed under brand name Keflex) is a cephalosporin antibiotic which is active against numerous classes of bacteria, including most skin bacteria. Side effects may include nausea, diarrhea, gastrointestinal upset, rash, hives, yeast infections, and in rare cases, hepatitis, kidney disease, seizures, fever, confusion, neurologic symptoms, and others. Patients with severe allergies to penicillin medications are cautioned that there is about a 10% incidence of cross-reactivity with cephalosporins. When possible, patients with penicillin allergies should use alternatives to cephalosporins for antibiotic therapy.

## 2023-09-02 DIAGNOSIS — R06.02 SHORTNESS OF BREATH: ICD-10-CM

## 2023-09-03 RX ORDER — ALBUTEROL SULFATE 90 UG/1
AEROSOL, METERED RESPIRATORY (INHALATION)
Refills: 8 | OUTPATIENT
Start: 2023-09-03

## 2024-01-31 NOTE — TELEPHONE ENCOUNTER
Caller: Lorrie Pagan    Relationship: Self    Best call back number: [phone]    Requested Prescriptions: [facility]       Pharmacy where request should be sent: [facility] [place], [place] - [address]  - [phone]  - [phone] FX     Additional details provided by patient: PATIENT STATES THAT SHE IS OUT OF THIS MEDICATION. SHE TRIED TO CALL THE PRESCRIBING PHYSICIAN BTU SHE WAS TOLD THAT THEY WOULD NOT FILL IT DUE TO THAT PROVIDER BEING OUT OF THE OFFICE AND TO CALL HER PRIMARY CARE INSTEAD. PATIENT IS NOT SURE WHAT THE STRENGTH OF THE MEDICATION SHE IS ON.     Does the patient have less than a 3 day supply:  [x] Yes  [] No    [place]ia Huertas, Cuong Rep   02/17/22 13:41 EST         
LVM letting Pt know that Lomotil is a controlled substance and that Dr. Viera will not prescribe from a script from another provider. Advised her that she should call providing provider for refill. Sent Imonomit message as well  
oral

## 2025-05-16 ENCOUNTER — TELEPHONE (OUTPATIENT)
Dept: INTERNAL MEDICINE | Facility: CLINIC | Age: 80
End: 2025-05-16

## 2025-05-16 NOTE — TELEPHONE ENCOUNTER
"    Caller: Lorrie Pagan \"Therese\"    Relationship: Self    Best call back number:   Telephone Information:   Mobile [phone]       What is the medical concern/diagnosis: DEGENERATIVE SPINAL CONCERNS    What specialty or service is being requested: PHYSICAL THERAPY      Any additional details: PATIENT WAS CALLING TO TRY TO GET A REFERRAL FOR PHYSICAL THERAPY. PATIENT HAS BEEN LIVING OUT OF TOWN FOR A YEAR AND A HALF, PATIENT HAS CONTINUED WITH PHYSICAL THERAPY DURRING THIS TIME. PATIENT WOULD LIKE TO KNOW IF SHE CAN GET A NEW REFERRAL TO CONTINUE PHYSICAL THERAPY WHEN SHE GETS BACK INTO TOWN 5/25/25. PLEASE CALL TO DISCUSS.  "

## 2025-05-16 NOTE — TELEPHONE ENCOUNTER
Hub to relay    Lvm for patient that she would need to be see in order for a referral. She is scheduled to establish care in July can discuss at that appointment or she can see if any other appointments for establish care are available. Office number given.

## 2025-05-16 NOTE — TELEPHONE ENCOUNTER
PATIENT CALLED THE CLINIC AND STATES THAT SHE IS MOVING BACK TO [place] TO START CHEMO FOR LUNG CANCER. PATIENT STATES THAT SHE WAS DIAGNOSED 2 WEEKS AGO.    PATIENT STATES THAT SHE WILL BE BACK IN [place] IN MAY 25,2025.    PATIENT IS SCHED FOR AN RE-EST-CARE VISIT IN JULY, BUT THE PATIENT IS REQUESTING TO SPEAK WITH DR. SETHI TO MAKE SHE THAT SHE IS MAKING THE RIGHT DECISIONS FOR HER LONG TERM CARE.    PATIENT STATES THAT ITS OKAY TO LEAVE VOICEMAIL.    CALL BACK: [phone]

## 2025-05-16 NOTE — TELEPHONE ENCOUNTER
Please advise you have a 30 min appointment open on 5/27 at 9 am. Ok to schedule patient for est care to discuss referral and other concerns?

## 2025-06-09 ENCOUNTER — TELEPHONE (OUTPATIENT)
Dept: INTERNAL MEDICINE | Facility: CLINIC | Age: 80
End: 2025-06-09
Payer: MEDICARE

## 2025-06-09 NOTE — TELEPHONE ENCOUNTER
Called and left VM with pt that provider would see her tomorrow and discuss what was going on. Left VM for pt to return call to office.

## 2025-06-09 NOTE — TELEPHONE ENCOUNTER
"Name: Lorrie Pagan \"Therese\"      Relationship: Self      HUB PROVIDED THE RELAY MESSAGE FROM THE OFFICE      PATIENT: VOICED UNDERSTANDING AND HAS NO FURTHER QUESTIONS AT THIS TIME  "

## 2025-06-09 NOTE — TELEPHONE ENCOUNTER
Pt called frequent urination, has COPD   Legs swollen, oozing     Pt declines headaches, dizziness, lightheadness,     Pt has apt tomorrow, wants to know if she can wait til tomorrow or if provider aside from elevating legs as much as possible?   Pt does not wish to go to ED or UTC

## 2025-06-09 NOTE — TELEPHONE ENCOUNTER
She will have to wait until she is seen tomorrow. She has not been here in awhile and was [place]. She is actually not yet reestablished with me so I can offer no further guidance.

## 2025-06-10 ENCOUNTER — TELEPHONE (OUTPATIENT)
Dept: PULMONOLOGY | Facility: CLINIC | Age: 80
End: 2025-06-10
Payer: MEDICARE

## 2025-06-10 ENCOUNTER — OFFICE VISIT (OUTPATIENT)
Dept: INTERNAL MEDICINE | Facility: CLINIC | Age: 80
End: 2025-06-10
Payer: MEDICARE

## 2025-06-10 VITALS
HEART RATE: 94 BPM | TEMPERATURE: 97.9 F | BODY MASS INDEX: 31.04 KG/M2 | OXYGEN SATURATION: 100 % | HEIGHT: 63 IN | DIASTOLIC BLOOD PRESSURE: 50 MMHG | WEIGHT: 175.2 LBS | SYSTOLIC BLOOD PRESSURE: 142 MMHG

## 2025-06-10 DIAGNOSIS — S41.111A SKIN TEAR OF RIGHT UPPER ARM WITHOUT COMPLICATION, INITIAL ENCOUNTER: ICD-10-CM

## 2025-06-10 DIAGNOSIS — E11.9 CONTROLLED TYPE 2 DIABETES MELLITUS WITHOUT COMPLICATION, WITHOUT LONG-TERM CURRENT USE OF INSULIN: ICD-10-CM

## 2025-06-10 DIAGNOSIS — N30.01 ACUTE CYSTITIS WITH HEMATURIA: ICD-10-CM

## 2025-06-10 DIAGNOSIS — G25.81 RESTLESS LEG SYNDROME: ICD-10-CM

## 2025-06-10 DIAGNOSIS — R29.6 FREQUENT FALLS: ICD-10-CM

## 2025-06-10 DIAGNOSIS — I50.32 CHRONIC DIASTOLIC HEART FAILURE: Primary | ICD-10-CM

## 2025-06-10 LAB
BILIRUB BLD-MCNC: NEGATIVE MG/DL
CLARITY, POC: ABNORMAL
COLOR UR: ABNORMAL
EXPIRATION DATE: ABNORMAL
GLUCOSE UR STRIP-MCNC: NEGATIVE MG/DL
KETONES UR QL: NEGATIVE
LEUKOCYTE EST, POC: ABNORMAL
Lab: ABNORMAL
NITRITE UR-MCNC: NEGATIVE MG/ML
PH UR: 7 [PH] (ref 5–8)
PROT UR STRIP-MCNC: ABNORMAL MG/DL
RBC # UR STRIP: ABNORMAL /UL
SP GR UR: 1.01 (ref 1–1.03)
UROBILINOGEN UR QL: ABNORMAL

## 2025-06-10 PROCEDURE — 87086 URINE CULTURE/COLONY COUNT: CPT | Performed by: INTERNAL MEDICINE

## 2025-06-10 PROCEDURE — 87088 URINE BACTERIA CULTURE: CPT | Performed by: INTERNAL MEDICINE

## 2025-06-10 PROCEDURE — 87186 SC STD MICRODIL/AGAR DIL: CPT | Performed by: INTERNAL MEDICINE

## 2025-06-10 RX ORDER — LISINOPRIL 5 MG/1
5 TABLET ORAL DAILY
COMMUNITY

## 2025-06-10 RX ORDER — CEFDINIR 300 MG/1
300 CAPSULE ORAL 2 TIMES DAILY
Qty: 10 CAPSULE | Refills: 0 | Status: SHIPPED | OUTPATIENT
Start: 2025-06-10 | End: 2025-06-15

## 2025-06-10 RX ORDER — FUROSEMIDE 40 MG/1
40 TABLET ORAL DAILY
Qty: 90 TABLET | Refills: 1 | Status: SHIPPED | OUTPATIENT
Start: 2025-06-10

## 2025-06-10 RX ORDER — ROPINIROLE 8 MG/1
8 TABLET, EXTENDED RELEASE ORAL NIGHTLY
Qty: 90 TABLET | Refills: 1 | Status: SHIPPED | OUTPATIENT
Start: 2025-06-10

## 2025-06-10 NOTE — PROGRESS NOTES
Internal Medicine Acute Visit    Chief Complaint   Patient presents with    Edema     B/l LE edema and oozing on and off a few days. Ran out of furosemide a few days ago. Swelling worsening    Dysuria     Frequency and urgency 3-4 days. Sometimes painful    Fatigue     Increased fatigue and SOB. Dozes off more frequently         HPI  Ms. Pagan comes in today with several acute issues. She has not been seen here in several years. Had moved to [place] to be closer to family but decided to move back and has been back in [place] for 1-2 weeks. She was diagnosed with pulmonary HTN, R lung cancer while in [place]. She is established with [facility] for her cancer care and will be receiving radiation. No plan for chemotherapy due to functional status. She remains on supplemental oxygen. Reports baseline 4L. She was scheduled to see pulmonary today but forgot about the appointment. She reports she is also scheduled with Dr. Chaidez. She did not bring a medication list with her but was able to name several mediations that she is taking. She notes that she discontinued several medications when living in [place]. She is on trelegy, lisinopril, lasix, requip, duloxetine, metformin, pepcid, and eye drops for gluacoma. She reports that she is not on potassium and that it has not been needed. She complains of increased edema. She has been out of her lasix for 4-7 days. Her daughter found her pill bottle in her apartment in [place]. She also cannot find her requip. She denies SOA. She is having frequent falls. She notes that she will get caught up in her oxygen tubing. She has a large skin tear of R upper arm. This was evaluated at . She is keeping it bandaged. She has had 4 days of significant dysuria. No flank pain or known fever.    Dysuria  Associated symptoms: no flank pain and no hematuria    Fatigue  Symptoms include fatigue and dysuria.    Pertinent negative symptoms include no chest pain and no fever.        Review of Systems  Review of Systems    Constitutional:  Positive for fatigue. Negative for fever.   Respiratory:  Positive for shortness of breath (stable).    Cardiovascular:  Positive for leg swelling. Negative for chest pain and palpitations.   Genitourinary:  Positive for dysuria. Negative for decreased urine volume, difficulty urinating, flank pain and hematuria.   Musculoskeletal:  Positive for gait problem.   Skin:  Positive for wound.        Medications  Current Outpatient Medications on File Prior to Visit   Medication Sig Dispense Refill    albuterol (ACCUNEB) 1.25 MG/3ML nebulizer solution Take 3 mL by nebulization Every 6 (Six) Hours As Needed for Shortness of Air. 90 each 5    albuterol sulfate  (90 Base) MCG/ACT inhaler INHALE 2 PUFFS EVERY 6 (SIX) HOURS AS NEEDED FOR WHEEZING. 18 g 11    Blood Glucose Monitoring Suppl (FreeStyle Freedom Lite) w/Device kit       chlorhexidine (PERIDEX) 0.12 % solution Apply 15 mL to the mouth or throat 2 (Two) Times a Day As Needed. For 14 days, w/1 refill      DULoxetine (CYMBALTA) 60 MG capsule TAKE 1 CAPSULE BY MOUTH EVERY DAY 90 capsule 3    famotidine (Pepcid AC Maximum Strength) 20 MG tablet Take 1 tablet by mouth 2 (Two) Times a Day. 8 tablet 0    Fluticasone-Umeclidin-Vilant (TRELEGY) 200-62.5-25 MCG/INH inhaler Inhale 1 puff Daily. 1 each 5    Glucose Blood (Blood Glucose Test) strip Use to test blood sugar daily before breakfast. 100 each 11    Lancets (freestyle) lancets Use to test blood sugar daily before breakfast. 100 each 12    latanoprost (XALATAN) 0.005 % ophthalmic solution Administer 1 drop to both eyes Every Night.      lisinopril (PRINIVIL,ZESTRIL) 5 MG tablet Take 1 tablet by mouth Daily.      metFORMIN (GLUCOPHAGE) 1000 MG tablet Take 0.5 tablets by mouth 2 (Two) Times a Day With Meals.      Misc. Devices (Rollator Ultra-Light) misc 1 each as needed. 1 each 0    O2 (OXYGEN) Inhale 4 L/min Continuous.      Spacer/Aero-Holding Chambers (AeroChamber Plus Morris-Vu) misc As Needed.       timolol (TIMOPTIC) 0.25 % ophthalmic solution Administer 1 drop to the right eye Every Morning.      [DISCONTINUED] azelastine (ASTELIN) 0.1 % nasal spray 2 sprays into the nostril(s) as directed by provider 2 (Two) Times a Day. (Patient taking differently: Administer 2 sprays into the nostril(s) as directed by provider 2 (Two) Times a Day As Needed for Rhinitis or Allergies.) 30 mL 5    [DISCONTINUED] bumetanide (BUMEX) 2 MG tablet Take 1 tablet by mouth Daily. 30 tablet 3    [DISCONTINUED] colestipol (COLESTID) 1 g tablet Take 1 tablet by mouth Daily.      [DISCONTINUED] donepezil (ARICEPT) 10 MG tablet Take 1 tablet by mouth Daily.      [DISCONTINUED] glimepiride (AMARYL) 2 MG tablet TAKE 1 TABLET BY MOUTH TWICE DAILY 180 tablet 0    [DISCONTINUED] memantine (NAMENDA) 5 MG tablet Take 1 tablet by mouth Daily.      [DISCONTINUED] metOLazone (ZAROXOLYN) 5 MG tablet Take 1 tablet by mouth Daily. 7 tablet 0    [DISCONTINUED] montelukast (SINGULAIR) 10 MG tablet Take 1 tablet by mouth Every Night. 90 tablet 3    [DISCONTINUED] pantoprazole (PROTONIX) 40 MG EC tablet Take 1 tablet by mouth 2 (Two) Times a Day. 180 tablet 3    [DISCONTINUED] polyethylene glycol (MIRALAX) 17 g packet Take 17 g by mouth 2 (Two) Times a Day As Needed (constipation). 72 each 0    [DISCONTINUED] potassium chloride (MICRO-K) 10 MEQ CR capsule 6 capsules daily 90 capsule 0    [DISCONTINUED] rOPINIRole XL (REQUIP XL) 8 MG 24 hr tablet Take 1 tablet by mouth 2 (two) times a day.      [DISCONTINUED] spironolactone (ALDACTONE) 25 MG tablet Take 2 tablets by mouth Daily. 60 tablet 11    [DISCONTINUED] Triamcinolone Acetonide (NASACORT) 55 MCG/ACT nasal inhaler Administer 2 sprays into the nostril(s) as directed by provider Daily.      [DISCONTINUED] vitamin D (ERGOCALCIFEROL) 1.25 MG (09613 UT) capsule capsule TAKE 1 CAPSULE BY MOUTH 1 (ONE) TIME PER WEEK. 12 capsule 0     No current facility-administered medications on file prior to visit.     "    Allergies  Allergies   Allergen Reactions    Gabapentin Confusion and Hallucinations    Hydrocodone Hallucinations     \"With high doses\"    Statins Myalgia     myalgia    Cyclobenzaprine Hallucinations    Hydroxyzine Hallucinations    Codeine Hallucinations and Unknown (See Comments)     Hallucinations     Hallucinations    Metoclopramide Other (See Comments)     EXACERBATED RLS    Penicillins Rash     Rash    Tolerated Ceftriaxone    Tizanidine Hallucinations    Tramadol Nausea And Vomiting and GI Intolerance     VERY MILD PER PT       PMH  Past Medical History:   Diagnosis Date    Asthma     Back pain     BRBPR (bright red blood per rectum) 07/10/2022    Breast injury     Fell on table and injuried both breasts about 2018    Bronchiectasis     Bronchitis 01/2018    Cancer     History of lung cancer and skin cancer     Cardiac murmur     Cellulitis of both lower extremities 01/27/2020    Chronic cough     Chronic obstructive pulmonary disease     Colon polyp     DDD (degenerative disc disease), lumbar     Depression     Diabetes mellitus     DX: 2019, CHECK BS QOD, LAST A1C 6.3??    Disease of thyroid gland     Esophageal dilatation 09/20/2019    Added automatically from request for surgery [id number]    Esophageal dysphagia 10/24/2019    Added automatically from request for surgery [id number]    Former smoker (None since 1992) 08/29/2019    Gastroparesis     GERD (gastroesophageal reflux disease)     Glaucoma     Globus sensation 01/27/2020    History of colonic polyps     History of transfusion 1988    NO REACTION     Hyperlipidemia     Hypertension     Irritable bowel syndrome     Constipation/diarrhea    Legally blind     Lung cancer     Macular degeneration     Neuropathy     Obesity 02/26/2020    Osteoarthritis     Oxygen dependent     4L    Pneumonia     Sleep apnea     NON COMPLIANT WITH CPAP USE    UTI (urinary tract infection)     Wears glasses        Objective  Visit Vitals  /50 (BP Location: Left " "arm, Patient Position: Sitting)   Pulse 94   Temp 97.9 °F (36.6 °C) (Temporal)   Ht 160 cm (63\")   Wt 79.5 kg (175 lb 3.2 oz)   LMP  (LMP Unknown)   SpO2 100% Comment: 4L   BMI 31.04 kg/m²        Physical Exam  Physical Exam  Vitals and nursing note reviewed.   Constitutional:       General: She is not in acute distress.     Appearance: She is well-developed. She is obese. She is not ill-appearing or toxic-appearing.   HENT:      Head: Normocephalic and atraumatic.   Eyes:      Conjunctiva/sclera: Conjunctivae normal.   Cardiovascular:      Rate and Rhythm: Normal rate and regular rhythm.      Heart sounds: Murmur (systolic) heard.   Pulmonary:      Effort: Pulmonary effort is normal. No respiratory distress.      Breath sounds: Normal breath sounds.      Comments: Diminished throughout but clear  Abdominal:      Tenderness: There is no right CVA tenderness or left CVA tenderness.   Musculoskeletal:      Right lower leg: Edema (3+) present.      Left lower leg: Edema (3+) present.   Skin:     General: Skin is warm and dry.      Comments: Large skin tear R upper arm without discharge, erythema   Neurological:      Mental Status: She is alert and oriented to person, place, and time. Mental status is at baseline.      Gait: Gait abnormal (using rollator).   Psychiatric:         Mood and Affect: Mood normal.         Behavior: Behavior normal.         Thought Content: Thought content normal.         Judgment: Judgment normal.         Results  Results for orders placed or performed in visit on 06/10/25   POCT urinalysis dipstick, automated    Collection Time: 06/10/25 10:35 AM    Specimen: Urine   Result Value Ref Range    Color Dark Yellow Yellow, Straw, Dark Yellow, Blanche    Clarity, UA Hazy (A) Clear    Specific Gravity  1.010 1.005 - 1.030    pH, Urine 7.0 5.0 - 8.0    Leukocytes Moderate (2+) (A) Negative    Nitrite, UA Negative Negative    Protein, POC 1+ (A) Negative mg/dL    Glucose, UA Negative Negative mg/dL    " Ketones, UA Negative Negative    Urobilinogen, UA 0.2 E.U./dL Normal, 0.2 E.U./dL    Bilirubin Negative Negative    Blood, UA 1+ (A) Negative    Lot Number [id number]     Expiration Date 06/04/2026         Assessment and Plan  Diagnoses and all orders for this visit:    Chronic diastolic heart failure  - increased edema since stopping lasix, resume lasix 40mg daily. Continue lisinopril 5mg daily.  - recent CMP reviewed, potassium normal without supplementation  - reports appt to reestablish with Dr. Chaidez    Restless leg syndrome  - continue requip 8mg qhs    Controlled type 2 diabetes mellitus without complication, without long-term current use of insulin  - A1c 7.1 2/2025, acceptable given age and comorbidities  - continue metformin 500mg BID    Frequent falls  - forms signed for her to start PT/OT at her facility    Skin tear of right upper arm without complication, initial encounter  - no signs of infection, continue wound care. Will check to see if nursing is available to help with wound care where she lives.    Acute cystitis with hematuria  - UA with signs of UTI, clinically has dysuria  - will treat with cefdinir 300mg BID x5d   - urine culture sent    Health Maintenance  - Pap smear: no longer indicated  - Mammogram: will likely discontinue, discuss next visit  - Colonoscopy: 8/2022, 3y repeat. May discontinue, discuss next visit.  - HCV: negative  - Immunizations: pneumococcal complete, Tdap 1/2022. Needs RSV, shingrix #2, COVID.  - Depression screening: screen next visit    Return for Next scheduled follow up.

## 2025-06-12 LAB — BACTERIA SPEC AEROBE CULT: ABNORMAL

## 2025-06-17 ENCOUNTER — TELEPHONE (OUTPATIENT)
Dept: INTERNAL MEDICINE | Facility: CLINIC | Age: 80
End: 2025-06-17

## 2025-06-17 ENCOUNTER — TELEPHONE (OUTPATIENT)
Dept: INTERNAL MEDICINE | Facility: CLINIC | Age: 80
End: 2025-06-17
Payer: MEDICARE

## 2025-06-17 NOTE — TELEPHONE ENCOUNTER
"  Caller: Lorrie Pagan \"Therese\"    Relationship: Self    Best call back number: [phone]     What is the best time to reach you: ANYTIME     Who are you requesting to speak with (clinical staff, provider,  specific staff member): CLINICAL STAFF    What was the call regarding: PATIENT IS CALLING BACK AFTER SPEAKING WITH SOMEONE EARLIER TODAY. SHE SAYS THAT SHE DOES NOT THINK THAT SHE NEEDS HOME HEALTH. SHE SAYS THAT SHE CAN PROBABLY WRAP HERSELF, PROVIDED THAT SHE IS TOLD WHAT TO USE.     Is it okay if the provider responds through MyChart: YES  "

## 2025-06-17 NOTE — TELEPHONE ENCOUNTER
Patient called back with this message    PATIENT IS CALLING BACK AFTER SPEAKING WITH SOMEONE EARLIER TODAY. SHE SAYS THAT SHE DOES NOT THINK THAT SHE NEEDS HOME HEALTH. SHE SAYS THAT SHE CAN PROBABLY WRAP HERSELF, PROVIDED THAT SHE IS TOLD WHAT TO USE.

## 2025-06-17 NOTE — TELEPHONE ENCOUNTER
Patient was seen by pcp on 6/10/2025 do discuss edema in legs. Patient states that she is still taking the lasix and the swelling is getting better but her legs are now forming blisters and oozing. Per the office note Dr Viera was going to check to see if the assisted living place where patient is staying offers nursing help and patient states they do not. Please advise if ok to order wound care or any other recommendations.

## 2025-06-18 NOTE — TELEPHONE ENCOUNTER
NOTIFY PATIENT OF MISSED APPOINTMENT/NO SHOW VIA MAILED LETTER REGARDING POLICY.     NO SHOW 1 OF 3

## 2025-07-02 NOTE — TELEPHONE ENCOUNTER
P  [facility]                                               Pulmonary Clinic Consult     I had the pleasure of seeing  Connie Roberts     Chief Complaint   Patient presents with    Follow-Up from Hospital       HISTORY OF PRESENT ILLNESS:    Connie Roberts is a 71 y.o. year old  Who start smoking at age 7   And increase gradually 1 pack and had 60 PPY  He/She  quit smoking     The Patient comes in with mild SOB that has been going on the last few years and get worse in summer with no chest pain     Her SOB  Associated with .mild cough ,,clear sputum    She  states that it get worse with exercise or walking long distance and he can walk  1/2 and stop with leg pain and spine issues     She can do less than 1 flight of stairs before get short winded    Had breast cancer s/p surgery and radiation and estrogen and was 2020     She use no inahlers     Today visit  States her breathing is ok  States no CP  States no hemoptysis     ALLERGIES:    Allergies   Allergen Reactions    Dye [Barium-Containing Compounds] Other (See Comments)     DISORIENTED- States that this was in her 30's     Iodinated Contrast Media Dizziness or Vertigo     Other reaction(s): Dizziness    Pcn [Penicillins] Swelling     RASH       PAST MEDICAL HISTORY:       Diagnosis Date    Allergic sinusitis     ANIMAL ALLERGIES    Back pain     Breast cancer (HCC)     History of therapeutic radiation     [place] (hard of hearing)     Hyperlipidemia     Malignant neoplasm of upper-inner quadrant of right breast in female, estrogen receptor positive (HCC) 12/11/2020    Reflux     Thyroid nodule greater than or equal to 1.5 cm in diameter incidentally noted on imaging study 10/23/2024       MEDICATIONS:   Current Outpatient Medications   Medication Sig Dispense Refill    HYDROcodone-acetaminophen (NORCO) 5-325 MG per tablet Take 1 tablet by mouth 2 times daily for 30 days. Max Daily Amount: 2 tablets 60 tablet 0     Referral placed for ENT.

## 2025-07-24 ENCOUNTER — PATIENT OUTREACH (OUTPATIENT)
Dept: CASE MANAGEMENT | Facility: OTHER | Age: 80
End: 2025-07-24
Payer: MEDICARE

## 2025-07-24 ENCOUNTER — OFFICE VISIT (OUTPATIENT)
Dept: INTERNAL MEDICINE | Facility: CLINIC | Age: 80
End: 2025-07-24
Payer: MEDICARE

## 2025-07-24 ENCOUNTER — REFERRAL TRIAGE (OUTPATIENT)
Dept: CASE MANAGEMENT | Facility: OTHER | Age: 80
End: 2025-07-24
Payer: MEDICARE

## 2025-07-24 ENCOUNTER — LAB (OUTPATIENT)
Dept: LAB | Facility: HOSPITAL | Age: 80
End: 2025-07-24
Payer: MEDICARE

## 2025-07-24 VITALS
WEIGHT: 171 LBS | SYSTOLIC BLOOD PRESSURE: 132 MMHG | DIASTOLIC BLOOD PRESSURE: 58 MMHG | HEART RATE: 56 BPM | TEMPERATURE: 97.3 F | BODY MASS INDEX: 30.3 KG/M2 | OXYGEN SATURATION: 100 % | HEIGHT: 63 IN

## 2025-07-24 DIAGNOSIS — K44.9 HIATAL HERNIA: ICD-10-CM

## 2025-07-24 DIAGNOSIS — C34.92 NON-SMALL CELL CANCER OF LEFT LUNG: Primary | ICD-10-CM

## 2025-07-24 DIAGNOSIS — G60.9 IDIOPATHIC PERIPHERAL NEUROPATHY: ICD-10-CM

## 2025-07-24 DIAGNOSIS — I10 PRIMARY HYPERTENSION: ICD-10-CM

## 2025-07-24 DIAGNOSIS — Z85.118 H/O: LUNG CANCER: ICD-10-CM

## 2025-07-24 DIAGNOSIS — I27.20 PULMONARY HYPERTENSION: ICD-10-CM

## 2025-07-24 DIAGNOSIS — J96.11 CHRONIC RESPIRATORY FAILURE WITH HYPOXIA: ICD-10-CM

## 2025-07-24 DIAGNOSIS — G25.81 RESTLESS LEG SYNDROME: ICD-10-CM

## 2025-07-24 DIAGNOSIS — E03.9 ACQUIRED HYPOTHYROIDISM: ICD-10-CM

## 2025-07-24 DIAGNOSIS — I50.32 CHRONIC DIASTOLIC HEART FAILURE: ICD-10-CM

## 2025-07-24 DIAGNOSIS — E78.2 MIXED HYPERLIPIDEMIA: ICD-10-CM

## 2025-07-24 DIAGNOSIS — E11.9 CONTROLLED TYPE 2 DIABETES MELLITUS WITHOUT COMPLICATION, WITHOUT LONG-TERM CURRENT USE OF INSULIN: ICD-10-CM

## 2025-07-24 DIAGNOSIS — D64.9 NORMOCYTIC ANEMIA: ICD-10-CM

## 2025-07-24 DIAGNOSIS — G47.33 OSA ON CPAP: ICD-10-CM

## 2025-07-24 DIAGNOSIS — J44.9 CHRONIC OBSTRUCTIVE PULMONARY DISEASE, UNSPECIFIED COPD TYPE: ICD-10-CM

## 2025-07-24 DIAGNOSIS — K21.9 GASTROESOPHAGEAL REFLUX DISEASE WITHOUT ESOPHAGITIS: ICD-10-CM

## 2025-07-24 DIAGNOSIS — E11.9 CONTROLLED TYPE 2 DIABETES MELLITUS WITHOUT COMPLICATION, WITHOUT LONG-TERM CURRENT USE OF INSULIN: Primary | ICD-10-CM

## 2025-07-24 PROBLEM — K11.7 DROOLING: Status: RESOLVED | Noted: 2021-11-05 | Resolved: 2025-07-24

## 2025-07-24 PROBLEM — T50.2X5A DIURETIC-INDUCED HYPOKALEMIA: Status: RESOLVED | Noted: 2020-09-27 | Resolved: 2025-07-24

## 2025-07-24 PROBLEM — K21.00 GASTROESOPHAGEAL REFLUX DISEASE WITH ESOPHAGITIS WITHOUT HEMORRHAGE: Status: RESOLVED | Noted: 2021-02-22 | Resolved: 2025-07-24

## 2025-07-24 PROBLEM — R55 SYNCOPE: Status: RESOLVED | Noted: 2020-09-25 | Resolved: 2025-07-24

## 2025-07-24 PROBLEM — E83.42 HYPOMAGNESEMIA: Status: RESOLVED | Noted: 2020-09-27 | Resolved: 2025-07-24

## 2025-07-24 PROBLEM — K22.4 ESOPHAGEAL DYSMOTILITY: Status: RESOLVED | Noted: 2021-04-17 | Resolved: 2025-07-24

## 2025-07-24 PROBLEM — K62.5 BRIGHT RED BLOOD PER RECTUM: Status: RESOLVED | Noted: 2022-07-28 | Resolved: 2025-07-24

## 2025-07-24 PROBLEM — H93.13 TINNITUS OF BOTH EARS: Status: RESOLVED | Noted: 2022-05-03 | Resolved: 2025-07-24

## 2025-07-24 PROBLEM — H69.93 DYSFUNCTION OF BOTH EUSTACHIAN TUBES: Status: RESOLVED | Noted: 2022-05-03 | Resolved: 2025-07-24

## 2025-07-24 PROBLEM — R13.10 DYSPHAGIA: Status: RESOLVED | Noted: 2021-04-27 | Resolved: 2025-07-24

## 2025-07-24 PROBLEM — E87.6 DIURETIC-INDUCED HYPOKALEMIA: Status: RESOLVED | Noted: 2020-09-27 | Resolved: 2025-07-24

## 2025-07-24 PROBLEM — K11.7 SIALORRHEA: Status: RESOLVED | Noted: 2021-09-15 | Resolved: 2025-07-24

## 2025-07-24 LAB
EXPIRATION DATE: ABNORMAL
HBA1C MFR BLD: 7.1 % (ref 4.5–5.7)
Lab: ABNORMAL

## 2025-07-24 PROCEDURE — 80061 LIPID PANEL: CPT

## 2025-07-24 PROCEDURE — 82728 ASSAY OF FERRITIN: CPT

## 2025-07-24 PROCEDURE — 82607 VITAMIN B-12: CPT

## 2025-07-24 PROCEDURE — 80053 COMPREHEN METABOLIC PANEL: CPT

## 2025-07-24 PROCEDURE — 82570 ASSAY OF URINE CREATININE: CPT

## 2025-07-24 PROCEDURE — 82746 ASSAY OF FOLIC ACID SERUM: CPT

## 2025-07-24 PROCEDURE — 84439 ASSAY OF FREE THYROXINE: CPT

## 2025-07-24 PROCEDURE — 84466 ASSAY OF TRANSFERRIN: CPT

## 2025-07-24 PROCEDURE — 82043 UR ALBUMIN QUANTITATIVE: CPT

## 2025-07-24 PROCEDURE — 84443 ASSAY THYROID STIM HORMONE: CPT

## 2025-07-24 PROCEDURE — 83540 ASSAY OF IRON: CPT

## 2025-07-24 RX ORDER — BUDESONIDE, GLYCOPYRROLATE, AND FORMOTEROL FUMARATE 160; 9; 4.8 UG/1; UG/1; UG/1
2 AEROSOL, METERED RESPIRATORY (INHALATION) 2 TIMES DAILY
COMMUNITY
Start: 2025-07-18

## 2025-07-24 NOTE — PROGRESS NOTES
Internal Medicine New Patient  Lorrie Pagan is a 79 y.o. female who presents today to establish care and with concerns as outlined below.    Chief Complaint  Chief Complaint   Patient presents with    Congestive Heart Failure        HPI  Ms. Pagan comes in today to reestablish care. She has recently moved back to [place] after living in [place] a couple years. She is following at  for nonsmall cell lung cancer in L lung. She is planned to start radiation. No plan for chemotherapy due to functional status. She has established with pulmonary at  as well. Notes plan to call them for alternative inhaler due to cost of breztri. She is on 4L supplemental oxygen. With use of lasix her edema has decreased and she notes weight loss. She has not scheduled with cardiology. She notes that she currently gets poor sleep. Sleeps in a recliner about 2 hours each day. Does not sleep at night. She did have a couple days where she slept most of the day. She does not have a bed and has not been able to tolerate sleeping flat. She has not thought about getting a hospital bed. She does have untreated RENETTA.         Review of Systems  Review of Systems   Constitutional:  Positive for unexpected weight loss.   Respiratory:  Positive for shortness of breath (stable).    Cardiovascular:  Positive for leg swelling (improved).   Gastrointestinal:  Positive for GERD.   Musculoskeletal:  Positive for gait problem and myalgias (restless legs).   Psychiatric/Behavioral:  Positive for sleep disturbance.         Past Medical History  Past Medical History:   Diagnosis Date    Asthma     Back pain     BRBPR (bright red blood per rectum) 07/10/2022    Breast injury     Fell on table and injuried both breasts about 2018    Bronchiectasis     Bronchitis 01/2018    Cancer     History of lung cancer and skin cancer     Cardiac murmur     Cellulitis of both lower extremities 01/27/2020    Chronic cough     Chronic obstructive pulmonary disease      Colon polyp     DDD (degenerative disc disease), lumbar     Depression     Diabetes mellitus     DX: 2019, CHECK BS QOD, LAST A1C 6.3??    Disease of thyroid gland     Esophageal dilatation 09/20/2019    Added automatically from request for surgery [id number]    Esophageal dysmotility 04/17/2021    Esophageal dysphagia 10/24/2019    Added automatically from request for surgery [id number]    Former smoker (None since 1992) 08/29/2019    Gastroesophageal reflux disease with esophagitis without hemorrhage 02/22/2021    Gastroparesis     GERD (gastroesophageal reflux disease)     Glaucoma     Globus sensation 01/27/2020    History of colonic polyps     History of transfusion 1988    NO REACTION     Hyperlipidemia     Hypertension     Hypomagnesemia 09/27/2020    Irritable bowel syndrome     Constipation/diarrhea    Legally blind     Lung cancer     Macular degeneration     Neuropathy     Obesity 02/26/2020    Osteoarthritis     Oxygen dependent     4L    Pneumonia     Sialorrhea 09/15/2021    Sleep apnea     NON COMPLIANT WITH CPAP USE    Syncope 09/25/2020    Tinnitus of both ears 05/03/2022    UTI (urinary tract infection)     Wears glasses         Surgical History  Past Surgical History:   Procedure Laterality Date    BACK SURGERY      X2 (LUMBAR)    BREAST BIOPSY      20+ years ago    BREAST BIOPSY Right 08/2021    fna ln    BRONCHOSCOPY N/A 08/24/2022    Procedure: BRONCHOSCOPY WITH ENDOBRONCHIAL ULTRASOUND;  Surgeon: Vladislav Arboleda MD;  Location: [facility] ENDOSCOPY;  Service: Pulmonary;  Laterality: N/A;    CATARACT EXTRACTION Bilateral     CHOLECYSTECTOMY      COLONOSCOPY      COLONOSCOPY N/A 08/18/2022    Procedure: COLONOSCOPY WITH POLYPECTOMY;  Surgeon: Cortes Millan MD;  Location:  [facility] ENDOSCOPY;  Service: Gastroenterology;  Laterality: N/A;    ENDOSCOPY N/A 11/06/2019    Procedure: ESOPHAGOGASTRODUODENOSCOPY;  Surgeon: Cortes Millan MD;  Location:  [facility] ENDOSCOPY;   Service: Gastroenterology    ENDOSCOPY N/A 2021    Procedure: ESOPHAGOGASTRODUODENOSCOPY;  Surgeon: Cortes Millan MD;  Location: [facility] ENDOSCOPY;  Service: Gastroenterology;  Laterality: N/A;  balloon dilation     HAND SURGERY Right     laceration repair    INCONTINENCE SURGERY      BLADDER    LUNG REMOVAL, PARTIAL Left 2016    NISSEN FUNDOPLICATION      SKIN BIOPSY      SPINAL CORD STIMULATOR IMPLANT      right buttock, in place but no longer working    TOTAL ABDOMINAL HYSTERECTOMY      benign cyst    US GUIDED FINE NEEDLE ASPIRATION  2021        Family History  Family History   Problem Relation Age of Onset    Colon polyps Mother     Emphysema Mother     Hypertension Mother     Colon polyps Father     Dementia Father     Heart disease Father     Hyperlipidemia Father     Macular degeneration Father     Glaucoma Father     Colon cancer Neg Hx     Breast cancer Neg Hx     Ovarian cancer Neg Hx         Social History  Social History     Socioeconomic History    Marital status: Single   Tobacco Use    Smoking status: Former     Current packs/day: 0.00     Average packs/day: 1.5 packs/day for 25.0 years (37.5 ttl pk-yrs)     Types: Cigarettes     Start date: 1967     Quit date: 1992     Years since quittin.5    Smokeless tobacco: Never   Vaping Use    Vaping status: Never Used   Substance and Sexual Activity    Alcohol use: Yes     Alcohol/week: 2.0 standard drinks of alcohol     Types: 2 Standard drinks or equivalent per week     Comment: a glass of wine or bourbon a couple times a WEEK    Drug use: No    Sexual activity: Never        Current Medications  Current Outpatient Medications on File Prior to Visit   Medication Sig Dispense Refill    albuterol (ACCUNEB) 1.25 MG/3ML nebulizer solution Take 3 mL by nebulization Every 6 (Six) Hours As Needed for Shortness of Air. 90 each 5    albuterol sulfate  (90 Base) MCG/ACT inhaler INHALE 2 PUFFS EVERY 6 (SIX) HOURS AS  "NEEDED FOR WHEEZING. 18 g 11    Blood Glucose Monitoring Suppl (FreeStyle Freedom Lite) w/Device kit       Breztri Aerosphere 160-9-4.8 MCG/ACT aerosol inhaler Inhale 2 puffs 2 (Two) Times a Day.      DULoxetine (CYMBALTA) 60 MG capsule TAKE 1 CAPSULE BY MOUTH EVERY DAY 90 capsule 3    furosemide (Lasix) 40 MG tablet Take 1 tablet by mouth Daily. 90 tablet 1    Glucose Blood (Blood Glucose Test) strip Use to test blood sugar daily before breakfast. 100 each 11    Lancets (freestyle) lancets Use to test blood sugar daily before breakfast. 100 each 12    lisinopril (PRINIVIL,ZESTRIL) 5 MG tablet Take 1 tablet by mouth Daily.      metFORMIN (GLUCOPHAGE) 1000 MG tablet Take 0.5 tablets by mouth 2 (Two) Times a Day With Meals.      Misc. Devices (Rollator Ultra-Light) misc 1 each as needed. 1 each 0    O2 (OXYGEN) Inhale 4 L/min Continuous.      rOPINIRole XL (REQUIP XL) 8 MG 24 hr tablet Take 1 tablet by mouth Every Night. 90 tablet 1    Spacer/Aero-Holding Chambers (AeroChamber Plus Morris-Vu) misc As Needed.      timolol (TIMOPTIC) 0.25 % ophthalmic solution Administer 1 drop to the right eye Every Morning.      [DISCONTINUED] chlorhexidine (PERIDEX) 0.12 % solution Apply 15 mL to the mouth or throat 2 (Two) Times a Day As Needed. For 14 days, w/1 refill      [DISCONTINUED] famotidine (Pepcid AC Maximum Strength) 20 MG tablet Take 1 tablet by mouth 2 (Two) Times a Day. 8 tablet 0    [DISCONTINUED] Fluticasone-Umeclidin-Vilant (TRELEGY) 200-62.5-25 MCG/INH inhaler Inhale 1 puff Daily. 1 each 5    [DISCONTINUED] latanoprost (XALATAN) 0.005 % ophthalmic solution Administer 1 drop to both eyes Every Night.       No current facility-administered medications on file prior to visit.       Allergies  Allergies   Allergen Reactions    Gabapentin Confusion and Hallucinations    Hydrocodone Hallucinations     \"With high doses\"    Statins Myalgia     myalgia    Cyclobenzaprine Hallucinations    Hydroxyzine Hallucinations    Codeine " "Hallucinations and Unknown (See Comments)     Hallucinations     Hallucinations    Metoclopramide Other (See Comments)     EXACERBATED RLS    Penicillins Rash     Rash    Tolerated Ceftriaxone    Tizanidine Hallucinations    Tramadol Nausea And Vomiting and GI Intolerance     VERY MILD PER PT        Objective  Visit Vitals  /58 (BP Location: Left arm, Patient Position: Sitting)   Pulse 56   Temp 97.3 °F (36.3 °C) (Temporal)   Ht 160 cm (63\")   Wt 77.6 kg (171 lb)   LMP  (LMP Unknown)   SpO2 100% Comment: 4L O2   BMI 30.29 kg/m²        Physical Exam  Physical Exam  Vitals and nursing note reviewed.   Constitutional:       General: She is not in acute distress.     Appearance: She is well-developed. She is obese. She is not ill-appearing or toxic-appearing.   HENT:      Head: Normocephalic and atraumatic.   Eyes:      Conjunctiva/sclera: Conjunctivae normal.   Cardiovascular:      Rate and Rhythm: Normal rate and regular rhythm.      Heart sounds: Murmur (systolic) heard.   Pulmonary:      Effort: Pulmonary effort is normal. No respiratory distress.      Breath sounds: Normal breath sounds.      Comments: Diminished throughout but clear  Musculoskeletal:      Right lower leg: Edema (trace) present.      Left lower leg: Edema (trace) present.   Skin:     General: Skin is warm and dry.   Neurological:      Mental Status: She is alert and oriented to person, place, and time. Mental status is at baseline.      Gait: Gait abnormal (using rollator).   Psychiatric:         Mood and Affect: Mood normal.         Behavior: Behavior normal.          Results  Results for orders placed or performed in visit on 06/10/25   POCT urinalysis dipstick, automated    Collection Time: 06/10/25 10:35 AM    Specimen: Urine   Result Value Ref Range    Color Dark Yellow Yellow, Straw, Dark Yellow, Blanche    Clarity, UA Hazy (A) Clear    Specific Gravity  1.010 1.005 - 1.030    pH, Urine 7.0 5.0 - 8.0    Leukocytes Moderate (2+) (A) " Negative    Nitrite, UA Negative Negative    Protein, POC 1+ (A) Negative mg/dL    Glucose, UA Negative Negative mg/dL    Ketones, UA Negative Negative    Urobilinogen, UA 0.2 E.U./dL Normal, 0.2 E.U./dL    Bilirubin Negative Negative    Blood, UA 1+ (A) Negative    Lot Number [id number]     Expiration Date 06/04/2026    Urine Culture - Urine, Urine, Clean Catch    Collection Time: 06/10/25 10:37 AM    Specimen: Urine, Clean Catch   Result Value Ref Range    Urine Culture >100,000 CFU/mL Escherichia coli (A)        Susceptibility    Escherichia coli - INEZ     Amoxicillin + Clavulanate  Susceptible ug/ml     Ampicillin  Susceptible ug/ml     Ampicillin + Sulbactam  Susceptible ug/ml     Cefazolin (Urine)  Susceptible ug/ml     Cefepime  Susceptible ug/ml     Ceftazidime  Susceptible ug/ml     Ceftriaxone  Susceptible ug/ml     Cefuroxime axetil  Susceptible ug/ml     Gentamicin  Susceptible ug/ml     Levofloxacin  Susceptible ug/ml     Nitrofurantoin  Susceptible ug/ml     Piperacillin + Tazobactam  Susceptible ug/ml     Trimethoprim + Sulfamethoxazole  Susceptible ug/ml        Assessment and Plan  Diagnoses and all orders for this visit:    Non-small cell cancer of left lung  H/O: lung cancer (Prior resection 2016)  - currently planning to start radiation at [facility], not a candidate for chemotherapy due to poor functional status    Chronic respiratory failure  Pulmonary hypertension  Chronic obstructive pulmonary disease, unspecified COPD type  - established with  pulmonary  - on 4L O2  - on albuterol PRN and prescribed breztri but too costly so she will call for alternative    Chronic diastolic heart failure  - improved edema on lasix 40mg daily, will continue  - continue lisinopril 5mg daily  - she will reestablish with cardiology  - she has had difficulty laying flat and is getting poor sleep in her recliner. She would benefit from a hospital bed. She requires 30-45 degree HOB elevation due to her heart  failure as well as GERD and chronic DDD. The patient also needs frequent changes in body position to alleviate pressure, pain, bed sores, and skin break down. The patient will use the hospital bed to achieve positioning and relief of symptoms not possible with a regular bed. The semi-electric bed will enable frequent changes in body position.     Acquired hypothyroidism  - was on levothyroxine 50mcg daily prior to move but now off medication  - TSH ordered    Restless leg syndrome  - stable, continue requip 8mg nightly    Mixed hyperlipidemia  - intolerant of statins and was on zetia but currently off treatment  - update lipids    Idiopathic peripheral neuropathy  - stable, on duloxetine 60mg daily    Controlled type 2 diabetes mellitus without complication, without long-term current use of insulin  - A1c 7.1, acceptable given age and comorbidities  - continue metformin 500mg BID  - urine microalbumin today    Normocytic anemia  - history of iron deficiency anemia requiring IV iron due to intolreance of oral iron. Reports last infusion in [place] about a year ago.  - currently anemic as of labs this month  - will check iron studies, B12, folate    RENETTA (Previous CPAP)  - untreated    Gastroesophageal reflux disease without esophagitis  Hiatal hernia (Prior Nissen 2008)   - stable ongoing globus sensation and difficulty laying flat due to reflux  - on OTC prilosec  - will try to get a hospital bed so that she can sleep with HOB elevated    Primary hypertension  - controlled, continue lisinopril 5mg daily and lasix 40mg daily  - CMP today      Health Maintenance   Topic Date Due    DIABETIC FOOT EXAM  Never done    ZOSTER VACCINE (2 of 2) 05/26/2021    URINE MICROALBUMIN-CREATININE RATIO (uACR)  02/22/2022    MAMMOGRAM  10/25/2024    LIPID PANEL  03/11/2025    HEMOGLOBIN A1C  08/07/2025    COLORECTAL CANCER SCREENING  08/18/2025    ANNUAL WELLNESS VISIT  08/26/2025    DIABETIC EYE EXAM  09/19/2025    COVID-19 Vaccine (6 -  2024-25 season) 08/07/2025 (Originally 9/1/2024)    RSV Vaccine - Adults (1 - 1-dose 75+ series) 07/24/2026 (Originally 9/19/2018)    INFLUENZA VACCINE  10/01/2025    TDAP/TD VACCINES (2 - Td or Tdap) 01/03/2032    Pneumococcal Vaccine 50+  Completed    DXA SCAN  Discontinued    LUNG CANCER SCREENING  Discontinued     Health Maintenance  - Pap smear: no longer indicated  - Mammogram: discontinue  - Colonoscopy: 8/2022, 3y repeat. May discontinue, discuss next visit.  - HCV: negative  - Immunizations: pneumococcal complete, Tdap 1/2022. Needs RSV, shingrix #2, COVID.  - Depression screening: screen next visit    Return in about 2 months (around 9/24/2025) for Follow up 30 minutes (may need mobility exam), Labs today.

## 2025-07-24 NOTE — OUTREACH NOTE
"AMBULATORY CASE MANAGEMENT NOTE    Names and Relationships of Patient/Support Persons: Contact: Lorrie Pagan \"Therese\"; Relationship: Self -     Patient Outreach    Received return call from patient. She reports her son is nearby and can assist. Reports she gets her oxygen via [employer]. Patient stated she preferred to get her hospital bed from a different DME supplier. Hospital bed order and office notes faxed to  [facility]. Ambulatory  will follow up with patient next week.     SDOH updated and reviewed with the patient during this program:  Continuing Care   Community & DME   [facility] [place] [address], [place] [address]    Phone: [phone]    Request Status: Pending - No Request Sent       Helena RIOS  Ambulatory Case Management    7/24/2025, 14:46 EDT  "

## 2025-07-24 NOTE — OUTREACH NOTE
"AMBULATORY CASE MANAGEMENT NOTE    Names and Relationships of Patient/Support Persons: Contact: Lorrie Pagan \"Therese\"; Relationship: Self  Contact: [facility]; Relationship: Other -     [facility] confirmed receipt of hospital bed fax.     Helena RIOS  Ambulatory Case Management    7/24/2025, 15:47 EDT  "

## 2025-07-25 LAB
ALBUMIN SERPL-MCNC: 4.2 G/DL (ref 3.5–5.2)
ALBUMIN UR-MCNC: 1.5 MG/DL
ALBUMIN/GLOB SERPL: 1.3 G/DL
ALP SERPL-CCNC: 91 U/L (ref 39–117)
ALT SERPL W P-5'-P-CCNC: 6 U/L (ref 1–33)
ANION GAP SERPL CALCULATED.3IONS-SCNC: 11 MMOL/L (ref 5–15)
AST SERPL-CCNC: 15 U/L (ref 1–32)
BILIRUB SERPL-MCNC: 0.2 MG/DL (ref 0–1.2)
BUN SERPL-MCNC: 15 MG/DL (ref 8–23)
BUN/CREAT SERPL: 24.6 (ref 7–25)
CALCIUM SPEC-SCNC: 9.5 MG/DL (ref 8.6–10.5)
CHLORIDE SERPL-SCNC: 96 MMOL/L (ref 98–107)
CHOLEST SERPL-MCNC: 191 MG/DL (ref 0–200)
CO2 SERPL-SCNC: 34 MMOL/L (ref 22–29)
CREAT SERPL-MCNC: 0.61 MG/DL (ref 0.57–1)
CREAT UR-MCNC: 73.5 MG/DL
EGFRCR SERPLBLD CKD-EPI 2021: 89.9 ML/MIN/1.73
FERRITIN SERPL-MCNC: 51.4 NG/ML (ref 13–150)
FOLATE SERPL-MCNC: 3.03 NG/ML (ref 4.78–24.2)
GLOBULIN UR ELPH-MCNC: 3.3 GM/DL
GLUCOSE SERPL-MCNC: 124 MG/DL (ref 65–99)
HDLC SERPL-MCNC: 51 MG/DL (ref 40–60)
IRON 24H UR-MRATE: 41 MCG/DL (ref 37–145)
IRON SATN MFR SERPL: 10 % (ref 20–50)
LDLC SERPL CALC-MCNC: 128 MG/DL (ref 0–100)
LDLC/HDLC SERPL: 2.49 {RATIO}
MICROALBUMIN/CREAT UR: 20.4 MG/G (ref 0–29)
POTASSIUM SERPL-SCNC: 4 MMOL/L (ref 3.5–5.2)
PROT SERPL-MCNC: 7.5 G/DL (ref 6–8.5)
SODIUM SERPL-SCNC: 141 MMOL/L (ref 136–145)
T4 FREE SERPL-MCNC: 0.9 NG/DL (ref 0.92–1.68)
TIBC SERPL-MCNC: 405 MCG/DL (ref 298–536)
TRANSFERRIN SERPL-MCNC: 272 MG/DL (ref 200–360)
TRIGL SERPL-MCNC: 64 MG/DL (ref 0–150)
TSH SERPL DL<=0.05 MIU/L-ACNC: 5.01 UIU/ML (ref 0.27–4.2)
VIT B12 BLD-MCNC: 326 PG/ML (ref 211–946)
VLDLC SERPL-MCNC: 12 MG/DL (ref 5–40)

## 2025-07-30 ENCOUNTER — TELEPHONE (OUTPATIENT)
Dept: INTERNAL MEDICINE | Facility: CLINIC | Age: 80
End: 2025-07-30

## 2025-08-07 ENCOUNTER — READMISSION MANAGEMENT (OUTPATIENT)
Dept: CALL CENTER | Facility: HOSPITAL | Age: 80
End: 2025-08-07
Payer: MEDICARE

## 2025-08-11 ENCOUNTER — TELEPHONE (OUTPATIENT)
Dept: INTERNAL MEDICINE | Facility: CLINIC | Age: 80
End: 2025-08-11
Payer: MEDICARE

## 2025-08-20 ENCOUNTER — PATIENT OUTREACH (OUTPATIENT)
Dept: CASE MANAGEMENT | Facility: OTHER | Age: 80
End: 2025-08-20
Payer: MEDICARE

## 2025-08-22 ENCOUNTER — OFFICE VISIT (OUTPATIENT)
Dept: INTERNAL MEDICINE | Facility: CLINIC | Age: 80
End: 2025-08-22
Payer: MEDICARE

## 2025-08-22 ENCOUNTER — TELEPHONE (OUTPATIENT)
Dept: INTERNAL MEDICINE | Facility: CLINIC | Age: 80
End: 2025-08-22

## 2025-08-22 VITALS
TEMPERATURE: 98.1 F | OXYGEN SATURATION: 98 % | WEIGHT: 173.2 LBS | BODY MASS INDEX: 30.69 KG/M2 | HEART RATE: 84 BPM | HEIGHT: 63 IN | SYSTOLIC BLOOD PRESSURE: 116 MMHG | DIASTOLIC BLOOD PRESSURE: 56 MMHG

## 2025-08-22 DIAGNOSIS — E03.9 ACQUIRED HYPOTHYROIDISM: ICD-10-CM

## 2025-08-22 DIAGNOSIS — E53.8 FOLIC ACID DEFICIENCY: ICD-10-CM

## 2025-08-22 DIAGNOSIS — J44.9 CHRONIC OBSTRUCTIVE PULMONARY DISEASE, UNSPECIFIED COPD TYPE: ICD-10-CM

## 2025-08-22 DIAGNOSIS — J96.11 CHRONIC RESPIRATORY FAILURE WITH HYPOXIA: Primary | ICD-10-CM

## 2025-08-22 DIAGNOSIS — G25.81 RESTLESS LEG SYNDROME: ICD-10-CM

## 2025-08-22 DIAGNOSIS — I27.20 PULMONARY HYPERTENSION: ICD-10-CM

## 2025-08-22 DIAGNOSIS — E11.9 CONTROLLED TYPE 2 DIABETES MELLITUS WITHOUT COMPLICATION, WITHOUT LONG-TERM CURRENT USE OF INSULIN: ICD-10-CM

## 2025-08-22 DIAGNOSIS — K31.84 GASTROPARESIS: ICD-10-CM

## 2025-08-22 DIAGNOSIS — D64.9 NORMOCYTIC ANEMIA: ICD-10-CM

## 2025-08-22 DIAGNOSIS — C34.92 NON-SMALL CELL CANCER OF LEFT LUNG: ICD-10-CM

## 2025-08-22 DIAGNOSIS — R40.4 EPISODIC ALTERED AWARENESS: ICD-10-CM

## 2025-08-22 RX ORDER — LEVOTHYROXINE SODIUM 50 UG/1
50 TABLET ORAL DAILY
Qty: 90 TABLET | Refills: 4 | Status: SHIPPED | OUTPATIENT
Start: 2025-08-22

## 2025-08-22 RX ORDER — FOLIC ACID 1 MG/1
1 TABLET ORAL DAILY
Qty: 90 TABLET | Refills: 4 | Status: SHIPPED | OUTPATIENT
Start: 2025-08-22

## 2025-08-22 RX ORDER — LANOLIN ALCOHOL/MO/W.PET/CERES
1000 CREAM (GRAM) TOPICAL DAILY
Qty: 90 TABLET | Refills: 4 | Status: SHIPPED | OUTPATIENT
Start: 2025-08-22

## 2025-08-22 RX ORDER — CETIRIZINE HYDROCHLORIDE 10 MG/1
10 TABLET ORAL DAILY
COMMUNITY

## 2025-08-22 RX ORDER — LANOLIN ALCOHOL/MO/W.PET/CERES
1000 CREAM (GRAM) TOPICAL DAILY
Qty: 90 TABLET | Refills: 4 | Status: SHIPPED | OUTPATIENT
Start: 2025-08-22 | End: 2025-08-22

## 2025-08-22 RX ORDER — FOLIC ACID 1 MG/1
1 TABLET ORAL DAILY
Qty: 90 TABLET | Refills: 4 | Status: SHIPPED | OUTPATIENT
Start: 2025-08-22 | End: 2025-08-22

## 2025-08-22 RX ORDER — LATANOPROST 50 UG/ML
1 SOLUTION/ DROPS OPHTHALMIC NIGHTLY
COMMUNITY

## 2025-08-26 DIAGNOSIS — E03.9 ACQUIRED HYPOTHYROIDISM: ICD-10-CM

## 2025-08-26 DIAGNOSIS — D64.9 NORMOCYTIC ANEMIA: ICD-10-CM

## 2025-08-26 DIAGNOSIS — E53.8 FOLIC ACID DEFICIENCY: ICD-10-CM

## 2025-08-26 RX ORDER — LEVOTHYROXINE SODIUM 50 UG/1
50 TABLET ORAL DAILY
Qty: 90 TABLET | Refills: 4 | Status: CANCELLED | OUTPATIENT
Start: 2025-08-26

## 2025-08-26 RX ORDER — LANOLIN ALCOHOL/MO/W.PET/CERES
1000 CREAM (GRAM) TOPICAL DAILY
Qty: 90 TABLET | Refills: 4 | Status: CANCELLED | OUTPATIENT
Start: 2025-08-26

## 2025-08-26 RX ORDER — FOLIC ACID 1 MG/1
1 TABLET ORAL DAILY
Qty: 90 TABLET | Refills: 4 | Status: CANCELLED | OUTPATIENT
Start: 2025-08-26

## 2025-08-28 ENCOUNTER — PATIENT OUTREACH (OUTPATIENT)
Dept: CASE MANAGEMENT | Facility: OTHER | Age: 80
End: 2025-08-28
Payer: MEDICARE

## 2025-08-29 ENCOUNTER — OFFICE VISIT (OUTPATIENT)
Dept: INTERNAL MEDICINE | Facility: CLINIC | Age: 80
End: 2025-08-29
Payer: MEDICARE

## 2025-08-29 VITALS
HEIGHT: 63 IN | SYSTOLIC BLOOD PRESSURE: 142 MMHG | WEIGHT: 173.2 LBS | BODY MASS INDEX: 30.69 KG/M2 | HEART RATE: 86 BPM | DIASTOLIC BLOOD PRESSURE: 54 MMHG | TEMPERATURE: 98.9 F | OXYGEN SATURATION: 100 %

## 2025-08-29 DIAGNOSIS — J96.11 CHRONIC RESPIRATORY FAILURE WITH HYPOXIA: ICD-10-CM

## 2025-08-29 DIAGNOSIS — J47.9 IDIOPATHIC BRONCHIECTASIS: ICD-10-CM

## 2025-08-29 DIAGNOSIS — J30.9 ALLERGIC RHINITIS, UNSPECIFIED SEASONALITY, UNSPECIFIED TRIGGER: Primary | ICD-10-CM

## 2025-08-29 LAB
EXPIRATION DATE: NORMAL
FLUAV AG UPPER RESP QL IA.RAPID: NOT DETECTED
FLUBV AG UPPER RESP QL IA.RAPID: NOT DETECTED
INTERNAL CONTROL: NORMAL
Lab: NORMAL
SARS-COV-2 AG UPPER RESP QL IA.RAPID: NOT DETECTED

## 2025-08-29 RX ORDER — FLUTICASONE PROPIONATE 50 MCG
1-2 SPRAY, SUSPENSION (ML) NASAL DAILY PRN
Qty: 16 G | Refills: 0 | Status: SHIPPED | OUTPATIENT
Start: 2025-08-29

## 2025-08-29 RX ORDER — GUAIFENESIN 600 MG/1
1200 TABLET, EXTENDED RELEASE ORAL 2 TIMES DAILY
Qty: 360 TABLET | Refills: 0 | Status: SHIPPED | OUTPATIENT
Start: 2025-08-29

## (undated) DEVICE — SNAR POLYP CAPTIVATOR RND STFF 2.4 240CM 10MM 1P/U

## (undated) DEVICE — "MH-438 A/W VLVE F/140 EVIS-140": Brand: AIR/WATER VALVE

## (undated) DEVICE — CONTN GRAD MEAS TRIANG 32OZ BLK

## (undated) DEVICE — BOWL UTIL STRL 32OZ

## (undated) DEVICE — "MH-443 SUCTION VALVE F/EVIS140 EVIS160": Brand: SUCTION VALVE

## (undated) DEVICE — ESOPHAGEAL WIREGUIDED BALLOON DILATATION CATHETER: Brand: CRE WIREGUIDED

## (undated) DEVICE — TRAP,MUCUS SPECIMEN,40CC: Brand: MEDLINE

## (undated) DEVICE — SYR LUERLOK 50ML

## (undated) DEVICE — SINGLE-USE BIOPSY FORCEPS: Brand: RADIAL JAW 4

## (undated) DEVICE — DEV INFL CRE STERIFLATE 60CC DISP

## (undated) DEVICE — TUBING,OXYGEN,CRUSH RES,7',CLEAR,UC: Brand: MEDLINE INDUSTRIES, INC.

## (undated) DEVICE — SINGLE USE BIOPSY VALVE MAJ-210: Brand: SINGLE USE BIOPSY VALVE (STERILE)

## (undated) DEVICE — THE BITE BLOCK MAXI, LATEX FREE STRAP IS USED TO PROTECT THE ENDOSCOPE INSERTION TUBE FROM BEING BITTEN BY THE PATIENT.

## (undated) DEVICE — ENDOGATOR HYBRID TUBING KIT FOR USE WITH ENDOGATOR IRRIGATION PUMP, OLYMPUS PUMP, GI4000 ESU, AND TORRENT IRRIGATION PUMP.: Brand: ENDOGATOR KIT

## (undated) DEVICE — Device: Brand: AIR/WATER CHANNEL CLEANING ADAPTER

## (undated) DEVICE — SINGLE USE SUCTION VALVE MAJ-209: Brand: SINGLE USE SUCTION VALVE (STERILE)

## (undated) DEVICE — THE SINGLE USE ETRAP – POLYP TRAP IS USED FOR SUCTION RETRIEVAL OF ENDOSCOPICALLY REMOVED POLYPS.: Brand: ETRAP

## (undated) DEVICE — SYR SLP TP 10ML DISP

## (undated) DEVICE — BRUSH CYTO BRONCOSCOPE

## (undated) DEVICE — Device: Brand: SINGLE USE ASPIRATION NEEDLE NA-U401SX

## (undated) DEVICE — SYR LUER SLPTP 50ML